# Patient Record
Sex: MALE | Race: WHITE | NOT HISPANIC OR LATINO | Employment: FULL TIME | URBAN - METROPOLITAN AREA
[De-identification: names, ages, dates, MRNs, and addresses within clinical notes are randomized per-mention and may not be internally consistent; named-entity substitution may affect disease eponyms.]

---

## 2016-10-14 LAB — PHQ9 SCORE: 10

## 2017-03-13 LAB
CHOLEST SERPL-MCNC: 171 MG/DL (ref 100–199)
CREAT SERPL-MCNC: 0.71 MG/DL (ref 0.72–1.25)
GFR SERPL CREATININE-BSD FRML MDRD: >60 ML/MIN/1.73M2
GLUCOSE SERPL-MCNC: 176 MG/DL (ref 65–100)
HBA1C MFR BLD: 9.4 % (ref 0–5.7)
HDLC SERPL-MCNC: 37 MG/DL
LDLC SERPL CALC-MCNC: 97 MG/DL
NONHDLC SERPL-MCNC: 134 MG/DL
POTASSIUM SERPL-SCNC: 4.2 MMOL/L (ref 3.5–5)
TRIGL SERPL-MCNC: 183 MG/DL

## 2017-05-15 ENCOUNTER — TRANSFERRED RECORDS (OUTPATIENT)
Dept: HEALTH INFORMATION MANAGEMENT | Facility: CLINIC | Age: 32
End: 2017-05-15

## 2017-05-15 LAB — HBA1C MFR BLD: 10.4 % (ref 0–5.7)

## 2017-06-13 ENCOUNTER — OFFICE VISIT (OUTPATIENT)
Dept: SURGERY | Facility: CLINIC | Age: 32
End: 2017-06-13
Payer: COMMERCIAL

## 2017-06-13 VITALS
SYSTOLIC BLOOD PRESSURE: 144 MMHG | HEIGHT: 73 IN | HEART RATE: 103 BPM | OXYGEN SATURATION: 98 % | TEMPERATURE: 99 F | DIASTOLIC BLOOD PRESSURE: 88 MMHG | RESPIRATION RATE: 22 BRPM | BODY MASS INDEX: 41.75 KG/M2 | WEIGHT: 315 LBS

## 2017-06-13 DIAGNOSIS — F41.1 GAD (GENERALIZED ANXIETY DISORDER): ICD-10-CM

## 2017-06-13 DIAGNOSIS — I10 ESSENTIAL HYPERTENSION: ICD-10-CM

## 2017-06-13 DIAGNOSIS — Z13.21 SCREENING FOR ENDOCRINE, NUTRITIONAL, METABOLIC AND IMMUNITY DISORDER: ICD-10-CM

## 2017-06-13 DIAGNOSIS — E66.01 MORBID OBESITY WITH BMI OF 45.0-49.9, ADULT (H): Primary | ICD-10-CM

## 2017-06-13 DIAGNOSIS — Z79.4 TYPE 2 DIABETES MELLITUS WITH HYPERGLYCEMIA, WITH LONG-TERM CURRENT USE OF INSULIN (H): ICD-10-CM

## 2017-06-13 DIAGNOSIS — Z13.0 SCREENING FOR ENDOCRINE, NUTRITIONAL, METABOLIC AND IMMUNITY DISORDER: ICD-10-CM

## 2017-06-13 DIAGNOSIS — M72.2 PLANTAR FASCIITIS: ICD-10-CM

## 2017-06-13 DIAGNOSIS — Z13.0 SCREENING FOR IRON DEFICIENCY ANEMIA: ICD-10-CM

## 2017-06-13 DIAGNOSIS — E66.01 MORBID OBESITY WITH BMI OF 45.0-49.9, ADULT (H): ICD-10-CM

## 2017-06-13 DIAGNOSIS — G89.29 CHRONIC RIGHT-SIDED LOW BACK PAIN WITH RIGHT-SIDED SCIATICA: ICD-10-CM

## 2017-06-13 DIAGNOSIS — E78.2 MIXED HYPERLIPIDEMIA: ICD-10-CM

## 2017-06-13 DIAGNOSIS — M54.41 CHRONIC RIGHT-SIDED LOW BACK PAIN WITH RIGHT-SIDED SCIATICA: ICD-10-CM

## 2017-06-13 DIAGNOSIS — E66.9 DIABETES MELLITUS TYPE 2 IN OBESE: ICD-10-CM

## 2017-06-13 DIAGNOSIS — Z13.29 SCREENING FOR ENDOCRINE, NUTRITIONAL, METABOLIC AND IMMUNITY DISORDER: ICD-10-CM

## 2017-06-13 DIAGNOSIS — E11.69 DIABETES MELLITUS TYPE 2 IN OBESE: ICD-10-CM

## 2017-06-13 DIAGNOSIS — Z13.228 SCREENING FOR ENDOCRINE, NUTRITIONAL, METABOLIC AND IMMUNITY DISORDER: ICD-10-CM

## 2017-06-13 DIAGNOSIS — G89.29 CHRONIC PAIN OF RIGHT KNEE: ICD-10-CM

## 2017-06-13 DIAGNOSIS — E11.65 TYPE 2 DIABETES MELLITUS WITH HYPERGLYCEMIA, WITH LONG-TERM CURRENT USE OF INSULIN (H): ICD-10-CM

## 2017-06-13 DIAGNOSIS — M25.561 CHRONIC PAIN OF RIGHT KNEE: ICD-10-CM

## 2017-06-13 PROCEDURE — 97802 MEDICAL NUTRITION INDIV IN: CPT | Performed by: DIETITIAN, REGISTERED

## 2017-06-13 PROCEDURE — 99205 OFFICE O/P NEW HI 60 MIN: CPT | Performed by: PHYSICIAN ASSISTANT

## 2017-06-13 RX ORDER — LOSARTAN POTASSIUM AND HYDROCHLOROTHIAZIDE 12.5; 1 MG/1; MG/1
1 TABLET ORAL DAILY
COMMUNITY
End: 2017-07-10

## 2017-06-13 RX ORDER — AMLODIPINE BESYLATE AND ATORVASTATIN CALCIUM 10; 40 MG/1; MG/1
1 TABLET, FILM COATED ORAL DAILY
COMMUNITY
End: 2017-07-10

## 2017-06-13 RX ORDER — TRIAMTERENE AND HYDROCHLOROTHIAZIDE 37.5; 25 MG/1; MG/1
1 CAPSULE ORAL EVERY MORNING
COMMUNITY
End: 2017-07-10

## 2017-06-13 RX ORDER — BISMUTH SUBSALICYLATE 262MG/15ML
1 SUSPENSION, ORAL (FINAL DOSE FORM) ORAL DAILY
COMMUNITY
End: 2018-05-04 | Stop reason: ALTCHOICE

## 2017-06-13 RX ORDER — TRAZODONE HYDROCHLORIDE 100 MG/1
100 TABLET ORAL AT BEDTIME
COMMUNITY
End: 2018-04-13

## 2017-06-13 RX ORDER — GLYBURIDE-METFORMIN HYDROCHLORIDE 5; 500 MG/1; MG/1
1 TABLET ORAL 2 TIMES DAILY WITH MEALS
COMMUNITY
End: 2017-07-10

## 2017-06-13 NOTE — MR AVS SNAPSHOT
MRN:[id number]                      After Visit Summary   6/13/2017    Naman Jones    MRN: [id number]           Visit Information        Provider Department      6/13/2017 10:30 AM 1, Adele Landa RD [facility]      Your next 10 appointments already scheduled     Jul 10, 2017  1:00 PM CDT   New Visit with Leilani Tavera MD   [facility]    [address]  [address]  [facility] [address] [phone]            Jul 14, 2017  8:30 AM CDT   Weight Loss Visit with Adele Landa 2, RD   [facility])    [address] [place] [address] [address]  [facility] [address] [phone]              MyChart Information     [employer] gives you secure access to your electronic health record. If you see a primary care provider, you can also send messages to your care team and make appointments. If you have questions, please call your primary care clinic.  If you do not have a primary care provider, please call [phone] and they will assist you.        Care EveryWhere ID     This is your Care EveryWhere ID. This could be used by other organizations to access your [facility] medical records  [id number]

## 2017-06-13 NOTE — MR AVS SNAPSHOT
After Visit Summary   6/13/2017    Naman Jones    MRN: [id number]           Patient Information     Date Of Birth          1985        Visit Information        Provider Department      6/13/2017 9:30 AM Lilibeth Ying PA-C [facility]      Today's Diagnoses     Morbid obesity with BMI of 45.0-49.9, adult (H)    -  1    Essential hypertension        Mixed hyperlipidemia        Diabetes mellitus type 2 in obese (H)        Plantar fasciitis        Chronic right-sided low back pain with right-sided sciatica        Chronic pain of right knee        Type 2 diabetes mellitus with hyperglycemia, with long-term current use of insulin (H)        JOEL (generalized anxiety disorder)        Screening for iron deficiency anemia        Screening for endocrine, nutritional, metabolic and immunity disorder          Care Instructions    Please refer to your task list created today at your appointment.  Make appointment to return to clinic in 1 month to see the dietician.  Have initial labs drawn.     [facility] if you would like to have your blood tests drawn today or call [phone] to make an appointment to have them drawn on another day.  You may also get your blood drawn at your [facility] clinic.            Follow-ups after your visit        Additional Services     ENDOCRINOLOGY ADULT REFERRAL       Your provider has referred you to: [facility]: [facility] [phone]   [email or web address]    Reason Uncontrolled Type II diabetes.  Preparing for baraitric surgery.  HgA1C must be below 9 prior to surgery.       Please be aware that coverage of these services is subject to the terms and limitations of your health insurance plan.  Call member services at your health plan with any benefit or coverage questions.      Please bring the following to your  "appointment:    >>   Any x-rays, CTs or MRIs which have been performed.  Contact the facility where they were done to arrange for  prior to your scheduled appointment.    >>   List of current medications   >>   This referral request   >>   Any documents/labs given to you for this referral            NUTRITION REFERRAL       Type of nutrition instruction needed: Weight Loss  Your provider has referred you to:     [facility]            PSYCHOLOGY REFERRAL       You will need to undergo Bariatric Psychological Assessment.  Below is a list of preferred bariatric psychologists.    When you call the psychologist's office, please specify you need a \"screening psychological assessment for bariatric surgery.\" This includes a bariatric surgery evaluation and MMPI.  The report should be mailed /faxed to our office at [phone]. Please see psychologist list given to you in your initial packet.                    Follow-up notes from your care team     Return in 1 month (on 7/13/2017).      Future tests that were ordered for you today     Open Future Orders        Priority Expected Expires Ordered    CBC with platelets Routine 6/13/2017 12/10/2017 6/13/2017    Comprehensive metabolic panel Routine 6/13/2017 12/10/2017 6/13/2017    Hemoglobin A1c Routine 6/13/2017 12/10/2017 6/13/2017    Vitamin D Deficiency Routine 6/13/2017 12/10/2017 6/13/2017            Who to contact     If you have questions or need follow up information about today's clinic visit or your schedule please contact [facility] directly at [phone].  Normal or non-critical lab and imaging results will be communicated to you by MyChart, letter or phone within 4 business days after the clinic has received the results. If you do not hear from us within 7 days, please contact the clinic through MyChart or phone. If you have a critical or abnormal lab result, we will notify you by phone as " "soon as possible.  Submit refill requests through [employer] or call your pharmacy and they will forward the refill request to us. Please allow 3 business days for your refill to be completed.          Additional Information About Your Visit        Mobile [facility] Information     [employer] gives you secure access to your electronic health record. If you see a primary care provider, you can also send messages to your care team and make appointments. If you have questions, please call your primary care clinic.  If you do not have a primary care provider, please call [phone] and they will assist you.        Care EveryWhere ID     This is your Care EveryWhere ID. This could be used by other organizations to access your [facility] medical records  [id number]        Your Vitals Were     Pulse Temperature Respirations Height Pulse Oximetry BMI (Body Mass Index)    103 99  F (37.2  C) 22 6' 1\" (1.854 m) 98% 46.1 kg/m2       Blood Pressure from Last 3 Encounters:   06/13/17 144/88    Weight from Last 3 Encounters:   06/13/17 (!) 349 lb 6.4 oz (158.5 kg)              We Performed the Following     ENDOCRINOLOGY ADULT REFERRAL     NUTRITION REFERRAL     OP ROOMING NOTE TO [facility]     PSYCHOLOGY REFERRAL        Primary Care Provider Office Phone # Fax #    Gilda Hernadez [phone]       [facility] [address] [place] [address]        Thank you!     Thank you for choosing [facility]  for your care. Our goal is always to provide you with excellent care. Hearing back from our patients is one way we can continue to improve our services. Please take a few minutes to complete the written survey that you may receive in the mail after your visit with us. Thank you!             Your Updated Medication List - Protect others around you: Learn how to safely use, store and throw away your medicines at www.disposemymeds.org.          This list is accurate as of: 6/13/17 10:45 " AM.  Always use your most recent med list.                   Brand Name Dispense Instructions for use    amLODIPine-atorvastatin 10-40 MG per tablet    CADUET     Take 1 tablet by mouth daily       blood glucose monitoring lancets      1 each by In Vitro route daily As directed.1 box. For testing blood sugar at home       blood glucose monitoring test strip    no brand specified     1 strip by In Vitro route 2 times daily Test twice daily       dulaglutide 1.5 MG/0.5ML pen    TRULICITY     Inject 1.5 mg Subcutaneous every 7 days       glyBURIDE-metFORMIN 5-500 MG per tablet    GLUCOVANCE     Take 1 tablet by mouth 2 times daily (with meals)       HumaLOG MIX 50/50 KWIKPEN 100 UNITS/ML susp   Generic drug:  insulin lispro prot & lispro      Inject 10 Units Subcutaneous 3 times daily (before meals)       LORAZEPAM PO      Take 1 mg by mouth 2 times daily       losartan-hydrochlorothiazide 100-12.5 MG per tablet    HYZAAR     Take 1 tablet by mouth daily       SERTRALINE HCL PO      Take 100 mg by mouth daily       traZODone 100 MG tablet    DESYREL     Take 100 mg by mouth At Bedtime       TRESIBA FLEXTOUCH 200 UNIT/ML pen   Generic drug:  insulin degludec      Inject 116 Units Subcutaneous At Bedtime       triamterene-hydrochlorothiazide 37.5-25 MG per capsule    DYAZIDE     Take 1 capsule by mouth every morning

## 2017-06-13 NOTE — PATIENT INSTRUCTIONS
Please refer to your task list created today at your appointment.  Make appointment to return to clinic in 1 month to see the dietician.  Have initial labs drawn.     [facility] if you would like to have your blood tests drawn today or call [phone] to make an appointment to have them drawn on another day.  You may also get your blood drawn at your [facility] clinic.

## 2017-06-13 NOTE — PROGRESS NOTES
[facility]  [address]  [place] [address]  Phone [phone] Fax [phone]    Date: 2017    RE:   MICK CHOWDHURY  MR#: [id number]  : 1985    Dear Emili Luna    I had the pleasure of seeing your patient, MICK CHOWDHURY, to evaluate his obesity and consider him for possible weight loss surgery.  As you know, MICK is 32 years old. He has been overweight since early childhood. He has been morbidly obese for the last 10 years and has been unable to achieve long-term success with weight loss attempts, such as: [place]ia, [place], Calorie Counting.    Comorbidities of obesity from his past medical history include: High blood pressure, High cholesterol, Diabetes type II, Plantar fasciitis, Sciatica, Low back pain, Intertrigo.     The symptoms related to his obesity include elevated blood sugars, Hip pain, Knee pain, Back pain, intertrigo. The following ADLs are hindered due to his weight: Transfer on/off toilet, Transfer in/out of shower/tub, Not enough energy to complete routine daily tasks.    Pts wife,Brit, had gastric bypass surgery about 1.5 years ago.  Pt has been working with the [facility] for a year and a half.  Has been having trouble getting he Hemoglobin A1C under 7.4 which is a requirement for him to have surgery there.  Pt is frustrated with the lack of care coordination and is hoping to transfer care to our program.       Non-Obesity Related Past Medical History:  Anxiety  Depression  Binge Eating Disorder diagnosed 2016-Did 6 months of treatment and now working with Hannah Antonio PsyD at [facility].  Acne  Subluxing patella    Past Surgical History:No previous bleeding, anesthesia, or nausea concerns with surgery.  [place] teeth   Knee Arthroscopy    Family History:  Father- Diabetes;High cholesterol  Mother- Obese  PGF- Prostate Cancer  PGM- Diabetes  MGM- Breast Cancer  Sibling 1- gall bladder problems;High  "cholesterol;Obese    Social History:  He is  to Brit.   Brody is going to be 3 in September.  He is in .   ETOH: Weekly, 4 drinks at a time.  Illicit Drug Use: None  Tobacco Use: No  Support system: spouse, mom, sister will be available to help the patient in the early post op period. spouse, mom, sister, raine, grace, bertram, han is who the patient can count on for support throughout his weight loss journey.  Can you afford three meals per day? Yes  Can you afford the $50-60 per month for vitamins? Yes    A 14 point review of system shows:  Gastrointestinal: Elevated blood sugars, Diarrhea- secondary to metformin  HEENT: Headaches ,  Musculoskeletal:  Hip pain, Knee pain, Back pain,  Psychological: Anxiety, Depression,  Skin: intertrigo  Endocrinology: Diabetes Type II diagnosed last year with a HgA1C at 11.8.  Medication management with PCP. Sees Enedelia Candelaria RD for diabetic diet management with Emili Vazquez - Most recent HgA1C 10.4.      /88  Pulse 103  Temp 99  F (37.2  C)  Resp 22  Ht 6' 1\" (1.854 m)  Wt (!) 349 lb 6.4 oz (158.5 kg)  SpO2 98%  BMI 46.1 kg/m2    PHYSICAL EXAMINATION:    GENERAL: obese, good health, good development, and normal affect., Alert and oriented x3. NAD  HEENT: Trachea midline, Neck supple without lymphadenopathy, thyroidmegaly, or mass., Oral dentition adequate for chewing  CARDIOVASCULAR: Regular rate and rhythm, No murmurs, rubs or gallops  RESPIRATORY: Good breath sounds, Lung sounds clear to auscultation bilaterally  GASTROINTESTINAL: Obese, Soft, Nontender, Non-distended, No organomeagly or masses  LOWER EXTREMITIES: No LE edema. No cyanosis, ulceration, or chronic venous stasis  MUSCULOSKELETAL: Normal gait, Moves all 4 extremities equal and strong  NEUROLOGIC: cranial nerves II-XII grossly intact  SKIN: No intertriginous irritation or rash    In summary, MICK is morbidly obese with a body mass index of 46.1 kg/m2 and the " comorbidities stated above. He attended an informational seminar and is a candidate for Gastric Bypass (Snehal-en-Y). He will have to complete the following pre-requisites:    Received weight loss goal of 5 lb prior to surgery.  Hemoglobin A1C under 9 before surgery  Records from [facility] regarding Binge Eating Disorder  Achieve clearance from dietitian to see surgeon.  Have preoperative laboratory tests drawn.  Letter of support from current therapist.  Psychological Evaluation with MMPI and clearance for weight loss surgery.    Today in the office we discussed gastric bypass surgery. Preoperative, perioperative, and postoperative processes, management, and follow up were addressed. Risks and benefits were outlined including the risk of death, PE, DVT, ulcer, N/V, stricture, hernia, wound infection, and vitamin deficiencies. All questions were answered. A goal sheet and support group handout were given to the patient.    Once the patient has completed the requirements set forth in paragraph one, and there are no further recommendations, he will be allowed to see the surgeon of his choice for consultation on the Gastric Bypass (Snehal-en-Y) surgery. Patient verbalizes understanding of the process to surgery and expectations for the postoperative period including the need for lifelong lifestyle changes, vitamin supplementation, and laboratory monitoring.    Thank you for allowing us to participate in his care.    Sincerely,        Lilibeth Ying PA-C      At [facility] we are committed to providing you exceptional care. Our surgeons specialize in performing the following minimally invasive weight-loss surgeries:    Snehal-en-Y gastric bypass  Laparoscopic adjustable gastric band  Sleeve gastrectomy    If you would like to review aspects of our weight loss surgery program, please visit our website at [email or web address]. If you have any questions, please do not hesitate to contact us at [phone].

## 2017-06-14 NOTE — PROGRESS NOTES
"late entry (patient seen on 17)  Name: MICK CHOWDHURY  : 1985  Gender: Male  MRN: [id number]  Age: 32  INITIAL BARIATRIC NUTRITION ASSESSMENT  REASON FOR VISIT:  MICK CHOWDHURY is a 32 year old Male presents today for initial nutrition visit for bariatric surgery. He was accompanied by his spouse, Brit.  DIAGNOSIS:  Morbid Obesity.  ANTHROPOMETRICS:  Height: 73 inches  Weight: 349.4 lbs  BMI: 46.1 kg/m2  CURRENT SUPPLEMENTS:  none  WEIGHT LOSS ATTEMPTS:  Atkins, [place], Calorie Counting  Prescription diet medications:  None  NUTRITION HISTORY:  Meals per day: 3  Snacking: No  Breakfast: Protein Shake ( Whey) and Yogurt-within 1 hour of waking  Lunch: [place] or soup, fruit or yogurt or cheese  Supper: Chicken Stir oquendo, side or ham/cheese/ green peppers omelet  Snacks: Bedtime some cereal with 2% milk or 1 cup shelled peanuts  Drinks: 3  Emotional eating: No  Binge eating: No-see comments  Night eating: No  Can you afford three meals per day? Yes  Can you afford the $50-60 per month for vitamins? Yes  Comments: patient goes by \"Chirag;\"pt worked with [facility] for 6 months on binge Eating Disorder and has been discharged from that program per feed back from Lilibeth Ying PA-C (she has requested records form that program); wife had weight loss surgery in ; sister had sleeve gastrostomy; pt and his wife share the cooking; pt was in the surgical weight loss program at [facility] for ~ 1 year (was having trouble meeting HGBA1C requirements and was not happy with the program in general) ; has on 4 year old son; pt works in the [employer] industry  DINING OUT HISTORY:  Frequency per week: Around once a week  Location: fast-casual  Type of food: hamburger, diet soda  PHYSICAL ACTIVITY:  None  NUTRITION DIAGNOSIS:  Patient with excessive oral food/beverage intake related to intake of calorically dense food/beverages at meals and/or snacks as evidenced by BMI of " 46.1  INTERVENTION:  Nutrition Prescription: Recommend nutrient/ energy modification   Goals:  Start: 1 multivitamin mineral, 2000 International Units Vitamin D, 1200 mg calcium with vitamin D (2-3 separate doses)  Avoid all alcohol (pt chose this goal) Limit diet pop to 12 oz. per day  Implementation:  Provided written guidelines for Gastric Bypass (Snehal-en-Y) surgery.  Provided weight loss suggestions.  Explained diet changes that would occur after surgery.  Emphasized importance of diet and lifestyle modifications.  Discussed necessity for chewable multivitamin/ mineral supplements, calcium with vitamin D, vitamin B-12, vitamin D supplements.  NUTRITION MONITORING AND EVALUATION:  Verbalizes understanding of surgery diet guidelines.  Anticipated compliance: fair-good    FOLLOW UP:  Recommend 2 to 3 follow up visits to assist with lifestyle changes or per insurance requirements.  RD name and number provided.  TIME SPENT WITH PATIENT: 50 minutes  Darius Montenegro RD, LD  [facility]  [phone]

## 2017-06-15 ENCOUNTER — TRANSFERRED RECORDS (OUTPATIENT)
Dept: HEALTH INFORMATION MANAGEMENT | Facility: CLINIC | Age: 32
End: 2017-06-15

## 2017-06-27 ENCOUNTER — TELEPHONE (OUTPATIENT)
Dept: SURGERY | Facility: CLINIC | Age: 32
End: 2017-06-27

## 2017-06-27 NOTE — TELEPHONE ENCOUNTER
Left message for therapist at [facility].  I reviewed records from [facility].  Pt has binge eating disorder and is looking to having bariatric surgery.  Was binging 2x weekly most recently Dec 2016.  Asked her to return my call so we could discuss his case.  (Pts with active eating disorder much complete treatment before continuing in bariatric program.)  Will await her follow up call.

## 2017-07-10 ENCOUNTER — OFFICE VISIT (OUTPATIENT)
Dept: ENDOCRINOLOGY | Facility: CLINIC | Age: 32
End: 2017-07-10
Payer: COMMERCIAL

## 2017-07-10 VITALS
BODY MASS INDEX: 40.43 KG/M2 | DIASTOLIC BLOOD PRESSURE: 84 MMHG | WEIGHT: 315 LBS | OXYGEN SATURATION: 98 % | TEMPERATURE: 98.4 F | HEART RATE: 108 BPM | SYSTOLIC BLOOD PRESSURE: 118 MMHG | HEIGHT: 74 IN

## 2017-07-10 DIAGNOSIS — Z79.4 TYPE 2 DIABETES MELLITUS WITH HYPERGLYCEMIA, WITH LONG-TERM CURRENT USE OF INSULIN (H): Primary | ICD-10-CM

## 2017-07-10 DIAGNOSIS — I10 ESSENTIAL HYPERTENSION: ICD-10-CM

## 2017-07-10 DIAGNOSIS — E66.01 MORBID OBESITY WITH BMI OF 45.0-49.9, ADULT (H): ICD-10-CM

## 2017-07-10 DIAGNOSIS — E11.65 TYPE 2 DIABETES MELLITUS WITH HYPERGLYCEMIA, WITH LONG-TERM CURRENT USE OF INSULIN (H): Primary | ICD-10-CM

## 2017-07-10 DIAGNOSIS — E78.2 MIXED HYPERLIPIDEMIA: ICD-10-CM

## 2017-07-10 LAB — HBA1C MFR BLD: 8.4 % (ref 4.3–6)

## 2017-07-10 PROCEDURE — 82043 UR ALBUMIN QUANTITATIVE: CPT | Performed by: INTERNAL MEDICINE

## 2017-07-10 PROCEDURE — 83516 IMMUNOASSAY NONANTIBODY: CPT | Mod: 90 | Performed by: INTERNAL MEDICINE

## 2017-07-10 PROCEDURE — 99000 SPECIMEN HANDLING OFFICE-LAB: CPT | Performed by: INTERNAL MEDICINE

## 2017-07-10 PROCEDURE — 82565 ASSAY OF CREATININE: CPT | Performed by: INTERNAL MEDICINE

## 2017-07-10 PROCEDURE — 84681 ASSAY OF C-PEPTIDE: CPT | Performed by: INTERNAL MEDICINE

## 2017-07-10 PROCEDURE — 83721 ASSAY OF BLOOD LIPOPROTEIN: CPT | Performed by: INTERNAL MEDICINE

## 2017-07-10 PROCEDURE — 99204 OFFICE O/P NEW MOD 45 MIN: CPT | Performed by: INTERNAL MEDICINE

## 2017-07-10 PROCEDURE — 36415 COLL VENOUS BLD VENIPUNCTURE: CPT | Performed by: INTERNAL MEDICINE

## 2017-07-10 PROCEDURE — 83036 HEMOGLOBIN GLYCOSYLATED A1C: CPT | Performed by: INTERNAL MEDICINE

## 2017-07-10 PROCEDURE — 84443 ASSAY THYROID STIM HORMONE: CPT | Performed by: INTERNAL MEDICINE

## 2017-07-10 RX ORDER — ATORVASTATIN CALCIUM 40 MG/1
40 TABLET, FILM COATED ORAL DAILY
COMMUNITY
End: 2018-04-18

## 2017-07-10 RX ORDER — LOSARTAN POTASSIUM 100 MG/1
100 TABLET ORAL DAILY
COMMUNITY
End: 2018-02-07

## 2017-07-10 RX ORDER — TRIAMTERENE AND HYDROCHLOROTHIAZIDE 75; 50 MG/1; MG/1
1 TABLET ORAL DAILY
COMMUNITY
End: 2018-04-18

## 2017-07-10 NOTE — PROGRESS NOTES
ENDOCRINOLOGY CLINIC NOTE:  Name: Naman Jones  Seen at the request of Gilda Hernadez for Diabetes. Is here with wife.  HPI:  Naman Jones is a 32 year old male who presents for the evaluation/management of Diabetes.  Was seen at bariatric surgery clinic for consideration of gastric bypass surgery and was noted to have high A1c.  He was referred to endocrinology for management of type 2 diabetes.  He was getting care at [facility] before.  Limited records available and were reviewed.    1. Type 2 DM:  Orginally diagnosed at the age of: 30. On routine physical exam. Was loosing wt at that time.  On insulin X 1 year (tresiba). Humalog X 3 months  Current Regimen: Tresiba 116 Units hs, Humalog 11 Units TID, Trulicity 1.5 mg once a week andMetformin 1000 mg BID.  BS checks: 1-2 times a day  Average Meter Download: Patient did not bring glucometer. Without Blood sugar data it is difficult to make adjustment to medical regimen.   FBGs 150-160.  Exercise: not much  Last A1c: 10.4%  Symptoms of hypoglycemia (low blood sugar):  Gets symptoms of hypoglycemia.  Episodes of hypoglycemia: no  Fixed meal pattern: yes  Patient counting carbs: no    DM Complications:   Nephropathy: no  Retinopathy: no  Neuropathy: + numbness in toes  Microalbuminuria: no  No results found for: MICROL  No results found for: MICROALBUMIN    CAD/PAD: no  Gastroparesis: no  Hypoglycemia unawareness: no    2. Hypertension:    On medications  3. Hyperlipidemia: On medications       PMH/PSH:  DM  HTN  Dyslipidemia    Family Hx:  Diabetes: Father and mgm    Social Hx:  Social History     Social History     Marital status:      Spouse name: N/A     Number of children: N/A     Years of education: N/A     Occupational History     Not on file.     Social History Main Topics     Smoking status: Never Smoker     Smokeless tobacco: Never Used     Alcohol use Not on file     Drug use: Not on file     Sexual activity: Not on file     Other Topics Concern  "    Not on file     Social History Narrative          MEDICATIONS:  has a current medication list which includes the following prescription(s): triamterene-hydrochlorothiazide, insulin degludec, insulin lispro prot & lispro, blood glucose monitoring, dulaglutide, trazodone, glyburide-metformin, lorazepam, amlodipine-atorvastatin, losartan-hydrochlorothiazide, blood glucose monitoring, and sertraline hcl.    ROS     ROS: 10 point ROS neg other than the symptoms noted above in the HPI.    Physical Exam   VS: /84 (Cuff Size: Adult Large)  Pulse 108  Temp 98.4  F (36.9  C) (Oral)  Ht 1.867 m (6' 1.5\")  Wt (!) 155.1 kg (342 lb)  SpO2 98%  BMI 44.51 kg/m2  GENERAL: AXOX3, NAD, well dressed, answering questions appropriately, appears stated age.  HEENT: No exopthalmous, no proptosis, EOMI, no lig lag, no retraction  NECK: Thyroid normal in size, nontender. No nodules were palpated.  CV: RRR, no rubs, gallops, no murmurs  LUNGS: CTAB, no wheezes, rales, or ronchi  ABDOMEN: +BS  EXTREMITIES: no edema, +pulses, no rashes, no lesions  NEUROLOGY: CN grossly intact, + DTR upper and lower extremity, no tremors  MSK: grossly intact  SKIN: no rashes, no lesions  PSYCH: normal affect and mood      LABS:  A1c:  5/2017 :  A1c 10.4%     Records from outside clinic reviewed.    All pertinent notes, labs, and images personally reviewed by me.     A/P  Mr.Anthony Jones is a 32 year old here for the evaluation/management of diabetes:    1. DM2 - Under Poor control.  A1c 10.4%.  Diabetes complicated by neuropathy.  Body mass index is 44.51 kg/(m^2).  Consideration for gastric bypass.  Needs strict blood sugar control before that.  Patient did not bring glucometer. Without Blood sugar data it is difficult to make adjustment to medical regimen.   He is on basal > bolus insulin  Sometimes having trouble with metformin.  Sometimes has diarrhea and nausea.  No major episodes of hypoglycemia noted  I also discussed importance of " strict blood sugar control to prevent complications associated with uncontrolled diabetes.  I discussed importance of carbohydrate counting and taking insulin according to carbohydrate with meals  Referred to diabetic educator for education about carbohydrate counting  Need more data- will benefit from continuous glucose monitor.  Referral made to diabetic educator.    -- Continue metformin 1000 mg twice a day.  He is not able to tolerate it can decrease the dose by 500 mg per day    -- Continue Tresiba 116 units per day and Humalog 11 units with each meal.    -- Start Invokana 100 mg/day    -- He is allergic to sulfa medications so can not be on sulfonylureas    -- Also discussed insulin pump briefly.    -- repeat labs      Recommend checking blood sugars before meals and at bedtime.    If Blood glucose are low more often-> 2-3 times/week- give us a call.  The patient is advised to Make better food choices: reduce carbs, Reduce portion size, weight loss and exercise 3-4 times a week.  Discussed hypoglycemia signs and symptoms as well as management in detail.    There is some variability among people, most will usually develop symptoms suggestive of hypoglycemia when blood glucose levels are lowered to the mid 60's. The first set of symptoms are called adrenergic. Patients may experience any of the following nervousness, sweating, intense hunger, trembling, weakness, palpitations, and difficulty speaking.   The acute management of hypoglycemia involves the rapid delivery of a source of easily absorbed sugar. Regular soda, juice, lifesavers, table sugar, are good options. 15 grams of glucose is the dose that is given, followed by an assessment of symptoms and a blood glucose check if possible. If after 10 minutes there is no improvement, another 10-15 grams should be given. This can be repeated up to three times. The equivalency of 10-15 grams of glucose (approximate servings) are: Four lifesavers, 4 teaspoons of  sugar, or 1/2 can of regular soda or juice.      2. Hypertension - Under Good control.  Continue Maxide, losartan 100 mg      3. Hyperlipidemia -no recent labs available.  Continue atorvastatin 40 mg  -- Repeat lipid panel once blood sugar under better control    4. Prevention  Flu Shot- NA  Pneumovax- NA  Opthalmology- due  ASA- NA  Smoking- no    All questions were answered.  The patient indicates understanding of the above issues and agrees with the plan set forth.     More than 50% of face to face time spent with Mr. Jones on counseling / coordinating his care.  Total appointment times was 45 minutes.      Follow-up:  4-6 weeks    Leilani Tavera M.D  Endocrinology  [facility]  CC: Gilda Hernadez    Disclaimer: This note consists of symbols derived from keyboarding, dictation and/or voice recognition software. As a result, there may be errors in the script that have gone undetected. Please consider this when interpreting information found in this chart.    Addendum to above note and clinic visit:    Labs reviewed.    See result note/telephone encounter.

## 2017-07-10 NOTE — PATIENT INSTRUCTIONS
[facility]   Dr Tavera, Endocrinology Department      [facility]   [address] [address]  Appointment [phone]  Fax: [phone]   Monday and Tuesday         [facility]   [address] [place] [address]  Appointment [phone]  Fax: [phone]  Wednesday and Thursday           Labs today  Your provider has referred you to Diabetes Education: For all [facility]:  Phone [phone]; Fax [phone]  Please call and make the appointment.--> Learn about carb counting and continous glucose monitor    Continue same amount of insulin and metformin  Start Invokana 100 mg/day    Follow  Up 4-6 weeks    Recommend checking blood sugars before meals and at bedtime.    If Blood glucose are low more often-> 2-3 times/week- give us a call.  The patient is advised to Make better food choices: reduce carbs, Reduce portion size, weight loss and exercise 3-4 times a week.  Discussed hypoglycemia signs and symptoms as well as management in detail.

## 2017-07-10 NOTE — MR AVS SNAPSHOT
After Visit Summary   7/10/2017    Naman Jones    MRN: [id number]           Patient Information     Date Of Birth          1985        Visit Information        Provider Department      7/10/2017 1:00 PM Leilani Tavera MD [facility]        Today's Diagnoses     Type 2 diabetes mellitus with hyperglycemia, with long-term current use of insulin (H)    -  1    Morbid obesity with BMI of 45.0-49.9, adult (H)        Essential hypertension        Mixed hyperlipidemia          Care Instructions    [facility]   Dr Tavera, Endocrinology Department      [facility]   [address] [address]  Appointment [phone]  Fax: [phone]   Monday and Tuesday         [address] [place] [place] [address]  Appointment [phone]  Fax: [phone]  Wednesday and Thursday           Labs today  Your provider has referred you to Diabetes Education: For all [facility]:  Phone [phone]; Fax [phone]  Please call and make the appointment.--> Learn about carb counting and continous glucose monitor    Continue same amount of insulin and metformin  Start Invokana 100 mg/day    Follow  Up 4-6 weeks    Recommend checking blood sugars before meals and at bedtime.    If Blood glucose are low more often-> 2-3 times/week- give us a call.  The patient is advised to Make better food choices: reduce carbs, Reduce portion size, weight loss and exercise 3-4 times a week.  Discussed hypoglycemia signs and symptoms as well as management in detail.                Follow-ups after your visit        Additional Services     DIABETES EDUCATOR REFERRAL       Your provider has referred you to Diabetes Education: For all [facility]:  Phone [phone]; Fax [phone]    This is a Previous Diagnosis     Type of diabetes is Type 2 - On Insulin                                                           A1C is: No results found for: A1C  If an urgent visit is needed or A1C is above 12, Care Team to call the diabetes education team at [phone] or send a message to the diabetes education pool (P DIAB ED-PATIENT CARE).    Diabetes education focus: Knowledge Comprehensive knowledge assessment , Healthy Eating, Being Active, Monitoring blood sugar, Taking Medication, Problem Solving (hypoglycemia), Reducing Risks (preventing diabetes complications) and Healthy Coping and Medical Nutrition Therapy Previous diagnosis: Annual follow-up MNT - 2 hours      Education needs: None                                                                                                                                                      Please be aware that coverage of these services is subject to the terms and limitations of your health insurance plan.  Call member services at your health plan to determine Diabetes Self-Management Training benefits and ask which blood glucose monitor brands are covered by your plan.      Please bring the following to your appointment:    -   List of current medications   -   List of blood glucose monitor brands that are covered by your insurance plan  -   Blood glucose monitor and log book  -   Food records for the 3 days prior to your visit      The Certified Diabetes Educator may make diabetes medication adjustments per  the CDE Protocol and Collaborative Practice Agreement.                  Your next 10 appointments already scheduled     Jul 28, 2017  9:00 AM CDT   Weight Loss Visit with Adele Landa 2, RD   [facility]    [address] [place] [id number] [phone]              Who to contact     If you have questions or need follow up information about today's clinic visit or your schedule please contact [facility] [place] directly at  "[phone].  Normal or non-critical lab and imaging results will be communicated to you by [employer], letter or phone within 4 business days after the clinic has received the results. If you do not hear from us within 7 days, please contact the clinic through [employer] or phone. If you have a critical or abnormal lab result, we will notify you by phone as soon as possible.  Submit refill requests through [employer] or call your pharmacy and they will forward the refill request to us. Please allow 3 business days for your refill to be completed.          Additional Information About Your Visit        [employer] Information     [employer] gives you secure access to your electronic health record. If you see a primary care provider, you can also send messages to your care team and make appointments. If you have questions, please call your primary care clinic.  If you do not have a primary care provider, please call [phone] and they will assist you.        Care EveryWhere ID     This is your Care EveryWhere ID. This could be used by other organizations to access your [place] medical records  [id number]        Your Vitals Were     Pulse Temperature Height Pulse Oximetry BMI (Body Mass Index)       108 98.4  F (36.9  C) (Oral) 1.867 m (6' 1.5\") 98% 44.51 kg/m2        Blood Pressure from Last 3 Encounters:   07/10/17 118/84   06/13/17 144/88    Weight from Last 3 Encounters:   07/10/17 (!) 155.1 kg (342 lb)   06/13/17 (!) 158.5 kg (349 lb 6.4 oz)              We Performed the Following     Albumin Random Urine Quantitative     C-peptide     Creatinine     DIABETES EDUCATOR REFERRAL     Glutamic acid decarboxylase antibody     Hemoglobin A1c     LDL cholesterol direct          Today's Medication Changes          These changes are accurate as of: 7/10/17  1:47 PM.  If you have any questions, ask your nurse or doctor.               Start taking these medicines.        Dose/Directions    canagliflozin 100 MG tablet   Commonly " known as:  INVOKANA   Used for:  Type 2 diabetes mellitus with hyperglycemia, with long-term current use of insulin (H)   Started by:  Leilani Tavera MD        Dose:  100 mg   Take 1 tablet (100 mg) by mouth every morning (before breakfast)   Quantity:  30 tablet   Refills:  1       insulin lispro 100 UNIT/ML injection   Commonly known as:  HumaLOG KWIKpen   Used for:  Type 2 diabetes mellitus with hyperglycemia, with long-term current use of insulin (H)   Started by:  Leilani Tavera MD        11 units with each meal   Quantity:  15 mL   Refills:  1       metFORMIN 500 MG tablet   Commonly known as:  GLUCOPHAGE   Used for:  Type 2 diabetes mellitus with hyperglycemia, with long-term current use of insulin (H)   Started by:  Leilani Tavera MD        Dose:  1000 mg   Take 2 tablets (1,000 mg) by mouth 2 times daily (with meals)   Quantity:  90 tablet   Refills:  3         Stop taking these medicines if you haven't already. Please contact your care team if you have questions.     glyBURIDE-metFORMIN 5-500 MG per tablet   Commonly known as:  GLUCOVANCE   Stopped by:  Leilani Tavera MD           HumaLOG MIX 50/50 KWIKPEN 100 UNITS/ML susp   Generic drug:  insulin lispro prot & lispro   Stopped by:  Leilani Tavera MD                Where to get your medicines      These medications were sent to [facility] - [place] - [address] AT [facility]  [address], [place] [address]     Phone:  [phone]     canagliflozin 100 MG tablet         Some of these will need a paper prescription and others can be bought over the counter.  Ask your nurse if you have questions.     You don't need a prescription for these medications     insulin lispro 100 UNIT/ML injection    metFORMIN 500 MG tablet                Primary Care Provider Office Phone # Fax #    Gilda Hernadez [phone]       [facility] [address]  [address]        Equal Access to Services     [facility]: Hadii lopez juarez miguel angelbonny Julia, waaxda luqadaha, qaybta kaalmada liz, naman rodgers. So [facility] [phone].    ATENCIÓN: Si habla español, tiene a dumas disposición servicios gratuitos de asistencia lingüística. Romanaame al [phone].    We comply with applicable federal civil rights laws and [place] laws. We do not discriminate on the basis of race, color, national origin, age, disability sex, sexual orientation or gender identity.            Thank you!     Thank you for choosing [facility] [place]  for your care. Our goal is always to provide you with excellent care. Hearing back from our patients is one way we can continue to improve our services. Please take a few minutes to complete the written survey that you may receive in the mail after your visit with us. Thank you!             Your Updated Medication List - Protect others around you: Learn how to safely use, store and throw away your medicines at www.disposemymeds.org.          This list is accurate as of: 7/10/17  1:47 PM.  Always use your most recent med list.                   Brand Name Dispense Instructions for use Diagnosis    amLODIPine-atorvastatin 10-40 MG per tablet    CADUET     Take 1 tablet by mouth daily        blood glucose monitoring lancets      1 each by In Vitro route daily As directed.1 box. For testing blood sugar at home        blood glucose monitoring test strip    no brand specified     1 strip by In Vitro route 2 times daily Test twice daily        canagliflozin 100 MG tablet    INVOKANA    30 tablet    Take 1 tablet (100 mg) by mouth every morning (before breakfast)    Type 2 diabetes mellitus with hyperglycemia, with long-term current use of insulin (H)       dulaglutide 1.5 MG/0.5ML pen    TRULICITY     Inject 1.5 mg Subcutaneous every 7 days        insulin lispro 100 UNIT/ML injection    HumaLOG KWIKpen    15 mL    11 units with each  meal    Type 2 diabetes mellitus with hyperglycemia, with long-term current use of insulin (H)       LORAZEPAM PO      Take 1 mg by mouth 2 times daily        losartan-hydrochlorothiazide 100-12.5 MG per tablet    HYZAAR     Take 1 tablet by mouth daily        metFORMIN 500 MG tablet    GLUCOPHAGE    90 tablet    Take 2 tablets (1,000 mg) by mouth 2 times daily (with meals)    Type 2 diabetes mellitus with hyperglycemia, with long-term current use of insulin (H)       SERTRALINE HCL PO      Take 100 mg by mouth daily        traZODone 100 MG tablet    DESYREL     Take 100 mg by mouth At Bedtime        TRESIBA FLEXTOUCH 200 UNIT/ML pen   Generic drug:  insulin degludec      Inject 116 Units Subcutaneous At Bedtime        triamterene-hydrochlorothiazide 37.5-25 MG per capsule    DYAZIDE     Take 1 capsule by mouth every morning

## 2017-07-11 LAB
C PEPTIDE SERPL-MCNC: 4.6 NG/ML (ref 0.9–6.9)
CREAT SERPL-MCNC: 0.48 MG/DL (ref 0.66–1.25)
CREAT UR-MCNC: 56 MG/DL
GFR SERPL CREATININE-BSD FRML MDRD: ABNORMAL ML/MIN/1.7M2
LDLC SERPL DIRECT ASSAY-MCNC: 127 MG/DL
MICROALBUMIN UR-MCNC: 18 MG/L
MICROALBUMIN/CREAT UR: 32.08 MG/G CR (ref 0–17)
TSH SERPL DL<=0.005 MIU/L-ACNC: 0.9 MU/L (ref 0.4–4)

## 2017-07-13 LAB — GAD65 AB SER IA-ACNC: NORMAL

## 2017-07-13 NOTE — TELEPHONE ENCOUNTER
Dayday Vega returned my call.  She has been seeing Naman for over 9 months and has been very impressed with his progress.  He has not binge ate > 2x a week since December. She is now classifying him as in remission.  She will continue seeeing him through the baritraic processes to surgery as well as in the post operative period to help ensure patient continues to stay in remission.   She will send a letter of support/clearance stating this.

## 2017-07-18 ENCOUNTER — ALLIED HEALTH/NURSE VISIT (OUTPATIENT)
Dept: EDUCATION SERVICES | Facility: CLINIC | Age: 32
End: 2017-07-18
Payer: COMMERCIAL

## 2017-07-18 DIAGNOSIS — Z79.4 TYPE 2 DIABETES MELLITUS WITH HYPERGLYCEMIA, WITH LONG-TERM CURRENT USE OF INSULIN (H): Primary | ICD-10-CM

## 2017-07-18 DIAGNOSIS — E66.01 MORBID OBESITY WITH BMI OF 45.0-49.9, ADULT (H): ICD-10-CM

## 2017-07-18 DIAGNOSIS — E11.65 TYPE 2 DIABETES MELLITUS WITH HYPERGLYCEMIA, WITH LONG-TERM CURRENT USE OF INSULIN (H): Primary | ICD-10-CM

## 2017-07-18 PROCEDURE — G0108 DIAB MANAGE TRN  PER INDIV: HCPCS

## 2017-07-18 PROCEDURE — 95250 CONT GLUC MNTR PHYS/QHP EQP: CPT

## 2017-07-18 NOTE — MR AVS SNAPSHOT
After Visit Summary   7/18/2017    Naman Jones    MRN: [id number]           Patient Information     Date Of Birth          1985        Visit Information        Provider Department      7/18/2017 8:00 AM CR DIABETIC ED RESOURCE [facility]        Care Instructions    Continue taking your Tresiba at bedtime.  Take your 11 units of Humalog before each meal (within 5 minutes of eating). If you skip a meal you will not take your Humalog as it can promote low blood sugars.   Blood glucose targets:  Fasting or before meals:  mg/dL and 2 hours after a meal: 180 mg/dL or less   Continue to aim for 2500 calories/day for weight loss.   Aim for around 60-75 grams of carbohydrate at your meals. This allows room for 15-30 grams at 1-2 snacks per day.     Signs/symptoms of low blood sugar include (but are not limited to): sweating, shaking, feeling dizzy or weak, extreme hunger, headache, feeling crabby or confused, numbness or tingling of mouth/lips.  If you have these symptoms check your blood sugar if you can and then consume carbohydrate (sugar)  This is a helpful reminder on how to treat a blood sugar if you are low:  If your blood sugar is < 70 mg/dL, consume 15 grams of fast-acting carbohydrate (4 glucose tabs OR 4 oz juice or non-diet pop OR 2 Tbsp dried fruit OR 5-7 lifesavers OR 1 Tbsp sugar) and wait 15 minutes. Check blood sugar again and if not rising, repeat.  If your blood sugar is < 50 mg/dL, consume 30 grams of fast-acting carbohydrate (8 glucose tabs OR 8 oz juice or non-diet pop OR 4 Tbsp dried fruit OR 10-14 lifesavers OR 2 Tbsp sugar) and wait 15 minutes. Check blood sugar again and if not rising, repeat.    Follow-up: Argelia Jose and Enedelia (work in [place])  [phone] Diabetes Education Line   [email or web address]           Follow-ups after your visit        Your next 10 appointments already scheduled     Jul 26, 2017  2:30 PM CDT   Diabetic  Education with Enedelia Pena   [facility]    [address]  [facility] [address] [phone]            Jul 28, 2017  9:00 AM CDT   Weight Loss Visit with Adele Wl Diet 2, RD   [facility] ([facility])    [address] [place] [address]  [place] [id number] [phone]            Aug 01, 2017 10:00 AM CDT   Diabetic Education with CR DIABETIC ED RESOURCE   [facility]    [address]  [facility] [address] [phone]              Who to contact     If you have questions or need follow up information about today's clinic visit or your schedule please contact [facility] directly at [phone].  Normal or non-critical lab and imaging results will be communicated to you by MyChart, letter or phone within 4 business days after the clinic has received the results. If you do not hear from us within 7 days, please contact the clinic through [employer] or phone. If you have a critical or abnormal lab result, we will notify you by phone as soon as possible.  Submit refill requests through [employer] or call your pharmacy and they will forward the refill request to us. Please allow 3 business days for your refill to be completed.          Additional Information About Your Visit        [employer] Information     [employer] gives you secure access to your electronic health record. If you see a primary care provider, you can also send messages to your care team and make appointments. If you have questions, please call your primary care clinic.  If you do not have a primary care provider, please call [phone] and they will assist you.        Care EveryWhere ID     This is your Care EveryWhere ID. This could be used by other organizations to access your [place] medical records  [id number]         Blood Pressure from Last 3  Encounters:   07/10/17 118/84   06/13/17 144/88    Weight from Last 3 Encounters:   07/10/17 (!) 155.1 kg (342 lb)   06/13/17 (!) 158.5 kg (349 lb 6.4 oz)              Today, you had the following     No orders found for display       Primary Care Provider Office Phone # Fax #    Gilda Hernadez [phone]       [facility] [address] [place] [address]        Equal Access to Services     [facility] HospitalJOSE L : Hadii aad ku hadasho Soomaali, waaxda luqadaha, qaybta kaalmada adeegyada, waxay idiin hayaan adeeg kharajordon laglenys . So [facility] [phone].    ATENCIÓN: Si habla español, tiene a dumas disposición servicios gratuitos de asistencia lingüística. Ll[facility] [phone].    We comply with applicable federal civil rights laws and [place] laws. We do not discriminate on the basis of race, color, national origin, age, disability sex, sexual orientation or gender identity.            Thank you!     Thank you for choosing [facility]  for your care. Our goal is always to provide you with excellent care. Hearing back from our patients is one way we can continue to improve our services. Please take a few minutes to complete the written survey that you may receive in the mail after your visit with us. Thank you!             Your Updated Medication List - Protect others around you: Learn how to safely use, store and throw away your medicines at www.disposemymeds.org.          This list is accurate as of: 7/18/17  9:03 AM.  Always use your most recent med list.                   Brand Name Dispense Instructions for use Diagnosis    blood glucose monitoring lancets      1 each by In Vitro route daily As directed.1 box. For testing blood sugar at home        blood glucose monitoring test strip    no brand specified     1 strip by In Vitro route 2 times daily Test twice daily        canagliflozin 100 MG tablet    INVOKANA    30 tablet    Take 1 tablet (100 mg) by mouth every  morning (before breakfast)    Type 2 diabetes mellitus with hyperglycemia, with long-term current use of insulin (H)       dulaglutide 1.5 MG/0.5ML pen    TRULICITY     Inject 1.5 mg Subcutaneous every 7 days        insulin lispro 100 UNIT/ML injection    HumaLOG KWIKpen    15 mL    11 units with each meal    Type 2 diabetes mellitus with hyperglycemia, with long-term current use of insulin (H)       LIPITOR 40 MG tablet   Generic drug:  atorvastatin      Take 40 mg by mouth daily        LORAZEPAM PO      Take 1 mg by mouth 2 times daily        losartan 100 MG tablet    COZAAR     Take 100 mg by mouth daily        metFORMIN 500 MG tablet    GLUCOPHAGE    90 tablet    Take 2 tablets (1,000 mg) by mouth 2 times daily (with meals)    Type 2 diabetes mellitus with hyperglycemia, with long-term current use of insulin (H)       SERTRALINE HCL PO      Take 100 mg by mouth daily        traZODone 100 MG tablet    DESYREL     Take 100 mg by mouth At Bedtime        TRESIBA FLEXTOUCH 200 UNIT/ML pen   Generic drug:  insulin degludec      Inject 116 Units Subcutaneous At Bedtime        triamterene-hydrochlorothiazide 75-50 MG per tablet    MAXZIDE     Take 1 tablet by mouth daily

## 2017-07-18 NOTE — Clinical Note
Hi JARRED Rene only- inserted sensor today and provided diabetes ed. Pt will see Enedelia Pena next week for CGM download (1 week) and will see me in 2 weeks for full download. Focused on carbohydrate counting recommendations for diet intervention. No med changes made today  Thank you! Aby Beverly RD, LD, CDE

## 2017-07-18 NOTE — PROGRESS NOTES
Diabetes Self Management Training: Individual Review Visit    Naman Jones presents today for education, evaluation of glucose control and initiation of continuous glucose monitoring related to Type 2 diabetes.    He is accompanied by spouse- Brit    Patient's diabetes management related comments/concerns: was seeing diabetes educator at [facility], doesn't feel the education was enough. Reports diet education was confusing as the focus was more on weight loss. Has questions about how to take his Humalog correctly as isn't currently taking with his lunch at work but rather taking it when he gets home.     Patient's emotional response to diabetes: expresses readiness to learn    Patient would like this visit to be focused around the following diabetes-related behaviors and goals: Diabetes pathophysiology, Assistance with making lifestyle changes, Self-care behavioral goal setting, Healthy Eating, Being Active, Monitoring, Taking Medication and Problem Solving    ASSESSMENT:  Patient Problem List and Family Medical History reviewed for relevant medical history, current medical status, and diabetes risk factors.    Was diagnosed with diabetes about 1.5 years ago. Is currently preparing for bariatric surgery. Is working with a dietitian to lose weight for bariatric surgery at [facility]. Is currently working on changing eating habits and using my fitness pal though is uncertain how many carbohydrates he should have at his meals.     No low blood sugars reports. Has been on Humalog for 1-2 months, started by Emili. Has been on Tresiba for about 1 year. Was recently prescribed Invokana but has not yet started taking as is concerned about risk of yeast infections.     Current Diabetes Management per Patient:  Taking diabetes medications?   yes:     Diabetes Medication(s)     Biguanides Sig    metFORMIN (GLUCOPHAGE) 500 MG tablet Take 2 tablets (1,000 mg) by mouth 2 times daily (with meals)    Insulin Sig    insulin  lispro (HUMALOG KWIKPEN) 100 UNIT/ML injection 11 units with each meal      insulin degludec (TRESIBA FLEXTOUCH) 200 UNIT/ML pen Inject 116 Units Subcutaneous At Bedtime     Sodium-Glucose Co-Transporter 2 (SGLT2) Inhibitors Sig    canagliflozin (INVOKANA) 100 MG tablet Take 1 tablet (100 mg) by mouth every morning (before breakfast)    Incretin Mimetic Agents (GLP-1 Receptor Agonists) Sig    dulaglutide (TRULICITY) 1.5 MG/0.5ML pen Inject 1.5 mg Subcutaneous every 7 days          *Abbreviated insulin dose documentation key: Insulin Trade Name (saismusmm-ekpyc-bezidz-bedtime) - i.e. Humalog 5-5-5-0 (Humalog 5 units at breakfast, 5 units at lunch, and 5 units at dinner).    Past Diabetes Education: Yes at [facility] for 6-7 months.     Patient glucose self monitoring as follows: three times daily. Has been checking fasting blood sugar, when home from work (before Humalog) and before dinner.   He reports a high fasting blood sugar. Notices it goes down after meals.  BG meter: Pt did not bring meter today.  BG results: fasting glucose- 210, weekends before lunch- 150, home from work (no Humalog at lunch)- 190-200, before dinner- 130 to 150 mg/dL      BG values are: unable to assess- appear to be above target but trending more toward target   Patient's most recent   Lab Results   Component Value Date    A1C 8.4 07/10/2017   A1c has decreased from previous, was a little over 10% at [facility] per patient report.     Nutrition:  Patient wakes up around 7 am. He is tracking his intake on my fitness pal and aiming for around 2500 calories/day. Reports feels satisfied at this amount and has been losing weight.     Breakfast 730am- in the car on the way to work- protein shake or protein bar (low carb, atkins or pure protein or  whey) + yogurt (Dannon light and fit) OR fruit (cherries) + coffee (black) takes 11 units of Humalog   Lunch - 11:30-12 pm-  (reports this may be his problem, can vary and may not be the best choices):  "may go out to eat for a burger + fries OR bring lunch (ham and cheese and deviled eggs) + diet soda or water Isn't currently taking Humalog with this meal   Takes 11 units of Humalog at 5 pm (no food)- doesn't normally snack during the day. On weekends takes Humalog with lunch.   Dinner - 7-8 pm- soup (green chicken maldonado) + chips a little later + water Takes 11 units of Humalog  Snacks - peanuts, varies depending on good of bad day. Reports will have chips if \"bad\". May have soup     Beverages: coffee-black, diet soda, water.  Will drink alcohol 4-5 drinks after dinner. Will have beer or whisky diet.     Biggest Challenge to Healthy Eating: knowing what to eat    Physical Activity:    Has had a lot of knee/leg problems which is a barrier to activity   Low activity over the past year  Has a sit down job at work. Tries to take the stairs.  On weekends is more active, walking or playing.     Diabetes Complications:  Acute Complications: At risk for hypoglycemia? yes  Symptoms of low blood sugar? none  Frequency of hypoglycemia: None  Patient carries a carbohydrate source with them regularly: No- discussed importance today     Vitals:  There were no vitals taken for this visit.  Estimated body mass index is 44.51 kg/(m^2) as calculated from the following:    Height as of 7/10/17: 1.867 m (6' 1.5\").    Weight as of 7/10/17: 155.1 kg (342 lb).   Last 3 BP:   BP Readings from Last 3 Encounters:   07/10/17 118/84   06/13/17 144/88       History   Smoking Status     Never Smoker   Smokeless Tobacco     Never Used       Labs:  Lab Results   Component Value Date    A1C 8.4 07/10/2017     No results found for: GLC  Lab Results   Component Value Date     07/10/2017     No results found for: HDL]  GFR Estimate   Date Value Ref Range Status   07/10/2017 >90  Non  GFR Calc   >60 mL/min/1.7m2 Final     GFR Estimate If Black   Date Value Ref Range Status   07/10/2017 >90   GFR Calc   >60 " mL/min/1.7m2 Final     Lab Results   Component Value Date    CR 0.48 07/10/2017     No results found for: MICROALBUMIN    Socio/Economic Considerations:    Support system: spouse/significant other    Health Beliefs and Attitudes:   Patient Activation Measure Survey Score:  No flowsheet data found.    Stage of Change: ACTION (Actively working towards change)    INTERVENTION:   Reviewed mechanism of action of medications patient is on, mainly focusing on long acting and rapid acting insulin. Did not make changes to any medication doses as not a lot of data available though patient isn't currently taking his Humalog before lunch at work, but rather when he gets home from work, without food. He is taking Humalog with breakfast and dinner daily, and with lunch on the weekends. Discussed risk of taking rapid acting insulin without food and advised patient to take Humalog directly before each meal (skipping Humalog if meal is skipped.) Pt plans to start doing this. He would benefit from working toward an insulin/carbohydrate ratio in the future, though will require more education. Data from the CGM will be helpful regarding fine tuning insulin doses.    Per MSJ equation patient likely burns around 3000 calories/day. Recommended he aim for 2500 calories/day to promote weight loss, continuing to track on My Fitness Pal. He would benefit from maintaining a consistent carbohydrate intake of around 60-75 grams of carbohydrates per meal and 15-30 grams of carbohydrates at snacks. This is roughly 45% of daily calorie intake from carbohydrate. Shared these needs can be adjusted/reassessed pending CGM results and weight loss. Recommended he follow my plate planner for portion control.     ASSESSMENT:  LibrePro sensor started today. (Lot # [id number], Serial # [id number], Expiration date 10/31/2017) Sensor was inserted with no resistance or bleeding at insertion site.    Pt will plan to wear the sensor through August 8.    WRITTEN AND  VERBAL INFORMATION GIVEN TO SUPPORT UNDERSTANDING OF:  LibrePro CGM: Sensor insertion, intention of monitoring for 14 days. Keep records of BG, food intake, exercise, and medication dosing during wear.       Patient verbalizes understanding of how to remove sensor and all instructions provided.     Education provided today on:  AADE Self-Care Behaviors:  Healthy Eating: carbohydrate counting, consistency in amount, composition, and timing of food intake, weight reduction, portion control, plate planning method and label reading. Discussed focus will be different with diabetes educator and bariatric RD and that his calorie needs will change as he loses weight and after bariatric surgery. Stressed importance of continued follow-up to reassess nutrition needs.   Being Active: relationship to blood glucose, describe appropriate activity program and precautions to take  Monitoring: log and interpret results, individual blood glucose targets and frequency of monitoring  Taking Medication: action of prescribed medication, side effects of prescribed medications and when to take medications- see above   Problem Solving: high blood glucose - causes, signs/symptoms, treatment and prevention, low blood glucose - causes, signs/symptoms, treatment and prevention, carrying a carbohydrate source at all times and when to call health care provider    Opportunities for ongoing education and support in diabetes-self management were discussed.    Pt verbalized understanding of concepts discussed and recommendations provided today.       Education Materials Provided:  Carbohydrate Counting, Hypoglycemia Signs and Symptoms, My Plate Planner and How to Read a Nutrition Label  Librepro Food and Blood Glucose Log Sheet    PLAN:  See Patient Instructions for co-developed, patient-stated behavior change goals.  AVS printed and provided to patient today.    FOLLOW-UP:  Patient to return all items associated with professional Continuous Glucose  Monitor System to the [facility] by August 8.   He will come in for 1 week download for insulin changes next Wed 7/26    Aby Beverly RD, JORGE, CDE  Time Spent: 60 minutes  Encounter Type: Individual    Any diabetes medication dose changes were made via the CDE Protocol and Collaborative Practice Agreement with the patient's referring provider. A copy of this encounter was shared with the provider.

## 2017-07-18 NOTE — PATIENT INSTRUCTIONS
Continue taking your Tresiba at bedtime.  Take your 11 units of Humalog before each meal (within 5 minutes of eating). If you skip a meal you will not take your Humalog as it can promote low blood sugars.   Blood glucose targets:  Fasting or before meals:  mg/dL and 2 hours after a meal: 180 mg/dL or less   Continue to aim for 2500 calories/day for weight loss.   Aim for around 60-75 grams of carbohydrate at your meals. This allows room for 15-30 grams at 1-2 snacks per day.     Signs/symptoms of low blood sugar include (but are not limited to): sweating, shaking, feeling dizzy or weak, extreme hunger, headache, feeling crabby or confused, numbness or tingling of mouth/lips.  If you have these symptoms check your blood sugar if you can and then consume carbohydrate (sugar)  This is a helpful reminder on how to treat a blood sugar if you are low:  If your blood sugar is < 70 mg/dL, consume 15 grams of fast-acting carbohydrate (4 glucose tabs OR 4 oz juice or non-diet pop OR 2 Tbsp dried fruit OR 5-7 lifesavers OR 1 Tbsp sugar) and wait 15 minutes. Check blood sugar again and if not rising, repeat.  If your blood sugar is < 50 mg/dL, consume 30 grams of fast-acting carbohydrate (8 glucose tabs OR 8 oz juice or non-diet pop OR 4 Tbsp dried fruit OR 10-14 lifesavers OR 2 Tbsp sugar) and wait 15 minutes. Check blood sugar again and if not rising, repeat.    Follow-up: Argelia Jose and Enedelia (work in [place])  [phone] Diabetes Education Line   [email or web address]

## 2017-07-24 NOTE — PROGRESS NOTES
Naman    Recently done endocrinology labs shows labs consistent with diagnosis of type 2 diabetes  Thyroid function test in normal range.  Kidney function is slightly low.  LDL- bad cholesterol is high.  With history of diabetes goal is below 100.  Lab Results       Component                Value               Date                       A1C                      8.4                 07/10/2017            Your microalbumen is elevated. This means you have some protein in the urine. It indicates that your kidneys are being affected by diabetes. Keeping blood pressure and blood sugar normal is the best treatment in order to keep this  stable. People with microalbumen should avoid anti-inflamatory agents such as motrin, alleve and ibuprofen as much as possible. Anybody that has this should be on lisinopril/losartan-as you already are-since this is protective for the kidney's    Please call endocrinology clinic (nurse line: [phone]) if questions.    Leilani Tavera MD  Endocrinology   [facility]  July 24, 2017

## 2017-07-26 ENCOUNTER — ALLIED HEALTH/NURSE VISIT (OUTPATIENT)
Dept: EDUCATION SERVICES | Facility: CLINIC | Age: 32
End: 2017-07-26
Payer: COMMERCIAL

## 2017-07-26 DIAGNOSIS — E11.69 DIABETES MELLITUS TYPE 2 IN OBESE: ICD-10-CM

## 2017-07-26 DIAGNOSIS — Z13.228 SCREENING FOR ENDOCRINE, NUTRITIONAL, METABOLIC AND IMMUNITY DISORDER: ICD-10-CM

## 2017-07-26 DIAGNOSIS — Z79.4 TYPE 2 DIABETES MELLITUS WITH HYPERGLYCEMIA, WITH LONG-TERM CURRENT USE OF INSULIN (H): ICD-10-CM

## 2017-07-26 DIAGNOSIS — E11.65 TYPE 2 DIABETES MELLITUS WITH HYPERGLYCEMIA, WITH LONG-TERM CURRENT USE OF INSULIN (H): ICD-10-CM

## 2017-07-26 DIAGNOSIS — E66.9 DIABETES MELLITUS TYPE 2 IN OBESE: ICD-10-CM

## 2017-07-26 DIAGNOSIS — Z13.21 SCREENING FOR ENDOCRINE, NUTRITIONAL, METABOLIC AND IMMUNITY DISORDER: ICD-10-CM

## 2017-07-26 DIAGNOSIS — E66.01 MORBID OBESITY WITH BMI OF 45.0-49.9, ADULT (H): ICD-10-CM

## 2017-07-26 DIAGNOSIS — Z13.0 SCREENING FOR IRON DEFICIENCY ANEMIA: ICD-10-CM

## 2017-07-26 DIAGNOSIS — Z13.29 SCREENING FOR ENDOCRINE, NUTRITIONAL, METABOLIC AND IMMUNITY DISORDER: ICD-10-CM

## 2017-07-26 DIAGNOSIS — Z13.0 SCREENING FOR ENDOCRINE, NUTRITIONAL, METABOLIC AND IMMUNITY DISORDER: ICD-10-CM

## 2017-07-26 DIAGNOSIS — E78.2 MIXED HYPERLIPIDEMIA: ICD-10-CM

## 2017-07-26 LAB
ERYTHROCYTE [DISTWIDTH] IN BLOOD BY AUTOMATED COUNT: 14.4 % (ref 10–15)
HBA1C MFR BLD: 8.5 % (ref 4.3–6)
HCT VFR BLD AUTO: 45.6 % (ref 40–53)
HGB BLD-MCNC: 15.6 G/DL (ref 13.3–17.7)
MCH RBC QN AUTO: 29.8 PG (ref 26.5–33)
MCHC RBC AUTO-ENTMCNC: 34.2 G/DL (ref 31.5–36.5)
MCV RBC AUTO: 87 FL (ref 78–100)
PLATELET # BLD AUTO: 252 10E9/L (ref 150–450)
RBC # BLD AUTO: 5.24 10E12/L (ref 4.4–5.9)
WBC # BLD AUTO: 9.1 10E9/L (ref 4–11)

## 2017-07-26 PROCEDURE — 82306 VITAMIN D 25 HYDROXY: CPT | Performed by: PHYSICIAN ASSISTANT

## 2017-07-26 PROCEDURE — G0108 DIAB MANAGE TRN  PER INDIV: HCPCS | Performed by: DIETITIAN, REGISTERED

## 2017-07-26 PROCEDURE — 80053 COMPREHEN METABOLIC PANEL: CPT | Performed by: PHYSICIAN ASSISTANT

## 2017-07-26 PROCEDURE — 83036 HEMOGLOBIN GLYCOSYLATED A1C: CPT | Performed by: PHYSICIAN ASSISTANT

## 2017-07-26 PROCEDURE — 36415 COLL VENOUS BLD VENIPUNCTURE: CPT | Performed by: PHYSICIAN ASSISTANT

## 2017-07-26 PROCEDURE — 85027 COMPLETE CBC AUTOMATED: CPT | Performed by: PHYSICIAN ASSISTANT

## 2017-07-26 NOTE — MR AVS SNAPSHOT
After Visit Summary   7/26/2017    Naman Jones    MRN: [id number]           Patient Information     Date Of Birth          1985        Visit Information        Provider Department      7/26/2017 2:30 PM Enedelia Pena [facility]        Today's Diagnoses     Type 2 diabetes mellitus with hyperglycemia, with long-term current use of insulin (H)          Care Instructions    1.           Follow-ups after your visit        Your next 10 appointments already scheduled     Jul 28, 2017  9:00 AM CDT   Weight Loss Visit with  Arturo Diet 2, RD   [facility] ([facility])    [facility] [facility]  [facility] [address]   [phone]            Aug 01, 2017 10:00 AM CDT   Diabetic Education with CR DIABETIC ED RESOURCE   [facility] ([facility])    [address]  [facility] [address] [phone]              Who to contact     If you have questions or need follow up information about today's clinic visit or your schedule please contact [facility] directly at [phone].  Normal or non-critical lab and imaging results will be communicated to you by Cellabushart, letter or phone within 4 business days after the clinic has received the results. If you do not hear from us within 7 days, please contact the clinic through In[employer] or phone. If you have a critical or abnormal lab result, we will notify you by phone as soon as possible.  Submit refill requests through [employer] or call your pharmacy and they will forward the refill request to us. Please allow 3 business days for your refill to be completed.          Additional Information About Your Visit        MyChart Information     [employer] gives you secure access to your electronic health record. If you see a primary care provider, you can also send messages to your care team and make appointments. If you  have questions, please call your primary care clinic.  If you do not have a primary care provider, please call [phone] and they will assist you.        Care EveryWhere ID     This is your Care EveryWhere ID. This could be used by other organizations to access your [place] medical records  [id number]         Blood Pressure from Last 3 Encounters:   07/10/17 118/84   06/13/17 144/88    Weight from Last 3 Encounters:   07/10/17 (!) 155.1 kg (342 lb)   06/13/17 (!) 158.5 kg (349 lb 6.4 oz)              We Performed the Following     DIABETES EDUCATION - Individual  []          Today's Medication Changes          These changes are accurate as of: 7/26/17 11:59 PM.  If you have any questions, ask your nurse or doctor.               These medicines have changed or have updated prescriptions.        Dose/Directions    insulin lispro 100 UNIT/ML injection   Commonly known as:  HumaLOG KWIKpen   This may have changed:  additional instructions   Used for:  Type 2 diabetes mellitus with hyperglycemia, with long-term current use of insulin (H)   Changed by:  Enedelia Pena        16 units with each meal   Quantity:  15 mL   Refills:  1            Where to get your medicines      Some of these will need a paper prescription and others can be bought over the counter.  Ask your nurse if you have questions.     You don't need a prescription for these medications     insulin lispro 100 UNIT/ML injection                Primary Care Provider Office Phone # Fax #    Gilda Hernadez [phone]       [facility] [address]  [place] [address]        Equal Access to Services     [facility] AH: Hadii aad ku hadasho Soomaali, waaxda luqadaha, qaybta kaalmada adeegyada, naman rodgers. So [facility] [phone].    ATENCIÓN: Si habla español, tiene a dumas disposición servicios gratuitos de asistencia lingüística. Llame al [phone].    We comply with applicable federal  civil rights laws and [place] laws. We do not discriminate on the basis of race, color, national origin, age, disability sex, sexual orientation or gender identity.            Thank you!     Thank you for choosing [facility]  for your care. Our goal is always to provide you with excellent care. Hearing back from our patients is one way we can continue to improve our services. Please take a few minutes to complete the written survey that you may receive in the mail after your visit with us. Thank you!             Your Updated Medication List - Protect others around you: Learn how to safely use, store and throw away your medicines at www.disposemymeds.org.          This list is accurate as of: 7/26/17 11:59 PM.  Always use your most recent med list.                   Brand Name Dispense Instructions for use Diagnosis    blood glucose monitoring lancets      1 each by In Vitro route daily As directed.1 box. For testing blood sugar at home        blood glucose monitoring test strip    no brand specified     1 strip by In Vitro route 2 times daily Test twice daily        canagliflozin 100 MG tablet    INVOKANA    30 tablet    Take 1 tablet (100 mg) by mouth every morning (before breakfast)    Type 2 diabetes mellitus with hyperglycemia, with long-term current use of insulin (H)       dulaglutide 1.5 MG/0.5ML pen    TRULICITY     Inject 1.5 mg Subcutaneous every 7 days        insulin lispro 100 UNIT/ML injection    HumaLOG KWIKpen    15 mL    16 units with each meal    Type 2 diabetes mellitus with hyperglycemia, with long-term current use of insulin (H)       LIPITOR 40 MG tablet   Generic drug:  atorvastatin      Take 40 mg by mouth daily        LORAZEPAM PO      Take 1 mg by mouth 2 times daily        losartan 100 MG tablet    COZAAR     Take 100 mg by mouth daily        metFORMIN 500 MG tablet    GLUCOPHAGE    90 tablet    Take 2 tablets (1,000 mg) by mouth 2 times daily (with meals)    Type 2  diabetes mellitus with hyperglycemia, with long-term current use of insulin (H)       SERTRALINE HCL PO      Take 100 mg by mouth daily        traZODone 100 MG tablet    DESYREL     Take 100 mg by mouth At Bedtime        TRESIBA FLEXTOUCH 200 UNIT/ML pen   Generic drug:  insulin degludec      Inject 116 Units Subcutaneous At Bedtime        triamterene-hydrochlorothiazide 75-50 MG per tablet    MAXZIDE     Take 1 tablet by mouth daily

## 2017-07-26 NOTE — PROGRESS NOTES
LibrePro Continuous Glucose Monitor Interpretation     Patient History:   1. Type of Diabetes: Type 2 diabetes  2. Duration of diabetes or year of diagnosis: age 30  3. Current treatment regimen (include all diabetes medications, dose & dosing frequency/timing):     Diabetes Medication(s)     Biguanides Sig    metFORMIN (GLUCOPHAGE) 500 MG tablet Take 2 tablets (1,000 mg) by mouth 2 times daily (with meals)    Insulin Sig    insulin lispro (HUMALOG KWIKPEN) 100 UNIT/ML injection 11 units with each meal    insulin degludec (TRESIBA FLEXTOUCH) 200 UNIT/ML pen Inject 116 Units Subcutaneous At Bedtime     Sodium-Glucose Co-Transporter 2 (SGLT2) Inhibitors Sig    canagliflozin (INVOKANA) 100 MG tablet Take 1 tablet (100 mg) by mouth every morning (before breakfast)    Incretin Mimetic Agents (GLP-1 Receptor Agonists) Sig    dulaglutide (TRULICITY) 1.5 MG/0.5ML pen Inject 1.5 mg Subcutaneous every 7 days        4. Most Recent A1c Result:    Lab Results   Component Value Date    A1C 8.4 07/10/2017     5. Indication/s for LibrePro study: Difficult to manage hypoglycemia and/or hyperglycemia, despite multiple adjustments in self-monitoring of blood glucose and diabetes medication administration and Unexplained fluctuations in glucose values.    Statistics:   1. Sensor worn for 9 days.  2. Glucose excursions:   Percent in target is: 26%  Percent above target is: 74%  Percent below target is: 0%  3. Estimated A1c: 9.0%                        Data evaluation:   1. Sensor modal day evaluation shows the followin. Consistent day-to-day patterns noted: pattern of daytime hyperglycemia, pattern of nocturnal hyperglycemia.  3. Average glucose: 211 mg/dL.  4. Low glucose events: 0  5. The overnight ranges from 150 to >350 mg/dL.   6. The premeal is usually above the target range.   7. The postmeal is above the target range.        Patient's Logbook shows the following:   Carbohydrate counting is: accurate, he is trying to  keep his carb intake consistent (4-5 carb choices per meal). Patient did not bring his written record, but was able to report food choices  from his phone lupe- which helped him correlate BG spikes to different foods (eg on Tues evening- had a large blueberry muffin, etc.)  He is aware that he has been snacking too much in the evening, and after seeing CGM report- wants to work on changing this behavior.   Medication and/or insulin dosing is: accurate-  is taking 11 units of Humalog with each meal (rather than at random times of day). Appears to need an increase in meal time insulin. He is currently at 78% basal and 22% bolus.    Education provided today:  AADE Self-Care Behaviors  Healthy Eating: reinforced carbohydrate counting and portion control  Taking Medication: action of prescribed medication and how to maximize the effectiveness of current insulin regimen. Reviewed basal vs. Bolus, concept of I:C and correction dosing  Problem Solving: high blood glucose - causes, signs/symptoms, treatment and prevention, low blood glucose - causes, signs/symptoms, treatment and prevention and carrying a carbohydrate source at all times     Assessment and Plan:  1. Meal Plan Recommendation: avoid high sugar/high carb snacks in the evening. Continue 4-5 carb choices per meal.   2. Recommend increase Humalog insulin from 11 units to 16 units each meal. Recommend move toward I:C dosing- he would like to defer more discussion on this to the next visit.   3. Patient will return for full CGM download in 1 week.     Enedelia Pena RD, CDE  Diabetes     Time Spent: 30 minutes  Any diabetes medication dose changes were made via the CDE Protocol and Collaborative Practice Agreement with the patient's endocrinology provider. A copy of this encounter was shared with the provider.

## 2017-07-27 LAB
ALBUMIN SERPL-MCNC: 4.1 G/DL (ref 3.4–5)
ALP SERPL-CCNC: 104 U/L (ref 40–150)
ALT SERPL W P-5'-P-CCNC: 52 U/L (ref 0–70)
ANION GAP SERPL CALCULATED.3IONS-SCNC: 9 MMOL/L (ref 3–14)
AST SERPL W P-5'-P-CCNC: 25 U/L (ref 0–45)
BILIRUB SERPL-MCNC: 0.5 MG/DL (ref 0.2–1.3)
BUN SERPL-MCNC: 12 MG/DL (ref 7–30)
CALCIUM SERPL-MCNC: 9.5 MG/DL (ref 8.5–10.1)
CHLORIDE SERPL-SCNC: 99 MMOL/L (ref 94–109)
CO2 SERPL-SCNC: 29 MMOL/L (ref 20–32)
CREAT SERPL-MCNC: 0.55 MG/DL (ref 0.66–1.25)
DEPRECATED CALCIDIOL+CALCIFEROL SERPL-MC: 24 UG/L (ref 20–75)
GFR SERPL CREATININE-BSD FRML MDRD: ABNORMAL ML/MIN/1.7M2
GLUCOSE SERPL-MCNC: 190 MG/DL (ref 70–99)
POTASSIUM SERPL-SCNC: 3.9 MMOL/L (ref 3.4–5.3)
PROT SERPL-MCNC: 7.9 G/DL (ref 6.8–8.8)
SODIUM SERPL-SCNC: 137 MMOL/L (ref 133–144)

## 2017-07-28 ENCOUNTER — OFFICE VISIT (OUTPATIENT)
Dept: SURGERY | Facility: CLINIC | Age: 32
End: 2017-07-28
Payer: COMMERCIAL

## 2017-07-28 DIAGNOSIS — E66.01 MORBID OBESITY WITH BMI OF 45.0-49.9, ADULT (H): ICD-10-CM

## 2017-07-28 PROCEDURE — 97803 MED NUTRITION INDIV SUBSEQ: CPT | Performed by: DIETITIAN, REGISTERED

## 2017-07-28 NOTE — MR AVS SNAPSHOT
MRN:[id number]                      After Visit Summary   7/28/2017    Naman Jones    MRN: [id number]           Visit Information        Provider Department      7/28/2017 9:00 AM 2, Sh Wl Diet, RD [facility] Weight Loss      Your next 10 appointments already scheduled     Aug 01, 2017 10:00 AM CDT   Diabetic Education with CR DIABETIC ED RESOURCE   [facility] ([facility])    [address]  [facility] [address] [phone]            Aug 25, 2017  9:00 AM CDT   Weight Loss Visit with Adele Wl Diet 1, RD   [facility] ([facility])    [address]  [facility] [address] [phone]              MyChart Information     [employer] gives you secure access to your electronic health record. If you see a primary care provider, you can also send messages to your care team and make appointments. If you have questions, please call your primary care clinic.  If you do not have a primary care provider, please call [phone] and they will assist you.        Care EveryWhere ID     This is your Care EveryWhere ID. This could be used by other organizations to access your [place] medical records  [id number]        Equal Access to Services     RANULFO REINOSO : Lucas michelleo Sozacariasali, waaxda luqadaha, qaybta kaalmada adeegyada, naman rodgers. So [facility] [phone].    ATENCIÓN: Si habla español, tiene a dumas disposición servicios gratuitos de asistencia lingüística. Llame al [phone].    We comply with applicable federal civil rights laws and [place] laws. We do not discriminate on the basis of race, color, national origin, age, disability sex, sexual orientation or gender identity.

## 2017-07-28 NOTE — PROGRESS NOTES
"PRE-SURGICAL WEIGHT LOSS NUTRITION APPOINTMENT  DATE OF VISIT: 2017  Name: MICK CHOWDHURY  : 1985  Gender: Male  MRN: [id number]  Age: 32  ASSESSMENT  REASON FOR VISIT:  MICK CHOWDHURY is a 32 year old Male presents today for a pre-surgical weight loss follow-up appointment.  DIAGNOSIS:  Class III  Morbid Obesity.    ANTHROPOMETRICS:  Height: 73 inches  Initial Weight: 349.4 lbs  Weight last visit: 349.4 lbs  Current Weight: 351.6 lbs  BMI: 46.4 kg/m2    NUTRITION HISTORY:  Breakfast: Protein Shake ( Whey) + banana/peach -within 1 hour of waking  Lunch: bringing lunch- chili bake; homemade soup; homemade sandwich (whole wheat bread) likes crock pot cooking (12:00)  Supper: Chicken Stir oquendo, side or ham/cheese/ green peppers omelet (7:00-8:00)  Snacks: Bedtime some cereal with 2% milk or 1 cup shelled peanuts  Drinks: 3  Consuming liquid calories: Milk  Eating 3 meals per day: Yes  Eating slower: Yes  Chewing foods thoroughly: Yes  Take 30 minutes to consume each meal: Yes  Fluids and meals separate by at least 30 minutes: Sometimes  Comments: patient goes by \"Chirag;\"pt worked with [facility] for 6 months on binge Eating Disorder and has been dischrged from that program per feed back from Lilibeth Ying PA-C (she has requested records form that program); wife had weight loss surgery in ; sister had sleeve gastrostomy; pt and his wife share the cooking; pt was in the surgical weight loss program at [facility] for ~ 1 year (was having trouble meeting HGBA1C requirements and was not happy with the program in general) ; has on 4 year old son; pt works in the [employer] industry  17 Patient has been working with endocrinologist and diabetes educator in the past month. Having him increase carbohydrates at meals and eliminating snacks. Patient states hgbA1C has decreased in past month from 10 to 9. Patient making positive changes towards surgery.  Desired Surgical Procedure: gastric " bypass   PHYSICAL ACTIVITY:  None  DIAGNOSIS:  Previous Nutrition Diagnosis: Obesity related to excess energy intake as evidenced by BMI of 46.1 kg/m2-no change  Previous goals:  Start: 1 multivitamin mineral, 2000 IU Vitamin D, 1200 mg calcium with vitamin D (2-3 separate doses)-improving (started MVI)   Avoid all alcohol (pt chose this goal)-met   Limit diet pop to 12 oz. per day-improving  Current Nutrition Diagnosis: Obesity related to excess energy intake as evidenced by BMI of 46.4 kg/m2  IMPLEMENTATION:  Nutrition Prescription: Recommended energy/nutrient modification  Goals:  Start: 2000 IU Vitamin D, 1200 mg calcium with vitamin D (2-3 separate doses)  Eliminate carbonation by 50%  Focus on evening meal  Implementation:  - Discussed progress towards previous goals  - Reinforced importance of making behavior changes in preparation for bariatric surgery.  NUTRITION MONITORING AND EVALUATION:  Anticipated compliance: Fair to good  Patient verbalized good understanding of bariatric diet guidelines.  Follow up: 1 month  # of visits needed: 2  Cleared by RD:No  TIME SPENT WITH PATIENT: 30 minutes  Timothy Raymond, RD, LD  [facility] Outpatient Dietitian  [phone] (office phone)

## 2017-08-01 ENCOUNTER — ALLIED HEALTH/NURSE VISIT (OUTPATIENT)
Dept: EDUCATION SERVICES | Facility: CLINIC | Age: 32
End: 2017-08-01
Payer: COMMERCIAL

## 2017-08-01 DIAGNOSIS — Z79.4 TYPE 2 DIABETES MELLITUS WITH HYPERGLYCEMIA, WITH LONG-TERM CURRENT USE OF INSULIN (H): Primary | ICD-10-CM

## 2017-08-01 DIAGNOSIS — E11.65 TYPE 2 DIABETES MELLITUS WITH HYPERGLYCEMIA, WITH LONG-TERM CURRENT USE OF INSULIN (H): Primary | ICD-10-CM

## 2017-08-01 DIAGNOSIS — E11.69 DIABETES MELLITUS TYPE 2 IN OBESE: ICD-10-CM

## 2017-08-01 DIAGNOSIS — E66.9 DIABETES MELLITUS TYPE 2 IN OBESE: ICD-10-CM

## 2017-08-01 PROCEDURE — G0108 DIAB MANAGE TRN  PER INDIV: HCPCS

## 2017-08-01 NOTE — Clinical Note
Hi. Dr. Tavera,  Please see documentation for BG values. Improved since increasing Humalog. I think he may benefit from dosing Humalog on insulin/carbohydrate ratio of 1 unit/3 grams at B/L/D and bedtime snack. Also starting correction (pre meal or solo) or 1 unit/15 grams > 150. Please document approval or indicate alt plan. I will notify pt once plan in place. No other changes made to diabetes meds.   Thanks! Aby Beverly RD, LD, CDE

## 2017-08-01 NOTE — PATIENT INSTRUCTIONS
Continue on your current insulin and diabetes medication regimen.    I will talk to Dr. Tavera about starting an insulin to carbohydrate ratio. Considering 1 unit for every 3 grams of carbohydrate (or 5 units for every 15 grams of carbohydrate.) I will ask Dr. Tavera if she would like you to do this for just meals or for snacks as well. Recommend using this insulin/carbohydrate ratio for breakfast, lunch, dinner and bedtime snack.    I am also going to recommend a correction scale if your blood sugar is over 150 mg/dL. I will mychart this to you. This can be used in addition to meals if your blood sugar is > 150 mg/dL and you are going to eat, or on it's own if your blood sugar is >150 mg/dL.     I will reach out to you regarding Dr. Tavera's approval. Please send me your numbers within a week.

## 2017-08-01 NOTE — PROGRESS NOTES
See [employer] message sent to patient regarding MD approval of plan for ICR and correction  Aby Beverly RD, LD, CDE

## 2017-08-01 NOTE — MR AVS SNAPSHOT
After Visit Summary   8/1/2017    Naman Jones    MRN: [id number]           Patient Information     Date Of Birth          1985        Visit Information        Provider Department      8/1/2017 10:00 AM CR DIABETIC ED RESOURCE [facility]        Care Instructions    Continue on your current insulin and diabetes medication regimen.    I will talk to Dr. Tavera about starting an insulin to carbohydrate ratio. Considering 1 unit for every 3 grams of carbohydrate (or 5 units for every 15 grams of carbohydrate.) I will ask Dr. Tavera if she would like you to do this for just meals or for snacks as well. Recommend using this insulin/carbohydrate ratio for breakfast, lunch, dinner and bedtime snack.    I am also going to recommend a correction scale if your blood sugar is over 150 mg/dL. I will mychart this to you. This can be used in addition to meals if your blood sugar is > 150 mg/dL and you are going to eat, or on it's own if your blood sugar is >150 mg/dL.     I will reach out to you regarding Dr. Tavera's approval. Please send me your numbers within a week.           Follow-ups after your visit        Your next 10 appointments already scheduled     Aug 09, 2017 11:15 AM CDT   Telephone Visit with  NUTRITION RESOURCE   [facility] ([facility])    [address] [id number] [phone]           Note: this is not an onsite visit; there is no need to come to the facility.            Aug 18, 2017  8:30 AM CDT   Diabetic Education with Enedelia Pena   [facility]    [address]  [facility] [address] [phone]            Aug 25, 2017  9:00 AM CDT   Weight Loss Visit with  Arturo Diet 1, RD   [facility] ([facility])    [facility]  [facility] [address] [phone]               Who to contact     If you have questions or need follow up information about today's clinic visit or your schedule please contact [facility] directly at [phone].  Normal or non-critical lab and imaging results will be communicated to you by MyChart, letter or phone within 4 business days after the clinic has received the results. If you do not hear from us within 7 days, please contact the clinic through MyChart or phone. If you have a critical or abnormal lab result, we will notify you by phone as soon as possible.  Submit refill requests through [employer] or call your pharmacy and they will forward the refill request to us. Please allow 3 business days for your refill to be completed.          Additional Information About Your Visit        [employer] Information     [employer] gives you secure access to your electronic health record. If you see a primary care provider, you can also send messages to your care team and make appointments. If you have questions, please call your primary care clinic.  If you do not have a primary care provider, please call [phone] and they will assist you.        Care EveryWhere ID     This is your Care EveryWhere ID. This could be used by other organizations to access your [place] medical records  [id number]         Blood Pressure from Last 3 Encounters:   07/10/17 118/84   06/13/17 144/88    Weight from Last 3 Encounters:   07/10/17 (!) 155.1 kg (342 lb)   06/13/17 (!) 158.5 kg (349 lb 6.4 oz)              Today, you had the following     No orders found for display       Primary Care Provider Office Phone # Fax #    Gilda Hernadez [phone]       [facility] [address] [address]        Equal Access to Services     [facility]: Hadii lopez Dumont, waaxda luqgreer, qaybta naman rosen . So [facility] [phone].    ATENCIÓN: Si jose guadalupe [place]  disposición servicios gratuitos de asistencia lingüística. Abraham capm [phone].    We comply with applicable federal civil rights laws and [place] laws. We do not discriminate on the basis of race, color, national origin, age, disability sex, sexual orientation or gender identity.            Thank you!     Thank you for choosing [facility]  for your care. Our goal is always to provide you with excellent care. Hearing back from our patients is one way we can continue to improve our services. Please take a few minutes to complete the written survey that you may receive in the mail after your visit with us. Thank you!             Your Updated Medication List - Protect others around you: Learn how to safely use, store and throw away your medicines at www.disposemymeds.org.          This list is accurate as of: 8/1/17 10:52 AM.  Always use your most recent med list.                   Brand Name Dispense Instructions for use Diagnosis    blood glucose monitoring lancets      1 each by In Vitro route daily As directed.1 box. For testing blood sugar at home        blood glucose monitoring test strip    no brand specified     1 strip by In Vitro route 2 times daily Test twice daily        canagliflozin 100 MG tablet    INVOKANA    30 tablet    Take 1 tablet (100 mg) by mouth every morning (before breakfast)    Type 2 diabetes mellitus with hyperglycemia, with long-term current use of insulin (H)       dulaglutide 1.5 MG/0.5ML pen    TRULICITY     Inject 1.5 mg Subcutaneous every 7 days        insulin lispro 100 UNIT/ML injection    HumaLOG KWIKpen    15 mL    16 units with each meal    Type 2 diabetes mellitus with hyperglycemia, with long-term current use of insulin (H)       LIPITOR 40 MG tablet   Generic drug:  atorvastatin      Take 40 mg by mouth daily        LORAZEPAM PO      Take 1 mg by mouth 2 times daily        losartan 100 MG tablet    COZAAR     Take 100 mg by mouth daily        metFORMIN  500 MG tablet    GLUCOPHAGE    90 tablet    Take 2 tablets (1,000 mg) by mouth 2 times daily (with meals)    Type 2 diabetes mellitus with hyperglycemia, with long-term current use of insulin (H)       SERTRALINE HCL PO      Take 100 mg by mouth daily        traZODone 100 MG tablet    DESYREL     Take 100 mg by mouth At Bedtime        TRESIBA FLEXTOUCH 200 UNIT/ML pen   Generic drug:  insulin degludec      Inject 116 Units Subcutaneous At Bedtime        triamterene-hydrochlorothiazide 75-50 MG per tablet    MAXZIDE     Take 1 tablet by mouth daily

## 2017-08-01 NOTE — PROGRESS NOTES
Diabetes Self Management Training: Follow-up Visit and Continuous Glucose Monitor Download    Naman Jones presents today for education and evaluation of glucose control related to Type 2 diabetes.    He is accompanied by self    Patient's diabetes management related comments/concerns: is feeling better since insulin changes were made. Seeing more blood sugars in the 150 range which is an improvement. Has tried to stop snacking and feels has been successful with this. Desires to work toward and insulin/carbohydrate ratio to fine tune blood glucose values. Did not bring BG meter with him today.     Current Diabetes Management per Patient:  Taking diabetes medications?   yes:     Diabetes Medication(s)     Biguanides Sig    metFORMIN (GLUCOPHAGE) 500 MG tablet Take 2 tablets (1,000 mg) by mouth 2 times daily (with meals)    Insulin Sig    insulin lispro (HUMALOG KWIKPEN) 100 UNIT/ML injection 16 units with each meal    insulin degludec (TRESIBA FLEXTOUCH) 200 UNIT/ML pen Inject 116 Units Subcutaneous At Bedtime     Sodium-Glucose Co-Transporter 2 (SGLT2) Inhibitors Sig    canagliflozin (INVOKANA) 100 MG tablet Take 1 tablet (100 mg) by mouth every morning (before breakfast)    Incretin Mimetic Agents (GLP-1 Receptor Agonists) Sig    dulaglutide (TRULICITY) 1.5 MG/0.5ML pen Inject 1.5 mg Subcutaneous every 7 days        Never started the Invokana. Scared of the yeast infections.   LibrePro Continuous Glucose Monitor Interpretation           Most Recent A1c Result:    Lab Results   Component Value Date    A1C 8.5 07/26/2017     Indication/s for LibrePro study: Difficult to manage hypoglycemia and/or hyperglycemia, despite multiple adjustments in self-monitoring of blood glucose and diabetes medication administration and Unexplained fluctuations in glucose values.    Statistics:   1. Sensor worn for 10 days.  2. Glucose excursions:   Percent in target is: 29%  Percent above target is: 71%  Percent below target is:  0%  3. Estimated A1c: 8.9%                        Data evaluation:   1. Sensor modal day evaluation shows the followin. Consistent day-to-day patterns noted: pattern of daytime hyperglycemia but this appears to have improved since insulin changes were made to increase Humalog last week. Pt only had one day of data after these changes were made to unable to fully assess the impact of these changes.   2. Average glucose: 208 mg/dL.  3. Low glucose events: none     Patient's Logbook shows the following:   Carbohydrate counting is: accurate  Medication and/or insulin dosing is: accurate    Assessment:  Using My Fitness Pal for Carb Counting. Is counting carbohydrates in grams and is pretty confident with carbohydrate counting.   Is trying to cut nighttime snacking way down- has been successful with this.   Reports being a little sluggish the first few days after insulin changes last week.   Food records for  (Wednesday)   Lunch: 11:30am- [employer] Roast Beef [place] + diet coke (16 units of Humalog)  Questions if he drank something with sugar.   Dinner: 7 pm- beef stew (around 20 oz) + non caloric beverage        Breakfast around 730-8 am: (in the car on the way to work)- coffee cold with 1% milk (8oz) + Atkins Bar (17 grams)   Lunch around 12:30 pm: burrialex bowl from restaurant (less rice than [employer], beans, meat, cheese, veggies)- estimates 47 grams of carbohydrates   T-ball around 5:15 pm- active  Dinner around 8:30pm- beef barley soup (41 g carb) + peanuts (16 g carb)   Has been trying to cut back on snacks after seeing sensor values.  Bedtime snack (small dinner)- 200 calorie soup OR peanuts OR 2 servings of chips + avocado OR protein bar     Generally only drinks diet soda. May have had vitamin water. Doesn't drink a lot of alcohol.     Activity: feeling better and doing more walking (outside and in mall). Playing T-ball.     INTERVENTION:  Pt would benefit from dosing his insulin  using an insulin to carbohydrate ratio. Per rule of 450, it's estimated that 1 unit of insulin will cover 3 grams of carbohydrate. Reviewed amount of carbohydrates consumed on 16 units of Humalog and this appears to be appropriate. Recommend that patient consume 1 unit of insulin for every 3 grams of carbohydrate consumed for breakfast, lunch, dinner and bedtime snack.  Pt would also benefit from using a correction scale. Per rule of 1800, 1 unit of insulin drops patients blood sugar 11 points. Recommend patient begin following scale pre-meal or for correction of 1/15/150 *1 unit drops blood sugar 15 points over 150. Would not recommend patient use this scale at bedtime at this time.  Pt feels comfortable following the above recommendations and will await endo approval. Will route to endo and notify patient upon approval.     Education provided today on:  AADE Self-Care Behaviors:  Healthy Eating: carbohydrate counting  Problem Solving: high blood glucose - causes, signs/symptoms, treatment and prevention and when to call health care provider    Opportunities for ongoing education and support in diabetes-self management were discussed.    Pt verbalized understanding of concepts discussed and recommendations provided today.       Education Materials Provided:  No new materials provided today    PLAN:  See Patient Instructions for co-developed, patient-stated behavior change goals.  AVS printed and provided to patient today.    FOLLOW-UP:  Follow-up [facility] Wed [email or web address] and in clinic on 8/18  Chart routed to referring provider.    Aby Beverly RD, LD, CDE  Time Spent: 45 minutes  Encounter Type: Individual    Any diabetes medication dose changes were made via the CDE Protocol and Collaborative Practice Agreement with the patient's referring provider and endocrinology provider. A copy of this encounter was shared with the provider.

## 2017-08-01 NOTE — Clinical Note
Harris Mcdaniels,  Just an FYI as patient will follow-up with you! He will begin on ICR of 1 unit/3 grams of carbohydrate and correction of 1 unit/15 grams over 150 (not at bedtime)  Thanks! Aby

## 2017-08-01 NOTE — Clinical Note
Please sign pended rx of humalog as updated to 30 mL/month with current Humalog use. Also, pt is not taking Invokana as is afraid of UTIs.  Thank you, Aby Beverly RD, LD, CDE

## 2017-08-09 ENCOUNTER — MYC MEDICAL ADVICE (OUTPATIENT)
Dept: EDUCATION SERVICES | Facility: CLINIC | Age: 32
End: 2017-08-09

## 2017-08-09 DIAGNOSIS — Z79.4 TYPE 2 DIABETES MELLITUS WITH HYPERGLYCEMIA, WITH LONG-TERM CURRENT USE OF INSULIN (H): ICD-10-CM

## 2017-08-09 DIAGNOSIS — E11.65 TYPE 2 DIABETES MELLITUS WITH HYPERGLYCEMIA, WITH LONG-TERM CURRENT USE OF INSULIN (H): ICD-10-CM

## 2017-08-09 NOTE — TELEPHONE ENCOUNTER
Pt needs refill on Humalog to reflect updated doses as he is now taking 1 unit per 3 grams of carbohydrate. He uses approximately 80 units/day. Please update Rx to reflect and send to [facility]. Thank you!!  Aby Beverly RD, LD, CDE

## 2017-08-09 NOTE — TELEPHONE ENCOUNTER
Diabetes Follow-up    Subjective/Objective:    Naman Jones sent in blood glucose log for review. Last date of communication was: 8/01/17. (started insulin/carbohydrate ratio at this time)    Taking diabetes medications?   yes:     Diabetes Medication(s)     Biguanides Sig    metFORMIN (GLUCOPHAGE) 500 MG tablet Take 2 tablets (1,000 mg) by mouth 2 times daily (with meals)    Insulin Sig    insulin lispro (HUMALOG KWIKPEN) 100 UNIT/ML injection 1 unit for every 3 grams of carb plus correction of 1 unit for every 15 points above 150 (approx 60 units/day)    insulin degludec (TRESIBA FLEXTOUCH) 200 UNIT/ML pen Inject 116 Units Subcutaneous At Bedtime     Sodium-Glucose Co-Transporter 2 (SGLT2) Inhibitors Sig    canagliflozin (INVOKANA) 100 MG tablet Take 1 tablet (100 mg) by mouth every morning (before breakfast)     Patient not taking:  Reported on 8/1/2017    Incretin Mimetic Agents (GLP-1 Receptor Agonists) Sig    dulaglutide (TRULICITY) 1.5 MG/0.5ML pen Inject 1.5 mg Subcutaneous every 7 days          BG/Food Log:   See below    Date Breakfast  Lunch  Dinner  Bedtime    Before After Before After Before After    8/3     162     8/4     230 172    8/5      229 149   8/6   192  160     8/7      204    8/8 230    142     8/9 180  170         Assessment:  Fasting blood glucose: 0% in target.  Before lunch glucose: 0% in target.  Before dinner glucose: 0% in target.  After dinner glucose: 33% in target.  Bedtime glucose: 100% in target.  Pt would benefit from checking blood sugars more frequently.   Will assess how well he feels he is counting carbohydrates.  Could consider correction at bedtime?  Will route to endo for refill request (also routed to [facility] refill team) and for plan     Plan/Response:  Will await endo reply    Aby Beverly RD, LD, CDE    Any diabetes medication dose changes were made via the CDE Protocol and Collaborative Practice Agreement with the patient's referring provider. A copy of this  encounter was shared with the provider.

## 2017-08-10 DIAGNOSIS — Z79.4 TYPE 2 DIABETES MELLITUS WITH HYPERGLYCEMIA, WITH LONG-TERM CURRENT USE OF INSULIN (H): ICD-10-CM

## 2017-08-10 DIAGNOSIS — E11.65 TYPE 2 DIABETES MELLITUS WITH HYPERGLYCEMIA, WITH LONG-TERM CURRENT USE OF INSULIN (H): ICD-10-CM

## 2017-08-10 NOTE — TELEPHONE ENCOUNTER
Pt does not have enough Humalog to get through 1 month. He is using approximately 80 units/day.  Updated rx dose to reflect the amount he is using.   Will route to RN as RD cannot refill Humalog    Aby Beverly RD, LD, CDE

## 2017-08-11 NOTE — TELEPHONE ENCOUNTER
Does this patient need anything further?  Looks like the Humalog and test strip Rx were renewed.  Let me know if there's something more he needs.  Annette Hobson NP  Endocrinology

## 2017-08-14 NOTE — TELEPHONE ENCOUNTER
Per initial message-I will route this to Dr. Tavera and her team as it looks like they amount in the prescription is not enough to last you one month-this was addressed by Annette Hobson.  RAJAN Medina RN

## 2017-08-18 ENCOUNTER — ALLIED HEALTH/NURSE VISIT (OUTPATIENT)
Dept: EDUCATION SERVICES | Facility: CLINIC | Age: 32
End: 2017-08-18
Payer: COMMERCIAL

## 2017-08-18 DIAGNOSIS — Z79.4 TYPE 2 DIABETES MELLITUS WITH HYPERGLYCEMIA, WITH LONG-TERM CURRENT USE OF INSULIN (H): Primary | ICD-10-CM

## 2017-08-18 DIAGNOSIS — E11.65 TYPE 2 DIABETES MELLITUS WITH HYPERGLYCEMIA, WITH LONG-TERM CURRENT USE OF INSULIN (H): Primary | ICD-10-CM

## 2017-08-18 PROCEDURE — G0108 DIAB MANAGE TRN  PER INDIV: HCPCS | Performed by: DIETITIAN, REGISTERED

## 2017-08-18 NOTE — PROGRESS NOTES
"Diabetes Self Management Training: Follow-up Visit    Naman Jones presents today for education related to Type 2 diabetes.    He is accompanied by self    Patient's diabetes management related comments/concerns: doing well, using I:C and correction dosing, \"BG rarely over 200 mg/dl\", not getting the big post meal spikes. Has lost 5#. Is going preparing for bariatric surgery. Pt very pleased with his progress and appreciates the support he has received.    Patient would like this visit to be focused around the following diabetes-related behaviors and goals: Assistance with making lifestyle changes    ASSESSMENT:  Patient Problem List reviewed for relevant medical history and current medical status.    Current Diabetes Management per Patient:     Diabetes Medication(s)     Biguanides Sig    metFORMIN (GLUCOPHAGE) 500 MG tablet Take 2 tablets (1,000 mg) by mouth 2 times daily (with meals)    Insulin Sig    insulin lispro (HUMALOG KWIKPEN) 100 UNIT/ML injection 1 unit for every 3 grams of carb plus correction of 1 unit for every 15 points above 150 (approx 100 units/day)    insulin degludec (TRESIBA FLEXTOUCH) 200 UNIT/ML pen Inject 116 Units Subcutaneous At Bedtime     Sodium-Glucose Co-Transporter 2 (SGLT2) Inhibitors Sig    canagliflozin (INVOKANA) 100 MG tablet Take 1 tablet (100 mg) by mouth every morning (before breakfast)     Patient not taking:  Reported on 8/1/2017    Incretin Mimetic Agents (GLP-1 Receptor Agonists) Sig    dulaglutide (TRULICITY) 1.5 MG/0.5ML pen Inject 1.5 mg Subcutaneous every 7 days        Not taking Invokana, never started due to fear of side effects.    Problems taking diabetes medications regularly? No   Side effects? No   Humalog TDD 70-80 units/day,  takes 4-5 injections daily (3 meals + 1-2 snacks)    Patient glucose self monitoring as follows: 3-4 times daily.   Patient forgot to bring his meter to appt.  BG results: 126- 190 (rarely > 200 mg/dl)     BG values are: significantly " "improved per patient comments  Patient's most recent   Lab Results   Component Value Date    A1C 8.5 07/26/2017    is not meeting goal of <7.0    Nutrition:  Patient eats 3 meals per day and counts carbohydrates in grams. Is eating out once per week rather than almost daily. Now enjoys cooking and planning meals.      Breakfast - banana or yogurt or protein bar or shake  Lunch - left over home made chili or split pea soup or grilled meat and potato   Dinner - tacos or fajitas or stir oquendo with rice or vegetable omelet and fruit  Snacks - protein bar, has been limiting dessert- eating more yogurt    Beverages: coffee, some milk with meals, flavored water, is weaning off carbonated beverages (diet)    Cultural/Anglican diet restrictions: No     Biggest Challenge to Healthy Eating: social events and evening snacking- has previously struggled with binge eating    Physical Activity:    Limitations: knee/leg problems  Is walking more    Diabetes Complications:  Not discussed today.    Vitals:  Estimated body mass index is 44.51 kg/(m^2) as calculated from the following:    Height as of 7/10/17: 1.867 m (6' 1.5\").    Weight as of 7/10/17: 155.1 kg (342 lb).   Last 3 BP:   BP Readings from Last 3 Encounters:   07/10/17 118/84   06/13/17 144/88       History   Smoking Status     Never Smoker   Smokeless Tobacco     Never Used       Labs:  Lab Results   Component Value Date    A1C 8.5 07/26/2017     Lab Results   Component Value Date     07/26/2017     Lab Results   Component Value Date     07/10/2017    LDL 97 03/13/2017     HDL Cholesterol   Date Value Ref Range Status   03/13/2017 37 (L) >40 mg/dL Final   ]  GFR Estimate   Date Value Ref Range Status   07/26/2017 >90  Non  GFR Calc   >60 mL/min/1.7m2 Final     GFR Estimate If Black   Date Value Ref Range Status   07/26/2017 >90   GFR Calc   >60 mL/min/1.7m2 Final     Lab Results   Component Value Date    CR 0.55 07/26/2017 "     No results found for: MICROALBUMIN    Health Beliefs and Attitudes:   Patient Activation Measure Survey Score:  No flowsheet data found.    Stage of Change: ACTION (Actively working towards change)    Diabetes knowledge and skills assessment:     Patient is knowledgeable in diabetes management concepts related to: Healthy Eating, Being Active, Monitoring, Taking Medication, Problem Solving, Reducing Risks and Healthy Coping    Patient needs further education on the following diabetes management concepts: Healthy Coping and continued reinforcement of principles of DM self mgmt    Barriers to Learning Assessment: No Barriers identified    Based on learning assessment above, most appropriate setting for further diabetes education would be: Individual setting.    INTERVENTION:    Education provided today on:  AADE Self-Care Behaviors:  Healthy Coping: benefits of making appropriate lifestyle changes, utilize support systems and benefits of professional counseling  Most of visit spent discussing current progress, challenges, behavior change process, and preparation for gastric bypass    Opportunities for ongoing education and support in diabetes-self management were discussed.    Pt verbalized understanding of concepts discussed and recommendations provided today.       Education Materials Provided:  No new materials provided today    PLAN:  Round up Humalog correction doses pre-meal as well as I:C to help slightly reduce post meal blood sugars (difficult to assess need for further changes without BG data).    Continue lifestyle efforts and current treatment regimen.     FOLLOW-UP:  Follow-up appointment scheduled 1 month.  Chart routed to referring provider.    Ongoing plan for education and support: Bariatric surgery program, Follow-up visit with diabetes educator  and Follow-up with primary care provider    Enedelia Pena RD, ADITIE  Diabetes     Time Spent: 40 minutes  Encounter Type:  Individual    Any diabetes medication dose changes were made via the CDE Protocol and Collaborative Practice Agreement with the patient's primary care provider. A copy of this encounter was shared with the provider.

## 2017-08-21 NOTE — TELEPHONE ENCOUNTER
I am going to close this encounter.  The last prescription sent was for Humalog pens, up to 100 units/day - 30 ml which is 6 pens.  There are no refills as patient is due to follow up with Dr. Tavera I believe.  Annette Hobson NP  Endocrinology

## 2017-08-21 NOTE — TELEPHONE ENCOUNTER
Is there anything father to do? It looks like the rx was sent for 30 ml (0 refills) this should last him at least a month if he is taking 100 units (or under a day) please advise

## 2017-08-25 ENCOUNTER — OFFICE VISIT (OUTPATIENT)
Dept: SURGERY | Facility: CLINIC | Age: 32
End: 2017-08-25
Payer: COMMERCIAL

## 2017-08-25 PROCEDURE — 97803 MED NUTRITION INDIV SUBSEQ: CPT | Performed by: DIETITIAN, REGISTERED

## 2017-08-25 NOTE — PROGRESS NOTES
PRE-SURGICAL WEIGHT LOSS NUTRITION APPOINTMENT  DATE OF VISIT: 2017  Name: MICK CHOWDHURY  : 1985  Gender: Male  MRN: [id number]  Age: 32  ASSESSMENT  REASON FOR VISIT:  MICK CHOWDHURY is a 32 year old Male presents today for a pre-surgical weight loss follow-up appointment.  DIAGNOSIS:  Class III,Morbid Obesity.    ANTHROPOMETRICS:  Height: 73 inches  Initial Weight: 349.4 lbs  Weight last visit: 351.6 lbs  Current Weight: 359.0 lbs  BMI: 47.4 kg/m2    NUTRITION HISTORY:  Breakfast: Protein Shake ( Whey) + banana or peach -within 1 hour of waking  Lunch: bringing lunch- chili bake or homemade soup or leftover stir oquendo with 1/2 cup rice (12:00)  Supper: Chicken Stir oquendo, side or ham/cheese/ green peppers omelet (7:00-8:00)  Snacks: Bedtime-lunch meat sandwich (30 g CHO + protein per diabetes education)  Drinks: 3  Consuming liquid calories: Milk  Eating 3 meals per day: Yes  Eating slower: Yes  Chewing foods thoroughly: Yes  Take 30 minutes to consume each meal: Yes  Fluids and meals separate by at least 30 minutes: Sometimes  Comments: Patient is frustrated with several changes in his insulin (long and short acting); has been advised to increase his carbohydrate intake by the diabetes educators to help manage his diabetes; patient said he did not know he needed a bariatric psychological evaluation, but will work on getting those appointments scheduled  Desired Surgical Procedure: gastric bypass  PHYSICAL ACTIVITY:  None  DIAGNOSIS:  Previous Nutrition Diagnosis: Obesity related to excess energy intake as evidenced by BMI of 46.4  no change, modified below  Previous goals:  Start: 1 multivitamin mineral, 2000 International Units Vitamin D, 1200 mg calcium with vitamin D (2-3 separate doses)-met  Eliminate carbonation by 50%-met  Focus on evening meal-met (eating earlier)  Current Nutrition Diagnosis: Obesity related to excess energy intake as evidenced by BMI of 47.4  IMPLEMENTATION:  Nutrition  Prescription: Recommended energy/nutrient modification  Goals:  Eliminate all carbonation  Reduce coffee intake by 50%  Exercise 3 X per week starting at 20 minutes, working up to 30 minutes as able (15 minutes 2 X per day if needed)  Implementation:  - Discussed progress towards previous goals  - Reinforced importance of making behavior changes in preparation for bariatric surgery.  NUTRITION MONITORING AND EVALUATION:  Anticipated compliance: fair-good  Patient verbalized good understanding of bariatric diet guidelines.    Follow up: 1 month  # of visits needed: 2  Cleared by RD: No  TIME SPENT WITH PATIENT: 25 minutes  Darius Montenegro RD, LD  [facility]  [phone]

## 2017-08-25 NOTE — MR AVS SNAPSHOT
MRN:[id number]                      After Visit Summary   8/25/2017    Naman Jones    MRN: [id number]           Visit Information        Provider Department      8/25/2017 9:00 AM 1, Adele Morris Diet, RD [facility]      Your next 10 appointments already scheduled     Sep 28, 2017  9:00 AM CDT   Weight Loss Visit with Adele Morris Diet 3, RD   [facility]  [place] ([facility])    [address]  [facility] [id number] [phone]            Sep 29, 2017  8:30 AM CDT   Diabetic Education with Enedelia Pena   [facility] ([facility])    [address]  [place] [address] [phone]              MyChart Information     [employer] gives you secure access to your electronic health record. If you see a primary care provider, you can also send messages to your care team and make appointments. If you have questions, please call your primary care clinic.  If you do not have a primary care provider, please call [phone] and they will assist you.        Care EveryWhere ID     This is your Care EveryWhere ID. This could be used by other organizations to access your [facility] medical records  [id number]        Equal Access to Services     RANULFO REINOSO : Hadii lopez michelleo Sozacariasali, waaxda luqadaha, qaybta kaalmada adeegyada, naman rodgers. So [facility] [phone].    ATENCIÓN: Si habla español, tiene a dumas disposición servicios gratuitos de asistencia lingüística. Llame al [phone].    We comply with applicable federal civil rights laws and [place] laws. We do not discriminate on the basis of race, color, national origin, age, disability sex, sexual orientation or gender identity.

## 2017-09-09 DIAGNOSIS — E11.65 TYPE 2 DIABETES MELLITUS WITH HYPERGLYCEMIA, WITH LONG-TERM CURRENT USE OF INSULIN (H): ICD-10-CM

## 2017-09-09 DIAGNOSIS — Z79.4 TYPE 2 DIABETES MELLITUS WITH HYPERGLYCEMIA, WITH LONG-TERM CURRENT USE OF INSULIN (H): ICD-10-CM

## 2017-09-11 RX ORDER — INSULIN LISPRO 100 [IU]/ML
INJECTION, SOLUTION INTRAVENOUS; SUBCUTANEOUS
Qty: 30 ML | Refills: 0 | Status: SHIPPED | OUTPATIENT
Start: 2017-09-11 | End: 2018-02-26

## 2017-10-04 ENCOUNTER — OFFICE VISIT (OUTPATIENT)
Dept: SURGERY | Facility: CLINIC | Age: 32
End: 2017-10-04
Payer: COMMERCIAL

## 2017-10-04 DIAGNOSIS — E66.01 MORBID OBESITY WITH BMI OF 45.0-49.9, ADULT (H): ICD-10-CM

## 2017-10-04 PROCEDURE — 97803 MED NUTRITION INDIV SUBSEQ: CPT | Performed by: DIETITIAN, REGISTERED

## 2017-10-04 NOTE — MR AVS SNAPSHOT
MRN:[id number]                      After Visit Summary   10/4/2017    Naman Jones    MRN: [id number]           Visit Information        Provider Department      10/4/2017 9:00 AM 3, Adele Landa, RD [facility]      Your next 10 appointments already scheduled     Oct 25, 2017  9:00 AM CDT   Weight Loss Visit with Sh Wl Diet 1, RD   [facility] ([facility])    [address] [place] [address] [phone]              MyChart Information     [employer] gives you secure access to your electronic health record. If you see a primary care provider, you can also send messages to your care team and make appointments. If you have questions, please call your primary care clinic.  If you do not have a primary care provider, please call [phone] and they will assist you.        Care EveryWhere ID     This is your Care EveryWhere ID. This could be used by other organizations to access your [facility] medical records  [id number]        Equal Access to Services     RANULFO REINOSO : Hadii lopez graf Sostacie, waaxda luqadaha, qaybta kaalmaanatoliy hill, naman rodgers. So [facility] [phone].    ATENCIÓN: Si habla español, tiene a dumas disposición servicios gratfrankos de asistencia lingüística. Llame al [phone].    We comply with applicable federal civil rights laws and [place] laws. We do not discriminate on the basis of race, color, national origin, age, disability, sex, sexual orientation, or gender identity.

## 2017-10-04 NOTE — PROGRESS NOTES
"PRE-SURGICAL WEIGHT LOSS NUTRITION APPOINTMENT  DATE OF VISIT: 10/4/2017  Name: MICK CHOWDHURY  : 1985  Gender: Male  MRN: [id number]  Age: 32  ASSESSMENT  REASON FOR VISIT:  MICK CHOWDHURY is a 32 year old Male presents today for a pre-surgical weight loss follow-up appointment.  DIAGNOSIS:  Class III obesity    ANTHROPOMETRICS:  Height: 73 inches  Initial Weight: 349.4 lbs  Weight last visit: 359.0 lbs  Current Weight: 351.7 lbs  BMI: 46.4 kg/m2    NUTRITION HISTORY:  Breakfast: (within 1 hour of waking, while driving to work) Protein Bar/Shake  Lunch: salad, soup or sandwich (trying to do more salads with light protein)   Supper: stir oquendo, fajitas (tries to stay low CHO)   Snacks: HS - protein drink   Drinks: powerade zero, vitamin water zero, 16oz milk, diet juice (grape, cranberry)   Consuming liquid calories: Milk  Eating 3 meals per day: Yes  Eating slower: Yes  Chewing foods thoroughly: Yes  Take 30 minutes to consume each meal: Yes  Fluids and meals separate by at least 30 minutes: Yes  Comments: patient goes by \"Noam;\"pt worked with [facility] for 6 months on binge Eating Disorder and has been discharged from that program per feed back from Lilibeth Ying PA-C (she has requested records form that program); wife had weight loss surgery in ; sister had sleeve gastrostomy; pt and his wife share the cooking; pt was in the surgical weight loss program at [place] for ~ 1 year (was having trouble meeting HGBA1C requirements and was not happy with the program in general) ; has on 4 year old son; pt works in the [employer] industry 17-Patient is frustrated with several changes in his insulin (long and short acting); has been advised to increase his carbohydrate intake by the diabetes educators to help manage his diabetes; patient said he did not know he needed a bariatric psychological evaluation, but will work on getting those appointments scheduled     10/4: Pt and wife very pleased " with weight loss and having found a good insulin/medication regimen. Has found information sessions with RD CDE very helpful. It is obvious they have a strong understanding of macronutrient needs. Has psych follow up appointment tomorrow. Pt opts to return in 3 weeks (vs 4) for next appointment; this is acceptable given confidence in all bariatric habits.   Desired Surgical Procedure: Laparoscopic Gastric Bypass  PHYSICAL ACTIVITY:  Type: Walking  Frequency: Daily  Duration (min): 15  DIAGNOSIS:  Previous Nutrition Diagnosis: Obesity related to excess energy intake as evidenced by BMI of 47.4kg/m2.   Unchanged, modified below.   Previous goals:  Start vitamin D and calcium per guidelines - improving  Reduce carbonation by 50% - met  Focus on evening meal - met  Current Nutrition Diagnosis: Obesity related to excess energy intake as evidenced by BMI of 46.4kg/m2.   IMPLEMENTATION:  Nutrition Prescription: Recommended energy/nutrient modification  Goals:  Continue all pre-op bariatric guidelines   Begin taking Vitamin D supplement - 2000 international units  Continue to follow low/moderate CHO diet as appropriate  Implementation:  - Discussed progress towards previous goals  - Reinforced importance of making behavior changes in preparation for bariatric surgery.  NUTRITION MONITORING AND EVALUATION:  Anticipated compliance: Good  Patient verbalized good understanding of bariatric diet guidelines.  Follow up: 1 month  # of visits needed: 1  Cleared by RD: No  TIME SPENT WITH PATIENT: 20 minutes  Radha Hood RD, LD  Clinical Dietitian

## 2017-10-09 ENCOUNTER — TRANSFERRED RECORDS (OUTPATIENT)
Dept: HEALTH INFORMATION MANAGEMENT | Facility: CLINIC | Age: 32
End: 2017-10-09

## 2017-10-25 ENCOUNTER — OFFICE VISIT (OUTPATIENT)
Dept: SURGERY | Facility: CLINIC | Age: 32
End: 2017-10-25
Payer: COMMERCIAL

## 2017-10-25 DIAGNOSIS — E66.01 MORBID OBESITY WITH BMI OF 45.0-49.9, ADULT (H): ICD-10-CM

## 2017-10-25 PROCEDURE — 97803 MED NUTRITION INDIV SUBSEQ: CPT | Performed by: DIETITIAN, REGISTERED

## 2017-10-25 NOTE — PROGRESS NOTES
"PRE-SURGICAL WEIGHT LOSS NUTRITION APPOINTMENT  DATE OF VISIT: 10/25/2017  Name: MICK CHOWDHURY  : 1985  Gender: Male  MRN: [id number]  Age: 32  ASSESSMENT  REASON FOR VISIT:  MICK CHOWDHURY is a 32 year old Male presents today for a pre-surgical weight loss follow-up appointment. Patient accompanied by his wife, Brit.  DIAGNOSIS:  Class III  Morbid Obesity.    ANTHROPOMETRICS:  Height: 73 inches  Initial Weight: 349.4 lbs  Weight last visit: 351.7 lbs  Current Weight: 352.2 lbs  BMI: 46.5 kg/m2    NUTRITION HISTORY:  Breakfast: Protein Bar OR Protein drink (30 g protein)  Lunch: Protein Bar (15 g pro/ 170 kcal/ 17 g CHO) OR Protein drink (30 g protein) OR deli ham/ cheese or home made chili  Supper: chili or entree salad with chicken  Snacks: 1 glass of milk + handful peanuts or beef jerky  Drinks: Light Vitamin Water, 4 cups Diet fruit juice (15 kcal/ cup), Milk, herbal tea  Consuming liquid calories: Milk  Eating 3 meals per day: Yes  Eating slower: Yes  Chewing foods thoroughly: Yes  Take 30 minutes to consume each meal: Yes  Fluids and meals separate by at least 30 minutes: Yes  Comments: patient goes by \"Chirag;\"pt worked with [facility] for 6 months on binge Eating Disorder and has been discharged from that program per feed back from Lilibeth Ying PA-C (she has requested records form that program); wife had weight loss surgery in ; sister had sleeve gastrostomy; pt and his wife share the cooking; pt was in the surgical weight loss program at [place] for ~ 1 year (was having trouble meeting HGBA1C requirements and was not happy with the program in general) ; has on 4 year old son; pt works in the [employer] industry  17-Patient is frustrated with several changes in his insulin (long and short acting); has been advised to increase his carbohydrate intake by the diabetes educators to help manage his diabetes; patient said he did not know he needed a bariatric psychological " evaluation, but will work on getting those appointments scheduled 10/4: Pt and wife very pleased with weight loss and having found a good insulin/medication regimen. Has found information sessions with RD CDE very helpful. It is obvious they have a strong understanding of macronutrient needs. Has psych follow up appointment tomorrow. Pt opts to return in 3 weeks (vs 4) for next appointment; this is acceptable given confidence in all bariatric habits.  10/25/17- pt is frustrated weight is up slightly; less intentional physical activity; pt feels like biggest struggle weight gain is due to increase in insulin; pt feels like if he has a shorter time frame (2 weeks) he will get more focused on weight loss; feel pt need to eat more to help promote weight loss  Desired Surgical Procedure: gastric bypass  PHYSICAL ACTIVITY:  Type: Walking  Frequency: 3-4x/week  Duration (min): 15  DIAGNOSIS:  Previous Nutrition Diagnosis: Obesity related to excess energy intake as evidenced by BMI of 46.4  no change, modified below  Previous goals:  Continue all pre-op bariatric guidelines-met   Begin taking Vitamin D supplement - 2000 international units-met   Continue to follow low/moderate CHO diet as appropriate-met  Current Nutrition Diagnosis: Obesity related to excess energy intake as evidenced by BMI of 46.5  IMPLEMENTATION:  Nutrition Prescription: Recommended energy/nutrient modification  Goals:  Continue to practice all prebariatric surgery diet guidelines  Increase walking to 5-6 X per week for 15 minutes  Track intake and activity; increasing calories to 2978-2937 kcal/ day (11-14 kcal/kg)   Implementation:  - Discussed progress towards previous goals  - Reinforced importance of making behavior changes in preparation for bariatric surgery.  NUTRITION MONITORING AND EVALUATION:  Anticipated compliance: good  Patient verbalized good understanding of bariatric diet guidelines.    Follow up: 2 weeks  # of visits needed: 1  Cleared  by RD: No  TIME SPENT WITH PATIENT: 20 minutes  Darius Montenegro RD, LD  [facility]  [phone]

## 2017-12-22 ENCOUNTER — OFFICE VISIT (OUTPATIENT)
Dept: SURGERY | Facility: CLINIC | Age: 32
End: 2017-12-22
Payer: COMMERCIAL

## 2017-12-22 VITALS — BODY MASS INDEX: 41.75 KG/M2 | HEIGHT: 73 IN | WEIGHT: 315 LBS

## 2017-12-22 DIAGNOSIS — E66.01 MORBID OBESITY WITH BMI OF 45.0-49.9, ADULT (H): ICD-10-CM

## 2017-12-22 PROCEDURE — 97803 MED NUTRITION INDIV SUBSEQ: CPT | Performed by: DIETITIAN, REGISTERED

## 2017-12-22 NOTE — PROGRESS NOTES
"PRE SURGICAL WEIGHT LOSS NUTRITION APPOINTMENT    Naman Jones  1985  male  [id number]  32 year old    ASSESSMENT    Desired Surgical Procedure: gastric bypass    REASON FOR VISIT:  Naman Jones is a 32 year old year old male presents today for a pre-surgical weight loss follow-up appointment. Patient accompanied by self.    DIAGNOSIS:  Weight Status Obesity Grade III BMI >40    ANTHROPOMETRICS:  Height: 73\"   Initial Weight:  349.4 lb  Weight last visit:  352.2 lb   Current weight:  352.2 lb  BMI: 46.55  kg/(m^2).      NUTRITION HISTORY:  Breakfast: protein drink, fresh fruit  Lunch: chili or chicken stew  Supper: earlier in month more take out due to moving/ omelette with peppers, cheese, ham and mushrooms   Snacks: rare   Fluids Consumed: Water, Protein Drink and skim or 1% milk, diet fruit juice, enhanced water  Eating slower: Yes  Chewing foods thoroughly: Yes  Take 20-30 minutes to consume each meal: Yes  Fluids and meals separate by at least 30 minutes: No  Carbonation: none  Caffeine: occasional, but none this week  Additional Information: patient reports this past month has been stressful due to selling his house and moving to a temporary living situation until his house is done being built; pt feels like January will be a good month for him to get focus on weight loss; pt is not concerned about holiday eating issues as his family is very supportive and will be offering healthy food choices    PHYSICAL ACTIVITY:  Type: gym at work  Frequency: 1 (days per week)  Duration: 30(minutes)      DIAGNOSIS:  Previous Nutrition Diagnosis: Obesity related to long history of self- monitoring deficit and excessive energy intake evidenced by BMI of 45.5 kg/m2  No change, modified below    Previous goals:   Continue to practice all prebariatric surgery diet guidelines-not met  Increase walking to 5-6 X per week for 15 minutes-not met  Track intake and activity; increasing calories to 2305-3480 kcal/ day " (11-14 kcal/kg) -not met    Current Nutrition Diagnosis: Obesity related to long history of self-monitoring deficit and excessive energy intake as evidenced by BMI of 46.55 kg/m2.    INTERVENTION:  Nutrition Prescription: Recommended energy/nutrient modification.    GOALS:  Relating To Eating:  Track intake and activity on application or paper (keeping calories at 7166-6757 kcal/day)    Relating to beverages:  Separate fluids from meals by 30 minutes before, during, and after eating      Relating to activity:  Increase activity level.  Exercise 3 days/week for 30 minutes      Follow-Up:   Recommend 1-2 follow up visits to assist with lifestyle changes or per insurance.    Intervention:  - Discussed progress towards previous goals.  - Reinforced importance of making behavior changes in preparation for bariatric surgery.   -Assessed learning needs and learning preferences       NUTRITION MONITORING AND EVALUATION:  Anticipated compliance: fair  Patient demonstrated fair-good understanding.   *Patient has met pre bariatric surgery diet requirements    Follow up: Continue to monitor patient closely regarding weight loss and diet.  # of visits needed: 1-2  Cleared by RD: No     TIME SPENT WITH PATIENT: 20 minutes  Darius Montenegro RD, LD  [facility]  [phone]

## 2017-12-22 NOTE — MR AVS SNAPSHOT
MRN:[id number]                      After Visit Summary   12/22/2017    Naman Jones    MRN: [id number]           Visit Information        Provider Department      12/22/2017 3:00 PM 1, Adele Landa, DANIELLE [facility] Weight Loss      Your next 10 appointments already scheduled     Jan 19, 2018  9:00 AM CST   Weight Loss Visit with Sh Wl Diet 1, RD   [facility] ([facility])    [address] [place] [id number] [phone]              MyChart Information     [employer] gives you secure access to your electronic health record. If you see a primary care provider, you can also send messages to your care team and make appointments. If you have questions, please call your primary care clinic.  If you do not have a primary care provider, please call [phone] and they will assist you.        Care EveryWhere ID     This is your Care EveryWhere ID. This could be used by other organizations to access your [facility] medical records  [id number]        Equal Access to Services     XI [facility] HospitalJOSE L : Hadii lopez graf Sostacie, waaxda luqadaha, qaybta kaalmaanatoliy hill, naman rodgers. So [facility] [phone].    ATENCIÓN: Si habla español, tiene a dumas disposición servicios gratfrankos de asistencia lingüística. Llame al [phone].    We comply with applicable federal civil rights laws and [place] laws. We do not discriminate on the basis of race, color, national origin, age, disability, sex, sexual orientation, or gender identity.

## 2018-02-02 ENCOUNTER — OFFICE VISIT (OUTPATIENT)
Dept: FAMILY MEDICINE | Facility: CLINIC | Age: 33
End: 2018-02-02
Payer: COMMERCIAL

## 2018-02-02 VITALS
RESPIRATION RATE: 18 BRPM | SYSTOLIC BLOOD PRESSURE: 130 MMHG | TEMPERATURE: 98.3 F | WEIGHT: 315 LBS | BODY MASS INDEX: 41.75 KG/M2 | HEIGHT: 73 IN | DIASTOLIC BLOOD PRESSURE: 86 MMHG | OXYGEN SATURATION: 98 % | HEART RATE: 102 BPM

## 2018-02-02 DIAGNOSIS — I10 ESSENTIAL HYPERTENSION: ICD-10-CM

## 2018-02-02 DIAGNOSIS — E11.65 TYPE 2 DIABETES MELLITUS WITH HYPERGLYCEMIA, WITH LONG-TERM CURRENT USE OF INSULIN (H): ICD-10-CM

## 2018-02-02 DIAGNOSIS — E66.01 MORBID OBESITY WITH BMI OF 45.0-49.9, ADULT (H): Primary | ICD-10-CM

## 2018-02-02 DIAGNOSIS — F41.1 GAD (GENERALIZED ANXIETY DISORDER): ICD-10-CM

## 2018-02-02 DIAGNOSIS — Z79.4 TYPE 2 DIABETES MELLITUS WITH HYPERGLYCEMIA, WITH LONG-TERM CURRENT USE OF INSULIN (H): ICD-10-CM

## 2018-02-02 PROCEDURE — 99214 OFFICE O/P EST MOD 30 MIN: CPT | Performed by: NURSE PRACTITIONER

## 2018-02-02 ASSESSMENT — ANXIETY QUESTIONNAIRES
6. BECOMING EASILY ANNOYED OR IRRITABLE: SEVERAL DAYS
2. NOT BEING ABLE TO STOP OR CONTROL WORRYING: NOT AT ALL
5. BEING SO RESTLESS THAT IT IS HARD TO SIT STILL: NOT AT ALL
GAD7 TOTAL SCORE: 4
IF YOU CHECKED OFF ANY PROBLEMS ON THIS QUESTIONNAIRE, HOW DIFFICULT HAVE THESE PROBLEMS MADE IT FOR YOU TO DO YOUR WORK, TAKE CARE OF THINGS AT HOME, OR GET ALONG WITH OTHER PEOPLE: SOMEWHAT DIFFICULT
3. WORRYING TOO MUCH ABOUT DIFFERENT THINGS: SEVERAL DAYS
1. FEELING NERVOUS, ANXIOUS, OR ON EDGE: SEVERAL DAYS
7. FEELING AFRAID AS IF SOMETHING AWFUL MIGHT HAPPEN: NOT AT ALL

## 2018-02-02 ASSESSMENT — PATIENT HEALTH QUESTIONNAIRE - PHQ9: 5. POOR APPETITE OR OVEREATING: SEVERAL DAYS

## 2018-02-02 NOTE — PROGRESS NOTES
SUBJECTIVE:   Naman Jones is a 32 year old male who presents to clinic today for the following health issues:    New Patient/Transfer of Care    Naman is hoping to transfer his primary care here to [place]. Previously his PCP was at [facility].     Morbid obesity: Naman's reports that his last step before he gets a gastric bypass is to loose 7 more pounds. He gained some weight after he got his A1C lowered, and he is now working to loose that weight. Is seeing weight loss center at .  Currently, Naman's only exercise is over his lunch break. He does not have a gym membership but he is hoping to get one this spring.    DM2: Naman spoke to a endocrinologist to get his A1C better managed. He has some mild peripheral neuropathy. Last A1C was done in 12/2017 and was 8.7%.      HTN: Well controlled with losartan (100 mg) and Maxzide (75-50 mg).    Depression/Anxiety:   PHQ9 score: 2 - denies thoughts of harming self or others  GAD7 score: 4    Work/Social: Naman works in . He has a wife and 5 year old child.    Vision: Naman's last vision check was in 6/2017.      Problem list and histories reviewed & adjusted, as indicated.  Additional history: as documented    Reviewed and updated as needed this visit by clinical staff  Tobacco  Allergies  Meds  Med Hx  Surg Hx  Fam Hx  Soc Hx      Reviewed and updated as needed this visit by Provider       ROS:  Constitutional, HEENT, cardiovascular, pulmonary, GI, , musculoskeletal, neuro, skin, endocrine and psych systems are negative, except as otherwise noted.    The information in this document, created by the medical scribe for me, accurately reflects the services I personally performed and the decisions made by me. I have reviewed and approved this document for accuracy.   Susan Haase, PRABHAKAR     OBJECTIVE:     /86 (BP Location: Left arm, Patient Position: Chair, Cuff Size: Adult Large)  Pulse 102  Temp 98.3  F (36.8  C)  "(Oral)  Resp 18  Ht 1.854 m (6' 1\")  Wt (!) 159.7 kg (352 lb)  SpO2 98%  BMI 46.44 kg/m2  Body mass index is 46.44 kg/(m^2).  GENERAL: healthy, alert and no distress  HENT: ear canals and TM's normal, nose and mouth without ulcers or lesions  NECK: no adenopathy, no asymmetry, masses, or scars and thyroid normal to palpation  RESP: lungs clear to auscultation - no rales, rhonchi or wheezes  CV: regular rate and rhythm, normal S1 S2, no S3 or S4, no murmur, click or rub, no peripheral edema and peripheral pulses strong  ABDOMEN: soft, nontender, no hepatosplenomegaly, no masses and bowel sounds normal  MS: no gross musculoskeletal defects noted, no edema  SKIN: no suspicious lesions or rashes  NEURO: Normal strength and tone, mentation intact and speech normal  PSYCH: mentation appears normal, affect normal/bright    Diabetic Foot Screen:  Any complaints of increased pain or numbness ? Numbness of great toe   Is there a foot ulcer now or a history of foot ulcer? No   Does the foot have an abnormal shape? No   Are the nails thick, too long or ingrown? No   Are there any redness or open areas? No            Sensation Testing done at all points on the diagram with monofilament     Right Foot: Sensation 1 and second toe  Left Foot: Sensation 1     Risk Category: 1- Loss of protective sensation with normal appearing foot (no weakness, deformity, callous, pre-ulcer or h/o ulceration)  Performed by Susan Haase      ASSESSMENT/PLAN:   Naman was seen today for establish care.    Diagnoses and all orders for this visit:    Morbid obesity with BMI of 45.0-49.9, adult (H):  Encouraged continued weight loss with dietary changes and exercise.      Type 2 diabetes mellitus with hyperglycemia, with long-term current use of insulin (H): Last A1C in 12/2017 elevated at 8.7%.     Essential hypertension:  Well controlled, /86    JOEL (generalized anxiety disorder): well controlled.    Follow up in 3/2018 for physical exam, " arrive fasting.   The information in this document, created by the medical scribe for me, accurately reflects the services I personally performed and the decisions made by me. I have reviewed and approved this document for accuracy.   Susan Haase, CNP Susan Haase, APRN CNP  [facility]

## 2018-02-02 NOTE — MR AVS SNAPSHOT
After Visit Summary   2/2/2018    Naman Jones    MRN: [id number]           Patient Information     Date Of Birth          1985        Visit Information        Provider Department      2/2/2018 1:00 PM Haase, Susan Rachele, APRN CNP [facility]        Today's Diagnoses     Type 2 diabetes mellitus with hyperglycemia, with long-term current use of insulin (H)    -  1    Essential hypertension           Follow-ups after your visit        Your next 10 appointments already scheduled     Mar 14, 2018  8:00 AM CDT   PHYSICAL with Susan Rachele Haase, APRN CNP   [facility]    [address] [facility] [address] [phone]              Who to contact     If you have questions or need follow up information about today's clinic visit or your schedule please contact [facility] directly at [phone].  Normal or non-critical lab and imaging results will be communicated to you by MyChart, letter or phone within 4 business days after the clinic has received the results. If you do not hear from us within 7 days, please contact the clinic through [employer] or phone. If you have a critical or abnormal lab result, we will notify you by phone as soon as possible.  Submit refill requests through [employer] or call your pharmacy and they will forward the refill request to us. Please allow 3 business days for your refill to be completed.          Additional Information About Your Visit        MyChart Information     [employer] gives you secure access to your electronic health record. If you see a primary care provider, you can also send messages to your care team and make appointments. If you have questions, please call your primary care clinic.  If you do not have a primary care provider, please call [phone] and they will assist you.        Care EveryWhere ID     This is your Care EveryWhere ID. This could  "be used by other organizations to access your [facility] medical records  [id number]        Your Vitals Were     Pulse Temperature Respirations Height Pulse Oximetry BMI (Body Mass Index)    102 98.3  F (36.8  C) (Oral) 18 6' 1\" (1.854 m) 98% 46.44 kg/m2       Blood Pressure from Last 3 Encounters:   02/02/18 130/86   07/10/17 118/84   06/13/17 144/88    Weight from Last 3 Encounters:   02/02/18 (!) 352 lb (159.7 kg)   12/22/17 (!) 352 lb 1.6 oz (159.7 kg)   07/10/17 (!) 342 lb (155.1 kg)              Today, you had the following     No orders found for display       Primary Care Provider Fax #    Physician No Ref-Primary [phone]       No address on file        Equal Access to Services     XI REINOSO : Lucas Dumont, julio c tay, natalia hill, naman conner . So [facility] [phone].    ATENCIÓN: Si habla español, tiene a dumas disposición servicios gratuitos de asistencia lingüística. Llame al [phone].    We comply with applicable federal civil rights laws and [place] laws. We do not discriminate on the basis of race, color, national origin, age, disability, sex, sexual orientation, or gender identity.            Thank you!     Thank you for choosing [facility]  for your care. Our goal is always to provide you with excellent care. Hearing back from our patients is one way we can continue to improve our services. Please take a few minutes to complete the written survey that you may receive in the mail after your visit with us. Thank you!             Your Updated Medication List - Protect others around you: Learn how to safely use, store and throw away your medicines at www.disposemymeds.org.          This list is accurate as of 2/2/18  1:24 PM.  Always use your most recent med list.                   Brand Name Dispense Instructions for use Diagnosis    blood glucose monitoring lancets      1 each by In Vitro route daily As " directed.1 box. For testing blood sugar at home        blood glucose monitoring test strip    no brand specified    150 each    5 strips by In Vitro route daily Test twice daily    Type 2 diabetes mellitus with hyperglycemia, with long-term current use of insulin (H)       dulaglutide 1.5 MG/0.5ML pen    TRULICITY     Inject 1.5 mg Subcutaneous every 7 days        HumaLOG KWIKpen 100 UNIT/ML injection   Generic drug:  insulin lispro     30 mL    INJECT 1 UNIT FOR EVERY 3 GRAMS OF CARBS PLUS CORRECTION OF 1 UNIT FOR EVERY 15 POINTS ABOVE 150. APPROXIMATELY 100 UNITS PER DAY.    Type 2 diabetes mellitus with hyperglycemia, with long-term current use of insulin (H)       LIPITOR 40 MG tablet   Generic drug:  atorvastatin      Take 40 mg by mouth daily        LORAZEPAM PO      Take 1 mg by mouth 2 times daily        losartan 100 MG tablet    COZAAR     Take 100 mg by mouth daily        metFORMIN 500 MG tablet    GLUCOPHAGE    90 tablet    Take 2 tablets (1,000 mg) by mouth 2 times daily (with meals)    Type 2 diabetes mellitus with hyperglycemia, with long-term current use of insulin (H)       SERTRALINE HCL PO      Take 100 mg by mouth daily        traZODone 100 MG tablet    DESYREL     Take 100 mg by mouth At Bedtime        TRESIBA FLEXTOUCH 200 UNIT/ML pen   Generic drug:  insulin degludec      Inject 116 Units Subcutaneous At Bedtime        triamterene-hydrochlorothiazide 75-50 MG per tablet    MAXZIDE     Take 1 tablet by mouth daily

## 2018-02-03 ASSESSMENT — PATIENT HEALTH QUESTIONNAIRE - PHQ9: SUM OF ALL RESPONSES TO PHQ QUESTIONS 1-9: 2

## 2018-02-03 ASSESSMENT — ANXIETY QUESTIONNAIRES: GAD7 TOTAL SCORE: 4

## 2018-02-07 DIAGNOSIS — I10 ESSENTIAL HYPERTENSION: Primary | ICD-10-CM

## 2018-02-07 RX ORDER — LOSARTAN POTASSIUM 100 MG/1
TABLET ORAL
Qty: 30 TABLET | Refills: 1 | Status: SHIPPED | OUTPATIENT
Start: 2018-02-07 | End: 2018-04-18

## 2018-02-07 NOTE — TELEPHONE ENCOUNTER
Routing refill request to provider for review/approval because:  Labs not current:  Dino 7/2017  Medication is reported/historical      Lauren KEYS RN, BSN, PHN  [facility]

## 2018-02-07 NOTE — TELEPHONE ENCOUNTER
"Last Written Prescription Date:  ?  Last Fill Quantity: ?,  # refills: ?   [facility] Office Visit with [facility] [place]:  2/2/2018   Future Office Visit:    Next 5 appointments (look out 90 days)     Mar 14, 2018  8:00 AM CDT   PHYSICAL with Susan Rachele Haase, APRN CNP   [facility]    [address] [place] [address] [phone]                 Requested Prescriptions   Pending Prescriptions Disp Refills     losartan (COZAAR) 100 MG tablet [Pharmacy Med Name: LOSARTAN 100MG TABLETS] 30 tablet 0     Sig: TAKE 1 TABLET BY MOUTH EVERY DAY    Angiotensin-II Receptors Failed    2/7/2018 12:53 PM       Failed - Normal serum creatinine on file in past 12 months    Recent Labs   Lab Test  07/26/17   1525   CR  0.55*            Passed - Blood pressure under 140/90 in past 12 months.    BP Readings from Last 3 Encounters:   02/02/18 130/86   07/10/17 118/84   06/13/17 144/88                Passed - Recent or future visit with authorizing provider's specialty    Patient had office visit in the last year or has a visit in the next 30 days with authorizing provider.  See \"Patient Info\" tab in inbasket, or \"Choose Columns\" in Meds & Orders section of the refill encounter.            Passed - Patient is age 18 or older       Passed - Normal serum potassium on file in past 12 months    Recent Labs   Lab Test  07/26/17   1525   POTASSIUM  3.9                    "

## 2018-03-27 DIAGNOSIS — E11.65 TYPE 2 DIABETES MELLITUS WITH HYPERGLYCEMIA, WITH LONG-TERM CURRENT USE OF INSULIN (H): ICD-10-CM

## 2018-03-27 DIAGNOSIS — Z79.4 TYPE 2 DIABETES MELLITUS WITH HYPERGLYCEMIA, WITH LONG-TERM CURRENT USE OF INSULIN (H): ICD-10-CM

## 2018-03-27 NOTE — TELEPHONE ENCOUNTER
"Requested Prescriptions   Pending Prescriptions Disp Refills     HUMALOG KWIKPEN 100 UNIT/ML soln [Pharmacy Med Name: HUMALOG 100 U/ML KWIK PEN INJ 3ML] 30 mL 0    Last Written Prescription Date:  02/26/2018  Last Fill Quantity: 30 milliliter,  # refills: 0   Last office visit: 2/2/2018 with prescribing provider:  02/02/2018   Future Office Visit:     Sig: INJECT 1 UNIT FOR EVERY 3 GRAMS OF CARBS PLUS CORRECTION OF 1 UNIT FOR EVERY 15 POINTS ABOVE 150.(APPROXIMATELY 100 UNITS PER DAY)    Short Acting Insulin Protocol Failed    3/27/2018 11:01 AM       Failed - HgbA1C in past 3 or 6 months    Recent Labs   Lab Test  07/26/17   1525   A1C  8.5*            Passed - Blood pressure less than 140/90 in past 6 months    BP Readings from Last 3 Encounters:   02/02/18 130/86   07/10/17 118/84   06/13/17 144/88                Passed - LDL on file in past 12 months    Recent Labs   Lab Test  07/10/17   1400   LDL  127*            Passed - Microalbumin on file in past 12 months    Recent Labs   Lab Test  07/10/17   1400   MICROL  18   UMALCR  32.08*            Passed - Serum creatinine on file in past 12 months    Recent Labs   Lab Test  07/26/17   1525   CR  0.55*            Passed - Patient is age 18 or older       Passed - Recent (6 mo) or future (30 days) visit within the authorizing provider's specialty    Patient had office visit in the last 6 months or has a visit in the next 30 days with authorizing provider or within the authorizing provider's specialty.  See \"Patient Info\" tab in inbasket, or \"Choose Columns\" in Meds & Orders section of the refill encounter.              Ralph Cramer XRT  "

## 2018-03-28 NOTE — TELEPHONE ENCOUNTER
Routing refill request to provider to review approval because:  Had pcp visit Feb 2018, ? Endo managing, overdue for A1C, ? Recent bypass    SH ok for lab only A1C?, route to inform pt if lab needed now    Enedelia Goetz RN, BSN  Message handled by Nurse Triage.

## 2018-03-28 NOTE — TELEPHONE ENCOUNTER
"Patient Contact    Attempt # 1    Was call answered?  No.  Unable to leave message.\"mailbox full\"    LMOVM informing [facility] to have pt contact us for clinic message, will consider filling after talking to pt    Enedelia Goetz RN, BSN  Message handled by Nurse Triage.      "

## 2018-03-28 NOTE — TELEPHONE ENCOUNTER
He needs to see endocrinology, not sure if this is outside of our clinic or sees Annette Hobson.  Anyway important to establish care with endo prior to his surgery since will need adjusting after. If he has established with outside endo clinic will need to get refill from them, if sets up with Annette I will refill for 1 month.   Thanks,  Susan Haase, CNP

## 2018-03-29 ENCOUNTER — MYC MEDICAL ADVICE (OUTPATIENT)
Dept: ENDOCRINOLOGY | Facility: CLINIC | Age: 33
End: 2018-03-29

## 2018-03-29 ENCOUNTER — TELEPHONE (OUTPATIENT)
Dept: ENDOCRINOLOGY | Facility: CLINIC | Age: 33
End: 2018-03-29

## 2018-03-29 ENCOUNTER — MYC REFILL (OUTPATIENT)
Dept: FAMILY MEDICINE | Facility: CLINIC | Age: 33
End: 2018-03-29

## 2018-03-29 DIAGNOSIS — E66.9 DIABETES MELLITUS TYPE 2 IN OBESE: Primary | ICD-10-CM

## 2018-03-29 DIAGNOSIS — E11.69 DIABETES MELLITUS TYPE 2 IN OBESE: Primary | ICD-10-CM

## 2018-03-29 DIAGNOSIS — Z79.4 TYPE 2 DIABETES MELLITUS WITH HYPERGLYCEMIA, WITH LONG-TERM CURRENT USE OF INSULIN (H): ICD-10-CM

## 2018-03-29 DIAGNOSIS — E11.65 TYPE 2 DIABETES MELLITUS WITH HYPERGLYCEMIA, WITH LONG-TERM CURRENT USE OF INSULIN (H): ICD-10-CM

## 2018-03-29 NOTE — TELEPHONE ENCOUNTER
Rx sent.    Please check with patient/pharmacy if he is in need of medication.  If he has then please pend orders.  Please pend the orders with correct quantity, select pharmacy and then send for signature. Also associate with correct diagnosis.    Thank you.    Leilani Tavera

## 2018-03-29 NOTE — TELEPHONE ENCOUNTER
Panel Management Review      Patient has the following on his problem list:     Diabetes    ASA: Not Required     Last A1C  Lab Results   Component Value Date    A1C 8.5 07/26/2017    A1C 8.4 07/10/2017    A1C 10.4 05/15/2017    A1C 9.4 03/13/2017     A1C tested: FAILED    Last LDL:    Lab Results   Component Value Date    CHOL 171 03/13/2017     Lab Results   Component Value Date    HDL 37 03/13/2017     Lab Results   Component Value Date     07/10/2017    LDL 97 03/13/2017     Lab Results   Component Value Date    TRIG 183 03/13/2017     No results found for: CHOLHDLRATIO  Lab Results   Component Value Date    NHDL 134 03/13/2017       Is the patient on a Statin? YES             Is the patient on Aspirin? NO    Medications     HMG CoA Reductase Inhibitors    atorvastatin (LIPITOR) 40 MG tablet          Last three blood pressure readings:  BP Readings from Last 3 Encounters:   02/02/18 130/86   07/10/17 118/84   06/13/17 144/88       Date of last diabetes office visit: 7/10/17     Tobacco History:     History   Smoking Status     Never Smoker   Smokeless Tobacco     Never Used           Composite cancer screening  Chart review shows that this patient is due/due soon for the following None  Summary:    Patient is due/failing the following:   FOLLOW UP    Action needed:   Patient needs office visit for dm.    Type of outreach:    Sent [employer] message.    Questions for provider review:    None                                                                                                                                    Bonnie Shukla CMA ([facility])       Chart routed to closed .

## 2018-03-30 ENCOUNTER — MYC REFILL (OUTPATIENT)
Dept: FAMILY MEDICINE | Facility: CLINIC | Age: 33
End: 2018-03-30

## 2018-03-30 DIAGNOSIS — Z79.4 TYPE 2 DIABETES MELLITUS WITH HYPERGLYCEMIA, WITH LONG-TERM CURRENT USE OF INSULIN (H): ICD-10-CM

## 2018-03-30 DIAGNOSIS — E11.65 TYPE 2 DIABETES MELLITUS WITH HYPERGLYCEMIA, WITH LONG-TERM CURRENT USE OF INSULIN (H): ICD-10-CM

## 2018-03-30 RX ORDER — INSULIN LISPRO 100 [IU]/ML
INJECTION, SOLUTION INTRAVENOUS; SUBCUTANEOUS
Qty: 30 ML | Refills: 0 | Status: SHIPPED | OUTPATIENT
Start: 2018-03-30 | End: 2018-03-30

## 2018-03-30 NOTE — TELEPHONE ENCOUNTER
Message from [employer]:  Original authorizing provider: Susan Haase, APRN CNP [facility] would like a refill of the following medications:  HUMALOG KWIKPEN 100 UNIT/ML abena [Susan Haase, APRN CNP]    Preferred pharmacy: [facility] [address] [place] [address] AT [facility] [address]    Comment:  Hey Aurelia only got the order for the trulicity but not the humalog. Again, I have labs and appt with endocrinologist before I'll need another refill

## 2018-03-30 NOTE — TELEPHONE ENCOUNTER
rx sent, sent message, refill was sent, will monitor reply  Enedelia Goetz, RN, BSN  Message handled by Nurse Triage.

## 2018-03-30 NOTE — TELEPHONE ENCOUNTER
Message from [employer]:  Original authorizing provider: Susan Haase, APRN CNP [facility] would like a refill of the following medications:  dulaglutide (TRULICITY) 1.5 MG/0.5ML pen [Susan Haase, APRN CNP]  insulin lispro (HUMALOG KWIKPEN) 100 UNIT/ML injection [Susan Haase, APRN CNP]    Preferred pharmacy: [facility] [address]    Comment:  I have scheduled appointments with both Dr Tavera and Susan Haase but need the meds to get me through to these appointments.

## 2018-03-30 NOTE — TELEPHONE ENCOUNTER
See other mychart message, pt made endo appointment, needs refill extension  Prescription approved per [facility].  Enedelia Goetz, RN, BSN

## 2018-04-12 DIAGNOSIS — E11.69 DIABETES MELLITUS TYPE 2 IN OBESE: ICD-10-CM

## 2018-04-12 DIAGNOSIS — E66.9 DIABETES MELLITUS TYPE 2 IN OBESE: ICD-10-CM

## 2018-04-12 LAB — HBA1C MFR BLD: 9 % (ref 0–6.4)

## 2018-04-12 PROCEDURE — 83036 HEMOGLOBIN GLYCOSYLATED A1C: CPT | Performed by: INTERNAL MEDICINE

## 2018-04-12 PROCEDURE — 36415 COLL VENOUS BLD VENIPUNCTURE: CPT | Performed by: INTERNAL MEDICINE

## 2018-04-13 ENCOUNTER — MYC MEDICAL ADVICE (OUTPATIENT)
Dept: FAMILY MEDICINE | Facility: CLINIC | Age: 33
End: 2018-04-13

## 2018-04-13 ENCOUNTER — TELEPHONE (OUTPATIENT)
Dept: FAMILY MEDICINE | Facility: CLINIC | Age: 33
End: 2018-04-13

## 2018-04-13 DIAGNOSIS — E66.01 MORBID OBESITY WITH BMI OF 45.0-49.9, ADULT (H): Primary | ICD-10-CM

## 2018-04-13 DIAGNOSIS — E11.65 TYPE 2 DIABETES MELLITUS WITH HYPERGLYCEMIA, WITH LONG-TERM CURRENT USE OF INSULIN (H): ICD-10-CM

## 2018-04-13 DIAGNOSIS — Z79.4 TYPE 2 DIABETES MELLITUS WITH HYPERGLYCEMIA, WITH LONG-TERM CURRENT USE OF INSULIN (H): ICD-10-CM

## 2018-04-13 DIAGNOSIS — F41.1 GAD (GENERALIZED ANXIETY DISORDER): Primary | ICD-10-CM

## 2018-04-13 RX ORDER — TRAZODONE HYDROCHLORIDE 100 MG/1
100 TABLET ORAL AT BEDTIME
Qty: 30 TABLET | Refills: 0 | Status: SHIPPED | OUTPATIENT
Start: 2018-04-13 | End: 2018-04-18

## 2018-04-13 NOTE — TELEPHONE ENCOUNTER
A referral is recommended for health  in regards to diabetes and obesity.  Referral written and sent to health  to contact patient.   SHANNAN Mulligan

## 2018-04-13 NOTE — TELEPHONE ENCOUNTER
SH, see Mychart message and advise, pt wants short term trazodone rx, has f/u appointment next week, new for you?    Last OV: 2/2/18  Reason for visit: morbid obesity, DM ,JOEL      Enedelia Goetz, RN, BSN  Message handled by Nurse Triage.

## 2018-04-17 ENCOUNTER — ALLIED HEALTH/NURSE VISIT (OUTPATIENT)
Dept: EDUCATION SERVICES | Facility: CLINIC | Age: 33
End: 2018-04-17
Payer: COMMERCIAL

## 2018-04-17 ENCOUNTER — OFFICE VISIT (OUTPATIENT)
Dept: ENDOCRINOLOGY | Facility: CLINIC | Age: 33
End: 2018-04-17
Payer: COMMERCIAL

## 2018-04-17 VITALS
HEIGHT: 74 IN | OXYGEN SATURATION: 98 % | DIASTOLIC BLOOD PRESSURE: 88 MMHG | HEART RATE: 107 BPM | BODY MASS INDEX: 40.43 KG/M2 | WEIGHT: 315 LBS | RESPIRATION RATE: 19 BRPM | SYSTOLIC BLOOD PRESSURE: 138 MMHG

## 2018-04-17 DIAGNOSIS — Z79.4 TYPE 2 DIABETES MELLITUS WITH HYPERGLYCEMIA, WITH LONG-TERM CURRENT USE OF INSULIN (H): Primary | ICD-10-CM

## 2018-04-17 DIAGNOSIS — I10 ESSENTIAL HYPERTENSION: ICD-10-CM

## 2018-04-17 DIAGNOSIS — E11.65 TYPE 2 DIABETES MELLITUS WITH HYPERGLYCEMIA, WITH LONG-TERM CURRENT USE OF INSULIN (H): Primary | ICD-10-CM

## 2018-04-17 PROCEDURE — 99214 OFFICE O/P EST MOD 30 MIN: CPT | Performed by: INTERNAL MEDICINE

## 2018-04-17 PROCEDURE — 95250 CONT GLUC MNTR PHYS/QHP EQP: CPT

## 2018-04-17 NOTE — PROGRESS NOTES
ENDOCRINOLOGY CLINIC NOTE:  Name: Naman Jones  Seen for f/u of Diabetes.  HPI:  Naman Jones is a 32 year old male who presents for the evaluation/management of Diabetes.  Was seen at bariatric surgery clinic for consideration of gastric bypass surgery and was noted to have high A1c.  He was referred to endocrinology for management of type 2 diabetes.  He was getting care at [facility] before.  Limited records available and were reviewed.    1. Type 2 DM:  Orginally diagnosed at the age of: 30. On routine physical exam. Was loosing wt at that time.  On insulin X 2016  Current Regimen: Tresiba 116 Units hs, Humalog 1 unit/3 gm of CHO + SSI 1-15 >150 (though not checking BG), Trulicity 1.5 mg once a week andMetformin 1000 mg BID.  BS checks: 1-2 times a day  Average Meter Download: Patient did not bring glucometer. Without Blood sugar data it is difficult to make adjustment to medical regimen. Reports that BG are in 180-190.  No major episodes of hypoglycemia noted/reported.   Exercise: not much  Last A1c: 9.0%  Symptoms of hypoglycemia (low blood sugar):  Gets symptoms of hypoglycemia.  Episodes of hypoglycemia: no  Fixed meal pattern: yes  Patient counting carbs: no    DM Complications:   Nephropathy: no  Retinopathy: no  Neuropathy: + numbness in toes  Microalbuminuria: present- on losartan  CAD/PAD: no  Gastroparesis: no  Hypoglycemia unawareness: no    2. Hypertension:    On medications  3. Hyperlipidemia: On medications       PMH/PSH:  DM  HTN  Dyslipidemia    Family Hx:  Diabetes: Father and mgm    Social Hx:  Social History     Social History     Marital status:      Spouse name: N/A     Number of children: N/A     Years of education: N/A     Occupational History     Not on file.     Social History Main Topics     Smoking status: Never Smoker     Smokeless tobacco: Never Used     Alcohol use Yes     Drug use: No     Sexual activity: Yes     Other Topics Concern     Not on file     Social History  "Narrative          MEDICATIONS:  has a current medication list which includes the following prescription(s): insulin lispro, insulin degludec, trazodone, dulaglutide, losartan, blood glucose monitoring, metformin, triamterene-hydrochlorothiazide, atorvastatin, lorazepam, blood glucose monitoring, and sertraline hcl.    ROS     ROS: 10 point ROS neg other than the symptoms noted above in the HPI.    Physical Exam   VS: /88 (BP Location: Right arm, Patient Position: Chair, Cuff Size: Adult Large)  Pulse 107  Resp 19  Ht 1.88 m (6' 2\")  Wt (!) 161 kg (355 lb)  SpO2 98%  BMI 45.58 kg/m2  GENERAL: AXOX3, NAD, well dressed, answering questions appropriately, appears stated age.  HEENT: No exopthalmous, no proptosis, EOMI, no lig lag, no retraction  NECK: Thyroid normal in size, non tender, no nodules were palpated.  CV: RRR  LUNGS: CTAB  ABDOMEN: +BS  NEUROLOGY: CN grossly intact, no tremors  PSYCH: normal affect and mood      LABS:  A1c:  Lab Results   Component Value Date    A1C 9.0 04/12/2018    A1C 8.5 07/26/2017    A1C 8.4 07/10/2017    A1C 10.4 05/15/2017    A1C 9.4 03/13/2017     Lab Results   Component Value Date    UMALCR 32.08 07/10/2017        Records from outside clinic reviewed.    All pertinent notes, labs, and images personally reviewed by me.     A/P  Mr.Anthony Jones is a 32 year old here for the evaluation/management of diabetes:    1. DM2 - Under Poor control.  A1c 9.0%.  Diabetes complicated by neuropathy and microalbuminuria.  Body mass index is 45.58 kg/(m^2).  Consideration for gastric bypass.  Needs strict blood sugar control before that.  Has seen bariatric surgery center [facility] and plans follow up with them.  Patient did not bring glucometer. Without Blood sugar data it is difficult to make adjustment to medical regimen.   Sometimes having trouble with metformin.  Sometimes has diarrhea and nausea.  No major episodes of hypoglycemia noted  I also discussed importance of strict " blood sugar control to prevent complications associated with uncontrolled diabetes.  I discussed importance of carbohydrate counting and taking insulin according to carbohydrate with meals  Referred to diabetic educator for education about carbohydrate counting  Need more data- will benefit from continuous glucose monitor.  Referral made to diabetic educator.  Using > 220 Units/day- recommend transition to U-500. CDE referral in place.    -- Continue metformin 1000 mg twice a day.  He is not able to tolerate it can decrease the dose by 500 mg per day    -- increase Tresiba 130 units per day   -- increase Humalog to 1.5 Units/3 gm of CHO + SSI 1-15> 150 (though he is not checking BG and not using that)   -- continue Trulicity 1.5 mg once a week    -- He is allergic to sulfa medications so can not be on sulfonylureas    -- Also discussed insulin pump briefly but will hold off at this time as insulin requirements will change after bariatric surgery.    -- repeat labs in 3 months      Recommend checking blood sugars before meals and at bedtime.    If Blood glucose are low more often-> 2-3 times/week- give us a call.  The patient is advised to Make better food choices: reduce carbs, Reduce portion size, weight loss and exercise 3-4 times a week.  Discussed hypoglycemia signs and symptoms as well as management in detail.    There is some variability among people, most will usually develop symptoms suggestive of hypoglycemia when blood glucose levels are lowered to the mid 60's. The first set of symptoms are called adrenergic. Patients may experience any of the following nervousness, sweating, intense hunger, trembling, weakness, palpitations, and difficulty speaking.   The acute management of hypoglycemia involves the rapid delivery of a source of easily absorbed sugar. Regular soda, juice, lifesavers, table sugar, are good options. 15 grams of glucose is the dose that is given, followed by an assessment of symptoms and a  blood glucose check if possible. If after 10 minutes there is no improvement, another 10-15 grams should be given. This can be repeated up to three times. The equivalency of 10-15 grams of glucose (approximate servings) are: Four lifesavers, 4 teaspoons of sugar, or 1/2 can of regular soda or juice.      2. Hypertension - Under Good control.  Continue Maxide, losartan 100 mg      3. Hyperlipidemia -poor control. Continue atorvastatin 40 mg  -- Repeat lipid panel once blood sugar under better control    4. Prevention  Opthalmology- June 2017  ASA- NA  Smoking- no    Most Recent Immunizations   Administered Date(s) Administered     HepB 05/15/2017     Influenza (intradermal) 09/20/2016     TDAP Vaccine (Adacel) 10/04/2012         All questions were answered.  The patient indicates understanding of the above issues and agrees with the plan set forth.     More than 50% of face to face time spent with Mr. Jones on counseling / coordinating his care.  Total appointment times was 45 minutes.      Follow-up:  3 months    Leilani Tavera M.D  Endocrinology  [facility]  CC: Haase, Susan Rachele      Disclaimer: This note consists of symbols derived from keyboarding, dictation and/or voice recognition software. As a result, there may be errors in the script that have gone undetected. Please consider this when interpreting information found in this chart.    Addendum to above note and clinic visit:    Labs reviewed.    See result note/telephone encounter.

## 2018-04-17 NOTE — MR AVS SNAPSHOT
After Visit Summary   4/17/2018    Naman Jones    MRN: [id number]           Patient Information     Date Of Birth          1985        Visit Information        Provider Department      4/17/2018 11:30 AM  DIABETIC ED RESOURCE [facility] [place]        Care Instructions    1. Plan to wear the LibrePro sensor for 14 days. It is okay to shower, bathe, and swim (up to 3 feet deep for 30 minutes)    2. Continue with your usual diabetes care plan - check blood sugars and take medicines, as prescribed.    3. Keep a log of what you eat and drink, when you take your medications and how much you take, and exercise you do while you are wearing the sensor.    3. Do not cover the sensor with extra adhesive (the small hole in the center of the sensor must remain uncovered)    4. Use a little extra care, especially when getting dressed or exercising, to avoid accidentally loosening or removing the sensor.     5. Remove the sensor if you need to have an MRI or CT scan.     Enedelia Pena RD, LD, CDE  Diabetes     [facility] for the [facility] Area:  For Your Diabetes Education and Nutrition Appointments Call:  [phone]   For Diabetes Education or Nutrition Related Questions:   Phone: [phone]  E-mail: [email or web address]  Fax: [phone]   If you need a medication refill please contact your pharmacy. Please allow 3 business days for your refills to be completed.    Instructions for emailing the Diabetes Educators    If you need to communicate a non-urgent message to a Diabetes Educator via email, please send to [email or web address].    Please follow the following email guidelines:    Subject line: Secure: your clinic name (example: Secure: [facility])  In the email please include: First name, middle initial, last name and date of birth.    We will be in touch with you within one (1) business day.               Follow-ups after your visit        Your next 10 appointments already scheduled     Apr 18, 2018  8:00 AM CDT   MyChart Physical Adult with Susan Rachele Haase, APRN CNP   [facility]    [address] [place] [address] [phone]            May 04, 2018  9:30 AM CDT   Diabetic Education with Enedelia Pena   [facility] ([facility])    [id number] [place] [address] [phone]              Future tests that were ordered for you today     Open Future Orders        Priority Expected Expires Ordered    Lipid Profile Routine  2/11/2019 4/17/2018    Hemoglobin A1c ASAP 6/16/2018 2/11/2019 4/17/2018            Who to contact     If you have questions or need follow up information about today's clinic visit or your schedule please contact [facility] directly at [phone].  Normal or non-critical lab and imaging results will be communicated to you by zSouphart, letter or phone within 4 business days after the clinic has received the results. If you do not hear from us within 7 days, please contact the clinic through [employer] or phone. If you have a critical or abnormal lab result, we will notify you by phone as soon as possible.  Submit refill requests through [employer] or call your pharmacy and they will forward the refill request to us. Please allow 3 business days for your refill to be completed.          Additional Information About Your Visit        [employer] Information     [employer] gives you secure access to your electronic health record. If you see a primary care provider, you can also send messages to your care team and make appointments. If you have questions, please call your primary care clinic.  If you do not have a primary care provider, please call [phone] and they will assist you.        Care EveryWhere ID     This is your Care EveryWhere ID. This  could be used by other organizations to access your [facility] medical records  [id number]         Blood Pressure from Last 3 Encounters:   04/17/18 138/88   02/02/18 130/86   07/10/17 118/84    Weight from Last 3 Encounters:   04/17/18 (!) 161 kg (355 lb)   02/02/18 (!) 159.7 kg (352 lb)   12/22/17 (!) 159.7 kg (352 lb 1.6 oz)              Today, you had the following     No orders found for display         Today's Medication Changes          These changes are accurate as of 4/17/18 12:03 PM.  If you have any questions, ask your nurse or doctor.               Start taking these medicines.        Dose/Directions    insulin degludec 200 UNIT/ML pen   Commonly known as:  TRESIBA   Used for:  Type 2 diabetes mellitus with hyperglycemia, with long-term current use of insulin (H)   Started by:  Leilani Tavera MD        Dose:  120 Units   Inject 120 Units Subcutaneous daily   Quantity:  30 mL   Refills:  3         These medicines have changed or have updated prescriptions.        Dose/Directions    insulin lispro 100 UNIT/ML injection   Commonly known as:  HumaLOG KWIKpen   This may have changed:  additional instructions   Used for:  Type 2 diabetes mellitus with hyperglycemia, with long-term current use of insulin (H)   Changed by:  Leilani Tavera MD        INJECT 1.5 UNIT FOR EVERY 3 GRAMS OF CARBS PLUS CORRECTION OF 1 UNIT FOR EVERY 15 POINTS ABOVE 150.(APPROXIMATELY 100 UNITS PER DAY)   Quantity:  30 mL   Refills:  3            Where to get your medicines      [facility] [address] [place] [address] AT [facility] [address] [address]     Phone:  [phone]     insulin degludec 200 UNIT/ML pen    insulin lispro 100 UNIT/ML injection                Primary Care Provider Office Phone # Fax #    Susan Rachele Haase, APRN [phone] [address] [facility] [address]        Equal Access to Services      RANULFO REINOSO : Hadii aad ku lesia Dumont, waaxda luqadaha, qaybta kaalmada adeainsley, naman lon thomilton marreroteodorojordon conner . So [facility] [phone].    ATENCIÓN: Si habla español, tiene a dumas disposición servicios gratuitos de asistencia lingüística. Llame al [phone].    We comply with applicable federal civil rights laws and [place] laws. We do not discriminate on the basis of race, color, national origin, age, disability, sex, sexual orientation, or gender identity.            Thank you!     Thank you for choosing [facility]  for your care. Our goal is always to provide you with excellent care. Hearing back from our patients is one way we can continue to improve our services. Please take a few minutes to complete the written survey that you may receive in the mail after your visit with us. Thank you!             Your Updated Medication List - Protect others around you: Learn how to safely use, store and throw away your medicines at www.disposemymeds.org.          This list is accurate as of 4/17/18 12:03 PM.  Always use your most recent med list.                   Brand Name Dispense Instructions for use Diagnosis    blood glucose monitoring lancets      1 each by In Vitro route daily As directed.1 box. For testing blood sugar at home        blood glucose monitoring test strip    no brand specified    150 each    5 strips by In Vitro route daily Test twice daily    Type 2 diabetes mellitus with hyperglycemia, with long-term current use of insulin (H)       dulaglutide 1.5 MG/0.5ML pen    TRULICITY    2 mL    Inject 1.5 mg Subcutaneous every 7 days    Type 2 diabetes mellitus with hyperglycemia, with long-term current use of insulin (H)       insulin degludec 200 UNIT/ML pen    TRESIBA    30 mL    Inject 120 Units Subcutaneous daily    Type 2 diabetes mellitus with hyperglycemia, with long-term current use of insulin (H)       insulin lispro 100 UNIT/ML injection    HumaLOG  KWIKpen    30 mL    INJECT 1.5 UNIT FOR EVERY 3 GRAMS OF CARBS PLUS CORRECTION OF 1 UNIT FOR EVERY 15 POINTS ABOVE 150.(APPROXIMATELY 100 UNITS PER DAY)    Type 2 diabetes mellitus with hyperglycemia, with long-term current use of insulin (H)       LIPITOR 40 MG tablet   Generic drug:  atorvastatin      Take 40 mg by mouth daily        LORAZEPAM PO      Take 1 mg by mouth 2 times daily        losartan 100 MG tablet    COZAAR    30 tablet    TAKE 1 TABLET BY MOUTH EVERY DAY    Essential hypertension       metFORMIN 500 MG tablet    GLUCOPHAGE    90 tablet    Take 2 tablets (1,000 mg) by mouth 2 times daily (with meals)    Type 2 diabetes mellitus with hyperglycemia, with long-term current use of insulin (H)       SERTRALINE HCL PO      Take 100 mg by mouth daily        traZODone 100 MG tablet    DESYREL    30 tablet    Take 1 tablet (100 mg) by mouth At Bedtime    JOEL (generalized anxiety disorder)       triamterene-hydrochlorothiazide 75-50 MG per tablet    MAXZIDE     Take 1 tablet by mouth daily

## 2018-04-17 NOTE — PATIENT INSTRUCTIONS
1. Plan to wear the LibrePro sensor for 14 days. It is okay to shower, bathe, and swim (up to 3 feet deep for 30 minutes)    2. Continue with your usual diabetes care plan - check blood sugars and take medicines, as prescribed.    3. Keep a log of what you eat and drink, when you take your medications and how much you take, and exercise you do while you are wearing the sensor.    3. Do not cover the sensor with extra adhesive (the small hole in the center of the sensor must remain uncovered)    4. Use a little extra care, especially when getting dressed or exercising, to avoid accidentally loosening or removing the sensor.     5. Remove the sensor if you need to have an MRI or CT scan.     Enedelia Pena RD, LD, CDE  Diabetes     [place] [facility] for the [facility] Area:  For Your Diabetes Education and Nutrition Appointments Call:  [phone]   For Diabetes Education or Nutrition Related Questions:   Phone: [phone]  E-mail: [email or web address]  Fax: [phone]   If you need a medication refill please contact your pharmacy. Please allow 3 business days for your refills to be completed.    Instructions for emailing the Diabetes Educators    If you need to communicate a non-urgent message to a Diabetes Educator via email, please send to [email or web address].    Please follow the following email guidelines:    Subject line: Secure: your clinic name (example: Secure: [facility])  In the email please include: First name, middle initial, last name and date of birth.    We will be in touch with you within one (1) business day.

## 2018-04-17 NOTE — PATIENT INSTRUCTIONS
[facility]   Dr Tavera, Endocrinology Department      [facility]   [address] [address]  Appointment [phone]  Fax: [phone]   Monday and Tuesday         [facility]   [address] [place] [address]  Appointment [phone]  Fax: [phone]  Wednesday and Thursday           To provide the best diabetic care, please bring your blood glucose meter to each and every visit with your Endocrinologist.  Your blood glucose meter/insulin pump will be downloaded at every appointment.      Please arrive 15 minutes before your scheduled appointment.  This will allow for your blood glucose meter/insulin pump to be downloaded and glycosylated hemoglobin (A1c) to be obtained prior to your appointment.    Increase Tresiba to 130 Units/day  Increase Humalog to 1.5 Units/ 3 gm of Carb + SSI 1-15> 150  Follow up with diabetic educator for U-500 transition  Your provider has referred you to Diabetes Education: For all [facility]:  Phone [phone]; Fax [phone]  Please call and make the appointment.-->continous glucose monitor (dexom, ipro) (diagnostic)    Recommend checking blood sugars before meals and at bedtime.    If Blood glucose are low more often-> 2-3 times/week- give us a call.  The patient is advised to Make better food choices: reduce carbs, Reduce portion size, weight loss and exercise 3-4 times a week.  Discussed hypoglycemia signs and symptoms as well as management in detail.

## 2018-04-17 NOTE — LETTER
4/17/2018         RE: Naman Jones  [address]  [facility] [address]        Dear Colleague,    Thank you for referring your patient, Naman Jones, to the [facility]. Please see a copy of my visit note below.    ENDOCRINOLOGY CLINIC NOTE:  Name: Naman Jones  Seen for f/u of Diabetes.  HPI:  Naman Jones is a 32 year old male who presents for the evaluation/management of Diabetes.  Was seen at bariatric surgery clinic for consideration of gastric bypass surgery and was noted to have high A1c.  He was referred to endocrinology for management of type 2 diabetes.  He was getting care at [facility].  Limited records available and were reviewed.    1. Type 2 DM:  Orginally diagnosed at the age of: 30. On routine physical exam. Was loosing wt at that time.  On insulin X 2016  Current Regimen: Tresiba 116 Units hs, Humalog 1 unit/3 gm of CHO + SSI 1-15 >150 (though not checking BG), Trulicity 1.5 mg once a week andMetformin 1000 mg BID.  BS checks: 1-2 times a day  Average Meter Download: Patient did not bring glucometer. Without Blood sugar data it is difficult to make adjustment to medical regimen. Reports that BG are in 180-190.  No major episodes of hypoglycemia noted/reported.   Exercise: not much  Last A1c: 9.0%  Symptoms of hypoglycemia (low blood sugar):  Gets symptoms of hypoglycemia.  Episodes of hypoglycemia: no  Fixed meal pattern: yes  Patient counting carbs: no    DM Complications:   Nephropathy: no  Retinopathy: no  Neuropathy: + numbness in toes  Microalbuminuria: present- on losartan  CAD/PAD: no  Gastroparesis: no  Hypoglycemia unawareness: no    2. Hypertension:    On medications  3. Hyperlipidemia: On medications       PMH/PSH:  DM  HTN  Dyslipidemia    Family Hx:  Diabetes: Father and mgm    Social Hx:  Social History     Social History     Marital status:      Spouse name: N/A     Number of children: N/A     Years of education: N/A     Occupational History     Not on  "file.     Social History Main Topics     Smoking status: Never Smoker     Smokeless tobacco: Never Used     Alcohol use Yes     Drug use: No     Sexual activity: Yes     Other Topics Concern     Not on file     Social History Narrative          MEDICATIONS:  has a current medication list which includes the following prescription(s): insulin lispro, insulin degludec, trazodone, dulaglutide, losartan, blood glucose monitoring, metformin, triamterene-hydrochlorothiazide, atorvastatin, lorazepam, blood glucose monitoring, and sertraline hcl.    ROS     ROS: 10 point ROS neg other than the symptoms noted above in the HPI.    Physical Exam   VS: /88 (BP Location: Right arm, Patient Position: Chair, Cuff Size: Adult Large)  Pulse 107  Resp 19  Ht 1.88 m (6' 2\")  Wt (!) 161 kg (355 lb)  SpO2 98%  BMI 45.58 kg/m2  GENERAL: AXOX3, NAD, well dressed, answering questions appropriately, appears stated age.  HEENT: No exopthalmous, no proptosis, EOMI, no lig lag, no retraction  NECK: Thyroid normal in size, non tender, no nodules were palpated.  CV: RRR  LUNGS: CTAB  ABDOMEN: +BS  NEUROLOGY: CN grossly intact, no tremors  PSYCH: normal affect and mood      LABS:  A1c:  Lab Results   Component Value Date    A1C 9.0 04/12/2018    A1C 8.5 07/26/2017    A1C 8.4 07/10/2017    A1C 10.4 05/15/2017    A1C 9.4 03/13/2017     Lab Results   Component Value Date    UMALCR 32.08 07/10/2017        Records from outside clinic reviewed.    All pertinent notes, labs, and images personally reviewed by me.     A/P  Mr.Anthony Jones is a 32 year old here for the evaluation/management of diabetes:    1. DM2 - Under Poor control.  A1c 9.0%.  Diabetes complicated by neuropathy and microalbuminuria.  Body mass index is 45.58 kg/(m^2).  Consideration for gastric bypass.  Needs strict blood sugar control before that.  Has seen bariatric surgery center [facility] and plans follow up with them.  Patient did not bring glucometer. Without Blood " sugar data it is difficult to make adjustment to medical regimen.   Sometimes having trouble with metformin.  Sometimes has diarrhea and nausea.  No major episodes of hypoglycemia noted  I also discussed importance of strict blood sugar control to prevent complications associated with uncontrolled diabetes.  I discussed importance of carbohydrate counting and taking insulin according to carbohydrate with meals  Referred to diabetic educator for education about carbohydrate counting  Need more data- will benefit from continuous glucose monitor.  Referral made to diabetic educator.  Using > 220 Units/day- recommend transition to U-500. CDE referral in place.    -- Continue metformin 1000 mg twice a day.  He is not able to tolerate it can decrease the dose by 500 mg per day    -- increase Tresiba 130 units per day   -- increase Humalog to 1.5 Units/3 gm of CHO + SSI 1-15> 150 (though he is not checking BG and not using that)   -- continue Trulicity 1.5 mg once a week    -- He is allergic to sulfa medications so can not be on sulfonylureas    -- Also discussed insulin pump briefly but will hold off at this time as insulin requirements will change after bariatric surgery.    -- repeat labs in 3 months      Recommend checking blood sugars before meals and at bedtime.    If Blood glucose are low more often-> 2-3 times/week- give us a call.  The patient is advised to Make better food choices: reduce carbs, Reduce portion size, weight loss and exercise 3-4 times a week.  Discussed hypoglycemia signs and symptoms as well as management in detail.    There is some variability among people, most will usually develop symptoms suggestive of hypoglycemia when blood glucose levels are lowered to the mid 60's. The first set of symptoms are called adrenergic. Patients may experience any of the following nervousness, sweating, intense hunger, trembling, weakness, palpitations, and difficulty speaking.   The acute management of  hypoglycemia involves the rapid delivery of a source of easily absorbed sugar. Regular soda, juice, lifesavers, table sugar, are good options. 15 grams of glucose is the dose that is given, followed by an assessment of symptoms and a blood glucose check if possible. If after 10 minutes there is no improvement, another 10-15 grams should be given. This can be repeated up to three times. The equivalency of 10-15 grams of glucose (approximate servings) are: Four lifesavers, 4 teaspoons of sugar, or 1/2 can of regular soda or juice.      2. Hypertension - Under Good control.  Continue Maxide, losartan 100 mg      3. Hyperlipidemia -poor control. Continue atorvastatin 40 mg  -- Repeat lipid panel once blood sugar under better control    4. Prevention  Opthalmology- June 2017  ASA- NA  Smoking- no    Most Recent Immunizations   Administered Date(s) Administered     HepB 05/15/2017     Influenza (intradermal) 09/20/2016     TDAP Vaccine (Adacel) 10/04/2012         All questions were answered.  The patient indicates understanding of the above issues and agrees with the plan set forth.     More than 50% of face to face time spent with Mr. Jones on counseling / coordinating his care.  Total appointment times was 45 minutes.      Follow-up:  3 months    Leilani Tavera M.D  Endocrinology  [facility]  CC: Haase, Susan Rachele      Disclaimer: This note consists of symbols derived from keyboarding, dictation and/or voice recognition software. As a result, there may be errors in the script that have gone undetected. Please consider this when interpreting information found in this chart.    Addendum to above note and clinic visit:    Labs reviewed.    See result note/telephone encounter.            Again, thank you for allowing me to participate in the care of your patient.        Sincerely,        Leilani Tavera MD

## 2018-04-17 NOTE — MR AVS SNAPSHOT
After Visit Summary   2018    Naman Jones    MRN: [id number]           Patient Information     Date Of Birth          1985        Visit Information        Provider Department      2018 11:30 AM Leilani Tavera MD [facility]        Today's Diagnoses     Type 2 diabetes mellitus with hyperglycemia, with long-term current use of insulin (H)    -  1    Essential hypertension          Care Instructions    [facility]   Dr Tavera, Endocrinology Department      [facility]   [address] [address]  Appointment [phone]  Fax: [phone]   Monday and Tuesday         [place] [address] [place] [address]  Appointment [phone]  Fax: [phone]  Wednesday and Thursday           To provide the best diabetic care, please bring your blood glucose meter to each and every visit with your Endocrinologist.  Your blood glucose meter/insulin pump will be downloaded at every appointment.      Please arrive 15 minutes before your scheduled appointment.  This will allow for your blood glucose meter/insulin pump to be downloaded and glycosylated hemoglobin (A1c) to be obtained prior to your appointment.    Increase Tresiba to 120 Units/day  Increase Humalog to 1.5 Units/ 3 gm of Carb + SSI 1-15> 150  Follow up with diabetic educator for U-500 transition  Your provider has referred you to Diabetes Education: For all [facility]:  Phone [phone]; Fax [phone]  Please call and make the appointment.-->continous glucose monitor (dexom, ipro) (diagnostic)    Recommend checking blood sugars before meals and at bedtime.    If Blood glucose are low more often-> 2-3 times/week- give us a call.  The patient is advised to Make better food choices: reduce carbs, Reduce portion size, weight loss and exercise 3-4 times a  week.  Discussed hypoglycemia signs and symptoms as well as management in detail.                Follow-ups after your visit        Additional Services     DIABETES EDUCATOR REFERRAL       Your provider has referred you to Diabetes Education: For all [place] [facility]:  Phone [phone]; Fax [phone]    This is a Previous Diagnosis     Type of diabetes is Type 2 - On Insulin   Medicare covers: 10 hours of initial DSMT in 12 month period from the time of first visit, plus 2 hours of follow-up DSMT annually, and additional hours as requested for insulin training.         Diabetes Co-Morbidities: hypertension               A1C Goal:  <7.0       A1C is: Lab Results       Component                Value               Date                       A1C                      9.0                 04/12/2018              MEDICAL NUTRITION THERAPY (MNT) for Diabetes    Medical Nutrition Therapy with a Registered Dietitian can be provided in coordination with Diabetes Self-Management Training to assist in achieving optimal diabetes management.    MNT Type and Hours: Previous diagnosis: Annual follow-up MNT - 2 hours                       Medicare will cover: 3 hours initial MNT in 12 month period after first visit, plus 2 hours of follow-up MNT annually                                                          A1C is: Lab Results       Component                Value               Date                       A1C                      9.0                 04/12/2018            If an urgent visit is needed or A1C is above 12, Care Team to call the diabetes education team at [phone] or send a message to the diabetes education pool (P DIAB ED-PATIENT CARE).    Diabetes education focus: Continuous glucose monitor Diagnostic CGM (minimum of 72-hours)      Education needs: None                                                                                                                                                      Please be  aware that coverage of these services is subject to the terms and limitations of your health insurance plan.  Call member services at your health plan to determine Diabetes Self-Management Training benefits and ask which blood glucose monitor brands are covered by your plan.      Please bring the following to your appointment:    -   List of current medications   -   List of blood glucose monitor brands that are covered by your insurance plan  -   Blood glucose monitor and log book  -   Food records for the 3 days prior to your visit      The Certified Diabetes Educator may make diabetes medication adjustments per  the CDE Protocol and Collaborative Practice Agreement.                  Your next 10 appointments already scheduled     Apr 18, 2018  8:00 AM ADITIT   [facility] Physical Adult with Susan Rachele Haase, APRN CNP   [facility]    [address] [place] [id number] [phone]              Future tests that were ordered for you today     Open Future Orders        Priority Expected Expires Ordered    Hemoglobin A1c ASAP 6/16/2018 2/11/2019 4/17/2018            Who to contact     If you have questions or need follow up information about today's clinic visit or your schedule please contact [facility] directly at [phone].  Normal or non-critical lab and imaging results will be communicated to you by MyChart, letter or phone within 4 business days after the clinic has received the results. If you do not hear from us within 7 days, please contact the clinic through [employer] or phone. If you have a critical or abnormal lab result, we will notify you by phone as soon as possible.  Submit refill requests through [employer] or call your pharmacy and they will forward the refill request to us. Please allow 3 business days for your refill to be completed.          Additional Information About Your Visit        MyChart Information     [employer]  "gives you secure access to your electronic health record. If you see a primary care provider, you can also send messages to your care team and make appointments. If you have questions, please call your primary care clinic.  If you do not have a primary care provider, please call [phone] and they will assist you.        Care EveryWhere ID     This is your Care EveryWhere ID. This could be used by other organizations to access your [facility] medical records  [id number]        Your Vitals Were     Pulse Respirations Height Pulse Oximetry BMI (Body Mass Index)       107 19 1.88 m (6' 2\") 98% 45.58 kg/m2        Blood Pressure from Last 3 Encounters:   04/17/18 138/88   02/02/18 130/86   07/10/17 118/84    Weight from Last 3 Encounters:   04/17/18 (!) 161 kg (355 lb)   02/02/18 (!) 159.7 kg (352 lb)   12/22/17 (!) 159.7 kg (352 lb 1.6 oz)              We Performed the Following     DIABETES EDUCATOR REFERRAL          Today's Medication Changes          These changes are accurate as of 4/17/18 11:48 AM.  If you have any questions, ask your nurse or doctor.               Start taking these medicines.        Dose/Directions    insulin degludec 200 UNIT/ML pen   Commonly known as:  TRESIBA   Used for:  Type 2 diabetes mellitus with hyperglycemia, with long-term current use of insulin (H)   Started by:  Leilani Tavera MD        Dose:  120 Units   Inject 120 Units Subcutaneous daily   Quantity:  30 mL   Refills:  3         These medicines have changed or have updated prescriptions.        Dose/Directions    insulin lispro 100 UNIT/ML injection   Commonly known as:  HumaLOG KWIKpen   This may have changed:  additional instructions   Used for:  Type 2 diabetes mellitus with hyperglycemia, with long-term current use of insulin (H)   Changed by:  Leilani Tavera MD        INJECT 1.5 UNIT FOR EVERY 3 GRAMS OF CARBS PLUS CORRECTION OF 1 UNIT FOR EVERY 15 POINTS ABOVE 150.(APPROXIMATELY 100 UNITS PER DAY) "   Quantity:  30 mL   Refills:  3            Where to get your medicines      These medications were sent to [facility] [id number] [address] AT [facility]  [address] [address]     Phone:  [phone]     insulin degludec 200 UNIT/ML pen    insulin lispro 100 UNIT/ML injection                Primary Care Provider Office Phone # Fax #    Susan Rachele Haase, APRN [phone] [address]  [facility] [address]        Equal Access to Services     [facility]: Hadii aad ku hadasho Soomaali, waaxda luqadaha, qaybta kaalmada adeegyada, waxay idiin hayaan adeeg khkristine conner . So [facility] [phone].    ATENCIÓN: Si habla español, tiene a dumas disposición servicios gratuitos de asistencia lingüística. [facility] [phone].    We comply with applicable federal civil rights laws and [place] laws. We do not discriminate on the basis of race, color, national origin, age, disability, sex, sexual orientation, or gender identity.            Thank you!     Thank you for choosing [facility]  for your care. Our goal is always to provide you with excellent care. Hearing back from our patients is one way we can continue to improve our services. Please take a few minutes to complete the written survey that you may receive in the mail after your visit with us. Thank you!             Your Updated Medication List - Protect others around you: Learn how to safely use, store and throw away your medicines at www.disposemymeds.org.          This list is accurate as of 4/17/18 11:48 AM.  Always use your most recent med list.                   Brand Name Dispense Instructions for use Diagnosis    blood glucose monitoring lancets      1 each by In Vitro route daily As directed.1 box. For testing blood sugar at home        blood glucose monitoring test strip    no brand specified    150 each    5 strips by In Vitro route daily Test twice daily    Type 2  diabetes mellitus with hyperglycemia, with long-term current use of insulin (H)       dulaglutide 1.5 MG/0.5ML pen    TRULICITY    2 mL    Inject 1.5 mg Subcutaneous every 7 days    Type 2 diabetes mellitus with hyperglycemia, with long-term current use of insulin (H)       insulin degludec 200 UNIT/ML pen    TRESIBA    30 mL    Inject 120 Units Subcutaneous daily    Type 2 diabetes mellitus with hyperglycemia, with long-term current use of insulin (H)       insulin lispro 100 UNIT/ML injection    HumaLOG KWIKpen    30 mL    INJECT 1.5 UNIT FOR EVERY 3 GRAMS OF CARBS PLUS CORRECTION OF 1 UNIT FOR EVERY 15 POINTS ABOVE 150.(APPROXIMATELY 100 UNITS PER DAY)    Type 2 diabetes mellitus with hyperglycemia, with long-term current use of insulin (H)       LIPITOR 40 MG tablet   Generic drug:  atorvastatin      Take 40 mg by mouth daily        LORAZEPAM PO      Take 1 mg by mouth 2 times daily        losartan 100 MG tablet    COZAAR    30 tablet    TAKE 1 TABLET BY MOUTH EVERY DAY    Essential hypertension       metFORMIN 500 MG tablet    GLUCOPHAGE    90 tablet    Take 2 tablets (1,000 mg) by mouth 2 times daily (with meals)    Type 2 diabetes mellitus with hyperglycemia, with long-term current use of insulin (H)       SERTRALINE HCL PO      Take 100 mg by mouth daily        traZODone 100 MG tablet    DESYREL    30 tablet    Take 1 tablet (100 mg) by mouth At Bedtime    JOEL (generalized anxiety disorder)       triamterene-hydrochlorothiazide 75-50 MG per tablet    MAXZIDE     Take 1 tablet by mouth daily

## 2018-04-18 ENCOUNTER — OFFICE VISIT (OUTPATIENT)
Dept: FAMILY MEDICINE | Facility: CLINIC | Age: 33
End: 2018-04-18
Payer: COMMERCIAL

## 2018-04-18 VITALS
BODY MASS INDEX: 46.09 KG/M2 | DIASTOLIC BLOOD PRESSURE: 94 MMHG | WEIGHT: 315 LBS | OXYGEN SATURATION: 97 % | TEMPERATURE: 99.3 F | HEART RATE: 116 BPM | RESPIRATION RATE: 16 BRPM | SYSTOLIC BLOOD PRESSURE: 152 MMHG

## 2018-04-18 DIAGNOSIS — Z00.00 ROUTINE GENERAL MEDICAL EXAMINATION AT A HEALTH CARE FACILITY: Primary | ICD-10-CM

## 2018-04-18 DIAGNOSIS — E11.65 TYPE 2 DIABETES MELLITUS WITH HYPERGLYCEMIA, WITH LONG-TERM CURRENT USE OF INSULIN (H): ICD-10-CM

## 2018-04-18 DIAGNOSIS — F41.1 GAD (GENERALIZED ANXIETY DISORDER): ICD-10-CM

## 2018-04-18 DIAGNOSIS — I10 ESSENTIAL HYPERTENSION: ICD-10-CM

## 2018-04-18 DIAGNOSIS — Z79.4 TYPE 2 DIABETES MELLITUS WITH HYPERGLYCEMIA, WITH LONG-TERM CURRENT USE OF INSULIN (H): ICD-10-CM

## 2018-04-18 DIAGNOSIS — E66.01 MORBID OBESITY WITH BMI OF 45.0-49.9, ADULT (H): ICD-10-CM

## 2018-04-18 DIAGNOSIS — E78.2 MIXED HYPERLIPIDEMIA: ICD-10-CM

## 2018-04-18 DIAGNOSIS — J01.01 ACUTE RECURRENT MAXILLARY SINUSITIS: ICD-10-CM

## 2018-04-18 PROCEDURE — 99395 PREV VISIT EST AGE 18-39: CPT | Performed by: NURSE PRACTITIONER

## 2018-04-18 RX ORDER — TRIAMTERENE AND HYDROCHLOROTHIAZIDE 75; 50 MG/1; MG/1
1 TABLET ORAL DAILY
Qty: 90 TABLET | Refills: 1 | Status: SHIPPED | OUTPATIENT
Start: 2018-04-18 | End: 2018-09-25

## 2018-04-18 RX ORDER — LOSARTAN POTASSIUM 100 MG/1
100 TABLET ORAL DAILY
Qty: 90 TABLET | Refills: 1 | Status: SHIPPED | OUTPATIENT
Start: 2018-04-18 | End: 2018-08-19

## 2018-04-18 RX ORDER — LORAZEPAM 1 MG/1
1 TABLET ORAL DAILY PRN
Qty: 30 TABLET | Refills: 2 | Status: SHIPPED | OUTPATIENT
Start: 2018-04-18 | End: 2020-02-14

## 2018-04-18 RX ORDER — ATORVASTATIN CALCIUM 40 MG/1
40 TABLET, FILM COATED ORAL DAILY
Qty: 90 TABLET | Refills: 3 | Status: SHIPPED | OUTPATIENT
Start: 2018-04-18 | End: 2019-02-11

## 2018-04-18 RX ORDER — TRAZODONE HYDROCHLORIDE 100 MG/1
100 TABLET ORAL AT BEDTIME
Qty: 90 TABLET | Refills: 1 | Status: SHIPPED | OUTPATIENT
Start: 2018-04-18 | End: 2018-06-29 | Stop reason: ALTCHOICE

## 2018-04-18 RX ORDER — AMOXICILLIN 875 MG
875 TABLET ORAL 2 TIMES DAILY
Qty: 20 TABLET | Refills: 0 | Status: SHIPPED | OUTPATIENT
Start: 2018-04-18 | End: 2018-05-18

## 2018-04-18 NOTE — MR AVS SNAPSHOT
After Visit Summary   4/18/2018    Naman Jones    MRN: [id number]           Patient Information     Date Of Birth          1985        Visit Information        Provider Department      4/18/2018 8:00 AM Haase, Susan Rachele, APRN [facility]        Today's Diagnoses     Routine general medical examination at a health care facility    -  1    Type 2 diabetes mellitus with hyperglycemia, with long-term current use of insulin (H)        Essential hypertension        Mixed hyperlipidemia        JOEL (generalized anxiety disorder)        Acute recurrent maxillary sinusitis          Care Instructions      Preventive Health Recommendations  Male Ages 26 - 39    Yearly exam:             See your health care provider every year in order to  o   Review health changes.   o   Discuss preventive care.    o   Review your medicines if your doctor has prescribed any.    You should be tested each year for STDs (sexually transmitted diseases), if you re at risk.     After age 35, talk to your provider about cholesterol testing. If you are at risk for heart disease, have your cholesterol tested at least every 5 years.     If you are at risk for diabetes, you should have a diabetes test (fasting glucose).  Shots: Get a flu shot each year. Get a tetanus shot every 10 years.     Nutrition:    Eat at least 5 servings of fruits and vegetables daily.     Eat whole-grain bread, whole-wheat pasta and brown rice instead of white grains and rice.     Talk to your provider about Calcium and Vitamin D.     Lifestyle    Exercise for at least 150 minutes a week (30 minutes a day, 5 days a week). This will help you control your weight and prevent disease.     Limit alcohol to one drink per day.     No smoking.     Wear sunscreen to prevent skin cancer.     See your dentist every six months for an exam and cleaning.             Follow-ups after your visit        Follow-up notes from your care team      Return in about 3 months (around 7/18/2018) for Routine Visit.      Your next 10 appointments already scheduled     May 04, 2018  9:30 AM CDT   Diabetic Education with Enedelia Pena   [facility] ([facility])    [id number] [place] [id number] [phone]            Jul 20, 2018  8:45 AM CDT   SHORT with Susan Rachele Haase, APRN CNP   [facility] ([facility])    [address] [facility] [address] [phone]              Future tests that were ordered for you today     Open Future Orders        Priority Expected Expires Ordered    Lipid Profile Routine  2/11/2019 4/17/2018    Hemoglobin A1c ASAP 6/16/2018 2/11/2019 4/17/2018            Who to contact     If you have questions or need follow up information about today's clinic visit or your schedule please contact [facility] directly at [phone].  Normal or non-critical lab and imaging results will be communicated to you by [employer], letter or phone within 4 business days after the clinic has received the results. If you do not hear from us within 7 days, please contact the clinic through [employer] or phone. If you have a critical or abnormal lab result, we will notify you by phone as soon as possible.  Submit refill requests through [employer] or call your pharmacy and they will forward the refill request to us. Please allow 3 business days for your refill to be completed.          Additional Information About Your Visit        Realie Information     Realie gives you secure access to your electronic health record. If you see a primary care provider, you can also send messages to your care team and make appointments. If you have questions, please call your primary care clinic.  If you do not have a primary care provider, please call [phone] and they will assist you.        Care EveryWhere ID     This is your Care  EveryWhere ID. This could be used by other organizations to access your [facility] medical records  [id number]        Your Vitals Were     Pulse Temperature Respirations Pulse Oximetry BMI (Body Mass Index)       116 99.3  F (37.4  C) (Oral) 16 97% 46.09 kg/m2        Blood Pressure from Last 3 Encounters:   04/18/18 (!) 152/94   04/17/18 138/88   02/02/18 130/86    Weight from Last 3 Encounters:   04/18/18 (!) 359 lb (162.8 kg)   04/17/18 (!) 355 lb (161 kg)   02/02/18 (!) 352 lb (159.7 kg)              Today, you had the following     No orders found for display         Today's Medication Changes          These changes are accurate as of 4/18/18  8:30 AM.  If you have any questions, ask your nurse or doctor.               Start taking these medicines.        Dose/Directions    amoxicillin 875 MG tablet   Commonly known as:  AMOXIL   Used for:  Acute recurrent maxillary sinusitis   Started by:  Haase, Susan Rachele, APRN CNP        Dose:  875 mg   Take 1 tablet (875 mg) by mouth 2 times daily   Quantity:  20 tablet   Refills:  0         These medicines have changed or have updated prescriptions.        Dose/Directions    * LORAZEPAM PO   This may have changed:  Another medication with the same name was added. Make sure you understand how and when to take each.   Changed by:  Haase, Susan Rachele, APRN CNP        Dose:  1 mg   Take 1 mg by mouth 2 times daily   Refills:  0       * LORazepam 1 MG tablet   Commonly known as:  ATIVAN   This may have changed:  You were already taking a medication with the same name, and this prescription was added. Make sure you understand how and when to take each.   Used for:  JOEL (generalized anxiety disorder)   Changed by:  Haase, Susan Rachele, APRN CNP        Dose:  1 mg   Take 1 tablet (1 mg) by mouth daily as needed for anxiety   Quantity:  30 tablet   Refills:  2       losartan 100 MG tablet   Commonly known as:  COZAAR   This may have changed:  See the new instructions.   Used  for:  Essential hypertension   Changed by:  Haase, Susan Rachele, APRN CNP        Dose:  100 mg   Take 1 tablet (100 mg) by mouth daily   Quantity:  90 tablet   Refills:  1       * Notice:  This list has 2 medication(s) that are the same as other medications prescribed for you. Read the directions carefully, and ask your doctor or other care provider to review them with you.         Where to get your medicines      These medications were sent to [facility] [address] [place] [address]  [id number] [place] [address]     Phone:  [phone]     amoxicillin 875 MG tablet         These medications were sent to [facility] [address] [place] [address] [facility]  [address] [place] [address]     Phone:  [phone]     atorvastatin 40 MG tablet    losartan 100 MG tablet    traZODone 100 MG tablet    triamterene-hydrochlorothiazide 75-50 MG per tablet         Some of these will need a paper prescription and others can be bought over the counter.  Ask your nurse if you have questions.     Bring a paper prescription for each of these medications     LORazepam 1 MG tablet                Primary Care Provider Office Phone # Fax #    Susan Rachele Haase, APRN [phone] [address] [place] [address]        Equal Access to Services     RANULFO REINOSO AH: Hadii lopez ku hadasho Soomaali, waaxda luqadaha, qaybta kaalmada adeegyada, waxay lon haydarío conner . So [facility] [phone].    ATENCIÓN: Si habla español, tiene a dumas disposición servicios gratuitos de asistencia lingüística. Llame al [phone].    We comply with applicable federal civil rights laws and [place] laws. We do not discriminate on the basis of race, color, national origin, age, disability, sex, sexual orientation, or gender identity.            Thank you!     Thank you for choosing [facility]  for your care. Our goal is  always to provide you with excellent care. Hearing back from our patients is one way we can continue to improve our services. Please take a few minutes to complete the written survey that you may receive in the mail after your visit with us. Thank you!             Your Updated Medication List - Protect others around you: Learn how to safely use, store and throw away your medicines at www.disposemymeds.org.          This list is accurate as of 4/18/18  8:30 AM.  Always use your most recent med list.                   Brand Name Dispense Instructions for use Diagnosis    amoxicillin 875 MG tablet    AMOXIL    20 tablet    Take 1 tablet (875 mg) by mouth 2 times daily    Acute recurrent maxillary sinusitis       atorvastatin 40 MG tablet    LIPITOR    90 tablet    Take 1 tablet (40 mg) by mouth daily    Mixed hyperlipidemia       blood glucose monitoring lancets      1 each by In Vitro route daily As directed.1 box. For testing blood sugar at home        blood glucose monitoring test strip    no brand specified    150 each    5 strips by In Vitro route daily Test twice daily    Type 2 diabetes mellitus with hyperglycemia, with long-term current use of insulin (H)       dulaglutide 1.5 MG/0.5ML pen    TRULICITY    2 mL    Inject 1.5 mg Subcutaneous every 7 days    Type 2 diabetes mellitus with hyperglycemia, with long-term current use of insulin (H)       insulin degludec 200 UNIT/ML pen    TRESIBA    30 mL    Inject 130 Units Subcutaneous daily    Type 2 diabetes mellitus with hyperglycemia, with long-term current use of insulin (H)       insulin lispro 100 UNIT/ML injection    HumaLOG KWIKpen    30 mL    INJECT 1.5 UNIT FOR EVERY 3 GRAMS OF CARBS PLUS CORRECTION OF 1 UNIT FOR EVERY 15 POINTS ABOVE 150.(APPROXIMATELY 100 UNITS PER DAY)    Type 2 diabetes mellitus with hyperglycemia, with long-term current use of insulin (H)       * LORAZEPAM PO      Take 1 mg by mouth 2 times daily        * LORazepam 1 MG tablet     ATIVAN    30 tablet    Take 1 tablet (1 mg) by mouth daily as needed for anxiety    JOEL (generalized anxiety disorder)       losartan 100 MG tablet    COZAAR    90 tablet    Take 1 tablet (100 mg) by mouth daily    Essential hypertension       metFORMIN 500 MG tablet    GLUCOPHAGE    120 tablet    Take 2 tablets (1,000 mg) by mouth 2 times daily (with meals)    Type 2 diabetes mellitus with hyperglycemia, with long-term current use of insulin (H)       SERTRALINE HCL PO      Take 100 mg by mouth daily        traZODone 100 MG tablet    DESYREL    90 tablet    Take 1 tablet (100 mg) by mouth At Bedtime    JOEL (generalized anxiety disorder)       triamterene-hydrochlorothiazide 75-50 MG per tablet    MAXZIDE    90 tablet    Take 1 tablet by mouth daily    Essential hypertension       * Notice:  This list has 2 medication(s) that are the same as other medications prescribed for you. Read the directions carefully, and ask your doctor or other care provider to review them with you.

## 2018-04-18 NOTE — PROGRESS NOTES
SUBJECTIVE:   CC: Naman Jones is an 32 year old male who presents for preventative health visit.     Physical   Annual:     Getting at least 3 servings of Calcium per day::  Yes    Bi-annual eye exam::  Yes    Dental care twice a year::  NO    Sleep apnea or symptoms of sleep apnea::  Daytime drowsiness    Diet::  Diabetic    Frequency of exercise::  1 day/week    Duration of exercise::  15-30 minutes    Taking medications regularly::  Yes    Medication side effects::  Other    Additional concerns today::  No          Answers for HPI/ROS submitted by the patient on 4/18/2018   PHQ-2 Score: 0    Obesity: Recently seen at Bariatric surgery clinic for consideration of gastric bypass, needs to lose 10 lbs and lower A1C before surgery. Current living situation is stressful along with poor eating habits.     Type 2 DM: Continues on insulin and metformin 1,000 mg BID. Complains of diarrhea from medication Last A1C 9.0. Seeing Dr. Tavera with endocrinology, currently on CMG. Follow up with Enedelia Pena.    Hypertension: Continues on losartan and Maxzide. BP is 152/94     Hyperlipidemia: Takes Lipitor 40 mg daily.     Anxiety: Lorazepam 1 mg nightly. Trouble falling asleep and staying asleep due to racing thoughts.     URI symptoms: Symptoms began Saturday, consist of nasal congestion, mild sore throat, sinus pressure and fever. Has taken Mucinex-D, Delsum, ibuprofen for symptoms. Personal history of sinus infections. Denies cough.     Today's PHQ-2 Score:   PHQ-2 ( 1999 Pfizer) 4/18/2018   Q1: Little interest or pleasure in doing things 0   Q2: Feeling down, depressed or hopeless 0   PHQ-2 Score 0   Q1: Little interest or pleasure in doing things Not at all   Q2: Feeling down, depressed or hopeless Not at all   PHQ-2 Score 0       Abuse: Current or Past(Physical, Sexual or Emotional)- No  Do you feel safe in your environment - Yes    Social History   Substance Use Topics     Smoking status: Never Smoker      Smokeless tobacco: Never Used     Alcohol use Yes     Alcohol Use 4/18/2018   If you drink alcohol do you typically have greater than 3 drinks per day OR greater than 7 drinks per week? No   No flowsheet data found.    Last PSA: No results found for: PSA    Reviewed orders with patient. Reviewed health maintenance and updated orders accordingly - Yes  Patient Active Problem List   Diagnosis     Morbid obesity with BMI of 45.0-49.9, adult (H)     Essential hypertension     Mixed hyperlipidemia     Diabetes mellitus type 2 in obese (H)     Plantar fasciitis     Chronic pain of right knee     JOEL (generalized anxiety disorder)     Type 2 diabetes mellitus with hyperglycemia, with long-term current use of insulin (H)     History reviewed. No pertinent surgical history.    Social History   Substance Use Topics     Smoking status: Never Smoker     Smokeless tobacco: Never Used     Alcohol use Yes     History reviewed. No pertinent family history.      Current Outpatient Prescriptions   Medication Sig Dispense Refill     atorvastatin (LIPITOR) 40 MG tablet Take 40 mg by mouth daily       blood glucose monitoring (NO BRAND SPECIFIED) test strip 5 strips by In Vitro route daily Test twice daily 150 each 3     blood glucose monitoring (ULTRA THIN 30G) lancets 1 each by In Vitro route daily As directed.1 box. For testing blood sugar at home       dulaglutide (TRULICITY) 1.5 MG/0.5ML pen Inject 1.5 mg Subcutaneous every 7 days 2 mL 3     insulin degludec (TRESIBA) 200 UNIT/ML pen Inject 130 Units Subcutaneous daily 30 mL 3     insulin lispro (HUMALOG KWIKPEN) 100 UNIT/ML injection INJECT 1.5 UNIT FOR EVERY 3 GRAMS OF CARBS PLUS CORRECTION OF 1 UNIT FOR EVERY 15 POINTS ABOVE 150.(APPROXIMATELY 100 UNITS PER DAY) 30 mL 3     LORAZEPAM PO Take 1 mg by mouth 2 times daily       losartan (COZAAR) 100 MG tablet TAKE 1 TABLET BY MOUTH EVERY DAY 30 tablet 1     metFORMIN (GLUCOPHAGE) 500 MG tablet Take 2 tablets (1,000 mg) by mouth 2  times daily (with meals) 120 tablet 3     SERTRALINE HCL PO Take 100 mg by mouth daily       traZODone (DESYREL) 100 MG tablet Take 1 tablet (100 mg) by mouth At Bedtime 30 tablet 0     triamterene-hydrochlorothiazide (MAXZIDE) 75-50 MG per tablet Take 1 tablet by mouth daily       Allergies   Allergen Reactions     Sulfa Drugs Anaphylaxis     Lisinopril      Other reaction(s): Impotence     Onion      Other reaction(s): Intolerance-Can't Take       Reviewed and updated as needed this visit by clinical staff  Tobacco  Allergies  Meds  Med Hx  Surg Hx  Fam Hx  Soc Hx        Reviewed and updated as needed this visit by Provider        History reviewed. No pertinent past medical history.   History reviewed. No pertinent surgical history.    Review of Systems  C: NEGATIVE for fever, chills, change in weight  I: NEGATIVE for worrisome rashes, moles or lesions  E: NEGATIVE for vision changes or irritation  ENT: NEGATIVE for ear, mouth and throat problems  R: NEGATIVE for significant cough or SOB  CV: NEGATIVE for chest pain, palpitations or peripheral edema  GI: NEGATIVE for nausea, abdominal pain, heartburn, or change in bowel habits   male: negative for dysuria, hematuria, decreased urinary stream, erectile dysfunction, urethral discharge  M: NEGATIVE for significant arthralgias or myalgia  N: NEGATIVE for weakness, dizziness or paresthesias  P: NEGATIVE for changes in mood or affect    This document serves as a record of the services and decisions personally performed and made by Susan Haase, CNP. It was created on her behalf by Heather Winter, a trained medical scribe. The creation of this document is based on the provider's statements to the medical scribe.  Heather Winter 8:14 AM April 18, 2018  OBJECTIVE:   BP (!) 152/94 (BP Location: Left arm, Patient Position: Chair, Cuff Size: Adult Large)  Pulse 116  Temp 99.3  F (37.4  C) (Oral)  Resp 16  Wt (!) 359 lb (162.8 kg)  SpO2 97%  BMI 46.09 kg/m2    Physical  Exam  GENERAL: healthy, alert and no distress  HENT: ear canals normal, TM with fullness, posterior pharynx with erythema, maxillary sinus tender to palpation. nose congested and mouth without ulcers or lesions  NECK: no adenopathy, no asymmetry, masses, or scars and thyroid normal to palpation  RESP: lungs clear to auscultation - no rales, rhonchi or wheezes  CV: regular rate and rhythm, normal S1 S2, no S3 or S4, no murmur, click or rub, no peripheral edema  ABDOMEN: soft, nontender, no hepatosplenomegaly, no masses and bowel sounds normal  MS: no gross musculoskeletal defects noted, no edema  SKIN: no suspicious lesions or rashes  NEURO: Normal strength and tone, mentation intact and speech normal  PSYCH: mentation appears normal, affect normal/bright    ASSESSMENT/PLAN:   Naman was seen today for physical.    Diagnoses and all orders for this visit:    Routine general medical examination at a health care facility    Morbid obesity with BMI of 45.0-49.9, adult (H): is aware of proper eating and techniques for weight loss, trying to cut out fast food    Type 2 diabetes mellitus with hyperglycemia, with long-term current use of insulin (H): followed by endocrinology   Essential hypertension:elevated at 152/94, follow up in 1 week for recheck.  -     losartan (COZAAR) 100 MG tablet; Take 1 tablet (100 mg) by mouth daily  -     triamterene-hydrochlorothiazide (MAXZIDE) 75-50 MG per tablet; Take 1 tablet by mouth daily    Mixed hyperlipidemia:  Last . Due for recheck in 7/2018  -     atorvastatin (LIPITOR) 40 MG tablet; Take 1 tablet (40 mg) by mouth daily    JOEL (generalized anxiety disorder): at next appointment will discuss options for sleep other than lorazepam  -     traZODone (DESYREL) 100 MG tablet; Take 1 tablet (100 mg) by mouth At Bedtime  -     LORazepam (ATIVAN) 1 MG tablet; Take 1 tablet (1 mg) by mouth daily as needed for anxiety    Acute recurrent maxillary sinusitis: suggested saline nasal  "spray  -     amoxicillin (AMOXIL) 875 MG tablet; Take 1 tablet (875 mg) by mouth 2 times daily      COUNSELING:   Reviewed preventive health counseling, as reflected in patient instructions       Regular exercise       Healthy diet/nutrition     reports that he has never smoked. He has never used smokeless tobacco.    Estimated body mass index is 46.09 kg/(m^2) as calculated from the following:    Height as of 4/17/18: 1.88 m (6' 2\").    Weight as of this encounter: 162.8 kg (359 lb).   Weight management plan: Patient referred to endocrine and/or weight management specialty, is having bariatric surgery, sees endocrinology    Counseling Resources:  ATP IV Guidelines  Pooled Cohorts Equation Calculator  FRAX Risk Assessment  ICSI Preventive Guidelines  Dietary Guidelines for Americans, 2010  USDA's MyPlate  ASA Prophylaxis  Lung CA Screening  Follow up in 1 week for BP check, in 3 months for routine visit.   The information in this document, created by the medical scribe for me, accurately reflects the services I personally performed and the decisions made by me. I have reviewed and approved this document for accuracy prior to leaving the patient care area.  April 18, 2018 8:22 AM  Susan Haase, APRN [facility]      "

## 2018-04-18 NOTE — PROGRESS NOTES
SUBJECTIVE:   CC: Naman Jones is an 32 year old woman who presents for preventive health visit.     Physical   Annual:     Getting at least 3 servings of Calcium per day::  Yes    Bi-annual eye exam::  Yes    Dental care twice a year::  NO    Sleep apnea or symptoms of sleep apnea::  Daytime drowsiness    Diet::  Diabetic    Frequency of exercise::  1 day/week    Duration of exercise::  15-30 minutes    Taking medications regularly::  Yes    Medication side effects::  Other    Additional concerns today::  No          Answers for HPI/ROS submitted by the patient on 4/18/2018   PHQ-2 Score: 0    Type 2 DM: Last A1C 9.0    Hypertension: Takes triamterene-HCTZ daily. BP is 152/94    Today's PHQ-2 Score:   PHQ-2 ( 1999 Pfizer) 4/18/2018   Q1: Little interest or pleasure in doing things 0   Q2: Feeling down, depressed or hopeless 0   PHQ-2 Score 0   Q1: Little interest or pleasure in doing things Not at all   Q2: Feeling down, depressed or hopeless Not at all   PHQ-2 Score 0       Abuse: Current or Past(Physical, Sexual or Emotional)- No  Do you feel safe in your environment - Yes    Social History   Substance Use Topics     Smoking status: Never Smoker     Smokeless tobacco: Never Used     Alcohol use Yes     Alcohol Use 4/18/2018   If you drink alcohol do you typically have greater than 3 drinks per day OR greater than 7 drinks per week? No   No flowsheet data found.    Reviewed orders with patient.  Reviewed health maintenance and updated orders accordingly - Yes  Patient Active Problem List   Diagnosis     Morbid obesity with BMI of 45.0-49.9, adult (H)     Essential hypertension     Mixed hyperlipidemia     Diabetes mellitus type 2 in obese (H)     Plantar fasciitis     Chronic pain of right knee     JOEL (generalized anxiety disorder)     Type 2 diabetes mellitus with hyperglycemia, with long-term current use of insulin (H)     History reviewed. No pertinent surgical history.    Social History   Substance Use  Topics     Smoking status: Never Smoker     Smokeless tobacco: Never Used     Alcohol use Yes     History reviewed. No pertinent family history.      Current Outpatient Prescriptions   Medication Sig Dispense Refill     atorvastatin (LIPITOR) 40 MG tablet Take 40 mg by mouth daily       blood glucose monitoring (NO BRAND SPECIFIED) test strip 5 strips by In Vitro route daily Test twice daily 150 each 3     blood glucose monitoring (ULTRA THIN 30G) lancets 1 each by In Vitro route daily As directed.1 box. For testing blood sugar at home       dulaglutide (TRULICITY) 1.5 MG/0.5ML pen Inject 1.5 mg Subcutaneous every 7 days 2 mL 3     insulin degludec (TRESIBA) 200 UNIT/ML pen Inject 130 Units Subcutaneous daily 30 mL 3     insulin lispro (HUMALOG KWIKPEN) 100 UNIT/ML injection INJECT 1.5 UNIT FOR EVERY 3 GRAMS OF CARBS PLUS CORRECTION OF 1 UNIT FOR EVERY 15 POINTS ABOVE 150.(APPROXIMATELY 100 UNITS PER DAY) 30 mL 3     LORAZEPAM PO Take 1 mg by mouth 2 times daily       losartan (COZAAR) 100 MG tablet TAKE 1 TABLET BY MOUTH EVERY DAY 30 tablet 1     metFORMIN (GLUCOPHAGE) 500 MG tablet Take 2 tablets (1,000 mg) by mouth 2 times daily (with meals) 120 tablet 3     SERTRALINE HCL PO Take 100 mg by mouth daily       traZODone (DESYREL) 100 MG tablet Take 1 tablet (100 mg) by mouth At Bedtime 30 tablet 0     triamterene-hydrochlorothiazide (MAXZIDE) 75-50 MG per tablet Take 1 tablet by mouth daily       Allergies   Allergen Reactions     Sulfa Drugs Anaphylaxis     Lisinopril      Other reaction(s): Impotence     Onion      Other reaction(s): Intolerance-Can't Take       Pertinent mammograms are reviewed under the imaging tab.  History of abnormal Pap smear: {PAP HX:166388}    Reviewed and updated as needed this visit by clinical staff         Reviewed and updated as needed this visit by Provider        {HISTORY OPTIONS (Optional):753389}    Review of Systems  {FEMALE PREVENTATIVE ROS:524645}     OBJECTIVE:   There were  "no vitals taken for this visit.  Physical Exam  {Exam Choices:349109}    ASSESSMENT/PLAN:   {Diag Picklist:033481}    COUNSELING:  {FEMALE COUNSELING MESSAGES:842648::\"Reviewed preventive health counseling, as reflected in patient instructions\"}    {BP Counseling- Complete if BP >= 120/80  (Optional):490854}     reports that he has never smoked. He has never used smokeless tobacco.  {Tobacco Cessation -- Complete if patient is a smoker (Optional):459176}  Estimated body mass index is 45.58 kg/(m^2) as calculated from the following:    Height as of 4/17/18: 6' 2\" (1.88 m).    Weight as of 4/17/18: 355 lb (161 kg).   {Weight Management Plan (ACO) Complete if BMI is abnormal-  Ages 18-64  BMI >24.9.  Age 65+ with BMI <23 or >30 (Optional):889373}    Counseling Resources:  ATP IV Guidelines  Pooled Cohorts Equation Calculator  Breast Cancer Risk Calculator  FRAX Risk Assessment  ICSI Preventive Guidelines  Dietary Guidelines for Americans, 2010  USDA's MyPlate  ASA Prophylaxis  Lung CA Screening    Susan Haase, APRN CNP  [facility]"

## 2018-04-30 ENCOUNTER — TELEPHONE (OUTPATIENT)
Dept: NURSING | Facility: CLINIC | Age: 33
End: 2018-04-30

## 2018-04-30 NOTE — TELEPHONE ENCOUNTER
Received Health Coaching Referral-Outreached patient for the first time, but wasn't able to connect. Left message for call back.    Samuel Rossi, Health    4/30/2018    4:01 PM

## 2018-05-04 ENCOUNTER — TELEPHONE (OUTPATIENT)
Dept: EDUCATION SERVICES | Facility: CLINIC | Age: 33
End: 2018-05-04

## 2018-05-04 ENCOUNTER — ALLIED HEALTH/NURSE VISIT (OUTPATIENT)
Dept: NURSING | Facility: CLINIC | Age: 33
End: 2018-05-04
Payer: COMMERCIAL

## 2018-05-04 ENCOUNTER — ALLIED HEALTH/NURSE VISIT (OUTPATIENT)
Dept: EDUCATION SERVICES | Facility: CLINIC | Age: 33
End: 2018-05-04
Payer: COMMERCIAL

## 2018-05-04 VITALS — SYSTOLIC BLOOD PRESSURE: 132 MMHG | DIASTOLIC BLOOD PRESSURE: 80 MMHG

## 2018-05-04 DIAGNOSIS — E11.65 TYPE 2 DIABETES MELLITUS WITH HYPERGLYCEMIA, WITH LONG-TERM CURRENT USE OF INSULIN (H): Primary | ICD-10-CM

## 2018-05-04 DIAGNOSIS — Z79.4 TYPE 2 DIABETES MELLITUS WITH HYPERGLYCEMIA, WITH LONG-TERM CURRENT USE OF INSULIN (H): Primary | ICD-10-CM

## 2018-05-04 DIAGNOSIS — Z01.30 BLOOD PRESSURE CHECK: Primary | ICD-10-CM

## 2018-05-04 PROCEDURE — G0108 DIAB MANAGE TRN  PER INDIV: HCPCS | Performed by: DIETITIAN, REGISTERED

## 2018-05-04 PROCEDURE — 99207 ZZC NO CHARGE NURSE ONLY: CPT

## 2018-05-04 RX ORDER — LANCETS 33 GAUGE
1 EACH MISCELLANEOUS 4 TIMES DAILY
Qty: 100 EACH | Refills: 6 | Status: SHIPPED | OUTPATIENT
Start: 2018-05-04 | End: 2020-09-23

## 2018-05-04 NOTE — TELEPHONE ENCOUNTER
See DM encounter from today. Recommend personal CGM.     Rx for Janie reader and sensors pended for Dr. Tavera signature if agreeable with plan.    Enedelia Pena RD, CDE  Diabetes

## 2018-05-04 NOTE — PATIENT INSTRUCTIONS
My Diabetes Care Goals:    1. Continue the Humalog 1 per 2 grams carbs + correction 1 per 15 above 150 mg/dl. Continue Tresiba 130 units/ day.    2. Start using OneTouch Reveal lupe. New meter provided today. Also recommend Janie personal CGM- will route Rx to Dr. Tavera for signature.    3. Continue working on healthy eating. Aim for no more than 75 gms of carbs per meal.     Enedelia Pena RD, LD, CDE  Diabetes      Bring blood glucose meter and logbook with you to all doctor and follow-up appointments.     [facility]:  For Your Diabetes Education and Nutrition Appointments Call:  [phone]   For Diabetes Education or Nutrition Related Questions:   Phone: [phone]  E-mail: [email or web address]  Fax: [phone]   If you need a medication refill please contact your pharmacy. Please allow 3 business days for your refills to be completed.    Instructions for emailing the Diabetes Educators    If you need to communicate a non-urgent message to a Diabetes Educator via email, please send to [email or web address].    Please follow the following email guidelines:    Subject line: Secure: your clinic name (example: Secure: [facility])  In the email please include: First name, middle initial, last name and date of birth.    We will be in touch with you within one (1) business day.

## 2018-05-04 NOTE — MR AVS SNAPSHOT
After Visit Summary   5/4/2018    Naman Jones    MRN: [id number]           Patient Information     Date Of Birth          1985        Visit Information        Provider Department      5/4/2018 9:30 AM Enedelia Pena [facility] [place]        Today's Diagnoses     Type 2 diabetes mellitus with hyperglycemia, with long-term current use of insulin (H)    -  1      Care Instructions    My Diabetes Care Goals:    1. Continue the Humalog 1 per 2 grams carbs + correction 1 per 15 above 150 mg/dl. Continue Tresiba 130 units/ day.    2. Start using OneTouch Reveal lupe. New meter provided today. Also recommend Jnaie personal CGM- will route Rx to Dr. Tavera for signature.    3. Continue working on healthy eating. Aim for no more than 75 gms of carbs per meal.     Enedelia Pena RD, LD, CDE  Diabetes      Bring blood glucose meter and logbook with you to all doctor and follow-up appointments.     [facility]:  For Your Diabetes Education and Nutrition Appointments Call:  [phone]   For Diabetes Education or Nutrition Related Questions:   Phone: [phone]  E-mail: [email or web address]  Fax: [phone]   If you need a medication refill please contact your pharmacy. Please allow 3 business days for your refills to be completed.    Instructions for emailing the Diabetes Educators    If you need to communicate a non-urgent message to a Diabetes Educator via email, please send to [email or web address].    Please follow the following email guidelines:    Subject line: Secure: your clinic name (example: Secure: [facility])  In the email please include: First name, middle initial, last name and date of birth.    We will be in touch with you within one (1) business day.             Follow-ups after your visit        Your next 10 appointments already scheduled     May 18, 2018  9:00 AM ADITIT    Weight Loss Visit with Lilibeth Ying PA-C   [facility] [place] ([facility])    [address]  [facility] [address] [phone]            May 18, 2018  9:30 AM CDT   Weight Loss Visit with Adele Wl Diet 3, RD   [facility] [place] ([facility])    [address] [address] [phone]            May 25, 2018 12:30 PM CDT   Diabetic Education with Enedelia Pena   [facility] ([facility])    [address] [place] [address] [phone]            Jul 20, 2018  8:45 AM CDT   SHORT with Susan Rachele Haase, APRN CNP   [facility] ([facility])    [address] [place] [address] [phone]              Who to contact     If you have questions or need follow up information about today's clinic visit or your schedule please contact [facility] [place] directly at [phone].  Normal or non-critical lab and imaging results will be communicated to you by [employer], letter or phone within 4 business days after the clinic has received the results. If you do not hear from us within 7 days, please contact the clinic through [employer] or phone. If you have a critical or abnormal lab result, we will notify you by phone as soon as possible.  Submit refill requests through Spokeable or call your pharmacy and they will forward the refill request to us. Please allow 3 business days for your refill to be completed.          Additional Information About Your Visit        Spokeable Information     Spokeable gives you secure access to your electronic health record. If you see a primary care provider, you can also send messages to your care team and make appointments. If you have questions, please call your primary care clinic.  If  you do not have a primary care provider, please call [phone] and they will assist you.        Care EveryWhere ID     This is your Care EveryWhere ID. This could be used by other organizations to access your [facility] medical records  [id number]         Blood Pressure from Last 3 Encounters:   05/04/18 132/80   04/18/18 (!) 152/94   04/17/18 138/88    Weight from Last 3 Encounters:   04/18/18 (!) 162.8 kg (359 lb)   04/17/18 (!) 161 kg (355 lb)   02/02/18 (!) 159.7 kg (352 lb)              Today, you had the following     No orders found for display         Today's Medication Changes          These changes are accurate as of 5/4/18 10:18 AM.  If you have any questions, ask your nurse or doctor.               Start taking these medicines.        Dose/Directions    insulin pen needle 31G X 5 MM   Commonly known as:  B-D U/F   Used for:  Type 2 diabetes mellitus with hyperglycemia, with long-term current use of insulin (H)   Started by:  Enedelia Pena        Use 3 daily as directed. May substitute with another brand if insurance does not cover.   Quantity:  100 each   Refills:  6         These medicines have changed or have updated prescriptions.        Dose/Directions    blood glucose monitoring test strip   Commonly known as:  ONETOUCH VERIO IQ   This may have changed:    - how much to take  - how to take this  - when to take this  - additional instructions   Used for:  Type 2 diabetes mellitus with hyperglycemia, with long-term current use of insulin (H)   Changed by:  Enedelia Pena        Use to test blood sugars 4 times daily or as directed.   Quantity:  150 each   Refills:  6       ONETOUCH DELICA LANCETS 33G Misc   This may have changed:    - how much to take  - how to take this  - when to take this  - additional instructions   Used for:  Type 2 diabetes mellitus with hyperglycemia, with long-term current use of insulin (H)   Changed by:  Enedelia Pena        Dose:  1 lancet   1 lancet 4 times daily    Quantity:  100 each   Refills:  6            Where to get your medicines      These medications were sent to [facility] - [facility] [address] AT [place] [address]  [address] [facility] [address]    Hours:  24-hours Phone:  [phone]     blood glucose monitoring test strip    insulin pen needle 31G X 5 MM    ONETOUCH DELICA LANCETS 33G [facility]                Primary Care Provider Office Phone # Fax #    Susan Rachele Haase, APRN [phone] [address] [facility] [address]        Equal Access to Services     : Hadii aad ku hadasho Soomaali, waaxda luqadaha, qaybta kaalmada adeegyada, waxay idiin hayaan adeeg fab rodgers. So [facility] [phone].    ATENCIÓN: Si habla español, tiene a dumas disposición servicios gratuitos de asistencia lingüística. [facility] [phone].    We comply with applicable federal civil rights laws and [place] laws. We do not discriminate on the basis of race, color, national origin, age, disability, sex, sexual orientation, or gender identity.            Thank you!     Thank you for choosing [facility]  for your care. Our goal is always to provide you with excellent care. Hearing back from our patients is one way we can continue to improve our services. Please take a few minutes to complete the written survey that you may receive in the mail after your visit with us. Thank you!             Your Updated Medication List - Protect others around you: Learn how to safely use, store and throw away your medicines at www.disposemymeds.org.          This list is accurate as of 5/4/18 10:18 AM.  Always use your most recent med list.                   Brand Name Dispense Instructions for use Diagnosis    amoxicillin 875 MG tablet    AMOXIL    20 tablet    Take 1 tablet (875 mg) by mouth 2 times daily    Acute recurrent maxillary sinusitis       atorvastatin 40 MG tablet     LIPITOR    90 tablet    Take 1 tablet (40 mg) by mouth daily    Mixed hyperlipidemia       blood glucose monitoring test strip    ONETOUCH VERIO IQ    150 each    Use to test blood sugars 4 times daily or as directed.    Type 2 diabetes mellitus with hyperglycemia, with long-term current use of insulin (H)       dulaglutide 1.5 MG/0.5ML pen    TRULICITY    2 mL    Inject 1.5 mg Subcutaneous every 7 days    Type 2 diabetes mellitus with hyperglycemia, with long-term current use of insulin (H)       insulin degludec 200 UNIT/ML pen    TRESIBA    30 mL    Inject 130 Units Subcutaneous daily    Type 2 diabetes mellitus with hyperglycemia, with long-term current use of insulin (H)       insulin lispro 100 UNIT/ML injection    HumaLOG KWIKpen    30 mL    INJECT 1.5 UNIT FOR EVERY 3 GRAMS OF CARBS PLUS CORRECTION OF 1 UNIT FOR EVERY 15 POINTS ABOVE 150.(APPROXIMATELY 100 UNITS PER DAY)    Type 2 diabetes mellitus with hyperglycemia, with long-term current use of insulin (H)       insulin pen needle 31G X 5 MM    B-D U/F    100 each    Use 3 daily as directed. May substitute with another brand if insurance does not cover.    Type 2 diabetes mellitus with hyperglycemia, with long-term current use of insulin (H)       * LORAZEPAM PO      Take 1 mg by mouth 2 times daily        * LORazepam 1 MG tablet    ATIVAN    30 tablet    Take 1 tablet (1 mg) by mouth daily as needed for anxiety    JOEL (generalized anxiety disorder)       losartan 100 MG tablet    COZAAR    90 tablet    Take 1 tablet (100 mg) by mouth daily    Essential hypertension       metFORMIN 500 MG tablet    GLUCOPHAGE    120 tablet    Take 2 tablets (1,000 mg) by mouth 2 times daily (with meals)    Type 2 diabetes mellitus with hyperglycemia, with long-term current use of insulin (H)       ONETOUCH DELICA LANCETS 33G Misc     100 each    1 lancet 4 times daily    Type 2 diabetes mellitus with hyperglycemia, with long-term current use of insulin (H)        SERTRALINE HCL PO      Take 100 mg by mouth daily        traZODone 100 MG tablet    DESYREL    90 tablet    Take 1 tablet (100 mg) by mouth At Bedtime    JOEL (generalized anxiety disorder)       triamterene-hydrochlorothiazide 75-50 MG per tablet    MAXZIDE    90 tablet    Take 1 tablet by mouth daily    Essential hypertension       * Notice:  This list has 2 medication(s) that are the same as other medications prescribed for you. Read the directions carefully, and ask your doctor or other care provider to review them with you.

## 2018-05-04 NOTE — Clinical Note
Harirs Tavera-  Here is pt's CGM report. Sensor came off after 5 days- and reports that he was very ill while wearing it, thus data may not be very useful. Rather that repeating the test, recommend  personal Janie.   Will send separate medication request for signature.   Please bill for CGM as usual if you feel it is appropriate.  Thanks! Enedelia Pena RD, CDE Diabetes

## 2018-05-04 NOTE — PROGRESS NOTES
"LibrePro Continuous Glucose Monitor Interpretation   CGM Download:  Janie came off after 5 days    Patient comments: reports was \"really sick while wearing the Janie\" - not sure it will be good data, states blood sugars getting better last few days.     Patient History:   1. Type of Diabetes: Type 2 diabetes  2. Duration of diabetes or year of diagnosis: age 30  3. Current treatment regimen:    Diabetes Medication(s)     Biguanides Sig    metFORMIN (GLUCOPHAGE) 500 MG tablet Take 2 tablets (1,000 mg) by mouth 2 times daily (with meals)    Insulin Sig    insulin degludec (TRESIBA) 200 UNIT/ML pen Inject 130 Units Subcutaneous daily    insulin lispro (HUMALOG KWIKPEN) 100 UNIT/ML injection INJECT 1.5 UNIT FOR EVERY 3 GRAMS OF CARBS PLUS CORRECTION OF 1 UNIT FOR EVERY 15 POINTS ABOVE 150.(APPROXIMATELY 100 UNITS PER DAY)    Incretin Mimetic Agents (GLP-1 Receptor Agonists) Sig    dulaglutide (TRULICITY) 1.5 MG/0.5ML pen Inject 1.5 mg Subcutaneous every 7 days        Current Humalog use is 1 unit per 2 gms of carbs (30-40 units per meal)    4. Most recent A1c values:  Lab Results   Component Value Date    A1C 9.0 2018    A1C 8.5 2017    A1C 8.4 07/10/2017     5. Indication/s for LibrePro study: Difficult to manage hypoglycemia and/or hyperglycemia, despite multiple adjustments in self-monitoring of blood glucose and diabetes medication administration and Unexplained fluctuations in glucose values.        Statistics:                         Data evaluation:   Ambulatory Glucose Profile (AGP) shows the followin. Consistent day-to-day patterns noted: pattern of daytime hyperglycemia  2. pattern of nocturnal hyperglycemia.  3. Hypoglycemia: none    Patient's Logbook shows the following:    Carbohydrate counting is: accurate   Medication and/or insulin dosing is: accurate          Education provided today:  AADE Self-Care Behaviors:  Monitoring: testing frequency, benefits of personal CGM, provided " "overview of Janie    Opportunities for ongoing education and support in diabetes-self management were discussed.    Pt verbalized understanding of concepts discussed and recommendations provided today.       Educational and other materials:  Personal Janie patient brochure  Copy of CGM report    Assessment:  pt is now making a better effort to control carb intake, portions, etc. Is now moved into their new home which has made healthy eating easier. While living with sister- there was a lot of junk food in the house which was difficult for him to avoid.   pt has self adjusted Humalog I:C from 1.5 per 3 gms to --> 1 per 2 gms.     BG meter download:      BG's are improving, pt interested in testing more frequently. New OneTouch Verio Flex meter provided at today's visit with rx for testing supplies. Discussed use of OneTouch Reveal [place] to help track BG's, carbs, insulin, etc.   Also recommend personal Janie CGM- which could also be used for tracking food, insulin, etc. Pt is very interested in learning more about his BG trends/ patterns and agrees that CGM would be very helpful to him.   Pt requesting to \"hold off on U-500 insulin\", as he feels that he can continue to work with his current treatment plan to improve glycemic control. He is receptive to more frequent follow up.     Plan:  1. Continue the Humalog 1 per 2 grams carbs + correction 1 per 15 above 150 mg/dl. Continue Tresiba 130 units/ day.    2. Start using OneTouch Reveal tyrone. New meter provided today. Also recommend Janie personal CGM- will route Rx to Dr. Tavera for signature.    3. Continue working on healthy eating. Aim for no more than 75 gms of carbs per meal.      AVS provided to patient     Enedelia Pena RD, CDE  Diabetes     Time Spent: 60 minutes  Any diabetes medication dose changes were made via the CDE Protocol and Collaborative Practice Agreement with the patient's endocrinology provider. A copy of this encounter was " shared with the provider.

## 2018-05-04 NOTE — LETTER
"    2018         RE: Naman Jones  [address] [facility] [place] [address]        Dear Colleague,    Thank you for referring your patient, Naman Jones, to the [facility]. Please see a copy of my visit note below.    LibrePro Continuous Glucose Monitor Interpretation   CGM Download:  Janie came off after 5 days    Patient comments: reports was \"really sick while wearing the Janie\" - not sure it will be good data, states blood sugars getting better last few days.     Patient History:   1. Type of Diabetes: Type 2 diabetes  2. Duration of diabetes or year of diagnosis: age 30  3. Current treatment regimen:    Diabetes Medication(s)     Biguanides Sig    metFORMIN (GLUCOPHAGE) 500 MG tablet Take 2 tablets (1,000 mg) by mouth 2 times daily (with meals)    Insulin Sig    insulin degludec (TRESIBA) 200 UNIT/ML pen Inject 130 Units Subcutaneous daily    insulin lispro (HUMALOG KWIKPEN) 100 UNIT/ML injection INJECT 1.5 UNIT FOR EVERY 3 GRAMS OF CARBS PLUS CORRECTION OF 1 UNIT FOR EVERY 15 POINTS ABOVE 150.(APPROXIMATELY 100 UNITS PER DAY)    Incretin Mimetic Agents (GLP-1 Receptor Agonists) Sig    dulaglutide (TRULICITY) 1.5 MG/0.5ML pen Inject 1.5 mg Subcutaneous every 7 days        Current Humalog use is 1 unit per 2 gms of carbs (30-40 units per meal)    4. Most recent A1c values:  Lab Results   Component Value Date    A1C 9.0 2018    A1C 8.5 2017    A1C 8.4 07/10/2017     5. Indication/s for LibrePro study: Difficult to manage hypoglycemia and/or hyperglycemia, despite multiple adjustments in self-monitoring of blood glucose and diabetes medication administration and Unexplained fluctuations in glucose values.        Statistics:                         Data evaluation:   Ambulatory Glucose Profile (AGP) shows the followin. Consistent day-to-day patterns noted: pattern of daytime hyperglycemia  2. pattern of nocturnal hyperglycemia.  3. Hypoglycemia: " "none    Patient's Logbook shows the following:    Carbohydrate counting is: accurate   Medication and/or insulin dosing is: accurate          Education provided today:  [place] Self-Care Behaviors:  Monitoring: testing frequency, benefits of personal CGM, provided overview of Janie    Opportunities for ongoing education and support in diabetes-self management were discussed.    Pt verbalized understanding of concepts discussed and recommendations provided today.       Educational and other materials:  Personal Janie patient brochure  Copy of CGM report    Assessment:  pt is now making a better effort to control carb intake, portions, etc. Is now moved into their new home which has made healthy eating easier. While living with sister- there was a lot of junk food in the house which was difficult for him to avoid.   pt has self adjusted Humalog I:C from 1.5 per 3 gms to --> 1 per 2 gms.     BG meter download:      BG's are improving, pt interested in testing more frequently. New OneTouch Verio Flex meter provided at today's visit with rx for testing supplies. Discussed use of OneTouch Reveal Tyrone to help track BG's, carbs, insulin, etc.   Also recommend personal Janie CGM- which could also be used for tracking food, insulin, etc. Pt is very interested in learning more about his BG trends/ patterns and agrees that CGM would be very helpful to him.   Pt requesting to \"hold off on U-500 insulin\", as he feels that he can continue to work with his current treatment plan to improve glycemic control. He is receptive to more frequent follow up.     Plan:  1. Continue the Humalog 1 per 2 grams carbs + correction 1 per 15 above 150 mg/dl. Continue Tresiba 130 units/ day.    2. Start using OneTouch Reveal tyrone. New meter provided today. Also recommend Janie personal CGM- will route Rx to Dr. Tavera for signature.    3. Continue working on healthy eating. Aim for no more than 75 gms of carbs per meal.      AVS provided to patient "     Enedelia Pena RD, CDE  Diabetes     Time Spent: 60 minutes  Any diabetes medication dose changes were made via the CDE Protocol and Collaborative Practice Agreement with the patient's endocrinology provider. A copy of this encounter was shared with the provider.

## 2018-05-08 RX ORDER — FLASH GLUCOSE SENSOR
KIT MISCELLANEOUS
Qty: 3 EACH | Refills: 6 | Status: SHIPPED | OUTPATIENT
Start: 2018-05-08 | End: 2018-07-26

## 2018-05-08 RX ORDER — FLASH GLUCOSE SENSOR
1 KIT MISCELLANEOUS PRN
Qty: 1 DEVICE | Refills: 0 | Status: SHIPPED | OUTPATIENT
Start: 2018-05-08 | End: 2019-02-01

## 2018-05-08 NOTE — TELEPHONE ENCOUNTER
Thanks so much Annette-  Dr. Tavera will appreciate the help I'm sure!    Enedelia Pena RD, CDE  Diabetes

## 2018-05-08 NOTE — TELEPHONE ENCOUNTER
Enedelia,   Dr. Tavera is out for the week.  I signed the order for the Janie, I don't think Dr. Tavera will mind.  Annette Hobson NP  Endocrinology

## 2018-05-11 ENCOUNTER — TELEPHONE (OUTPATIENT)
Dept: NURSING | Facility: CLINIC | Age: 33
End: 2018-05-11

## 2018-05-11 NOTE — TELEPHONE ENCOUNTER
Outreached patient for second time, but wasn't able to connect. Left message for call back.    Samuel Rossi, Health    5/11/2018    1:27 PM

## 2018-05-18 ENCOUNTER — OFFICE VISIT (OUTPATIENT)
Dept: SURGERY | Facility: CLINIC | Age: 33
End: 2018-05-18
Payer: COMMERCIAL

## 2018-05-18 VITALS
TEMPERATURE: 98 F | HEART RATE: 90 BPM | OXYGEN SATURATION: 96 % | HEIGHT: 73 IN | WEIGHT: 315 LBS | BODY MASS INDEX: 41.75 KG/M2 | DIASTOLIC BLOOD PRESSURE: 83 MMHG | SYSTOLIC BLOOD PRESSURE: 129 MMHG | RESPIRATION RATE: 18 BRPM

## 2018-05-18 VITALS — BODY MASS INDEX: 41.75 KG/M2 | WEIGHT: 315 LBS | HEIGHT: 73 IN

## 2018-05-18 DIAGNOSIS — E66.01 MORBID OBESITY WITH BMI OF 45.0-49.9, ADULT (H): ICD-10-CM

## 2018-05-18 DIAGNOSIS — E78.2 MIXED HYPERLIPIDEMIA: ICD-10-CM

## 2018-05-18 DIAGNOSIS — F41.1 GAD (GENERALIZED ANXIETY DISORDER): ICD-10-CM

## 2018-05-18 DIAGNOSIS — E66.01 MORBID OBESITY WITH BMI OF 40.0-44.9, ADULT (H): ICD-10-CM

## 2018-05-18 DIAGNOSIS — E11.65 TYPE 2 DIABETES MELLITUS WITH HYPERGLYCEMIA, WITH LONG-TERM CURRENT USE OF INSULIN (H): ICD-10-CM

## 2018-05-18 DIAGNOSIS — I10 ESSENTIAL HYPERTENSION: ICD-10-CM

## 2018-05-18 DIAGNOSIS — M72.2 PLANTAR FASCIITIS: ICD-10-CM

## 2018-05-18 DIAGNOSIS — Z79.4 TYPE 2 DIABETES MELLITUS WITH HYPERGLYCEMIA, WITH LONG-TERM CURRENT USE OF INSULIN (H): ICD-10-CM

## 2018-05-18 DIAGNOSIS — E55.9 VITAMIN D INSUFFICIENCY: Primary | ICD-10-CM

## 2018-05-18 PROCEDURE — 97803 MED NUTRITION INDIV SUBSEQ: CPT | Performed by: DIETITIAN, REGISTERED

## 2018-05-18 PROCEDURE — 99215 OFFICE O/P EST HI 40 MIN: CPT | Performed by: PHYSICIAN ASSISTANT

## 2018-05-18 NOTE — LETTER
[facility]          [address]  [address]  [place] [address]                                         Tel:  [phone]  Fax: [phone]    2018    Naman Jones  [address] [address] [address]    : 1985      Dear Naman;     We have identified that you have outstanding lab tests that have . If you would like to have the  labs completed, please contact our clinic at [phone] to have them re-ordered. If you have any questions, please contact our office.  Sincerely,        Moni Aragon CMA

## 2018-05-18 NOTE — LETTER
Naman Jones  May 18, 2018        Bariatric Task List      Required Weight loss:     Lose 5 lbs prior to surgery.  Goal Weight: 364 lbs  Tasks:   Have preoperative laboratory tests drawn.    Psychological Evaluation Update    Hemoglobin A1C under 8 before surgery   Achieve clearance from dietitian to see surgeon.

## 2018-05-18 NOTE — PROGRESS NOTES
"PRE SURGICAL WEIGHT LOSS NUTRITION APPOINTMENT    Naman Jones  1985  male  [id number]  33 year old    ASSESSMENT    Desired Surgical Procedure: gastric bypass    REASON FOR VISIT:  Naman Jones is a 33 year old year old male presents today for a pre-surgical weight loss follow-up appointment. Patient accompanied by wife.    DIAGNOSIS:  Weight Status Obesity Grade III BMI >40    ANTHROPOMETRICS:  Height: 185.4 cm (6' 1\")  Initial Weight: 349.4 lbs    Weight last visit: 352.2 lbs   Current weight: (!) 167.4 kg (369 lb) RESTART WEIGHT  BMI: 48.79 kg/(m^2).    MULTIVITAMINS/MINERALS  Multivitamin - daily     NUTRITION HISTORY:  Breakfast: (within 1 hour of waking) Protein bar   Lunch: leftovers OR restaurant food (chicken sandwich, stir oquendo, chinese food) OR trail mix   Supper: meat + vegetable + sometimes a starch (low carb pasta)   Snacks: HS snack w/insulin - usually cereal OR leftovers  Fluids Consumed: Water (1 liter), coffee (8oz), diet soda (40oz), herbal tea (8oz), EtOH (~4 drinks on weekends)  Eating slower: No  Chewing foods thoroughly: No  Take 20-30 minutes to consume each meal: No  Fluids and meals separate by at least 30 minutes: No  Carbonation: yes  Caffeine: yes  Additional Information: Pt restarting program today. Goal weight is 364 lbs. Discussed getting back to following all pre-op habits. Pt has been working hard at getting DM under better control.     PHYSICAL ACTIVITY:  Minimal    DIAGNOSIS:  Previous Nutrition Diagnosis:   (remote/restarting)    Current Nutrition Diagnosis: Obesity related to long history of self-monitoring deficit and excessive energy intake as evidenced by BMI of 48.68 kg/m2.    INTERVENTION:  Nutrition Prescription: Recommended energy/nutrient modification.    GOALS:  Begin taking calcium and vitamin D per guidelines  Eliminate carbonated and alcoholic beverages  Reduce caffeine/coffee by 50 %    Follow-Up:   Recommend monthly follow up visits to assist with " lifestyle changes or per insurance.    Intervention:  - Discussed progress towards previous goals.  - Reinforced importance of making behavior changes in preparation for bariatric surgery.   - Assessed learning needs and learning preferences       NUTRITION MONITORING AND EVALUATION:  Anticipated compliance: good  Patient demonstrated good understanding.     Follow up: Continue to monitor patient closely regarding weight loss and diet.  # of visits needed: 2-3  Cleared by RD: No     TIME SPENT WITH PATIENT: 20 minutes    Radha Hood RD, LD  Clinical Dietitian

## 2018-05-18 NOTE — MR AVS SNAPSHOT
MRN:[id number]                      After Visit Summary   5/18/2018    Naman Jones    MRN: [id number]           Visit Information        Provider Department      5/18/2018 9:30 AM 3, Sh Arturo Landa RD [facility]      Your next 10 appointments already scheduled     May 25, 2018 12:30 PM CDT   Diabetic Education with Enedelia Mongeer   [facility] [address] [address] [phone]            Jul 20, 2018  8:45 AM CDT   SHORT with Susan Rachele Haase, APRN CNP   [facility]    [address] [place] [address] [phone]              MyChart Information     Imsyshart gives you secure access to your electronic health record. If you see a primary care provider, you can also send messages to your care team and make appointments. If you have questions, please call your primary care clinic.  If you do not have a primary care provider, please call [phone] and they will assist you.        Care EveryWhere ID     This is your Care EveryWhere ID. This could be used by other organizations to access your [facility] medical records  [id number]        Equal Access to Services     RANULFO RODGERS: Hadii lopez michelleo Sozacariasali, waaxda luqadaha, qaybta kaalmada adeegyada, naman rodgers. So [facility] [phone].    ATENCIÓN: Si habla español, tiene a dumas disposición servicios gratuitos de asistencia lingüística. Llame al [phone].    We comply with applicable federal civil rights laws and [place] laws. We do not discriminate on the basis of race, color, national origin, age, disability, sex, sexual orientation, or gender identity.

## 2018-05-18 NOTE — Clinical Note
I had the pleasure of seeing your patient in our bariatric clinic to evaluate his obesity. He is considering baraitric surgery and had an initial evaluation today. I will keep you abreast of his progress.  Sincerely,   Lilibeth Ying PA-C

## 2018-05-18 NOTE — MR AVS SNAPSHOT
After Visit Summary   5/18/2018    Naman Jones    MRN: [id number]           Patient Information     Date Of Birth          1985        Visit Information        Provider Department      5/18/2018 9:00 AM Lilibeth Ying PA-C [facility]      Today's Diagnoses     Vitamin D insufficiency    -  1    Morbid obesity with BMI of 40.0-44.9, adult (H)        Mixed hyperlipidemia        Essential hypertension        Plantar fasciitis        JOEL (generalized anxiety disorder)        Type 2 diabetes mellitus with hyperglycemia, with long-term current use of insulin (H)          Care Instructions    Please refer to your task list created today at your appointment.  Make appointment to return to clinic in 1 month to see the dietician.    Bariatric Postoperative Vitamins     2 Complete multivitamins with minerals* (at different times than calcium)   2 tablets at bedtime   Vitamin D   2000 Int. Units Daily     Calcium+D  0550-8588 mg in divided doses (Do not take at same time as multivitamin or iron)  500 mg three times daily or 600 mg twice daily.    Vitamin B12   1000 mcg SL Tablet daily or 1000 mcg SQ injection monthly    For menstruating women:  Iron supplement with vitamin C   1 at bedtime     *Multivitamin should contain at least     18 mg of Iron, 400 mg of Folic Acid,  2 mg of Copper,  1.5 mg of Thiamine.                              Follow-ups after your visit        Your next 10 appointments already scheduled     May 25, 2018 12:30 PM CDT   Diabetic Education with Enedelia Pena   [facility] ([facility])    [address] [place] [address]   [phone]            Jul 20, 2018  8:45 AM CDT   SHORT with Susan Rachele Haase, APRN [facility] ([facility])    [address] [place] [address]  "  [phone]              Future tests that were ordered for you today     Open Future Orders        Priority Expected Expires Ordered    Vitamin D Screen Routine 7/18/2018 11/14/2018 5/18/2018            Who to contact     If you have questions or need follow up information about today's clinic visit or your schedule please contact [facility] directly at [phone].  Normal or non-critical lab and imaging results will be communicated to you by Fanearhart, letter or phone within 4 business days after the clinic has received the results. If you do not hear from us within 7 days, please contact the clinic through JavaJobst or phone. If you have a critical or abnormal lab result, we will notify you by phone as soon as possible.  Submit refill requests through Ticket Cake or call your pharmacy and they will forward the refill request to us. Please allow 3 business days for your refill to be completed.          Additional Information About Your Visit        [employer] Information     Ticket Cake gives you secure access to your electronic health record. If you see a primary care provider, you can also send messages to your care team and make appointments. If you have questions, please call your primary care clinic.  If you do not have a primary care provider, please call [phone] and they will assist you.        Care EveryWhere ID     This is your Care EveryWhere ID. This could be used by other organizations to access your [facility] medical records  [id number]        Your Vitals Were     Pulse Temperature Respirations Height Pulse Oximetry BMI (Body Mass Index)    90 98  F (36.7  C) (Oral) 18 6' 1\" (1.854 m) 96% 48.68 kg/m2       Blood Pressure from Last 3 Encounters:   05/18/18 129/83   05/04/18 132/80   04/18/18 (!) 152/94    Weight from Last 3 Encounters:   05/18/18 (!) 369 lb (167.4 kg)   05/18/18 (!) 369 lb (167.4 kg)   04/18/18 (!) 359 lb (162.8 kg)               Primary Care Provider " Office Phone # Fax #    Susan Rachele Haase, APRN [phone] [address] [facility] [address]        Equal Access to Services     RANULFO REINOSO : Hadii lopez juarez miguel angelo Sozacariasali, waaxda luqadaha, qaybta kaalmada adeegyada, naman sanon laArnolddarío rodgers. So [facility] [phone].    ATENCIÓN: Si habla español, tiene a dumas disposición servicios gratuitos de asistencia lingüística. Llame al [phone].    We comply with applicable federal civil rights laws and [place] laws. We do not discriminate on the basis of race, color, national origin, age, disability, sex, sexual orientation, or gender identity.            Thank you!     Thank you for choosing [facility]  for your care. Our goal is always to provide you with excellent care. Hearing back from our patients is one way we can continue to improve our services. Please take a few minutes to complete the written survey that you may receive in the mail after your visit with us. Thank you!             Your Updated Medication List - Protect others around you: Learn how to safely use, store and throw away your medicines at www.disposemymeds.org.          This list is accurate as of 5/18/18 12:21 PM.  Always use your most recent med list.                   Brand Name Dispense Instructions for use Diagnosis    atorvastatin 40 MG tablet    LIPITOR    90 tablet    Take 1 tablet (40 mg) by mouth daily    Mixed hyperlipidemia       blood glucose monitoring test strip    ONETOUCH VERIO IQ    150 each    Use to test blood sugars 4 times daily or as directed.    Type 2 diabetes mellitus with hyperglycemia, with long-term current use of insulin (H)       continuous blood glucose monitoring sensor     3 each    For use with Freestyle Janie Flash  for continuous monitioring of blood glucose levels. Replace sensor every 10 days.    Type 2 diabetes mellitus with hyperglycemia, with long-term current use of  insulin (H)       dulaglutide 1.5 MG/0.5ML pen    TRULICITY    2 mL    Inject 1.5 mg Subcutaneous every 7 days    Type 2 diabetes mellitus with hyperglycemia, with long-term current use of insulin (H)       FREESTYLE KEILA READER Noemy     1 Device    1 Device as needed    Type 2 diabetes mellitus with hyperglycemia, with long-term current use of insulin (H)       insulin degludec 200 UNIT/ML pen    TRESIBA    30 mL    Inject 130 Units Subcutaneous daily    Type 2 diabetes mellitus with hyperglycemia, with long-term current use of insulin (H)       insulin lispro 100 UNIT/ML injection    HumaLOG KWIKpen    30 mL    INJECT 1 UNIT FOR EVERY 2 GRAMS OF CARBS PLUS CORRECTION OF 1 UNIT FOR EVERY 15 POINTS ABOVE 150.(APPROXIMATELY 100 UNITS PER DAY)    Type 2 diabetes mellitus with hyperglycemia, with long-term current use of insulin (H)       insulin pen needle 31G X 5 MM    B-D U/F    100 each    Use 3 daily as directed. May substitute with another brand if insurance does not cover.    Type 2 diabetes mellitus with hyperglycemia, with long-term current use of insulin (H)       * LORAZEPAM PO      Take 1 mg by mouth 2 times daily        * LORazepam 1 MG tablet    ATIVAN    30 tablet    Take 1 tablet (1 mg) by mouth daily as needed for anxiety    JOEL (generalized anxiety disorder)       losartan 100 MG tablet    COZAAR    90 tablet    Take 1 tablet (100 mg) by mouth daily    Essential hypertension       metFORMIN 500 MG tablet    GLUCOPHAGE    120 tablet    Take 2 tablets (1,000 mg) by mouth 2 times daily (with meals)    Type 2 diabetes mellitus with hyperglycemia, with long-term current use of insulin (H)       ONETOUCH DELICA LANCETS 33G Misc     100 each    1 lancet 4 times daily    Type 2 diabetes mellitus with hyperglycemia, with long-term current use of insulin (H)       SERTRALINE HCL PO      Take 100 mg by mouth daily        traZODone 100 MG tablet    DESYREL    90 tablet    Take 1 tablet (100 mg) by mouth At  Bedtime    JOEL (generalized anxiety disorder)       triamterene-hydrochlorothiazide 75-50 MG per tablet    MAXZIDE    90 tablet    Take 1 tablet by mouth daily    Essential hypertension       * Notice:  This list has 2 medication(s) that are the same as other medications prescribed for you. Read the directions carefully, and ask your doctor or other care provider to review them with you.

## 2018-05-18 NOTE — PATIENT INSTRUCTIONS
Please refer to your task list created today at your appointment.  Make appointment to return to clinic in 1 month to see the dietician.    Bariatric Postoperative Vitamins     2 Complete multivitamins with minerals* (at different times than calcium)   2 tablets at bedtime   Vitamin D   2000 Int. Units Daily     Calcium+D  1963-4980 mg in divided doses (Do not take at same time as multivitamin or iron)  500 mg three times daily or 600 mg twice daily.    Vitamin B12   1000 mcg SL Tablet daily or 1000 mcg SQ injection monthly    For menstruating women:  Iron supplement with vitamin C   1 at bedtime     *Multivitamin should contain at least     18 mg of Iron, 400 mg of Folic Acid,  2 mg of Copper,  1.5 mg of Thiamine.

## 2018-05-22 ENCOUNTER — DOCUMENTATION ONLY (OUTPATIENT)
Dept: ENDOCRINOLOGY | Facility: CLINIC | Age: 33
End: 2018-05-22
Payer: COMMERCIAL

## 2018-05-22 DIAGNOSIS — Z79.4 TYPE 2 DIABETES MELLITUS WITH HYPERGLYCEMIA, WITH LONG-TERM CURRENT USE OF INSULIN (H): Primary | ICD-10-CM

## 2018-05-22 DIAGNOSIS — E11.65 TYPE 2 DIABETES MELLITUS WITH HYPERGLYCEMIA, WITH LONG-TERM CURRENT USE OF INSULIN (H): Primary | ICD-10-CM

## 2018-05-22 PROCEDURE — 95251 CONT GLUC MNTR ANALYSIS I&R: CPT | Performed by: INTERNAL MEDICINE

## 2018-05-22 NOTE — PROGRESS NOTES
"LibrePro Continuous Glucose Monitor Interpretation   CGM Download:  Janie came off after 5 days     Patient comments: reports was \"really sick while wearing the Janie\" - not sure it will be good data, states blood sugars getting better last few days.      Patient History:   1. Type of Diabetes: Type 2 diabetes  2. Duration of diabetes or year of diagnosis: age 30  3. Current treatment regimen:     Diabetes Medication(s)     Biguanides Sig     metFORMIN (GLUCOPHAGE) 500 MG tablet Take 2 tablets (1,000 mg) by mouth 2 times daily (with meals)    Insulin Sig     insulin degludec (TRESIBA) 200 UNIT/ML pen Inject 130 Units Subcutaneous daily     insulin lispro (HUMALOG KWIKPEN) 100 UNIT/ML injection INJECT 1.5 UNIT FOR EVERY 3 GRAMS OF CARBS PLUS CORRECTION OF 1 UNIT FOR EVERY 15 POINTS ABOVE 150.(APPROXIMATELY 100 UNITS PER DAY)    Incretin Mimetic Agents (GLP-1 Receptor Agonists) Sig     dulaglutide (TRULICITY) 1.5 MG/0.5ML pen Inject 1.5 mg Subcutaneous every 7 days         Current Humalog use is 1 unit per 2 gms of carbs (30-40 units per meal)     4. Most recent A1c values:        Lab Results   Component Value Date     A1C 9.0 2018     A1C 8.5 2017     A1C 8.4 07/10/2017      5. Indication/s for LibrePro study: Difficult to manage hypoglycemia and/or hyperglycemia, despite multiple adjustments in self-monitoring of blood glucose and diabetes medication administration and Unexplained fluctuations in glucose values.          Statistics:                         Data evaluation:   Ambulatory Glucose Profile (AGP) shows the followin. Consistent day-to-day patterns noted: pattern of daytime hyperglycemia  2. pattern of nocturnal hyperglycemia.  3. Hypoglycemia: none     Patient's Logbook shows the following:    Carbohydrate counting is: accurate   Medication and/or insulin dosing is: accurate           Education provided today:  AADE Self-Care Behaviors:  Monitoring: testing frequency, benefits of " "personal CGM, provided overview of Janie     Opportunities for ongoing education and support in diabetes-self management were discussed.     Pt verbalized understanding of concepts discussed and recommendations provided today.        Educational and other materials:  Personal Janie patient brochure  Copy of CGM report     Assessment:  pt is now making a better effort to control carb intake, portions, etc. Is now moved into their new home which has made healthy eating easier. While living with sister- there was a lot of junk food in the house which was difficult for him to avoid.   pt has self adjusted Humalog I:C from 1.5 per 3 gms to --> 1 per 2 gms.      BG meter download:       BG's are improving, pt interested in testing more frequently. New OneTouch Verio Flex meter provided at today's visit with rx for testing supplies. Discussed use of OneTouch Reveal Tyrone to help track BG's, carbs, insulin, etc.   Also recommend personal Janie CGM- which could also be used for tracking food, insulin, etc. Pt is very interested in learning more about his BG trends/ patterns and agrees that CGM would be very helpful to him.   Pt requesting to \"hold off on U-500 insulin\", as he feels that he can continue to work with his current treatment plan to improve glycemic control. He is receptive to more frequent follow up.      Plan:  1. Continue the Humalog 1 per 2 grams carbs + correction 1 per 15 above 150 mg/dl. Continue Tresiba 130 units/ day.     2. Start using OneTouch Reveal tyrone. New meter provided today. Also recommend Janie personal CGM- Agree.     3. Continue working on healthy eating. Aim for no more than 75 gms of carbs per meal.       CGM data reviewed.  Diabetic Educator Assessment and plan reviewed.    I agree with CGM data assessment and plan.    Leilani Tavera      "

## 2018-05-24 ENCOUNTER — TELEPHONE (OUTPATIENT)
Dept: NURSING | Facility: CLINIC | Age: 33
End: 2018-05-24

## 2018-05-24 NOTE — TELEPHONE ENCOUNTER
Outreached patient for third and final time and was able to connect.  Unfortunately caught him at a bad time-asked I call next Thursday at 4pm.  Will outreach again then.    Samuel Rossi, Health    5/24/2018    3:45 PM

## 2018-05-25 ENCOUNTER — ALLIED HEALTH/NURSE VISIT (OUTPATIENT)
Dept: EDUCATION SERVICES | Facility: CLINIC | Age: 33
End: 2018-05-25
Payer: COMMERCIAL

## 2018-05-25 DIAGNOSIS — E66.9 DIABETES MELLITUS TYPE 2 IN OBESE: Primary | ICD-10-CM

## 2018-05-25 DIAGNOSIS — E11.69 DIABETES MELLITUS TYPE 2 IN OBESE: Primary | ICD-10-CM

## 2018-05-25 PROCEDURE — G0108 DIAB MANAGE TRN  PER INDIV: HCPCS | Performed by: DIETITIAN, REGISTERED

## 2018-05-25 NOTE — PROGRESS NOTES
Diabetes Self Management Training: Follow-up and Personal Continuous Glucose Monitor Download    Naman Jones presents today for download and report review of patient-owned continuous glucose monitor device related to Type 2 diabetes.    He is accompanied by self    Patient's diabetes management related comments/concerns: has started using personal Janie, which he reports has been very helpful in assessing his BG trends, it has also motivated him to make better food choices as he has noticed the difference in how feels when blood sugars are in target range, has also lost 8#,  is still planning to get a gastric sleeve.    Patient would like this visit to be focused around the following diabetes-related behaviors and goals: Problem Solving    ASSESSMENT:  Patient Problem List reviewed for relevant medical history and current medical status.    Current Diabetes Management per Patient:     Diabetes Medication(s)     Biguanides Sig    metFORMIN (GLUCOPHAGE) 500 MG tablet Take 2 tablets (1,000 mg) by mouth 2 times daily (with meals)    Insulin Sig    insulin degludec (TRESIBA) 200 UNIT/ML pen Inject 130 Units Subcutaneous daily    insulin lispro (HUMALOG KWIKPEN) 100 UNIT/ML injection INJECT 1 UNIT FOR EVERY 2 GRAMS OF CARBS PLUS CORRECTION OF 1 UNIT FOR EVERY 15 POINTS ABOVE 150.(APPROXIMATELY 100 UNITS PER DAY)    Incretin Mimetic Agents (GLP-1 Receptor Agonists) Sig    dulaglutide (TRULICITY) 1.5 MG/0.5ML pen Inject 1.5 mg Subcutaneous every 7 days          Most Recent A1c Result:    Lab Results   Component Value Date    A1C 9.0 2018       Personal Continuous Glucose Monitor Interpretation           Statistics:   1. Sensor data covers 10 days.  2. Average scans and/or glucose check/calibrations per day: 8  3. Summary:                             Data evaluation:    Ambulatory Glucose Profile shows the followin. Consistent day-to-day patterns noted: variability during the night due depending on snacks  in the evening, pt is very aware that he needs to reduce/avoid carbs before going to bed. Discussed replacing current snack of cereal with alternative options unless taking Novolog to cover carbs.   2. Low glucose events:  None     Assessment:  Glycemic control significantly improved since use of personal CGM. Current sensor avg of 182 mg/dl is reduced from previous diagnosticCGM avg of 292 mg/dl (was 8% in target --> now 58% in target).   Was previously 92% above target with 77% above 250 mg/dl, and now 42% above target with 15% above 250 mg/dl.   Medication and/or insulin dosing is: accurate - is using correction dosing as needed, no change in I:C ratio or basal insulin dose.    INTERVENTION:  Commended pt on progress- discussed lifestyle behavior change process at length.      Education provided today on:  AADE Self-Care Behaviors:  Problem Solving: high blood glucose - causes, signs/symptoms, treatment and prevention    CGM-specific education:  [employer] System: reviewing reports, downloading to [employer], assessing trends/ patterns and trouble shooting    Pt verbalized understanding of concepts discussed and recommendations provided today.       Education Materials Provided:  Copy of CGM report    PLAN:  Reduce/avoid carbs at bedtime.  Continue to work on healthier food choices, portion control, and more physical activity.   Continue current treatment plan.    FOLLOW-UP:  Follow-up appointment scheduled 4 weeks.  Chart routed to referring provider.    Enedelia Pena RD, CDE  Diabetes     Time Spent: 60 minutes  Encounter Type: Individual    Any diabetes medication dose changes were made via the CDE Protocol and Collaborative Practice Agreement with the patient's endocrinology provider. A copy of this encounter was shared with the provider.

## 2018-05-25 NOTE — LETTER
5/25/2018         RE: Naman Jones  [address] [facility] [address]        Dear Colleague,    Thank you for referring your patient, Naman Jones, to the [facility] [facility]. Please see a copy of my visit note below.    Diabetes Self Management Training: Follow-up and Personal Continuous Glucose Monitor Download    Naman Jones presents today for download and report review of patient-owned continuous glucose monitor device related to Type 2 diabetes.    He is accompanied by self    Patient's diabetes management related comments/concerns: has started using personal Janie, which he reports has been very helpful in assessing his BG trends, it has also motivated him to make better food choices as he has noticed the difference in how feels when blood sugars are in target range, has also lost 8#,  is still planning to get a gastric sleeve.    Patient would like this visit to be focused around the following diabetes-related behaviors and goals: Problem Solving    ASSESSMENT:  Patient Problem List reviewed for relevant medical history and current medical status.    Current Diabetes Management per Patient:     Diabetes Medication(s)     Biguanides Sig    metFORMIN (GLUCOPHAGE) 500 MG tablet Take 2 tablets (1,000 mg) by mouth 2 times daily (with meals)    Insulin Sig    insulin degludec (TRESIBA) 200 UNIT/ML pen Inject 130 Units Subcutaneous daily    insulin lispro (HUMALOG KWIKPEN) 100 UNIT/ML injection INJECT 1 UNIT FOR EVERY 2 GRAMS OF CARBS PLUS CORRECTION OF 1 UNIT FOR EVERY 15 POINTS ABOVE 150.(APPROXIMATELY 100 UNITS PER DAY)    Incretin Mimetic Agents (GLP-1 Receptor Agonists) Sig    dulaglutide (TRULICITY) 1.5 MG/0.5ML pen Inject 1.5 mg Subcutaneous every 7 days          Most Recent A1c Result:    Lab Results   Component Value Date    A1C 9.0 04/12/2018       Personal Continuous Glucose Monitor Interpretation           Statistics:   1. Sensor data covers 10  days.  2. Average scans and/or glucose check/calibrations per day: 8  3. Summary:                             Data evaluation:    Ambulatory Glucose Profile shows the followin. Consistent day-to-day patterns noted: variability during the night due depending on snacks in the evening, pt is very aware that he needs to reduce/avoid carbs before going to bed. Discussed replacing current snack of cereal with alternative options unless taking Novolog to cover carbs.   2. Low glucose events:  None     Assessment:  Glycemic control significantly improved since use of personal CGM. Current sensor avg of 182 mg/dl is reduced from previous diagnosticCGM avg of 292 mg/dl (was 8% in target --> now 58% in target).   Was previously 92% above target with 77% above 250 mg/dl, and now 42% above target with 15% above 250 mg/dl.   Medication and/or insulin dosing is: accurate - is using correction dosing as needed, no change in I:C ratio or basal insulin dose.    INTERVENTION:  Commended pt on progress- discussed lifestyle behavior change process at length.      Education provided today on:  AADE Self-Care Behaviors:  Problem Solving: high blood glucose - causes, signs/symptoms, treatment and prevention    CGM-specific education:  [employer] System: reviewing reports, downloading to [employer], assessing trends/ patterns and trouble shooting    Pt verbalized understanding of concepts discussed and recommendations provided today.       Education Materials Provided:  Copy of CGM report    PLAN:  Reduce/avoid carbs at bedtime.  Continue to work on healthier food choices, portion control, and more physical activity.   Continue current treatment plan.    FOLLOW-UP:  Follow-up appointment scheduled 4 weeks.  Chart routed to referring provider.    Enedelia Pena RD, CDE  Diabetes     Time Spent: 60 minutes  Encounter Type: Individual    Any diabetes medication dose changes were made via the CDE Protocol and  Collaborative Practice Agreement with the patient's endocrinology provider. A copy of this encounter was shared with the provider.

## 2018-05-25 NOTE — Clinical Note
Hi Dr. Tavera, Just fyi- pt is doing much better with his BG control since using personal Janie. Has also started losing weight. Follow up is planned for 1 month.  Thanks! Enedelia Pena RD, CDE Diabetes

## 2018-05-25 NOTE — MR AVS SNAPSHOT
After Visit Summary   5/25/2018    Naman Jones    MRN: [id number]           Patient Information     Date Of Birth          1985        Visit Information        Provider Department      5/25/2018 12:30 PM Enedelia Pena [facility] [place]        Today's Diagnoses     Diabetes mellitus type 2 in obese (H)    -  1       Follow-ups after your visit        Your next 10 appointments already scheduled     Feliz 15, 2018  9:30 AM CDT   Weight Loss Visit with  Wl Diet 2, RD   [facility] ([facility])    [address]  [facility] [address]   [phone]            Jun 22, 2018 12:30 PM CDT   Diabetic Education with Enedelia Pena   [facility] ([facility])    [address] [place] [address] [phone]            Jul 20, 2018  8:45 AM CDT   SHORT with Susan Rachele Haase, APRN CNP   [facility] ([facility])    [address] [place] [address] [phone]              Who to contact     If you have questions or need follow up information about today's clinic visit or your schedule please contact [facility] directly at [phone].  Normal or non-critical lab and imaging results will be communicated to you by MyChart, letter or phone within 4 business days after the clinic has received the results. If you do not hear from us within 7 days, please contact the clinic through MyChart or phone. If you have a critical or abnormal lab result, we will notify you by phone as soon as possible.  Submit refill requests through [employer] or call your pharmacy and they will forward the refill request to us. Please allow 3 business days for your refill to be completed.          Additional Information About Your Visit        MyChart Information     [employer] gives  you secure access to your electronic health record. If you see a primary care provider, you can also send messages to your care team and make appointments. If you have questions, please call your primary care clinic.  If you do not have a primary care provider, please call [phone] and they will assist you.        Care EveryWhere ID     This is your Care EveryWhere ID. This could be used by other organizations to access your [facility] [place] medical records  [id number]         Blood Pressure from Last 3 Encounters:   05/18/18 129/83   05/04/18 132/80   04/18/18 (!) 152/94    Weight from Last 3 Encounters:   05/18/18 (!) 167.4 kg (369 lb)   05/18/18 (!) 167.4 kg (369 lb)   04/18/18 (!) 162.8 kg (359 lb)              We Performed the Following     DIABETES EDUCATION - Individual  []          Today's Medication Changes          These changes are accurate as of 5/25/18  2:10 PM.  If you have any questions, ask your nurse or doctor.               These medicines have changed or have updated prescriptions.        Dose/Directions    insulin pen needle 32G X 4 MM   This may have changed:    - medication strength  - additional instructions   Used for:  Diabetes mellitus type 2 in obese (H)        Use 3 pen needles daily or as directed.   Quantity:  100 each   Refills:  6            Where to get your medicines      These medications were sent to [facility] [address] [place] [address] [id number] [place] [address] AT [address] [address]     Phone:  [phone]     insulin pen needle 32G X 4 MM                Primary Care Provider Office Phone # Fax #    Susan Rachele Haase, APRN [phone] [address] [facility] [address]        Equal Access to Services     RANULFO REINOSO AH: Lucas Dumont, julio c tay, qausha kaalexandra hill, naman rodgers. So [facility] [phone].    ATENCIÓN: Harinder guzman,  tiene a dumas disposición servicios gratuitos de asistencia lingüística. Abraham camp [phone].    We comply with applicable federal civil rights laws and [place] laws. We do not discriminate on the basis of race, color, national origin, age, disability, sex, sexual orientation, or gender identity.            Thank you!     Thank you for choosing [facility]  for your care. Our goal is always to provide you with excellent care. Hearing back from our patients is one way we can continue to improve our services. Please take a few minutes to complete the written survey that you may receive in the mail after your visit with us. Thank you!             Your Updated Medication List - Protect others around you: Learn how to safely use, store and throw away your medicines at www.disposemymeds.org.          This list is accurate as of 5/25/18  2:10 PM.  Always use your most recent med list.                   Brand Name Dispense Instructions for use Diagnosis    atorvastatin 40 MG tablet    LIPITOR    90 tablet    Take 1 tablet (40 mg) by mouth daily    Mixed hyperlipidemia       blood glucose monitoring test strip    ONETOUCH VERIO IQ    150 each    Use to test blood sugars 4 times daily or as directed.    Type 2 diabetes mellitus with hyperglycemia, with long-term current use of insulin (H)       continuous blood glucose monitoring sensor     3 each    For use with Freestyle Janie Flash  for continuous monitioring of blood glucose levels. Replace sensor every 10 days.    Type 2 diabetes mellitus with hyperglycemia, with long-term current use of insulin (H)       dulaglutide 1.5 MG/0.5ML pen    TRULICITY    2 mL    Inject 1.5 mg Subcutaneous every 7 days    Type 2 diabetes mellitus with hyperglycemia, with long-term current use of insulin (H)       FREESTYLE JANIE READER Noemy     1 Device    1 Device as needed    Type 2 diabetes mellitus with hyperglycemia, with long-term current use of  insulin (H)       insulin degludec 200 UNIT/ML pen    TRESIBA    30 mL    Inject 130 Units Subcutaneous daily    Type 2 diabetes mellitus with hyperglycemia, with long-term current use of insulin (H)       insulin lispro 100 UNIT/ML injection    HumaLOG KWIKpen    30 mL    INJECT 1 UNIT FOR EVERY 2 GRAMS OF CARBS PLUS CORRECTION OF 1 UNIT FOR EVERY 15 POINTS ABOVE 150.(APPROXIMATELY 100 UNITS PER DAY)    Type 2 diabetes mellitus with hyperglycemia, with long-term current use of insulin (H)       insulin pen needle 32G X 4 MM     100 each    Use 3 pen needles daily or as directed.    Diabetes mellitus type 2 in obese (H)       * LORAZEPAM PO      Take 1 mg by mouth 2 times daily        * LORazepam 1 MG tablet    ATIVAN    30 tablet    Take 1 tablet (1 mg) by mouth daily as needed for anxiety    JOEL (generalized anxiety disorder)       losartan 100 MG tablet    COZAAR    90 tablet    Take 1 tablet (100 mg) by mouth daily    Essential hypertension       metFORMIN 500 MG tablet    GLUCOPHAGE    120 tablet    Take 2 tablets (1,000 mg) by mouth 2 times daily (with meals)    Type 2 diabetes mellitus with hyperglycemia, with long-term current use of insulin (H)       ONETOUCH DELICA LANCETS 33G Misc     100 each    1 lancet 4 times daily    Type 2 diabetes mellitus with hyperglycemia, with long-term current use of insulin (H)       SERTRALINE HCL PO      Take 100 mg by mouth daily        traZODone 100 MG tablet    DESYREL    90 tablet    Take 1 tablet (100 mg) by mouth At Bedtime    JOEL (generalized anxiety disorder)       triamterene-hydrochlorothiazide 75-50 MG per tablet    MAXZIDE    90 tablet    Take 1 tablet by mouth daily    Essential hypertension       * Notice:  This list has 2 medication(s) that are the same as other medications prescribed for you. Read the directions carefully, and ask your doctor or other care provider to review them with you.

## 2018-05-31 ENCOUNTER — TELEPHONE (OUTPATIENT)
Dept: NURSING | Facility: CLINIC | Age: 33
End: 2018-05-31

## 2018-05-31 NOTE — TELEPHONE ENCOUNTER
Outreached patient for third and final time today as requested by patient, but wasn't able to connect. Left message for call back.      Samuel Rossi, Health    5/31/2018    4:14 PM

## 2018-06-15 ENCOUNTER — OFFICE VISIT (OUTPATIENT)
Dept: SURGERY | Facility: CLINIC | Age: 33
End: 2018-06-15
Payer: COMMERCIAL

## 2018-06-15 VITALS — BODY MASS INDEX: 48.25 KG/M2 | WEIGHT: 315 LBS

## 2018-06-15 DIAGNOSIS — E66.01 MORBID OBESITY WITH BMI OF 45.0-49.9, ADULT (H): ICD-10-CM

## 2018-06-15 PROCEDURE — 97803 MED NUTRITION INDIV SUBSEQ: CPT | Performed by: DIETITIAN, REGISTERED

## 2018-06-15 NOTE — PROGRESS NOTES
"PRE SURGICAL WEIGHT LOSS NUTRITION APPOINTMENT    Naman Jones  1985  male  [id number]  33 year old    ASSESSMENT    Desired Surgical Procedure: gastric bypass    REASON FOR VISIT:  Naman Jones is a 33 year old year old male presents today for a pre-surgical weight loss follow-up appointment. Patient accompanied by self.    DIAGNOSIS:  Weight Status Obesity Grade III BMI >40    ANTHROPOMETRICS:  Height: 6'1\"  Initial Weight: 369 (restart weight)     Weight last visit: 369 lbs    Current weight: (!) 165.9 kg (365 lb 11.2 oz)  BMI: 48.2  kg/(m^2).    VITAMINS AND MINERALS:  Multivitamin with Minerals  calcium with Vitamin D (not sure of dose) 3 times per day  Vitamin D (unsure of dose)    NUTRITION HISTORY:  Breakfast: protein shake   Lunch: homemade meal-chicken noodle soup; casserole  Supper: grilled protein and vegetables  Snacks: limiting   Fluids Consumed: Water, Crystal Light and Protein Drink  Eating slower: Yes  Chewing foods thoroughly: Yes  Take 20-30 minutes to consume each meal: Yes  Fluids and meals separate by at least 30 minutes: Yes  Carbonation: none for past 2 days  Caffeine: reduced by 50%  Additional Information: Patient states he is very engaged in the process and has helped him lose weight in the past month.  Patient putting fork or spoon down after each bite.  Patient states he is no longer shoveling food.  Patient moved into new house and no longer has \"moving\" as exercise.  Patient bought exercise bike but has not been able put it together yet.    PHYSICAL ACTIVITY:  Type: none -Patient willing to start walking     DIAGNOSIS:  Previous Nutrition Diagnosis: Obesity related to long history of self- monitoring deficit and excessive energy intake evidenced by BMI of 48.8 kg/m2  No change, modified below    Previous goals:   Start calcium and vitamin D-met  Eliminate alcohol and carbonation-met  Reduce caffeine by 50%-met    Current Nutrition Diagnosis: Obesity related to long history " of self-monitoring deficit and excessive energy intake as evidenced by BMI of 48.2 kg/m2.    INTERVENTION:  Nutrition Prescription: Recommended energy/nutrient modification.    GOALS:  Exercise 3 times per week for 20 minutes  Reduce portion sizes at meals  Reduce caffeine by additional 50%     Intervention:  - Discussed progress towards previous goals.  - Reinforced importance of making behavior changes in preparation for bariatric surgery.   - Assessed learning needs and learning preferences     NUTRITION MONITORING AND EVALUATION:  Anticipated compliance: good  Patient demonstrated good understanding.     Follow up: Continue to monitor patient closely regarding weight loss and diet.  # of visits needed: 1-2  Cleared by RD: No     TIME SPENT WITH PATIENT: 25 minutes    Timothy Raymond, RD, LD  [facility] Outpatient Dietitian  [phone] (office phone)

## 2018-06-15 NOTE — MR AVS SNAPSHOT
MRN:[id number]                      After Visit Summary   6/15/2018    Naman Jones    MRN: [id number]           Visit Information        Provider Department      6/15/2018 9:30 AM 2, Sh Arturo Diet, RD [facility]      Your next 10 appointments already scheduled     Jun 22, 2018 12:30 PM CDT   Diabetic Education with Enedelia BAHENA Jean   [facility] [place] ([facility])    [address] [facility] [address] [phone]            Jul 20, 2018  8:45 AM CDT   SHORT with Susan Rachele Haase, APRN [facility] ([facility])    [address] [facility] [address] [phone]            Jul 24, 2018  8:30 AM CDT   Weight Loss Visit with Sh Wl Diet 1, RD   [facility] ([facility])    [address]  [place] [address] [phone]              MyChart Information     [employer] gives you secure access to your electronic health record. If you see a primary care provider, you can also send messages to your care team and make appointments. If you have questions, please call your primary care clinic.  If you do not have a primary care provider, please call [phone] and they will assist you.        Care EveryWhere ID     This is your Care EveryWhere ID. This could be used by other organizations to access your [place] medical records  [id number]        Equal Access to Services     RANULFO REINOSO : Hadii aad ku hadasho Soomaali, waaxda luqadaha, qaybta kaalmada adeegyada, waxtanner forrest haydarío rodgers. So [facility] [phone].    ATENCIÓN: Si habla español, tiene a dumas disposición servicios gratuitos de asistencia lingüística. Llame al [phone].    We comply with applicable federal civil rights laws and [place] laws. We do not  discriminate on the basis of race, color, national origin, age, disability, sex, sexual orientation, or gender identity.

## 2018-06-27 ENCOUNTER — MYC REFILL (OUTPATIENT)
Dept: FAMILY MEDICINE | Facility: CLINIC | Age: 33
End: 2018-06-27

## 2018-06-27 ENCOUNTER — MYC MEDICAL ADVICE (OUTPATIENT)
Dept: FAMILY MEDICINE | Facility: CLINIC | Age: 33
End: 2018-06-27

## 2018-06-27 DIAGNOSIS — F41.1 GAD (GENERALIZED ANXIETY DISORDER): ICD-10-CM

## 2018-06-27 RX ORDER — LORAZEPAM 1 MG/1
1 TABLET ORAL DAILY PRN
Qty: 30 TABLET | Refills: 0 | Status: CANCELLED | OUTPATIENT
Start: 2018-06-27

## 2018-06-27 NOTE — TELEPHONE ENCOUNTER
Message from Rebel Monkeyt:  Original authorizing provider: Susan Haase, APRN CNP [facility] would like a refill of the following medications:  LORazepam (ATIVAN) 1 MG tablet [Susan Haase, APRN CNP]    Preferred pharmacy: [facility] [address] [place] [address] AT [facility] [address]    Comment:  Need refill almost out. Used for anxiety at night and acute anxiety episodes.

## 2018-06-27 NOTE — TELEPHONE ENCOUNTER
Sent MiCursadahart response,  only shows SH and 4/18/18 listed  Enedelia Goetz RN, BSN  Message handled by Nurse Triage.

## 2018-06-27 NOTE — TELEPHONE ENCOUNTER
Routing refill request to provider to review approval because:  Drug not on the [facility], [facility] or [facility] refill protocol or controlled substance    See Mychart request, pt using q, inform pt when final               Requested Prescriptions   Pending Prescriptions Disp Refills     LORazepam (ATIVAN) 1 MG tablet 30 tablet 2     Sig: Take 1 tablet (1 mg) by mouth daily as needed for anxiety    There is no refill protocol information for this order        Controlled Substance Refill Request for Lorazepam  Problem List Complete:  No     PROVIDER TO CONSIDER COMPLETION OF PROBLEM LIST AND OVERVIEW/CONTROLLED SUBSTANCE AGREEMENT    Last Written Prescription Date:  4/18/18  Last Fill Quantity: 30,   # refills: 2    Last Office Visit with [facility] primary care provider: 4/18/18    Future Office visit:   Next 5 appointments (look out 90 days)     Jul 20, 2018  8:45 AM CDT   SHORT with Susan Rachele Haase, APRN [facility] ([facility])    [address] [place] [address] [phone]                  Controlled substance agreement on file: No.     Processing:  Fax Rx to [facility]'s pharmacy   checked in past 3 months?  Yes 6/27/18     Enedelia Goetz, RN, BSN  Message handled by Nurse Triage.'

## 2018-06-27 NOTE — TELEPHONE ENCOUNTER
I refilled that for him when he established care, I would like him to set up an appt to discuss other sleep options,  at his last appt in 4/2018 I wanted him to follow up and also asked him to try trazodone. So no refills, will need an appt to discuss other options.  Thanks,  Susan Haase, CNP

## 2018-06-28 ENCOUNTER — TRANSFERRED RECORDS (OUTPATIENT)
Dept: HEALTH INFORMATION MANAGEMENT | Facility: CLINIC | Age: 33
End: 2018-06-28

## 2018-06-29 ENCOUNTER — OFFICE VISIT (OUTPATIENT)
Dept: FAMILY MEDICINE | Facility: CLINIC | Age: 33
End: 2018-06-29
Payer: COMMERCIAL

## 2018-06-29 VITALS
HEART RATE: 114 BPM | RESPIRATION RATE: 16 BRPM | DIASTOLIC BLOOD PRESSURE: 86 MMHG | SYSTOLIC BLOOD PRESSURE: 138 MMHG | BODY MASS INDEX: 47.1 KG/M2 | TEMPERATURE: 98.5 F | OXYGEN SATURATION: 98 % | WEIGHT: 315 LBS

## 2018-06-29 DIAGNOSIS — G47.00 INSOMNIA, UNSPECIFIED TYPE: Primary | ICD-10-CM

## 2018-06-29 PROCEDURE — 99213 OFFICE O/P EST LOW 20 MIN: CPT | Performed by: NURSE PRACTITIONER

## 2018-06-29 NOTE — PROGRESS NOTES
SUBJECTIVE:   Naman Jones is a 33 year old male who presents to clinic today for the following health issues:    Insomnia      Duration: 15 years    Description  Frequency of insomnia:  nightly  Time to fall asleep: 1.5 hours  Middle of night awakening:  several times a week  Early morning awakening:  several times a week    Accompanying signs and symptoms:  none    History  Similar episodes in past:  YES  Previous evaluation/sleep study:  YES    Precipitating or alleviating factors:  New stressful situation: no   Caffeine intake after lunchtime: no   OTC decongestants: no   Any new medications: no     Therapies tried and outcome: Benadryl  and lorazepam -     Insomnia: Naman reports that he has been struggling falling asleep, which he thinks this is mostly due to his anxiety. He reports that once he falls asleep he stays asleep, and he had a sleep study done two years ago which was normal. He has tried several different things to help him sleep including exercise, melatonin, benadryl, trazodone and lorazepam. He had some success with benadryl and trazodone, but didn't like the side effects,felt drowsy in the morning after taking trazodone.      Problem list and histories reviewed & adjusted, as indicated.  Additional history: as documented    Reviewed and updated as needed this visit by clinical staff  Tobacco  Med Hx  Surg Hx  Fam Hx  Soc Hx      Reviewed and updated as needed this visit by Provider       ROS:  Constitutional and psych systems are negative, except as otherwise noted.    This document serves as a record of the services and decisions personally performed and made by Susan Haase, CNP. It was created on her behalf by Mario Godwin, a trained medical scribe. The creation of this document is based the provider's statements to the medical scribe.  Mario Godwin June 29, 2018 2:59 PM      OBJECTIVE:   /86 (BP Location: Right arm, Patient Position: Chair, Cuff Size: Adult Large)  Pulse 114   Temp 98.5  F (36.9  C) (Oral)  Resp 16  Wt (!) 161.9 kg (357 lb)  SpO2 98%  BMI 47.1 kg/m2  Body mass index is 47.1 kg/(m^2).  GENERAL: healthy, alert and no distress  PSYCH: mentation appears normal, affect normal/bright    ASSESSMENT/PLAN:     Naman was seen today for insomnia.    Diagnoses and all orders for this visit:    Insomnia, unspecified type: sleep hygiene techniques discussed,will try amitriptyline 25 mg at bedtime, may increase to 50 mg if needed.   -     amitriptyline (ELAVIL) 25 MG tablet; Take 25-50 mg prior to bed.  Follow up in 1 month with endocrinology, in 6 months with PCP, sooner as needed.   The information in this document, created by the medical scribe for me, accurately reflects the services I personally performed and the decisions made by me. I have reviewed and approved this document for accuracy.   Susan Haase, CNP Susan Haase, APRN CNP  [facility]

## 2018-06-29 NOTE — MR AVS SNAPSHOT
After Visit Summary   6/29/2018    Naman Jones    MRN: [id number]           Patient Information     Date Of Birth          1985        Visit Information        Provider Department      6/29/2018 3:00 PM Haase, Susan Rachele, APRN CNP [facility]        Today's Diagnoses     Insomnia, unspecified type    -  1       Follow-ups after your visit        Follow-up notes from your care team     Return in about 3 months (around 9/29/2018).      Your next 10 appointments already scheduled     Jul 20, 2018  8:45 AM CDT   SHORT with Susan Rachele Haase, APRN CNP   [facility] ([facility])    [address] [facility] [address] [phone]            Jul 24, 2018  8:30 AM CDT   Weight Loss Visit with Sh Wl Diet 1, RD   [facility] ([facility])    [address] [place] [address] [phone]              Who to contact     If you have questions or need follow up information about today's clinic visit or your schedule please contact [facility] directly at [phone].  Normal or non-critical lab and imaging results will be communicated to you by MyChart, letter or phone within 4 business days after the clinic has received the results. If you do not hear from us within 7 days, please contact the clinic through MyChart or phone. If you have a critical or abnormal lab result, we will notify you by phone as soon as possible.  Submit refill requests through [employer] or call your pharmacy and they will forward the refill request to us. Please allow 3 business days for your refill to be completed.          Additional Information About Your Visit        [employer] Information     [employer] gives you secure access to your electronic health record. If you see a primary care provider, you can also send messages to your care team and make  appointments. If you have questions, please call your primary care clinic.  If you do not have a primary care provider, please call [phone] and they will assist you.        Care EveryWhere ID     This is your Care EveryWhere ID. This could be used by other organizations to access your [place] medical records  [id number]        Your Vitals Were     Pulse Temperature Respirations Pulse Oximetry BMI (Body Mass Index)       114 98.5  F (36.9  C) (Oral) 16 98% 47.1 kg/m2        Blood Pressure from Last 3 Encounters:   06/29/18 138/86   05/18/18 129/83   05/04/18 132/80    Weight from Last 3 Encounters:   06/29/18 (!) 357 lb (161.9 kg)   06/15/18 (!) 365 lb 11.2 oz (165.9 kg)   05/18/18 (!) 369 lb (167.4 kg)              Today, you had the following     No orders found for display         Today's Medication Changes          These changes are accurate as of 6/29/18  3:12 PM.  If you have any questions, ask your nurse or doctor.               Start taking these medicines.        Dose/Directions    amitriptyline 25 MG tablet   Commonly known as:  ELAVIL   Used for:  Insomnia, unspecified type   Started by:  Haase, Susan Rachele, APRN CNP        Take 25-50 mg prior to bed.   Quantity:  60 tablet   Refills:  1         These medicines have changed or have updated prescriptions.        Dose/Directions    LORazepam 1 MG tablet   Commonly known as:  ATIVAN   This may have changed:  Another medication with the same name was removed. Continue taking this medication, and follow the directions you see here.   Used for:  JOEL (generalized anxiety disorder)   Changed by:  Haase, Susan Rachele, APRN CNP        Dose:  1 mg   Take 1 tablet (1 mg) by mouth daily as needed for anxiety   Quantity:  30 tablet   Refills:  2         Stop taking these medicines if you haven't already. Please contact your care team if you have questions.     traZODone 100 MG tablet   Commonly known as:  DESYREL   Stopped by:  Haase, Susan Rachele, APRN CNP                 Where to get your medicines      These medications were sent to [id number] - [place], [place] - [address]  [address], [facility] [address]     Phone:  [phone]     amitriptyline 25 MG tablet                Primary Care Provider Office Phone # Fax #    Susan Rachele Haase, APRN -[phone]       [address]  [facility] [address]        Equal Access to Services     RANULFO REINOSO : Hadii aad ku hadasho Soomaali, waaxda luqadaha, qaybta kaalmada adeegyada, waxay idiin hayaan adeeg kharash laglenys . So [facility] [phone].    ATENCIÓN: Si habla español, tiene a dumas disposición servicios gratuitos de asistencia lingüística. Llame al [phone].    We comply with applicable federal civil rights laws and [place] laws. We do not discriminate on the basis of race, color, national origin, age, disability, sex, sexual orientation, or gender identity.            Thank you!     Thank you for choosing [facility]  for your care. Our goal is always to provide you with excellent care. Hearing back from our patients is one way we can continue to improve our services. Please take a few minutes to complete the written survey that you may receive in the mail after your visit with us. Thank you!             Your Updated Medication List - Protect others around you: Learn how to safely use, store and throw away your medicines at www.disposemymeds.org.          This list is accurate as of 6/29/18  3:12 PM.  Always use your most recent med list.                   Brand Name Dispense Instructions for use Diagnosis    amitriptyline 25 MG tablet    ELAVIL    60 tablet    Take 25-50 mg prior to bed.    Insomnia, unspecified type       atorvastatin 40 MG tablet    LIPITOR    90 tablet    Take 1 tablet (40 mg) by mouth daily    Mixed hyperlipidemia       blood glucose monitoring test strip    ONETOUCH VERIO IQ    150  each    Use to test blood sugars 4 times daily or as directed.    Type 2 diabetes mellitus with hyperglycemia, with long-term current use of insulin (H)       continuous blood glucose monitoring sensor     3 each    For use with Freestyle Janie Flash  for continuous monitioring of blood glucose levels. Replace sensor every 10 days.    Type 2 diabetes mellitus with hyperglycemia, with long-term current use of insulin (H)       dulaglutide 1.5 MG/0.5ML pen    TRULICITY    2 mL    Inject 1.5 mg Subcutaneous every 7 days    Type 2 diabetes mellitus with hyperglycemia, with long-term current use of insulin (H)       FREESTYLE JANIE READER Noemy     1 Device    1 Device as needed    Type 2 diabetes mellitus with hyperglycemia, with long-term current use of insulin (H)       insulin degludec 200 UNIT/ML pen    TRESIBA    30 mL    Inject 130 Units Subcutaneous daily    Type 2 diabetes mellitus with hyperglycemia, with long-term current use of insulin (H)       insulin lispro 100 UNIT/ML injection    HumaLOG KWIKpen    30 mL    INJECT 1 UNIT FOR EVERY 2 GRAMS OF CARBS PLUS CORRECTION OF 1 UNIT FOR EVERY 15 POINTS ABOVE 150.(APPROXIMATELY 100 UNITS PER DAY)    Type 2 diabetes mellitus with hyperglycemia, with long-term current use of insulin (H)       insulin pen needle 32G X 4 MM     100 each    Use 3 pen needles daily or as directed.    Diabetes mellitus type 2 in obese (H)       LORazepam 1 MG tablet    ATIVAN    30 tablet    Take 1 tablet (1 mg) by mouth daily as needed for anxiety    JOEL (generalized anxiety disorder)       losartan 100 MG tablet    COZAAR    90 tablet    Take 1 tablet (100 mg) by mouth daily    Essential hypertension       metFORMIN 500 MG tablet    GLUCOPHAGE    120 tablet    Take 2 tablets (1,000 mg) by mouth 2 times daily (with meals)    Type 2 diabetes mellitus with hyperglycemia, with long-term current use of insulin (H)       ONETOUCH DELICA LANCETS 33G Misc     100 each    1 lancet 4 times  daily    Type 2 diabetes mellitus with hyperglycemia, with long-term current use of insulin (H)       SERTRALINE HCL PO      Take 100 mg by mouth daily        triamterene-hydrochlorothiazide 75-50 MG per tablet    MAXZIDE    90 tablet    Take 1 tablet by mouth daily    Essential hypertension

## 2018-07-05 ENCOUNTER — TELEPHONE (OUTPATIENT)
Dept: ENDOCRINOLOGY | Facility: CLINIC | Age: 33
End: 2018-07-05

## 2018-07-05 NOTE — TELEPHONE ENCOUNTER
Panel Management Review      Patient has the following on his problem list:     Diabetes    ASA: Not Required     Last A1C  Lab Results   Component Value Date    A1C 9.0 04/12/2018    A1C 8.5 07/26/2017    A1C 8.4 07/10/2017    A1C 10.4 05/15/2017    A1C 9.4 03/13/2017     A1C tested: FAILED    Last LDL:    Lab Results   Component Value Date    CHOL 171 03/13/2017     Lab Results   Component Value Date    HDL 37 03/13/2017     Lab Results   Component Value Date     07/10/2017    LDL 97 03/13/2017     Lab Results   Component Value Date    TRIG 183 03/13/2017     No results found for: CHOLHDLRATIO  Lab Results   Component Value Date    NHDL 134 03/13/2017       Is the patient on a Statin? YES             Is the patient on Aspirin? NO    Medications     HMG CoA Reductase Inhibitors    atorvastatin (LIPITOR) 40 MG tablet          Last three blood pressure readings:  BP Readings from Last 3 Encounters:   06/29/18 138/86   05/18/18 129/83   05/04/18 132/80       Date of last diabetes office visit: 4/17/18     Tobacco History:     History   Smoking Status     Never Smoker   Smokeless Tobacco     Never Used         Hypertension   Last three blood pressure readings:  BP Readings from Last 3 Encounters:   06/29/18 138/86   05/18/18 129/83   05/04/18 132/80     Blood pressure: Passed    HTN Guidelines:  Age 18-59 BP range:  Less than 140/90  Age 60-85 with Diabetes:  Less than 140/90  Age 60-85 without Diabetes:  less than 150/90      Composite cancer screening  Chart review shows that this patient is due/due soon for the following None  Summary:    Patient is due/failing the following:   FOLLOW UP    Action needed:   Patient needs office visit for endo.    Type of outreach:    Sent letter.    Questions for provider review:    None                                                                                                                                    oBnnie Shukla CMA ([facility])       Chart routed to closed  .

## 2018-07-05 NOTE — LETTER
July 5, 2018    Naman Jones  [address] [address] [facility] [address]    Dear Naman,  This letter is being sent on behalf of Dr. Tavera. It is to remind you that your provider expects you to return for a follow up clinic visit. Please make a lab only appointment, and then follow up with a clinic visit one week afterwards to review those results. I     You may call our office at [phone] ([place]) or [phone] ([place]) to schedule an appointment.     If you have already scheduled these appointments, please disregard this notice.      Sincerely,    [facility],  Dr. Leilani Tavera

## 2018-07-20 ENCOUNTER — OFFICE VISIT (OUTPATIENT)
Dept: FAMILY MEDICINE | Facility: CLINIC | Age: 33
End: 2018-07-20
Payer: COMMERCIAL

## 2018-07-20 ENCOUNTER — MYC REFILL (OUTPATIENT)
Dept: ENDOCRINOLOGY | Facility: CLINIC | Age: 33
End: 2018-07-20

## 2018-07-20 VITALS
TEMPERATURE: 98.4 F | OXYGEN SATURATION: 100 % | DIASTOLIC BLOOD PRESSURE: 80 MMHG | BODY MASS INDEX: 47.89 KG/M2 | HEART RATE: 98 BPM | RESPIRATION RATE: 14 BRPM | WEIGHT: 315 LBS | SYSTOLIC BLOOD PRESSURE: 130 MMHG

## 2018-07-20 DIAGNOSIS — E11.65 TYPE 2 DIABETES MELLITUS WITH HYPERGLYCEMIA, WITH LONG-TERM CURRENT USE OF INSULIN (H): ICD-10-CM

## 2018-07-20 DIAGNOSIS — G47.00 INSOMNIA, UNSPECIFIED TYPE: ICD-10-CM

## 2018-07-20 DIAGNOSIS — Z79.4 TYPE 2 DIABETES MELLITUS WITH HYPERGLYCEMIA, WITH LONG-TERM CURRENT USE OF INSULIN (H): ICD-10-CM

## 2018-07-20 DIAGNOSIS — F41.1 GAD (GENERALIZED ANXIETY DISORDER): Primary | ICD-10-CM

## 2018-07-20 PROCEDURE — 99213 OFFICE O/P EST LOW 20 MIN: CPT | Performed by: NURSE PRACTITIONER

## 2018-07-20 RX ORDER — SERTRALINE HYDROCHLORIDE 100 MG/1
100 TABLET, FILM COATED ORAL DAILY
Qty: 90 TABLET | Refills: 1 | Status: SHIPPED | OUTPATIENT
Start: 2018-07-20 | End: 2018-12-25

## 2018-07-20 ASSESSMENT — ANXIETY QUESTIONNAIRES
7. FEELING AFRAID AS IF SOMETHING AWFUL MIGHT HAPPEN: NOT AT ALL
GAD7 TOTAL SCORE: 4
3. WORRYING TOO MUCH ABOUT DIFFERENT THINGS: NOT AT ALL
5. BEING SO RESTLESS THAT IT IS HARD TO SIT STILL: SEVERAL DAYS
6. BECOMING EASILY ANNOYED OR IRRITABLE: SEVERAL DAYS
IF YOU CHECKED OFF ANY PROBLEMS ON THIS QUESTIONNAIRE, HOW DIFFICULT HAVE THESE PROBLEMS MADE IT FOR YOU TO DO YOUR WORK, TAKE CARE OF THINGS AT HOME, OR GET ALONG WITH OTHER PEOPLE: SOMEWHAT DIFFICULT
2. NOT BEING ABLE TO STOP OR CONTROL WORRYING: NOT AT ALL
1. FEELING NERVOUS, ANXIOUS, OR ON EDGE: SEVERAL DAYS

## 2018-07-20 ASSESSMENT — PATIENT HEALTH QUESTIONNAIRE - PHQ9: 5. POOR APPETITE OR OVEREATING: SEVERAL DAYS

## 2018-07-20 NOTE — MR AVS SNAPSHOT
After Visit Summary   7/20/2018    Naman Jones    MRN: [id number]           Patient Information     Date Of Birth          1985        Visit Information        Provider Department      7/20/2018 8:45 AM Haase, Susan Rachele, APRN CNP [facility]        Today's Diagnoses     JOEL (generalized anxiety disorder)    -  1       Follow-ups after your visit        Follow-up notes from your care team     Return in about 6 months (around 1/20/2019) for Routine Visit.      Your next 10 appointments already scheduled     Jul 24, 2018  8:30 AM CDT   Weight Loss Visit with Adele Morris Diet 1, RD   [facility] [place] ([facility])    [address] [place] [id number] [phone]            Jul 26, 2018  7:00 AM CDT   Return Visit with ARDEN Henderson CNP   [facility] ([facility])    [address]  [facility] [address] [phone]              Who to contact     If you have questions or need follow up information about today's clinic visit or your schedule please contact [facility] directly at [phone].  Normal or non-critical lab and imaging results will be communicated to you by MyChart, letter or phone within 4 business days after the clinic has received the results. If you do not hear from us within 7 days, please contact the clinic through MyChart or phone. If you have a critical or abnormal lab result, we will notify you by phone as soon as possible.  Submit refill requests through [employer] or call your pharmacy and they will forward the refill request to us. Please allow 3 business days for your refill to be completed.          Additional Information About Your Visit        [employer] Information     [employer] gives you secure access to your electronic health record. If you see a primary care provider, you can also send messages to your  care team and make appointments. If you have questions, please call your primary care clinic.  If you do not have a primary care provider, please call [phone] and they will assist you.        Care EveryWhere ID     This is your Care EveryWhere ID. This could be used by other organizations to access your [place] medical records  [id number]        Your Vitals Were     Pulse Temperature Respirations Pulse Oximetry BMI (Body Mass Index)       98 98.4  F (36.9  C) (Oral) 14 100% 47.89 kg/m2        Blood Pressure from Last 3 Encounters:   07/20/18 130/80   06/29/18 138/86   05/18/18 129/83    Weight from Last 3 Encounters:   07/20/18 (!) 363 lb (164.7 kg)   06/29/18 (!) 357 lb (161.9 kg)   06/15/18 (!) 365 lb 11.2 oz (165.9 kg)              Today, you had the following     No orders found for display         Today's Medication Changes          These changes are accurate as of 7/20/18  9:00 AM.  If you have any questions, ask your nurse or doctor.               Start taking these medicines.        Dose/Directions    sertraline 100 MG tablet   Commonly known as:  ZOLOFT   Used for:  JOEL (generalized anxiety disorder)   Started by:  Haase, Susan Rachele, APRN CNP        Dose:  100 mg   Take 1 tablet (100 mg) by mouth daily   Quantity:  90 tablet   Refills:  1            Where to get your medicines      These medications were sent to [id number] - [facility] [address] [place], [place] [address]  [address], [facility] [address]     Phone:  [phone]     sertraline 100 MG tablet                Primary Care Provider Office Phone # Fax #    Susan Rachele Haase, APRN -[phone] [address]  [facility] [address]        Equal Access to Services     RANULFO REINOSO AH: Lucas Dumont, wamunira luqadaha, qaybta kaalmanaman portillo. [facility] [phone].    ATENCIÓN: Si jose guadalupe [place]  disposición servicios gratuitos de asistencia lingüística. [place] [phone].    We comply with applicable federal civil rights laws and [place] laws. We do not discriminate on the basis of race, color, national origin, age, disability, sex, sexual orientation, or gender identity.            Thank you!     Thank you for choosing [facility]  for your care. Our goal is always to provide you with excellent care. Hearing back from our patients is one way we can continue to improve our services. Please take a few minutes to complete the written survey that you may receive in the mail after your visit with us. Thank you!             Your Updated Medication List - Protect others around you: Learn how to safely use, store and throw away your medicines at www.disposemymeds.org.          This list is accurate as of 7/20/18  9:00 AM.  Always use your most recent med list.                   Brand Name Dispense Instructions for use Diagnosis    amitriptyline 25 MG tablet    ELAVIL    60 tablet    Take 25-50 mg prior to bed.    Insomnia, unspecified type       atorvastatin 40 MG tablet    LIPITOR    90 tablet    Take 1 tablet (40 mg) by mouth daily    Mixed hyperlipidemia       blood glucose monitoring test strip    ONETOUCH VERIO IQ    150 each    Use to test blood sugars 4 times daily or as directed.    Type 2 diabetes mellitus with hyperglycemia, with long-term current use of insulin (H)       continuous blood glucose monitoring sensor     3 each    For use with Freestyle Janie Flash  for continuous monitioring of blood glucose levels. Replace sensor every 10 days.    Type 2 diabetes mellitus with hyperglycemia, with long-term current use of insulin (H)       dulaglutide 1.5 MG/0.5ML pen    TRULICITY    2 mL    Inject 1.5 mg Subcutaneous every 7 days    Type 2 diabetes mellitus with hyperglycemia, with long-term current use of insulin (H)       FREESTYLE JANIE READER Noemy     1 Device    1  Device as needed    Type 2 diabetes mellitus with hyperglycemia, with long-term current use of insulin (H)       insulin degludec 200 UNIT/ML pen    TRESIBA    30 mL    Inject 130 Units Subcutaneous daily    Type 2 diabetes mellitus with hyperglycemia, with long-term current use of insulin (H)       insulin lispro 100 UNIT/ML injection    HumaLOG KWIKpen    30 mL    INJECT 1 UNIT FOR EVERY 2 GRAMS OF CARBS PLUS CORRECTION OF 1 UNIT FOR EVERY 15 POINTS ABOVE 150.(APPROXIMATELY 100 UNITS PER DAY)    Type 2 diabetes mellitus with hyperglycemia, with long-term current use of insulin (H)       insulin pen needle 32G X 4 MM     100 each    Use 3 pen needles daily or as directed.    Diabetes mellitus type 2 in obese (H)       LORazepam 1 MG tablet    ATIVAN    30 tablet    Take 1 tablet (1 mg) by mouth daily as needed for anxiety    JOEL (generalized anxiety disorder)       losartan 100 MG tablet    COZAAR    90 tablet    Take 1 tablet (100 mg) by mouth daily    Essential hypertension       metFORMIN 500 MG tablet    GLUCOPHAGE    120 tablet    Take 2 tablets (1,000 mg) by mouth 2 times daily (with meals)    Type 2 diabetes mellitus with hyperglycemia, with long-term current use of insulin (H)       ONETOUCH DELICA LANCETS 33G Misc     100 each    1 lancet 4 times daily    Type 2 diabetes mellitus with hyperglycemia, with long-term current use of insulin (H)       sertraline 100 MG tablet    ZOLOFT    90 tablet    Take 1 tablet (100 mg) by mouth daily    JOEL (generalized anxiety disorder)       triamterene-hydrochlorothiazide 75-50 MG per tablet    MAXZIDE    90 tablet    Take 1 tablet by mouth daily    Essential hypertension

## 2018-07-20 NOTE — PROGRESS NOTES
SUBJECTIVE:   Naman Jones is a 33 year old male who presents to clinic today for the following health issues:    Anxiety Follow-Up    Status since last visit: No change    Other associated symptoms:None    Complicating factors:   Significant life event: No   Current substance abuse: None  Depression symptoms: Yes  JOEL-7 SCORE 2/2/2018   Total Score 4   JOEL-7    Amount of exercise or physical activity: 2-3 days/week for an average of 15-30 minutes    Problems taking medications regularly: No    Medication side effects: none    Diet: diabetic and carbohydrate counting  Getting 6-7 hours of sleep per night, takes about 1 hour to fall asleep.  Waking up once per night.  Taking amitriptyline 25 mg every night, does not feel groggy in the morning.    Also taking sertraline 100 mg every day, anxiety under good control.      Problem list and histories reviewed & adjusted, as indicated.  Additional history: as documented      Reviewed and updated as needed this visit by clinical staff       Reviewed and updated as needed this visit by Provider         ROS:  CONSTITUTIONAL: NEGATIVE for fever, chills, change in weight  RESP: NEGATIVE for significant cough or SOB  CV: NEGATIVE for chest pain, palpitations or peripheral edema  PSYCHIATRIC: NEGATIVE for changes in mood or affect    OBJECTIVE:     /80 (BP Location: Right arm, Patient Position: Chair, Cuff Size: Adult Large)  Pulse 98  Temp 98.4  F (36.9  C) (Oral)  Resp 14  Wt (!) 363 lb (164.7 kg)  SpO2 100%  BMI 47.89 kg/m2  Body mass index is 47.89 kg/(m^2).   GENERAL: healthy, alert and no distress  RESP: lungs clear to auscultation - no rales, rhonchi or wheezes  CV: regular rate and rhythm, normal S1 S2, no S3 or S4, no murmur, click or rub, no peripheral edema  PSYCH: mentation appears normal, affect normal/bright    ASSESSMENT:   See below    PLAN:   Naman was seen today for recheck medication.    Diagnoses and all orders for this visit:    JOEL  (generalized anxiety disorder)  -     sertraline (ZOLOFT) 100 MG tablet; Take 1 tablet (100 mg) by mouth daily    Insomnia:  Taking amitriptyline 25 mg at bedtime with adequate control.     FUTURE APPOINTMENTS:       - Follow-up visit in 6 months, sooner as needed.     Susan Haase, APRN [facility]

## 2018-07-21 ASSESSMENT — ANXIETY QUESTIONNAIRES: GAD7 TOTAL SCORE: 4

## 2018-07-21 ASSESSMENT — PATIENT HEALTH QUESTIONNAIRE - PHQ9: SUM OF ALL RESPONSES TO PHQ QUESTIONS 1-9: 3

## 2018-07-26 ENCOUNTER — OFFICE VISIT (OUTPATIENT)
Dept: ENDOCRINOLOGY | Facility: CLINIC | Age: 33
End: 2018-07-26
Payer: COMMERCIAL

## 2018-07-26 VITALS
TEMPERATURE: 98.5 F | WEIGHT: 315 LBS | BODY MASS INDEX: 48.1 KG/M2 | DIASTOLIC BLOOD PRESSURE: 85 MMHG | SYSTOLIC BLOOD PRESSURE: 126 MMHG | HEART RATE: 112 BPM

## 2018-07-26 DIAGNOSIS — Z79.4 TYPE 2 DIABETES MELLITUS WITH HYPERGLYCEMIA, WITH LONG-TERM CURRENT USE OF INSULIN (H): Primary | ICD-10-CM

## 2018-07-26 DIAGNOSIS — E11.65 TYPE 2 DIABETES MELLITUS WITH HYPERGLYCEMIA, WITH LONG-TERM CURRENT USE OF INSULIN (H): Primary | ICD-10-CM

## 2018-07-26 DIAGNOSIS — Z23 NEED FOR VACCINATION: ICD-10-CM

## 2018-07-26 DIAGNOSIS — I10 ESSENTIAL HYPERTENSION: ICD-10-CM

## 2018-07-26 DIAGNOSIS — E78.2 MIXED HYPERLIPIDEMIA: ICD-10-CM

## 2018-07-26 LAB
ALBUMIN SERPL-MCNC: 3.8 G/DL (ref 3.4–5)
ALP SERPL-CCNC: 102 U/L (ref 40–150)
ALT SERPL W P-5'-P-CCNC: 58 U/L (ref 0–70)
ANION GAP SERPL CALCULATED.3IONS-SCNC: 12 MMOL/L (ref 3–14)
AST SERPL W P-5'-P-CCNC: 37 U/L (ref 0–45)
BILIRUB SERPL-MCNC: 0.5 MG/DL (ref 0.2–1.3)
BUN SERPL-MCNC: 13 MG/DL (ref 7–30)
CALCIUM SERPL-MCNC: 9 MG/DL (ref 8.5–10.1)
CHLORIDE SERPL-SCNC: 102 MMOL/L (ref 94–109)
CHOLEST SERPL-MCNC: 175 MG/DL
CO2 SERPL-SCNC: 22 MMOL/L (ref 20–32)
CREAT SERPL-MCNC: 0.5 MG/DL (ref 0.66–1.25)
CREAT UR-MCNC: 63 MG/DL
GFR SERPL CREATININE-BSD FRML MDRD: >90 ML/MIN/1.7M2
GLUCOSE SERPL-MCNC: 265 MG/DL (ref 70–99)
HBA1C MFR BLD: 7.7 % (ref 0–5.6)
HDLC SERPL-MCNC: 34 MG/DL
LDLC SERPL CALC-MCNC: ABNORMAL MG/DL
LDLC SERPL DIRECT ASSAY-MCNC: 108 MG/DL
MICROALBUMIN UR-MCNC: 7 MG/L
MICROALBUMIN/CREAT UR: 10.85 MG/G CR (ref 0–17)
NONHDLC SERPL-MCNC: 141 MG/DL
POTASSIUM SERPL-SCNC: 3.4 MMOL/L (ref 3.4–5.3)
PROT SERPL-MCNC: 7.5 G/DL (ref 6.8–8.8)
SODIUM SERPL-SCNC: 136 MMOL/L (ref 133–144)
TRIGL SERPL-MCNC: 488 MG/DL
TSH SERPL DL<=0.005 MIU/L-ACNC: 1.5 MU/L (ref 0.4–4)

## 2018-07-26 PROCEDURE — 84443 ASSAY THYROID STIM HORMONE: CPT | Performed by: CLINICAL NURSE SPECIALIST

## 2018-07-26 PROCEDURE — 99214 OFFICE O/P EST MOD 30 MIN: CPT | Mod: 25 | Performed by: CLINICAL NURSE SPECIALIST

## 2018-07-26 PROCEDURE — 36415 COLL VENOUS BLD VENIPUNCTURE: CPT | Performed by: CLINICAL NURSE SPECIALIST

## 2018-07-26 PROCEDURE — 90471 IMMUNIZATION ADMIN: CPT | Performed by: CLINICAL NURSE SPECIALIST

## 2018-07-26 PROCEDURE — 90670 PCV13 VACCINE IM: CPT | Performed by: CLINICAL NURSE SPECIALIST

## 2018-07-26 PROCEDURE — 80061 LIPID PANEL: CPT | Performed by: CLINICAL NURSE SPECIALIST

## 2018-07-26 PROCEDURE — 83721 ASSAY OF BLOOD LIPOPROTEIN: CPT | Mod: 59 | Performed by: CLINICAL NURSE SPECIALIST

## 2018-07-26 PROCEDURE — 82043 UR ALBUMIN QUANTITATIVE: CPT | Performed by: CLINICAL NURSE SPECIALIST

## 2018-07-26 PROCEDURE — 80053 COMPREHEN METABOLIC PANEL: CPT | Performed by: CLINICAL NURSE SPECIALIST

## 2018-07-26 PROCEDURE — 82306 VITAMIN D 25 HYDROXY: CPT | Performed by: CLINICAL NURSE SPECIALIST

## 2018-07-26 PROCEDURE — 83036 HEMOGLOBIN GLYCOSYLATED A1C: CPT | Performed by: CLINICAL NURSE SPECIALIST

## 2018-07-26 RX ORDER — FLASH GLUCOSE SENSOR
KIT MISCELLANEOUS
Qty: 3 EACH | Refills: 11 | Status: SHIPPED | OUTPATIENT
Start: 2018-07-26 | End: 2019-02-01

## 2018-07-26 NOTE — TELEPHONE ENCOUNTER
Message from [employer]:  Original authorizing provider: MD Naman Rodriguez would like a refill of the following medications:  insulin degludec (TRESIBA) 200 UNIT/ML pen [Leilani Tavera MD]    Preferred pharmacy: [facility] [id number] [place] [address] [id number]    Comment:  Hey, I tried refilling this but the insurance is coming back as not ready to fill yet. I think they must have an old dosage or something. Can you please send a new script with the 130 dose? They said that might work. Thanks, Naman

## 2018-07-26 NOTE — LETTER
7/26/2018         RE: Naman Jones  [address] [facility] [address]        Dear Colleague,    Thank you for referring your patient, Naman Jones, to the [facility]. Please see a copy of my visit note below.    ENDOCRINOLOGY CLINIC NOTE:  Name: Naman Jones  Seen for f/u of Diabetes (Last seen 4/17/2018).  HPI:  Naman Jones is a 33 year old male who presents for the evaluation/management of Diabetes.  Was seen at bariatric surgery clinic for consideration of gastric bypass surgery and was noted to have high A1c.  He was referred to endocrinology for management of type 2 diabetes.  He was getting care at [facility] before.   Appointment in 2 weeks - plans upcoming gastric bypass.    1. Type 2 DM:  Orginally diagnosed at the age of: 30. On routine physical exam. Was losing wt at that time.  On insulin X 2016    Current Regimen: Tresiba 130 Units hs, Humalog 1.5-2 unit/3 gm of CHO + SSI 1-15 >150, Trulicity 1.5 mg once a week andMetformin 1000 mg BID.    Insurance would not fill Tresiba and has been out x 1 week - glucose higher off Tresiba.    BS checks: 1-2 times a day  Meter Download:   Janie: average glucose 216.  BG range:  73% above 180.  27% in target range.  0% below 70.    Exercise: not much  Symptoms of hypoglycemia (low blood sugar):  Gets symptoms of hypoglycemia.  Episodes of hypoglycemia: no  Fixed meal pattern: yes  Patient counting carbs: yes    DM Complications:   Nephropathy: no  Retinopathy: no, last exam 7/2017  Neuropathy: + numbness in toes  Microalbuminuria: present- on losartan  CAD/PAD: no  Gastroparesis: no  Hypoglycemia unawareness: no  2. Hypertension:    On medications  3. Hyperlipidemia: On medications     PMH/PSH:  DM  HTN  Dyslipidemia    Family Hx:  Diabetes: Father and mgm    Social Hx:  Social History     Social History     Marital status:      Spouse name: N/A     Number of children: N/A     Years of education: N/A     Occupational  History     Not on file.     Social History Main Topics     Smoking status: Never Smoker     Smokeless tobacco: Never Used     Alcohol use Yes     Drug use: No     Sexual activity: Yes     Other Topics Concern     Not on file     Social History Narrative          MEDICATIONS:  has a current medication list which includes the following prescription(s): amitriptyline, atorvastatin, blood glucose monitoring, freestyle césar reader, continuous blood glucose monitoring, dulaglutide, insulin degludec, insulin lispro, insulin pen needle, lorazepam, losartan, metformin, onetouch delica lancets 33g, sertraline, and triamterene-hydrochlorothiazide.    ROS     ROS: 10 point ROS neg other than the symptoms noted above in the HPI.    Physical Exam   VS: /85 (BP Location: Left arm, Patient Position: Chair, Cuff Size: Adult Large)  Pulse 112  Temp 98.5  F (36.9  C) (Oral)  Wt (!) 165.4 kg (364 lb 9.6 oz)  BMI 48.1 kg/m2  GENERAL: AXOX3, NAD, well dressed, answering questions appropriately, appears stated age.  HEENT: No exopthalmous, no proptosis, no lig lag, no retraction  NECK: Thyroid normal in size, non tender, no nodules were palpated.  CV: RRR  LUNGS: CTAB  NEUROLOGY: CN grossly intact, no tremors  PSYCH: normal affect and mood    LABS:  A1c:  Component      Latest Ref Rng & Units 7/26/2017 4/12/2018 7/26/2018   Hemoglobin A1C      0 - 5.6 % 8.5 (H) 9.0 (H) 7.7 (H)     !COMPREHENSIVE Latest Ref Rng & Units 7/26/2017   SODIUM 133 - 144 mmol/L 137   POTASSIUM 3.4 - 5.3 mmol/L 3.9   CHLORIDE 94 - 109 mmol/L 99   BUN 7 - 30 mg/dL 12   Creatinine 0.66 - 1.25 mg/dL 0.55 (L)   Glucose 70 - 99 mg/dL 190 (H)   ANION GAP 3 - 14 mmol/L 9   CALCIUM 8.5 - 10.1 mg/dL 9.5   ALBUMIN 3.4 - 5.0 g/dL 4.1     !LIPID/HEPATIC Latest Ref Rng & Units 7/10/2017   LDL CHOLESTEROL DIRECT <100 mg/dL 127 (H)     !LIPID/HEPATIC Latest Ref Rng & Units 7/26/2017   AST 0 - 45 U/L 25   ALT 0 - 70 U/L 52     !THYROID Latest Ref Rng & Units 7/10/2017    TSH 0.40 - 4.00 mU/L 0.90       Records from outside clinic previously reviewed.    All pertinent notes, labs, and images personally reviewed by me.     A/P  Mr.Anthony Jones is a 33 year old here for the evaluation/management of diabetes:    1. DM2 - Uncontrolled but significantly improved since starting using Janie.  A1c 7.7%.  Diabetes complicated by neuropathy and microalbuminuria.  Body mass index is 48.1 kg/(m^2).  A1c/blood sugars adequately controlled for gastric bypass.    Has seen bariatric surgery center [facility] and plans follow up with them.  No major episodes of hypoglycemia  Sees Enedelia Pena - diabetes ed.    -- Continue metformin 1000 mg twice a day.  He is not able to tolerate it can decrease the dose by 500 mg per day    -- Continue Tresiba 130 units per day   -- Continue current dose of Humalog     -- continue Trulicity 1.5 mg once a week    -- He is allergic to sulfa medications so can not be on sulfonylureas    -- previous eye exam at [facility], wishing to change to [facility].  Ophthalmology referral provided.     2. Hypertension - Under Good control.  Continue Maxide, losartan 100 mg      3. Hyperlipidemia -Currently treated with atorvastatin 40 mg  -- Repeat lipid panel today.    Most Recent Immunizations   Administered Date(s) Administered     HepB 05/15/2017     Influenza (intradermal) 09/20/2016     TDAP Vaccine (Adacel) 10/04/2012     All questions were answered.  The patient indicates understanding of the above issues and agrees with the plan set forth.     More than 50% of face to face time spent with Mr. Jones on counseling / coordinating his care.  Total face to face time was 25 minutes.      Follow-up:  3 months    Annette Hobson NP  Endocrinology  [facility]  CC: Haase, Susan Rachele            Again, thank you for allowing me to participate in the care of your patient.        Sincerely,        ARDEN Henderson CNP

## 2018-07-26 NOTE — MR AVS SNAPSHOT
After Visit Summary   7/26/2018    Naman Jones    MRN: [id number]           Patient Information     Date Of Birth          1985        Visit Information        Provider Department      7/26/2018 7:00 AM Annette Hobson APRN [facility]        Today's Diagnoses     Type 2 diabetes mellitus with hyperglycemia, with long-term current use of insulin (H)    -  1    Mixed hyperlipidemia        Essential hypertension        Need for vaccination          Care Instructions    Component      Latest Ref Rng & Units 7/26/2017 4/12/2018 7/26/2018   Hemoglobin A1C      0 - 5.6 % 8.5 (H) 9.0 (H) 7.7 (H)             Follow-ups after your visit        Additional Services     OPHTHALMOLOGY ADULT REFERRAL       Your provider has referred you to:  N: [facility]  [place]  [phone]  [email or web address] OR [facility] Optometry with Goldie Georges at ph# [phone].      Please be aware that coverage of these services is subject to the terms and limitations of your health insurance plan.  Call member services at your health plan with any benefit or coverage questions.      Please bring the following to your appointment:  >>   Any x-rays, CTs or MRIs which have been performed.  Contact the facility where they were done to arrange for  prior to your scheduled appointment.  Any new CT, MRI or other procedures ordered by your specialist must be performed at a [facility] facility or coordinated by your clinic's referral office.    >>   List of current medications   >>   This referral request   >>   Any documents/labs given to you for this referral                  Your next 10 appointments already scheduled     Aug 08, 2018  8:30 AM CDT   Weight Loss Visit with Adele Morris Diet 1, RD   [facility] ([facility])    [address] [place] [address] [phone]             Oct 26, 2018  2:30 PM CDT   Return Visit with ARDEN Henderson CNP   [facility] ([facility])    [address]  [facility] [address] [phone]              Who to contact     If you have questions or need follow up information about today's clinic visit or your schedule please contact [facility] directly at [phone].  Normal or non-critical lab and imaging results will be communicated to you by [employer], letter or phone within 4 business days after the clinic has received the results. If you do not hear from us within 7 days, please contact the clinic through [employer] or phone. If you have a critical or abnormal lab result, we will notify you by phone as soon as possible.  Submit refill requests through [employer] or call your pharmacy and they will forward the refill request to us. Please allow 3 business days for your refill to be completed.          Additional Information About Your Visit        [employer] Information     [employer] gives you secure access to your electronic health record. If you see a primary care provider, you can also send messages to your care team and make appointments. If you have questions, please call your primary care clinic.  If you do not have a primary care provider, please call [phone] and they will assist you.        Care EveryWhere ID     This is your Care EveryWhere ID. This could be used by other organizations to access your [facility] medical records  [id number]        Your Vitals Were     Pulse Temperature BMI (Body Mass Index)             112 98.5  F (36.9  C) (Oral) 48.1 kg/m2          Blood Pressure from Last 3 Encounters:   07/26/18 126/85   07/20/18 130/80   06/29/18 138/86    Weight from Last 3 Encounters:   07/26/18 (!) 165.4 kg (364 lb 9.6 oz)   07/20/18 (!) 164.7 kg (363 lb)   06/29/18 (!) 161.9 kg (357 lb)              We Performed the Following     Albumin Random Urine  Quantitative with Creat Ratio     Comprehensive metabolic panel     Hemoglobin A1c     Lipid panel reflex to direct LDL Fasting     OPHTHALMOLOGY ADULT REFERRAL     PNEUMOCOCCAL CONJ VACCINE 13 VALENT IM     TSH with free T4 reflex     Vitamin D Deficiency          Today's Medication Changes          These changes are accurate as of 7/26/18  7:40 AM.  If you have any questions, ask your nurse or doctor.               These medicines have changed or have updated prescriptions.        Dose/Directions    insulin lispro 100 UNIT/ML injection   Commonly known as:  HumaLOG KWIKpen   This may have changed:  additional instructions   Used for:  Type 2 diabetes mellitus with hyperglycemia, with long-term current use of insulin (H)   Changed by:  Annette Hobson, ARDEN CNP        INJECT 1 UNIT FOR EVERY 2 GRAMS OF CARBS PLUS CORRECTION OF 1 UNIT FOR EVERY 15 POINTS ABOVE 150.(APPROXIMATELY 130 UNITS PER DAY)   Quantity:  45 mL   Refills:  5            Where to get your medicines      These medications were sent to [facility] - [facility] [address]  [address], [facility] [address]     Phone:  [phone]     continuous blood glucose monitoring sensor    dulaglutide 1.5 MG/0.5ML pen    insulin degludec 200 UNIT/ML pen    insulin lispro 100 UNIT/ML injection    metFORMIN 500 MG tablet                Primary Care Provider Office Phone # Fax #    Ilana Rachele Haase, APRN [phone] [address]  [facility] [address]        Equal Access to Services     RANULFO REINOSO AH: Hadii lopez michelleo Sostacie, waaxda luqadaha, qaybta kaalmada adeegyada, naman conner . So [facility] [phone].    ATENCIÓN: Si habla español, tiene a dumas disposición servicios gratuitos de asistencia lingüística. Abraham al [phone].    We comply with applicable federal civil rights laws and [place] laws. We do not discriminate  on the basis of race, color, national origin, age, disability, sex, sexual orientation, or gender identity.            Thank you!     Thank you for choosing [facility]  for your care. Our goal is always to provide you with excellent care. Hearing back from our patients is one way we can continue to improve our services. Please take a few minutes to complete the written survey that you may receive in the mail after your visit with us. Thank you!             Your Updated Medication List - Protect others around you: Learn how to safely use, store and throw away your medicines at www.disposemymeds.org.          This list is accurate as of 7/26/18  7:40 AM.  Always use your most recent med list.                   Brand Name Dispense Instructions for use Diagnosis    amitriptyline 25 MG tablet    ELAVIL    60 tablet    Take 25-50 mg prior to bed.    Insomnia, unspecified type       atorvastatin 40 MG tablet    LIPITOR    90 tablet    Take 1 tablet (40 mg) by mouth daily    Mixed hyperlipidemia       blood glucose monitoring test strip    ONETOUCH VERIO IQ    150 each    Use to test blood sugars 4 times daily or as directed.    Type 2 diabetes mellitus with hyperglycemia, with long-term current use of insulin (H)       continuous blood glucose monitoring sensor     3 each    For use with Freestyle Janie Flash  for continuous monitioring of blood glucose levels. Replace sensor every 10 days.    Type 2 diabetes mellitus with hyperglycemia, with long-term current use of insulin (H)       dulaglutide 1.5 MG/0.5ML pen    TRULICITY    2 mL    Inject 1.5 mg Subcutaneous every 7 days    Type 2 diabetes mellitus with hyperglycemia, with long-term current use of insulin (H)       FREESTYLE JANIE READER Noemy     1 Device    1 Device as needed    Type 2 diabetes mellitus with hyperglycemia, with long-term current use of insulin (H)       insulin degludec 200 UNIT/ML pen    TRESIBA    27 mL    Inject 130  Units Subcutaneous daily    Type 2 diabetes mellitus with hyperglycemia, with long-term current use of insulin (H)       insulin lispro 100 UNIT/ML injection    HumaLOG KWIKpen    45 mL    INJECT 1 UNIT FOR EVERY 2 GRAMS OF CARBS PLUS CORRECTION OF 1 UNIT FOR EVERY 15 POINTS ABOVE 150.(APPROXIMATELY 130 UNITS PER DAY)    Type 2 diabetes mellitus with hyperglycemia, with long-term current use of insulin (H)       insulin pen needle 32G X 4 MM     100 each    Use 3 pen needles daily or as directed.    Diabetes mellitus type 2 in obese (H)       LORazepam 1 MG tablet    ATIVAN    30 tablet    Take 1 tablet (1 mg) by mouth daily as needed for anxiety    JOEL (generalized anxiety disorder)       losartan 100 MG tablet    COZAAR    90 tablet    Take 1 tablet (100 mg) by mouth daily    Essential hypertension       metFORMIN 500 MG tablet    GLUCOPHAGE    360 tablet    Take 2 tablets (1,000 mg) by mouth 2 times daily (with meals)    Type 2 diabetes mellitus with hyperglycemia, with long-term current use of insulin (H)       ONETOUCH DELICA LANCETS 33G Misc     100 each    1 lancet 4 times daily    Type 2 diabetes mellitus with hyperglycemia, with long-term current use of insulin (H)       sertraline 100 MG tablet    ZOLOFT    90 tablet    Take 1 tablet (100 mg) by mouth daily    JOEL (generalized anxiety disorder)       triamterene-hydrochlorothiazide 75-50 MG per tablet    MAXZIDE    90 tablet    Take 1 tablet by mouth daily    Essential hypertension

## 2018-07-26 NOTE — PROGRESS NOTES
ENDOCRINOLOGY CLINIC NOTE:  Name: Naman Jones  Seen for f/u of Diabetes (Last seen 4/17/2018).  HPI:  Naman Jones is a 33 year old male who presents for the evaluation/management of Diabetes.  Was seen at bariatric surgery clinic for consideration of gastric bypass surgery and was noted to have high A1c.  He was referred to endocrinology for management of type 2 diabetes.  He was getting care at [facility] before.   Appointment in 2 weeks - plans upcoming gastric bypass.    1. Type 2 DM:  Orginally diagnosed at the age of: 30. On routine physical exam. Was losing wt at that time.  On insulin X 2016    Current Regimen: Tresiba 130 Units hs, Humalog 1.5-2 unit/3 gm of CHO + SSI 1-15 >150, Trulicity 1.5 mg once a week andMetformin 1000 mg BID.    Insurance would not fill Tresiba and has been out x 1 week - glucose higher off Tresiba.    BS checks: 1-2 times a day  Meter Download:   Janie: average glucose 216.  BG range:  73% above 180.  27% in target range.  0% below 70.    Exercise: not much  Symptoms of hypoglycemia (low blood sugar):  Gets symptoms of hypoglycemia.  Episodes of hypoglycemia: no  Fixed meal pattern: yes  Patient counting carbs: yes    DM Complications:   Nephropathy: no  Retinopathy: no, last exam 7/2017  Neuropathy: + numbness in toes  Microalbuminuria: present- on losartan  CAD/PAD: no  Gastroparesis: no  Hypoglycemia unawareness: no  2. Hypertension:    On medications  3. Hyperlipidemia: On medications     PMH/PSH:  DM  HTN  Dyslipidemia    Family Hx:  Diabetes: Father and mgm    Social Hx:  Social History     Social History     Marital status:      Spouse name: N/A     Number of children: N/A     Years of education: N/A     Occupational History     Not on file.     Social History Main Topics     Smoking status: Never Smoker     Smokeless tobacco: Never Used     Alcohol use Yes     Drug use: No     Sexual activity: Yes     Other Topics Concern     Not on file     Social History Narrative           MEDICATIONS:  has a current medication list which includes the following prescription(s): amitriptyline, atorvastatin, blood glucose monitoring, freestyle césar reader, continuous blood glucose monitoring, dulaglutide, insulin degludec, insulin lispro, insulin pen needle, lorazepam, losartan, metformin, onetouch delica lancets 33g, sertraline, and triamterene-hydrochlorothiazide.    ROS     ROS: 10 point ROS neg other than the symptoms noted above in the HPI.    Physical Exam   VS: /85 (BP Location: Left arm, Patient Position: Chair, Cuff Size: Adult Large)  Pulse 112  Temp 98.5  F (36.9  C) (Oral)  Wt (!) 165.4 kg (364 lb 9.6 oz)  BMI 48.1 kg/m2  GENERAL: AXOX3, NAD, well dressed, answering questions appropriately, appears stated age.  HEENT: No exopthalmous, no proptosis, no lig lag, no retraction  NECK: Thyroid normal in size, non tender, no nodules were palpated.  CV: RRR  LUNGS: CTAB  NEUROLOGY: CN grossly intact, no tremors  PSYCH: normal affect and mood    LABS:  A1c:  Component      Latest Ref Rng & Units 7/26/2017 4/12/2018 7/26/2018   Hemoglobin A1C      0 - 5.6 % 8.5 (H) 9.0 (H) 7.7 (H)     !COMPREHENSIVE Latest Ref Rng & Units 7/26/2017   SODIUM 133 - 144 mmol/L 137   POTASSIUM 3.4 - 5.3 mmol/L 3.9   CHLORIDE 94 - 109 mmol/L 99   BUN 7 - 30 mg/dL 12   Creatinine 0.66 - 1.25 mg/dL 0.55 (L)   Glucose 70 - 99 mg/dL 190 (H)   ANION GAP 3 - 14 mmol/L 9   CALCIUM 8.5 - 10.1 mg/dL 9.5   ALBUMIN 3.4 - 5.0 g/dL 4.1     !LIPID/HEPATIC Latest Ref Rng & Units 7/10/2017   LDL CHOLESTEROL DIRECT <100 mg/dL 127 (H)     !LIPID/HEPATIC Latest Ref Rng & Units 7/26/2017   AST 0 - 45 U/L 25   ALT 0 - 70 U/L 52     !THYROID Latest Ref Rng & Units 7/10/2017   TSH 0.40 - 4.00 mU/L 0.90       Records from outside clinic previously reviewed.    All pertinent notes, labs, and images personally reviewed by me.     A/P  Mr.Anthony Jones is a 33 year old here for the evaluation/management of diabetes:    1. DM2 -  Uncontrolled but significantly improved since starting using Janie.  A1c 7.7%.  Diabetes complicated by neuropathy and microalbuminuria.  Body mass index is 48.1 kg/(m^2).  A1c/blood sugars adequately controlled for gastric bypass.    Has seen bariatric surgery center [facility] and plans follow up with them.  No major episodes of hypoglycemia  Sees Enedelia Pena - diabetes ed.    -- Continue metformin 1000 mg twice a day.  He is not able to tolerate it can decrease the dose by 500 mg per day    -- Continue Tresiba 130 units per day   -- Continue current dose of Humalog     -- continue Trulicity 1.5 mg once a week    -- He is allergic to sulfa medications so can not be on sulfonylureas    -- previous eye exam at [facility], wishing to change to [place].  Ophthalmology referral provided.     2. Hypertension - Under Good control.  Continue Maxide, losartan 100 mg      3. Hyperlipidemia -Currently treated with atorvastatin 40 mg  -- Repeat lipid panel today.    Most Recent Immunizations   Administered Date(s) Administered     HepB 05/15/2017     Influenza (intradermal) 09/20/2016     TDAP Vaccine (Adacel) 10/04/2012     All questions were answered.  The patient indicates understanding of the above issues and agrees with the plan set forth.     More than 50% of face to face time spent with Mr. Jones on counseling / coordinating his care.  Total face to face time was 25 minutes.      Follow-up:  3 months    Annette Hobson NP  Endocrinology  [facility]  CC: Haase, Susan Rachele

## 2018-07-27 LAB — DEPRECATED CALCIDIOL+CALCIFEROL SERPL-MC: 18 UG/L (ref 20–75)

## 2018-07-28 NOTE — PROGRESS NOTES
Naman,  There was no abnormal protein in your urine.  Your vitamin D level is low.  Please start over the counter vitamin D, 4000 units per day.  Your LDL cholesterol was slightly above desirable. Your triglycerides were elevated but I do not think you were fasting for 10-12 hours before this test so it is probably due to being nonfasting.  Your thyroid test was normal.  Here's a copy of the results for our records.  Please let me know if you have any questions.  Annette Hobson NP  Endocrinology

## 2018-08-08 ENCOUNTER — OFFICE VISIT (OUTPATIENT)
Dept: SURGERY | Facility: CLINIC | Age: 33
End: 2018-08-08
Payer: COMMERCIAL

## 2018-08-08 VITALS — HEIGHT: 73 IN | WEIGHT: 315 LBS | BODY MASS INDEX: 41.75 KG/M2

## 2018-08-08 DIAGNOSIS — E66.01 MORBID OBESITY WITH BMI OF 45.0-49.9, ADULT (H): ICD-10-CM

## 2018-08-08 PROCEDURE — 97803 MED NUTRITION INDIV SUBSEQ: CPT | Performed by: DIETITIAN, REGISTERED

## 2018-08-08 NOTE — MR AVS SNAPSHOT
MRN:[id number]                      After Visit Summary   8/8/2018    Naman Jones    MRN: [id number]           Visit Information        Provider Department      8/8/2018 8:30 AM 1, Adele Landa RD [facility] - [facility]      Your next 10 appointments already scheduled     Oct 26, 2018  2:30 PM CDT   [facility]DEN [facility]    [address]  [facility] [id number] [phone]              MyChart Information     Your.MDhart gives you secure access to your electronic health record. If you see a primary care provider, you can also send messages to your care team and make appointments. If you have questions, please call your primary care clinic.  If you do not have a primary care provider, please call [phone] and they will assist you.        Care EveryWhere ID     This is your Care EveryWhere ID. This could be used by other organizations to access your [facility] medical records  [id number]        Equal Access to Services     RANULFO REINOSO : Hadii lopez juarez hadasho Sostacie, waaxda luqadaha, qaybta kaalmada adeegrenetta, naman conner . So [facility] [phone].    ATENCIÓN: Si habla español, tiene a dumas disposición servicios gratuitos de asistencia lingüística. Llame al [phone].    We comply with applicable federal civil rights laws and [place] laws. We do not discriminate on the basis of race, color, national origin, age, disability, sex, sexual orientation, or gender identity.

## 2018-08-08 NOTE — PROGRESS NOTES
"PRE SURGICAL WEIGHT LOSS NUTRITION APPOINTMENT    Naman Jones  1985  male  [id number]  33 year old    ASSESSMENT    Desired Surgical Procedure: gastric bypass    REASON FOR VISIT:  Naman Jones is a 33 year old year old male presents today for a pre-surgical weight loss follow-up appointment. Patient accompanied by self.    DIAGNOSIS:  Weight Status Obesity Grade III BMI >40    ANTHROPOMETRICS:  Height: 185.4 cm (6' 1\")  Initial Weight:  167.4 kg (369 lb)   Weight last visit:  165.9 kg (365 lb 11.2 oz)  Current weight: (!) 163.7 kg (361 lb)  BMI: 47.73 kg/(m^2).    VITAMINS AND MINERALS:  1 Multivitamin with Minerals (Childrens chewable complete)  500 mg Calcium with Vitamin D TID  2000 International units Vitamin D      NUTRITION HISTORY:  Breakfast: protein drink (30 g protein)  Lunch: home made chicken soup (small amount of grain) or chili  Supper: chicken fajita with or without a tortilla or chicken stir oquendo  Snacks: sugar free frozen fudge bar   Fluids Consumed: Water and Kafix (chicory root, beets)  Eating slower: Yes  Chewing foods thoroughly: Yes  Take 20-30 minutes to consume each meal: Yes  Fluids and meals separate by at least 30 minutes: Yes  Carbonation: none  Caffeine: none  Additional Information: Patient feels like working with a new endocrine MD has helped the most with weight loss. He is wearing a continuous glucose monitor and keeping his CHO intake at < 50 g per meal. Patient is feeling confident with moving forward with weight loss surgery.    PHYSICAL ACTIVITY:  Type: walking  Frequency: 3-4 (days per week)  Duration: 30 (minutes)     DIAGNOSIS:  Previous Nutrition Diagnosis: Obesity related to long history of self- monitoring deficit and excessive energy intake evidenced by BMI of 48.2 kg/m2  No change, modified below    Previous goals:   Exercise 3 times per week for 20 minutes-met  Reduce portion sizes at meals-met  Reduce caffeine by additional 50% -met       Current Nutrition " Diagnosis: Obesity related to long history of self-monitoring deficit and excessive energy intake as evidenced by BMI of 47.73 kg/m2.    INTERVENTION:  Nutrition Prescription: Recommended energy/nutrient modification.    GOALS:  Relating To Eating:  Continue to practice all pre-bariatric diet guidelines  Relating to dietary supplements:  Take multivitmins/ mineral at bed time to free up the rest of the day to take calcium      Intervention:  - Discussed progress towards previous goals.  - Reinforced importance of making behavior changes in preparation for bariatric surgery.   - Assessed learning needs and learning preferences       NUTRITION MONITORING AND EVALUATION:  Anticipated compliance: good  Patient demonstrated good understanding.   Patient has met pre bariatric surgery diet requirements    Follow up: Continue to monitor patient closely regarding weight loss and diet.  # of visits needed: 0  Cleared by RD: Yes     TIME SPENT WITH PATIENT: 20 minutes    Darius Montenegro RD, LD  [facility]  [phone]

## 2018-08-16 ENCOUNTER — OFFICE VISIT (OUTPATIENT)
Dept: SURGERY | Facility: CLINIC | Age: 33
End: 2018-08-16
Payer: COMMERCIAL

## 2018-08-16 VITALS
OXYGEN SATURATION: 99 % | DIASTOLIC BLOOD PRESSURE: 100 MMHG | BODY MASS INDEX: 41.75 KG/M2 | HEIGHT: 73 IN | SYSTOLIC BLOOD PRESSURE: 140 MMHG | RESPIRATION RATE: 12 BRPM | HEART RATE: 117 BPM | WEIGHT: 315 LBS

## 2018-08-16 DIAGNOSIS — E66.01 MORBID OBESITY WITH BMI OF 45.0-49.9, ADULT (H): Primary | ICD-10-CM

## 2018-08-16 PROCEDURE — 99215 OFFICE O/P EST HI 40 MIN: CPT | Performed by: SURGERY

## 2018-08-16 NOTE — MR AVS SNAPSHOT
After Visit Summary   8/16/2018    Naman Jones    MRN: [id number]           Patient Information     Date Of Birth          1985        Visit Information        Provider Department      8/16/2018 8:30 AM Alden Mcwilliams MD [facility]      Today's Diagnoses     Morbid obesity with BMI of 45.0-49.9, adult (H)    -  1      Care Instructions    Patient to follow up with Primary Care provider regarding elevated blood pressure.  Moni Aragon CMA on 8/16/2018 at 9:26 AM            Follow-ups after your visit        Your next 10 appointments already scheduled     Oct 26, 2018  2:30 PM CDT   [facility]    [address] [facility] [address] [phone]              Who to contact     If you have questions or need follow up information about today's clinic visit or your schedule please contact [place] [facility] directly at [phone].  Normal or non-critical lab and imaging results will be communicated to you by [employer], letter or phone within 4 business days after the clinic has received the results. If you do not hear from us within 7 days, please contact the clinic through Tribzit or phone. If you have a critical or abnormal lab result, we will notify you by phone as soon as possible.  Submit refill requests through [employer] or call your pharmacy and they will forward the refill request to us. Please allow 3 business days for your refill to be completed.          Additional Information About Your Visit        [employer] Information     [employer] gives you secure access to your electronic health record. If you see a primary care provider, you can also send messages to your care team and make appointments. If you have questions, please call your primary care clinic.  If you do not have a  "primary care provider, please call [phone] and they will assist you.        Care EveryWhere ID     This is your Care EveryWhere ID. This could be used by other organizations to access your [facility] medical records  [id number]        Your Vitals Were     Pulse Respirations Height Pulse Oximetry BMI (Body Mass Index)       117 12 6' 1\" (1.854 m) 99% 47.69 kg/m2        Blood Pressure from Last 3 Encounters:   08/16/18 (!) 140/100   07/26/18 126/85   07/20/18 130/80    Weight from Last 3 Encounters:   08/16/18 (!) 361 lb 8 oz (164 kg)   08/08/18 (!) 361 lb (163.7 kg)   07/26/18 (!) 364 lb 9.6 oz (165.4 kg)              Today, you had the following     No orders found for display       Primary Care Provider Office Phone # Fax #    Ilana Yoo Haase, APRN [phone]       [address] [facility] [address]        Equal Access to Services     [facility]: Hadii aad ku hadasho Sostacie, waaxda luqadaha, qaybta kaalmada adeegyaanatoliy, naman conner . So [facility] [phone].    ATENCIÓN: Si habla español, tiene a dumas disposición servicios gratuitos de asistencia lingüística. Llame al [phone].    We comply with applicable federal civil rights laws and [place] laws. We do not discriminate on the basis of race, color, national origin, age, disability, sex, sexual orientation, or gender identity.            Thank you!     Thank you for choosing [place] [facility]  for your care. Our goal is always to provide you with excellent care. Hearing back from our patients is one way we can continue to improve our services. Please take a few minutes to complete the written survey that you may receive in the mail after your visit with us. Thank you!             Your Updated Medication List - Protect others around you: Learn how to safely use, store and throw away your medicines at www.disposemymeds.org.          This list is accurate as of 8/16/18 11:59 " PM.  Always use your most recent med list.                   Brand Name Dispense Instructions for use Diagnosis    amitriptyline 25 MG tablet    ELAVIL    60 tablet    Take 25-50 mg prior to bed.    Insomnia, unspecified type       atorvastatin 40 MG tablet    LIPITOR    90 tablet    Take 1 tablet (40 mg) by mouth daily    Mixed hyperlipidemia       blood glucose monitoring test strip    ONETOUCH VERIO IQ    150 each    Use to test blood sugars 4 times daily or as directed.    Type 2 diabetes mellitus with hyperglycemia, with long-term current use of insulin (H)       continuous blood glucose monitoring sensor     3 each    For use with Freestyle Janie Flash  for continuous monitioring of blood glucose levels. Replace sensor every 10 days.    Type 2 diabetes mellitus with hyperglycemia, with long-term current use of insulin (H)       dulaglutide 1.5 MG/0.5ML pen    TRULICITY    2 mL    Inject 1.5 mg Subcutaneous every 7 days    Type 2 diabetes mellitus with hyperglycemia, with long-term current use of insulin (H)       FREESTYLE JANIE READER Noemy     1 Device    1 Device as needed    Type 2 diabetes mellitus with hyperglycemia, with long-term current use of insulin (H)       insulin degludec 200 UNIT/ML pen    TRESIBA    27 mL    Inject 130 Units Subcutaneous daily    Type 2 diabetes mellitus with hyperglycemia, with long-term current use of insulin (H)       insulin lispro 100 UNIT/ML injection    HumaLOG KWIKpen    45 mL    INJECT 1 UNIT FOR EVERY 2 GRAMS OF CARBS PLUS CORRECTION OF 1 UNIT FOR EVERY 15 POINTS ABOVE 150.(APPROXIMATELY 130 UNITS PER DAY)    Type 2 diabetes mellitus with hyperglycemia, with long-term current use of insulin (H)       insulin pen needle 32G X 4 MM     100 each    Use 3 pen needles daily or as directed.    Diabetes mellitus type 2 in obese (H)       LORazepam 1 MG tablet    ATIVAN    30 tablet    Take 1 tablet (1 mg) by mouth daily as needed for anxiety    JOEL (generalized  anxiety disorder)       losartan 100 MG tablet    COZAAR    90 tablet    Take 1 tablet (100 mg) by mouth daily    Essential hypertension       metFORMIN 500 MG tablet    GLUCOPHAGE    360 tablet    Take 2 tablets (1,000 mg) by mouth 2 times daily (with meals)    Type 2 diabetes mellitus with hyperglycemia, with long-term current use of insulin (H)       ONETOUCH DELICA LANCETS 33G Misc     100 each    1 lancet 4 times daily    Type 2 diabetes mellitus with hyperglycemia, with long-term current use of insulin (H)       sertraline 100 MG tablet    ZOLOFT    90 tablet    Take 1 tablet (100 mg) by mouth daily    JOEL (generalized anxiety disorder)       triamterene-hydrochlorothiazide 75-50 MG per tablet    MAXZIDE    90 tablet    Take 1 tablet by mouth daily    Essential hypertension

## 2018-08-16 NOTE — PATIENT INSTRUCTIONS
Patient to follow up with Primary Care provider regarding elevated blood pressure.  Moni Aragon CMA on 8/16/2018 at 9:26 AM

## 2018-08-19 DIAGNOSIS — I10 ESSENTIAL HYPERTENSION: ICD-10-CM

## 2018-08-20 NOTE — TELEPHONE ENCOUNTER
Routing refill request to provider for review/approval because:  Labs out of range:  BP  Carlos Albright RN, BSN

## 2018-08-20 NOTE — TELEPHONE ENCOUNTER
"Requested Prescriptions   Pending Prescriptions Disp Refills     losartan (COZAAR) 100 MG tablet [Pharmacy Med Name: LOSARTAN 100MG TABLETS]  Last Written Prescription Date:  4/18/2018  Last Fill Quantity: 90 tablet,  # refills: 1   Last office visit: 7/20/2018 with prescribing provider:  Haase     Future Office Visit:   Next 5 appointments (look out 90 days)     Oct 26, 2018  2:30 PM CDT   [facility]    [address] [facility] [id number] [phone]                  90 tablet 0     Sig: TAKE 1 TABLET(100 MG) BY MOUTH DAILY    Angiotensin-II Receptors Failed    8/19/2018  2:38 PM       Failed - Blood pressure under 140/90 in past 12 months    BP Readings from Last 3 Encounters:   08/16/18 (!) 140/100   07/26/18 126/85   07/20/18 130/80                Failed - Normal serum creatinine on file in past 12 months    Recent Labs   Lab Test  07/26/18   0703   CR  0.50*            Passed - Recent (12 mo) or future (30 days) visit within the authorizing provider's specialty    Patient had office visit in the last 12 months or has a visit in the next 30 days with authorizing provider or within the authorizing provider's specialty.  See \"Patient Info\" tab in inbasket, or \"Choose Columns\" in Meds & Orders section of the refill encounter.           Passed - Patient is age 18 or older       Passed - Normal serum potassium on file in past 12 months    Recent Labs   Lab Test  07/26/18   0703   POTASSIUM  3.4                      "

## 2018-08-21 NOTE — TELEPHONE ENCOUNTER
Naman needs to see me regarding his BP, was quite elevated when seen by surgery on 8/16.  I will likely change his dose when I see him.  Thanks,  Susan Haase, CNP

## 2018-08-25 NOTE — PROGRESS NOTES
"Dear Dr. Haase, Ilana Yoo,      I was asked to see the patient regarding obesity by the referring provider above.    I had the pleasure of meeting with your patient Naman Jones in our weight loss surgery office.  This patient is a 33 year old male who has been undergoing our thorough preoperative screening process in anticipation of potential bariatric surgery.    At initial evaluation we recorded Naman Jones's Initial BMI: 46.1 and Initial Weight: 349 lb (158.3 kg).  The patient has been unsuccessful with other methods of permanent weight loss and suffers from multiple weight related medical conditions.  Due to lack of success in achieving weight loss through other methods, he is interested in undergoing bariatric surgery.    PREVIOUS WEIGHT LOSS ATTEMPTS:  Calorie count, atkins, [place].    CO-MORBIDITIES OF OBESITY INCLUDE:  Diabetes, hypertension, high cholesterol, sciatica.    VITALS:  BP (!) 140/100 (BP Location: Right arm, Patient Position: Sitting, Cuff Size: Adult Large)  Pulse 117  Resp 12  Ht 6' 1\" (1.854 m)  Wt (!) 361 lb 8 oz (164 kg)  SpO2 99%  BMI 47.69 kg/m2    PE:  GENERAL: Alert and oriented x3. NAD  HEENT exam: Sclerae not icteric. Hearing good. Head normocephalic and atraumatic.   CARDIOVASCULAR: No JVD  RESPIRATORY: Breathing unlabored  GASTROINTESTINAL: Obese  LOWER EXTREMITIES: no deformities  MUSCULOSKELETAL: Normal gait, Moves all 4 extremities equal and strong  NEUROLOGIC: no gross defect  SKIN: warm and dry to touch     In summary, he has undergone an appropriate medical evaluation, dietitian evaluation, as well as psychologic screening. The patient appears to be an appropriate candidate for bariatric surgery.    In the office today, I discussed the laparoscopic gastric bypass surgery.  Risks, benefits and anticipated outcomes were outlined including the risk of death, staple line leak (1-2%), PE, DVT, ulcer, worsening GERD, N/V, stricture, hernia, wound infection, " weight regain, and vitamin deficiencies. This patient has a good chance of sustaining permanent weight loss due to this procedure.  This should also allow improvement if not resolution of his/her weight related medical conditions.    At present we are going to present your patient's file for prior authorization to insurance.  Pending prior authorization, I anticipate a surgical date in the near future.  We will keep you updated on any progress.  If you have questions regarding care please feel free to contact me.  Total time spent in the clinic was 60 minutes with greater than 50% in face-to-face consultation.        Sincerely,    Alden Mcwilliams    Please route or send letter to:  Primary Care Provider (PCP) and Referring Provider

## 2018-08-27 RX ORDER — LOSARTAN POTASSIUM 100 MG/1
TABLET ORAL
Status: SHIPPED
Start: 2018-08-27 | End: 2019-02-01

## 2018-08-27 NOTE — TELEPHONE ENCOUNTER
losartan (COZAAR) 100 MG tablet 90 tablet 1 4/18/2018  No   Sig - Route: Take 1 tablet (100 mg) by mouth daily - Oral     Has existing refill, sent denial to pharmacy as not needed, also inform pt to make f/u apt  Enedelia Goetz RN, BSN  Message handled by Nurse Triage.

## 2018-09-25 ENCOUNTER — MYC MEDICAL ADVICE (OUTPATIENT)
Dept: SURGERY | Facility: CLINIC | Age: 33
End: 2018-09-25

## 2018-09-25 ENCOUNTER — OFFICE VISIT (OUTPATIENT)
Dept: FAMILY MEDICINE | Facility: CLINIC | Age: 33
End: 2018-09-25
Payer: COMMERCIAL

## 2018-09-25 VITALS
DIASTOLIC BLOOD PRESSURE: 86 MMHG | HEART RATE: 114 BPM | TEMPERATURE: 98.7 F | BODY MASS INDEX: 41.75 KG/M2 | WEIGHT: 315 LBS | OXYGEN SATURATION: 97 % | RESPIRATION RATE: 18 BRPM | SYSTOLIC BLOOD PRESSURE: 132 MMHG | HEIGHT: 73 IN

## 2018-09-25 DIAGNOSIS — E11.65 TYPE 2 DIABETES MELLITUS WITH HYPERGLYCEMIA, WITH LONG-TERM CURRENT USE OF INSULIN (H): ICD-10-CM

## 2018-09-25 DIAGNOSIS — Z79.4 TYPE 2 DIABETES MELLITUS WITH HYPERGLYCEMIA, WITH LONG-TERM CURRENT USE OF INSULIN (H): ICD-10-CM

## 2018-09-25 DIAGNOSIS — E66.01 MORBID OBESITY WITH BMI OF 45.0-49.9, ADULT (H): ICD-10-CM

## 2018-09-25 DIAGNOSIS — Z01.818 PREOP GENERAL PHYSICAL EXAM: Primary | ICD-10-CM

## 2018-09-25 DIAGNOSIS — Z23 NEED FOR PROPHYLACTIC VACCINATION AND INOCULATION AGAINST INFLUENZA: ICD-10-CM

## 2018-09-25 DIAGNOSIS — I10 ESSENTIAL HYPERTENSION: ICD-10-CM

## 2018-09-25 LAB
BASOPHILS # BLD AUTO: 0.1 10E9/L (ref 0–0.2)
BASOPHILS NFR BLD AUTO: 0.6 %
DIFFERENTIAL METHOD BLD: NORMAL
EOSINOPHIL # BLD AUTO: 0.2 10E9/L (ref 0–0.7)
EOSINOPHIL NFR BLD AUTO: 1.7 %
ERYTHROCYTE [DISTWIDTH] IN BLOOD BY AUTOMATED COUNT: 14.7 % (ref 10–15)
HBA1C MFR BLD: 8.9 % (ref 0–5.6)
HCT VFR BLD AUTO: 47.2 % (ref 40–53)
HGB BLD-MCNC: 16 G/DL (ref 13.3–17.7)
LYMPHOCYTES # BLD AUTO: 2.3 10E9/L (ref 0.8–5.3)
LYMPHOCYTES NFR BLD AUTO: 25.6 %
MCH RBC QN AUTO: 29.9 PG (ref 26.5–33)
MCHC RBC AUTO-ENTMCNC: 33.9 G/DL (ref 31.5–36.5)
MCV RBC AUTO: 88 FL (ref 78–100)
MONOCYTES # BLD AUTO: 0.7 10E9/L (ref 0–1.3)
MONOCYTES NFR BLD AUTO: 7.4 %
NEUTROPHILS # BLD AUTO: 5.9 10E9/L (ref 1.6–8.3)
NEUTROPHILS NFR BLD AUTO: 64.7 %
PLATELET # BLD AUTO: 267 10E9/L (ref 150–450)
RBC # BLD AUTO: 5.36 10E12/L (ref 4.4–5.9)
WBC # BLD AUTO: 9.1 10E9/L (ref 4–11)

## 2018-09-25 PROCEDURE — 90686 IIV4 VACC NO PRSV 0.5 ML IM: CPT | Performed by: NURSE PRACTITIONER

## 2018-09-25 PROCEDURE — 99214 OFFICE O/P EST MOD 30 MIN: CPT | Mod: 25 | Performed by: NURSE PRACTITIONER

## 2018-09-25 PROCEDURE — 85025 COMPLETE CBC W/AUTO DIFF WBC: CPT | Performed by: NURSE PRACTITIONER

## 2018-09-25 PROCEDURE — 93000 ELECTROCARDIOGRAM COMPLETE: CPT | Performed by: NURSE PRACTITIONER

## 2018-09-25 PROCEDURE — 83036 HEMOGLOBIN GLYCOSYLATED A1C: CPT | Performed by: NURSE PRACTITIONER

## 2018-09-25 PROCEDURE — 90471 IMMUNIZATION ADMIN: CPT | Performed by: NURSE PRACTITIONER

## 2018-09-25 PROCEDURE — 36415 COLL VENOUS BLD VENIPUNCTURE: CPT | Performed by: NURSE PRACTITIONER

## 2018-09-25 PROCEDURE — 80053 COMPREHEN METABOLIC PANEL: CPT | Performed by: NURSE PRACTITIONER

## 2018-09-25 RX ORDER — TRIAMTERENE AND HYDROCHLOROTHIAZIDE 75; 50 MG/1; MG/1
1 TABLET ORAL DAILY
Qty: 90 TABLET | Refills: 1 | Status: SHIPPED | OUTPATIENT
Start: 2018-09-25 | End: 2019-02-11

## 2018-09-25 NOTE — PROGRESS NOTES
[facility]  [address] [address]  [phone]  Dept: [phone]    PRE-OP EVALUATION:  Today's date: 2018    Naman Jones (: 1985) presents for pre-operative evaluation assessment as requested by Dr. Alden chew.  He requires evaluation and anesthesia risk assessment prior to undergoing surgery/procedure for treatment of obesity .    Fax number for surgical facility: [facility]  Primary Physician: Haase, Susan Rachele  Type of Anesthesia Anticipated: General    Patient has a Health Care Directive or Living Will:  NO    Preop Questions 2018   Who is doing your surgery? alden chew   What are you having done? jackie en y gastric bypass   Date of Surgery/Procedure: oct 9   [facility]   1.  Do you have a history of Heart attack, stroke, stent, coronary bypass surgery, or other heart surgery? No   2.  Do you ever have any pain or discomfort in your chest? No   3.  Do you have a history of  Heart Failure? No   4.   Are you troubled by shortness of breath when:  walking on a level surface, or up a slight hill, or at night? No   5.  Do you currently have a cold, bronchitis or other respiratory infection? No   6.  Do you have a cough, shortness of breath, or wheezing? No   7.  Do you sometimes get pains in the calves of your legs when you walk? No   8. Do you or anyone in your family have previous history of blood clots? No   9.  Do you or does anyone in your family have a serious bleeding problem such as prolonged bleeding following surgeries or cuts? No   10. Have you ever had problems with anemia or been told to take iron pills? No   11. Have you had any abnormal blood loss such as black, tarry or bloody stools? No   12. Have you ever had a blood transfusion? No   13. Have you or any of your relatives ever had problems with anesthesia? No   14. Do you have sleep apnea,  excessive snoring or daytime drowsiness? No   15. Do you have any prosthetic heart valves? No   16. Do you have prosthetic joints? No     HPI:     HPI related to upcoming procedure:morbid obesity, has tried calorie counting, atkins and [place] diet without significant weigh loss.  BMI of 46.1.    Type 2 DM: well controlled, last A1C 7.7%. On  tresiba 130 units daily and lispro sliding scale.   Hypertension:  BP today 132/86, taking lisinopril, trimaterene and hydrochlorothiazide.     MEDICAL HISTORY:     Patient Active Problem List    Diagnosis Date Noted     Insomnia, unspecified type 07/20/2018     Priority: Medium     Morbid obesity with BMI of 45.0-49.9, adult (H) 06/13/2017     Priority: Medium     Essential hypertension 06/13/2017     Priority: Medium     Mixed hyperlipidemia 06/13/2017     Priority: Medium     Diabetes mellitus type 2 in obese (H) 06/13/2017     Priority: Medium     Plantar fasciitis 06/13/2017     Priority: Medium     Chronic pain of right knee 06/13/2017     Priority: Medium     JOEL (generalized anxiety disorder) 06/13/2017     Priority: Medium     Type 2 diabetes mellitus with hyperglycemia, with long-term current use of insulin (H) 06/13/2017     Priority: Medium      No past medical history on file.  Past Surgical History:   Procedure Laterality Date     ORTHOPEDIC SURGERY Right 02/2016     Current Outpatient Prescriptions   Medication Sig Dispense Refill     amitriptyline (ELAVIL) 25 MG tablet Take 25-50 mg prior to bed. 60 tablet 1     atorvastatin (LIPITOR) 40 MG tablet Take 1 tablet (40 mg) by mouth daily 90 tablet 3     Continuous Blood Gluc  (FREESTYLE KEILA READER) SUGAR 1 Device as needed 1 Device 0     continuous blood glucose monitoring (FREESTYLE KEILA) sensor For use with Freestyle Keila Flash  for continuous monitioring of blood glucose levels. Replace sensor every 10 days. 3 each 11     dulaglutide (TRULICITY) 1.5 MG/0.5ML pen Inject 1.5 mg Subcutaneous  every 7 days 2 mL 5     insulin degludec (TRESIBA) 200 UNIT/ML pen Inject 130 Units Subcutaneous daily 27 mL 3     insulin lispro (HUMALOG KWIKPEN) 100 UNIT/ML injection INJECT 1 UNIT FOR EVERY 2 GRAMS OF CARBS PLUS CORRECTION OF 1 UNIT FOR EVERY 15 POINTS ABOVE 150.(APPROXIMATELY 130 UNITS PER DAY) 45 mL 5     insulin pen needle 32G X 4 MM Use 3 pen needles daily or as directed. 100 each 6     LORazepam (ATIVAN) 1 MG tablet Take 1 tablet (1 mg) by mouth daily as needed for anxiety 30 tablet 2     losartan (COZAAR) 100 MG tablet TAKE 1 TABLET(100 MG) BY MOUTH DAILY       metFORMIN (GLUCOPHAGE) 500 MG tablet Take 2 tablets (1,000 mg) by mouth 2 times daily (with meals) 360 tablet 3     ONETOUCH DELICA LANCETS 33G MISC 1 lancet 4 times daily 100 each 6     sertraline (ZOLOFT) 100 MG tablet Take 1 tablet (100 mg) by mouth daily 90 tablet 1     triamterene-hydrochlorothiazide (MAXZIDE) 75-50 MG per tablet Take 1 tablet by mouth daily 90 tablet 1     OTC products: no recent use of OTC ASA, NSAIDS or Steroids    Allergies   Allergen Reactions     Sulfa Drugs Anaphylaxis     Lisinopril      Other reaction(s): Impotence     Onion      Other reaction(s): Intolerance-Can't Take      Latex Allergy: NO    Social History   Substance Use Topics     Smoking status: Never Smoker     Smokeless tobacco: Never Used     Alcohol use Yes     History   Drug Use No       REVIEW OF SYSTEMS:   CONSTITUTIONAL: NEGATIVE for fever, chills, change in weight  INTEGUMENTARY/SKIN: NEGATIVE for worrisome rashes, moles or lesions  ENT/MOUTH: NEGATIVE for ear, mouth and throat problems  RESP: NEGATIVE for significant cough or SOB  CV: NEGATIVE for chest pain, palpitations or peripheral edema  GI: NEGATIVE for nausea, abdominal pain, heartburn, or change in bowel habits  : NEGATIVE for frequency, dysuria, or hematuria  MUSCULOSKELETAL: NEGATIVE for significant arthralgias or myalgia  NEURO: NEGATIVE for weakness, dizziness or paresthesias  ENDOCRINE:  "NEGATIVE for temperature intolerance, skin/hair changes  HEME: NEGATIVE for bleeding problems  PSYCHIATRIC: NEGATIVE for changes in mood or affect    EXAM:   /86 (BP Location: Left arm, Patient Position: Chair, Cuff Size: Adult Large)  Pulse 114  Temp 98.7  F (37.1  C) (Oral)  Resp 18  Ht 6' 1\" (1.854 m)  Wt (!) 360 lb (163.3 kg)  SpO2 97%  BMI 47.5 kg/m2    GENERAL APPEARANCE: healthy, alert and no distress     HENT: ear canals and TM's normal and nose and mouth without ulcers or lesions     NECK: no adenopathy, no asymmetry, masses, or scars and thyroid normal to palpation     RESP: lungs clear to auscultation - no rales, rhonchi or wheezes     CV: regular rates and rhythm, normal S1 S2, no S3 or S4 and no murmur, click or rub     ABDOMEN:  soft, nontender, no HSM or masses and bowel sounds normal     MS: extremities normal- no gross deformities noted, no evidence of inflammation in joints, FROM in all extremities.     SKIN: no suspicious lesions or rashes     NEURO: Normal strength and tone, sensory exam grossly normal, mentation intact and speech normal     PSYCH: mentation appears normal. and affect normal/bright     LYMPHATICS: No cervical adenopathy    DIAGNOSTICS:   EKG: sinus tachycardia, normal axis, normal intervals, no acute ST/T changes c/w ischemia, no LVH by voltage criteria, unchanged from previous tracings  HGB: 16  Glucose:  Pending.  A1C: 8.9%    IMPRESSION:   Reason for surgery/procedure: morbid obesity  Diagnosis/reason for consult: evaluation and anesthesia risk assessment prior to undergoing surgery    The proposed surgical procedure is considered INTERMEDIATE risk.    REVISED CARDIAC RISK INDEX  The patient has the following serious cardiovascular risks for perioperative complications such as (MI, PE, VFib and 3  AV Block):  Diabetes Mellitus (on Insulin)  INTERPRETATION: 1 risks: Class II (low risk - 0.9% complication rate)    The patient has the following additional risks for " perioperative complications:  Morbid obesity    RECOMMENDATIONS:   Naman was seen today for pre-op exam and flu shot.    Diagnoses and all orders for this visit:    Preop general physical exam  -     EKG 12-lead complete w/read - Clinics  -     CBC with platelets differential  -     Comprehensive metabolic panel  -     Hemoglobin A1c    Morbid obesity with BMI of 45.0-49.9, adult (H)    Type 2 diabetes mellitus with hyperglycemia, with long-term current use of insulin (H): A1C of 8.9%. Instructed to hold metformin and lispro the morning of surgery, decrease tresiba to 70 units the night prior to surgery.      Essential hypertension:  BP today well controlled 132/86.    ACE Inhibitor or Angiotensin Receptor Blocker (ARB) Use:   Ace inhibitor or Angiotensin Receptor Blocker (ARB) and should HOLD this medication for the 24 hours prior to surgery.    --Consult hospital rounder / IM to assist post-op medical management      APPROVAL GIVEN to proceed with proposed procedure, without further diagnostic evaluation       Signed Electronically by: Susan Haase, APRN CNP    Copy of this evaluation report is provided to requesting physician.    [facility] Preop Guidelines    Revised Cardiac Risk Index

## 2018-09-25 NOTE — PROGRESS NOTES
Harris Florence,  Your CBC (checks for anemia and infection) is normal.  Sincerely,  Susan Haase, CNP

## 2018-09-25 NOTE — MR AVS SNAPSHOT
After Visit Summary   9/25/2018    Naman Jones    MRN: [id number]           Patient Information     Date Of Birth          1985        Visit Information        Provider Department      9/25/2018 9:00 AM Haase, Susan Rachele, APRN [facility]        Today's Diagnoses     Preop general physical exam    -  1    Morbid obesity with BMI of 45.0-49.9, adult (H)        Type 2 diabetes mellitus with hyperglycemia, with long-term current use of insulin (H)        Essential hypertension          Care Instructions      Before Your Surgery      Call your surgeon if there is any change in your health. This includes signs of a cold or flu (such as a sore throat, runny nose, cough, rash or fever).    Do not smoke, drink alcohol or take over the counter medicine (unless your surgeon or primary care doctor tells you to) for the 24 hours before and after surgery.    If you take prescribed drugs: Follow your doctor s orders about which medicines to take and which to stop until after surgery.    Eating and drinking prior to surgery: follow the instructions from your surgeon    Take a shower or bath the night before surgery. Use the soap your surgeon gave you to gently clean your skin. If you do not have soap from your surgeon, use your regular soap. Do not shave or scrub the surgery site.  Wear clean pajamas and have clean sheets on your bed.           Follow-ups after your visit        Your next 10 appointments already scheduled     Oct 09, 2018  8:00 AM CDT   [facility] OR with Alden Mcwilliams MD   [facility] ([facility])    [facility] ([facility])   [phone]            Oct 09, 2018   Procedure with Alden Mcwilliams MD   [facility] PeriOP Services (--)    [address]  [facility] [address]   [phone]            Oct 16, 2018  1:00 PM CDT   Bariatric Post Op Global  Visit with Adele Morris Rn, RN   [facility] [place] ([facility])    [address]  [address]  [place] [address]   [phone]            Oct 16, 2018  1:20 PM CDT   Bariatric Post Op Global Visit with Oj Luong PA-C   [facility] [place] ([facility])    [address]  [address]  [place] [address] [phone]            Oct 23, 2018 10:30 AM CDT   Bariatric Post Op Global Visit with Adele Morris Rn, RN   [facility] [place] ([facility])    [address]  [address]  [place] [address] [phone]            Oct 23, 2018 11:00 AM CDT   Return Bariatric Nutrition Visit with Adele Morris Diet 1, RD   [facility] [place] ([facility])    [address]  [address]  [place] [address] [phone]            Oct 26, 2018  2:30 PM CDT   Return Visit with ARDEN Henderson CNP   [facility] ([facility]    [address]  [facility] [id number] [phone]            Nov 12, 2018  9:00 AM CST   Return Bariatric Nutrition Visit with Adele Morris Diet 1, RD   [facility] [place] ([facility])    [address]  [place] [address] [phone]              Who to contact     If you have questions or need follow up information about today's clinic visit or your schedule please contact [facility] directly at [phone].  Normal or non-critical lab and imaging results will be communicated to you by MyChart, letter or phone within 4 business days after the clinic has received the results. If you do not hear from us within 7 days, please contact the clinic through MyChart or phone. If you have a critical or abnormal lab result, we will notify  "you by phone as soon as possible.  Submit refill requests through Look[employer] (powering InternMatch) or call your pharmacy and they will forward the refill request to us. Please allow 3 business days for your refill to be completed.          Additional Information About Your Visit        CitySlickerhart Information     [employer] (powering [employer]) gives you secure access to your electronic health record. If you see a primary care provider, you can also send messages to your care team and make appointments. If you have questions, please call your primary care clinic.  If you do not have a primary care provider, please call [phone] and they will assist you.        Care EveryWhere ID     This is your Care EveryWhere ID. This could be used by other organizations to access your [place] medical records  [id number]        Your Vitals Were     Pulse Temperature Respirations Height Pulse Oximetry BMI (Body Mass Index)    114 98.7  F (37.1  C) (Oral) 18 6' 1\" (1.854 m) 97% 47.5 kg/m2       Blood Pressure from Last 3 Encounters:   09/25/18 132/86   08/16/18 (!) 140/100   07/26/18 126/85    Weight from Last 3 Encounters:   09/25/18 (!) 360 lb (163.3 kg)   08/16/18 (!) 361 lb 8 oz (164 kg)   08/08/18 (!) 361 lb (163.7 kg)              We Performed the Following     CBC with platelets differential     Comprehensive metabolic panel     EKG 12-lead complete w/read - Clinics     Hemoglobin A1c        Primary Care Provider Office Phone # Fax #    Ilana Rachele Haase, APRN [phone] [address] [facility] [address]        Equal Access to Services     [facility]: Hadii aad ku hadasho Soomaali, waaxda luqadaha, qaybta kaalmada adeegyaanatoliy, naman conner . So [facility] [phone].    ATENCIÓN: Si habla español, tiene a dumas disposición servicios gratuitos de asistencia lingüística. Llame al [phone].    We comply with applicable federal civil rights laws and [place] laws. We do not discriminate on the basis of race, color, " national origin, age, disability, sex, sexual orientation, or gender identity.            Thank you!     Thank you for choosing [facility]  for your care. Our goal is always to provide you with excellent care. Hearing back from our patients is one way we can continue to improve our services. Please take a few minutes to complete the written survey that you may receive in the mail after your visit with us. Thank you!             Your Updated Medication List - Protect others around you: Learn how to safely use, store and throw away your medicines at www.disposemymeds.org.          This list is accurate as of 9/25/18  9:23 AM.  Always use your most recent med list.                   Brand Name Dispense Instructions for use Diagnosis    amitriptyline 25 MG tablet    ELAVIL    60 tablet    Take 25-50 mg prior to bed.    Insomnia, unspecified type       atorvastatin 40 MG tablet    LIPITOR    90 tablet    Take 1 tablet (40 mg) by mouth daily    Mixed hyperlipidemia       continuous blood glucose monitoring sensor     3 each    For use with Freestyle Janie Flash  for continuous monitioring of blood glucose levels. Replace sensor every 10 days.    Type 2 diabetes mellitus with hyperglycemia, with long-term current use of insulin (H)       dulaglutide 1.5 MG/0.5ML pen    TRULICITY    2 mL    Inject 1.5 mg Subcutaneous every 7 days    Type 2 diabetes mellitus with hyperglycemia, with long-term current use of insulin (H)       FREESTYLE JANIE READER Noemy     1 Device    1 Device as needed    Type 2 diabetes mellitus with hyperglycemia, with long-term current use of insulin (H)       insulin degludec 200 UNIT/ML pen    TRESIBA    27 mL    Inject 130 Units Subcutaneous daily    Type 2 diabetes mellitus with hyperglycemia, with long-term current use of insulin (H)       insulin lispro 100 UNIT/ML injection    HumaLOG KWIKpen    45 mL    INJECT 1 UNIT FOR EVERY 2 GRAMS OF CARBS PLUS CORRECTION OF 1 UNIT  FOR EVERY 15 POINTS ABOVE 150.(APPROXIMATELY 130 UNITS PER DAY)    Type 2 diabetes mellitus with hyperglycemia, with long-term current use of insulin (H)       insulin pen needle 32G X 4 MM     100 each    Use 3 pen needles daily or as directed.    Diabetes mellitus type 2 in obese (H)       LORazepam 1 MG tablet    ATIVAN    30 tablet    Take 1 tablet (1 mg) by mouth daily as needed for anxiety    JOEL (generalized anxiety disorder)       losartan 100 MG tablet    COZAAR     TAKE 1 TABLET(100 MG) BY MOUTH DAILY    Essential hypertension       metFORMIN 500 MG tablet    GLUCOPHAGE    360 tablet    Take 2 tablets (1,000 mg) by mouth 2 times daily (with meals)    Type 2 diabetes mellitus with hyperglycemia, with long-term current use of insulin (H)       ONETOUCH DELICA LANCETS 33G Misc     100 each    1 lancet 4 times daily    Type 2 diabetes mellitus with hyperglycemia, with long-term current use of insulin (H)       sertraline 100 MG tablet    ZOLOFT    90 tablet    Take 1 tablet (100 mg) by mouth daily    JOEL (generalized anxiety disorder)       triamterene-hydrochlorothiazide 75-50 MG per tablet    MAXZIDE    90 tablet    Take 1 tablet by mouth daily    Essential hypertension

## 2018-09-25 NOTE — PROGRESS NOTES

## 2018-09-25 NOTE — PROGRESS NOTES
Harris Florence,  Your A1C (test for diabetes) has increased to 8.9%. Please continue to monitor carbohydrate intake.  Sincerely,     Susan Haase, CNP

## 2018-09-26 LAB
ALBUMIN SERPL-MCNC: 4.1 G/DL (ref 3.4–5)
ALP SERPL-CCNC: 112 U/L (ref 40–150)
ALT SERPL W P-5'-P-CCNC: 77 U/L (ref 0–70)
ANION GAP SERPL CALCULATED.3IONS-SCNC: 12 MMOL/L (ref 3–14)
AST SERPL W P-5'-P-CCNC: 77 U/L (ref 0–45)
BILIRUB SERPL-MCNC: 0.6 MG/DL (ref 0.2–1.3)
BUN SERPL-MCNC: 11 MG/DL (ref 7–30)
CALCIUM SERPL-MCNC: 9.6 MG/DL (ref 8.5–10.1)
CHLORIDE SERPL-SCNC: 97 MMOL/L (ref 94–109)
CO2 SERPL-SCNC: 24 MMOL/L (ref 20–32)
CREAT SERPL-MCNC: 0.51 MG/DL (ref 0.66–1.25)
GFR SERPL CREATININE-BSD FRML MDRD: >90 ML/MIN/1.7M2
GLUCOSE SERPL-MCNC: 249 MG/DL (ref 70–99)
POTASSIUM SERPL-SCNC: 4 MMOL/L (ref 3.4–5.3)
PROT SERPL-MCNC: 8.1 G/DL (ref 6.8–8.8)
SODIUM SERPL-SCNC: 133 MMOL/L (ref 133–144)

## 2018-09-26 NOTE — PROGRESS NOTES
Harris Florence  Your glucose is elevated at 249 and your liver enzyme levels (AST/ALT) are slightly elevated, I would like you to have these checked again in the next 3 months.  Sincerely,    Susan Haase, CNP

## 2018-09-27 ENCOUNTER — ALLIED HEALTH/NURSE VISIT (OUTPATIENT)
Dept: SURGERY | Facility: CLINIC | Age: 33
End: 2018-09-27
Payer: COMMERCIAL

## 2018-09-27 DIAGNOSIS — E66.01 MORBID OBESITY WITH BMI OF 45.0-49.9, ADULT (H): Primary | ICD-10-CM

## 2018-09-27 PROCEDURE — 99207 ZZC NO CHARGE NURSE ONLY: CPT

## 2018-09-27 NOTE — TELEPHONE ENCOUNTER
Please see message below.  Recommend to f/u in CDE for titration in next few days or with me if we have opening.    Lab Results   Component Value Date    A1C 8.9 09/25/2018    A1C 7.7 07/26/2018    A1C 9.0 04/12/2018    A1C 8.5 07/26/2017    A1C 8.4 07/10/2017     yes:     Diabetes Medication(s)     Biguanides Sig    metFORMIN (GLUCOPHAGE) 500 MG tablet Take 2 tablets (1,000 mg) by mouth 2 times daily (with meals)    Insulin Sig    insulin degludec (TRESIBA) 200 UNIT/ML pen Inject 130 Units Subcutaneous daily    insulin lispro (HUMALOG KWIKPEN) 100 UNIT/ML injection INJECT 1 UNIT FOR EVERY 2 GRAMS OF CARBS PLUS CORRECTION OF 1 UNIT FOR EVERY 15 POINTS ABOVE 150.(APPROXIMATELY 130 UNITS PER DAY)    Incretin Mimetic Agents (GLP-1 Receptor Agonists) Sig    dulaglutide (TRULICITY) 1.5 MG/0.5ML pen Inject 1.5 mg Subcutaneous every 7 days

## 2018-09-27 NOTE — TELEPHONE ENCOUNTER
Naman Madrid is scheduled for RYGBS on 10/9/18.  His most recent HgA1C on 9/25/18 was 8.9 up from 7.7 on 7/26/18.  Could you, Naman, and your team take a look to see if there is any way we could tighten up his control before surgery?    Thanks.    Lilibeth Ying PA-C

## 2018-09-27 NOTE — TELEPHONE ENCOUNTER
Will message surgeon who has RYGBS scheduled for 10/9/18 to see if he would like to postpone surgery to try to tighten his control?  I can talk to Dr. Turpin if needed.  I don't think he is ever going to get that great of control.      Naman Jones did a repeat HgA1C since he was so close to 8.   HgA1C 9/25/18  8.9   HgA1C 7/26/18. 7.7   RYGBS scheduled for 10/9/18     Sees endocrinologist: Dr. Turpin.     On [employer] continuous blood glucose monitoring system.   Meds:   Humalog- short acting insulin   Trisiba- long acting insulin   Metformin   Trulicity     6098-7236 HGA1C ranges   high 10.4   low 7.7   Avg 8.9       Component      Latest Ref Rng & Units 3/13/2017 5/15/2017 7/10/2017 7/26/2017   Hemoglobin A1C      0 - 5.6 % 9.4 (H) 10.4 (H) 8.4 (H) 8.5 (H)     Component      Latest Ref Rng & Units 4/12/2018 7/26/2018 9/25/2018   Hemoglobin A1C      0 - 5.6 % 9.0 (H) 7.7 (H) 8.9 (H)

## 2018-09-27 NOTE — MR AVS SNAPSHOT
After Visit Summary   9/27/2018    Naman Jones    MRN: [id number]           Patient Information     Date Of Birth          1985        Visit Information        Provider Department      9/27/2018 9:00 AM Rn, Adele Morris RN [facility]      Today's Diagnoses     Morbid obesity with BMI of 45.0-49.9, adult (H)    -  1       Follow-ups after your visit        Your next 10 appointments already scheduled     Oct 09, 2018  8:00 AM CDT   [facility] OR with Alden Mcwilliams MD   [facility] ([facility])    [facility] ([facility])   [phone]            Oct 09, 2018   Procedure with Alden Mcwilliams MD   [facility] PeriOP Services (--)    [address] [place], [place] [address], [address]  [facility] [address]   [phone]            Oct 16, 2018  1:00 PM CDT   Bariatric Post Op Global Visit with Adele Morris Rn RN   [facility])    [address] [place] [address]  [address] [place] [address]   [phone]            Oct 16, 2018  1:20 PM CDT   Bariatric Post Op Global Visit with Oj Luong PA-C   [facility] ([facility])    [address] [place] [address]  [address]  [facility] [address]   [phone]            Oct 23, 2018 10:30 AM CDT   Bariatric Post Op Global Visit with Adele Morris Rn, RN   [facility] ([facility])    [address] [place] [address]  [address]  [place] [address]   [phone]            Oct 23, 2018 11:00 AM CDT   Return Bariatric Nutrition Visit with Adele Landa 1, RD   [facility] ([facility])    [address] [place] [address]  [address]  [place] [address]   [phone]            Oct 26, 2018   2:30 PM CDT   Return Visit with ARDEN Henderson CNP   [facility] ([facility])    [address]  [facility] [address] [phone]            Nov 12, 2018  9:00 AM CST   Return Bariatric Nutrition Visit with Adele Morris Diet 1, RD   [facility] ([facility])    [address] [place], [place] [address]  [facility] [address] [phone]              Who to contact     If you have questions or need follow up information about today's clinic visit or your schedule please contact [facility] [place] [facility] directly at [phone].  Normal or non-critical lab and imaging results will be communicated to you by Rollbarhart, letter or phone within 4 business days after the clinic has received the results. If you do not hear from us within 7 days, please contact the clinic through Rollbarhart or phone. If you have a critical or abnormal lab result, we will notify you by phone as soon as possible.  Submit refill requests through New.net or call your pharmacy and they will forward the refill request to us. Please allow 3 business days for your refill to be completed.          Additional Information About Your Visit        New.net Information     New.net gives you secure access to your electronic health record. If you see a primary care provider, you can also send messages to your care team and make appointments. If you have questions, please call your primary care clinic.  If you do not have a primary care provider, please call [phone] and they will assist you.        Care EveryWhere ID     This is your Care EveryWhere ID. This could be used by other organizations to access your [place] medical records  [id number]         Blood Pressure from Last 3 Encounters:   09/25/18 132/86   08/16/18 (!) 140/100   07/26/18 126/85    Weight from Last 3 Encounters:   09/25/18 (!) 360 lb (163.3 kg)   08/16/18 (!) 361  lb 8 oz (164 kg)   08/08/18 (!) 361 lb (163.7 kg)              Today, you had the following     No orders found for display       Primary Care Provider Office Phone # Fax #    Susan Rachele Haase, APRN [phone] [address] [facility] [address]        Equal Access to Services     [facility]: Hadii aad ku hadasho Soomaali, waaxda luqadaha, qaybta kaalmada adeegyada, waxay idiin hayaan adeeg kharash la'aamilton . So [facility] [phone].    ATENCIÓN: Si habla español, tiene a dumas disposición servicios gratuitos de asistencia lingüística. Romanaame al [phone].    We comply with applicable federal civil rights laws and [place] laws. We do not discriminate on the basis of race, color, national origin, age, disability, sex, sexual orientation, or gender identity.            Thank you!     Thank you for choosing [facility]  for your care. Our goal is always to provide you with excellent care. Hearing back from our patients is one way we can continue to improve our services. Please take a few minutes to complete the written survey that you may receive in the mail after your visit with us. Thank you!             Your Updated Medication List - Protect others around you: Learn how to safely use, store and throw away your medicines at www.disposemymeds.org.          This list is accurate as of 9/27/18 11:52 AM.  Always use your most recent med list.                   Brand Name Dispense Instructions for use Diagnosis    amitriptyline 25 MG tablet    ELAVIL    60 tablet    Take 25-50 mg prior to bed.    Insomnia, unspecified type       atorvastatin 40 MG tablet    LIPITOR    90 tablet    Take 1 tablet (40 mg) by mouth daily    Mixed hyperlipidemia       continuous blood glucose monitoring sensor     3 each    For use with Freestyle Janie Flash  for continuous monitioring of blood glucose levels. Replace sensor every 10 days.    Type 2 diabetes mellitus with  hyperglycemia, with long-term current use of insulin (H)       dulaglutide 1.5 MG/0.5ML pen    TRULICITY    2 mL    Inject 1.5 mg Subcutaneous every 7 days    Type 2 diabetes mellitus with hyperglycemia, with long-term current use of insulin (H)       FREESTYLE KEILA READER Noemy     1 Device    1 Device as needed    Type 2 diabetes mellitus with hyperglycemia, with long-term current use of insulin (H)       insulin degludec 200 UNIT/ML pen    TRESIBA    27 mL    Inject 130 Units Subcutaneous daily    Type 2 diabetes mellitus with hyperglycemia, with long-term current use of insulin (H)       insulin lispro 100 UNIT/ML injection    HumaLOG KWIKpen    45 mL    INJECT 1 UNIT FOR EVERY 2 GRAMS OF CARBS PLUS CORRECTION OF 1 UNIT FOR EVERY 15 POINTS ABOVE 150.(APPROXIMATELY 130 UNITS PER DAY)    Type 2 diabetes mellitus with hyperglycemia, with long-term current use of insulin (H)       insulin pen needle 32G X 4 MM     100 each    Use 3 pen needles daily or as directed.    Diabetes mellitus type 2 in obese (H)       LORazepam 1 MG tablet    ATIVAN    30 tablet    Take 1 tablet (1 mg) by mouth daily as needed for anxiety    JOEL (generalized anxiety disorder)       losartan 100 MG tablet    COZAAR     TAKE 1 TABLET(100 MG) BY MOUTH DAILY    Essential hypertension       metFORMIN 500 MG tablet    GLUCOPHAGE    360 tablet    Take 2 tablets (1,000 mg) by mouth 2 times daily (with meals)    Type 2 diabetes mellitus with hyperglycemia, with long-term current use of insulin (H)       ONETOUCH DELICA LANCETS 33G Misc     100 each    1 lancet 4 times daily    Type 2 diabetes mellitus with hyperglycemia, with long-term current use of insulin (H)       sertraline 100 MG tablet    ZOLOFT    90 tablet    Take 1 tablet (100 mg) by mouth daily    JOEL (generalized anxiety disorder)       triamterene-hydrochlorothiazide 75-50 MG per tablet    MAXZIDE    90 tablet    Take 1 tablet by mouth daily    Essential hypertension

## 2018-09-27 NOTE — PROGRESS NOTES
Bariatric pre op class completed.  Class power point and patient checklist information gone over with patient.  Patient verbalized understanding of tasks needed to complete before surgery.  Patient also verbalized understanding of the power point and patient checklist information.  All questions were answered.  Quiz was completed.  Patient was advised to call if further questions.  Sena Barrios, MS, RD, RN

## 2018-09-27 NOTE — TELEPHONE ENCOUNTER
Will do.  I'll let Jennifer and Sena know to keep him on schedule and contact Dr. Turpin and pt in mean time.    MKD.  ===View-only below this line===    ----- Message -----     From: Alden Mcwilliams MD     Sent: 9/27/2018  12:19 PM       To: EFRAÍN RodriguezC  Subject: RE:                                              I agree, he will probably never get it under that tight control until he loses the weight, and because of that diabete is the reason he s doing this the most. If you can touch base with [facility] to see if they can do something different, that would be great but otherwise we can still proceed. Thank you

## 2018-10-08 NOTE — H&P (VIEW-ONLY)
[facility]  [address] [facility] [address]  [phone]  Dept: [phone]    PRE-OP EVALUATION:  Today's date: 2018    Naman Jones (: 1985) presents for pre-operative evaluation assessment as requested by Dr. Alden chew.  He requires evaluation and anesthesia risk assessment prior to undergoing surgery/procedure for treatment of obesity .    Fax number for surgical facility: [facility]  Primary Physician: Haase, Susan Rachele  Type of Anesthesia Anticipated: General    Patient has a Health Care Directive or Living Will:  NO    Preop Questions 2018   Who is doing your surgery? alden chew   What are you having done? jackie en y gastric bypass   Date of Surgery/Procedure: oct 9   [facility]   1.  Do you have a history of Heart attack, stroke, stent, coronary bypass surgery, or other heart surgery? No   2.  Do you ever have any pain or discomfort in your chest? No   3.  Do you have a history of  Heart Failure? No   4.   Are you troubled by shortness of breath when:  walking on a level surface, or up a slight hill, or at night? No   5.  Do you currently have a cold, bronchitis or other respiratory infection? No   6.  Do you have a cough, shortness of breath, or wheezing? No   7.  Do you sometimes get pains in the calves of your legs when you walk? No   8. Do you or anyone in your family have previous history of blood clots? No   9.  Do you or does anyone in your family have a serious bleeding problem such as prolonged bleeding following surgeries or cuts? No   10. Have you ever had problems with anemia or been told to take iron pills? No   11. Have you had any abnormal blood loss such as black, tarry or bloody stools? No   12. Have you ever had a blood transfusion? No   13. Have you or any of your relatives ever had problems with anesthesia? No   14. Do you have sleep apnea,  excessive snoring or daytime drowsiness? No   15. Do you have any prosthetic heart valves? No   16. Do you have prosthetic joints? No     HPI:     HPI related to upcoming procedure:morbid obesity, has tried calorie counting, atkins and [place] diet without significant weigh loss.  BMI of 46.1.    Type 2 DM: well controlled, last A1C 7.7%. On  tresiba 130 units daily and lispro sliding scale.   Hypertension:  BP today 132/86, taking lisinopril, trimaterene and hydrochlorothiazide.     MEDICAL HISTORY:     Patient Active Problem List    Diagnosis Date Noted     Insomnia, unspecified type 07/20/2018     Priority: Medium     Morbid obesity with BMI of 45.0-49.9, adult (H) 06/13/2017     Priority: Medium     Essential hypertension 06/13/2017     Priority: Medium     Mixed hyperlipidemia 06/13/2017     Priority: Medium     Diabetes mellitus type 2 in obese (H) 06/13/2017     Priority: Medium     Plantar fasciitis 06/13/2017     Priority: Medium     Chronic pain of right knee 06/13/2017     Priority: Medium     JOEL (generalized anxiety disorder) 06/13/2017     Priority: Medium     Type 2 diabetes mellitus with hyperglycemia, with long-term current use of insulin (H) 06/13/2017     Priority: Medium      No past medical history on file.  Past Surgical History:   Procedure Laterality Date     ORTHOPEDIC SURGERY Right 02/2016     Current Outpatient Prescriptions   Medication Sig Dispense Refill     amitriptyline (ELAVIL) 25 MG tablet Take 25-50 mg prior to bed. 60 tablet 1     atorvastatin (LIPITOR) 40 MG tablet Take 1 tablet (40 mg) by mouth daily 90 tablet 3     Continuous Blood Gluc  (FREESTYLE KEILA READER) SUGAR 1 Device as needed 1 Device 0     continuous blood glucose monitoring (FREESTYLE KEILA) sensor For use with Freestyle Keila Flash  for continuous monitioring of blood glucose levels. Replace sensor every 10 days. 3 each 11     dulaglutide (TRULICITY) 1.5 MG/0.5ML pen Inject 1.5 mg Subcutaneous  every 7 days 2 mL 5     insulin degludec (TRESIBA) 200 UNIT/ML pen Inject 130 Units Subcutaneous daily 27 mL 3     insulin lispro (HUMALOG KWIKPEN) 100 UNIT/ML injection INJECT 1 UNIT FOR EVERY 2 GRAMS OF CARBS PLUS CORRECTION OF 1 UNIT FOR EVERY 15 POINTS ABOVE 150.(APPROXIMATELY 130 UNITS PER DAY) 45 mL 5     insulin pen needle 32G X 4 MM Use 3 pen needles daily or as directed. 100 each 6     LORazepam (ATIVAN) 1 MG tablet Take 1 tablet (1 mg) by mouth daily as needed for anxiety 30 tablet 2     losartan (COZAAR) 100 MG tablet TAKE 1 TABLET(100 MG) BY MOUTH DAILY       metFORMIN (GLUCOPHAGE) 500 MG tablet Take 2 tablets (1,000 mg) by mouth 2 times daily (with meals) 360 tablet 3     ONETOUCH DELICA LANCETS 33G MISC 1 lancet 4 times daily 100 each 6     sertraline (ZOLOFT) 100 MG tablet Take 1 tablet (100 mg) by mouth daily 90 tablet 1     triamterene-hydrochlorothiazide (MAXZIDE) 75-50 MG per tablet Take 1 tablet by mouth daily 90 tablet 1     OTC products: no recent use of OTC ASA, NSAIDS or Steroids    Allergies   Allergen Reactions     Sulfa Drugs Anaphylaxis     Lisinopril      Other reaction(s): Impotence     Onion      Other reaction(s): Intolerance-Can't Take      Latex Allergy: NO    Social History   Substance Use Topics     Smoking status: Never Smoker     Smokeless tobacco: Never Used     Alcohol use Yes     History   Drug Use No       REVIEW OF SYSTEMS:   CONSTITUTIONAL: NEGATIVE for fever, chills, change in weight  INTEGUMENTARY/SKIN: NEGATIVE for worrisome rashes, moles or lesions  ENT/MOUTH: NEGATIVE for ear, mouth and throat problems  RESP: NEGATIVE for significant cough or SOB  CV: NEGATIVE for chest pain, palpitations or peripheral edema  GI: NEGATIVE for nausea, abdominal pain, heartburn, or change in bowel habits  : NEGATIVE for frequency, dysuria, or hematuria  MUSCULOSKELETAL: NEGATIVE for significant arthralgias or myalgia  NEURO: NEGATIVE for weakness, dizziness or paresthesias  ENDOCRINE:  "NEGATIVE for temperature intolerance, skin/hair changes  HEME: NEGATIVE for bleeding problems  PSYCHIATRIC: NEGATIVE for changes in mood or affect    EXAM:   /86 (BP Location: Left arm, Patient Position: Chair, Cuff Size: Adult Large)  Pulse 114  Temp 98.7  F (37.1  C) (Oral)  Resp 18  Ht 6' 1\" (1.854 m)  Wt (!) 360 lb (163.3 kg)  SpO2 97%  BMI 47.5 kg/m2    GENERAL APPEARANCE: healthy, alert and no distress     HENT: ear canals and TM's normal and nose and mouth without ulcers or lesions     NECK: no adenopathy, no asymmetry, masses, or scars and thyroid normal to palpation     RESP: lungs clear to auscultation - no rales, rhonchi or wheezes     CV: regular rates and rhythm, normal S1 S2, no S3 or S4 and no murmur, click or rub     ABDOMEN:  soft, nontender, no HSM or masses and bowel sounds normal     MS: extremities normal- no gross deformities noted, no evidence of inflammation in joints, FROM in all extremities.     SKIN: no suspicious lesions or rashes     NEURO: Normal strength and tone, sensory exam grossly normal, mentation intact and speech normal     PSYCH: mentation appears normal. and affect normal/bright     LYMPHATICS: No cervical adenopathy    DIAGNOSTICS:   EKG: sinus tachycardia, normal axis, normal intervals, no acute ST/T changes c/w ischemia, no LVH by voltage criteria, unchanged from previous tracings  HGB: 16  Glucose:  Pending.  A1C: 8.9%    IMPRESSION:   Reason for surgery/procedure: morbid obesity  Diagnosis/reason for consult: evaluation and anesthesia risk assessment prior to undergoing surgery    The proposed surgical procedure is considered INTERMEDIATE risk.    REVISED CARDIAC RISK INDEX  The patient has the following serious cardiovascular risks for perioperative complications such as (MI, PE, VFib and 3  AV Block):  Diabetes Mellitus (on Insulin)  INTERPRETATION: 1 risks: Class II (low risk - 0.9% complication rate)    The patient has the following additional risks for " perioperative complications:  Morbid obesity    RECOMMENDATIONS:   Naman was seen today for pre-op exam and flu shot.    Diagnoses and all orders for this visit:    Preop general physical exam  -     EKG 12-lead complete w/read - Clinics  -     CBC with platelets differential  -     Comprehensive metabolic panel  -     Hemoglobin A1c    Morbid obesity with BMI of 45.0-49.9, adult (H)    Type 2 diabetes mellitus with hyperglycemia, with long-term current use of insulin (H): A1C of 8.9%. Instructed to hold metformin and lispro the morning of surgery, decrease tresiba to 70 units the night prior to surgery.      Essential hypertension:  BP today well controlled 132/86.    ACE Inhibitor or Angiotensin Receptor Blocker (ARB) Use:   Ace inhibitor or Angiotensin Receptor Blocker (ARB) and should HOLD this medication for the 24 hours prior to surgery.    --Consult hospital rounder / IM to assist post-op medical management      APPROVAL GIVEN to proceed with proposed procedure, without further diagnostic evaluation       Signed Electronically by: Susan Haase, APRN CNP    Copy of this evaluation report is provided to requesting physician.    [facility] Preop Guidelines    Revised Cardiac Risk Index

## 2018-10-09 ENCOUNTER — SURGERY (OUTPATIENT)
Age: 33
End: 2018-10-09

## 2018-10-09 ENCOUNTER — ANESTHESIA (OUTPATIENT)
Dept: SURGERY | Facility: CLINIC | Age: 33
DRG: 621 | End: 2018-10-09
Payer: COMMERCIAL

## 2018-10-09 ENCOUNTER — APPOINTMENT (OUTPATIENT)
Dept: SURGERY | Facility: PHYSICIAN GROUP | Age: 33
End: 2018-10-09
Payer: COMMERCIAL

## 2018-10-09 ENCOUNTER — ANESTHESIA EVENT (OUTPATIENT)
Dept: SURGERY | Facility: CLINIC | Age: 33
DRG: 621 | End: 2018-10-09
Payer: COMMERCIAL

## 2018-10-09 ENCOUNTER — HOSPITAL ENCOUNTER (INPATIENT)
Facility: CLINIC | Age: 33
LOS: 2 days | Discharge: HOME | DRG: 621 | End: 2018-10-11
Attending: SURGERY | Admitting: SURGERY
Payer: COMMERCIAL

## 2018-10-09 DIAGNOSIS — Z98.84 STATUS POST BARIATRIC SURGERY: ICD-10-CM

## 2018-10-09 DIAGNOSIS — Z98.890 PONV (POSTOPERATIVE NAUSEA AND VOMITING): Primary | ICD-10-CM

## 2018-10-09 DIAGNOSIS — R11.2 PONV (POSTOPERATIVE NAUSEA AND VOMITING): Primary | ICD-10-CM

## 2018-10-09 DIAGNOSIS — G89.18 ACUTE POST-OPERATIVE PAIN: ICD-10-CM

## 2018-10-09 LAB
CREAT SERPL-MCNC: 0.6 MG/DL (ref 0.66–1.25)
GFR SERPL CREATININE-BSD FRML MDRD: >90 ML/MIN/1.7M2
GLUCOSE BLDC GLUCOMTR-MCNC: 156 MG/DL (ref 70–99)
GLUCOSE BLDC GLUCOMTR-MCNC: 240 MG/DL (ref 70–99)
GLUCOSE BLDC GLUCOMTR-MCNC: 259 MG/DL (ref 70–99)
GLUCOSE BLDC GLUCOMTR-MCNC: 261 MG/DL (ref 70–99)
GLUCOSE SERPL-MCNC: 160 MG/DL (ref 70–99)
PLATELET # BLD AUTO: 215 10E9/L (ref 150–450)
POTASSIUM SERPL-SCNC: 3.6 MMOL/L (ref 3.4–5.3)

## 2018-10-09 PROCEDURE — 25000125 ZZHC RX 250: Performed by: PHYSICIAN ASSISTANT

## 2018-10-09 PROCEDURE — 43644 LAP GASTRIC BYPASS/ROUX-EN-Y: CPT | Performed by: SURGERY

## 2018-10-09 PROCEDURE — 36415 COLL VENOUS BLD VENIPUNCTURE: CPT | Performed by: PHYSICIAN ASSISTANT

## 2018-10-09 PROCEDURE — 25800025 ZZH RX 258: Performed by: SURGERY

## 2018-10-09 PROCEDURE — 25000125 ZZHC RX 250: Performed by: SURGERY

## 2018-10-09 PROCEDURE — 36415 COLL VENOUS BLD VENIPUNCTURE: CPT | Performed by: ANESTHESIOLOGY

## 2018-10-09 PROCEDURE — 25000128 H RX IP 250 OP 636: Performed by: NURSE ANESTHETIST, CERTIFIED REGISTERED

## 2018-10-09 PROCEDURE — 37000009 ZZH ANESTHESIA TECHNICAL FEE, EACH ADDTL 15 MIN: Performed by: SURGERY

## 2018-10-09 PROCEDURE — 43644 LAP GASTRIC BYPASS/ROUX-EN-Y: CPT | Mod: AS | Performed by: PHYSICIAN ASSISTANT

## 2018-10-09 PROCEDURE — 25000125 ZZHC RX 250: Performed by: NURSE ANESTHETIST, CERTIFIED REGISTERED

## 2018-10-09 PROCEDURE — 00000146 ZZHCL STATISTIC GLUCOSE BY METER IP

## 2018-10-09 PROCEDURE — 36000063 ZZH SURGERY LEVEL 4 EA 15 ADDTL MIN: Performed by: SURGERY

## 2018-10-09 PROCEDURE — 82947 ASSAY GLUCOSE BLOOD QUANT: CPT | Performed by: ANESTHESIOLOGY

## 2018-10-09 PROCEDURE — 25000128 H RX IP 250 OP 636: Performed by: ANESTHESIOLOGY

## 2018-10-09 PROCEDURE — 27210794 ZZH OR GENERAL SUPPLY STERILE: Performed by: SURGERY

## 2018-10-09 PROCEDURE — 12000007 ZZH R&B INTERMEDIATE

## 2018-10-09 PROCEDURE — 25000566 ZZH SEVOFLURANE, EA 15 MIN: Performed by: SURGERY

## 2018-10-09 PROCEDURE — 71000013 ZZH RECOVERY PHASE 1 LEVEL 1 EA ADDTL HR: Performed by: SURGERY

## 2018-10-09 PROCEDURE — 84132 ASSAY OF SERUM POTASSIUM: CPT | Performed by: ANESTHESIOLOGY

## 2018-10-09 PROCEDURE — 71000012 ZZH RECOVERY PHASE 1 LEVEL 1 FIRST HR: Performed by: SURGERY

## 2018-10-09 PROCEDURE — 25000131 ZZH RX MED GY IP 250 OP 636 PS 637: Performed by: PHYSICIAN ASSISTANT

## 2018-10-09 PROCEDURE — 85049 AUTOMATED PLATELET COUNT: CPT | Performed by: PHYSICIAN ASSISTANT

## 2018-10-09 PROCEDURE — 25000128 H RX IP 250 OP 636: Performed by: PHYSICIAN ASSISTANT

## 2018-10-09 PROCEDURE — 36000093 ZZH SURGERY LEVEL 4 1ST 30 MIN: Performed by: SURGERY

## 2018-10-09 PROCEDURE — 25000128 H RX IP 250 OP 636: Performed by: SURGERY

## 2018-10-09 PROCEDURE — 82565 ASSAY OF CREATININE: CPT | Performed by: PHYSICIAN ASSISTANT

## 2018-10-09 PROCEDURE — 40000170 ZZH STATISTIC PRE-PROCEDURE ASSESSMENT II: Performed by: SURGERY

## 2018-10-09 PROCEDURE — 0D164ZA BYPASS STOMACH TO JEJUNUM, PERCUTANEOUS ENDOSCOPIC APPROACH: ICD-10-PCS | Performed by: SURGERY

## 2018-10-09 PROCEDURE — 37000008 ZZH ANESTHESIA TECHNICAL FEE, 1ST 30 MIN: Performed by: SURGERY

## 2018-10-09 RX ORDER — DIPHENHYDRAMINE HYDROCHLORIDE 50 MG/ML
25 INJECTION INTRAMUSCULAR; INTRAVENOUS EVERY 6 HOURS PRN
Status: DISCONTINUED | OUTPATIENT
Start: 2018-10-09 | End: 2018-10-11 | Stop reason: HOSPADM

## 2018-10-09 RX ORDER — DEXTROSE MONOHYDRATE 25 G/50ML
25-50 INJECTION, SOLUTION INTRAVENOUS
Status: DISCONTINUED | OUTPATIENT
Start: 2018-10-09 | End: 2018-10-11 | Stop reason: HOSPADM

## 2018-10-09 RX ORDER — MAGNESIUM HYDROXIDE 1200 MG/15ML
LIQUID ORAL PRN
Status: DISCONTINUED | OUTPATIENT
Start: 2018-10-09 | End: 2018-10-09 | Stop reason: HOSPADM

## 2018-10-09 RX ORDER — DIPHENHYDRAMINE HCL 25 MG
25 CAPSULE ORAL EVERY 6 HOURS PRN
Status: DISCONTINUED | OUTPATIENT
Start: 2018-10-09 | End: 2018-10-11 | Stop reason: HOSPADM

## 2018-10-09 RX ORDER — CEFAZOLIN SODIUM IN 0.9 % NACL 3 G/100 ML
3 INTRAVENOUS SOLUTION, PIGGYBACK (ML) INTRAVENOUS
Status: COMPLETED | OUTPATIENT
Start: 2018-10-09 | End: 2018-10-09

## 2018-10-09 RX ORDER — KETOROLAC TROMETHAMINE 15 MG/ML
15 INJECTION, SOLUTION INTRAMUSCULAR; INTRAVENOUS
Status: DISCONTINUED | OUTPATIENT
Start: 2018-10-09 | End: 2018-10-09 | Stop reason: HOSPADM

## 2018-10-09 RX ORDER — LIDOCAINE HYDROCHLORIDE 20 MG/ML
INJECTION, SOLUTION INFILTRATION; PERINEURAL PRN
Status: DISCONTINUED | OUTPATIENT
Start: 2018-10-09 | End: 2018-10-09

## 2018-10-09 RX ORDER — SERTRALINE HYDROCHLORIDE 100 MG/1
100 TABLET, FILM COATED ORAL DAILY
Status: DISCONTINUED | OUTPATIENT
Start: 2018-10-10 | End: 2018-10-11 | Stop reason: HOSPADM

## 2018-10-09 RX ORDER — LOSARTAN POTASSIUM 100 MG/1
100 TABLET ORAL DAILY
Status: DISCONTINUED | OUTPATIENT
Start: 2018-10-10 | End: 2018-10-11 | Stop reason: HOSPADM

## 2018-10-09 RX ORDER — PROPOFOL 10 MG/ML
INJECTION, EMULSION INTRAVENOUS PRN
Status: DISCONTINUED | OUTPATIENT
Start: 2018-10-09 | End: 2018-10-09

## 2018-10-09 RX ORDER — SODIUM CHLORIDE, SODIUM LACTATE, POTASSIUM CHLORIDE, CALCIUM CHLORIDE 600; 310; 30; 20 MG/100ML; MG/100ML; MG/100ML; MG/100ML
INJECTION, SOLUTION INTRAVENOUS CONTINUOUS
Status: DISCONTINUED | OUTPATIENT
Start: 2018-10-09 | End: 2018-10-09 | Stop reason: HOSPADM

## 2018-10-09 RX ORDER — ALBUTEROL SULFATE 0.83 MG/ML
2.5 SOLUTION RESPIRATORY (INHALATION) EVERY 4 HOURS PRN
Status: DISCONTINUED | OUTPATIENT
Start: 2018-10-09 | End: 2018-10-09 | Stop reason: HOSPADM

## 2018-10-09 RX ORDER — MEPERIDINE HYDROCHLORIDE 25 MG/ML
12.5 INJECTION INTRAMUSCULAR; INTRAVENOUS; SUBCUTANEOUS EVERY 5 MIN PRN
Status: DISCONTINUED | OUTPATIENT
Start: 2018-10-09 | End: 2018-10-09 | Stop reason: HOSPADM

## 2018-10-09 RX ORDER — ALBUTEROL SULFATE 0.83 MG/ML
2.5 SOLUTION RESPIRATORY (INHALATION)
Status: DISCONTINUED | OUTPATIENT
Start: 2018-10-09 | End: 2018-10-09 | Stop reason: HOSPADM

## 2018-10-09 RX ORDER — KETOROLAC TROMETHAMINE 30 MG/ML
INJECTION, SOLUTION INTRAMUSCULAR; INTRAVENOUS PRN
Status: DISCONTINUED | OUTPATIENT
Start: 2018-10-09 | End: 2018-10-09

## 2018-10-09 RX ORDER — FENTANYL CITRATE 50 UG/ML
25-50 INJECTION, SOLUTION INTRAMUSCULAR; INTRAVENOUS
Status: DISCONTINUED | OUTPATIENT
Start: 2018-10-09 | End: 2018-10-09 | Stop reason: HOSPADM

## 2018-10-09 RX ORDER — HEPARIN SODIUM 5000 [USP'U]/.5ML
5000 INJECTION, SOLUTION INTRAVENOUS; SUBCUTANEOUS
Status: COMPLETED | OUTPATIENT
Start: 2018-10-09 | End: 2018-10-09

## 2018-10-09 RX ORDER — LORAZEPAM 2 MG/ML
.5-1 INJECTION INTRAMUSCULAR
Status: DISCONTINUED | OUTPATIENT
Start: 2018-10-09 | End: 2018-10-09 | Stop reason: HOSPADM

## 2018-10-09 RX ORDER — NALOXONE HYDROCHLORIDE 0.4 MG/ML
.1-.4 INJECTION, SOLUTION INTRAMUSCULAR; INTRAVENOUS; SUBCUTANEOUS
Status: DISCONTINUED | OUTPATIENT
Start: 2018-10-09 | End: 2018-10-09

## 2018-10-09 RX ORDER — OXYCODONE HYDROCHLORIDE 5 MG/1
5-10 TABLET ORAL
Status: DISCONTINUED | OUTPATIENT
Start: 2018-10-09 | End: 2018-10-11 | Stop reason: HOSPADM

## 2018-10-09 RX ORDER — DEXAMETHASONE SODIUM PHOSPHATE 4 MG/ML
INJECTION, SOLUTION INTRA-ARTICULAR; INTRALESIONAL; INTRAMUSCULAR; INTRAVENOUS; SOFT TISSUE PRN
Status: DISCONTINUED | OUTPATIENT
Start: 2018-10-09 | End: 2018-10-09

## 2018-10-09 RX ORDER — ONDANSETRON 4 MG/1
4 TABLET, ORALLY DISINTEGRATING ORAL EVERY 30 MIN PRN
Status: DISCONTINUED | OUTPATIENT
Start: 2018-10-09 | End: 2018-10-09 | Stop reason: HOSPADM

## 2018-10-09 RX ORDER — FENTANYL CITRATE 50 UG/ML
INJECTION, SOLUTION INTRAMUSCULAR; INTRAVENOUS PRN
Status: DISCONTINUED | OUTPATIENT
Start: 2018-10-09 | End: 2018-10-09

## 2018-10-09 RX ORDER — ONDANSETRON 2 MG/ML
4 INJECTION INTRAMUSCULAR; INTRAVENOUS EVERY 30 MIN PRN
Status: DISCONTINUED | OUTPATIENT
Start: 2018-10-09 | End: 2018-10-09 | Stop reason: HOSPADM

## 2018-10-09 RX ORDER — LIDOCAINE 40 MG/G
CREAM TOPICAL
Status: DISCONTINUED | OUTPATIENT
Start: 2018-10-09 | End: 2018-10-09 | Stop reason: HOSPADM

## 2018-10-09 RX ORDER — ACETAMINOPHEN 325 MG/1
650 TABLET ORAL EVERY 4 HOURS PRN
Status: DISCONTINUED | OUTPATIENT
Start: 2018-10-12 | End: 2018-10-11 | Stop reason: HOSPADM

## 2018-10-09 RX ORDER — CEFAZOLIN SODIUM 2 G/100ML
2 INJECTION, SOLUTION INTRAVENOUS EVERY 8 HOURS
Status: COMPLETED | OUTPATIENT
Start: 2018-10-09 | End: 2018-10-10

## 2018-10-09 RX ORDER — ATORVASTATIN CALCIUM 40 MG/1
40 TABLET, FILM COATED ORAL DAILY
Status: DISCONTINUED | OUTPATIENT
Start: 2018-10-10 | End: 2018-10-11 | Stop reason: HOSPADM

## 2018-10-09 RX ORDER — NICOTINE POLACRILEX 4 MG
15-30 LOZENGE BUCCAL
Status: DISCONTINUED | OUTPATIENT
Start: 2018-10-09 | End: 2018-10-11 | Stop reason: HOSPADM

## 2018-10-09 RX ORDER — SODIUM CHLORIDE, SODIUM LACTATE, POTASSIUM CHLORIDE, CALCIUM CHLORIDE 600; 310; 30; 20 MG/100ML; MG/100ML; MG/100ML; MG/100ML
INJECTION, SOLUTION INTRAVENOUS CONTINUOUS
Status: DISCONTINUED | OUTPATIENT
Start: 2018-10-09 | End: 2018-10-11 | Stop reason: HOSPADM

## 2018-10-09 RX ORDER — SIMETHICONE 20 MG/.3ML
100 EMULSION ORAL 4 TIMES DAILY
Status: DISCONTINUED | OUTPATIENT
Start: 2018-10-09 | End: 2018-10-11 | Stop reason: HOSPADM

## 2018-10-09 RX ORDER — ONDANSETRON 2 MG/ML
INJECTION INTRAMUSCULAR; INTRAVENOUS PRN
Status: DISCONTINUED | OUTPATIENT
Start: 2018-10-09 | End: 2018-10-09

## 2018-10-09 RX ORDER — PROPOFOL 10 MG/ML
INJECTION, EMULSION INTRAVENOUS CONTINUOUS PRN
Status: DISCONTINUED | OUTPATIENT
Start: 2018-10-09 | End: 2018-10-09

## 2018-10-09 RX ORDER — ONDANSETRON 2 MG/ML
4 INJECTION INTRAMUSCULAR; INTRAVENOUS EVERY 6 HOURS PRN
Status: DISCONTINUED | OUTPATIENT
Start: 2018-10-09 | End: 2018-10-11 | Stop reason: HOSPADM

## 2018-10-09 RX ORDER — ONDANSETRON 4 MG/1
4 TABLET, ORALLY DISINTEGRATING ORAL EVERY 6 HOURS PRN
Status: DISCONTINUED | OUTPATIENT
Start: 2018-10-09 | End: 2018-10-11 | Stop reason: HOSPADM

## 2018-10-09 RX ORDER — ACETAMINOPHEN 325 MG/1
975 TABLET ORAL EVERY 8 HOURS
Status: DISCONTINUED | OUTPATIENT
Start: 2018-10-09 | End: 2018-10-11 | Stop reason: HOSPADM

## 2018-10-09 RX ORDER — LIDOCAINE 40 MG/G
CREAM TOPICAL
Status: DISCONTINUED | OUTPATIENT
Start: 2018-10-09 | End: 2018-10-11 | Stop reason: HOSPADM

## 2018-10-09 RX ORDER — NALOXONE HYDROCHLORIDE 0.4 MG/ML
.1-.4 INJECTION, SOLUTION INTRAMUSCULAR; INTRAVENOUS; SUBCUTANEOUS
Status: DISCONTINUED | OUTPATIENT
Start: 2018-10-09 | End: 2018-10-11 | Stop reason: HOSPADM

## 2018-10-09 RX ORDER — PROCHLORPERAZINE MALEATE 10 MG
10 TABLET ORAL EVERY 6 HOURS PRN
Status: DISCONTINUED | OUTPATIENT
Start: 2018-10-09 | End: 2018-10-11 | Stop reason: HOSPADM

## 2018-10-09 RX ORDER — LORAZEPAM 0.5 MG/1
1 TABLET ORAL 3 TIMES DAILY PRN
Status: DISCONTINUED | OUTPATIENT
Start: 2018-10-10 | End: 2018-10-11 | Stop reason: HOSPADM

## 2018-10-09 RX ORDER — HYDROMORPHONE HYDROCHLORIDE 1 MG/ML
.3-.5 INJECTION, SOLUTION INTRAMUSCULAR; INTRAVENOUS; SUBCUTANEOUS EVERY 5 MIN PRN
Status: DISCONTINUED | OUTPATIENT
Start: 2018-10-09 | End: 2018-10-09 | Stop reason: HOSPADM

## 2018-10-09 RX ADMIN — SODIUM CHLORIDE 1000 ML: 900 IRRIGANT IRRIGATION at 08:40

## 2018-10-09 RX ADMIN — INSULIN ASPART 3 UNITS: 100 INJECTION, SOLUTION INTRAVENOUS; SUBCUTANEOUS at 21:17

## 2018-10-09 RX ADMIN — DEXAMETHASONE SODIUM PHOSPHATE 4 MG: 4 INJECTION, SOLUTION INTRA-ARTICULAR; INTRALESIONAL; INTRAMUSCULAR; INTRAVENOUS; SOFT TISSUE at 08:08

## 2018-10-09 RX ADMIN — DEXMEDETOMIDINE HYDROCHLORIDE 4 MCG: 100 INJECTION, SOLUTION INTRAVENOUS at 09:10

## 2018-10-09 RX ADMIN — HYDROMORPHONE HYDROCHLORIDE 0.25 MG: 1 INJECTION, SOLUTION INTRAMUSCULAR; INTRAVENOUS; SUBCUTANEOUS at 10:33

## 2018-10-09 RX ADMIN — LIDOCAINE HYDROCHLORIDE 50 ML: 10 INJECTION, SOLUTION INFILTRATION; PERINEURAL at 10:41

## 2018-10-09 RX ADMIN — HYDROMORPHONE HYDROCHLORIDE 0.25 MG: 1 INJECTION, SOLUTION INTRAMUSCULAR; INTRAVENOUS; SUBCUTANEOUS at 10:19

## 2018-10-09 RX ADMIN — DEXMEDETOMIDINE HYDROCHLORIDE 8 MCG: 100 INJECTION, SOLUTION INTRAVENOUS at 08:40

## 2018-10-09 RX ADMIN — SUGAMMADEX 200 MG: 100 INJECTION, SOLUTION INTRAVENOUS at 11:00

## 2018-10-09 RX ADMIN — ROCURONIUM BROMIDE 20 MG: 10 INJECTION INTRAVENOUS at 10:00

## 2018-10-09 RX ADMIN — HYDROMORPHONE HYDROCHLORIDE 0.25 MG: 1 INJECTION, SOLUTION INTRAMUSCULAR; INTRAVENOUS; SUBCUTANEOUS at 10:52

## 2018-10-09 RX ADMIN — PHENYLEPHRINE HYDROCHLORIDE 100 MCG: 10 INJECTION, SOLUTION INTRAMUSCULAR; INTRAVENOUS; SUBCUTANEOUS at 10:42

## 2018-10-09 RX ADMIN — HEPARIN SODIUM 5000 UNITS: 10000 INJECTION, SOLUTION INTRAVENOUS; SUBCUTANEOUS at 08:11

## 2018-10-09 RX ADMIN — PROPOFOL 200 MG: 10 INJECTION, EMULSION INTRAVENOUS at 08:01

## 2018-10-09 RX ADMIN — LIDOCAINE HYDROCHLORIDE 100 MG: 20 INJECTION, SOLUTION INFILTRATION; PERINEURAL at 08:01

## 2018-10-09 RX ADMIN — KETOROLAC TROMETHAMINE 30 MG: 30 INJECTION, SOLUTION INTRAMUSCULAR at 10:33

## 2018-10-09 RX ADMIN — MIDAZOLAM HYDROCHLORIDE 2 MG: 1 INJECTION, SOLUTION INTRAMUSCULAR; INTRAVENOUS at 07:58

## 2018-10-09 RX ADMIN — SODIUM CHLORIDE, POTASSIUM CHLORIDE, SODIUM LACTATE AND CALCIUM CHLORIDE: 600; 310; 30; 20 INJECTION, SOLUTION INTRAVENOUS at 08:46

## 2018-10-09 RX ADMIN — ROCURONIUM BROMIDE 20 MG: 10 INJECTION INTRAVENOUS at 10:22

## 2018-10-09 RX ADMIN — SODIUM CHLORIDE, POTASSIUM CHLORIDE, SODIUM LACTATE AND CALCIUM CHLORIDE: 600; 310; 30; 20 INJECTION, SOLUTION INTRAVENOUS at 17:41

## 2018-10-09 RX ADMIN — SODIUM CHLORIDE, POTASSIUM CHLORIDE, SODIUM LACTATE AND CALCIUM CHLORIDE: 600; 310; 30; 20 INJECTION, SOLUTION INTRAVENOUS at 10:14

## 2018-10-09 RX ADMIN — PROPOFOL 25 MCG/KG/MIN: 10 INJECTION, EMULSION INTRAVENOUS at 08:33

## 2018-10-09 RX ADMIN — SODIUM CHLORIDE, POTASSIUM CHLORIDE, SODIUM LACTATE AND CALCIUM CHLORIDE: 600; 310; 30; 20 INJECTION, SOLUTION INTRAVENOUS at 22:47

## 2018-10-09 RX ADMIN — HYDROMORPHONE HYDROCHLORIDE 0.25 MG: 1 INJECTION, SOLUTION INTRAMUSCULAR; INTRAVENOUS; SUBCUTANEOUS at 10:02

## 2018-10-09 RX ADMIN — FENTANYL CITRATE 100 MCG: 50 INJECTION, SOLUTION INTRAMUSCULAR; INTRAVENOUS at 08:01

## 2018-10-09 RX ADMIN — Medication: at 12:08

## 2018-10-09 RX ADMIN — PHENYLEPHRINE HYDROCHLORIDE 100 MCG: 10 INJECTION, SOLUTION INTRAMUSCULAR; INTRAVENOUS; SUBCUTANEOUS at 10:45

## 2018-10-09 RX ADMIN — CEFAZOLIN SODIUM 2 G: 2 INJECTION, SOLUTION INTRAVENOUS at 17:41

## 2018-10-09 RX ADMIN — DEXMEDETOMIDINE HYDROCHLORIDE 8 MCG: 100 INJECTION, SOLUTION INTRAVENOUS at 08:57

## 2018-10-09 RX ADMIN — ROCURONIUM BROMIDE 50 MG: 10 INJECTION INTRAVENOUS at 08:01

## 2018-10-09 RX ADMIN — ONDANSETRON 4 MG: 2 INJECTION INTRAMUSCULAR; INTRAVENOUS at 10:33

## 2018-10-09 RX ADMIN — PHENYLEPHRINE HYDROCHLORIDE 100 MCG: 10 INJECTION, SOLUTION INTRAMUSCULAR; INTRAVENOUS; SUBCUTANEOUS at 10:24

## 2018-10-09 RX ADMIN — FENTANYL CITRATE 100 MCG: 50 INJECTION, SOLUTION INTRAMUSCULAR; INTRAVENOUS at 08:37

## 2018-10-09 RX ADMIN — Medication 3 G: at 08:15

## 2018-10-09 RX ADMIN — PHENYLEPHRINE HYDROCHLORIDE 100 MCG: 10 INJECTION, SOLUTION INTRAMUSCULAR; INTRAVENOUS; SUBCUTANEOUS at 10:14

## 2018-10-09 RX ADMIN — SUGAMMADEX 400 MG: 100 INJECTION, SOLUTION INTRAVENOUS at 10:49

## 2018-10-09 RX ADMIN — SODIUM CHLORIDE 3000 ML: 900 IRRIGANT IRRIGATION at 08:40

## 2018-10-09 RX ADMIN — ROCURONIUM BROMIDE 20 MG: 10 INJECTION INTRAVENOUS at 08:57

## 2018-10-09 RX ADMIN — FAMOTIDINE 20 MG: 10 INJECTION, SOLUTION INTRAVENOUS at 17:41

## 2018-10-09 RX ADMIN — SODIUM CHLORIDE, POTASSIUM CHLORIDE, SODIUM LACTATE AND CALCIUM CHLORIDE: 600; 310; 30; 20 INJECTION, SOLUTION INTRAVENOUS at 07:55

## 2018-10-09 RX ADMIN — INSULIN ASPART 3 UNITS: 100 INJECTION, SOLUTION INTRAVENOUS; SUBCUTANEOUS at 17:40

## 2018-10-09 RX ADMIN — MIDAZOLAM HYDROCHLORIDE 2 MG: 1 INJECTION, SOLUTION INTRAMUSCULAR; INTRAVENOUS at 07:55

## 2018-10-09 RX ADMIN — ROCURONIUM BROMIDE 30 MG: 10 INJECTION INTRAVENOUS at 08:30

## 2018-10-09 RX ADMIN — FENTANYL CITRATE 50 MCG: 50 INJECTION, SOLUTION INTRAMUSCULAR; INTRAVENOUS at 08:57

## 2018-10-09 RX ADMIN — FENTANYL CITRATE 50 MCG: 50 INJECTION, SOLUTION INTRAMUSCULAR; INTRAVENOUS at 11:25

## 2018-10-09 ASSESSMENT — COPD QUESTIONNAIRES: COPD: 0

## 2018-10-09 ASSESSMENT — ENCOUNTER SYMPTOMS
SEIZURES: 0
DYSRHYTHMIAS: 0

## 2018-10-09 ASSESSMENT — LIFESTYLE VARIABLES: TOBACCO_USE: 0

## 2018-10-09 ASSESSMENT — ACTIVITIES OF DAILY LIVING (ADL)
ADLS_ACUITY_SCORE: 9
ADLS_ACUITY_SCORE: 9

## 2018-10-09 NOTE — PROGRESS NOTES
Admission medication history interview status for the 10/9/2018  admission is complete. See EPIC admission navigator for prior to admission medications     Medication history source reliability:Good    Medication history interview source(s):Patient    Medication history resources (including written lists, pill bottles, clinic record):None    Primary pharmacy.[facility]    Additional medication history information not noted on PTA med list :None    Time spent in this activity: 45 minutes    Prior to Admission medications    Medication Sig Last Dose Taking? Auth Provider   atorvastatin (LIPITOR) 40 MG tablet Take 1 tablet (40 mg) by mouth daily 10/8/2018 at am Yes Haase, Susan Rachele, APRN CNP   DiphenhydrAMINE HCl (BENADRYL PO) Take 25 mg by mouth every evening 10/8/2018 at 2000 Yes Reported, Patient   dulaglutide (TRULICITY) 1.5 MG/0.5ML pen Inject 1.5 mg Subcutaneous every 7 days  Patient taking differently: Inject 1.5 mg Subcutaneous every 7 days On Sundays 10/7/2018 Yes Annette Hobson APRN CNP   insulin degludec (TRESIBA) 200 UNIT/ML pen Inject 130 Units Subcutaneous daily 80 units on 0/8/2018 at 2200 Yes Annette Hobson APRN CNP   insulin lispro (HUMALOG KWIKPEN) 100 UNIT/ML injection INJECT 1 UNIT FOR EVERY 2 GRAMS OF CARBS PLUS CORRECTION OF 1 UNIT FOR EVERY 15 POINTS ABOVE 150.(APPROXIMATELY 130 UNITS PER DAY)  Patient taking differently: Inject Subcutaneous 4 times daily INJECT 1 UNIT FOR EVERY 2 GRAMS OF CARBS PLUS CORRECTION OF 1 UNIT FOR EVERY 15 POINTS ABOVE 150.(APPROXIMATELY 130 UNITS PER DAY) 10/8/2018 at am Yes Annette Hobson APRN CNP   LORazepam (ATIVAN) 1 MG tablet Take 1 tablet (1 mg) by mouth daily as needed for anxiety  Patient taking differently: Take 1 mg by mouth 3 times daily as needed for anxiety  10/8/2018 at pm Yes Haase, Susan Rachele, APRN CNP   losartan (COZAAR) 100 MG tablet TAKE 1 TABLET(100 MG) BY MOUTH DAILY 10/9/2018 at 0500 Yes Haase, Susan Rachele, APRN CNP    metFORMIN (GLUCOPHAGE) 500 MG tablet Take 2 tablets (1,000 mg) by mouth 2 times daily (with meals) 10/8/2018 at am Yes Annette Hobson APRN CNP   multivitamin  peds with iron (FLINTSTONES COMPLETE) 60 MG chewable tablet Take 1 chew tab by mouth daily 10/8/2018 at am Yes Reported, Patient   sertraline (ZOLOFT) 100 MG tablet Take 1 tablet (100 mg) by mouth daily 10/8/2018 at am Yes Haase, Susan Rachele, APRN CNP   triamterene-hydrochlorothiazide (MAXZIDE) 75-50 MG per tablet Take 1 tablet by mouth daily 10/9/2018 at 0500 Yes Haase, Susan Rachele, APRN CNP   Continuous Blood Gluc  (FREESTYLE JANIE READER) SUGAR 1 Device as needed   Annette Hobson APRN CNP   continuous blood glucose monitoring (FREESTYLE JANIE) sensor For use with Freestyle Janie Flash  for continuous monitioring of blood glucose levels. Replace sensor every 10 days.   Annette Hobson APRN CNP   insulin pen needle 32G X 4 MM Use 3 pen needles daily or as directed.   Leilani Tavera MD   ONETOUCH DELICA LANCETS 33G MISC 1 lancet 4 times daily   Leilani Tavera MD

## 2018-10-09 NOTE — PLAN OF CARE
"Problem: Patient Care Overview  Goal: Plan of Care/Patient Progress Review  Outcome: Improving  Arrived from PACU Ox4, states \"very tired\". AVSS. 6 laps with ice and binder in place, CDI. Dilaudid PCA @ 0.2, rates pain 2-3/10, states well controlled. BS hypo, denies nausea or flatus, tolerating ice chips. LS diminished. Family at bedside, very supportive.       "

## 2018-10-09 NOTE — IP AVS SNAPSHOT
MRN:[id number]                      After Visit Summary   10/9/2018    Naman Jones    MRN: [id number]           Thank you!     Thank you for choosing [facility] for your care. Our goal is always to provide you with excellent care. Hearing back from our patients is one way we can continue to improve our services. Please take a few minutes to complete the written survey that you may receive in the mail after you visit with us. Thank you!        Patient Information     Date Of Birth          1985        Designated Caregiver       Most Recent Value    Caregiver    Will someone help with your care after discharge? yes    Name of designated caregiver Siri    Phone number of caregiver [phone]    Caregiver address [id number]       About your hospital stay     You were admitted on:  October 9, 2018 You last received care in the:  [place] [address] [facility]    You were discharged on:  October 11, 2018        Reason for your hospital stay       Bariatric operation                  Who to Call     For medical emergencies, please call 911.  For non-urgent questions about your medical care, please call your primary care provider or clinic, [phone]          Attending Provider     Provider Specialty    Alden Mcwilliams MD Surgery       Primary Care Provider Office Phone # Fax #    Susan Rachele Haase, APRN [phone]      After Care Instructions     Activity       Your activity upon discharge: activity as tolerated and no heavy lifting for 4 weeks            Diet       Follow this diet upon discharge: Orders Placed This Encounter      Room Service      Bariatric Diet Full Liquids                  Follow-up Appointments     Follow-up and recommended labs and tests        Follow up with bariatric clinic next week                  Your next 10 appointments already scheduled     Oct 16, 2018  1:00 PM CDT   Bariatric Post Op Global Visit with Adele Morris Rn, RN   [facility] ([facility])    [address]  [address]  [place] [address]   [phone]            Oct 16, 2018  1:20 PM CDT   Bariatric Post Op Global Visit with Oj Luong PA-C   [facility] ([facility])    [address]  [address]  [place] [address]   [phone]            Oct 23, 2018 10:30 AM CDT   Bariatric Post Op Global Visit with Adele Morris Rn, RN   [facility] ([facility])    [address]  [address]  [place] [address]   [phone]            Oct 23, 2018 11:00 AM CDT   Return Bariatric Nutrition Visit with Adele Morris Diet 1, RD   [facility] ([facility])    [address]  [place] [address]   [phone]            Oct 26, 2018  2:30 PM CDT   Return Visit with ARDEN Henderson CNP   [facility] ([facility]    [address]  [facility] [address] [phone]            Nov 12, 2018  9:00 AM CST   Return Bariatric Nutrition Visit with Adele Landa 1, RD   [facility] ([facility])    [address]  [place] [address]   [phone]              Further instructions from your care team            For informational purposes only. Not to replace the advice of your health care provider. Copyright   2006 [facility]. All rights reserved. [employer] [id number] - REV 09/14  After Bariatric Surgery  [facility]  Note: Before you go home, ask your nurse to order your pain medicine from the pharmacy. Be sure you have your medicine with you when you leave.  How much fluid should I drink?    Drink at least 1 ounce of fluid every 15 minutes (1/2 cup per hour) during  the day.     Carry a water bottle with you. Drink from it often.    Keep track of how much fluid you drink in a day.    Adjustable stomach band: Do not overeat or drink too much. This can cause vomiting (throwing up), stretch your stomach, or make your stomach slip up over your band.    Remember:  - Do not use straws, chew gum or suck on hard candies. They may cause painful gas.   - No cold drinks.  - No coffee, soda pop or drinks with caffeine. These may cause stomach pain.  - No alcohol. It is bad for your liver and will cause stomach pain. It also adds a lot of calories.  What can I do for pain control?      You had major abdominal surgery that involves all layers of your abdominal muscles.   Pain is expected, even as far out as 6-8 weeks postop.  Moving, sneezing, coughing, and  breathing will cause pain because these activities use your abdominal muscles.      You can take the liquid pain medicine as prescribed. You can also try to wean off from it  as soon as you feel comfortable.  Do not drive while you are taking pain medicine.   This is dangerous.     You may take liquid Tylenol (acetaminophen) for pain in place of the prescribed pain  medicine. Do not take more than 3000 mg in a 24 hour period.     You may also apply ice or heat to the affected area(s).  Just remember to wrap the ice  in something and limit icing sessions to 20 minutes. Excessive icing can irritate the skin  or cause tissue damage.   You can apply heat with a hot, wet towel or heating pad. Just like cold therapy, limit  heat application to 20 minutes. Never sleep with a heating pad on. It could cause severe  burns to your skin.    Do not take NSAID s (ibuprofen, Motrin, Advil, Aleve, Naproxen). They will increase you risk for bleeding or getting an ulcer.    If you have any of the following pain issues, we would like to talk to you:  - pain that does not improve with rest  - pain that gets worse and worse  - pain that is not controlled by  your pain medicine  - a sudden severe increase in pain  -   If your doctor prescribes Zantac, take it as ordered. Take it for 3 months to prevent       Ulcers.  -   If you took an antacid before you had surgery, keep taking it for 3 months after           surgery. This will help prevent ulcers.   - Take any other medicines that your doctor tells you to take.   - Wear your binder to support your belly muscles.  Please call the clinic at [phone] for any concerns      How much rest do I need?  Get plenty of rest the first few days after surgery. Balance rest and activity.  Do not nap more than one hour during the day. Set a timer to wake yourself up, if needed. Too much sleep will keep you from drinking enough fluid during the day.  What should I know about my incisions (cuts)?  - Change your bandages as needed or if they get wet.   -Call your doctor if you have any of these signs of infection:   - Redness around the site.   - Drainage that smells bad.   - Fever of 101  F (38.3  C) or higher when taken under the tongue.- Chills.  If you     have a drain:  - Do not pull on the drain. Do not pull the drain out. We will take out your drain at your first clinic visit.  - The color and amount of fluid varies. Right after surgery the fluid is bright red. Over time, it changes to light pink and may become clear or the color of straw.   Will my urine or bowel movements change?   You might not have a bowel movement for several days after surgery. Your first bowel movements will likely be liquid.   Gastric bypass or sleeve gastrectomy: You may also notice old blood or a darker color in your stools (bowel movements).   Your urine should be clear to light yellow. This shows that you are drinking enough fluid. You should urinate (pass water) at least 2 to 3 times during the day. If not, call us.  What kind of activity is safe?  For 4 weeks after surgery:     Walk for a short time every day.    Do not jog or run.    No weight  lifting or belly exercises.  No swimming, baths or hot tubs until your cuts are healed (scabs are gone). You may shower.  No outside activity in hot, humid weather until you can drink 48 to 64 ounces of fluid in 24 hours. If you sweat a lot, your body may lose too much water.   The first month after surgery, do not take any trips where you must sit for a long time. You could get a blood clot in your legs.  Call the clinic at [phone] if:    Your pain medicine is not working.    You do not urinate (pass water) 2 to 3 times per day.    You have any signs of infection.    You have belly pain that gets worse and worse.    You have swollen legs with pain behind the knee or calf.    You have chest pain or feel very short of breath.    You have any questions or concerns.    You have a sudden severe increase in heart rate.    You have vomiting that gets worse and worse.  When should I go back to the clinic?  Time Gastric bypass or sleeve gastrectomy Adjustable gastric band   Week 1 See the physician or physician assistant (PA). See the nurse and physical therapist.   Week 2 See the nurse and dietitian. See the nurse and dietitian.   Week 4 Have a nutrition consult with the dietitian. See the PA and dietitian.   Week 6  Go for the first adjustment (fill) of your stomach band.   For the first year (or until your BMI is less than 30) Go to the clinic each month to see if you need a band adjustment (fill).         Pending Results     No orders found from 10/7/2018 to 10/10/2018.            Statement of Approval     Ordered          10/11/18 1025  I have reviewed and agree with all the recommendations and orders detailed in this document.  EFFECTIVE NOW     Approved and electronically signed by:  Alden Mcwilliams MD             Admission Information     Date & Time Provider Department Dept. Phone    10/9/2018 Alden Mcwilliams MD [place] [address] [facility] [phone]      Your Vitals Were     Blood Pressure  "Pulse Temperature Respirations Height Weight    152/94 (BP Location: Left arm) 80 98.6  F (37  C) (Oral) 16 1.854 m (6' 1\") 162.8 kg (359 lb)    Pulse Oximetry BMI (Body Mass Index)                95% 47.36 kg/m2          MyChart Information     [employer] gives you secure access to your electronic health record. If you see a primary care provider, you can also send messages to your care team and make appointments. If you have questions, please call your primary care clinic.  If you do not have a primary care provider, please call [phone] and they will assist you.        Care EveryWhere ID     This is your Care EveryWhere ID. This could be used by other organizations to access your [facility] medical records  [id number]        Equal Access to Services     RANULFO REINOSO : Lucas Dumont, julio c tay, natalia hill, naman rodgers. So [facility] [phone].    ATENCIÓN: Si habla español, tiene a dumas disposición servicios gratuitos de asistencia lingüística. Llame al [phone].    We comply with applicable federal civil rights laws and [place] laws. We do not discriminate on the basis of race, color, national origin, age, disability, sex, sexual orientation, or gender identity.               Review of your medicines      START taking        Dose / Directions    HYDROmorphone 2 MG tablet   Commonly known as:  DILAUDID   Used for:  Acute post-operative pain, Status post bariatric surgery        Dose:  2-4 mg   Take 1-2 tablets (2-4 mg) by mouth every 3 hours as needed for moderate to severe pain   Quantity:  40 tablet   Refills:  0       ondansetron 4 MG ODT tab   Commonly known as:  ZOFRAN-ODT   Used for:  PONV (postoperative nausea and vomiting)        Dose:  4 mg   Take 1 tablet (4 mg) by mouth every 6 hours as needed for nausea or vomiting   Quantity:  30 tablet   Refills:  0       ranitidine 75 MG tablet   Commonly known as:  ZANTAC   Used for:  Status post " bariatric surgery        Dose:  75 mg   Take 1 tablet (75 mg) by mouth 2 times daily   Quantity:  120 tablet   Refills:  1         CONTINUE these medicines which may have CHANGED, or have new prescriptions. If we are uncertain of the size of tablets/capsules you have at home, strength may be listed as something that might have changed.        Dose / Directions    dulaglutide 1.5 MG/0.5ML pen   Commonly known as:  TRULICITY   This may have changed:  additional instructions   Used for:  Type 2 diabetes mellitus with hyperglycemia, with long-term current use of insulin (H)        Dose:  1.5 mg   Inject 1.5 mg Subcutaneous every 7 days   Quantity:  2 mL   Refills:  5       insulin lispro 100 UNIT/ML injection   Commonly known as:  HumaLOG KWIKpen   This may have changed:    - how to take this  - when to take this  - additional instructions   Used for:  Type 2 diabetes mellitus with hyperglycemia, with long-term current use of insulin (H)        INJECT 1 UNIT FOR EVERY 2 GRAMS OF CARBS PLUS CORRECTION OF 1 UNIT FOR EVERY 15 POINTS ABOVE 150.(APPROXIMATELY 130 UNITS PER DAY)   Quantity:  45 mL   Refills:  5       LORazepam 1 MG tablet   Commonly known as:  ATIVAN   This may have changed:  when to take this   Used for:  JOEL (generalized anxiety disorder)        Dose:  1 mg   Take 1 tablet (1 mg) by mouth daily as needed for anxiety   Quantity:  30 tablet   Refills:  2         CONTINUE these medicines which have NOT CHANGED        Dose / Directions    atorvastatin 40 MG tablet   Commonly known as:  LIPITOR   Used for:  Mixed hyperlipidemia        Dose:  40 mg   Take 1 tablet (40 mg) by mouth daily   Quantity:  90 tablet   Refills:  3       BENADRYL PO        Dose:  25 mg   Take 25 mg by mouth every evening   Refills:  0       continuous blood glucose monitoring sensor   Used for:  Type 2 diabetes mellitus with hyperglycemia, with long-term current use of insulin (H)        For use with Freestyle Janie Flash  for  continuous monitioring of blood glucose levels. Replace sensor every 10 days.   Quantity:  3 each   Refills:  11       FREESTYLE KEILA READER Noemy   Used for:  Type 2 diabetes mellitus with hyperglycemia, with long-term current use of insulin (H)        Dose:  1 Device   1 Device as needed   Quantity:  1 Device   Refills:  0       insulin degludec 200 UNIT/ML pen   Commonly known as:  TRESIBA   Used for:  Type 2 diabetes mellitus with hyperglycemia, with long-term current use of insulin (H)        Dose:  130 Units   Inject 130 Units Subcutaneous daily   Quantity:  27 mL   Refills:  3       insulin pen needle 32G X 4 MM   Used for:  Diabetes mellitus type 2 in obese (H)        Use 3 pen needles daily or as directed.   Quantity:  100 each   Refills:  6       losartan 100 MG tablet   Commonly known as:  COZAAR   Used for:  Essential hypertension        TAKE 1 TABLET(100 MG) BY MOUTH DAILY   Refills:  0       metFORMIN 500 MG tablet   Commonly known as:  GLUCOPHAGE   Used for:  Type 2 diabetes mellitus with hyperglycemia, with long-term current use of insulin (H)        Dose:  1000 mg   Take 2 tablets (1,000 mg) by mouth 2 times daily (with meals)   Quantity:  360 tablet   Refills:  3       multivitamin  peds with iron 60 MG chewable tablet        Dose:  1 chew tab   Take 1 chew tab by mouth daily   Refills:  0       ONETOUCH DELICA LANCETS 33G Misc   Used for:  Type 2 diabetes mellitus with hyperglycemia, with long-term current use of insulin (H)        Dose:  1 lancet   1 lancet 4 times daily   Quantity:  100 each   Refills:  6       sertraline 100 MG tablet   Commonly known as:  ZOLOFT   Used for:  JOEL (generalized anxiety disorder)        Dose:  100 mg   Take 1 tablet (100 mg) by mouth daily   Quantity:  90 tablet   Refills:  1       triamterene-hydrochlorothiazide 75-50 MG per tablet   Commonly known as:  MAXZIDE   Used for:  Essential hypertension        Dose:  1 tablet   Take 1 tablet by mouth daily   Quantity:   90 tablet   Refills:  1            Where to get your medicines      These medications were sent to [facility] [place], [place] - [address]  [address] [place] [address]     Phone:  [phone]     ondansetron 4 MG ODT tab    ranitidine 75 MG tablet         Some of these will need a paper prescription and others can be bought over the counter. Ask your nurse if you have questions.     Bring a paper prescription for each of these medications     HYDROmorphone 2 MG tablet                Protect others around you: Learn how to safely use, store and throw away your medicines at www.disposemymeds.org.        Information about OPIOIDS     PRESCRIPTION OPIOIDS: WHAT YOU NEED TO KNOW   We gave you an opioid (narcotic) pain medicine. It is important to manage your pain, but opioids are not always the best choice. You should first try all the other options your care team gave you. Take this medicine for as short a time (and as few doses) as possible.    Some activities can increase your pain, such as bandage changes or therapy sessions. It may help to take your pain medicine 30 to 60 minutes before these activities. Reduce your stress by getting enough sleep, working on hobbies you enjoy and practicing relaxation or meditation. Talk to your care team about ways to manage your pain beyond prescription opioids.    These medicines have risks:    DO NOT drive when on new or higher doses of pain medicine. These medicines can affect your alertness and reaction times, and you could be arrested for driving under the influence (DUI). If you need to use opioids long-term, talk to your care team about driving.    DO NOT operate heavy machinery    DO NOT do any other dangerous activities while taking these medicines.    DO NOT drink any alcohol while taking these medicines.     If the opioid prescribed includes acetaminophen, DO NOT take with any other medicines that contain acetaminophen. Read all  labels carefully. Look for the word  acetaminophen  or  Tylenol.  Ask your pharmacist if you have questions or are unsure.    You can get addicted to pain medicines, especially if you have a history of addiction (chemical, alcohol or substance dependence). Talk to your care team about ways to reduce this risk.    All opioids tend to cause constipation. Drink plenty of water and eat foods that have a lot of fiber, such as fruits, vegetables, prune juice, apple juice and high-fiber cereal. Take a laxative (Miralax, milk of magnesia, Colace, Senna) if you don t move your bowels at least every other day. Other side effects include upset stomach, sleepiness, dizziness, throwing up, tolerance (needing more of the medicine to have the same effect), physical dependence and slowed breathing.    Store your pills in a secure place, locked if possible. We will not replace any lost or stolen medicine. If you don t finish your medicine, please throw away (dispose) as directed by your pharmacist. The [place] [employer]ution [employer] has more information about safe disposal: https://www.pca.[facility].[place].us/living-green/managing-unwanted-medications             Medication List: This is a list of all your medications and when to take them. Check marks below indicate your daily home schedule. Keep this list as a reference.      Medications           Morning Afternoon Evening Bedtime As Needed    atorvastatin 40 MG tablet   Commonly known as:  LIPITOR   Take 1 tablet (40 mg) by mouth daily   Last time this was given:  40 mg on 10/11/2018  9:07 AM   Next Dose Due:  Take tomorrow,10/12                                   BENADRYL PO   Take 25 mg by mouth every evening   Last time this was given:  25 mg on 10/10/2018  8:10 PM   Next Dose Due:  Take this evening, 10/11                                   continuous blood glucose monitoring sensor   For use with Freestyle Janie Flash  for continuous monitioring of blood glucose levels.  Replace sensor every 10 days.   Next Dose Due:  As at home                                dulaglutide 1.5 MG/0.5ML pen   Commonly known as:  TRULICITY   Inject 1.5 mg Subcutaneous every 7 days   Next Dose Due:  As at home                                FREESTYLE KEILA READER Noemy   1 Device as needed                                HYDROmorphone 2 MG tablet   Commonly known as:  DILAUDID   Take 1-2 tablets (2-4 mg) by mouth every 3 hours as needed for moderate to severe pain   Last time this was given:  2 mg on 10/11/2018  9:03 AM   Next Dose Due:  May take anytime as needed for pain.                                   insulin degludec 200 UNIT/ML pen   Commonly known as:  TRESIBA   Inject 130 Units Subcutaneous daily   Next Dose Due:  Resume as directed by your doctor                                insulin lispro 100 UNIT/ML injection   Commonly known as:  HumaLOG KWIKpen   INJECT 1 UNIT FOR EVERY 2 GRAMS OF CARBS PLUS CORRECTION OF 1 UNIT FOR EVERY 15 POINTS ABOVE 150.(APPROXIMATELY 130 UNITS PER DAY)                                insulin pen needle 32G X 4 MM   Use 3 pen needles daily or as directed.                                LORazepam 1 MG tablet   Commonly known as:  ATIVAN   Take 1 tablet (1 mg) by mouth daily as needed for anxiety   Last time this was given:  1 mg on 10/10/2018  7:58 PM                                losartan 100 MG tablet   Commonly known as:  COZAAR   TAKE 1 TABLET(100 MG) BY MOUTH DAILY   Last time this was given:  100 mg on 10/11/2018  9:07 AM   Next Dose Due:  Take tomorrow, 10/12                                metFORMIN 500 MG tablet   Commonly known as:  GLUCOPHAGE   Take 2 tablets (1,000 mg) by mouth 2 times daily (with meals)   Next Dose Due:  Resume per instructed by doctor                                      multivitamin  peds with iron 60 MG chewable tablet   Take 1 chew tab by mouth daily                                   ondansetron 4 MG ODT tab   Commonly known as:   ZOFRAN-ODT   Take 1 tablet (4 mg) by mouth every 6 hours as needed for nausea or vomiting   Next Dose Due:  Anytime as needed for nausea                                ONETOUCH DELICA LANCETS 33G Misc   1 lancet 4 times daily                                ranitidine 75 MG tablet   Commonly known as:  ZANTAC   Take 1 tablet (75 mg) by mouth 2 times daily   Last time this was given:  75 mg on 10/11/2018  9:07 AM   Next Dose Due:  Take this evening                                      sertraline 100 MG tablet   Commonly known as:  ZOLOFT   Take 1 tablet (100 mg) by mouth daily   Last time this was given:  100 mg on 10/11/2018  9:07 AM   Next Dose Due:  Take tomorrow                                   triamterene-hydrochlorothiazide 75-50 MG per tablet   Commonly known as:  MAXZIDE   Take 1 tablet by mouth daily   Next Dose Due:  Take tomorrow

## 2018-10-09 NOTE — BRIEF OP NOTE
[facility] Brief Operative Note    Pre-operative diagnosis: MORBID OBESITY   Post-operative diagnosis Morbid Obesity    Procedure: Procedure(s):  LAPAROSCOPIC WELLINGTON-EN Y GASTRIC BYPASS - Wound Class: I-Clean   Surgeon(s): Surgeon(s) and Role:     * Alden Mcwilliams MD - Primary     * Sunday Zhu PA-C - Assisting   Estimated blood loss: 15 mL    Specimens: * No specimens in log *   Findings: Gallbladder wnl  Large, fatty liver  25 EEA used.  See Operative Report for full details.  No complications noted.

## 2018-10-09 NOTE — ANESTHESIA PREPROCEDURE EVALUATION
Anesthesia Evaluation     .             ROS/MED HX    ENT/Pulmonary:      (-) tobacco use, asthma, COPD and sleep apnea   Neurologic:      (-) seizures, CVA and TIA   Cardiovascular:     (+) Dyslipidemia, hypertension----. : . . . :. . Previous cardiac testing date:results:date: results:ECG reviewed date:9/2018 results:ST at 105 bpm date: results:         (-) CAD, CHF and arrhythmias   METS/Exercise Tolerance:     Hematologic:         Musculoskeletal:         GI/Hepatic:        (-) GERD and liver disease   Renal/Genitourinary:      (-) renal disease   Endo: Comment: BMI 48    (+) type II DM Using insulin Obesity, .   (-) Type I DM   Psychiatric:         Infectious Disease:         Malignancy:         Other:                     Physical Exam  Normal systems: dental    Airway   Mallampati: III  TM distance: >3 FB  Neck ROM: full    Dental     Cardiovascular   Rhythm and rate: regular      Pulmonary    breath sounds clear to auscultation                    Anesthesia Plan      History & Physical Review  History and physical reviewed and following examination; no interval change.    ASA Status:  3 .    NPO Status:  > 8 hours    Plan for General and ETT with Intravenous and Propofol induction. Maintenance will be Balanced.    PONV prophylaxis:  Ondansetron (or other 5HT-3)  Additional equipment: Videolaryngoscope Intraoperative glucose monitoring - patient took 1/2 dose of Tresiba last evening      Postoperative Care      Consents  Anesthetic plan, risks, benefits and alternatives discussed with:  Patient..                          .

## 2018-10-09 NOTE — ANESTHESIA POSTPROCEDURE EVALUATION
Patient: Naman Jones    Procedure(s):  LAPAROSCOPIC WELLINGTON-EN Y GASTRIC BYPASS - Wound Class: I-Clean    Diagnosis:MORBID OBESITY  Diagnosis Additional Information: No value filed.    Anesthesia Type:  General, ETT    Note:  Anesthesia Post Evaluation    Patient location during evaluation: PACU  Patient participation: Able to fully participate in evaluation  Level of consciousness: awake and alert  Pain management: adequate  Airway patency: patent  Cardiovascular status: acceptable  Respiratory status: acceptable and unassisted  Hydration status: acceptable  PONV: none             Last vitals:  Vitals:    10/09/18 1140 10/09/18 1150 10/09/18 1200   BP: 109/63 119/66 121/71   Pulse:      Resp: 15 15 14   Temp:      SpO2: 96% 97% 94%         Electronically Signed By: Linnea Boyd MD  October 9, 2018  12:34 PM

## 2018-10-09 NOTE — OP NOTE
Surgeon: Alden Mcwilliams MD   1 st Assistant: Sunday Zhu PA-C, The physicians assistant was medically necessary for their expertise in camera management, suctioning, suturing, and retraction.    PREOPERATIVE DIAGNOSIS:   Morbid Obesity  Indication for operation:  Body mass index is 47.36 kg/(m^2).  Naman Jones has been previously unsuccessful with medical treatment for obesity  History reviewed. No pertinent past medical history.    POSTOPERATIVE DIAGNOSIS:   Same    PROCEDURE:   Laparoscopic Gastric Bypass with Jackie-en-Y Gastrojejunostomy ( 125 cm jackie limb)  ANESTHESIA:   General Endotracheal Anesthesia   COMPLICATIONS:   none   EBL:   15 ml   CONSENT:   Naman Jones was seen and evaluated in the office setting where the procedure was explained to the patient and all questions were answered. An informed consent discussion was held with the patient. Viable alternatives to the proposed procedure, including but not limited to, medical observation under the care of a physician, exercise programs and other weight reductive operative procedures were explained to the patient. Risks to the proposed procedure, including but not limited to bleeding, infection, conversion to open, anastomotic disruption, bile leak, injury to bile ducts, pneumonia, pulmonary embolus, airway complications and death were explained to the patient. The patient understands the above alternatives and risks and has chosen laparoscopic gastric bypass with Jackie-en-Y reconstruction and wishes to proceed.  Events:   The patient was taken to the operating room and placed in the supine position. After successful induction of general endotracheal anesthesia, a ayala catheter and orogastric tube was placed to decompress the bladder and the stomach, respectively. The patient was placed in the Lithotomy position, supine with legs abducted to 30 degrees. Care was taken to pad the exposed extremities. The patient's abdomen was prepped and draped  in the normal sterile fashion. A direct visualization trocar was placed in the left subcostal position in the midclavicular line and access to abdominal cavity was obtained under visualization. Pneumoperitoneum was then established without complications. After evaluation of the abdominal contents from this trocar position, four other 12-mm ports were placed under direct visualization. A solution of local anesthetic was used to infiltrate the subcutaneous tissues prior to trocar placement.   We turned our attention to dividing the greater omentum. This was accomplished with the harmonic scalpel. After this, we identified the ligament of Treitz and went 45 to 50-cm distal to that. A 3-0 suture was then place to viviana the proximal bowel and then we divided the bowel using a 45-mm MEHRDAD stapler. We then measured 125-cm distal on the bowel and oriented our bowel to create the jejunojejunostomy. An enterotomy was created in each limb with the harmonic scalpel and using the triple-staple technique created a side-to-side jejunojejunostomy. The opening was evaluated for hemostasis and care was taken to make sure that the posterior wall of the anastomosis was free. The remaining defect was closed with one more firing of the MEHRDAD stapler.  Mesenteric defect closed with silk suture.  Left lobe of the liver was retracted with a Shikha device on the sub-xyphoid location. After decompression of the stomach with the orogastric tube, the orogastric tube and any esophageal probes were removed from the patient. The stomach was grasped using forceps and retracted caudally to expose the phrenoesophageal ligament. This was taken down with the hook electrocautery. A blunt palpation probe was used to expose the esophagus and the left bundle of the esophageal milton. At this point we released our retraction on the stomach and measured seven centimeters from the Angle of His along the lesser curvature. The Nerve of Latarjet was identified and  preserved. We made a window along the lesser curvature with the Harmonic Scalpel and entered the lesser sac. Once the lesser sac was entered, a MEHRDAD staple loads were used to progress cephalad toward the Angle of His. A small gastric pouch was created, approximately 25-30 ml in size. After this was done, the anvil for the 25 mm EEA stapler was placed transorally. The orogastric tube with the anvil was seen in the gastric pouch. The harmonic scalpel was used to make a gastrotomy on the pouch thru the staple line, and the orogastric tube was pulled out of the gastric pouch. The orogastric tube was detached from the anvil and removed leaving the anvil in the gastric pouch.   After this was done we brought the efferent limb all the way up to the gastric pouch, in between our previously created defect in the greater omentum. The staple line on the jejunum was opened using the harmonic scalpel and after enlarging the lateral trocar site, the 25-mm EEA stapler was introduced into the abdomen and into the open jejunum. Using the EEA stapler, we performed our end to side gastrojejunostomy. The stapler was removed and two healthy donuts of tissue were identified. The open-ended jejunum was closed using a MEHRDAD stapler. This amputated piece of jejunum was placed into an endopouch and removed from the abdomen. The gallbladder was inspected and found to be normal.  Having completed our gastrojejunostomy, an orogastric tube was placed by anesthesia and the integrity of the anastomosis was checked. Saline was used to irrigate the pouch to evaluate for hemostasis, and after that, the pouch was insufflated with air and methylene blue. There was no evidence of bleeding, air leak nor liquid leak.   The abdomen was then copiously irrigated and checked for hemostasis. The effluent was evacuated from the abdomen and then we turned our attention to closure of the trocar sites.   At this point, prior to evacuation of the pneumoperitoneum, we  closed all our ports with the laparoscopic suture fascial closure device using 0-Vicryl. The pneumoperitoneum was then evacuated. The wounds were irrigated and found to be hemostatic. The skin was closed using 4-0 Monocryl. Steri strips were applied.  Counts reported as correct.  No immediate complications.

## 2018-10-09 NOTE — IP AVS SNAPSHOT
[facility]    [address] [place] [address]    Phone:  [phone]                                       After Visit Summary   10/9/2018    Naman Jones    MRN: [id number]           After Visit Summary Signature Page     I have received my discharge instructions, and my questions have been answered. I have discussed any challenges I see with this plan with the nurse or doctor.    ..........................................................................................................................................  Patient/Patient Representative Signature      ..........................................................................................................................................  Patient Representative Print Name and Relationship to Patient    ..................................................               ................................................  Date                                   Time    ..........................................................................................................................................  Reviewed by Signature/Title    ...................................................              ..............................................  Date                                               Time          22EPIC Rev 08/18

## 2018-10-09 NOTE — ANESTHESIA CARE TRANSFER NOTE
Patient: Naman Jones    Procedure(s):  LAPAROSCOPIC WELLINGTON-EN Y GASTRIC BYPASS - Wound Class: I-Clean    Diagnosis: MORBID OBESITY  Diagnosis Additional Information: No value filed.    Anesthesia Type:   General, ETT     Note:  Airway :Face Mask  Patient transferred to:PACU  Comments: Pt extubated awake and strong. Talking in PACU. Denies pain      Vitals: (Last set prior to Anesthesia Care Transfer)    CRNA VITALS  10/9/2018 1041 - 10/9/2018 1123      10/9/2018             Pulse: 96    SpO2: 95 %                Electronically Signed By: ARDEN Yi CRNA  October 9, 2018  11:23 AM

## 2018-10-10 ENCOUNTER — ANESTHESIA EVENT (OUTPATIENT)
Dept: MEDSURG UNIT | Facility: CLINIC | Age: 33
DRG: 621 | End: 2018-10-10
Payer: COMMERCIAL

## 2018-10-10 ENCOUNTER — ANESTHESIA (OUTPATIENT)
Dept: MEDSURG UNIT | Facility: CLINIC | Age: 33
DRG: 621 | End: 2018-10-10
Payer: COMMERCIAL

## 2018-10-10 ENCOUNTER — APPOINTMENT (OUTPATIENT)
Dept: GENERAL RADIOLOGY | Facility: CLINIC | Age: 33
DRG: 621 | End: 2018-10-10
Attending: PHYSICIAN ASSISTANT
Payer: COMMERCIAL

## 2018-10-10 LAB
ANION GAP SERPL CALCULATED.3IONS-SCNC: 4 MMOL/L (ref 3–14)
CHLORIDE SERPL-SCNC: 101 MMOL/L (ref 94–109)
CO2 SERPL-SCNC: 33 MMOL/L (ref 20–32)
GLUCOSE BLDC GLUCOMTR-MCNC: 260 MG/DL (ref 70–99)
HGB BLD-MCNC: 13.6 G/DL (ref 13.3–17.7)
POTASSIUM SERPL-SCNC: 3.7 MMOL/L (ref 3.4–5.3)
SODIUM SERPL-SCNC: 138 MMOL/L (ref 133–144)

## 2018-10-10 PROCEDURE — 36415 COLL VENOUS BLD VENIPUNCTURE: CPT | Performed by: PHYSICIAN ASSISTANT

## 2018-10-10 PROCEDURE — 25000128 H RX IP 250 OP 636: Performed by: PHYSICIAN ASSISTANT

## 2018-10-10 PROCEDURE — 25000132 ZZH RX MED GY IP 250 OP 250 PS 637: Performed by: INTERNAL MEDICINE

## 2018-10-10 PROCEDURE — 00000146 ZZHCL STATISTIC GLUCOSE BY METER IP

## 2018-10-10 PROCEDURE — 37000011 ZZH ANESTHESIA WARD SERVICE: Performed by: NURSE ANESTHETIST, CERTIFIED REGISTERED

## 2018-10-10 PROCEDURE — 25000132 ZZH RX MED GY IP 250 OP 250 PS 637: Performed by: PHYSICIAN ASSISTANT

## 2018-10-10 PROCEDURE — 85018 HEMOGLOBIN: CPT | Performed by: PHYSICIAN ASSISTANT

## 2018-10-10 PROCEDURE — 25000125 ZZHC RX 250: Performed by: PHYSICIAN ASSISTANT

## 2018-10-10 PROCEDURE — 12000007 ZZH R&B INTERMEDIATE

## 2018-10-10 PROCEDURE — 40000986 XR UPPER GI WATER SOLUBLE

## 2018-10-10 PROCEDURE — 40000671 ZZH STATISTIC ANESTHESIA CASE

## 2018-10-10 PROCEDURE — 80051 ELECTROLYTE PANEL: CPT | Performed by: PHYSICIAN ASSISTANT

## 2018-10-10 RX ORDER — HYDROMORPHONE HYDROCHLORIDE 2 MG/1
2 TABLET ORAL
Status: DISCONTINUED | OUTPATIENT
Start: 2018-10-10 | End: 2018-10-11 | Stop reason: HOSPADM

## 2018-10-10 RX ADMIN — ACETAMINOPHEN 975 MG: 325 TABLET, FILM COATED ORAL at 22:38

## 2018-10-10 RX ADMIN — Medication 100 MG: at 14:00

## 2018-10-10 RX ADMIN — INSULIN ASPART 2 UNITS: 100 INJECTION, SOLUTION INTRAVENOUS; SUBCUTANEOUS at 07:56

## 2018-10-10 RX ADMIN — ENOXAPARIN SODIUM 40 MG: 40 INJECTION SUBCUTANEOUS at 10:45

## 2018-10-10 RX ADMIN — FAMOTIDINE 20 MG: 10 INJECTION, SOLUTION INTRAVENOUS at 18:39

## 2018-10-10 RX ADMIN — HYDROMORPHONE HYDROCHLORIDE 2 MG: 2 TABLET ORAL at 21:42

## 2018-10-10 RX ADMIN — DIPHENHYDRAMINE HYDROCHLORIDE 25 MG: 25 CAPSULE ORAL at 20:10

## 2018-10-10 RX ADMIN — ACETAMINOPHEN 975 MG: 325 TABLET, FILM COATED ORAL at 14:00

## 2018-10-10 RX ADMIN — LOSARTAN POTASSIUM 100 MG: 100 TABLET, FILM COATED ORAL at 14:00

## 2018-10-10 RX ADMIN — OXYCODONE HYDROCHLORIDE 10 MG: 5 TABLET ORAL at 17:03

## 2018-10-10 RX ADMIN — DIATRIZOATE MEGLUMINE AND DIATRIZOATE SODIUM 30 ML: 660; 100 SOLUTION ORAL; RECTAL at 08:40

## 2018-10-10 RX ADMIN — Medication 100 MG: at 18:38

## 2018-10-10 RX ADMIN — INSULIN ASPART 1 UNITS: 100 INJECTION, SOLUTION INTRAVENOUS; SUBCUTANEOUS at 01:37

## 2018-10-10 RX ADMIN — SERTRALINE HYDROCHLORIDE 100 MG: 100 TABLET ORAL at 14:01

## 2018-10-10 RX ADMIN — INSULIN ASPART 3 UNITS: 100 INJECTION, SOLUTION INTRAVENOUS; SUBCUTANEOUS at 17:03

## 2018-10-10 RX ADMIN — CEFAZOLIN SODIUM 2 G: 2 INJECTION, SOLUTION INTRAVENOUS at 01:36

## 2018-10-10 RX ADMIN — LORAZEPAM 1 MG: 0.5 TABLET ORAL at 19:58

## 2018-10-10 RX ADMIN — INSULIN ASPART 3 UNITS: 100 INJECTION, SOLUTION INTRAVENOUS; SUBCUTANEOUS at 13:55

## 2018-10-10 RX ADMIN — ATORVASTATIN CALCIUM 40 MG: 40 TABLET, FILM COATED ORAL at 14:00

## 2018-10-10 RX ADMIN — SODIUM CHLORIDE, POTASSIUM CHLORIDE, SODIUM LACTATE AND CALCIUM CHLORIDE: 600; 310; 30; 20 INJECTION, SOLUTION INTRAVENOUS at 19:47

## 2018-10-10 RX ADMIN — INSULIN ASPART 2 UNITS: 100 INJECTION, SOLUTION INTRAVENOUS; SUBCUTANEOUS at 21:15

## 2018-10-10 RX ADMIN — SODIUM CHLORIDE, POTASSIUM CHLORIDE, SODIUM LACTATE AND CALCIUM CHLORIDE: 600; 310; 30; 20 INJECTION, SOLUTION INTRAVENOUS at 12:45

## 2018-10-10 RX ADMIN — ENOXAPARIN SODIUM 40 MG: 40 INJECTION SUBCUTANEOUS at 20:10

## 2018-10-10 RX ADMIN — FAMOTIDINE 20 MG: 10 INJECTION, SOLUTION INTRAVENOUS at 06:22

## 2018-10-10 RX ADMIN — SODIUM CHLORIDE, POTASSIUM CHLORIDE, SODIUM LACTATE AND CALCIUM CHLORIDE: 600; 310; 30; 20 INJECTION, SOLUTION INTRAVENOUS at 06:27

## 2018-10-10 RX ADMIN — INSULIN ASPART 2 UNITS: 100 INJECTION, SOLUTION INTRAVENOUS; SUBCUTANEOUS at 04:15

## 2018-10-10 RX ADMIN — Medication 100 MG: at 21:41

## 2018-10-10 ASSESSMENT — ACTIVITIES OF DAILY LIVING (ADL)
ADLS_ACUITY_SCORE: 9

## 2018-10-10 NOTE — PROGRESS NOTES
"Bariatric Surgery Progress Note         Assessment:      Naman Jones is a 33 year old male S/P Lap Gastric Bypass, POD #1   Morbid Obesity, BMI >40     Diabetes Mellitus Type 2         Plan:   UGI this am was WNL  Start protocol:    D/C lucy this am.  Start water now.  Clears at lunch.  Fulls tomorrow am.  D/C PCA when tolerating PO and start pain medication.  Antiemetics PRN N/V.  Lovenox and PCDs.  Ambulate at least QID  Discussed OR findings    Dispo:Likely home tomorrow.        Interval History:   Fractured sleep last night.  Pain is about what he expected.  Worse on the Left side.  Denies N/V.           Physical Exam:   /88 (BP Location: Left arm)  Pulse 87  Temp 97.6  F (36.4  C) (Oral)  Resp 18  Ht 1.854 m (6' 1\")  Wt (!) 162.8 kg (359 lb)  SpO2 97%  BMI 47.36 kg/m2  I/O last 3 completed shifts:  In: 4140 [P.O.:60; I.V.:4080]  Out: 2220 [Urine:2205; Blood:15]  General: NAD, pleasant, alert and oriented x3  Abdomen: soft, with binder on  Incision: no complaints   UGI: WNL    Labs (most recent at bottom):     Results for orders placed or performed during the hospital encounter of 10/09/18 (from the past 24 hour(s))   Glucose by meter   Result Value Ref Range    Glucose 261 (H) 70 - 99 mg/dL   Platelet count   Result Value Ref Range    Platelet Count 215 150 - 450 10e9/L   Creatinine   Result Value Ref Range    Creatinine 0.60 (L) 0.66 - 1.25 mg/dL    GFR Estimate >90 >60 mL/min/1.7m2    GFR Estimate If Black >90 >60 mL/min/1.7m2   Glucose by meter   Result Value Ref Range    Glucose 259 (H) 70 - 99 mg/dL   Glucose by meter   Result Value Ref Range    Glucose 240 (H) 70 - 99 mg/dL   Hemoglobin   Result Value Ref Range    Hemoglobin 13.6 13.3 - 17.7 g/dL   Electrolyte panel   Result Value Ref Range    Sodium 138 133 - 144 mmol/L    Potassium 3.7 3.4 - 5.3 mmol/L    Chloride 101 94 - 109 mmol/L    Carbon Dioxide 33 (H) 20 - 32 mmol/L    Anion Gap 4 3 - 14 mmol/L   XR Upper GI Water Soluble: " Snehal-en-Y    Narrative    UPPER GI WATER SOLUBLE 10/10/2018 8:41 AM     COMPARISON: None.    HISTORY: Snehal-en-Y gastric bypass postoperative day 1.    FLUOROSCOPY TIME: 0.4 minutes.    SPOT FILMS: 2    FINDINGS: A water soluble upper GI showed no leak. The  gastrojejunostomy is patent and intact. No dilated loops of bowel are  noted.      Impression    IMPRESSION: Intact gastrojejunostomy.    MD Sunday GOTTLIEB  Pager: [phone]  [facility]: [phone]

## 2018-10-10 NOTE — CONSULTS
"NUTRITION EDUCATION    REASON FOR ASSESSMENT:  Bariatric Surgery Consult    CURRENT DIET:  Bariatric Clear Liquids    NUTRITION HISTORY:  Patient worked with clinical dietitian in [facility] prior to surgery to assist with lifestyle modification.    ANTHROPOMETRICS:   Ht: 6' 1\"  Wt: 359 lbs 0 oz (162.8 kg)  BMI: Body mass index is 47.36 kg/(m^2).  IBW: 83.6 kg  %IBW: 195%    ASSESSED NUTRITION NEEDS:  Energy Needs: 7824-7043 kcals (11 - 14 kcal/kg ABW)                      Protein Needs:  g (1 - 1.5 gm/kg IBW)  Fluid Needs: 8494-1357 mL (1mL/kcal/ABW)    Know patient will not meet assessed needs due to the restrictive nature of surgery.    NUTRITION DIAGNOSIS:   Food- and nutrition related knowledge deficit related to bariatric surgery as evidence by laparoscopic gastric bypass surgery on 10/9/18.    INTERVENTIONS:    Nutrition Prescription:    Recommended patient follow bariatric diet advancement for laparoscopic gastric bypass    Implementation:    Nutrition Education (Content):  a) Reviewed laparoscopic gastric bypass diet guidelines  b) Provided handouts:  Nutrition After laparoscopic gastric bypass and Vitamin and Mineral Supplements After Weight Loss Surgery    Nutrition Education (Application):  c) Discussed current eating habits and recommended alternative food choices  d) Patient verbalizes understanding of diet by listing appropriate foods for home    Anticipate good compliance    Diet Education - refer to Education Flowsheet    Goals:    Patient will follow bariatric diet advancement schedule    Patient will follow post-operative vitamin mineral schedule      Follow Up:    Patient to follow up in 2 weeks with RD in [facility]    Provided RD contact information for future questions      Radha Hood RD, LD  Clinical Dietitian       "

## 2018-10-10 NOTE — PLAN OF CARE
Problem: Patient Care Overview  Goal: Plan of Care/Patient Progress Review  Outcome: No Change  A/Ox 4, vss,a-febrile, c/o pain 5-6/10 pca helping, tolerating clear liquids,up with one assist and gait belt, patient has six lap sites, CDI, no drainage, using IS to 2250, ambulated in the hallway with spouse, tolerated well, patient had small bowel movement.

## 2018-10-10 NOTE — PLAN OF CARE
Problem: Patient Care Overview  Goal: Plan of Care/Patient Progress Review  Outcome: Improving  VSS, alert/orientated x4, lung sounds diminished throughout, NPO/Ice chips, bowel sounds hypoactive, no flatus, ayala in place and to be removed POD 1. Pain controlled with PCA pump 0.2 Dilauded, successful.

## 2018-10-11 VITALS
RESPIRATION RATE: 16 BRPM | SYSTOLIC BLOOD PRESSURE: 152 MMHG | BODY MASS INDEX: 41.75 KG/M2 | HEART RATE: 80 BPM | DIASTOLIC BLOOD PRESSURE: 94 MMHG | HEIGHT: 73 IN | OXYGEN SATURATION: 95 % | TEMPERATURE: 98.6 F | WEIGHT: 315 LBS

## 2018-10-11 LAB
GLUCOSE BLDC GLUCOMTR-MCNC: 178 MG/DL (ref 70–99)
GLUCOSE BLDC GLUCOMTR-MCNC: 179 MG/DL (ref 70–99)
GLUCOSE BLDC GLUCOMTR-MCNC: 189 MG/DL (ref 70–99)
GLUCOSE BLDC GLUCOMTR-MCNC: 199 MG/DL (ref 70–99)
GLUCOSE BLDC GLUCOMTR-MCNC: 206 MG/DL (ref 70–99)
GLUCOSE BLDC GLUCOMTR-MCNC: 207 MG/DL (ref 70–99)
GLUCOSE BLDC GLUCOMTR-MCNC: 212 MG/DL (ref 70–99)
GLUCOSE BLDC GLUCOMTR-MCNC: 243 MG/DL (ref 70–99)

## 2018-10-11 PROCEDURE — 00000146 ZZHCL STATISTIC GLUCOSE BY METER IP

## 2018-10-11 PROCEDURE — 25000132 ZZH RX MED GY IP 250 OP 250 PS 637: Performed by: PHYSICIAN ASSISTANT

## 2018-10-11 PROCEDURE — 25000132 ZZH RX MED GY IP 250 OP 250 PS 637: Performed by: INTERNAL MEDICINE

## 2018-10-11 PROCEDURE — 25000125 ZZHC RX 250: Performed by: PHYSICIAN ASSISTANT

## 2018-10-11 PROCEDURE — 25000128 H RX IP 250 OP 636: Performed by: PHYSICIAN ASSISTANT

## 2018-10-11 RX ORDER — HYDROMORPHONE HYDROCHLORIDE 2 MG/1
2-4 TABLET ORAL
Qty: 40 TABLET | Refills: 0 | Status: SHIPPED | OUTPATIENT
Start: 2018-10-11 | End: 2018-10-16

## 2018-10-11 RX ORDER — OXYCODONE HYDROCHLORIDE 5 MG/1
5-10 TABLET ORAL
Qty: 40 TABLET | Refills: 0 | Status: SHIPPED | OUTPATIENT
Start: 2018-10-11 | End: 2018-10-11

## 2018-10-11 RX ORDER — ONDANSETRON 4 MG/1
4 TABLET, ORALLY DISINTEGRATING ORAL EVERY 6 HOURS PRN
Qty: 30 TABLET | Refills: 0 | Status: SHIPPED | OUTPATIENT
Start: 2018-10-11 | End: 2018-10-23

## 2018-10-11 RX ADMIN — ACETAMINOPHEN 975 MG: 325 TABLET, FILM COATED ORAL at 05:59

## 2018-10-11 RX ADMIN — RANITIDINE 75 MG: 75 TABLET, FILM COATED ORAL at 09:07

## 2018-10-11 RX ADMIN — LOSARTAN POTASSIUM 100 MG: 100 TABLET, FILM COATED ORAL at 09:07

## 2018-10-11 RX ADMIN — INSULIN ASPART 3 UNITS: 100 INJECTION, SOLUTION INTRAVENOUS; SUBCUTANEOUS at 01:39

## 2018-10-11 RX ADMIN — INSULIN ASPART 2 UNITS: 100 INJECTION, SOLUTION INTRAVENOUS; SUBCUTANEOUS at 09:09

## 2018-10-11 RX ADMIN — INSULIN ASPART 1 UNITS: 100 INJECTION, SOLUTION INTRAVENOUS; SUBCUTANEOUS at 13:28

## 2018-10-11 RX ADMIN — SODIUM CHLORIDE, POTASSIUM CHLORIDE, SODIUM LACTATE AND CALCIUM CHLORIDE: 600; 310; 30; 20 INJECTION, SOLUTION INTRAVENOUS at 05:59

## 2018-10-11 RX ADMIN — Medication 100 MG: at 09:08

## 2018-10-11 RX ADMIN — INSULIN ASPART 1 UNITS: 100 INJECTION, SOLUTION INTRAVENOUS; SUBCUTANEOUS at 04:47

## 2018-10-11 RX ADMIN — SERTRALINE HYDROCHLORIDE 100 MG: 100 TABLET ORAL at 09:07

## 2018-10-11 RX ADMIN — HYDROMORPHONE HYDROCHLORIDE 2 MG: 2 TABLET ORAL at 09:03

## 2018-10-11 RX ADMIN — HYDROMORPHONE HYDROCHLORIDE 2 MG: 2 TABLET ORAL at 13:27

## 2018-10-11 RX ADMIN — ENOXAPARIN SODIUM 40 MG: 40 INJECTION SUBCUTANEOUS at 09:08

## 2018-10-11 RX ADMIN — FAMOTIDINE 20 MG: 10 INJECTION, SOLUTION INTRAVENOUS at 05:59

## 2018-10-11 RX ADMIN — ATORVASTATIN CALCIUM 40 MG: 40 TABLET, FILM COATED ORAL at 09:07

## 2018-10-11 ASSESSMENT — ACTIVITIES OF DAILY LIVING (ADL)
ADLS_ACUITY_SCORE: 9

## 2018-10-11 NOTE — DISCHARGE INSTRUCTIONS
Check with endocrinologist in regards to insulin/oral agent adjustments.     For informational purposes only. Not to replace the advice of your health care provider. Copyright   2006 [place] [employer] â€“ [employer] [facility]. All rights reserved. [employer] 141604 - REV 09/14  After Bariatric Surgery  [facility]  Note: Before you go home, ask your nurse to order your pain medicine from the pharmacy. Be sure you have your medicine with you when you leave.  How much fluid should I drink?    Drink at least 1 ounce of fluid every 15 minutes (1/2 cup per hour) during the day.     Carry a water bottle with you. Drink from it often.    Keep track of how much fluid you drink in a day.    Adjustable stomach band: Do not overeat or drink too much. This can cause vomiting (throwing up), stretch your stomach, or make your stomach slip up over your band.    Remember:  - Do not use straws, chew gum or suck on hard candies. They may cause painful gas.   - No cold drinks.  - No coffee, soda pop or drinks with caffeine. These may cause stomach pain.  - No alcohol. It is bad for your liver and will cause stomach pain. It also adds a lot of calories.  What can I do for pain control?      You had major abdominal surgery that involves all layers of your abdominal muscles.   Pain is expected, even as far out as 6-8 weeks postop.  Moving, sneezing, coughing, and  breathing will cause pain because these activities use your abdominal muscles.      You can take the liquid pain medicine as prescribed. You can also try to wean off from it  as soon as you feel comfortable.  Do not drive while you are taking pain medicine.   This is dangerous.     You may take liquid Tylenol (acetaminophen) for pain in place of the prescribed pain  medicine. Do not take more than 3000 mg in a 24 hour period.     You may also apply ice or heat to the affected area(s).  Just remember to wrap the ice  in something and limit icing sessions to 20 minutes.  Excessive icing can irritate the skin  or cause tissue damage.   You can apply heat with a hot, wet towel or heating pad. Just like cold therapy, limit  heat application to 20 minutes. Never sleep with a heating pad on. It could cause severe  burns to your skin.    Do not take NSAID s (ibuprofen, Motrin, Advil, Aleve, Naproxen). They will increase you risk for bleeding or getting an ulcer.    If you have any of the following pain issues, we would like to talk to you:  - pain that does not improve with rest  - pain that gets worse and worse  - pain that is not controlled by your pain medicine  - a sudden severe increase in pain  -   If your doctor prescribes Zantac, take it as ordered. Take it for 3 months to prevent       Ulcers.  -   If you took an antacid before you had surgery, keep taking it for 3 months after           surgery. This will help prevent ulcers.   - Take any other medicines that your doctor tells you to take.   - Wear your binder to support your belly muscles.  Please call the clinic at [phone] for any concerns      How much rest do I need?  Get plenty of rest the first few days after surgery. Balance rest and activity.  Do not nap more than one hour during the day. Set a timer to wake yourself up, if needed. Too much sleep will keep you from drinking enough fluid during the day.  What should I know about my incisions (cuts)?  - Change your bandages as needed or if they get wet.   -Call your doctor if you have any of these signs of infection:   - Redness around the site.   - Drainage that smells bad.   - Fever of 101  F (38.3  C) or higher when taken under the tongue.- Chills.  If you     have a drain:  - Do not pull on the drain. Do not pull the drain out. We will take out your drain at your first clinic visit.  - The color and amount of fluid varies. Right after surgery the fluid is bright red. Over time, it changes to light pink and may become clear or the color of straw.   Will my urine or  bowel movements change?   You might not have a bowel movement for several days after surgery. Your first bowel movements will likely be liquid.   Gastric bypass or sleeve gastrectomy: You may also notice old blood or a darker color in your stools (bowel movements).   Your urine should be clear to light yellow. This shows that you are drinking enough fluid. You should urinate (pass water) at least 2 to 3 times during the day. If not, call us.  What kind of activity is safe?  For 4 weeks after surgery:     Walk for a short time every day.    Do not jog or run.    No weight lifting or belly exercises.  No swimming, baths or hot tubs until your cuts are healed (scabs are gone). You may shower.  No outside activity in hot, humid weather until you can drink 48 to 64 ounces of fluid in 24 hours. If you sweat a lot, your body may lose too much water.   The first month after surgery, do not take any trips where you must sit for a long time. You could get a blood clot in your legs.  Call the clinic at [phone] if:    Your pain medicine is not working.    You do not urinate (pass water) 2 to 3 times per day.    You have any signs of infection.    You have belly pain that gets worse and worse.    You have swollen legs with pain behind the knee or calf.    You have chest pain or feel very short of breath.    You have any questions or concerns.    You have a sudden severe increase in heart rate.    You have vomiting that gets worse and worse.  When should I go back to the clinic?  Time Gastric bypass or sleeve gastrectomy Adjustable gastric band   Week 1 See the physician or physician assistant (PA). See the nurse and physical therapist.   Week 2 See the nurse and dietitian. See the nurse and dietitian.   Week 4 Have a nutrition consult with the dietitian. See the PA and dietitian.   Week 6  Go for the first adjustment (fill) of your stomach band.   For the first year (or until your BMI is less than 30) Go to the clinic each  month to see if you need a band adjustment (fill).

## 2018-10-11 NOTE — PLAN OF CARE
Problem: Patient Care Overview  Goal: Plan of Care/Patient Progress Review  Outcome: No Change  A&Ox4. Pain controlled with dilaudid. Patient ambulates and voids independently. Incision CDI with steri strips. Abdominal binder on. Discharge instructions reviewed and well received by patient. Pt's feels comfortable with diabetic management and states he will contact endocrinologist immediately upon discharge.

## 2018-10-11 NOTE — PROVIDER NOTIFICATION
MD Notification    Notified Person: MD Delcid    Notified Person Name: on call answering service     Notification Date/Time: 10/10/18 2030    Notification Interaction:     Purpose of Notification: Pt reaction to oxycodone of facial flushing, increased temp, itching. Denies SOB     Orders Received: Awaiting call back    Comments:

## 2018-10-11 NOTE — ANESTHESIA CARE TRANSFER NOTE
Patient: Naman Jones    * No procedures listed *    Diagnosis: * No pre-op diagnosis entered *  Diagnosis Additional Information: No value filed.    Anesthesia Type:   No value filed.     Note:  Airway :Room Air  Destination: did not transfer.  Comments: Report given to RN. Patient alert and talking.      Vitals: (Last set prior to Anesthesia Care Transfer)              Electronically Signed By: ARDEN Bauer CRNA  October 10, 2018  11:10 PM

## 2018-10-11 NOTE — PROGRESS NOTES
Slight increased in temp overnight - improved quickly with IS  Pain well controlled  abd soft and benign   Tolerated full liquid breakfast  Home today

## 2018-10-11 NOTE — PLAN OF CARE
Problem: Patient Care Overview  Goal: Plan of Care/Patient Progress Review  A&Ox4. CMS intact. Bowel sounds audible, +flatus. Tolerating clears. VSS. Dressing CDI-of the 6 dressings, 2 dressings on LUQ have marked dried drainage. Up with SBA. C/o 6/10 abdominal pain, decreased with PO dilaudid. PO oxycodone was given when PCA was discontinued-pt reacted with flushing of face, increased temp, upper body itchy with white raised bumps-administered benadryl with relief of symptoms & notified provider. New order of PO dilaudid. Plan pending.

## 2018-10-11 NOTE — PLAN OF CARE
Problem: Patient Care Overview  Goal: Plan of Care/Patient Progress Review  Outcome: Improving  VSS, alert/orientated x4, lung sounds diminished throughout, clear liquid diet. Change of shift reported patient had reaction to oxycodone PO, patients temperature elevated at 100.3, provided education regarding IS and the impact it can have on relieving elevated temp, patients 0400 temp at 98.5, successful. Patient now on PO Dilaudid but refused pain meds for this shift, verbalized tolerating current pain level.

## 2018-10-11 NOTE — DISCHARGE SUMMARY
[facility] Discharge Summary    Naman Jones MRN# [id number]   Age: 33 year old YOB: 1985     Date of Admission:  10/9/2018  Date of Discharge:  10/11/2018  Admitting Provider:  Alden Mcwilliams MD  Discharge Provider:  Sunday Zhu PA-C with Dr. Mcwilliams  Discharging Service: Bariatric Surgery     Primary Provider: Haase, Susan Rachele  Primary Care Physician Phone Number: [phone]          Admission Diagnoses:   Principle Diagnosis: MORBID OBESITY  Morbid obesity with BMI of 45.0-49.9, adult (H)    Secondary Diagnoses:    Diabetes Mellitus Type 2, Hypertension, and Hyperlipidemia ALL associated with obesity           Discharge Diagnosis:   Morbid obesity with BMI of 45.0-49.9          Procedures:   Procedure(s): Laparoscopic Gastric Bypass            Discharge Medications:     Current Discharge Medication List      START taking these medications    Details   HYDROmorphone (DILAUDID) 2 MG tablet Take 1-2 tablets (2-4 mg) by mouth every 3 hours as needed for moderate to severe pain  Qty: 40 tablet, Refills: 0    Associated Diagnoses: Acute post-operative pain; Status post bariatric surgery      ondansetron (ZOFRAN-ODT) 4 MG ODT tab Take 1 tablet (4 mg) by mouth every 6 hours as needed for nausea or vomiting  Qty: 30 tablet, Refills: 0    Associated Diagnoses: PONV (postoperative nausea and vomiting)      ranitidine (ZANTAC) 75 MG tablet Take 1 tablet (75 mg) by mouth 2 times daily  Qty: 120 tablet, Refills: 1    Associated Diagnoses: Status post bariatric surgery         CONTINUE these medications which have NOT CHANGED    Details   atorvastatin (LIPITOR) 40 MG tablet Take 1 tablet (40 mg) by mouth daily  Qty: 90 tablet, Refills: 3    Associated Diagnoses: Mixed hyperlipidemia      DiphenhydrAMINE HCl (BENADRYL PO) Take 25 mg by mouth every evening      dulaglutide (TRULICITY) 1.5 MG/0.5ML pen Inject 1.5 mg Subcutaneous every 7 days  Qty: 2 mL, Refills: 5    Associated Diagnoses: Type 2  diabetes mellitus with hyperglycemia, with long-term current use of insulin (H)      insulin degludec (TRESIBA) 200 UNIT/ML pen Inject 130 Units Subcutaneous daily  Qty: 27 mL, Refills: 3    Associated Diagnoses: Type 2 diabetes mellitus with hyperglycemia, with long-term current use of insulin (H)      insulin lispro (HUMALOG KWIKPEN) 100 UNIT/ML injection INJECT 1 UNIT FOR EVERY 2 GRAMS OF CARBS PLUS CORRECTION OF 1 UNIT FOR EVERY 15 POINTS ABOVE 150.(APPROXIMATELY 130 UNITS PER DAY)  Qty: 45 mL, Refills: 5    Associated Diagnoses: Type 2 diabetes mellitus with hyperglycemia, with long-term current use of insulin (H)      LORazepam (ATIVAN) 1 MG tablet Take 1 tablet (1 mg) by mouth daily as needed for anxiety  Qty: 30 tablet, Refills: 2    Associated Diagnoses: JOEL (generalized anxiety disorder)      losartan (COZAAR) 100 MG tablet TAKE 1 TABLET(100 MG) BY MOUTH DAILY    Comments: Has refills until October, inform pt needs f/u appointment  Associated Diagnoses: Essential hypertension      metFORMIN (GLUCOPHAGE) 500 MG tablet Take 2 tablets (1,000 mg) by mouth 2 times daily (with meals)  Qty: 360 tablet, Refills: 3    Associated Diagnoses: Type 2 diabetes mellitus with hyperglycemia, with long-term current use of insulin (H)      multivitamin  peds with iron (FLINTSTONES COMPLETE) 60 MG chewable tablet Take 1 chew tab by mouth daily      sertraline (ZOLOFT) 100 MG tablet Take 1 tablet (100 mg) by mouth daily  Qty: 90 tablet, Refills: 1    Associated Diagnoses: JOEL (generalized anxiety disorder)      triamterene-hydrochlorothiazide (MAXZIDE) 75-50 MG per tablet Take 1 tablet by mouth daily  Qty: 90 tablet, Refills: 1    Associated Diagnoses: Essential hypertension      Continuous Blood Gluc  (FREESTYLE KEILA READER) SUGAR 1 Device as needed  Qty: 1 Device, Refills: 0    Associated Diagnoses: Type 2 diabetes mellitus with hyperglycemia, with long-term current use of insulin (H)      continuous blood glucose  monitoring (FREESTYLE KEILA) sensor For use with Freestyle Keila Flash  for continuous monitioring of blood glucose levels. Replace sensor every 10 days.  Qty: 3 each, Refills: 11    Associated Diagnoses: Type 2 diabetes mellitus with hyperglycemia, with long-term current use of insulin (H)      insulin pen needle 32G X 4 MM Use 3 pen needles daily or as directed.  Qty: 100 each, Refills: 6    Associated Diagnoses: Diabetes mellitus type 2 in obese (H)      ONETOUCH DELICA LANCETS 33G MISC 1 lancet 4 times daily  Qty: 100 each, Refills: 6    Associated Diagnoses: Type 2 diabetes mellitus with hyperglycemia, with long-term current use of insulin (H)                 Allergies:         Allergies   Allergen Reactions     Sulfa Drugs Anaphylaxis     Oxycodone Other (See Comments) and Itching     Flushed     Lisinopril      Other reaction(s): Impotence     Onion      Other reaction(s): Intolerance-Can't Take             Brief History of Illness:   Naman Jones is a 33 year old-year-old male who underwent preoperative screening in anticipation for potential bariatric surgery. He was deemed an appropriate candidate for bariatric surgery by the consensus of our [facility] team. In the office, surgical options were thoroughly discussed. He was furnished with a copy of our specialized consent form for bariatric surgery. The potential risks as outlined in that document were all thoroughly reviewed. All questions were answered and he wished to proceed.    On the day of surgery, after reviewing the risks, benefits, and possible complications, informed consent was obtained and the patient underwent the above procedure.  There were no complications.  Please see the Operative Report for full details.           Hospital Course:   Naman Jones's hospital course was unremarkable.  He recovered as anticipated and experienced no post-operative complications.  He had a reaction to oxycodone that was treated with  "benadryl.  Changed PO narcotic to dilaudid and tolerated this better.    On the date of discharge, the patient was discharged to home in stable condition and afebrile.  He verbalized understanding of all discharge instructions and felt comfortable with the discharge plan.  He was asked to call with any further questions or concerns.         Image Results From This Hospital Stay:        Results for orders placed or performed during the hospital encounter of 10/09/18   XR Upper GI Water Soluble: Snehal-en-Y    Narrative    UPPER GI WATER SOLUBLE 10/10/2018 8:41 AM     COMPARISON: None.    HISTORY: Snehal-en-Y gastric bypass postoperative day 1.    FLUOROSCOPY TIME: 0.4 minutes.    SPOT FILMS: 2    FINDINGS: A water soluble upper GI showed no leak. The  gastrojejunostomy is patent and intact. No dilated loops of bowel are  noted.      Impression    IMPRESSION: Intact gastrojejunostomy.    BALTAZAR PRO MD            Condition on Discharge:      Discharge condition: Stable   Discharge vitals: Blood pressure (!) 152/94, pulse 80, temperature 98.6  F (37  C), temperature source Oral, resp. rate 16, height 1.854 m (6' 1\"), weight (!) 162.8 kg (359 lb), SpO2 95 %.           Discharge Disposition:   Discharged to home          Discharge Instructions and Follow-Up:      Naman RUDY Robert was asked to follow up with the bariatric surgical team within 1 week.  He will see our clinic PA-C at that visit, with plans to reconvene with a Registered Dietician at the 2nd week office visit.    Sunday Zhu PA-C  dictating on behalf of Dr. Mcwilliams  [phone]     "

## 2018-10-12 ENCOUNTER — TELEPHONE (OUTPATIENT)
Dept: FAMILY MEDICINE | Facility: CLINIC | Age: 33
End: 2018-10-12

## 2018-10-12 DIAGNOSIS — E11.65 TYPE 2 DIABETES MELLITUS WITH HYPERGLYCEMIA, WITH LONG-TERM CURRENT USE OF INSULIN (H): Primary | ICD-10-CM

## 2018-10-12 DIAGNOSIS — Z79.4 TYPE 2 DIABETES MELLITUS WITH HYPERGLYCEMIA, WITH LONG-TERM CURRENT USE OF INSULIN (H): Primary | ICD-10-CM

## 2018-10-12 NOTE — TELEPHONE ENCOUNTER
Blood sugars have been 190 highest and 110 lowest since being home.  Has been taking Novolog with meals 15-20 carbs about 10 units.  No Metformin yet.  Trulicity is on Sundays so has not done that.  Tresiba has not taken.  Hold Novolog and others over the week-end.  Can take Trulicty Sunday if tolerates OK.  Mychart readings Monday.  Advised patient of Annette's recommendations.  Advised to mychart us Monday with his readings from over the week-end and also to include in message if he took Trulicity.  Patient agrees with plan.  Nazanin Rahman RN

## 2018-10-12 NOTE — TELEPHONE ENCOUNTER
"Annette- had jackie-en-y surgery 10/9/18.  Wondering about diabetic medications.  Should he back off on Tresiba.  Should he adjust Humalog sliding scale.  Does have appt 10/26/18.  Also willing to come talk to Enedelia if you would also.  Has lost 5-6# so far but feels still full of IV fluids.  Please advise.  Call him back at [phone].  Nazanin Rahman RN           Discharge Instructions and Follow-Up:      Naman Jones was asked to follow up with the bariatric surgical team within 1 week.  He will see our clinic PAJennaC at that visit, with plans to reconvene with a Registered Dietician at the 2nd week office visit.     Sunday Zhu PA-C  dictating on behalf of Dr. Mcwilliams  [phone]      Hospital/TCU/ED for chronic condition Discharge Protocol    \"Hi, my name is Nazanin Rahman, a registered nurse, and I am calling from [facility].  I am calling to follow up and see how things are going for you after your recent emergency visit/hospital/TCU stay.\"    Tell me how you are doing now that you are home?\" so far so good      Discharge Instructions    \"Let's review your discharge instructions.  What is/are the follow-up recommendations?  Pt. Response: see above    \"Has an appointment with your primary care provider been scheduled?\"   seeing surgeon    \"When you see the provider, I would recommend that you bring your medications with you.\"    Medications    \"Tell me what changed about your medicines when you discharged?\"    Changes to chronic meds?    0-1    \"What questions do you have about your medications?\"    see above - Questions cannot be easily answered - Epic MTM referral needed     New diagnoses of heart failure, COPD, diabetes, or MI?    No     On insulin: \"Did you start on insulin in the hospital or did you have your insulin dose changed?\"  No         Medication reconciliation completed? Yes  Was MTM referral placed (*Make sure to put transitions as reason for referral)?   No    Call Summary    \"What " "questions or concerns do you have about your recent visit and your follow-up care?\"     none    \"If you have questions or things don't continue to improve, we encourage you contact us through the main clinic number (give number).  Even if the clinic is not open, triage nurses are available 24/7 to help you.     We would like you to know that our clinic has extended hours (provide information).  We also have urgent care (provide details on closest location and hours/contact info)\"      \"Thank you for your time and take care!\"    Nazanin Rahman RN             "

## 2018-10-12 NOTE — TELEPHONE ENCOUNTER
Please call and see what his recent blood sugar readings are and message me back.    Annette Hobson NP  Endocrinology

## 2018-10-12 NOTE — TELEPHONE ENCOUNTER
Inpatient discharge from [facility] on 10/11/2018 Morbid Obesity, Morbid Obesity With Bmi Of 45.0-49.9, Adult (H)    Astrid Franklin/

## 2018-10-12 NOTE — TELEPHONE ENCOUNTER
ED / Discharge Outreach Protocol    Patient Contact    Attempt # 1    Was call answered?  No.  Left message on voicemail with information to call me back.  Enedelia Goetz RN, BSN  Message handled by Nurse Triage.

## 2018-10-14 ENCOUNTER — TELEPHONE (OUTPATIENT)
Dept: SURGERY | Facility: CLINIC | Age: 33
End: 2018-10-14

## 2018-10-15 NOTE — TELEPHONE ENCOUNTER
[facility]      I spoke with Naman Jones on the phone this evening.  He underwent Snehal-en-Y laparoscopic gastric bypass with no complications earlier this week.  He did have a slight fever during his hospitalization to improve with his incentive spirometer.    He calls tonight because he has had a slight cough and a temperature to 100.6 degrees.  No chills associated with this.  No abdominal pain or excessive pain at his surgical sites.  Is been tolerating his bariatric diet with no difficulty.    He just started using the incentive spirometer again this evening but called due to the fever.    Speaking with him he is not short of breath and is quite coherent and intelligent.  Very likely has atelectasis associated with the surgery.  Very unlikely this is related to the abdominal procedure itself with no abdominal pain and tolerating his diet.    You aggressively work on his incentive spirometer using this 3-4 times every 15 minutes.  We discussed the importance of holding the spirometer level up to help aerate the lower lobes of the lung.  He is very comfortable with this.  He will call us in the morning if his fever and cough does not resolve.    Kolton Renteria MD   10/14/2018 at 2000 hrs.

## 2018-10-15 NOTE — TELEPHONE ENCOUNTER
Has not sent [employer] message update yet.  Will check tomorrow and if no message will contact him for update.  Nazanin Rahman RN

## 2018-10-16 ENCOUNTER — OFFICE VISIT (OUTPATIENT)
Dept: SURGERY | Facility: CLINIC | Age: 33
End: 2018-10-16
Payer: COMMERCIAL

## 2018-10-16 VITALS
DIASTOLIC BLOOD PRESSURE: 90 MMHG | SYSTOLIC BLOOD PRESSURE: 134 MMHG | WEIGHT: 315 LBS | BODY MASS INDEX: 46.18 KG/M2 | RESPIRATION RATE: 16 BRPM | TEMPERATURE: 98.2 F | OXYGEN SATURATION: 96 % | HEART RATE: 89 BPM

## 2018-10-16 DIAGNOSIS — G89.18 ACUTE POST-OPERATIVE PAIN: ICD-10-CM

## 2018-10-16 DIAGNOSIS — E66.01 MORBID OBESITY WITH BMI OF 45.0-49.9, ADULT (H): Primary | ICD-10-CM

## 2018-10-16 DIAGNOSIS — E66.9 DIABETES MELLITUS TYPE 2 IN OBESE: ICD-10-CM

## 2018-10-16 DIAGNOSIS — E11.69 DIABETES MELLITUS TYPE 2 IN OBESE: ICD-10-CM

## 2018-10-16 DIAGNOSIS — Z98.84 BARIATRIC SURGERY STATUS: ICD-10-CM

## 2018-10-16 DIAGNOSIS — Z98.84 STATUS POST BARIATRIC SURGERY: ICD-10-CM

## 2018-10-16 DIAGNOSIS — I10 ESSENTIAL HYPERTENSION: ICD-10-CM

## 2018-10-16 DIAGNOSIS — E66.01 MORBID OBESITY (H): Primary | ICD-10-CM

## 2018-10-16 PROCEDURE — 99024 POSTOP FOLLOW-UP VISIT: CPT | Performed by: PHYSICIAN ASSISTANT

## 2018-10-16 PROCEDURE — 99207 ZZC NO CHARGE NURSE ONLY: CPT

## 2018-10-16 RX ORDER — HYDROMORPHONE HYDROCHLORIDE 2 MG/1
2 TABLET ORAL 2 TIMES DAILY PRN
Qty: 10 TABLET | Refills: 0 | Status: SHIPPED | OUTPATIENT
Start: 2018-10-16 | End: 2018-10-26

## 2018-10-16 RX ORDER — ACETAMINOPHEN 325 MG/1
325-650 TABLET ORAL EVERY 8 HOURS PRN
COMMUNITY
End: 2018-10-23

## 2018-10-16 NOTE — PATIENT INSTRUCTIONS
Post op pain.  Discussed increasing tylenol to maximum of 3000 mg daily.  Start ice/heat.  Small refill given of dilaudid.   Continue with recommended antacid medication for the next 3 months.   Strive to drink 64 oz of fluid daily.  Strive to move hourly while awake to decrease risk of developing a blood clot.  Continue to do deep breathing exercises twice daily or use your spirometer.- Advised he continue with 3-4 times per hour and take his temp before taking tylenol.  If he continues to get over 100 should contact this clinic asap.  Or if has worsening cough or concerns  Follow up with your primary care provider to discuss obesity related conditions by one month post op.   Has appointment on Oct 26 th with endocrinology  Start recommended postoperative vitamins within in the first 6 weeks.  Advance diet per Dietitian at your 2 week appointment.   Make follow up appointment to meet with psychologist at 1 and 3 months post operatively. - Continues to meet with Dr Hannah Blue every 2 weeks  Wear binder to support abdominal muscles and gradually wean off over the next few weeks.   Return to clinic for 2 wk post op appointment and bring post op vitamins along.  Call with questions or concerns at any time.

## 2018-10-16 NOTE — TELEPHONE ENCOUNTER
Called patient.  Mailbox full.  Unable to L/M.  Sent Here On [employer] message.  Nazanin Rahman RN

## 2018-10-16 NOTE — MR AVS SNAPSHOT
After Visit Summary   10/16/2018    Naman Jones    MRN: [id number]           Patient Information     Date Of Birth          1985        Visit Information        Provider Department      10/16/2018 1:00 PM Rn, Adele Morris RN [facility]      Today's Diagnoses     Morbid obesity (H)    -  1    Bariatric surgery status           Follow-ups after your visit        Your next 10 appointments already scheduled     Oct 23, 2018 10:30 AM CDT   Bariatric Post Op Global Visit with Adele Morris Rn, RN   [facility] ([facility])    [address] [place] [address]   [phone]            Oct 23, 2018 11:00 AM CDT   Return Bariatric Nutrition Visit with Adele Landa 1, RD   [facility] ([facility])    [address] [place] [id number] [phone]            Oct 26, 2018  2:30 PM CDT   [facility] ARDEN Henderson C[facility]   [facility]    [address]  [facility] [address]   [phone]            Nov 12, 2018  9:00 AM CST   Return Bariatric Nutrition Visit with Adele Landa 1, RD   [facility] ([facility])    [address] [place] [address] [phone]              Who to contact     If you have questions or need follow up information about today's clinic visit or your schedule please contact [place] [facility] directly at [phone].  Normal or non-critical lab and imaging results will be communicated to you by MyChart, letter or phone within 4 business days after the clinic has received the results. If you do not hear from us within 7 days, please contact the clinic through MyChart or phone.  If you have a critical or abnormal lab result, we will notify you by phone as soon as possible.  Submit refill requests through [employer] or call your pharmacy and they will forward the refill request to us. Please allow 3 business days for your refill to be completed.          Additional Information About Your Visit        [employer] Information     [employer] gives you secure access to your electronic health record. If you see a primary care provider, you can also send messages to your care team and make appointments. If you have questions, please call your primary care clinic.  If you do not have a primary care provider, please call [phone] and they will assist you.        Care EveryWhere ID     This is your Care EveryWhere ID. This could be used by other organizations to access your [place] medical records  [id number]         Blood Pressure from Last 3 Encounters:   10/16/18 134/90   10/11/18 (!) 152/94   09/25/18 132/86    Weight from Last 3 Encounters:   10/16/18 (!) 350 lb (158.8 kg)   10/09/18 (!) 359 lb (162.8 kg)   09/25/18 (!) 360 lb (163.3 kg)              Today, you had the following     No orders found for display         Today's Medication Changes          These changes are accurate as of 10/16/18  2:31 PM.  If you have any questions, ask your nurse or doctor.               These medicines have changed or have updated prescriptions.        Dose/Directions    dulaglutide 1.5 MG/0.5ML pen   Commonly known as:  TRULICITY   This may have changed:  additional instructions   Used for:  Type 2 diabetes mellitus with hyperglycemia, with long-term current use of insulin (H)        Dose:  1.5 mg   Inject 1.5 mg Subcutaneous every 7 days   Quantity:  2 mL   Refills:  5       HYDROmorphone 2 MG tablet   Commonly known as:  DILAUDID   This may have changed:    - how much to take  - when to take this   Used for:  Acute post-operative pain, Status post bariatric surgery   Changed by:  Oj Luong PA-C         Dose:  2 mg   Take 1 tablet (2 mg) by mouth 2 times daily as needed for moderate to severe pain   Quantity:  10 tablet   Refills:  0       insulin lispro 100 UNIT/ML injection   Commonly known as:  HumaLOG KWIKpen   This may have changed:    - how to take this  - when to take this  - additional instructions   Used for:  Type 2 diabetes mellitus with hyperglycemia, with long-term current use of insulin (H)        INJECT 1 UNIT FOR EVERY 2 GRAMS OF CARBS PLUS CORRECTION OF 1 UNIT FOR EVERY 15 POINTS ABOVE 150.(APPROXIMATELY 130 UNITS PER DAY)   Quantity:  45 mL   Refills:  5       LORazepam 1 MG tablet   Commonly known as:  ATIVAN   This may have changed:    - when to take this  - additional instructions   Used for:  JOEL (generalized anxiety disorder)        Dose:  1 mg   Take 1 tablet (1 mg) by mouth daily as needed for anxiety   Quantity:  30 tablet   Refills:  2            Where to get your medicines      Some of these will need a paper prescription and others can be bought over the counter.  Ask your nurse if you have questions.     Bring a paper prescription for each of these medications     HYDROmorphone 2 MG tablet                Primary Care Provider Office Phone # Fax #    Ilana Rachele Haase, APRN -[phone] [address] [id number]        Equal Access to Services     RANULFO REINOSO : Lucas graf Sostacie, waaxda maggi, qaybta kaalmada adejonathanyada, naman rodgers. So [facility] [phone].    ATENCIÓN: Si habla español, tiene a dumas disposición servicios gratuitos de asistencia lingüística. Abraham al [phone].    We comply with applicable federal civil rights laws and [place] laws. We do not discriminate on the basis of race, color, national origin, age, disability, sex, sexual orientation, or gender identity.            Thank you!     Thank you for choosing [facility]  for your care. Our goal is  always to provide you with excellent care. Hearing back from our patients is one way we can continue to improve our services. Please take a few minutes to complete the written survey that you may receive in the mail after your visit with us. Thank you!             Your Updated Medication List - Protect others around you: Learn how to safely use, store and throw away your medicines at www.disposemymeds.org.          This list is accurate as of 10/16/18  2:31 PM.  Always use your most recent med list.                   Brand Name Dispense Instructions for use Diagnosis    atorvastatin 40 MG tablet    LIPITOR    90 tablet    Take 1 tablet (40 mg) by mouth daily    Mixed hyperlipidemia       BENADRYL PO      Take 25 mg by mouth every evening        continuous blood glucose monitoring sensor     3 each    For use with Freestyle Janie Flash  for continuous monitioring of blood glucose levels. Replace sensor every 10 days.    Type 2 diabetes mellitus with hyperglycemia, with long-term current use of insulin (H)       dulaglutide 1.5 MG/0.5ML pen    TRULICITY    2 mL    Inject 1.5 mg Subcutaneous every 7 days    Type 2 diabetes mellitus with hyperglycemia, with long-term current use of insulin (H)       FREESTYLE JANIE READER Noemy     1 Device    1 Device as needed    Type 2 diabetes mellitus with hyperglycemia, with long-term current use of insulin (H)       HYDROmorphone 2 MG tablet    DILAUDID    10 tablet    Take 1 tablet (2 mg) by mouth 2 times daily as needed for moderate to severe pain    Acute post-operative pain, Status post bariatric surgery       insulin degludec 200 UNIT/ML pen    TRESIBA    27 mL    Inject 130 Units Subcutaneous daily    Type 2 diabetes mellitus with hyperglycemia, with long-term current use of insulin (H)       insulin lispro 100 UNIT/ML injection    HumaLOG KWIKpen    45 mL    INJECT 1 UNIT FOR EVERY 2 GRAMS OF CARBS PLUS CORRECTION OF 1 UNIT FOR EVERY 15 POINTS ABOVE  150.(APPROXIMATELY 130 UNITS PER DAY)    Type 2 diabetes mellitus with hyperglycemia, with long-term current use of insulin (H)       insulin pen needle 32G X 4 MM     100 each    Use 3 pen needles daily or as directed.    Diabetes mellitus type 2 in obese (H)       LORazepam 1 MG tablet    ATIVAN    30 tablet    Take 1 tablet (1 mg) by mouth daily as needed for anxiety    JOEL (generalized anxiety disorder)       losartan 100 MG tablet    COZAAR     TAKE 1 TABLET(100 MG) BY MOUTH DAILY    Essential hypertension       metFORMIN 500 MG tablet    GLUCOPHAGE    360 tablet    Take 2 tablets (1,000 mg) by mouth 2 times daily (with meals)    Type 2 diabetes mellitus with hyperglycemia, with long-term current use of insulin (H)       multivitamin  peds with iron 60 MG chewable tablet      Take 1 chew tab by mouth daily        ondansetron 4 MG ODT tab    ZOFRAN-ODT    30 tablet    Take 1 tablet (4 mg) by mouth every 6 hours as needed for nausea or vomiting    PONV (postoperative nausea and vomiting)       ONETOUCH DELICA LANCETS 33G Misc     100 each    1 lancet 4 times daily    Type 2 diabetes mellitus with hyperglycemia, with long-term current use of insulin (H)       ranitidine 75 MG tablet    ZANTAC    120 tablet    Take 1 tablet (75 mg) by mouth 2 times daily    Status post bariatric surgery       sertraline 100 MG tablet    ZOLOFT    90 tablet    Take 1 tablet (100 mg) by mouth daily    JOEL (generalized anxiety disorder)       triamterene-hydrochlorothiazide 75-50 MG per tablet    MAXZIDE    90 tablet    Take 1 tablet by mouth daily    Essential hypertension       TYLENOL 325 MG tablet   Generic drug:  acetaminophen      Take 325-650 mg by mouth every 8 hours as needed for mild pain

## 2018-10-16 NOTE — MR AVS SNAPSHOT
After Visit Summary   10/16/2018    Naman Jones    MRN: [id number]           Patient Information     Date Of Birth          1985        Visit Information        Provider Department      10/16/2018 1:20 PM Oj Luong PA-C [facility]      Today's Diagnoses     Morbid obesity with BMI of 45.0-49.9, adult (H)    -  1    Acute post-operative pain        Status post bariatric surgery        Essential hypertension        Diabetes mellitus type 2 in obese (H)          Care Instructions    Post op pain.  Discussed increasing tylenol to maximum of 3000 mg daily.  Start ice/heat.  Small refill given of dilaudid.   Continue with recommended antacid medication for the next 3 months.   Strive to drink 64 oz of fluid daily.  Strive to move hourly while awake to decrease risk of developing a blood clot.  Continue to do deep breathing exercises twice daily or use your spirometer.- Advised he continue with 3-4 times per hour and take his temp before taking tylenol.  If he continues to get over 100 should contact this clinic asap.  Or if has worsening cough or concerns  Follow up with your primary care provider to discuss obesity related conditions by one month post op.   Has appointment on Oct 26 th with endocrinology  Start recommended postoperative vitamins within in the first 6 weeks.  Advance diet per Dietitian at your 2 week appointment.   Make follow up appointment to meet with psychologist at 1 and 3 months post operatively. - Continues to meet with Dr Hannah Blue every 2 weeks  Wear binder to support abdominal muscles and gradually wean off over the next few weeks.   Return to clinic for 2 wk post op appointment and bring post op vitamins along.  Call with questions or concerns at any time.            Follow-ups after your visit        Your next 10 appointments already scheduled     Oct 23, 2018 10:30 AM CDT    Bariatric Post Op Global Visit with Adele Morris Rn, RN   [facility] ([facility])    [address]  [address]  [place] [address] [phone]            Oct 23, 2018 11:00 AM CDT   Return Bariatric Nutrition Visit with Adele Morris Diet 1, RD   [facility] ([facility])    [address]  [address]  [place] [address] [phone]            Oct 26, 2018  2:30 PM CDT   [facility] with ARDEN Henderson CNP   [facility]    [address]  [facility] [id number] [phone]            Nov 12, 2018  9:00 AM CST   Return Bariatric Nutrition Visit with Adele Morris Diet 1, RD   [facility] ([facility])    [address]  [place] [address] [phone]              Who to contact     If you have questions or need follow up information about today's clinic visit or your schedule please contact [place] [facility] directly at [phone].  Normal or non-critical lab and imaging results will be communicated to you by [employer], letter or phone within 4 business days after the clinic has received the results. If you do not hear from us within 7 days, please contact the clinic through [employer] or phone. If you have a critical or abnormal lab result, we will notify you by phone as soon as possible.  Submit refill requests through [employer] or call your pharmacy and they will forward the refill request to us. Please allow 3 business days for your refill to be completed.          Additional Information About Your Visit        [employer] Information     [employer] gives you secure access to your electronic health record. If you see a primary care provider, you can also send messages to your care team and make appointments. If you have  questions, please call your primary care clinic.  If you do not have a primary care provider, please call [phone] and they will assist you.        Care EveryWhere ID     This is your Care EveryWhere ID. This could be used by other organizations to access your [place] medical records  [id number]        Your Vitals Were     Pulse Temperature Respirations Pulse Oximetry BMI (Body Mass Index)       89 98.2  F (36.8  C) 16 96% 46.18 kg/m2        Blood Pressure from Last 3 Encounters:   10/16/18 134/90   10/11/18 (!) 152/94   09/25/18 132/86    Weight from Last 3 Encounters:   10/16/18 (!) 350 lb (158.8 kg)   10/09/18 (!) 359 lb (162.8 kg)   09/25/18 (!) 360 lb (163.3 kg)              Today, you had the following     No orders found for display         Today's Medication Changes          These changes are accurate as of 10/16/18  2:08 PM.  If you have any questions, ask your nurse or doctor.               These medicines have changed or have updated prescriptions.        Dose/Directions    dulaglutide 1.5 MG/0.5ML pen   Commonly known as:  TRULICITY   This may have changed:  additional instructions   Used for:  Type 2 diabetes mellitus with hyperglycemia, with long-term current use of insulin (H)        Dose:  1.5 mg   Inject 1.5 mg Subcutaneous every 7 days   Quantity:  2 mL   Refills:  5       HYDROmorphone 2 MG tablet   Commonly known as:  DILAUDID   This may have changed:    - how much to take  - when to take this   Used for:  Acute post-operative pain, Status post bariatric surgery   Changed by:  Oj Luong PA-C        Dose:  2 mg   Take 1 tablet (2 mg) by mouth 2 times daily as needed for moderate to severe pain   Quantity:  10 tablet   Refills:  0       insulin lispro 100 UNIT/ML injection   Commonly known as:  HumaLOG KWIKpen   This may have changed:    - how to take this  - when to take this  - additional instructions   Used for:  Type 2 diabetes mellitus with hyperglycemia, with long-term  current use of insulin (H)        INJECT 1 UNIT FOR EVERY 2 GRAMS OF CARBS PLUS CORRECTION OF 1 UNIT FOR EVERY 15 POINTS ABOVE 150.(APPROXIMATELY 130 UNITS PER DAY)   Quantity:  45 mL   Refills:  5       LORazepam 1 MG tablet   Commonly known as:  ATIVAN   This may have changed:    - when to take this  - additional instructions   Used for:  JOEL (generalized anxiety disorder)        Dose:  1 mg   Take 1 tablet (1 mg) by mouth daily as needed for anxiety   Quantity:  30 tablet   Refills:  2            Where to get your medicines      Some of these will need a paper prescription and others can be bought over the counter.  Ask your nurse if you have questions.     Bring a paper prescription for each of these medications     HYDROmorphone 2 MG tablet               Information about OPIOIDS     PRESCRIPTION OPIOIDS: WHAT YOU NEED TO KNOW   We gave you an opioid (narcotic) pain medicine. It is important to manage your pain, but opioids are not always the best choice. You should first try all the other options your care team gave you. Take this medicine for as short a time (and as few doses) as possible.    Some activities can increase your pain, such as bandage changes or therapy sessions. It may help to take your pain medicine 30 to 60 minutes before these activities. Reduce your stress by getting enough sleep, working on hobbies you enjoy and practicing relaxation or meditation. Talk to your care team about ways to manage your pain beyond prescription opioids.    These medicines have risks:    DO NOT drive when on new or higher doses of pain medicine. These medicines can affect your alertness and reaction times, and you could be arrested for driving under the influence (DUI). If you need to use opioids long-term, talk to your care team about driving.    DO NOT operate heavy machinery    DO NOT do any other dangerous activities while taking these medicines.    DO NOT drink any alcohol while taking these medicines.     If  the opioid prescribed includes acetaminophen, DO NOT take with any other medicines that contain acetaminophen. Read all labels carefully. Look for the word  acetaminophen  or  Tylenol.  Ask your pharmacist if you have questions or are unsure.    You can get addicted to pain medicines, especially if you have a history of addiction (chemical, alcohol or substance dependence). Talk to your care team about ways to reduce this risk.    All opioids tend to cause constipation. Drink plenty of water and eat foods that have a lot of fiber, such as fruits, vegetables, prune juice, apple juice and high-fiber cereal. Take a laxative (Miralax, milk of magnesia, Colace, Senna) if you don t move your bowels at least every other day. Other side effects include upset stomach, sleepiness, dizziness, throwing up, tolerance (needing more of the medicine to have the same effect), physical dependence and slowed breathing.    Store your pills in a secure place, locked if possible. We will not replace any lost or stolen medicine. If you don t finish your medicine, please throw away (dispose) as directed by your pharmacist. The [place] Pollution Control Agency has more information about safe disposal: [email or web address]://[email or web address].[email or web address]/living-[email or web address]/[email or web address]-unwanted-medications         Primary Care Provider Office Phone # Fax #    Susan Rachele Haase, APRN -[phone] [address] [facility] [address]        Equal Access to Services     RANULFO REINOSO : Hadibeth graf Sostacie, waaxda luqadaha, qaybta kaalmada naman hill. So [facility] [phone].    ATENCIÓN: Si habla español, tiene a dumas disposición servicios gratuitos de asistencia lingüística. Abraham camp [phone].    We comply with applicable federal civil rights laws and [place] laws. We do not discriminate on the basis of race, color, national origin, age, disability, sex, sexual orientation, or gender  identity.            Thank you!     Thank you for choosing [place] [facility]  for your care. Our goal is always to provide you with excellent care. Hearing back from our patients is one way we can continue to improve our services. Please take a few minutes to complete the written survey that you may receive in the mail after your visit with us. Thank you!             Your Updated Medication List - Protect others around you: Learn how to safely use, store and throw away your medicines at www.disposemymeds.org.          This list is accurate as of 10/16/18  2:08 PM.  Always use your most recent med list.                   Brand Name Dispense Instructions for use Diagnosis    atorvastatin 40 MG tablet    LIPITOR    90 tablet    Take 1 tablet (40 mg) by mouth daily    Mixed hyperlipidemia       BENADRYL PO      Take 25 mg by mouth every evening        continuous blood glucose monitoring sensor     3 each    For use with Freestyle Janie Flash  for continuous monitioring of blood glucose levels. Replace sensor every 10 days.    Type 2 diabetes mellitus with hyperglycemia, with long-term current use of insulin (H)       dulaglutide 1.5 MG/0.5ML pen    TRULICITY    2 mL    Inject 1.5 mg Subcutaneous every 7 days    Type 2 diabetes mellitus with hyperglycemia, with long-term current use of insulin (H)       FREESTYLE JANIE READER Noemy     1 Device    1 Device as needed    Type 2 diabetes mellitus with hyperglycemia, with long-term current use of insulin (H)       HYDROmorphone 2 MG tablet    DILAUDID    10 tablet    Take 1 tablet (2 mg) by mouth 2 times daily as needed for moderate to severe pain    Acute post-operative pain, Status post bariatric surgery       insulin degludec 200 UNIT/ML pen    TRESIBA    27 mL    Inject 130 Units Subcutaneous daily    Type 2 diabetes mellitus with hyperglycemia, with long-term current use of insulin (H)       insulin lispro 100 UNIT/ML injection     HumaLOG KWIKpen    45 mL    INJECT 1 UNIT FOR EVERY 2 GRAMS OF CARBS PLUS CORRECTION OF 1 UNIT FOR EVERY 15 POINTS ABOVE 150.(APPROXIMATELY 130 UNITS PER DAY)    Type 2 diabetes mellitus with hyperglycemia, with long-term current use of insulin (H)       insulin pen needle 32G X 4 MM     100 each    Use 3 pen needles daily or as directed.    Diabetes mellitus type 2 in obese (H)       LORazepam 1 MG tablet    ATIVAN    30 tablet    Take 1 tablet (1 mg) by mouth daily as needed for anxiety    JOEL (generalized anxiety disorder)       losartan 100 MG tablet    COZAAR     TAKE 1 TABLET(100 MG) BY MOUTH DAILY    Essential hypertension       metFORMIN 500 MG tablet    GLUCOPHAGE    360 tablet    Take 2 tablets (1,000 mg) by mouth 2 times daily (with meals)    Type 2 diabetes mellitus with hyperglycemia, with long-term current use of insulin (H)       multivitamin  peds with iron 60 MG chewable tablet      Take 1 chew tab by mouth daily        ondansetron 4 MG ODT tab    ZOFRAN-ODT    30 tablet    Take 1 tablet (4 mg) by mouth every 6 hours as needed for nausea or vomiting    PONV (postoperative nausea and vomiting)       ONETOUCH DELICA LANCETS 33G Misc     100 each    1 lancet 4 times daily    Type 2 diabetes mellitus with hyperglycemia, with long-term current use of insulin (H)       ranitidine 75 MG tablet    ZANTAC    120 tablet    Take 1 tablet (75 mg) by mouth 2 times daily    Status post bariatric surgery       sertraline 100 MG tablet    ZOLOFT    90 tablet    Take 1 tablet (100 mg) by mouth daily    JOEL (generalized anxiety disorder)       triamterene-hydrochlorothiazide 75-50 MG per tablet    MAXZIDE    90 tablet    Take 1 tablet by mouth daily    Essential hypertension       TYLENOL 325 MG tablet   Generic drug:  acetaminophen      Take 325-650 mg by mouth every 8 hours as needed for mild pain

## 2018-10-16 NOTE — PROGRESS NOTES
"[facility]   1 Week Surgical Follow-Up     PCP:  Haase, Susan Rachele    DOS: 10/09/18  Surgeon: BOBBYL  Surgery Type: Bypass  HISTORY OF PRESENT ILLNESS:  Naman Jones returns today for her follow-up appointment status post bariatric surgery. Here with mom Maricel  He is currently using dilaudid for pain medication.  Down to 4 per day over the last few days and also taking tylenol 325 mg tid.  Refill Needed: Yes.  Patient's Pain Scale: 3. The pain is located left side of abdomen.  Sitting in clinic pain is 3/10.  Last dilaudid is 5 hours ago.  With movement can go up to 5-6/10.  Patient's current daily fluid intake in oz is: 60-64 oz. Powerade Zero and water - starting protein drink today.  Patient has started postoperative Vitamins: No.   Checking BS 6 times per day.  They were running 250's up until yesterday.  Discussed with endo and today sugars are 150's.  Patient was discharged with insulin but patient did not actually start until yesterday  Talked to MD on call regarding a fever 2 days ago. Was 100.6.  Has increased spirometry and fever was down to 99.9 yesterday.  He only 2 elevated readings.  Patient is coughing.  Worse with lying down.  Using spirometer once per hour for the past couple of days.  Using ing for 3-4 times.  Today the cough seems better.  No phlegmy cough. No chest pain.  Activity:   Activity: walking outside several blocks about 15\" 2 times daily  REVIEW OF SYSTEMS:  GI:    Nausea: No  Vomiting: No  Diarrhea: No  Constipation: No  Last BM: Sunday semi solid and darkish brown  Dysphagia: No  GERD: No - zantac bid    CV/Pulmonary:   Chest Pain: No  Dizzy: No  Light Headed: No  SOB: No  Endo:  Diabetes: Yes  :    Contraception: male  Dysuria: No  Vascular:    LE Edema: No  PHYSICAL EXAMINATION:    /90 (BP Location: Right arm, Patient Position: Sitting, Cuff Size: Adult Large)  Pulse 89  Temp 98.2  F (36.8  C)  Resp 16  Wt (!) 350 lb (158.8 kg)  SpO2 96%  BMI 46.18 " kg/m2     GENERAL: No acute distress. Alert and oriented time 3.  HEART: Regular rate and rhythm.  LUNGS:  Clear to auscultation bilaterally. - No wheezes or abnormal lung sounds  ABDOMEN: Soft, incisions clean,dry, and intact. Tenderness WNL for post op.  EXTREMITIES: No lower extremity edema bilaterally. No calf swelling or tenderness. Negative anna's  SKIN: No rashes.  PSYCHOLOGICAL: Stable. Pleasant      ASSESSMENT:    1. S/P bariatric surgery.  2. Post surgical malabsorption  3. DM  4. Hypertension    PLAN:   Post op pain.  Discussed increasing tylenol to maximum of 3000 mg daily.  Start ice/heat.  Small refill given of dilaudid.   Continue with recommended antacid medication for the next 3 months.   Strive to drink 64 oz of fluid daily.  Strive to move hourly while awake to decrease risk of developing a blood clot.  Continue to do deep breathing exercises twice daily or use your spirometer.- Advised he continue with 3-4 times per hour and take his temp before taking tylenol.  If he continues to get over 100 should contact this clinic asap.  Or if has worsening cough or concerns  Follow up with your primary care provider to discuss obesity related conditions by one month post op.   Has appointment on Oct 26 th with endocrinology  Start recommended postoperative vitamins within in the first 6 weeks.  Advance diet per Dietitian at your 2 week appointment.   Make follow up appointment to meet with psychologist at 1 and 3 months post operatively. - Continues to meet with Dr Hannah Blue every 2 weeks  Wear binder to support abdominal muscles and gradually wean off over the next few weeks.   Return to clinic for 2 wk post op appointment and bring post op vitamins along.  Call with questions or concerns at any time.

## 2018-10-17 NOTE — TELEPHONE ENCOUNTER
Has not read [employer] message.  Called patient again.  No answer.  Mailbox still full.  Unable to L/M.  Nazanin Rahman RN

## 2018-10-22 NOTE — TELEPHONE ENCOUNTER
Requested Prescriptions   Pending Prescriptions Disp Refills     LORazepam (ATIVAN) 2 MG tablet [Pharmacy Med Name: LORAZEPAM 2MG TABLETS] 15 tablet 0     Sig: TAKE ONE-HALF TABLET BY MOUTH DAILY AS NEEDED FOR ANXIETY    There is no refill protocol information for this order        Last Written Prescription Date:  4/18/18  Last Fill Quantity: 30,  # refills: 2   Last Office Visit: 9/25/2018 Haase  Future Office Visit:    Next 5 appointments (look out 90 days)     Oct 26, 2018  2:30 PM CDT   Return Visit with ARDEN Henderson CNP   [facility]    [address]  [facility] [id number] [phone]                   Routing refill request to provider for review/approval because:  Drug not on the [facility], [facility] or [facility] refill protocol or controlled substance

## 2018-10-23 ENCOUNTER — OFFICE VISIT (OUTPATIENT)
Dept: SURGERY | Facility: CLINIC | Age: 33
End: 2018-10-23
Payer: COMMERCIAL

## 2018-10-23 VITALS
OXYGEN SATURATION: 96 % | DIASTOLIC BLOOD PRESSURE: 79 MMHG | TEMPERATURE: 97.4 F | SYSTOLIC BLOOD PRESSURE: 144 MMHG | BODY MASS INDEX: 44.65 KG/M2 | RESPIRATION RATE: 16 BRPM | WEIGHT: 315 LBS | HEART RATE: 96 BPM

## 2018-10-23 VITALS — BODY MASS INDEX: 41.75 KG/M2 | HEIGHT: 73 IN | WEIGHT: 315 LBS

## 2018-10-23 DIAGNOSIS — E66.01 MORBID OBESITY (H): Primary | ICD-10-CM

## 2018-10-23 DIAGNOSIS — Z98.84 BARIATRIC SURGERY STATUS: ICD-10-CM

## 2018-10-23 DIAGNOSIS — E66.01 MORBID OBESITY WITH BMI OF 45.0-49.9, ADULT (H): ICD-10-CM

## 2018-10-23 PROCEDURE — 97803 MED NUTRITION INDIV SUBSEQ: CPT | Performed by: DIETITIAN, REGISTERED

## 2018-10-23 PROCEDURE — 99207 ZZC NO CHARGE NURSE ONLY: CPT

## 2018-10-23 RX ORDER — GREEN TEA/HOODIA GORDONII 315-12.5MG
1 CAPSULE ORAL DAILY
COMMUNITY

## 2018-10-23 RX ORDER — LORAZEPAM 2 MG/1
TABLET ORAL
Qty: 15 TABLET | Refills: 0 | COMMUNITY
Start: 2018-10-23 | End: 2018-10-23

## 2018-10-23 NOTE — PROGRESS NOTES
"BARIATRIC PROGRESS NOTE - 2 Week Post Op  DATE OF VISIT: October 23, 2018    Naman Jones  1985  male  [id number]  33 year old    ASSESSMENT:    REASON FOR VISIT:  Naman Jones is a 33 year old year old male presents today for a 2 Week Post Op nutrition follow-up appointment. Patient is accompanied by self      DIAGNOSIS:  Status: post gastric bypass surgery.   Obesity Grade III BMI >40    ANTHROPOMETRICS:  Height: 185.4 cm (6' 1\")  Initial weight:  158.5 kg (349 lb 6.4 oz)  Current Weight: (!) 153.5 kg (338 lb 6.5 oz)  BMI: 44.74 kg/(m^2).    VITAMINS AND MINERALS:   2 Multivitamin with Minerals-   600 mg Calcium carbonate With Vitamin D TID- with meals  3-1000 International units Vitamin D  500 mcg Vitamin B-12 sublingual      NUTRITION HISTORY:  Tolerating diet: Bariatric full liquid diet  Fluids/water intake: 80 ounces/day (water, protein drink, enhanced water, Fairlife)  Small bites: Yes  Portion size: 1/2 cup or less  20-30 minute meals: Yes  Breakfast: 1/2 cup Greek yogurt  Lunch: 1/2 cup tomato soup  Dinner: 1/2 cup maldonado soup  Snacks: 1-2 protein drinks per day  Nausea: none  Vomiting: none  Constipation: 1 stool every 3 days- has bowel regimen   Additional Information: Patient shared that his blood sugars running in the 150's and pt states he is using less insulin. Tried some online recipes for bariatric full liquids.      PHYSICAL ACTIVITY:  Type: walking  Frequency: 7 days a week.  Duration: 35-40 minutes.    DIAGNOSIS:     Current Nutrition Diagnosis: Altered gastrointestinal function related to alternation in gastrointestinal structure as evidenced by history of gastric bypass.     INTERVENTION  Nutrition Prescription: Recommended bariatric puree diet.    Goals:  Start puree diet at day 14 post op.  Continue MVI, calcium with vitamin D, vitamin B12, vitamin D  Aim for 60 to 90 grams protein.  Continue 48 to 80 oz of fluid per day.    Implementation:  Discussed transition to puree " diet.  Emphasized importance of adequate protein.  Reviewed required vitamins and mineral supplements.  Verbalizes good understanding of surgery diet guidelines.  Assessed learning needs and learning preferences.      NUTRITION MONITORING AND EVALUATION:   Monitor diet tolerance and weight loss.      Anticipated Compliance: good  Follow Up: Continue to monitor patient closely regarding weight loss and diet.  At 4 weeks Post Op    TIME SPENT WITH PATIENT: 20 minutes.    Darius Montenegro RD, LD  [facility]  [phone]

## 2018-10-23 NOTE — TELEPHONE ENCOUNTER
Pt has appointment this week, will close and wait for pt to call back if needed, see multiple attempts  Next 5 appointments (look out 90 days)     Oct 26, 2018  2:30 PM CDT   Return Visit with ARDEN Henderson [facility] ([facility]    [address] [facility] [address]   [phone]                Enedelia Goetz RN, BSN  Message handled by Nurse Triage.

## 2018-10-23 NOTE — MR AVS SNAPSHOT
MRN:[id number]                      After Visit Summary   10/23/2018    Naman Jones    MRN: [id number]           Visit Information        Provider Department      10/23/2018 11:00 AM 1, Adele Landa, RD [facility] [place]      Your next 10 appointments already scheduled     Oct 26, 2018  2:30 PM CDT   [facility] ARDEN Henderson CNP   [facility]    [address]  [facility] [id number] [phone]            Nov 12, 2018  9:00 AM CST   Return Bariatric Nutrition Visit with Adele Morris Diet 1, RD   [facility] ([facility])    [facility] [address]  [facility] [address] [phone]              MyChart Information     [employer] gives you secure access to your electronic health record. If you see a primary care provider, you can also send messages to your care team and make appointments. If you have questions, please call your primary care clinic.  If you do not have a primary care provider, please call [phone] and they will assist you.        Care EveryWhere ID     This is your Care EveryWhere ID. This could be used by other organizations to access your [place] medical records  [id number]        Equal Access to Services     RANULFO REINOSO : Hadii lopez michelleo Sozacariasali, waaxda luqadaha, qaybta kaalmada adeegyada, naman rodgers. So [facility] [phone].    ATENCIÓN: Si habla español, tiene a dumas disposición servicios gratuitos de asistencia lingüística. Llame al [phone].    We comply with applicable federal civil rights laws and [place] laws. We do not discriminate on the basis of race, color, national origin, age, disability, sex, sexual orientation, or gender identity.

## 2018-10-23 NOTE — PROGRESS NOTES
"[facility]  2 Week Surgical Follow-Up     Current PCP:  Haase, Susan Rachele  Surgeon: ANJALI  HISTORY OF PRESENT ILLNESS:  Naman Jones returns today for his follow-up appointment status post Surgery Type: Bypass DOS: 10/09/18.  He is accompanied by Self.   His daily fluid intake in oz is: 80 oz Powerade Zero, Skim, flavored H2O, and Atkins 1-2 daily between meals.   Feels well emotionally Yes. Discussed with patient that if mood not well-controlled with Zoloft to contact provider. Patient verbalized understanding and is agreeable to plan.  Patient reports using: Alcohol - No     Cigarettes - No  Pain:    He is currently using Pain Meds: No Dilaudid in 2 days - using 500 mg 3 times daily for pain relief. He rates his pain at a Pain Scale: 0 - 3 on the pain scale. The pain is located right side of abdomen and is mostly felt with twisting motion.  Refill Needed: No  Activity:  The patient is considered a fall risk: No. What are you doing for physical activity? Activity: walking 20-25\" minutes daily  Patient plans to walk outside or in mall for now but when gets back to work will use treadmill there or when restrictions lifted use recumbent bike at home.    Review of Systems:   GI:  Nausea: No   Vomiting: No   GERD: No  Diarrhea: No          Constipation: No Patient's Last BM: Yesterday - very hard BM and dark brown      Neuro/CV/Pulmonary:   Dizzy: No    Light Headed: No   SOB: No   Cough reported last week is much better - remains non-productive  Chest Pain: No   Vascular:    LE Edema: No  Maryann's Negative  :  Form of birth control is Contraception: male  Symptoms of UTI:  Dysuria: No  Endo: Diabetes: Yes  BS check (freq): at least 5 times                   Avg. BS Level: 150's avg  PHYSICAL EXAMINATION:    VITALS:  /79 (BP Location: Left arm, Patient Position: Sitting, Cuff Size: Adult Large)  Pulse 96  Temp 97.4  F (36.3  C)  Resp 16  Wt (!) 338 lb 6.4 oz (153.5 kg)  SpO2 96%  BMI " 44.65 kg/m2   Repeat BP done manually, left arm with adult large cuff: 116/76. Taking Maxzide and Cozaar.                   LUNGS:  clear to auscultation  ABDOMEN: Abdomen soft, non-tender. BS normal. No masses, organomegaly  INCISION: dry and intact    MEDICATIONS/VITAMINS/ALLERGIES REVIEWED: Yes - taking Trulicity weekly + 30 units Humalog daily + 100 units Tresiba daily - no Metformin  ASA Hx: No    ASSESSMENT:    1.  DOS: 10/09/18 status post gastric bypass surgery.    Steri strip removed?  Yes       PLAN:    Make follow up appointment to meet with psychologist and 1 month post operatively. Sees Dr. Blue every 2 weeks.    Follow up with your primary care provider to obesity related conditions by one month post op. Seeing endocrine 10/26/18.    Advance diet per Dietitian today.     Call with questions or concerns at any time.    Return to clinic in 4 weeks for nutrition visit.    Return to clinic postop month 3.    RTW extended to 10/29/18 from 10/24/18. [facility] paperwork was updated and faxed. A letter was written and emailed to patient's home email. Patient verbalized that he realized the security of this email and was okay with document being emailed.     Guidelines provided re: how to increase activity/exercise?  Yes

## 2018-10-23 NOTE — MR AVS SNAPSHOT
After Visit Summary   10/23/2018    Naman Jones    MRN: [id number]           Patient Information     Date Of Birth          1985        Visit Information        Provider Department      10/23/2018 10:30 AM Rn, Adele Morris, RN [facility]      Today's Diagnoses     Morbid obesity (H)    -  1    Bariatric surgery status           Follow-ups after your visit        Your next 10 appointments already scheduled     Oct 26, 2018  2:30 PM CDT   Return Visit with ARDEN Henderson CNP   [facility]    [address]  [facility] [address]   [phone]            Nov 12, 2018  9:00 AM CST   Return Bariatric Nutrition Visit with Adele Morris Diet 1, RD   [facility] ([facility])    [address] [place] [id number] [phone]              Who to contact     If you have questions or need follow up information about today's clinic visit or your schedule please contact [place] [facility] directly at [phone].  Normal or non-critical lab and imaging results will be communicated to you by flexReceiptshart, letter or phone within 4 business days after the clinic has received the results. If you do not hear from us within 7 days, please contact the clinic through flexReceiptshart or phone. If you have a critical or abnormal lab result, we will notify you by phone as soon as possible.  Submit refill requests through [employer] or call your pharmacy and they will forward the refill request to us. Please allow 3 business days for your refill to be completed.          Additional Information About Your Visit        MyChart Information     [employer] gives you secure access to your electronic health record. If you see a primary care provider, you can also send messages to your care team and make  appointments. If you have questions, please call your primary care clinic.  If you do not have a primary care provider, please call [phone] and they will assist you.        Care EveryWhere ID     This is your Care EveryWhere ID. This could be used by other organizations to access your [place] medical records  [id number]        Your Vitals Were     Pulse Temperature Respirations Pulse Oximetry BMI (Body Mass Index)       96 97.4  F (36.3  C) 16 96% 44.65 kg/m2        Blood Pressure from Last 3 Encounters:   10/23/18 144/79   10/16/18 134/90   10/11/18 (!) 152/94    Weight from Last 3 Encounters:   10/23/18 (!) 338 lb 6.5 oz (153.5 kg)   10/23/18 (!) 338 lb 6.4 oz (153.5 kg)   10/16/18 (!) 350 lb (158.8 kg)              Today, you had the following     No orders found for display         Today's Medication Changes          These changes are accurate as of 10/23/18  3:10 PM.  If you have any questions, ask your nurse or doctor.               These medicines have changed or have updated prescriptions.        Dose/Directions    dulaglutide 1.5 MG/0.5ML pen   Commonly known as:  TRULICITY   This may have changed:  additional instructions   Used for:  Type 2 diabetes mellitus with hyperglycemia, with long-term current use of insulin (H)        Dose:  1.5 mg   Inject 1.5 mg Subcutaneous every 7 days   Quantity:  2 mL   Refills:  5       insulin degludec 200 UNIT/ML pen   Commonly known as:  TRESIBA   This may have changed:  how much to take   Used for:  Type 2 diabetes mellitus with hyperglycemia, with long-term current use of insulin (H)        Dose:  130 Units   Inject 130 Units Subcutaneous daily   Quantity:  27 mL   Refills:  3       insulin lispro 100 UNIT/ML injection   Commonly known as:  HumaLOG KWIKpen   This may have changed:    - how to take this  - when to take this  - additional instructions   Used for:  Type 2 diabetes mellitus with hyperglycemia, with long-term current use of insulin (H)        INJECT  1 UNIT FOR EVERY 2 GRAMS OF CARBS PLUS CORRECTION OF 1 UNIT FOR EVERY 15 POINTS ABOVE 150.(APPROXIMATELY 130 UNITS PER DAY)   Quantity:  45 mL   Refills:  5       LORazepam 1 MG tablet   Commonly known as:  ATIVAN   This may have changed:    - when to take this  - additional instructions   Used for:  JOEL (generalized anxiety disorder)        Dose:  1 mg   Take 1 tablet (1 mg) by mouth daily as needed for anxiety   Quantity:  30 tablet   Refills:  2                Primary Care Provider Office Phone # Fax #    Susan Rachele Haase, APRN [phone] [address] [facility] [address]        Equal Access to Services     XI REINOSO : Hadii lopez graf Sostacie, waaxda lujunoadaha, qaybta kaalmada liz, naamn conner . So [facility] [phone].    ATENCIÓN: Si habla español, tiene a dumas disposición servicios gratuitos de asistencia lingüística. [facility] [phone].    We comply with applicable federal civil rights laws and [place] laws. We do not discriminate on the basis of race, color, national origin, age, disability, sex, sexual orientation, or gender identity.            Thank you!     Thank you for choosing [facility] [facility]  for your care. Our goal is always to provide you with excellent care. Hearing back from our patients is one way we can continue to improve our services. Please take a few minutes to complete the written survey that you may receive in the mail after your visit with us. Thank you!             Your Updated Medication List - Protect others around you: Learn how to safely use, store and throw away your medicines at www.disposemymeds.org.          This list is accurate as of 10/23/18  3:10 PM.  Always use your most recent med list.                   Brand Name Dispense Instructions for use Diagnosis    ACETAMINOPHEN ER PO      Take 500 mg by mouth 3 times daily        atorvastatin 40 MG tablet    LIPITOR    90  tablet    Take 1 tablet (40 mg) by mouth daily    Mixed hyperlipidemia       B-12 500 MCG Subl      Place 1 tablet under the tongue daily        BENADRYL PO      Take 25 mg by mouth every evening        CALCIUM 600 + D PO      Take 1 tablet by mouth 3 times daily        continuous blood glucose monitoring sensor     3 each    For use with Freestyle Janie Flash  for continuous monitioring of blood glucose levels. Replace sensor every 10 days.    Type 2 diabetes mellitus with hyperglycemia, with long-term current use of insulin (H)       dulaglutide 1.5 MG/0.5ML pen    TRULICITY    2 mL    Inject 1.5 mg Subcutaneous every 7 days    Type 2 diabetes mellitus with hyperglycemia, with long-term current use of insulin (H)       FREESTYLE JANIE READER Noemy     1 Device    1 Device as needed    Type 2 diabetes mellitus with hyperglycemia, with long-term current use of insulin (H)       HYDROmorphone 2 MG tablet    DILAUDID    10 tablet    Take 1 tablet (2 mg) by mouth 2 times daily as needed for moderate to severe pain    Acute post-operative pain, Status post bariatric surgery       insulin degludec 200 UNIT/ML pen    TRESIBA    27 mL    Inject 130 Units Subcutaneous daily    Type 2 diabetes mellitus with hyperglycemia, with long-term current use of insulin (H)       insulin lispro 100 UNIT/ML injection    HumaLOG KWIKpen    45 mL    INJECT 1 UNIT FOR EVERY 2 GRAMS OF CARBS PLUS CORRECTION OF 1 UNIT FOR EVERY 15 POINTS ABOVE 150.(APPROXIMATELY 130 UNITS PER DAY)    Type 2 diabetes mellitus with hyperglycemia, with long-term current use of insulin (H)       insulin pen needle 32G X 4 MM     100 each    Use 3 pen needles daily or as directed.    Diabetes mellitus type 2 in obese (H)       LORazepam 1 MG tablet    ATIVAN    30 tablet    Take 1 tablet (1 mg) by mouth daily as needed for anxiety    JOEL (generalized anxiety disorder)       losartan 100 MG tablet    COZAAR     TAKE 1 TABLET(100 MG) BY MOUTH DAILY     Essential hypertension       metFORMIN 500 MG tablet    GLUCOPHAGE    360 tablet    Take 2 tablets (1,000 mg) by mouth 2 times daily (with meals)    Type 2 diabetes mellitus with hyperglycemia, with long-term current use of insulin (H)       multivitamin  peds with iron 60 MG chewable tablet      Take 2 chew tab by mouth every morning        ONETOUCH DELICA LANCETS 33G Misc     100 each    1 lancet 4 times daily    Type 2 diabetes mellitus with hyperglycemia, with long-term current use of insulin (H)       ranitidine 75 MG tablet    ZANTAC    120 tablet    Take 1 tablet (75 mg) by mouth 2 times daily    Status post bariatric surgery       sertraline 100 MG tablet    ZOLOFT    90 tablet    Take 1 tablet (100 mg) by mouth daily    JOEL (generalized anxiety disorder)       triamterene-hydrochlorothiazide 75-50 MG per tablet    MAXZIDE    90 tablet    Take 1 tablet by mouth daily    Essential hypertension       vitamin D3 1000 units Caps      Take 3 capsules by mouth daily

## 2018-10-23 NOTE — TELEPHONE ENCOUNTER
Routing refill request to provider to review approval because:  Drug not on the [facility] or [facility] refill protocol or controlled substance     I refilled that for him when he established care, I would like him to set up an appt to discuss other sleep options,  at his last appt in 4/2018 I wanted him to follow up and also asked him to try trazodone. So no refills, will need an appt to discuss other options.  Thanks,  Susan Haase, CNP    OK TO DENY REFILL FROM PHARMACY REQUEST? ROUTE TO INFORM THEM  Enedelia Goetz, RN, BSN  Message handled by Nurse Triage.

## 2018-10-23 NOTE — TELEPHONE ENCOUNTER
"Patient Contact    Attempt # 1    Was call answered?  No.  Unable to leave message.\"mail box full    Called pharmacy, LMOVM informing  message, have pt call us if needed, ? Auto refill   Will close and wait for pt or pharmacy to call us back  Enedelia Goetz RN, BSN  Message handled by Nurse Triage.      "

## 2018-10-23 NOTE — LETTER
[facility]          [address]  [place] [address]                                         Tel:  [phone]  Fax: [phone]    October 23, 2018    Regarding:  Name: Naman Jones  Address: [address] [address] [facility] [address]  YOB: 1985      To Whom it may concern:    Type of Request:  Medical Necessity - employee s own  Serious Health Condition  requiring hospitalization and recovery from a procedure that prohibited patient from performing essential function(s) of his job in a safe manner. The procedure took place on October 9, 2018. The patient originally felt that his return to work day would be 10/24/18; however during his recovery period decided that he would benefit from extending his return date to 10/29/18. It is appropriate for this patient s leave to be extended to October 29, 2018.  .    Procedure took place on at: October 9, 2018  Surgeon: Dr. Alden Mcwilliams MD  [facility]                                                         [address], [place] [address]    Sincerely,       MARLEN Baldwin/kendrick

## 2018-10-23 NOTE — TELEPHONE ENCOUNTER
Can you let radha know that I would rather not have him on lorazepam for sleep. He has been on amitriptyline for sleep, does he need a refill of this?    Thanks,  Susan Haase, CNP

## 2018-10-26 ENCOUNTER — OFFICE VISIT (OUTPATIENT)
Dept: ENDOCRINOLOGY | Facility: CLINIC | Age: 33
End: 2018-10-26
Payer: COMMERCIAL

## 2018-10-26 VITALS
WEIGHT: 315 LBS | TEMPERATURE: 98.5 F | SYSTOLIC BLOOD PRESSURE: 119 MMHG | BODY MASS INDEX: 44.49 KG/M2 | HEART RATE: 88 BPM | DIASTOLIC BLOOD PRESSURE: 88 MMHG

## 2018-10-26 DIAGNOSIS — E66.01 MORBID OBESITY WITH BMI OF 45.0-49.9, ADULT (H): ICD-10-CM

## 2018-10-26 DIAGNOSIS — E78.2 MIXED HYPERLIPIDEMIA: ICD-10-CM

## 2018-10-26 DIAGNOSIS — I10 ESSENTIAL HYPERTENSION: ICD-10-CM

## 2018-10-26 DIAGNOSIS — E66.9 DIABETES MELLITUS TYPE 2 IN OBESE: ICD-10-CM

## 2018-10-26 DIAGNOSIS — E11.69 DIABETES MELLITUS TYPE 2 IN OBESE: ICD-10-CM

## 2018-10-26 DIAGNOSIS — E11.65 TYPE 2 DIABETES MELLITUS WITH HYPERGLYCEMIA, WITH LONG-TERM CURRENT USE OF INSULIN (H): Primary | ICD-10-CM

## 2018-10-26 DIAGNOSIS — Z79.4 TYPE 2 DIABETES MELLITUS WITH HYPERGLYCEMIA, WITH LONG-TERM CURRENT USE OF INSULIN (H): Primary | ICD-10-CM

## 2018-10-26 PROCEDURE — 99214 OFFICE O/P EST MOD 30 MIN: CPT | Mod: 25 | Performed by: CLINICAL NURSE SPECIALIST

## 2018-10-26 PROCEDURE — 95251 CONT GLUC MNTR ANALYSIS I&R: CPT | Performed by: CLINICAL NURSE SPECIALIST

## 2018-10-26 RX ORDER — FLASH GLUCOSE SENSOR
1 KIT MISCELLANEOUS
Qty: 2 EACH | Refills: 11 | Status: SHIPPED | OUTPATIENT
Start: 2018-10-26 | End: 2019-10-18

## 2018-10-26 RX ORDER — FLASH GLUCOSE SENSOR
1 KIT MISCELLANEOUS CONTINUOUS
Qty: 1 DEVICE | Refills: 0 | Status: SHIPPED | OUTPATIENT
Start: 2018-10-26 | End: 2022-08-03

## 2018-10-26 NOTE — LETTER
10/26/2018         RE: Naman Jones  [address] [address] [address]        Dear Colleague,    Thank you for referring your patient, Naman Jones, to the [facility]. Please see a copy of my visit note below.    ENDOCRINOLOGY CLINIC NOTE:  Name: Naman Jones  Seen for f/u of Diabetes (Last seen 4/17/2018).  HPI:  Naman Jones is a 33 year old male who presents for the evaluation/management of Diabetes.  10/9/2018: gastric bypass, [facility] loss [facility]  Has lost 359-->337 lbs today.  On bariatric full liquid diet:  Fluids/water intake: 80 ounces/day (water, protein drink, enhanced water, [employer]life)  Small bites: Yes  Portion size: 1/2 cup or less  20-30 minute meals: Yes  Breakfast: 1/2 cup Greek yogurt  Lunch: 1/2 cup tomato soup  Dinner: 1/2 cup maldonado soup  Snacks: 1-2 protein drinks per day    VITAMINS AND MINERALS:   2 Multivitamin with Minerals-   600 mg Calcium carbonate With Vitamin D TID- with meals  3-1000 International units Vitamin D  500 mcg Vitamin B-12 sublingual  He was getting care at [facility] before.       1. Type 2 DM:  Orginally diagnosed at the age of: 30. On routine physical exam. Was losing wt at that time.  On insulin X 2016    Current Regimen: Tresiba 100 Units hs, Humalog 1 unit per 2 gm of CHO + SSI 1:15 >150, Trulicity 1.5 mg once a week.  Has not resumed Metformin yet.    Insulin use decreased since gastric bypass, Tresiba 150 unit(s) --> 100 units/day.    Still taking Humalog before each meal using pre-gastric bypass I:C ratio and correction factor.  No low blood sugars.  Resumed Trulicity on his own - tolerating well, says he feels better since restarting it.    BS checks: Janie personal CGM  Janie: average glucose 182.    50% above 180.  50% in target range.  0% below 70.    Exercise: not much  Symptoms of hypoglycemia (low blood sugar):  Gets symptoms of hypoglycemia.  Episodes of hypoglycemia: no  Fixed meal pattern: yes  Patient  counting carbs: yes    DM Complications:   Nephropathy: no  Retinopathy: no retinopathy  Neuropathy: + numbness in toes  Microalbuminuria: Yes- on losartan  CAD/PAD: no  Gastroparesis: no  Hypoglycemia unawareness: no  2. Hypertension:    On Losartan 100 mg and Maxide 75-50 mg daily.  3. Hyperlipidemia: +Atorvastatin 40 mg qd    PMH/PSH:  DM  HTN  Dyslipidemia    Family Hx:  Diabetes: Father and mgm    Social Hx:  Social History     Social History     Marital status:      Spouse name: N/A     Number of children: N/A     Years of education: N/A     Occupational History     Not on file.     Social History Main Topics     Smoking status: Never Smoker     Smokeless tobacco: Never Used     Alcohol use Yes     Drug use: No     Sexual activity: Yes     Other Topics Concern     Not on file     Social History Narrative          MEDICATIONS:  has a current medication list which includes the following prescription(s): atorvastatin, calcium carb-cholecalciferol, vitamin d3, freestyle césar reader, freestyle césar reader, freestyle césar 14 day sensor, continuous blood glucose monitoring, b-12, diphenhydramine hcl, dulaglutide, insulin degludec, insulin lispro, insulin pen needle, lorazepam, losartan, metformin, multivitamin  peds with iron, onetouch delica lancets 33g, ranitidine, sertraline, and triamterene-hydrochlorothiazide.    ROS     ROS: 10 point ROS neg other than the symptoms noted above in the HPI.    Physical Exam   VS: /88 (BP Location: Right arm, Patient Position: Chair, Cuff Size: Adult Large)  Pulse 88  Temp 98.5  F (36.9  C) (Oral)  Wt (!) 153 kg (337 lb 3.2 oz)  BMI 44.49 kg/m2  GENERAL: AXOX3, NAD, well dressed, answering questions appropriately, appears stated age.  HEENT: No exopthalmous, no proptosis, no lig lag, no retraction  CV: RRR  LUNGS: CTAB  NEUROLOGY: CN grossly intact, no tremors  PSYCH: normal affect and mood    LABS:  A1c:  Component      Latest Ref Rng & Units 7/26/2018  9/25/2018   Hemoglobin A1C      0 - 5.6 % 7.7 (H) 8.9 (H)     !COMPREHENSIVE Latest Ref Rng & Units 10/9/2018 10/10/2018   SODIUM 133 - 144 mmol/L  138   POTASSIUM 3.4 - 5.3 mmol/L 3.6 3.7   CHLORIDE 94 - 109 mmol/L  101   BUN 7 - 30 mg/dL     Creatinine 0.66 - 1.25 mg/dL 0.60 (L)    Glucose 70 - 99 mg/dL 160 (H)    ANION GAP 3 - 14 mmol/L  4     !LIPID/HEPATIC Latest Ref Rng & Units 7/26/2018   CHOLESTEROL <200 mg/dL 175   TRIGLYCERIDES <150 mg/dL 488 (H)   HDL CHOLESTEROL >39 mg/dL 34 (L)   LDL CHOLESTEROL DIRECT <100 mg/dL 108 (H)   LDL CHOLESTEROL, CALCULATED <100 mg/dL Cannot estimate LDL when triglyceride exceeds 400 mg/dL   NON HDL CHOLESTEROL <130 mg/dL 141 (H)   AST 0 - 45 U/L 37   ALT 0 - 70 U/L 58     !THYROID Latest Ref Rng & Units 7/26/2018   TSH 0.40 - 4.00 mU/L 1.50       Records from outside clinic previously reviewed.    All pertinent notes, labs, and images personally reviewed by me.     A/P  Mr.Anthony Jones is a 33 year old here for the evaluation/management of diabetes:    1. DM2 - Uncontrolled but significantly improved post gastric bypass.  A1c 8.9%.  Diabetes complicated by neuropathy and microalbuminuria.  Body mass index is 44.49 kg/(m^2).    No major episodes of hypoglycemia  Sees Enedelia Pena - diabetes ed    -- Appear to still need insulin at this time as 50% of his readings are > 180.  He's not having any low blood sugars.  -- Continue Tresiba 100 units per day   -- Continue current dose of Humalog   -- decrease insulin as needed to keep glucose levels controlled without low blood sugars  -- continue Trulicity 1.5 mg once a week  -- upgrade Janie to 14-day sensor    --resume metformin regular release 500 mg every day - crush tabs and mix with applesause for enhanced absorption.    -- He is allergic to sulfa medications so can not be on sulfonylureas    -- previous eye exam at [facility], wishing to change to [place].  Ophthalmology referral provided.     2. Hypertension - Under Good  control.  Continue Maxide, losartan 100 mg      3. Hyperlipidemia -Currently treated with atorvastatin 40 mg - conitnue.      Most Recent Immunizations   Administered Date(s) Administered     HepB 05/15/2017     Influenza (intradermal) 09/20/2016     Influenza Vaccine IM 3yrs+ 4 Valent IIV4 09/25/2018     Pneumo Conj 13-V (2010&after) 07/26/2018     TDAP Vaccine (Adacel) 10/04/2012     All questions were answered.  The patient indicates understanding of the above issues and agrees with the plan set forth.     More than 50% of face to face time spent with Mr. Jones on counseling / coordinating his care.  Total face to face time was 25 minutes.      Follow-up:  4 weeks with Enedelia Pena, diabetes ed   3 months with me    Annette Hobson NP  Endocrinology  [facility]  CC: Haase, Susan Rachele            Again, thank you for allowing me to participate in the care of your patient.        Sincerely,        ARDEN Henderson CNP

## 2018-10-26 NOTE — PROGRESS NOTES
ENDOCRINOLOGY CLINIC NOTE:  Name: Naman Jones  Seen for f/u of Diabetes (Last seen 4/17/2018).  HPI:  Naman Jones is a 33 year old male who presents for the evaluation/management of Diabetes.  10/9/2018: gastric bypass, [facility]  Has lost 359-->337 lbs today.  On bariatric full liquid diet:  Fluids/water intake: 80 ounces/day (water, protein drink, enhanced water, [employer]life)  Small bites: Yes  Portion size: 1/2 cup or less  20-30 minute meals: Yes  Breakfast: 1/2 cup Greek yogurt  Lunch: 1/2 cup tomato soup  Dinner: 1/2 cup maldonado soup  Snacks: 1-2 protein drinks per day    VITAMINS AND MINERALS:   2 Multivitamin with Minerals-   600 mg Calcium carbonate With Vitamin D TID- with meals  3-1000 International units Vitamin D  500 mcg Vitamin B-12 sublingual  He was getting care at [facility] before.       1. Type 2 DM:  Orginally diagnosed at the age of: 30. On routine physical exam. Was losing wt at that time.  On insulin X 2016    Current Regimen: Tresiba 100 Units hs, Humalog 1 unit per 2 gm of CHO + SSI 1:15 >150, Trulicity 1.5 mg once a week.  Has not resumed Metformin yet.    Insulin use decreased since gastric bypass, Tresiba 150 unit(s) --> 100 units/day.    Still taking Humalog before each meal using pre-gastric bypass I:C ratio and correction factor.  No low blood sugars.  Resumed Trulicity on his own - tolerating well, says he feels better since restarting it.    BS checks: Janie personal CGM  Janie: average glucose 182.    50% above 180.  50% in target range.  0% below 70.    Exercise: not much  Symptoms of hypoglycemia (low blood sugar):  Gets symptoms of hypoglycemia.  Episodes of hypoglycemia: no  Fixed meal pattern: yes  Patient counting carbs: yes    DM Complications:   Nephropathy: no  Retinopathy: no retinopathy  Neuropathy: + numbness in toes  Microalbuminuria: Yes- on losartan  CAD/PAD: no  Gastroparesis: no  Hypoglycemia unawareness: no  2. Hypertension:    On Losartan  100 mg and Maxide 75-50 mg daily.  3. Hyperlipidemia: +Atorvastatin 40 mg qd    PMH/PSH:  DM  HTN  Dyslipidemia    Family Hx:  Diabetes: Father and mgm    Social Hx:  Social History     Social History     Marital status:      Spouse name: N/A     Number of children: N/A     Years of education: N/A     Occupational History     Not on file.     Social History Main Topics     Smoking status: Never Smoker     Smokeless tobacco: Never Used     Alcohol use Yes     Drug use: No     Sexual activity: Yes     Other Topics Concern     Not on file     Social History Narrative          MEDICATIONS:  has a current medication list which includes the following prescription(s): atorvastatin, calcium carb-cholecalciferol, vitamin d3, freestyle césar reader, freestyle césar reader, freestyle césar 14 day sensor, continuous blood glucose monitoring, b-12, diphenhydramine hcl, dulaglutide, insulin degludec, insulin lispro, insulin pen needle, lorazepam, losartan, metformin, multivitamin  peds with iron, onetouch delica lancets 33g, ranitidine, sertraline, and triamterene-hydrochlorothiazide.    ROS     ROS: 10 point ROS neg other than the symptoms noted above in the HPI.    Physical Exam   VS: /88 (BP Location: Right arm, Patient Position: Chair, Cuff Size: Adult Large)  Pulse 88  Temp 98.5  F (36.9  C) (Oral)  Wt (!) 153 kg (337 lb 3.2 oz)  BMI 44.49 kg/m2  GENERAL: AXOX3, NAD, well dressed, answering questions appropriately, appears stated age.  HEENT: No exopthalmous, no proptosis, no lig lag, no retraction  CV: RRR  LUNGS: CTAB  NEUROLOGY: CN grossly intact, no tremors  PSYCH: normal affect and mood    LABS:  A1c:  Component      Latest Ref Rng & Units 7/26/2018 9/25/2018   Hemoglobin A1C      0 - 5.6 % 7.7 (H) 8.9 (H)     !COMPREHENSIVE Latest Ref Rng & Units 10/9/2018 10/10/2018   SODIUM 133 - 144 mmol/L  138   POTASSIUM 3.4 - 5.3 mmol/L 3.6 3.7   CHLORIDE 94 - 109 mmol/L  101   BUN 7 - 30 mg/dL     Creatinine  0.66 - 1.25 mg/dL 0.60 (L)    Glucose 70 - 99 mg/dL 160 (H)    ANION GAP 3 - 14 mmol/L  4     !LIPID/HEPATIC Latest Ref Rng & Units 7/26/2018   CHOLESTEROL <200 mg/dL 175   TRIGLYCERIDES <150 mg/dL 488 (H)   HDL CHOLESTEROL >39 mg/dL 34 (L)   LDL CHOLESTEROL DIRECT <100 mg/dL 108 (H)   LDL CHOLESTEROL, CALCULATED <100 mg/dL Cannot estimate LDL when triglyceride exceeds 400 mg/dL   NON HDL CHOLESTEROL <130 mg/dL 141 (H)   AST 0 - 45 U/L 37   ALT 0 - 70 U/L 58     !THYROID Latest Ref Rng & Units 7/26/2018   TSH 0.40 - 4.00 mU/L 1.50       Records from outside clinic previously reviewed.    All pertinent notes, labs, and images personally reviewed by me.     A/P  Mr.Anthony Jones is a 33 year old here for the evaluation/management of diabetes:    1. DM2 - Uncontrolled but significantly improved post gastric bypass.  A1c 8.9%.  Diabetes complicated by neuropathy and microalbuminuria.  Body mass index is 44.49 kg/(m^2).    No major episodes of hypoglycemia  Sees Enedelia Pena - diabetes ed    -- Appear to still need insulin at this time as 50% of his readings are > 180.  He's not having any low blood sugars.  -- Continue Tresiba 100 units per day   -- Continue current dose of Humalog   -- decrease insulin as needed to keep glucose levels controlled without low blood sugars  -- continue Trulicity 1.5 mg once a week  -- upgrade Janie to 14-day sensor    --resume metformin regular release 500 mg every day - crush tabs and mix with applesause for enhanced absorption.    -- He is allergic to sulfa medications so can not be on sulfonylureas    -- previous eye exam at [facility], wishing to change to [place].  Ophthalmology referral provided.     2. Hypertension - Under Good control.  Continue Maxide, losartan 100 mg      3. Hyperlipidemia -Currently treated with atorvastatin 40 mg - conitnue.      Most Recent Immunizations   Administered Date(s) Administered     HepB 05/15/2017     Influenza (intradermal) 09/20/2016     Influenza  Vaccine IM 3yrs+ 4 Valent IIV4 09/25/2018     Pneumo Conj 13-V (2010&after) 07/26/2018     TDAP Vaccine (Adacel) 10/04/2012     All questions were answered.  The patient indicates understanding of the above issues and agrees with the plan set forth.     More than 50% of face to face time spent with Mr. Jones on counseling / coordinating his care.  Total face to face time was 25 minutes.      Follow-up:  4 weeks with Enedelia Pena, diabetes ed   3 months with forrest Hobson NP  Endocrinology  [facility]  CC: Haase, Susan Rachele

## 2018-10-26 NOTE — MR AVS SNAPSHOT
After Visit Summary   10/26/2018    Naman Jones    MRN: [id number]           Patient Information     Date Of Birth          1985        Visit Information        Provider Department      10/26/2018 2:30 PM Annette Hobson APRN [facility]        Today's Diagnoses     Type 2 diabetes mellitus with hyperglycemia, with long-term current use of insulin (H)    -  1       Follow-ups after your visit        Additional Services     OPHTHALMOLOGY ADULT REFERRAL       Your provider has referred you to:  [facility]  [phone]  [email or web address] [facility] with Goldie José Manuel at # [phone].      Please be aware that coverage of these services is subject to the terms and limitations of your health insurance plan.  Call member services at your health plan with any benefit or coverage questions.      Please bring the following to your appointment:  >>   Any x-rays, CTs or MRIs which have been performed.  Contact the facility where they were done to arrange for  prior to your scheduled appointment.  Any new CT, MRI or other procedures ordered by your specialist must be performed at a [place] facility or coordinated by your clinic's referral office.    >>   List of current medications   >>   This referral request   >>   Any documents/labs given to you for this referral                  Your next 10 appointments already scheduled     Nov 12, 2018  9:00 AM CST   Return Bariatric Nutrition Visit with Adele Landa 1, RD   [facility] ([facility])    [address] [place] [address] [phone]            Nov 16, 2018  8:30 AM CST   Diabetes Education with Enedelia Pena RD   [facility] [place] ([facility])    [address]  [facility] [address] [phone]             Feb 01, 2019  8:30 AM CST   Return Visit with ARDEN Henderson CNP   [facility] ([facility])    [address]  [facility] [address] [phone]              Who to contact     If you have questions or need follow up information about today's clinic visit or your schedule please contact [facility] directly at [phone].  Normal or non-critical lab and imaging results will be communicated to you by IdealSeathart, letter or phone within 4 business days after the clinic has received the results. If you do not hear from us within 7 days, please contact the clinic through Inaayat or phone. If you have a critical or abnormal lab result, we will notify you by phone as soon as possible.  Submit refill requests through FlightOffice or call your pharmacy and they will forward the refill request to us. Please allow 3 business days for your refill to be completed.          Additional Information About Your Visit        IdealSeatharPendo Systems Information     FlightOffice gives you secure access to your electronic health record. If you see a primary care provider, you can also send messages to your care team and make appointments. If you have questions, please call your primary care clinic.  If you do not have a primary care provider, please call [phone] and they will assist you.        Care EveryWhere ID     This is your Care EveryWhere ID. This could be used by other organizations to access your [place] medical records  [id number]        Your Vitals Were     Pulse Temperature BMI (Body Mass Index)             88 98.5  F (36.9  C) (Oral) 44.49 kg/m2          Blood Pressure from Last 3 Encounters:   10/26/18 119/88   10/23/18 144/79   10/16/18 134/90    Weight from Last 3 Encounters:   10/26/18 (!) 153 kg (337 lb 3.2 oz)   10/23/18 (!) 153.5 kg (338 lb 6.5 oz)   10/23/18 (!) 153.5 kg (338 lb 6.4 oz)              We Performed the Following     OPHTHALMOLOGY ADULT  REFERRAL          Today's Medication Changes          These changes are accurate as of 10/26/18  3:37 PM.  If you have any questions, ask your nurse or doctor.               These medicines have changed or have updated prescriptions.        Dose/Directions    * continuous blood glucose monitoring sensor   This may have changed:  Another medication with the same name was added. Make sure you understand how and when to take each.   Used for:  Type 2 diabetes mellitus with hyperglycemia, with long-term current use of insulin (H)   Changed by:  Annette Hobson APRN CNP        For use with Freestyle Janie Flash  for continuous monitioring of blood glucose levels. Replace sensor every 10 days.   Quantity:  3 each   Refills:  11       * FREESTYLE JANIE 14 DAY SENSOR Misc   This may have changed:  You were already taking a medication with the same name, and this prescription was added. Make sure you understand how and when to take each.   Used for:  Type 2 diabetes mellitus with hyperglycemia, with long-term current use of insulin (H)   Changed by:  Annette Hobson APRN CNP        Dose:  1 each   1 each every 14 days   Quantity:  2 each   Refills:  11       dulaglutide 1.5 MG/0.5ML pen   Commonly known as:  TRULICITY   This may have changed:  additional instructions   Used for:  Type 2 diabetes mellitus with hyperglycemia, with long-term current use of insulin (H)        Dose:  1.5 mg   Inject 1.5 mg Subcutaneous every 7 days   Quantity:  2 mL   Refills:  5       * FREESTYLE JANIE READER Noemy   This may have changed:  Another medication with the same name was added. Make sure you understand how and when to take each.   Used for:  Type 2 diabetes mellitus with hyperglycemia, with long-term current use of insulin (H)   Changed by:  Annette Hobson APRN CNP        Dose:  1 Device   1 Device as needed   Quantity:  1 Device   Refills:  0       * FREESTYLE JANIE READER Noemy   This may have changed:  You were  already taking a medication with the same name, and this prescription was added. Make sure you understand how and when to take each.   Used for:  Type 2 diabetes mellitus with hyperglycemia, with long-term current use of insulin (H)   Changed by:  Annette Hobson APRN CNP        Dose:  1 each   1 each continuous   Quantity:  1 Device   Refills:  0       insulin degludec 200 UNIT/ML pen   Commonly known as:  TRESIBA   This may have changed:  how much to take   Used for:  Type 2 diabetes mellitus with hyperglycemia, with long-term current use of insulin (H)        Dose:  130 Units   Inject 130 Units Subcutaneous daily   Quantity:  27 mL   Refills:  3       insulin lispro 100 UNIT/ML injection   Commonly known as:  HumaLOG KWIKpen   This may have changed:    - how to take this  - when to take this  - additional instructions   Used for:  Type 2 diabetes mellitus with hyperglycemia, with long-term current use of insulin (H)        INJECT 1 UNIT FOR EVERY 2 GRAMS OF CARBS PLUS CORRECTION OF 1 UNIT FOR EVERY 15 POINTS ABOVE 150.(APPROXIMATELY 130 UNITS PER DAY)   Quantity:  45 mL   Refills:  5       LORazepam 1 MG tablet   Commonly known as:  ATIVAN   This may have changed:    - when to take this  - additional instructions   Used for:  JOEL (generalized anxiety disorder)        Dose:  1 mg   Take 1 tablet (1 mg) by mouth daily as needed for anxiety   Quantity:  30 tablet   Refills:  2       * Notice:  This list has 4 medication(s) that are the same as other medications prescribed for you. Read the directions carefully, and ask your doctor or other care provider to review them with you.         Where to get your medicines      These medications were sent to [facility] [address] [facility] [address], [facility] [address]     Phone:  [phone]     FREESTYLE KEILA 14 DAY SENSOR [facility]    FREESTYLE KEILA READER Noemy                Primary Care  Provider Office Phone # Fax #    Susan Rachele Haase, ARDEN [phone] [address]  [facility] [address]        Equal Access to Services     RANULFO REINOSO : Hadii lopez ku miguel angelo Soomaali, waaxda luqadaha, qaybta kaalmada adeegyada, naman nettlesn jackelin mercerdarío rodgers. So [facility] [phone].    ATENCIÓN: Si habla español, tiene a dumas disposición servicios gratuitos de asistencia lingüística. Llame al [phone].    We comply with applicable federal civil rights laws and [place] laws. We do not discriminate on the basis of race, color, national origin, age, disability, sex, sexual orientation, or gender identity.            Thank you!     Thank you for choosing [facility]  for your care. Our goal is always to provide you with excellent care. Hearing back from our patients is one way we can continue to improve our services. Please take a few minutes to complete the written survey that you may receive in the mail after your visit with us. Thank you!             Your Updated Medication List - Protect others around you: Learn how to safely use, store and throw away your medicines at www.disposemymeds.org.          This list is accurate as of 10/26/18  3:37 PM.  Always use your most recent med list.                   Brand Name Dispense Instructions for use Diagnosis    atorvastatin 40 MG tablet    LIPITOR    90 tablet    Take 1 tablet (40 mg) by mouth daily    Mixed hyperlipidemia       B-12 500 MCG Subl      Place 1 tablet under the tongue daily        BENADRYL PO      Take 25 mg by mouth every evening        CALCIUM 600 + D PO      Take 1 tablet by mouth 3 times daily        * continuous blood glucose monitoring sensor     3 each    For use with Freestyle Janie Flash  for continuous monitioring of blood glucose levels. Replace sensor every 10 days.    Type 2 diabetes mellitus with hyperglycemia, with long-term current use of insulin (H)       * FREESTYLE  KEILA 14 DAY SENSOR Misc     2 each    1 each every 14 days    Type 2 diabetes mellitus with hyperglycemia, with long-term current use of insulin (H)       dulaglutide 1.5 MG/0.5ML pen    TRULICITY    2 mL    Inject 1.5 mg Subcutaneous every 7 days    Type 2 diabetes mellitus with hyperglycemia, with long-term current use of insulin (H)       * FREESTYLE KEILA READER Noemy     1 Device    1 Device as needed    Type 2 diabetes mellitus with hyperglycemia, with long-term current use of insulin (H)       * FREESTYLE KEILA READER Noemy     1 Device    1 each continuous    Type 2 diabetes mellitus with hyperglycemia, with long-term current use of insulin (H)       insulin degludec 200 UNIT/ML pen    TRESIBA    27 mL    Inject 130 Units Subcutaneous daily    Type 2 diabetes mellitus with hyperglycemia, with long-term current use of insulin (H)       insulin lispro 100 UNIT/ML injection    HumaLOG KWIKpen    45 mL    INJECT 1 UNIT FOR EVERY 2 GRAMS OF CARBS PLUS CORRECTION OF 1 UNIT FOR EVERY 15 POINTS ABOVE 150.(APPROXIMATELY 130 UNITS PER DAY)    Type 2 diabetes mellitus with hyperglycemia, with long-term current use of insulin (H)       insulin pen needle 32G X 4 MM     100 each    Use 3 pen needles daily or as directed.    Diabetes mellitus type 2 in obese (H)       LORazepam 1 MG tablet    ATIVAN    30 tablet    Take 1 tablet (1 mg) by mouth daily as needed for anxiety    JOEL (generalized anxiety disorder)       losartan 100 MG tablet    COZAAR     TAKE 1 TABLET(100 MG) BY MOUTH DAILY    Essential hypertension       metFORMIN 500 MG tablet    GLUCOPHAGE    360 tablet    Take 2 tablets (1,000 mg) by mouth 2 times daily (with meals)    Type 2 diabetes mellitus with hyperglycemia, with long-term current use of insulin (H)       multivitamin  peds with iron 60 MG chewable tablet      Take 2 chew tab by mouth every morning        ONETOUCH DELICA LANCETS 33G Misc     100 each    1 lancet 4 times daily    Type 2 diabetes  mellitus with hyperglycemia, with long-term current use of insulin (H)       ranitidine 75 MG tablet    ZANTAC    120 tablet    Take 1 tablet (75 mg) by mouth 2 times daily    Status post bariatric surgery       sertraline 100 MG tablet    ZOLOFT    90 tablet    Take 1 tablet (100 mg) by mouth daily    JOEL (generalized anxiety disorder)       triamterene-hydrochlorothiazide 75-50 MG per tablet    MAXZIDE    90 tablet    Take 1 tablet by mouth daily    Essential hypertension       vitamin D3 1000 units Caps      Take 3 capsules by mouth daily        * Notice:  This list has 4 medication(s) that are the same as other medications prescribed for you. Read the directions carefully, and ask your doctor or other care provider to review them with you.

## 2018-11-12 ENCOUNTER — OFFICE VISIT (OUTPATIENT)
Dept: SURGERY | Facility: CLINIC | Age: 33
End: 2018-11-12
Payer: COMMERCIAL

## 2018-11-12 VITALS — HEIGHT: 73 IN | BODY MASS INDEX: 41.75 KG/M2 | WEIGHT: 315 LBS

## 2018-11-12 DIAGNOSIS — E66.01 MORBID OBESITY WITH BMI OF 45.0-49.9, ADULT (H): ICD-10-CM

## 2018-11-12 PROCEDURE — 97803 MED NUTRITION INDIV SUBSEQ: CPT | Performed by: DIETITIAN, REGISTERED

## 2018-11-12 NOTE — MR AVS SNAPSHOT
MRN:[id number]                      After Visit Summary   11/12/2018    Naman Jones    MRN: [id number]           Visit Information        Provider Department      11/12/2018 9:00 AM 1, Adele Landa, DANIELLE [facility]      Your next 10 appointments already scheduled     Nov 16, 2018  8:30 AM CST   Diabetes Education with Enedelia Pena RD   [facility] [place] ([facility])    [address]  [facility] [id number] [phone]            Jan 11, 2019  8:30 AM CST   (Arrive by 8:15 AM)   Return Bariatric Visit with Lilibeth Ying PA-C   [facility] ([facility])    [address] [place] [address]   [phone]            Jan 11, 2019  9:00 AM CST   (Arrive by 8:45 AM)   Return Bariatric Nutrition Visit with Adele Landa 2, DANIELLE   [facility] ([facility])    [address] [place] [address]   [phone]            Feb 01, 2019  8:30 AM CST   Return Visit with ARDEN Henderson [facility] ([facility])    [address] [place] [address]. [facility] [address]   [phone]              MyChart Information     Medifocus gives you secure access to your electronic health record. If you see a primary care provider, you can also send messages to your care team and make appointments. If you have questions, please call your primary care clinic.  If you do not have a primary care provider, please call [phone] and they will assist you.        Care EveryWhere ID     This is your Care EveryWhere ID. This could be used by other organizations to access your [place] medical records  [id number]        Equal Access to Services     RANULFO REINOSO AH: Lucas graf  Julia, julio c tay, natalia kaalmaanatoliy hill, naman rodgers. So [facility] [phone].    ATENCIÓN: Si habla español, tiene a dumas disposición servicios gratuitos de asistencia lingüística. Llame al [phone].    We comply with applicable federal civil rights laws and [place] laws. We do not discriminate on the basis of race, color, national origin, age, disability, sex, sexual orientation, or gender identity.

## 2018-11-12 NOTE — PROGRESS NOTES
"BARIATRIC PROGRESS NOTE - 4 Week Post Op  DATE OF VISIT: 2018    Naman Jones  1985  male  [id number]  33 year old    ASSESSMENT:    REASON FOR VISIT:  Naman Jones is a 33 year old year old male presents today for a 4 Week Post Op nutrition follow-up appointment. Patient is accompanied by self      DIAGNOSIS:  Status: post gastric bypass surgery.   Obesity Grade III BMI >40    ANTHROPOMETRICS:  Height: 185.4 cm (6' 1\")  Initial weight:  158.5 kg (349 lb 6.4 oz)  Current Weight: (!) 150.5 kg (331 lb 11.2 oz)  BMI: 43.85 kg/(m^2).    VITAMINS AND MINERALS:   2 Multivitamin with Minerals  600 mg Calcium With Vitamin D BID  3-1000  International units Vitamin D  500 mcg Vitamin B-12 sublingual    NUTRITION HISTORY:  Tolerating diet: Bariatric pureed diet  Fluids/water intake: 60 ounces/day  Small bites: Yes (using baby spoon)  Portion size: 1/2-1 cup  20-30 minute meals: Yes  Fluids and meals  by 30 minutes: Yes  Chew foods thoroughly: Yes  Breakfast: ~ 3 oz Greek yogurt, recently 1 egg  Lunch: soup with diced ham or chicken (not drinking the liquid)  Dinner: 1/3 cup taco meat or ~ 2 oz baked salmon   Snacks: protein drink (mid morning)  Nausea: rare (not needing Zofran)  VomitinX  Constipation: yes, 1 stool every 3 days  Additional Information: patient started soft foods at 4 weeks post-op.; has vomited @X (dry oatmeal and over cooked fish); blood sugars are averaging 160 per patient      PHYSICAL ACTIVITY:  Type: walking  Frequency: 6-7 days a week.  Duration: 20 minutes.    DIAGNOSIS:   *Previous Nutrition Diagnosis: Altered gastrointestinal function related to alternation in gastrointestinal structure as evidenced by history of gastric bypass.   No change    Previous Goals:  Start puree diet at day 14 post op.-met  Continue MVI, calcium with vitamin D, vitamin B12, vitamin D-met  Aim for 60 to 90 grams protein.-met  Continue 48 to 80 oz of fluid per day.-not met    Current " "Nutrition Diagnosis: Altered gastrointestinal function related to alternation in gastrointestinal structure as evidenced by history of gastric bypass.     INTERVENTION  Nutrition Prescription: Recommended bariatric soft diet.    Goals:  Start soft diet at day 28 post op.  Continue MVI, calcium with vitamin D, vitamin B12, vitamin D  Aim for 60 to 90 grams protein.  Start 64 to 80 oz of fluid per day.    Implementation:  Congratulated patient on weight loss  Reviewed \"Bowel regimen\" information  Discussed transition to soft diet.  Emphasized importance of adequate protein.  Reviewed required vitamins and mineral supplements.  Verbalizes good understanding of surgery diet guidelines.  Assessed learning needs and learning preferences.      NUTRITION MONITORING AND EVALUATION:   Monitor diet tolerance and weight loss.      Anticipated Compliance: good  Follow Up: Continue to monitor patient closely regarding weight loss and diet.  At 3 months Post Op    TIME SPENT WITH PATIENT: 25 minutes.    Darius Montenegro RD, LD  [facility]  [phone]  "

## 2018-11-27 ENCOUNTER — MYC REFILL (OUTPATIENT)
Dept: ENDOCRINOLOGY | Facility: CLINIC | Age: 33
End: 2018-11-27

## 2018-11-27 DIAGNOSIS — E11.65 TYPE 2 DIABETES MELLITUS WITH HYPERGLYCEMIA, WITH LONG-TERM CURRENT USE OF INSULIN (H): ICD-10-CM

## 2018-11-27 DIAGNOSIS — Z79.4 TYPE 2 DIABETES MELLITUS WITH HYPERGLYCEMIA, WITH LONG-TERM CURRENT USE OF INSULIN (H): ICD-10-CM

## 2018-11-27 RX ORDER — FLASH GLUCOSE SENSOR
1 KIT MISCELLANEOUS CONTINUOUS
Qty: 1 DEVICE | Refills: 0 | Status: CANCELLED | OUTPATIENT
Start: 2018-11-27

## 2018-11-28 NOTE — TELEPHONE ENCOUNTER
Message from [employer]:  Original authorizing provider: ARDEN Henderson CNP Jones would like a refill of the following medications:  Continuous Blood Gluc  (FREESTYLE KEILA READER) SUGAR [ARDEN Henderson CNP]    Preferred pharmacy: [facility] [id number] [place] [address] [id number]    Comment:  Hey when I originally tried to fill this RX they tried to give me a 10 day sensor which I didn't need so I declined. Can you resend it? I am out of 10 day sensors now and would like to switch to 14 day at this point.

## 2018-12-07 ENCOUNTER — TELEPHONE (OUTPATIENT)
Dept: SURGERY | Facility: CLINIC | Age: 33
End: 2018-12-07

## 2018-12-07 NOTE — TELEPHONE ENCOUNTER
Patient 2 months S/P gastric bypass called in and left message stating that everything had been going well but this am he woke up and is nauseated and vomiting. Unable to keep anything down.      Called patient back.  At about 6 am today woke up with a feeling of overeating/burning in chest going from stomach to back of mouth. DENIES ANY PAIN. Almost like GERD.  Threw up about 8 am.  It was a little.  Since then has thrown up several times.  No abdominal pain.  Has not been able to drink anything as he vomits it back up.  No diarrhea.  No SOB.  Last time threw up about 1 hour ago.  Vomit looks brown in color.  No red blood.  A lot of mucous.      No one else at house feels sick that he knows of.    Denies any heart racing. Tried to have patient check his pulse over the phone but he was unable to do so but denies that it is fast Taking zantac bid.  Sometimes misses second dose but denies any heartburn symptoms since surgery.     While on the phone had patient take a few sips of water.  He did so but in about 30 seconds he vomited up.      Feels slightly better now than 3 hour ago.      A/P:  Discussed that this seems like a GI bug.  Advised he try to nurse his vitamin water he has there.  Can have some ice chips or popcles.  Asked that he call this clinic back in about 3 hours with update.  Informed if he develops acute abdominal pain, tachycardia, or worsening symptoms to go to ED.  He was calm on phone and verbalized understanding.       Oj Luong MS, PA-C          Called patient back.  He states he is doing better.  States it was scary.  But is able to keep down vitamin zero over the past 2 hours.  Time of call 4:45 pm.  Nausea has subsided.  Not sure if had fever or chills earlier but now feels good.  Does think it was a GI bug. Will continue to slowly push fluids.      Oj Luong MS PAJennaC

## 2018-12-26 ENCOUNTER — ALLIED HEALTH/NURSE VISIT (OUTPATIENT)
Dept: EDUCATION SERVICES | Facility: CLINIC | Age: 33
End: 2018-12-26
Payer: COMMERCIAL

## 2018-12-26 DIAGNOSIS — Z79.4 TYPE 2 DIABETES MELLITUS WITH HYPERGLYCEMIA, WITH LONG-TERM CURRENT USE OF INSULIN (H): Primary | ICD-10-CM

## 2018-12-26 DIAGNOSIS — E11.65 TYPE 2 DIABETES MELLITUS WITH HYPERGLYCEMIA, WITH LONG-TERM CURRENT USE OF INSULIN (H): Primary | ICD-10-CM

## 2018-12-26 PROCEDURE — G0108 DIAB MANAGE TRN  PER INDIV: HCPCS | Performed by: DIETITIAN, REGISTERED

## 2018-12-26 NOTE — LETTER
2018         RE: Naman Jones  [address] [facility] [place] [address]        Dear Colleague,    Thank you for referring your patient, Naman Jones, to the [facility]. Please see a copy of my visit note below.    Diabetes Self-Management Education & Support      Diabetes Education Self-Management & Training: Follow-up and Continuous Glucose Monitor Download    Naman Jones presents today for download and report review of Personal continuous glucose monitor device      Current Diabetes Management per Patient:  Diabetes Medication(s)     Biguanides Sig    metFORMIN (GLUCOPHAGE) 500 MG tablet Take 2 tablets (1,000 mg) by mouth 2 times daily (with meals)    Insulin Sig    insulin degludec (TRESIBA) 200 UNIT/ML pen Inject 100 Units Subcutaneous daily    insulin lispro (HUMALOG KWIKPEN) 100 UNIT/ML injection Inject 1 unit(s): 2 gms CHO + 1:15>150 tid.  TDD 80 units per day    Incretin Mimetic Agents (GLP-1 Receptor Agonists) Sig    dulaglutide (TRULICITY) 1.5 MG/0.5ML pen Inject 1.5 mg Subcutaneous every 7 days        Current Treatments: Diet, Insulin Injections, Non-insulin Injectables    Most Recent A1c Result:    Lab Results   Component Value Date    A1C 8.9 2018       Continuous Glucose Monitor Interpretation             Statistics:   1. Sensor data covers 14 days.  2. Average scans and/or glucose check/calibrations per day: 3  (61% of data captured)  3.   Summary:      Data evaluation:    Ambulatory Glucose Profile (AGP) shows the followin. Consistent day-to-day patterns noted: high BG variability during the night due to inconsistencies with diet and meal time insulin- sometimes late evening meals or snacks, over-all BG avg is above target.          2.   Hypoglycemia:               Healthy Eating  Meal planning: Avoiding sweets, Calorie counting, Carbohydrate counting, Smaller portions  Meals include: Breakfast, Lunch, Dinner  Beverages: Tea, Coffee,  Milk, Juice  Has patient met with a dietitian in the past?: Yes    Being Active  How intense was your typical exercise? : Moderate (like brisk walking)  Barrier to exercise: Time, Access    Monitoring  Blood Glucose Meter: FreeStyle  Home Glucose (Sugar) Monitoring: 3-4 times per day  Blood glucose trend: Decreasing steadily  Low Glucose Range (mg/dL): 70-90  High Glucose Range (mg/dL): 180-200  Overall Range (mg/dL): 140-180    Taking Medications  Diabetes Medication(s)     Biguanides Sig    metFORMIN (GLUCOPHAGE) 500 MG tablet Take 2 tablets (1,000 mg) by mouth 2 times daily (with meals)    Insulin Sig    insulin degludec (TRESIBA) 200 UNIT/ML pen Inject 100 Units Subcutaneous daily    insulin lispro (HUMALOG KWIKPEN) 100 UNIT/ML injection Inject 1 unit(s): 2 gms CHO + 1:15>150 tid.  TDD 80 units per day    Incretin Mimetic Agents (GLP-1 Receptor Agonists) Sig    dulaglutide (TRULICITY) 1.5 MG/0.5ML pen Inject 1.5 mg Subcutaneous every 7 days          Current Treatments: Diet, Insulin Injections, Non-insulin Injectables    Problem Solving  Hypoglycemia Frequency: Never  Patient carries a carbohydrate source: No  Medical alert: No  Severe weather/disaster plan for diabetes management?: No  DKA prevention plan?: No  Sick day plan for diabetes management?: (P) No    Reducing Risks  CAD Risks: Diabetes Mellitus, Family history, Obesity, Stress  Has dilated eye exam at least once a year?: No  Sees dentist every 6 months?: Yes  Sees podiatrist (foot doctor)?: No    Healthy Coping  Informal Support system:: Children, Family, Friends, Parent, Spouse  Difficulty affording diabetes management supplies?: Yes  Patient Activation Measure Survey Score:  No flowsheet data found.      ASSESSMENT:  Medication and/or insulin dosing is: needs to work on consistency with taking insulin doses, pt agreeable to more tracking along with follow up visit to re-assess basal vs bolus insulin dosing.     Goals        General    Taking  Medication     Notes - Note created  12/26/2018  3:18 PM by Enedelia Pena RD    My Goal: I will start paying closer attention to my BG trends and insulin use    What I need to meet my goal: use Janie reader to start tracking insulin doses and carb intake, avoid going > 8 hrs between BG scans    I plan to meet my goal by this date: next CDE appt- 1 month              INTERVENTION:   Diabetes knowledge and skills assessment:     Patient is knowledgeable in diabetes management concepts related to: Healthy Eating, Being Active, Monitoring, Taking Medication, Problem Solving, Reducing Risks and Healthy Coping    Patient needs further education on the following diabetes management concepts: Taking Medication and Problem Solving    Based on learning assessment above, most appropriate setting for further diabetes education would be: Individual setting.    Education provided today on:  AADE Self-Care Behaviors:  Taking Medication: action of prescribed medication, when to take medications and importance, avoid missed injections  Problem Solving: high blood glucose - causes, signs/symptoms, treatment and prevention and low blood glucose - causes, signs/symptoms, treatment and prevention    CGM-specific education:   Janie [place]: frequency of scanning sensor, length of time data is visible, use of built in glucose meter, Precision X-tra test strips and Using Janie reader to track carbs and insulin doses    Pt verbalized understanding of concepts discussed and recommendations provided today.       Education Materials Provided:  No new materials provided today    PLAN:  No changes made today.   Increase BG testing, track carb and insulin doses for further review at next visit.   AVS printed and provided to patient today. See Follow-Up section for recommended follow-up.    Enedelia Pena RD, CDE  Diabetes     Time Spent: 30 minutes  Encounter Type: Individual    Any diabetes medication dose changes were made  via the CDE Protocol and Collaborative Practice Agreement with the patient's endocrinology provider. A copy of this encounter was shared with the provider.

## 2018-12-26 NOTE — PROGRESS NOTES
Diabetes Self-Management Education & Support      Diabetes Education Self-Management & Training: Follow-up and Continuous Glucose Monitor Download    Naman RUDY Jones presents today for download and report review of Personal continuous glucose monitor device      Current Diabetes Management per Patient:  Diabetes Medication(s)     Biguanides Sig    metFORMIN (GLUCOPHAGE) 500 MG tablet Take 2 tablets (1,000 mg) by mouth 2 times daily (with meals)    Insulin Sig    insulin degludec (TRESIBA) 200 UNIT/ML pen Inject 100 Units Subcutaneous daily    insulin lispro (HUMALOG KWIKPEN) 100 UNIT/ML injection Inject 1 unit(s): 2 gms CHO + 1:15>150 tid.  TDD 80 units per day    Incretin Mimetic Agents (GLP-1 Receptor Agonists) Sig    dulaglutide (TRULICITY) 1.5 MG/0.5ML pen Inject 1.5 mg Subcutaneous every 7 days        Current Treatments: Diet, Insulin Injections, Non-insulin Injectables    Most Recent A1c Result:    Lab Results   Component Value Date    A1C 8.9 2018       Continuous Glucose Monitor Interpretation             Statistics:   1. Sensor data covers 14 days.  2. Average scans and/or glucose check/calibrations per day: 3  (61% of data captured)  3.   Summary:      Data evaluation:    Ambulatory Glucose Profile (AGP) shows the followin. Consistent day-to-day patterns noted: high BG variability during the night due to inconsistencies with diet and meal time insulin- sometimes late evening meals or snacks, over-all BG avg is above target.          2.   Hypoglycemia:               Healthy Eating  Meal planning: Avoiding sweets, Calorie counting, Carbohydrate counting, Smaller portions  Meals include: Breakfast, Lunch, Dinner  Beverages: Tea, Coffee, Milk, Juice  Has patient met with a dietitian in the past?: Yes    Being Active  How intense was your typical exercise? : Moderate (like brisk walking)  Barrier to exercise: Time, Access    Monitoring  Blood Glucose Meter: FreeStyle  Home Glucose (Sugar)  Monitoring: 3-4 times per day  Blood glucose trend: Decreasing steadily  Low Glucose Range (mg/dL): 70-90  High Glucose Range (mg/dL): 180-200  Overall Range (mg/dL): 140-180    Taking Medications  Diabetes Medication(s)     Biguanides Sig    metFORMIN (GLUCOPHAGE) 500 MG tablet Take 2 tablets (1,000 mg) by mouth 2 times daily (with meals)    Insulin Sig    insulin degludec (TRESIBA) 200 UNIT/ML pen Inject 100 Units Subcutaneous daily    insulin lispro (HUMALOG KWIKPEN) 100 UNIT/ML injection Inject 1 unit(s): 2 gms CHO + 1:15>150 tid.  TDD 80 units per day    Incretin Mimetic Agents (GLP-1 Receptor Agonists) Sig    dulaglutide (TRULICITY) 1.5 MG/0.5ML pen Inject 1.5 mg Subcutaneous every 7 days          Current Treatments: Diet, Insulin Injections, Non-insulin Injectables    Problem Solving  Hypoglycemia Frequency: Never  Patient carries a carbohydrate source: No  Medical alert: No  Severe weather/disaster plan for diabetes management?: No  DKA prevention plan?: No  Sick day plan for diabetes management?: (P) No    Reducing Risks  CAD Risks: Diabetes Mellitus, Family history, Obesity, Stress  Has dilated eye exam at least once a year?: No  Sees dentist every 6 months?: Yes  Sees podiatrist (foot doctor)?: No    Healthy Coping  Informal Support system:: Children, Family, Friends, Parent, Spouse  Difficulty affording diabetes management supplies?: Yes  Patient Activation Measure Survey Score:  No flowsheet data found.      ASSESSMENT:  Medication and/or insulin dosing is: needs to work on consistency with taking insulin doses, pt agreeable to more tracking along with follow up visit to re-assess basal vs bolus insulin dosing.     Goals        General    Taking Medication     Notes - Note created  12/26/2018  3:18 PM by Enedelia Pena RD    My Goal: I will start paying closer attention to my BG trends and insulin use    What I need to meet my goal: use [employer] reader to start tracking insulin doses and carb intake,  avoid going > 8 hrs between BG scans    I plan to meet my goal by this date: next CDE appt- 1 month              INTERVENTION:   Diabetes knowledge and skills assessment:     Patient is knowledgeable in diabetes management concepts related to: Healthy Eating, Being Active, Monitoring, Taking Medication, Problem Solving, Reducing Risks and Healthy Coping    Patient needs further education on the following diabetes management concepts: Taking Medication and Problem Solving    Based on learning assessment above, most appropriate setting for further diabetes education would be: Individual setting.    Education provided today on:  AADE Self-Care Behaviors:  Taking Medication: action of prescribed medication, when to take medications and importance, avoid missed injections  Problem Solving: high blood glucose - causes, signs/symptoms, treatment and prevention and low blood glucose - causes, signs/symptoms, treatment and prevention    CGM-specific education:   Janie [place]: frequency of scanning sensor, length of time data is visible, use of built in glucose meter, Precision X-tra test strips and Using Janie reader to track carbs and insulin doses    Pt verbalized understanding of concepts discussed and recommendations provided today.       Education Materials Provided:  No new materials provided today    PLAN:  No changes made today.   Increase BG testing, track carb and insulin doses for further review at next visit.   AVS printed and provided to patient today. See Follow-Up section for recommended follow-up.    Enedelia Pena RD, CDE  Diabetes     Time Spent: 30 minutes  Encounter Type: Individual    Any diabetes medication dose changes were made via the CDE Protocol and Collaborative Practice Agreement with the patient's endocrinology provider. A copy of this encounter was shared with the provider.

## 2019-01-18 ENCOUNTER — ALLIED HEALTH/NURSE VISIT (OUTPATIENT)
Dept: EDUCATION SERVICES | Facility: CLINIC | Age: 34
End: 2019-01-18
Payer: COMMERCIAL

## 2019-01-18 DIAGNOSIS — Z79.4 TYPE 2 DIABETES MELLITUS WITH HYPERGLYCEMIA, WITH LONG-TERM CURRENT USE OF INSULIN (H): Primary | ICD-10-CM

## 2019-01-18 DIAGNOSIS — E11.65 TYPE 2 DIABETES MELLITUS WITH HYPERGLYCEMIA, WITH LONG-TERM CURRENT USE OF INSULIN (H): Primary | ICD-10-CM

## 2019-01-18 PROCEDURE — G0108 DIAB MANAGE TRN  PER INDIV: HCPCS | Performed by: DIETITIAN, REGISTERED

## 2019-01-18 NOTE — PROGRESS NOTES
Diabetes Self-Management Education & Support      Naman RUDY Dickinsonon presents today for download and report review of Personal continuous glucose monitor device    Most Recent A1c Result:    Lab Results   Component Value Date    A1C 8.9 2018       Continuous Glucose Monitor Download and Interpretation: Personal          Statistics:   1. Data includes 14 days. Avg # scans per day: 2 (49% of data captured)  2. Summary:                           Data evaluation:   Ambulatory Glucose Profile (AGP) shows the followin. Consistent day-to-day patterns noted: pattern of post evening meal hyperglycemia.  2. Hypoglycemia:  none    Healthy Eating  Healthy Eating Assessed Today: Yes  Meal planning: Avoiding sweets, Calorie counting, Carbohydrate counting, Smaller portions  Meals include: Breakfast, Lunch, Dinner  Has patient met with a dietitian in the past?: Yes    Being Active  Being Active Assessed Today: Yes  Exercise:: Yes  Days per week of moderate to strenuous exercise (like a brisk walk): 5  On average, minutes per day of exercise at this level: 30  Exercise Minutes per Week: 150  Barrier to exercise: Time, Access    Monitoring  Monitoring Assessed Today: Yes  Did patient bring glucose meter to appointment? : Yes  Blood Glucose Meter: CGM  Home Glucose (Sugar) Monitoring: 3-4 times per day  Blood glucose trend: Decreasing steadily  Low Glucose Range (mg/dL): 70-90  High Glucose Range (mg/dL): 180-200  Overall Range (mg/dL): 140-180    Taking Medications  Diabetes Medication(s)     Biguanides Sig    metFORMIN (GLUCOPHAGE) 500 MG tablet Take 2 tablets (1,000 mg) by mouth 2 times daily (with meals)- currently taking 1 tab bid    Insulin Sig    insulin degludec (TRESIBA) 200 UNIT/ML pen Inject 100 Units Subcutaneous daily    insulin lispro (HUMALOG KWIKPEN) 100 UNIT/ML injection Inject 1 unit(s): 2 gms CHO + 1:15>150 tid.  TDD 80 units per day - currently using ~50 units/day    Incretin Mimetic Agents (GLP-1  "Receptor Agonists) Sig    dulaglutide (TRULICITY) 1.5 MG/0.5ML pen Inject 1.5 mg Subcutaneous every 7 days          Current Treatments: Diet, Insulin Injections, Non-insulin Injectables, Oral Agent (monotherapy)    Problem Solving  Problem Solving Assessed Today: Yes  Hypoglycemia Frequency: Never  Patient carries a carbohydrate source: No  Medical alert: No  Severe weather/disaster plan for diabetes management?: No  DKA prevention plan?: No    Reducing Risks  Reducing Risks Assessed Today: No  CAD Risks: Diabetes Mellitus, Family history, Obesity, Stress  Has dilated eye exam at least once a year?: No  Sees dentist every 6 months?: Yes  Sees podiatrist (foot doctor)?: No    Healthy Coping  Healthy Coping Assessed Today: Yes  Emotional response to diabetes: Confidence diabetes can be controlled  Informal Support system:: Children, Family, Friends, Parent, Spouse  Stage of change: ACTION (Actively working towards change)  Difficulty affording diabetes management supplies?: Yes  Patient Activation Measure Survey Score:  No flowsheet data found.      Assessment:  Pt has lost ~70# since bariatric surgery. Reports \"feeling better, more energy, and blood sugars improved\".   Current time in BG target is 67% - which has improved from 55% at last visit on 12/26. Noted that BG data is somewhat incomplete.   Pt would benefit from closer attention to I:C dosing at evening meal, since blood sugars are highest post dinner which continue overnight. Pt agrees.     Goals        General    Taking Medication     Notes - Note created  12/26/2018  3:18 PM by Enedelia Pena RD    My Goal: I will start paying closer attention to my BG trends and insulin use    What I need to meet my goal: use [employer] reader to start tracking insulin doses and carb intake, avoid going > 8 hrs between BG scans    I plan to meet my goal by this date: next CDE appt- 1 month              INTERVENTION:   Diabetes knowledge and skills assessment:     Patient is " knowledgeable in diabetes management concepts related to: Healthy Eating, Being Active, Reducing Risks and Healthy Coping    Patient needs further education on the following diabetes management concepts: Healthy Eating, Monitoring, Taking Medication and Problem Solving    Based on learning assessment above, most appropriate setting for further diabetes education would be: Individual setting.    Education provided today on:  AADE Self-Care Behaviors:  Healthy Eating: carb counting accuracy  Monitoring: frequency of monitoring  Taking Medication: importance of balancing insulin with carbs and BG  Problem Solving: high blood glucose - causes, signs/symptoms, treatment and prevention and when to call health care provider    CGM-specific education:   Janie [place]: frequency of scanning sensor, length of time data is visible, use of built in glucose meter, Precision X-tra test strips, Use of trends and graphs for pattern management and problem solving and Dosing insulin based on sensor glucose results    Pt verbalized understanding of concepts discussed and recommendations provided today.       Education Materials Provided:  Copy of CGM report    PLAN:  See Patient Instructions for co-developed, patient-stated behavior change goals.  Pay closer attention to I:C dosing at evening meal, work on carb counting accuracy. Check blood sugar two hrs after meal and/or bedtime.  Endo follow up as planned. Recommend CDE appt 3 months, or as needed.    Enedelia Pena RD, CDE  Diabetes     Time Spent: 30 minutes  Encounter Type: Individual    Any diabetes medication dose changes were made via the CDE Protocol and Collaborative Practice Agreement with the patient's endocrinology provider. A copy of this encounter was shared with the provider.

## 2019-01-18 NOTE — LETTER
2019         RE: Naman Jones  [address] [facility] [address]        Dear Colleague,    Thank you for referring your patient, Naman Jones, to the [facility]. Please see a copy of my visit note below.    Diabetes Self-Management Education & Support      Naman Jones presents today for download and report review of Personal continuous glucose monitor device    Most Recent A1c Result:    Lab Results   Component Value Date    A1C 8.9 2018       Continuous Glucose Monitor Download and Interpretation: Personal          Statistics:   1. Data includes 14 days. Avg # scans per day: 2 (49% of data captured)  2. Summary:                           Data evaluation:   Ambulatory Glucose Profile (AGP) shows the followin. Consistent day-to-day patterns noted: pattern of post evening meal hyperglycemia.  2. Hypoglycemia:  none    Healthy Eating  Healthy Eating Assessed Today: Yes  Meal planning: Avoiding sweets, Calorie counting, Carbohydrate counting, Smaller portions  Meals include: Breakfast, Lunch, Dinner  Has patient met with a dietitian in the past?: Yes    Being Active  Being Active Assessed Today: Yes  Exercise:: Yes  Days per week of moderate to strenuous exercise (like a brisk walk): 5  On average, minutes per day of exercise at this level: 30  Exercise Minutes per Week: 150  Barrier to exercise: Time, Access    Monitoring  Monitoring Assessed Today: Yes  Did patient bring glucose meter to appointment? : Yes  Blood Glucose Meter: CGM  Home Glucose (Sugar) Monitoring: 3-4 times per day  Blood glucose trend: Decreasing steadily  Low Glucose Range (mg/dL): 70-90  High Glucose Range (mg/dL): 180-200  Overall Range (mg/dL): 140-180    Taking Medications  Diabetes Medication(s)     Biguanides Sig    metFORMIN (GLUCOPHAGE) 500 MG tablet Take 2 tablets (1,000 mg) by mouth 2 times daily (with meals)- currently taking 1 tab bid    Insulin Sig    insulin  "degludec (TRESIBA) 200 UNIT/ML pen Inject 100 Units Subcutaneous daily    insulin lispro (HUMALOG KWIKPEN) 100 UNIT/ML injection Inject 1 unit(s): 2 gms CHO + 1:15>150 tid.  TDD 80 units per day - currently using ~50 units/day    Incretin Mimetic Agents (GLP-1 Receptor Agonists) Sig    dulaglutide (TRULICITY) 1.5 MG/0.5ML pen Inject 1.5 mg Subcutaneous every 7 days          Current Treatments: Diet, Insulin Injections, Non-insulin Injectables, Oral Agent (monotherapy)    Problem Solving  Problem Solving Assessed Today: Yes  Hypoglycemia Frequency: Never  Patient carries a carbohydrate source: No  Medical alert: No  Severe weather/disaster plan for diabetes management?: No  DKA prevention plan?: No    Reducing Risks  Reducing Risks Assessed Today: No  CAD Risks: Diabetes Mellitus, Family history, Obesity, Stress  Has dilated eye exam at least once a year?: No  Sees dentist every 6 months?: Yes  Sees podiatrist (foot doctor)?: No    Healthy Coping  Healthy Coping Assessed Today: Yes  Emotional response to diabetes: Confidence diabetes can be controlled  Informal Support system:: Children, Family, Friends, Parent, Spouse  Stage of change: ACTION (Actively working towards change)  Difficulty affording diabetes management supplies?: Yes  Patient Activation Measure Survey Score:  No flowsheet data found.      Assessment:  Pt has lost ~70# since bariatric surgery. Reports \"feeling better, more energy, and blood sugars improved\".   Current time in BG target is 67% - which has improved from 55% at last visit on 12/26. Noted that BG data is somewhat incomplete.   Pt would benefit from closer attention to I:C dosing at evening meal, since blood sugars are highest post dinner which continue overnight. Pt agrees.     Goals        General    Taking Medication     Notes - Note created  12/26/2018  3:18 PM by Enedelia Pena RD    My Goal: I will start paying closer attention to my BG trends and insulin use    What I need to meet " my goal: use Janie reader to start tracking insulin doses and carb intake, avoid going > 8 hrs between BG scans    I plan to meet my goal by this date: next CDE appt- 1 month              INTERVENTION:   Diabetes knowledge and skills assessment:     Patient is knowledgeable in diabetes management concepts related to: Healthy Eating, Being Active, Reducing Risks and Healthy Coping    Patient needs further education on the following diabetes management concepts: Healthy Eating, Monitoring, Taking Medication and Problem Solving    Based on learning assessment above, most appropriate setting for further diabetes education would be: Individual setting.    Education provided today on:  AADE Self-Care Behaviors:  Healthy Eating: carb counting accuracy  Monitoring: frequency of monitoring  Taking Medication: importance of balancing insulin with carbs and BG  Problem Solving: high blood glucose - causes, signs/symptoms, treatment and prevention and when to call health care provider    CGM-specific education:   Janie [place]: frequency of scanning sensor, length of time data is visible, use of built in glucose meter, Precision X-tra test strips, Use of trends and graphs for pattern management and problem solving and Dosing insulin based on sensor glucose results    Pt verbalized understanding of concepts discussed and recommendations provided today.       Education Materials Provided:  Copy of CGM report    PLAN:  See Patient Instructions for co-developed, patient-stated behavior change goals.  Pay closer attention to I:C dosing at evening meal, work on carb counting accuracy. Check blood sugar two hrs after meal and/or bedtime.  Endo follow up as planned. Recommend CDE appt 3 months, or as needed.    Enedelia Pena RD, CDE  Diabetes     Time Spent: 30 minutes  Encounter Type: Individual    Any diabetes medication dose changes were made via the CDE Protocol and Collaborative Practice Agreement with the  patient's endocrinology provider. A copy of this encounter was shared with the provider.

## 2019-02-01 ENCOUNTER — OFFICE VISIT (OUTPATIENT)
Dept: ENDOCRINOLOGY | Facility: CLINIC | Age: 34
End: 2019-02-01
Payer: COMMERCIAL

## 2019-02-01 VITALS
HEART RATE: 106 BPM | TEMPERATURE: 98.1 F | BODY MASS INDEX: 39.07 KG/M2 | DIASTOLIC BLOOD PRESSURE: 86 MMHG | SYSTOLIC BLOOD PRESSURE: 126 MMHG | WEIGHT: 296.1 LBS

## 2019-02-01 DIAGNOSIS — E11.65 TYPE 2 DIABETES MELLITUS WITH HYPERGLYCEMIA, WITH LONG-TERM CURRENT USE OF INSULIN (H): Primary | ICD-10-CM

## 2019-02-01 DIAGNOSIS — E66.9 DIABETES MELLITUS TYPE 2 IN OBESE: ICD-10-CM

## 2019-02-01 DIAGNOSIS — E11.69 DIABETES MELLITUS TYPE 2 IN OBESE: ICD-10-CM

## 2019-02-01 DIAGNOSIS — Z79.4 TYPE 2 DIABETES MELLITUS WITH HYPERGLYCEMIA, WITH LONG-TERM CURRENT USE OF INSULIN (H): Primary | ICD-10-CM

## 2019-02-01 LAB — HBA1C MFR BLD: 6.4 % (ref 0–5.6)

## 2019-02-01 PROCEDURE — 36415 COLL VENOUS BLD VENIPUNCTURE: CPT | Performed by: CLINICAL NURSE SPECIALIST

## 2019-02-01 PROCEDURE — 83036 HEMOGLOBIN GLYCOSYLATED A1C: CPT | Performed by: CLINICAL NURSE SPECIALIST

## 2019-02-01 PROCEDURE — 99213 OFFICE O/P EST LOW 20 MIN: CPT | Performed by: CLINICAL NURSE SPECIALIST

## 2019-02-01 NOTE — PATIENT INSTRUCTIONS
In an effort to stay on schedule, please remember to check in for your next clinic appointment 15-20 minutes early.  This is necessary so your insulin pump/blood glucose meter or CGM can be uploaded and any labs can be done if necessary.  We appreciate your understanding.      Component      Latest Ref Rng & Units 7/26/2018 9/25/2018 2/1/2019   Hemoglobin A1C      0 - 5.6 % 7.7 (H) 8.9 (H) 6.4 (H)       Follow up with me in 3 months.    Annette Hobson NP  Endocrinology

## 2019-02-01 NOTE — PROGRESS NOTES
ENDOCRINOLOGY CLINIC NOTE:  Name: Naman Jones  Seen for f/u of Diabetes (Last seen 10/26/2018).  HPI:  Naman Jones is a 33 year old male who presents for the evaluation/management of Diabetes.  10/9/2018: gastric bypass, [facility] weight loss center  Has lost 369-->296 lbs today.  Currently eating about 70% normal diet  Following up every 3 months with [facility] weight loss center  Snacks: 1 protein drinks per day    VITAMINS AND MINERALS:   2 Multivitamin with Minerals-   600 mg Calcium carbonate With Vitamin D TID- with meals  3-1000 International units Vitamin D  500 mcg Vitamin B-12 sublingual      1. Type 2 DM:  Orginally diagnosed at the age of: 30. On routine physical exam. Was losing wt at that time.  On insulin X 2016    Current Regimen: Tresiba 100 Units hs, Humalog 1 unit per 2 gm of CHO + SSI 1:15 >150 - average daily dose about 50 units, Trulicity 1.5 mg once a week.  Metformin 1000 mg daily.    Insulin use decreased since gastric bypass, Tresiba 150 unit(s) --> 100 units/day.    Still taking Humalog before each meal using pre-gastric bypass I:C ratio and correction factor.  No low blood sugars.  Resumed Trulicity on his own - tolerating well, says he feels better since restarting it.  Slowly increasing Metformin, initially caused some nausea    BS checks: Janie personal CGM  Janie: average glucose forgot Janie  at work.     Exercise: not much  Symptoms of hypoglycemia (low blood sugar):  Gets symptoms of hypoglycemia.  Episodes of hypoglycemia: no  Fixed meal pattern: yes  Patient counting carbs: yes    DM Complications:   Nephropathy: no  Retinopathy: no retinopathy - due for eye exam, referral provided.  Neuropathy: + numbness in toes  Microalbuminuria: Yes- on losartan  CAD/PAD: no  Gastroparesis: no  Hypoglycemia unawareness: no  2. Hypertension:    On Losartan 100 mg and Maxide 75-50 mg daily.  3. Hyperlipidemia: +Atorvastatin 40 mg qd     PMH/PSH:  DM  HTN  Dyslipidemia    Family Hx:  Diabetes: Father and mgm    Social Hx:  Social History     Socioeconomic History     Marital status:      Spouse name: Not on file     Number of children: Not on file     Years of education: Not on file     Highest education level: Not on file   Social Needs     Financial resource strain: Not on file     Food insecurity - worry: Not on file     Food insecurity - inability: Not on file     Transportation needs - medical: Not on file     Transportation needs - non-medical: Not on file   Occupational History     Not on file   Tobacco Use     Smoking status: Never Smoker     Smokeless tobacco: Never Used   Substance and Sexual Activity     Alcohol use: Yes     Drug use: No     Sexual activity: Yes   Other Topics Concern     Parent/sibling w/ CABG, MI or angioplasty before 65F 55M? Not Asked   Social History Narrative     Not on file          MEDICATIONS:  has a current medication list which includes the following prescription(s): atorvastatin, calcium carb-cholecalciferol, vitamin d3, freestyle césar reader, freestyle césar reader, freestyle césar 14 day sensor, continuous blood glucose monitoring, b-12, diphenhydramine hcl, dulaglutide, insulin degludec, insulin lispro, insulin pen needle, lorazepam, losartan, losartan, metformin, childrens multivitamin w/iron, onetouch delica lancets 33g, ranitidine, sertraline, and triamterene-hctz.    ROS     ROS: 10 point ROS neg other than the symptoms noted above in the HPI.    Physical Exam   VS: /86 (BP Location: Right arm, Patient Position: Chair, Cuff Size: Adult Large)   Pulse 106   Temp 98.1  F (36.7  C) (Oral)   Wt 134.3 kg (296 lb 1.6 oz)   BMI 39.07 kg/m    GENERAL: AXOX3, NAD, well dressed, answering questions appropriately, appears stated age.  HEENT: No exopthalmous, no proptosis, no lig lag, no retraction  CV: RRR  LUNGS: CTAB  NEUROLOGY: CN grossly intact, no tremors  PSYCH: normal affect and  mood    LABS:  A1c:  Component      Latest Ref Rng & Units 7/26/2018 9/25/2018 2/1/2019   Hemoglobin A1C      0 - 5.6 % 7.7 (H) 8.9 (H) 6.4 (H)     !COMPREHENSIVE Latest Ref Rng & Units 10/9/2018 10/10/2018   SODIUM 133 - 144 mmol/L  138   POTASSIUM 3.4 - 5.3 mmol/L 3.6 3.7   CHLORIDE 94 - 109 mmol/L  101   BUN 7 - 30 mg/dL     Creatinine 0.66 - 1.25 mg/dL 0.60 (L)    Glucose 70 - 99 mg/dL 160 (H)    ANION GAP 3 - 14 mmol/L  4     !LIPID/HEPATIC Latest Ref Rng & Units 7/26/2018   CHOLESTEROL <200 mg/dL 175   TRIGLYCERIDES <150 mg/dL 488 (H)   HDL CHOLESTEROL >39 mg/dL 34 (L)   LDL CHOLESTEROL DIRECT <100 mg/dL 108 (H)   LDL CHOLESTEROL, CALCULATED <100 mg/dL Cannot estimate LDL when triglyceride exceeds 400 mg/dL   NON HDL CHOLESTEROL <130 mg/dL 141 (H)   AST 0 - 45 U/L 37   ALT 0 - 70 U/L 58     !THYROID Latest Ref Rng & Units 7/26/2018   TSH 0.40 - 4.00 mU/L 1.50     Component      Latest Ref Rng & Units 7/26/2018   Creatinine Urine      mg/dL 63   Albumin Urine mg/L      mg/L 7   Albumin Urine mg/g Cr      0 - 17 mg/g Cr 10.85       Records from outside clinic previously reviewed.    All pertinent notes, labs, and images personally reviewed by me.     A/P  Mr.Anthony Jones is a 33 year old here for the evaluation/management of diabetes:    1. DM2 - Controlled  A1c 6.4%.  Diabetes complicated by neuropathy and microalbuminuria.  Body mass index is 39.07 kg/m .    No major episodes of hypoglycemia  Sees Enedelia Pena - diabetes ed    -- Using less insulin as he continues to lose weight.  He's not having any low blood sugars.  -- Continue Tresiba 100 units per day   -- Gradually decrease Humalog dose - ok to stop humalog entirely if postprandial glucose remains < 180  -- continue Trulicity 1.5 mg once a week  -- increase Metformin as tolerated to max dose of 2000 mg every day.    -- He is allergic to sulfa medications so can not be on sulfonylureas    -- Ophthalmology referral provided.     2. Hypertension - Under  Good control.  Continue Maxide, losartan 100 mg      3. Hyperlipidemia -Currently treated with atorvastatin 40 mg - conitnue.      Most Recent Immunizations   Administered Date(s) Administered     HepB 05/15/2017     Influenza (intradermal) 09/20/2016     Influenza Vaccine IM 3yrs+ 4 Valent IIV4 09/25/2018     Pneumo Conj 13-V (2010&after) 07/26/2018     TDAP Vaccine (Adacel) 10/04/2012     All questions were answered.  The patient indicates understanding of the above issues and agrees with the plan set forth.     More than 50% of face to face time spent with Mr. Jones on counseling / coordinating his care.  Total face to face time was 25 minutes.      Follow-up:  3 months with forrest Hobson NP  Endocrinology  [facility]  CC: Haase, Susan Rachele

## 2019-02-01 NOTE — LETTER
2/1/2019         RE: Naman Jones  [address] [facility] [address]        Dear Colleague,    Thank you for referring your patient, Naman Jones, to the [facility]. Please see a copy of my visit note below.    ENDOCRINOLOGY CLINIC NOTE:  Name: Naman Jones  Seen for f/u of Diabetes (Last seen 10/26/2018).  HPI:  Naman Jones is a 33 year old male who presents for the evaluation/management of Diabetes.  10/9/2018: gastric bypass, [facility] weight loss center  Has lost 369-->296 lbs today.  Currently eating about 70% normal diet  Following up every 3 months with [facility] weight loss center  Snacks: 1 protein drinks per day    VITAMINS AND MINERALS:   2 Multivitamin with Minerals-   600 mg Calcium carbonate With Vitamin D TID- with meals  3-1000 International units Vitamin D  500 mcg Vitamin B-12 sublingual      1. Type 2 DM:  Orginally diagnosed at the age of: 30. On routine physical exam. Was losing wt at that time.  On insulin X 2016    Current Regimen: Tresiba 100 Units hs, Humalog 1 unit per 2 gm of CHO + SSI 1:15 >150 - average daily dose about 50 units, Trulicity 1.5 mg once a week.  Metformin 1000 mg daily.    Insulin use decreased since gastric bypass, Tresiba 150 unit(s) --> 100 units/day.    Still taking Humalog before each meal using pre-gastric bypass I:C ratio and correction factor.  No low blood sugars.  Resumed Trulicity on his own - tolerating well, says he feels better since restarting it.  Slowly increasing Metformin, initially caused some nausea    BS checks: Janie personal CGM  Janie: average glucose forgot Janie  at work.     Exercise: not much  Symptoms of hypoglycemia (low blood sugar):  Gets symptoms of hypoglycemia.  Episodes of hypoglycemia: no  Fixed meal pattern: yes  Patient counting carbs: yes    DM Complications:   Nephropathy: no  Retinopathy: no retinopathy - due for eye exam, referral provided.  Neuropathy: + numbness in  toes  Microalbuminuria: Yes- on losartan  CAD/PAD: no  Gastroparesis: no  Hypoglycemia unawareness: no  2. Hypertension:    On Losartan 100 mg and Maxide 75-50 mg daily.  3. Hyperlipidemia: +Atorvastatin 40 mg qd    PMH/PSH:  DM  HTN  Dyslipidemia    Family Hx:  Diabetes: Father and mgm    Social Hx:  Social History     Socioeconomic History     Marital status:      Spouse name: Not on file     Number of children: Not on file     Years of education: Not on file     Highest education level: Not on file   Social Needs     Financial resource strain: Not on file     Food insecurity - worry: Not on file     Food insecurity - inability: Not on file     Transportation needs - medical: Not on file     Transportation needs - non-medical: Not on file   Occupational History     Not on file   Tobacco Use     Smoking status: Never Smoker     Smokeless tobacco: Never Used   Substance and Sexual Activity     Alcohol use: Yes     Drug use: No     Sexual activity: Yes   Other Topics Concern     Parent/sibling w/ CABG, MI or angioplasty before 65F 55M? Not Asked   Social History Narrative     Not on file          MEDICATIONS:  has a current medication list which includes the following prescription(s): atorvastatin, calcium carb-cholecalciferol, vitamin d3, freestyle césar reader, freestyle césar reader, freestyle césar 14 day sensor, continuous blood glucose monitoring, b-12, diphenhydramine hcl, dulaglutide, insulin degludec, insulin lispro, insulin pen needle, lorazepam, losartan, losartan, metformin, childrens multivitamin w/iron, onetouch delica lancets 33g, ranitidine, sertraline, and triamterene-hctz.    ROS     ROS: 10 point ROS neg other than the symptoms noted above in the HPI.    Physical Exam   VS: /86 (BP Location: Right arm, Patient Position: Chair, Cuff Size: Adult Large)   Pulse 106   Temp 98.1  F (36.7  C) (Oral)   Wt 134.3 kg (296 lb 1.6 oz)   BMI 39.07 kg/m     GENERAL: AXOX3, NAD, well dressed,  answering questions appropriately, appears stated age.  HEENT: No exopthalmous, no proptosis, no lig lag, no retraction  CV: RRR  LUNGS: CTAB  NEUROLOGY: CN grossly intact, no tremors  PSYCH: normal affect and mood    LABS:  A1c:  Component      Latest Ref Rng & Units 7/26/2018 9/25/2018 2/1/2019   Hemoglobin A1C      0 - 5.6 % 7.7 (H) 8.9 (H) 6.4 (H)     !COMPREHENSIVE Latest Ref Rng & Units 10/9/2018 10/10/2018   SODIUM 133 - 144 mmol/L  138   POTASSIUM 3.4 - 5.3 mmol/L 3.6 3.7   CHLORIDE 94 - 109 mmol/L  101   BUN 7 - 30 mg/dL     Creatinine 0.66 - 1.25 mg/dL 0.60 (L)    Glucose 70 - 99 mg/dL 160 (H)    ANION GAP 3 - 14 mmol/L  4     !LIPID/HEPATIC Latest Ref Rng & Units 7/26/2018   CHOLESTEROL <200 mg/dL 175   TRIGLYCERIDES <150 mg/dL 488 (H)   HDL CHOLESTEROL >39 mg/dL 34 (L)   LDL CHOLESTEROL DIRECT <100 mg/dL 108 (H)   LDL CHOLESTEROL, CALCULATED <100 mg/dL Cannot estimate LDL when triglyceride exceeds 400 mg/dL   NON HDL CHOLESTEROL <130 mg/dL 141 (H)   AST 0 - 45 U/L 37   ALT 0 - 70 U/L 58     !THYROID Latest Ref Rng & Units 7/26/2018   TSH 0.40 - 4.00 mU/L 1.50     Component      Latest Ref Rng & Units 7/26/2018   Creatinine Urine      mg/dL 63   Albumin Urine mg/L      mg/L 7   Albumin Urine mg/g Cr      0 - 17 mg/g Cr 10.85       Records from outside clinic previously reviewed.    All pertinent notes, labs, and images personally reviewed by me.     A/P  Mr.Anthony Jones is a 33 year old here for the evaluation/management of diabetes:    1. DM2 - Controlled  A1c 6.4%.  Diabetes complicated by neuropathy and microalbuminuria.  Body mass index is 39.07 kg/m .    No major episodes of hypoglycemia  Sees Enedelia Pena - diabetes ed    -- Using less insulin as he continues to lose weight.  He's not having any low blood sugars.  -- Continue Tresiba 100 units per day   -- Gradually decrease Humalog dose - ok to stop humalog entirely if postprandial glucose remains < 180  -- continue Trulicity 1.5 mg once a  week  -- increase Metformin as tolerated to max dose of 2000 mg every day.    -- He is allergic to sulfa medications so can not be on sulfonylureas    -- Ophthalmology referral provided.     2. Hypertension - Under Good control.  Continue Maxide, losartan 100 mg      3. Hyperlipidemia -Currently treated with atorvastatin 40 mg - conitnue.      Most Recent Immunizations   Administered Date(s) Administered     HepB 05/15/2017     Influenza (intradermal) 09/20/2016     Influenza Vaccine IM 3yrs+ 4 Valent IIV4 09/25/2018     Pneumo Conj 13-V (2010&after) 07/26/2018     TDAP Vaccine (Adacel) 10/04/2012     All questions were answered.  The patient indicates understanding of the above issues and agrees with the plan set forth.     More than 50% of face to face time spent with Mr. Jones on counseling / coordinating his care.  Total face to face time was 25 minutes.      Follow-up:  3 months with me    Annette Hobson NP  Endocrinology  [facility]  CC: Haase, Susan Rachele            Again, thank you for allowing me to participate in the care of your patient.        Sincerely,        ARDEN Henderson CNP

## 2019-02-02 NOTE — RESULT ENCOUNTER NOTE
Chirag,  Here's a copy of your recent A1c result for your records.  Congratulations on the improvement.  Annette Hobson NP  Endocrinology

## 2019-02-11 DIAGNOSIS — I10 ESSENTIAL HYPERTENSION: ICD-10-CM

## 2019-02-11 DIAGNOSIS — E78.2 MIXED HYPERLIPIDEMIA: ICD-10-CM

## 2019-02-12 NOTE — TELEPHONE ENCOUNTER
"Requested Prescriptions   Pending Prescriptions Disp Refills     triamterene-HCTZ (MAXZIDE) 75-50 MG tablet [Pharmacy Med Name: TRIAMTERENE 75MG/ HCTZ 50MG TABLETS]  Last Written Prescription Date:  09/25/2018  Last Fill Quantity: 90 tablet,  # refills: 1   Last office visit: 9/25/2018 with prescribing provider:  Haase, Susan Rachele, APRN CNP    Future Office Visit:   Next 5 appointments (look out 90 days)    May 03, 2019  8:30 AM CDT  Return Visit with ARDEN Henderson CNP  [facility] ([facility]) [address]  [facility] [address] [phone]          90 tablet 0     Sig: TAKE 1 TABLET BY MOUTH DAILY    Diuretics (Including Combos) Protocol Failed - 2/11/2019  9:53 PM       Failed - Normal serum creatinine on file in past 12 months    Recent Labs   Lab Test 10/09/18  1650   CR 0.60*             Passed - Blood pressure under 140/90 in past 12 months    BP Readings from Last 3 Encounters:   02/01/19 126/86   10/26/18 119/88   10/23/18 144/79                Passed - Recent (12 mo) or future (30 days) visit within the authorizing provider's specialty    Patient had office visit in the last 12 months or has a visit in the next 30 days with authorizing provider or within the authorizing provider's specialty.  See \"Patient Info\" tab in inbasket, or \"Choose Columns\" in Meds & Orders section of the refill encounter.             Passed - Medication is active on med list       Passed - Patient is age 18 or older       Passed - Normal serum potassium on file in past 12 months    Recent Labs   Lab Test 10/10/18  0814   POTASSIUM 3.7                   Passed - Normal serum sodium on file in past 12 months    Recent Labs   Lab Test 10/10/18  0814                 atorvastatin (LIPITOR) 40 MG tablet [Pharmacy Med Name: ATORVASTATIN 40MG TABLETS]  Last Written Prescription Date:  04/18/2018  Last Fill Quantity: 90 tablet,  # refills: 3   Last office visit: 9/25/2018 " "with prescribing provider:  Haase, Susan Rachele, APRN CNP    Future Office Visit:   Next 5 appointments (look out 90 days)    May 03, 2019  8:30 AM CDT  Return Visit with ARDEN Henderson CNP  [facility] ([facility]) [address]  [facility] [address]  [phone]          90 tablet 0     Sig: TAKE 1 TABLET(40 MG) BY MOUTH DAILY    Statins Protocol Passed - 2/11/2019  9:53 PM       Passed - LDL on file in past 12 months    Recent Labs   Lab Test 07/26/18  0703   LDL Cannot estimate LDL when triglyceride exceeds 400 mg/dL  108*            Passed - No abnormal creatine kinase in past 12 months    No lab results found.            Passed - Recent (12 mo) or future (30 days) visit within the authorizing provider's specialty    Patient had office visit in the last 12 months or has a visit in the next 30 days with authorizing provider or within the authorizing provider's specialty.  See \"Patient Info\" tab in inbasket, or \"Choose Columns\" in Meds & Orders section of the refill encounter.             Passed - Medication is active on med list       Passed - Patient is age 18 or older        losartan (COZAAR) 100 MG tablet [Pharmacy Med Name: LOSARTAN 100MG TABLETS]  Last Written Prescription Date:  12/27/2018  Last Fill Quantity: 90 tablet,  # refills: 1   Last office visit: 9/25/2018 with prescribing provider:  Haase, Susan Rachele, APRN CNP    Future Office Visit:   Next 5 appointments (look out 90 days)    May 03, 2019  8:30 AM CDT  Return Visit with ARDEN Henderson CNP  [facility] ([facility]) [address] [facility] [address]  [phone]          90 tablet 0     Sig: TAKE 1 TABLET BY MOUTH EVERY DAY    Angiotensin-II Receptors Failed - 2/11/2019  9:53 PM       Failed - Normal serum creatinine on file in past 12 months    Recent Labs   Lab Test 10/09/18  1650   CR 0.60*            Passed - Blood " "pressure under 140/90 in past 12 months    BP Readings from Last 3 Encounters:   02/01/19 126/86   10/26/18 119/88   10/23/18 144/79                Passed - Recent (12 mo) or future (30 days) visit within the authorizing provider's specialty    Patient had office visit in the last 12 months or has a visit in the next 30 days with authorizing provider or within the authorizing provider's specialty.  See \"Patient Info\" tab in inbasket, or \"Choose Columns\" in Meds & Orders section of the refill encounter.             Passed - Medication is active on med list       Passed - Patient is age 18 or older       Passed - Normal serum potassium on file in past 12 months    Recent Labs   Lab Test 10/10/18  0814   POTASSIUM 3.7                      "

## 2019-02-14 RX ORDER — TRIAMTERENE AND HYDROCHLOROTHIAZIDE 75; 50 MG/1; MG/1
1 TABLET ORAL DAILY
Qty: 90 TABLET | Refills: 0 | Status: SHIPPED | OUTPATIENT
Start: 2019-02-14 | End: 2019-10-18

## 2019-02-14 RX ORDER — ATORVASTATIN CALCIUM 40 MG/1
TABLET, FILM COATED ORAL
Qty: 90 TABLET | Refills: 0 | Status: SHIPPED | OUTPATIENT
Start: 2019-02-14 | End: 2019-10-17

## 2019-02-14 RX ORDER — LOSARTAN POTASSIUM 100 MG/1
TABLET ORAL
Qty: 90 TABLET | Refills: 0 | Status: SHIPPED | OUTPATIENT
Start: 2019-02-14 | End: 2019-10-17

## 2019-02-14 NOTE — TELEPHONE ENCOUNTER
Routing refill request to provider for review/approval because:  Labs out of range:  CR is lower than September reading (October reading was done by hospital)    Carlos Albright RN, BSN

## 2019-02-15 ENCOUNTER — OFFICE VISIT (OUTPATIENT)
Dept: SURGERY | Facility: CLINIC | Age: 34
End: 2019-02-15
Payer: COMMERCIAL

## 2019-02-15 VITALS
HEART RATE: 86 BPM | WEIGHT: 290.9 LBS | HEIGHT: 73 IN | SYSTOLIC BLOOD PRESSURE: 128 MMHG | OXYGEN SATURATION: 96 % | BODY MASS INDEX: 38.55 KG/M2 | DIASTOLIC BLOOD PRESSURE: 84 MMHG

## 2019-02-15 DIAGNOSIS — Z79.4 TYPE 2 DIABETES MELLITUS WITH HYPERGLYCEMIA, WITH LONG-TERM CURRENT USE OF INSULIN (H): ICD-10-CM

## 2019-02-15 DIAGNOSIS — E66.812 CLASS 2 DRUG-INDUCED OBESITY WITH SERIOUS COMORBIDITY AND BODY MASS INDEX (BMI) OF 38.0 TO 38.9 IN ADULT: ICD-10-CM

## 2019-02-15 DIAGNOSIS — K91.2 POSTSURGICAL MALABSORPTION: ICD-10-CM

## 2019-02-15 DIAGNOSIS — M72.2 PLANTAR FASCIITIS: ICD-10-CM

## 2019-02-15 DIAGNOSIS — Z98.84 BARIATRIC SURGERY STATUS: Primary | ICD-10-CM

## 2019-02-15 DIAGNOSIS — E78.2 MIXED HYPERLIPIDEMIA: ICD-10-CM

## 2019-02-15 DIAGNOSIS — I10 ESSENTIAL HYPERTENSION: ICD-10-CM

## 2019-02-15 DIAGNOSIS — E66.1 CLASS 2 DRUG-INDUCED OBESITY WITH SERIOUS COMORBIDITY AND BODY MASS INDEX (BMI) OF 38.0 TO 38.9 IN ADULT: ICD-10-CM

## 2019-02-15 DIAGNOSIS — E11.65 TYPE 2 DIABETES MELLITUS WITH HYPERGLYCEMIA, WITH LONG-TERM CURRENT USE OF INSULIN (H): ICD-10-CM

## 2019-02-15 PROBLEM — E66.01 MORBID OBESITY WITH BMI OF 45.0-49.9, ADULT (H): Status: RESOLVED | Noted: 2017-06-13 | Resolved: 2019-02-15

## 2019-02-15 PROCEDURE — 97803 MED NUTRITION INDIV SUBSEQ: CPT | Performed by: DIETITIAN, REGISTERED

## 2019-02-15 PROCEDURE — 99213 OFFICE O/P EST LOW 20 MIN: CPT | Performed by: PHYSICIAN ASSISTANT

## 2019-02-15 ASSESSMENT — MIFFLIN-ST. JEOR: SCORE: 2308.39

## 2019-02-15 NOTE — PROGRESS NOTES
"BARIATRIC FOLLOW UP VISIT     February 15, 2019       HISTORY OF PRESENT ILLNESS: Pt returns today for his follow-up appointment status post laparoscopic gastric bypass. Happy with weight loss and is feeling well.    Initial Weight: 349 lb 6.4 oz (158.5 kg)   Current Weight: 290 lb 14.4 oz (132 kg)  Cumulative weight loss (lbs): 58.5  Last Visits Weight: 331 lb 11.2 oz (150.5 kg)     Patient is taking the following bariatric postoperative vitamins:  2 Complete multivitamins with minerals (at different times than calcium)   3000 International Units of Vitamin D daily  1200 mg of Calcium daily in divided doses  500 mcg of Vitamin B12 sl daily      Pt is exercising by walking on treadmill at lunch for 20 minutes.  On weekends tries to do something at least once.  Has been walking or shoveling.  Is also coaching basketball for kindergardeners on weekends       OBESITY RELATED CONDITIONS:  Hypertension - On two medications but lower doses  High cholesterol - On meds  Diabetes Type II - Last A1C was 6.4 on 2/1/29.  Still on insulin working to get off.  Has been on insulin for 2-3 years  Plantar faascittis - Doing a lot better  Low back pain - better  Binge Eating Disorder diagnosed 2016-Continues to work with  Hannah Antonio PsyD at [employer].  Down to once monthly.        SOCIAL HISTORY:  Pt denies smoking.  Pt drank 1 glass of wine last week.  Discussed AUD risk.  Avoids NSAIDS.  No caffeine    REVIEW OF SYSTEMS:     GI:  Nausea- No  Vomiting- No  Diarrhea- No  Constipation- some. Gets in about 60 oz of water.  Was taking miralax before but has not needed much lately  Dysphagia- No  Abdominal Pain- No  Heartburn- No     SKIN:  Intertriginous irritation- No  Hair loss - No         PSYCH:  Depression- No  Anxiety- No      LABS/IMAGING/MEDICAL RECORDS REVIEW: Reviewed recent A1C    PHYSICAL EXAMINATION:   /84 (BP Location: Right arm, Patient Position: Sitting, Cuff Size: Adult Large)   Pulse 86   Ht 6' 1\" " (1.854 m)   Wt 290 lb 14.4 oz (132 kg)   SpO2 96%   BMI 38.38 kg/m      GENERAL: Alert and oriented x3. NAD  HEART: No murmurs, rubs or gallops, Regular rate and rhythm  LUNGS: Breathing unlabored, Lung sounds clear to auscultation bilaterally  ABDOMEN: soft; nontender; nondistended, incision well healed. No hernia  EXTREMITIES: No LE edema bilaterally, Gait normal  SKIN: No intertriginous irritation or rash      ASSESSMENT AND PLAN:      4 months status laparoscopic gastric bypass  Morbid Obesity - Resolved  Obesity- Body mass index is 38.38 kg/m ..  Post surgical malabsorption:   Ordered vitamin B12, vitamin D, PTH, ferritin, TIBC, and iron labs.   Follow food plan per dietitian recommendations.   Continue taking recommended post-op vitamins.  Diabetes Type II - Continue working with endocrinology  Hypertension - Continue working with primary  Binge Eating Disorder - In remission.  Continue working with Hannah Tejada  Reviewed alcohol handout today and discussed 10% increase of Alcohol Use Disorder in patients with bariatric surgery.  Might want to incorporate more/variety of cardio    Return to clinic in 2 months      I spent a total of 17 minutes face to face with Naman during today's office visit. Over 50% of this time was spent counseling the patient and/or coordinating care.

## 2019-02-15 NOTE — LETTER
[facility]          [address]  [address]  [place] [address]                                         Tel:  [phone]  Fax: [phone]    2019    Naman Jones  [address] [address] [address]    : 1985      Dear Naman;     We have identified that you have outstanding lab tests that have . If you would like to have the  labs completed, please contact our clinic at [phone] to have them re-ordered. If you have any questions, please contact our office.  Sincerely,    Moni Aragon CMA

## 2019-02-15 NOTE — PROGRESS NOTES
"NUTRITION POST OP APPOINTMENT  DATE OF VISIT: February 15, 2019    Naman Jones  1985  male  [id number]  33 year old     ASSESSMENT:    REASON FOR VISIT:  Naman is a 33 year old year old male presents today for 3 month PO nutrition follow-up appointment. Patient is accompanied by self.      DIAGNOSIS:  Status post gastric bypass surgery.  Obesity Grade II BMI 35-39.9     ANTHROPOMETRICS:  Last weight: 331 lbs 11.2 oz   Height:  6'1\"  Current Weight: 290 lb 14.4 oz  BMI: 38.38  kg/(m^2).    VITAMINS AND MINERALS:  2 Multivitamin with Minerals  600 mg Calcium With Vitamin D BID  3000 International units Vitamin D  5000 mcg Vitamin B-12 sublingual      NUTRITION HISTORY:  Breakfast: protein shake ( Premier protein)   Lunch: stew chili, stir oquendo   Supper: protein (salmon, chicken) with a little bit of vegetables (frozen - beans, peas and carrot mix)  Snacks: not very much - cereal, oatmeal (avoids sweets, junk food) - does when he is taking insulin when BG is low in before bed   Fluids consumed: Water, Protein Drink and vitamin water zero and flavor water and Fairlife milk   Consuming liquid calories: Yes  Protein intake: ~50 grams/day  Tolerate regular texture food: Yes - can't tolerate tough and dry meat - but has not done, has only done ham   Any foods not tolerated details: no  If any food not tolerated: N/A  Portion size: 1/2-1 cup -   Take 20-30 minutes to consume each meal: Yes   Eat protein foods first: Yes  Fluids and meals separate by at least 30 minutes: Yes  Chew foods thoroughly: Yes  Tolerating diet: Yes  Drinking high protein supplements: Yes  Consuming snacks per day: 1 per day max due to low BG levels   Additional Information:  Pt states that he thinks that he is getting maybe 50g of protein per day would like to work on more. He states that he drinks at least 64 oz per day.        PHYSICAL ACTIVITY:  Type: walking   Frequency (days per week): 5  Duration (min): 20    DIAGNOSIS:  Previous " Nutrition Diagnosis: Altered gastrointestinal function related to alteration in gastrointestinal structure as evidenced by history of gastric bypass surgery.- no change    Previous goals:  Start soft diet at day 28 post op.- met  Continue MVI, calcium with vitamin D, vitamin B12, vitamin D- met  Aim for 60 to 90 grams protein- not met  Start 64 to 80 oz of fluid per day- met (64 oz)    Current Nutrition Diagnosis: Altered gastrointestinal function related to alteration in gastrointestinal structure as evidenced by history of gastric bypass surgery.    INTERVENTION:   Nutrition Prescription: Eat 3 meals a day at regular intervals. Consume 60-90 grams of protein daily. Follow post-surgical vitamin and mineral protocol.  Assessed learning needs and learning preferences.    GOALS:  Consume at least 60g of protein per day   Integrate  different type of exercises  Move towards more than one food group per meal      Follow-Up:   Implementation: Discussed progress toward previous goals; reinforced importance of following bariatric lifestyle changes.    NUTRITION MONITORING AND EVALUATION:  Anticipated compliance: good  Verbalized good understanding.    Follow up: Patient to follow up in 3 months.    TIME SPENT WITH PATIENT:  28 minutes    Stephania Maradiaga RD, LD

## 2019-05-03 ENCOUNTER — OFFICE VISIT (OUTPATIENT)
Dept: ENDOCRINOLOGY | Facility: CLINIC | Age: 34
End: 2019-05-03
Payer: COMMERCIAL

## 2019-05-03 VITALS
SYSTOLIC BLOOD PRESSURE: 111 MMHG | DIASTOLIC BLOOD PRESSURE: 77 MMHG | TEMPERATURE: 98.5 F | RESPIRATION RATE: 12 BRPM | WEIGHT: 273.5 LBS | BODY MASS INDEX: 36.08 KG/M2 | HEART RATE: 86 BPM

## 2019-05-03 DIAGNOSIS — E66.01 MORBID OBESITY (H): ICD-10-CM

## 2019-05-03 DIAGNOSIS — E11.65 TYPE 2 DIABETES MELLITUS WITH HYPERGLYCEMIA, WITH LONG-TERM CURRENT USE OF INSULIN (H): Primary | ICD-10-CM

## 2019-05-03 DIAGNOSIS — E11.69 DIABETES MELLITUS TYPE 2 IN OBESE: ICD-10-CM

## 2019-05-03 DIAGNOSIS — Z79.4 TYPE 2 DIABETES MELLITUS WITH HYPERGLYCEMIA, WITH LONG-TERM CURRENT USE OF INSULIN (H): Primary | ICD-10-CM

## 2019-05-03 DIAGNOSIS — E66.9 DIABETES MELLITUS TYPE 2 IN OBESE: ICD-10-CM

## 2019-05-03 LAB — HBA1C MFR BLD: 6.5 % (ref 0–5.6)

## 2019-05-03 PROCEDURE — 36415 COLL VENOUS BLD VENIPUNCTURE: CPT | Performed by: CLINICAL NURSE SPECIALIST

## 2019-05-03 PROCEDURE — 99214 OFFICE O/P EST MOD 30 MIN: CPT | Performed by: CLINICAL NURSE SPECIALIST

## 2019-05-03 PROCEDURE — 83036 HEMOGLOBIN GLYCOSYLATED A1C: CPT | Performed by: CLINICAL NURSE SPECIALIST

## 2019-05-03 NOTE — PROGRESS NOTES
ENDOCRINOLOGY CLINIC NOTE:  Name: Naman Jones  Seen for f/u of Diabetes (Last seen 2/1/2019).  HPI:  Naman Jones is a 33 year old male who presents for the evaluation/management of Diabetes.  10/9/2018: gastric bypass, [facility] surgical weight loss center  Has lost 369-->296-->273 lbs today.  Currently eating a normal diet    VITAMINS AND MINERALS:   2 Multivitamin with Minerals-   600 mg Calcium carbonate With Vitamin D TID- with meals  3-1000 International units Vitamin D  500 mcg Vitamin B-12 sublingual      1. Type 2 DM:  Orginally diagnosed at the age of: 30. On routine physical exam. Was losing wt at that time.  On insulin X 2016    Current Regimen: Tresiba 70 Units hs, Humalog - usually only taking as a correction if needed at  1:10 >150, Trulicity 1.5 mg once a week.  Metformin 500 mg bid.  Off Trulicity the past month - forgot, also cost.    Insulin use decreased since gastric bypass, Tresiba 150 unit(s) --> 100-->70 units/day.    Taking Humalog only as a correction factor.  No low blood sugars.  Unable to tolerate more than 500 mg Metformin bid.    BS checks: Janie personal CGM  Janie: average glucose 181, 48% above 180, 52% in target range , 0% below    Exercise: not much  Symptoms of hypoglycemia (low blood sugar):  Gets symptoms of hypoglycemia.  Episodes of hypoglycemia: no  Fixed meal pattern: yes  Patient counting carbs: yes     DM Complications:   Nephropathy: no  Retinopathy: no retinopathy - due for eye exam, referral provided.  Neuropathy: + numbness in toes - improving  Microalbuminuria: Yes- on losartan  CAD/PAD: no  Gastroparesis: no  Hypoglycemia unawareness: no  2. Hypertension:    On Losartan 100 mg and Maxide 75-50 mg daily.  3. Hyperlipidemia: +Atorvastatin 40 mg qd    PMH/PSH:  DM  HTN  Dyslipidemia    Family Hx:  Diabetes: Father and mgm    Social Hx:  Social History     Socioeconomic History     Marital status:      Spouse name: Not on file     Number of children:  Not on file     Years of education: Not on file     Highest education level: Not on file   Occupational History     Not on file   Social Needs     Financial resource strain: Not on file     Food insecurity:     Worry: Not on file     Inability: Not on file     Transportation needs:     Medical: Not on file     Non-medical: Not on file   Tobacco Use     Smoking status: Never Smoker     Smokeless tobacco: Never Used   Substance and Sexual Activity     Alcohol use: Yes     Drug use: No     Sexual activity: Yes   Lifestyle     Physical activity:     Days per week: Not on file     Minutes per session: Not on file     Stress: Not on file   Relationships     Social connections:     Talks on phone: Not on file     Gets together: Not on file     Attends Jainism service: Not on file     Active member of club or organization: Not on file     Attends meetings of clubs or organizations: Not on file     Relationship status: Not on file     Intimate partner violence:     Fear of current or ex partner: Not on file     Emotionally abused: Not on file     Physically abused: Not on file     Forced sexual activity: Not on file   Other Topics Concern     Parent/sibling w/ CABG, MI or angioplasty before 65F 55M? Not Asked   Social History Narrative     Not on file          MEDICATIONS:  has a current medication list which includes the following prescription(s): atorvastatin, calcium carb-cholecalciferol, vitamin d3, freestyle césar reader, freestyle césar 14 day sensor, b-12, diphenhydramine hcl, dulaglutide, insulin degludec, insulin lispro, insulin pen needle, lorazepam, losartan, metformin, childrens multivitamin w/iron, onetouch delica lancets 33g, ranitidine, sertraline, and triamterene-hctz.    ROS     ROS: 10 point ROS neg other than the symptoms noted above in the HPI.    Physical Exam   VS: /77 (BP Location: Right arm, Patient Position: Chair, Cuff Size: Adult Large)   Pulse 86   Temp 98.5  F (36.9  C) (Oral)   Resp  12   Wt 124.1 kg (273 lb 8 oz)   BMI 36.08 kg/m    GENERAL: AXOX3, NAD, well dressed, answering questions appropriately, appears stated age.  HEENT: No exopthalmous, no proptosis, no lig lag, no retraction  CV: RRR  LUNGS: CTAB  NEUROLOGY: CN grossly intact, no tremors  PSYCH: normal affect and mood    LABS:  A1c:   Component      Latest Ref Rng & Units 9/25/2018 2/1/2019 5/3/2019   Hemoglobin A1C      0 - 5.6 % 8.9 (H) 6.4 (H) 6.5 (H)     !COMPREHENSIVE Latest Ref Rng & Units 10/9/2018 10/10/2018   SODIUM 133 - 144 mmol/L  138   POTASSIUM 3.4 - 5.3 mmol/L 3.6 3.7   CHLORIDE 94 - 109 mmol/L  101   BUN 7 - 30 mg/dL     Creatinine 0.66 - 1.25 mg/dL 0.60 (L)    Glucose 70 - 99 mg/dL 160 (H)    ANION GAP 3 - 14 mmol/L  4     !LIPID/HEPATIC Latest Ref Rng & Units 7/26/2018   CHOLESTEROL <200 mg/dL 175   TRIGLYCERIDES <150 mg/dL 488 (H)   HDL CHOLESTEROL >39 mg/dL 34 (L)   LDL CHOLESTEROL DIRECT <100 mg/dL 108 (H)   LDL CHOLESTEROL, CALCULATED <100 mg/dL Cannot estimate LDL when triglyceride exceeds 400 mg/dL   NON HDL CHOLESTEROL <130 mg/dL 141 (H)   AST 0 - 45 U/L 37   ALT 0 - 70 U/L 58     !THYROID Latest Ref Rng & Units 7/26/2018   TSH 0.40 - 4.00 mU/L 1.50     Component      Latest Ref Rng & Units 7/26/2018   Creatinine Urine      mg/dL 63   Albumin Urine mg/L      mg/L 7   Albumin Urine mg/g Cr      0 - 17 mg/g Cr 10.85       Records from outside clinic previously reviewed.    All pertinent notes, labs, and images personally reviewed by me.     A/P  Mr.Anthony Jones is a 33 year old here for the evaluation/management of diabetes:    1. DM2 - Controlled  A1c 6.5%.  Diabetes complicated by neuropathy and microalbuminuria.  Body mass index is 36.08 kg/m .    No major episodes of hypoglycemia    -- Using less insulin as he continues to lose weight.  He's not having any low blood sugars.  -- Restart Trulicity.  -- Continue to gradually decrease Tresiba   -- Can continue to use Humalog prn as a correction  -- Continue  Metformin 500 mg bid.    -- He is allergic to sulfa medications so can not be on sulfonylureas    -- Ophthalmology referral provided.     2. Hypertension - Under Good control.  Continue Maxide, losartan 100 mg      3. Hyperlipidemia -Currently treated with atorvastatin 40 mg - conitnue.      Most Recent Immunizations   Administered Date(s) Administered     HepB 05/15/2017     Influenza (intradermal) 09/20/2016     Influenza Vaccine IM 3yrs+ 4 Valent IIV4 09/25/2018     Pneumo Conj 13-V (2010&after) 07/26/2018     TDAP Vaccine (Adacel) 10/04/2012     All questions were answered.  The patient indicates understanding of the above issues and agrees with the plan set forth.     More than 50% of face to face time spent with Mr. Jones on counseling / coordinating his care.  Total face to face time was 25 minutes.      Follow-up:  3 months with forrest Hobson NP  Endocrinology  [facility]  CC: Haase, Susan Rachele

## 2019-05-03 NOTE — PATIENT INSTRUCTIONS
Buy Skin Tac at [employer] - apply before putting the Janie on - it will make your skin stickier.      Component      Latest Ref Rng & Units 9/25/2018 2/1/2019 5/3/2019   Hemoglobin A1C      0 - 5.6 % 8.9 (H) 6.4 (H) 6.5 (H)       Follow up again in 3 months.    Annette Hobson NP  Endocrinology

## 2019-05-03 NOTE — LETTER
5/3/2019         RE: Naman Jones  [address] [facility] [place] [address]        Dear Colleague,    Thank you for referring your patient, Naman Jones, to the [facility]. Please see a copy of my visit note below.    ENDOCRINOLOGY CLINIC NOTE:  Name: Naman Jones  Seen for f/u of Diabetes (Last seen 2/1/2019).  HPI:  Naman Jones is a 33 year old male who presents for the evaluation/management of Diabetes.  10/9/2018: gastric bypass, [facility] weight loss center  Has lost 369-->296-->273 lbs today.  Currently eating a normal diet    VITAMINS AND MINERALS:   2 Multivitamin with Minerals-   600 mg Calcium carbonate With Vitamin D TID- with meals  3-1000 International units Vitamin D  500 mcg Vitamin B-12 sublingual      1. Type 2 DM:  Orginally diagnosed at the age of: 30. On routine physical exam. Was losing wt at that time.  On insulin X 2016    Current Regimen: Tresiba 70 Units hs, Humalog - usually only taking as a correction if needed at  1:10 >150, Trulicity 1.5 mg once a week.  Metformin 500 mg bid.  Off Trulicity the past month - forgot, also cost.    Insulin use decreased since gastric bypass, Tresiba 150 unit(s) --> 100-->70 units/day.    Taking Humalog only as a correction factor.  No low blood sugars.  Unable to tolerate more than 500 mg Metformin bid.    BS checks: Janie personal CGM  Janie: average glucose 181, 48% above 180, 52% in target range , 0% below    Exercise: not much  Symptoms of hypoglycemia (low blood sugar):  Gets symptoms of hypoglycemia.  Episodes of hypoglycemia: no  Fixed meal pattern: yes  Patient counting carbs: yes     DM Complications:   Nephropathy: no  Retinopathy: no retinopathy - due for eye exam, referral provided.  Neuropathy: + numbness in toes - improving  Microalbuminuria: Yes- on losartan  CAD/PAD: no  Gastroparesis: no  Hypoglycemia unawareness: no  2. Hypertension:    On Losartan 100 mg and Maxide 75-50 mg daily.  3.  Hyperlipidemia: +Atorvastatin 40 mg qd    PMH/PSH:  DM  HTN  Dyslipidemia    Family Hx:  Diabetes: Father and mgm    Social Hx:  Social History     Socioeconomic History     Marital status:      Spouse name: Not on file     Number of children: Not on file     Years of education: Not on file     Highest education level: Not on file   Occupational History     Not on file   Social Needs     Financial resource strain: Not on file     Food insecurity:     Worry: Not on file     Inability: Not on file     Transportation needs:     Medical: Not on file     Non-medical: Not on file   Tobacco Use     Smoking status: Never Smoker     Smokeless tobacco: Never Used   Substance and Sexual Activity     Alcohol use: Yes     Drug use: No     Sexual activity: Yes   Lifestyle     Physical activity:     Days per week: Not on file     Minutes per session: Not on file     Stress: Not on file   Relationships     Social connections:     Talks on phone: Not on file     Gets together: Not on file     Attends Church service: Not on file     Active member of club or organization: Not on file     Attends meetings of clubs or organizations: Not on file     Relationship status: Not on file     Intimate partner violence:     Fear of current or ex partner: Not on file     Emotionally abused: Not on file     Physically abused: Not on file     Forced sexual activity: Not on file   Other Topics Concern     Parent/sibling w/ CABG, MI or angioplasty before 65F 55M? Not Asked   Social History Narrative     Not on file          MEDICATIONS:  has a current medication list which includes the following prescription(s): atorvastatin, calcium carb-cholecalciferol, vitamin d3, freestyle césar reader, freestyle césar 14 day sensor, b-12, diphenhydramine hcl, dulaglutide, insulin degludec, insulin lispro, insulin pen needle, lorazepam, losartan, metformin, childrens multivitamin w/iron, onetouch delica lancets 33g, ranitidine, sertraline, and  triamterene-hctz.    ROS     ROS: 10 point ROS neg other than the symptoms noted above in the HPI.    Physical Exam   VS: /77 (BP Location: Right arm, Patient Position: Chair, Cuff Size: Adult Large)   Pulse 86   Temp 98.5  F (36.9  C) (Oral)   Resp 12   Wt 124.1 kg (273 lb 8 oz)   BMI 36.08 kg/m     GENERAL: AXOX3, NAD, well dressed, answering questions appropriately, appears stated age.  HEENT: No exopthalmous, no proptosis, no lig lag, no retraction  CV: RRR  LUNGS: CTAB  NEUROLOGY: CN grossly intact, no tremors  PSYCH: normal affect and mood    LABS:  A1c:   Component      Latest Ref Rng & Units 9/25/2018 2/1/2019 5/3/2019   Hemoglobin A1C      0 - 5.6 % 8.9 (H) 6.4 (H) 6.5 (H)     !COMPREHENSIVE Latest Ref Rng & Units 10/9/2018 10/10/2018   SODIUM 133 - 144 mmol/L  138   POTASSIUM 3.4 - 5.3 mmol/L 3.6 3.7   CHLORIDE 94 - 109 mmol/L  101   BUN 7 - 30 mg/dL     Creatinine 0.66 - 1.25 mg/dL 0.60 (L)    Glucose 70 - 99 mg/dL 160 (H)    ANION GAP 3 - 14 mmol/L  4     !LIPID/HEPATIC Latest Ref Rng & Units 7/26/2018   CHOLESTEROL <200 mg/dL 175   TRIGLYCERIDES <150 mg/dL 488 (H)   HDL CHOLESTEROL >39 mg/dL 34 (L)   LDL CHOLESTEROL DIRECT <100 mg/dL 108 (H)   LDL CHOLESTEROL, CALCULATED <100 mg/dL Cannot estimate LDL when triglyceride exceeds 400 mg/dL   NON HDL CHOLESTEROL <130 mg/dL 141 (H)   AST 0 - 45 U/L 37   ALT 0 - 70 U/L 58     !THYROID Latest Ref Rng & Units 7/26/2018   TSH 0.40 - 4.00 mU/L 1.50     Component      Latest Ref Rng & Units 7/26/2018   Creatinine Urine      mg/dL 63   Albumin Urine mg/L      mg/L 7   Albumin Urine mg/g Cr      0 - 17 mg/g Cr 10.85       Records from outside clinic previously reviewed.    All pertinent notes, labs, and images personally reviewed by me.     A/P  Mr.Anthony Jones is a 33 year old here for the evaluation/management of diabetes:    1. DM2 - Controlled  A1c 6.5%.  Diabetes complicated by neuropathy and microalbuminuria.  Body mass index is 36.08 kg/m .    No  major episodes of hypoglycemia    -- Using less insulin as he continues to lose weight.  He's not having any low blood sugars.  -- Restart Trulicity.  -- Continue to gradually decrease Tresiba   -- Can continue to use Humalog prn as a correction  -- Continue Metformin 500 mg bid.    -- He is allergic to sulfa medications so can not be on sulfonylureas    -- Ophthalmology referral provided.     2. Hypertension - Under Good control.  Continue Maxide, losartan 100 mg      3. Hyperlipidemia -Currently treated with atorvastatin 40 mg - conitnue.      Most Recent Immunizations   Administered Date(s) Administered     HepB 05/15/2017     Influenza (intradermal) 09/20/2016     Influenza Vaccine IM 3yrs+ 4 Valent IIV4 09/25/2018     Pneumo Conj 13-V (2010&after) 07/26/2018     TDAP Vaccine (Adacel) 10/04/2012     All questions were answered.  The patient indicates understanding of the above issues and agrees with the plan set forth.     More than 50% of face to face time spent with Mr. Jones on counseling / coordinating his care.  Total face to face time was 25 minutes.      Follow-up:  3 months with me    Annette Hobson NP  Endocrinology  [facility]  CC: Haase, Susan Rachele            Again, thank you for allowing me to participate in the care of your patient.        Sincerely,        ARDEN Henderson CNP

## 2019-05-06 NOTE — RESULT ENCOUNTER NOTE
Chirag,  Here's a copy of your recent A1c result for your records.  Annette Hobson NP  Endocrinology

## 2019-05-28 ENCOUNTER — OFFICE VISIT (OUTPATIENT)
Dept: OPTOMETRY | Facility: CLINIC | Age: 34
End: 2019-05-28
Payer: COMMERCIAL

## 2019-05-28 DIAGNOSIS — H01.00A BLEPHARITIS OF UPPER AND LOWER EYELIDS OF BOTH EYES, UNSPECIFIED TYPE: ICD-10-CM

## 2019-05-28 DIAGNOSIS — E11.9 TYPE 2 DIABETES MELLITUS WITHOUT RETINOPATHY (H): Primary | ICD-10-CM

## 2019-05-28 DIAGNOSIS — H52.13 MYOPIA OF BOTH EYES WITH ASTIGMATISM: ICD-10-CM

## 2019-05-28 DIAGNOSIS — H52.203 MYOPIA OF BOTH EYES WITH ASTIGMATISM: ICD-10-CM

## 2019-05-28 DIAGNOSIS — H01.00B BLEPHARITIS OF UPPER AND LOWER EYELIDS OF BOTH EYES, UNSPECIFIED TYPE: ICD-10-CM

## 2019-05-28 PROCEDURE — 92015 DETERMINE REFRACTIVE STATE: CPT | Performed by: OPTOMETRIST

## 2019-05-28 PROCEDURE — 92014 COMPRE OPH EXAM EST PT 1/>: CPT | Performed by: OPTOMETRIST

## 2019-05-28 ASSESSMENT — REFRACTION_MANIFEST
OD_AXIS: 046
OS_SPHERE: -4.25
OD_SPHERE: -2.25
METHOD_AUTOREFRACTION: 1
OS_SPHERE: -6.00
OD_CYLINDER: SPHERE
OS_CYLINDER: +0.50
OD_CYLINDER: +0.75
OS_AXIS: 117
OS_AXIS: 166
OD_SPHERE: -5.00
OS_CYLINDER: +0.50

## 2019-05-28 ASSESSMENT — CONF VISUAL FIELD
OD_NORMAL: 1
OS_NORMAL: 1
METHOD: COUNTING FINGERS

## 2019-05-28 ASSESSMENT — VISUAL ACUITY
OD_CC+: -2
OD_SC: 20/100
OS_CC: 20/20
OD_CC: 20/20
OS_SC: 20/125
METHOD: SNELLEN - LINEAR
OS_CC: 20/20
OS_CC+: -1
OD_CC: 20/20
OD_SC+: -1
CORRECTION_TYPE: GLASSES
OS_SC+: -1
METHOD_MR: PRA 1.25

## 2019-05-28 ASSESSMENT — CUP TO DISC RATIO
OS_RATIO: 0.4
OD_RATIO: 0.5

## 2019-05-28 ASSESSMENT — EXTERNAL EXAM - RIGHT EYE: OD_EXAM: ROSACEA

## 2019-05-28 ASSESSMENT — REFRACTION_WEARINGRX
OS_SPHERE: -3.75
OS_AXIS: 114
OS_CYLINDER: +0.50
OD_SPHERE: -2.25
SPECS_TYPE: SVL
OD_AXIS: 154
OD_CYLINDER: +0.25

## 2019-05-28 ASSESSMENT — TONOMETRY
OS_IOP_MMHG: 18
IOP_METHOD: APPLANATION
OD_IOP_MMHG: 18

## 2019-05-28 NOTE — PROGRESS NOTES
Chief Complaint   Patient presents with     Diabetic Eye Exam     Dad has glaucoma    Lab Results   Component Value Date    A1C 6.5 05/03/2019    A1C 6.4 02/01/2019    A1C 8.9 09/25/2018    A1C 7.7 07/26/2018    A1C 9.0 04/12/2018     X 3 y self checks in 120-130 range     Last Eye Exam: 2017 at [facility]  Dilated Previously: Yes    What are you currently using to see?  glasses    Distance Vision Acuity: Satisfied with vision    Near Vision Acuity: Satisfied with vision while reading  with glasses    Eye Comfort: good  Do you use eye drops? : No  Occupation or Hobbies: IT Security    Reena Grullon, Optometric Assistant     Medical, surgical and family histories reviewed and updated 5/28/2019.       OBJECTIVE: See Ophthalmology exam    ASSESSMENT:    ICD-10-CM    1. Type 2 diabetes mellitus without retinopathy (H) E11.9 EYE EXAM (SIMPLE-NONBILLABLE)     REFRACTION   2. Myopia of both eyes with astigmatism H52.13 EYE EXAM (SIMPLE-NONBILLABLE)    H52.203 REFRACTION   3. Blepharitis of upper and lower eyelids of both eyes, unspecified type H01.00A     H01.00B       PLAN:  Warm compresses   Lid hygiene  Glucose control  Naman Jones aware  eye exam results will be sent to Haase, Susan Rachele.    Goldie Georges OD

## 2019-05-28 NOTE — LETTER
5/28/2019         RE: Naman Jones  [address] [facility] [address]        Dear Colleague,    Thank you for referring your patient, Naman Jones, to the [facility]. Please see a copy of my visit note below.    Chief Complaint   Patient presents with     Diabetic Eye Exam     Dad has glaucoma    Lab Results   Component Value Date    A1C 6.5 05/03/2019    A1C 6.4 02/01/2019    A1C 8.9 09/25/2018    A1C 7.7 07/26/2018    A1C 9.0 04/12/2018     X 1 y self checks in 120-130 range     Last Eye Exam: 2017 at [facility]  Dilated Previously: Yes    What are you currently using to see?  glasses    Distance Vision Acuity: Satisfied with vision    Near Vision Acuity: Satisfied with vision while reading  with glasses    Eye Comfort: good  Do you use eye drops? : No  Occupation or Hobbies: IT Security    Reena Grullon, Optometric Assistant     Medical, surgical and family histories reviewed and updated 5/28/2019.       OBJECTIVE: See Ophthalmology exam    ASSESSMENT:    ICD-10-CM    1. Type 2 diabetes mellitus without retinopathy (H) E11.9 EYE EXAM (SIMPLE-NONBILLABLE)     REFRACTION   2. Myopia of both eyes with astigmatism H52.13 EYE EXAM (SIMPLE-NONBILLABLE)    H52.203 REFRACTION   3. Blepharitis of upper and lower eyelids of both eyes, unspecified type H01.00A     H01.00B       PLAN:  Warm compresses   Lid hygiene  Glucose control  Naman Jones aware  eye exam results will be sent to Haase, Susan Rachele.    Goldie Georges OD         Again, thank you for allowing me to participate in the care of your patient.        Sincerely,        Goldie Georges, OD

## 2019-05-28 NOTE — PATIENT INSTRUCTIONS
Patient Education   Diabetes weakens the blood vessels all over the body, including the eyes. Damage to the blood vessels in the eyes can cause swelling or bleeding into part of the eye (called the retina). This is called diabetic retinopathy (PATRICIA-tin-AH-puh-thee). If not treated, this disease can cause vision loss or blindness.   Symptoms may include blurred or distorted vision, but many people have no symptoms. It's important to see your eye doctor regularly to check for problems.   Early treatment and good control can help protect your vision. Here are the things you can do to help prevent vision loss:      1. Keep your blood sugar levels under tight control.      2. Bring high blood pressure under control.      3. No smoking.      4. Have yearly dilated eye exams.    Blepharitis is a chronic or long term inflammation of the eyelids and eyelashes. It affects all ages. Causes include poor eyelid hygiene, excess oil production, staph bacteria or an allergic reaction.    Blepharitis may appear as greasy flakes on the base of the eyelashes, crusting of eyelashes and mild redness of the eyelid margins.  Sometimes it may result in an acute infection of a gland in the eyelid called a stye and sometimes painless firm nodules can form in the eyelid.    Treatment includes warm compresses and lid hygiene with an antimicrobial lid scrub and sometimes a prescription ointment is needed.      Hot compresses/ warm soaks  Warm compresses are very beneficial to the normal functioning of the eye.   They help loosen up the eyelid debris that has collected on the eyelash follicles.  Overabundance of bacterial microorganisms along the eyelashes and lid margins induce stress on the tear film and promote inflammation.  Regular lid hygiene helps diminish the bacterial population to prevent inflammation and infection.  Cleanse lids once daily with a lid cleansing product as directed such as Ocusoft or Sterilid which can be purchased at  most pharmacies. Eyelid cleansers maintain clean and healthy eyelid margins. Ocusoft or sterilid are commercial products that are available as individual wrapped cleansing pads.  Diluted baby shampoo will work, but not as well and is a cheaper alternative.    Directions for warm soaks  There are few methods for hot compresses. Moisten a washcloth with hot water, or microwave for 10 seconds, being careful to not get the cloth too hot.   Then put the washcloth onto your eyelids for 5 minutes. It will cool quickly so a rice pack or eyemask that can be heated and laid on top of the washcloth will help retain the heat.

## 2019-08-30 ENCOUNTER — MYC MEDICAL ADVICE (OUTPATIENT)
Dept: FAMILY MEDICINE | Facility: CLINIC | Age: 34
End: 2019-08-30

## 2019-09-06 ENCOUNTER — OFFICE VISIT (OUTPATIENT)
Dept: ENDOCRINOLOGY | Facility: CLINIC | Age: 34
End: 2019-09-06
Payer: COMMERCIAL

## 2019-09-06 VITALS
RESPIRATION RATE: 12 BRPM | WEIGHT: 255 LBS | HEART RATE: 105 BPM | DIASTOLIC BLOOD PRESSURE: 65 MMHG | TEMPERATURE: 99.2 F | BODY MASS INDEX: 33.64 KG/M2 | SYSTOLIC BLOOD PRESSURE: 130 MMHG

## 2019-09-06 DIAGNOSIS — Z79.4 TYPE 2 DIABETES MELLITUS WITH HYPERGLYCEMIA, WITH LONG-TERM CURRENT USE OF INSULIN (H): Primary | ICD-10-CM

## 2019-09-06 DIAGNOSIS — E11.65 TYPE 2 DIABETES MELLITUS WITH HYPERGLYCEMIA, WITH LONG-TERM CURRENT USE OF INSULIN (H): Primary | ICD-10-CM

## 2019-09-06 LAB — HBA1C MFR BLD: 6.1 % (ref 0–5.6)

## 2019-09-06 PROCEDURE — 36415 COLL VENOUS BLD VENIPUNCTURE: CPT | Performed by: CLINICAL NURSE SPECIALIST

## 2019-09-06 PROCEDURE — 82043 UR ALBUMIN QUANTITATIVE: CPT | Performed by: CLINICAL NURSE SPECIALIST

## 2019-09-06 PROCEDURE — 99214 OFFICE O/P EST MOD 30 MIN: CPT | Performed by: CLINICAL NURSE SPECIALIST

## 2019-09-06 PROCEDURE — 83036 HEMOGLOBIN GLYCOSYLATED A1C: CPT | Performed by: CLINICAL NURSE SPECIALIST

## 2019-09-06 NOTE — LETTER
"    9/6/2019         RE: Naman Jones  [address] [facility][note 3] [id number]        Dear Colleague,    Thank you for referring your patient, Naman Jones, to the [facility]. Please see a copy of my visit note below.    ENDOCRINOLOGY CLINIC NOTE:  Name: Naman \"Chirag\" Robert  Seen for f/u of Diabetes (Last seen 5/3/2019).  HPI:  Naman Jones is a 34 year old male who presents for the evaluation/management of Diabetes.  10/9/2018: gastric bypass, [facility] weight loss center  Has lost 114 lbs, 369-->296-->255 lbs today.  Currently eating a normal diet    VITAMINS AND MINERALS:   2 Multivitamin with Minerals-   600 mg Calcium carbonate With Vitamin D TID- with meals  3-1000 International units Vitamin D  500 mcg Vitamin B-12 sublingual      1. Type 2 DM:  Orginally diagnosed at the age of: 30. On routine physical exam. Was losing wt at that time.    Current Regimen: Tresiba 60 Units hs  Rarely takes humalog at  1:10 >150, Trulicity 1.5 mg once a week.    Metformin 500 mg bid. Unable to tolerate higher doses due to GI side effects  Off Trulicity - DUE TO COST    Insulin use decreased since gastric bypass, Tresiba 150 unit(s) --> 100-->60 units/day.    Ran out of Tresiba - hasn't taken any for the past 3-4 days.  Fasting glucose the past 3 days without Tresiba 112, 131, 129, however postprandial glucose >180 as high as 300.  No low blood sugars..    BS checks: Janie personal CGM  Janie: average glucose 138, 10% above 180, 90% in target range , 0% below    Exercise: not much  Symptoms of hypoglycemia (low blood sugar):  Gets symptoms of hypoglycemia.  Episodes of hypoglycemia: no  Fixed meal pattern: yes  Patient counting carbs: yes     DM Complications:   Nephropathy: no  Retinopathy: no retinopathy   Neuropathy: + numbness in toes - stable  Microalbuminuria: Yes- on losartan  CAD/PAD: no  Gastroparesis: no  Hypoglycemia unawareness: no  2. Hypertension:    On Losartan 100 " mg and Maxide 75-50 mg daily.  3. Hyperlipidemia: +Atorvastatin 40 mg qd    PMH/PSH:  DM  HTN  Dyslipidemia    Family Hx:  Diabetes: Father and mgm    Social Hx:  Social History     Socioeconomic History     Marital status:      Spouse name: Not on file     Number of children: Not on file     Years of education: Not on file     Highest education level: Not on file   Occupational History     Not on file   Social Needs     Financial resource strain: Not on file     Food insecurity:     Worry: Not on file     Inability: Not on file     Transportation needs:     Medical: Not on file     Non-medical: Not on file   Tobacco Use     Smoking status: Never Smoker     Smokeless tobacco: Never Used   Substance and Sexual Activity     Alcohol use: Yes     Drug use: No     Sexual activity: Yes   Lifestyle     Physical activity:     Days per week: Not on file     Minutes per session: Not on file     Stress: Not on file   Relationships     Social connections:     Talks on phone: Not on file     Gets together: Not on file     Attends Islam service: Not on file     Active member of club or organization: Not on file     Attends meetings of clubs or organizations: Not on file     Relationship status: Not on file     Intimate partner violence:     Fear of current or ex partner: Not on file     Emotionally abused: Not on file     Physically abused: Not on file     Forced sexual activity: Not on file   Other Topics Concern     Parent/sibling w/ CABG, MI or angioplasty before 65F 55M? Not Asked   Social History Narrative     Not on file          MEDICATIONS:  has a current medication list which includes the following prescription(s): atorvastatin, calcium carb-cholecalciferol, vitamin d3, freestyle césar reader, freestyle césar 14 day sensor, b-12, diphenhydramine hcl, dulaglutide, insulin degludec, insulin lispro, insulin pen needle, lorazepam, losartan, metformin, childrens multivitamin w/iron, onetouch delica lancets 33g,  ranitidine, sertraline, and triamterene-hctz.    ROS     ROS: 10 point ROS neg other than the symptoms noted above in the HPI.    Physical Exam   VS: /65 (BP Location: Right arm, Patient Position: Chair, Cuff Size: Adult Large)   Pulse 105   Temp 99.2  F (37.3  C) (Oral)   Resp 12   Wt 115.7 kg (255 lb)   BMI 33.64 kg/m     GENERAL: AXOX3, NAD, well dressed, answering questions appropriately, appears stated age.  HEENT: No exopthalmous, no proptosis, no lig lag, no retraction  CV: RRR  LUNGS: CTAB  NEUROLOGY: CN grossly intact, no tremors  PSYCH: normal affect and mood    LABS:  A1c:   Component      Latest Ref Rng & Units 2/1/2019 5/3/2019 9/6/2019   Hemoglobin A1C      0 - 5.6 % 6.4 (H) 6.5 (H) 6.1 (H)     !COMPREHENSIVE Latest Ref Rng & Units 10/9/2018 10/10/2018   SODIUM 133 - 144 mmol/L  138   POTASSIUM 3.4 - 5.3 mmol/L 3.6 3.7   CHLORIDE 94 - 109 mmol/L  101   BUN 7 - 30 mg/dL     Creatinine 0.66 - 1.25 mg/dL 0.60 (L)    Glucose 70 - 99 mg/dL 160 (H)    ANION GAP 3 - 14 mmol/L  4     !LIPID/HEPATIC Latest Ref Rng & Units 7/26/2018   CHOLESTEROL <200 mg/dL 175   TRIGLYCERIDES <150 mg/dL 488 (H)   HDL CHOLESTEROL >39 mg/dL 34 (L)   LDL CHOLESTEROL DIRECT <100 mg/dL 108 (H)   LDL CHOLESTEROL, CALCULATED <100 mg/dL Cannot estimate LDL when triglyceride exceeds 400 mg/dL   NON HDL CHOLESTEROL <130 mg/dL 141 (H)   AST 0 - 45 U/L 37   ALT 0 - 70 U/L 58     !THYROID Latest Ref Rng & Units 7/26/2018   TSH 0.40 - 4.00 mU/L 1.50     Component      Latest Ref Rng & Units 7/26/2018   Creatinine Urine      mg/dL 63   Albumin Urine mg/L      mg/L 7   Albumin Urine mg/g Cr      0 - 17 mg/g Cr 10.85       All pertinent notes, labs, and images personally reviewed by me.     A/P  Mr.Anthony Jones is a 34 year old here for the evaluation/management of diabetes:    1. DM2 - Controlled  A1c 6.1%.  Diabetes complicated by neuropathy and microalbuminuria.  Body mass index is 33.64 kg/m .    -- [place] off Tresiba  -- Start  Januvia 100 mg daily.  -- Continue Metformin 500 mg bid  -- He can resume Tresiba if BG inadequately controlled with Metformin and Januvia aljose ge    -- He is allergic to sulfa medications so can not be on sulfonylureas     2. Hypertension - Under Good control.  Continue Maxide, losartan 100 mg      3. Hyperlipidemia -Currently treated with atorvastatin 40 mg - continue.      Most Recent Immunizations   Administered Date(s) Administered     HepB 05/15/2017     Influenza (intradermal) 09/20/2016     Influenza Vaccine IM > 6 months Valent IIV4 09/25/2018     Pneumo Conj 13-V (2010&after) 07/26/2018     TDAP Vaccine (Adacel) 10/04/2012     All questions were answered.  The patient indicates understanding of the above issues and agrees with the plan set forth.     More than 50% of face to face time spent with Mr. Jones on counseling / coordinating his care.  Total face to face time was 25 minutes.      Follow-up:  3 months with me    Annette Hobson NP  Endocrinology  [facility]  CC: Haase, Susan Rachele            Again, thank you for allowing me to participate in the care of your patient.        Sincerely,        ARDEN Henderson CNP

## 2019-09-06 NOTE — PROGRESS NOTES
"ENDOCRINOLOGY CLINIC NOTE:  Name: Naman \"Chirag\" Robert  Seen for f/u of Diabetes (Last seen 5/3/2019).  HPI:  Naman Jones is a 34 year old male who presents for the evaluation/management of Diabetes.  10/9/2018: gastric bypass, [facility] surgical weight loss center  Has lost 114 lbs, 369-->296-->255 lbs today.  Currently eating a normal diet    VITAMINS AND MINERALS:   2 Multivitamin with Minerals-   600 mg Calcium carbonate With Vitamin D TID- with meals  3-1000 International units Vitamin D  500 mcg Vitamin B-12 sublingual      1. Type 2 DM:  Orginally diagnosed at the age of: 30. On routine physical exam. Was losing wt at that time.    Current Regimen: Tresiba 60 Units hs  Rarely takes humalog at  1:10 >150, Trulicity 1.5 mg once a week.    Metformin 500 mg bid. Unable to tolerate higher doses due to GI side effects  Off Trulicity - DUE TO COST    Insulin use decreased since gastric bypass, Tresiba 150 unit(s) --> 100-->60 units/day.    Ran out of Tresiba - hasn't taken any for the past 3-4 days.  Fasting glucose the past 3 days without Tresiba 112, 131, 129, however postprandial glucose >180 as high as 300.  No low blood sugars..    BS checks: Janie personal CGM  Janie: average glucose 138, 10% above 180, 90% in target range , 0% below    Exercise: not much  Symptoms of hypoglycemia (low blood sugar):  Gets symptoms of hypoglycemia.  Episodes of hypoglycemia: no  Fixed meal pattern: yes  Patient counting carbs: yes     DM Complications:   Nephropathy: no  Retinopathy: no retinopathy   Neuropathy: + numbness in toes - stable  Microalbuminuria: Yes- on losartan  CAD/PAD: no  Gastroparesis: no  Hypoglycemia unawareness: no  2. Hypertension:    On Losartan 100 mg and Maxide 75-50 mg daily.  3. Hyperlipidemia: +Atorvastatin 40 mg qd    PMH/PSH:  DM  HTN  Dyslipidemia    Family Hx:  Diabetes: Father and mgm    Social Hx:  Social History     Socioeconomic History     Marital status:      Spouse name: " Not on file     Number of children: Not on file     Years of education: Not on file     Highest education level: Not on file   Occupational History     Not on file   Social Needs     Financial resource strain: Not on file     Food insecurity:     Worry: Not on file     Inability: Not on file     Transportation needs:     Medical: Not on file     Non-medical: Not on file   Tobacco Use     Smoking status: Never Smoker     Smokeless tobacco: Never Used   Substance and Sexual Activity     Alcohol use: Yes     Drug use: No     Sexual activity: Yes   Lifestyle     Physical activity:     Days per week: Not on file     Minutes per session: Not on file     Stress: Not on file   Relationships     Social connections:     Talks on phone: Not on file     Gets together: Not on file     Attends Buddhist service: Not on file     Active member of club or organization: Not on file     Attends meetings of clubs or organizations: Not on file     Relationship status: Not on file     Intimate partner violence:     Fear of current or ex partner: Not on file     Emotionally abused: Not on file     Physically abused: Not on file     Forced sexual activity: Not on file   Other Topics Concern     Parent/sibling w/ CABG, MI or angioplasty before 65F 55M? Not Asked   Social History Narrative     Not on file          MEDICATIONS:  has a current medication list which includes the following prescription(s): atorvastatin, calcium carb-cholecalciferol, vitamin d3, freestyle césar reader, freestyle césar 14 day sensor, b-12, diphenhydramine hcl, dulaglutide, insulin degludec, insulin lispro, insulin pen needle, lorazepam, losartan, metformin, childrens multivitamin w/iron, onetouch delica lancets 33g, ranitidine, sertraline, and triamterene-hctz.    ROS     ROS: 10 point ROS neg other than the symptoms noted above in the HPI.    Physical Exam   VS: /65 (BP Location: Right arm, Patient Position: Chair, Cuff Size: Adult Large)   Pulse 105    Temp 99.2  F (37.3  C) (Oral)   Resp 12   Wt 115.7 kg (255 lb)   BMI 33.64 kg/m    GENERAL: AXOX3, NAD, well dressed, answering questions appropriately, appears stated age.  HEENT: No exopthalmous, no proptosis, no lig lag, no retraction  CV: RRR  LUNGS: CTAB  NEUROLOGY: CN grossly intact, no tremors  PSYCH: normal affect and mood    LABS:  A1c:   Component      Latest Ref Rng & Units 2/1/2019 5/3/2019 9/6/2019   Hemoglobin A1C      0 - 5.6 % 6.4 (H) 6.5 (H) 6.1 (H)     !COMPREHENSIVE Latest Ref Rng & Units 10/9/2018 10/10/2018   SODIUM 133 - 144 mmol/L  138   POTASSIUM 3.4 - 5.3 mmol/L 3.6 3.7   CHLORIDE 94 - 109 mmol/L  101   BUN 7 - 30 mg/dL     Creatinine 0.66 - 1.25 mg/dL 0.60 (L)    Glucose 70 - 99 mg/dL 160 (H)    ANION GAP 3 - 14 mmol/L  4     !LIPID/HEPATIC Latest Ref Rng & Units 7/26/2018   CHOLESTEROL <200 mg/dL 175   TRIGLYCERIDES <150 mg/dL 488 (H)   HDL CHOLESTEROL >39 mg/dL 34 (L)   LDL CHOLESTEROL DIRECT <100 mg/dL 108 (H)   LDL CHOLESTEROL, CALCULATED <100 mg/dL Cannot estimate LDL when triglyceride exceeds 400 mg/dL   NON HDL CHOLESTEROL <130 mg/dL 141 (H)   AST 0 - 45 U/L 37   ALT 0 - 70 U/L 58     !THYROID Latest Ref Rng & Units 7/26/2018   TSH 0.40 - 4.00 mU/L 1.50     Component      Latest Ref Rng & Units 7/26/2018   Creatinine Urine      mg/dL 63   Albumin Urine mg/L      mg/L 7   Albumin Urine mg/g Cr      0 - 17 mg/g Cr 10.85       All pertinent notes, labs, and images personally reviewed by me.     A/P  Mr.Anthony Jones is a 34 year old here for the evaluation/management of diabetes:    1. DM2 - Controlled  A1c 6.1%.  Diabetes complicated by neuropathy and microalbuminuria.  Body mass index is 33.64 kg/m .    -- [place] off Tresiba  -- Start Januvia 100 mg daily.  -- Continue Metformin 500 mg bid  -- He can resume Tresiba if BG inadequately controlled with Metformin and Januvia alonne    -- He is allergic to sulfa medications so can not be on sulfonylureas     2. Hypertension - Under Good  control.  Continue Maxide, losartan 100 mg      3. Hyperlipidemia -Currently treated with atorvastatin 40 mg - continue.      Most Recent Immunizations   Administered Date(s) Administered     HepB 05/15/2017     Influenza (intradermal) 09/20/2016     Influenza Vaccine IM > 6 months Valent IIV4 09/25/2018     Pneumo Conj 13-V (2010&after) 07/26/2018     TDAP Vaccine (Adacel) 10/04/2012     All questions were answered.  The patient indicates understanding of the above issues and agrees with the plan set forth.     More than 50% of face to face time spent with Mr. Jones on counseling / coordinating his care.  Total face to face time was 25 minutes.      Follow-up:  3 months with forrest Hobson NP  Endocrinology  [facility]  CC: Haase, Susan Rachele

## 2019-09-07 LAB
CREAT UR-MCNC: 127 MG/DL
MICROALBUMIN UR-MCNC: 60 MG/L
MICROALBUMIN/CREAT UR: 47.56 MG/G CR (ref 0–17)

## 2019-09-09 NOTE — RESULT ENCOUNTER NOTE
Chirag,  Your urine protein was slightly elevated.  This is not new - it was slightly elevated in 2017, then normal in 2018.  The treatment is good blood sugar and blood pressure control as both of these are stressful for your kidneys.  Also one of the medications you take, Losartan, protects your kidneys.  Your diabetes is in excellent control so keep up the good work!  Annette Hobson NP  Endocrinology

## 2019-09-25 ENCOUNTER — E-VISIT (OUTPATIENT)
Dept: FAMILY MEDICINE | Facility: CLINIC | Age: 34
End: 2019-09-25
Payer: COMMERCIAL

## 2019-09-25 ENCOUNTER — MYC MEDICAL ADVICE (OUTPATIENT)
Dept: FAMILY MEDICINE | Facility: CLINIC | Age: 34
End: 2019-09-25

## 2019-09-25 DIAGNOSIS — F41.1 GAD (GENERALIZED ANXIETY DISORDER): Primary | ICD-10-CM

## 2019-09-25 PROCEDURE — 99444 ZZC PHYSICIAN ONLINE EVALUATION & MANAGEMENT SERVICE: CPT | Performed by: NURSE PRACTITIONER

## 2019-09-25 ASSESSMENT — ANXIETY QUESTIONNAIRES
5. BEING SO RESTLESS THAT IT IS HARD TO SIT STILL: MORE THAN HALF THE DAYS
6. BECOMING EASILY ANNOYED OR IRRITABLE: SEVERAL DAYS
3. WORRYING TOO MUCH ABOUT DIFFERENT THINGS: SEVERAL DAYS
4. TROUBLE RELAXING: MORE THAN HALF THE DAYS
2. NOT BEING ABLE TO STOP OR CONTROL WORRYING: SEVERAL DAYS
GAD7 TOTAL SCORE: 9
7. FEELING AFRAID AS IF SOMETHING AWFUL MIGHT HAPPEN: SEVERAL DAYS
7. FEELING AFRAID AS IF SOMETHING AWFUL MIGHT HAPPEN: SEVERAL DAYS
GAD7 TOTAL SCORE: 9
1. FEELING NERVOUS, ANXIOUS, OR ON EDGE: SEVERAL DAYS

## 2019-09-26 RX ORDER — HYDROXYZINE HYDROCHLORIDE 25 MG/1
25-50 TABLET, FILM COATED ORAL 3 TIMES DAILY PRN
Qty: 60 TABLET | Refills: 1 | Status: SHIPPED | OUTPATIENT
Start: 2019-09-26 | End: 2019-10-18

## 2019-09-26 ASSESSMENT — ANXIETY QUESTIONNAIRES: GAD7 TOTAL SCORE: 9

## 2019-09-29 NOTE — TELEPHONE ENCOUNTER
HUMALOG KWIKPEN 100 UNIT/ML soln    Last Written Prescription Date: 08/10/2017  Last Fill Quantity: 30ml 08/10/2017, # refills: 0  Last Office Visit with [facility], [facility] or [facility] prescribing provider:  07/10/2017-Dr Crook-AZIZA Flynn Provider        BP Readings from Last 3 Encounters:   07/10/17 118/84   06/13/17 144/88     Lab Results   Component Value Date    MICROL 18 07/10/2017     Lab Results   Component Value Date    UMALCR 32.08 07/10/2017     Creatinine   Date Value Ref Range Status   07/26/2017 0.55 (L) 0.66 - 1.25 mg/dL Final   ]  GFR Estimate   Date Value Ref Range Status   07/26/2017 >90  Non  GFR Calc   >60 mL/min/1.7m2 Final   07/10/2017 >90  Non  GFR Calc   >60 mL/min/1.7m2 Final   03/13/2017 >60 >60 ml/min/1.73m2 Final     GFR Estimate If Black   Date Value Ref Range Status   07/26/2017 >90   GFR Calc   >60 mL/min/1.7m2 Final   07/10/2017 >90   GFR Calc   >60 mL/min/1.7m2 Final   03/13/2017 >60 >60 ml/min/1.73m2 Final     Lab Results   Component Value Date    CHOL 171 03/13/2017     Lab Results   Component Value Date    HDL 37 03/13/2017     Lab Results   Component Value Date     07/10/2017    LDL 97 03/13/2017     Lab Results   Component Value Date    TRIG 183 03/13/2017     No results found for: CHOLHDLRATIO  Lab Results   Component Value Date    AST 25 07/26/2017     Lab Results   Component Value Date    ALT 52 07/26/2017     Lab Results   Component Value Date    A1C 8.5 07/26/2017    A1C 8.4 07/10/2017    A1C 10.4 05/15/2017    A1C 9.4 03/13/2017     Potassium   Date Value Ref Range Status   07/26/2017 3.9 3.4 - 5.3 mmol/L Final        Per CRISTINA Harry give another 0.2 mg Dilaudid.

## 2019-10-17 NOTE — PROGRESS NOTES
"Pre-Visit Planning     Future Appointments   Date Time Provider Department Center   10/18/2019  1:30 PM Haase, Susan Rachele, [place] CNP [facility]   12/13/2019 12:30 PM Annette Hobson APRN CNP [facility] CR     Appointment Notes for this encounter:   Knee injury left in May and med review    Questionnaires Reviewed/Assigned  Additional questionnaires assigned        Patient preferred phone number: [phone]    Spoke to patient via phone. Patient does not have additional questions or concerns.        Visit is not preventive.    Health Maintenance Due   Topic Date Due     URINE DRUG SCREEN  1983     ANNUAL REVIEW OF HM ORDERS  1983     HIV SCREENING  05/15/1998     PNEUMOCOCCAL IMMUNIZATION 19-64 MEDIUM RISK (1 of 1 - PPSV23) 09/20/2018     DIABETIC FOOT EXAM  02/02/2019     PREVENTIVE CARE VISIT  04/18/2019     PHQ-9  07/20/2019     LIPID  07/26/2019     INFLUENZA VACCINE (1) 09/01/2019     BMP  09/25/2019     Patient is due for:  NA  No appointment needed.    [employer]  Patient is active on [employer].    Questionnaire Review   Advised patient to arrive early in order to complete questionnaires.    Call Summary  \"Thank you for your time today.  If anything comes up before your appointment, please feel free to contact us at [phone].\"    Daria Obregon, Christy Nurse   [facility]       "

## 2019-10-18 ENCOUNTER — OFFICE VISIT (OUTPATIENT)
Dept: FAMILY MEDICINE | Facility: CLINIC | Age: 34
End: 2019-10-18
Payer: COMMERCIAL

## 2019-10-18 VITALS
HEART RATE: 102 BPM | RESPIRATION RATE: 20 BRPM | HEIGHT: 73 IN | BODY MASS INDEX: 31.81 KG/M2 | DIASTOLIC BLOOD PRESSURE: 86 MMHG | SYSTOLIC BLOOD PRESSURE: 124 MMHG | OXYGEN SATURATION: 97 % | WEIGHT: 240 LBS | TEMPERATURE: 99.1 F

## 2019-10-18 DIAGNOSIS — M25.562 LEFT KNEE PAIN, UNSPECIFIED CHRONICITY: Primary | ICD-10-CM

## 2019-10-18 DIAGNOSIS — E78.2 MIXED HYPERLIPIDEMIA: ICD-10-CM

## 2019-10-18 DIAGNOSIS — E11.65 TYPE 2 DIABETES MELLITUS WITH HYPERGLYCEMIA, WITH LONG-TERM CURRENT USE OF INSULIN (H): ICD-10-CM

## 2019-10-18 DIAGNOSIS — Z79.4 TYPE 2 DIABETES MELLITUS WITH HYPERGLYCEMIA, WITH LONG-TERM CURRENT USE OF INSULIN (H): ICD-10-CM

## 2019-10-18 DIAGNOSIS — Z23 NEED FOR PROPHYLACTIC VACCINATION AND INOCULATION AGAINST INFLUENZA: ICD-10-CM

## 2019-10-18 DIAGNOSIS — F41.1 GAD (GENERALIZED ANXIETY DISORDER): ICD-10-CM

## 2019-10-18 DIAGNOSIS — I10 ESSENTIAL HYPERTENSION: ICD-10-CM

## 2019-10-18 PROCEDURE — 99214 OFFICE O/P EST MOD 30 MIN: CPT | Mod: 25 | Performed by: NURSE PRACTITIONER

## 2019-10-18 PROCEDURE — 99207 C FOOT EXAM  NO CHARGE: CPT | Mod: 25 | Performed by: NURSE PRACTITIONER

## 2019-10-18 PROCEDURE — 90686 IIV4 VACC NO PRSV 0.5 ML IM: CPT | Performed by: NURSE PRACTITIONER

## 2019-10-18 PROCEDURE — 90471 IMMUNIZATION ADMIN: CPT | Performed by: NURSE PRACTITIONER

## 2019-10-18 RX ORDER — BUSPIRONE HYDROCHLORIDE 5 MG/1
5 TABLET ORAL 2 TIMES DAILY
Qty: 60 TABLET | Refills: 1 | Status: SHIPPED | OUTPATIENT
Start: 2019-10-18 | End: 2019-12-27

## 2019-10-18 RX ORDER — FLASH GLUCOSE SENSOR
1 KIT MISCELLANEOUS
Qty: 2 EACH | Refills: 11 | Status: SHIPPED | OUTPATIENT
Start: 2019-10-18 | End: 2020-02-14

## 2019-10-18 RX ORDER — LOSARTAN POTASSIUM 100 MG/1
100 TABLET ORAL DAILY
Qty: 90 TABLET | Refills: 1 | Status: SHIPPED | OUTPATIENT
Start: 2019-10-18 | End: 2020-01-27

## 2019-10-18 RX ORDER — LORAZEPAM 1 MG/1
1 TABLET ORAL DAILY PRN
Qty: 30 TABLET | Refills: 2 | Status: CANCELLED | OUTPATIENT
Start: 2019-10-18

## 2019-10-18 RX ORDER — ATORVASTATIN CALCIUM 40 MG/1
40 TABLET, FILM COATED ORAL DAILY
Qty: 90 TABLET | Refills: 3 | Status: SHIPPED | OUTPATIENT
Start: 2019-10-18 | End: 2020-09-23

## 2019-10-18 RX ORDER — HYDROXYZINE HYDROCHLORIDE 25 MG/1
25-50 TABLET, FILM COATED ORAL 3 TIMES DAILY PRN
Qty: 60 TABLET | Refills: 1 | Status: SHIPPED | OUTPATIENT
Start: 2019-10-18 | End: 2020-01-16

## 2019-10-18 RX ORDER — TRIAMTERENE AND HYDROCHLOROTHIAZIDE 75; 50 MG/1; MG/1
1 TABLET ORAL DAILY
Qty: 90 TABLET | Refills: 1 | Status: SHIPPED | OUTPATIENT
Start: 2019-10-18 | End: 2020-01-27

## 2019-10-18 ASSESSMENT — PATIENT HEALTH QUESTIONNAIRE - PHQ9
10. IF YOU CHECKED OFF ANY PROBLEMS, HOW DIFFICULT HAVE THESE PROBLEMS MADE IT FOR YOU TO DO YOUR WORK, TAKE CARE OF THINGS AT HOME, OR GET ALONG WITH OTHER PEOPLE: SOMEWHAT DIFFICULT
SUM OF ALL RESPONSES TO PHQ QUESTIONS 1-9: 3
SUM OF ALL RESPONSES TO PHQ QUESTIONS 1-9: 3

## 2019-10-18 ASSESSMENT — MIFFLIN-ST. JEOR: SCORE: 2072.51

## 2019-10-18 NOTE — PROGRESS NOTES
Subjective     Naman Jones is a 34 year old male who presents to clinic today for the following health issues:    HPI   Joint Pain    Onset: 4 months ago    Description:   Location: left knee  Character: Sharp    Intensity: moderate, severe    Progression of Symptoms: same    Accompanying Signs & Symptoms:  Other symptoms: swelling    History:   Previous similar pain: YES      Precipitating factors:   Trauma or overuse: YES    Alleviating factors:  Improved by: nothing    Therapies Tried and outcome: has had left knee pain since May after slipping and falling.  Was seen at [facility] at that time and had xray obtained..  Since that time has continued to have pain and feels that the knee dislocates.  Has been taking tylenol with some relief.        Anxiety:  At lasts visit stopped Zoloft due to side effects of sexual dysfunction. Since that time has been taking atarax several times per day for anxiety with some relief.  Is not interested in starting back on an SNRI or an serotonin specific reuptake inhibitor.     Diabetes Follow-up      How often are you checking your blood sugar? Two times daily    What time of day are you checking your blood sugars (select all that apply)?  After meals    Have you had any blood sugars above 200?  No    Have you had any blood sugars below 70?  No    What symptoms do you notice when your blood sugar is low?  None    What concerns do you have today about your diabetes? None     Do you have any of these symptoms? (Select all that apply)  Numbness in feet     Have you had a diabetic eye exam in the last 12 months? Yes- Date of last eye exam: 5/28/19    BP Readings from Last 2 Encounters:   10/18/19 124/86   09/06/19 130/65     Hemoglobin A1C (%)   Date Value   09/06/2019 6.1 (H)   05/03/2019 6.5 (H)     LDL Cholesterol Calculated (mg/dL)   Date Value   07/26/2018     Cannot estimate LDL when triglyceride exceeds 400 mg/dL   03/13/2017 97     LDL Cholesterol Direct (mg/dL)   Date Value    07/26/2018 108 (H)   07/10/2017 127 (H)     Diabetes Management Resources  Since bariatric surgery has had a weight loss of 100 lbs.    Has been followed by endocrinology for insulin adjustments.     Patient Active Problem List   Diagnosis     Essential hypertension     Mixed hyperlipidemia     Diabetes mellitus type 2 in obese (H)     Plantar fasciitis     Chronic pain of right knee     JOEL (generalized anxiety disorder)     Type 2 diabetes mellitus with hyperglycemia, with long-term current use of insulin (H)     Insomnia, unspecified type     Class 2 drug-induced obesity with serious comorbidity and body mass index (BMI) of 38.0 to 38.9 in adult     Obesity (BMI 35.0-39.9) with comorbidity (H)     Blepharitis of upper and lower eyelids of both eyes, unspecified type     Past Surgical History:   Procedure Laterality Date     LAPAROSCOPIC BYPASS GASTRIC N/A 10/9/2018    Procedure: LAPAROSCOPIC BYPASS GASTRIC;  LAPAROSCOPIC WELLINGTON-EN Y GASTRIC BYPASS;  Surgeon: Alden Mcwilliams MD;  Location: [facility] OR     ORTHOPEDIC SURGERY Right 02/2016       Social History     Tobacco Use     Smoking status: Never Smoker     Smokeless tobacco: Never Used   Substance Use Topics     Alcohol use: Yes     Comment: socially     Family History   Problem Relation Age of Onset     Glaucoma Father      Diabetes Father      Prostate Cancer Paternal Grandfather      Macular Degeneration No family hx of          Current Outpatient Medications   Medication Sig Dispense Refill     atorvastatin (LIPITOR) 40 MG tablet Take 1 tablet (40 mg) by mouth daily 90 tablet 3     busPIRone (BUSPAR) 5 MG tablet Take 1 tablet (5 mg) by mouth 2 times daily 60 tablet 1     Calcium Carb-Cholecalciferol (CALCIUM 600 + D PO) Take 1 tablet by mouth 2 times daily        Cholecalciferol (VITAMIN D3) 1000 units CAPS Take 3 capsules by mouth daily       Continuous Blood Gluc  (FREESTYLE KEILA READER) SUGAR 1 each continuous 1 Device 0     Continuous Blood Gluc  Sensor (FREESTYLE KEILA 14 DAY SENSOR) MISC 1 each every 14 days 2 each 11     Cyanocobalamin (B-12) 500 MCG SUBL Place 1 tablet under the tongue daily       hydrOXYzine (ATARAX) 25 MG tablet Take 1-2 tablets (25-50 mg) by mouth 3 times daily as needed for anxiety 60 tablet 1     insulin degludec (TRESIBA) 200 UNIT/ML pen Inject 60 Units Subcutaneous daily 9 mL 1     LORazepam (ATIVAN) 1 MG tablet Take 1 tablet (1 mg) by mouth daily as needed for anxiety (Patient taking differently: Take 1 mg by mouth as needed for anxiety Taking 1 time daily) 30 tablet 2     losartan (COZAAR) 100 MG tablet Take 1 tablet (100 mg) by mouth daily 90 tablet 1     metFORMIN (GLUCOPHAGE) 500 MG tablet Take 1 tablet (500 mg) by mouth 2 times daily (with meals) 180 tablet 3     multivitamin  peds with iron (FLINTSTONES COMPLETE) 60 MG chewable tablet Take 2 chew tab by mouth every morning        ONETOUCH DELICA LANCETS 33G MISC 1 lancet 4 times daily 100 each 6     sitagliptin (JANUVIA) 100 MG tablet Take 1 tablet (100 mg) by mouth daily 90 tablet 3     triamterene-HCTZ (MAXZIDE) 75-50 MG tablet Take 1 tablet by mouth daily 90 tablet 1     insulin pen needle (32G X 4 MM) 32G X 4 MM miscellaneous Use 4 pen needles daily or as directed. 200 each 6     Recent Labs   Lab Test 09/06/19  0902 05/03/19  0818 02/01/19  0826 10/10/18  0814 10/09/18  1650 10/09/18  0655 09/25/18  0928 07/26/18  0703  07/26/17  1525 07/10/17  1400  03/13/17   A1C 6.1* 6.5* 6.4*  --   --   --  8.9* 7.7*   < > 8.5* 8.4*   < > 9.4*   LDL  --   --   --   --   --   --   --  Cannot estimate LDL when triglyceride exceeds 400 mg/dL  108*  --   --  127*  --  97   HDL  --   --   --   --   --   --   --  34*  --   --   --   --  37*   TRIG  --   --   --   --   --   --   --  488*  --   --   --   --  183*   ALT  --   --   --   --   --   --  77* 58  --  52  --   --   --    CR  --   --   --   --  0.60*  --  0.51* 0.50*  --  0.55* 0.48*  --  0.71*   GFRESTIMATED  --   --   --   --   ">90  --  >90 >90  --  >90  Non  GFR Calc   >90  Non  GFR Calc    --  >60   GFRESTBLACK  --   --   --   --  >90  --  >90 >90  --  >90   GFR Calc   >90   GFR Calc    --  >60   POTASSIUM  --   --   --  3.7  --  3.6 4.0 3.4  --  3.9  --   --  4.2   TSH  --   --   --   --   --   --   --  1.50  --   --  0.90  --   --     < > = values in this interval not displayed.      BP Readings from Last 3 Encounters:   10/18/19 124/86   09/06/19 130/65   05/03/19 111/77    Wt Readings from Last 3 Encounters:   10/18/19 108.9 kg (240 lb)   09/06/19 115.7 kg (255 lb)   05/03/19 124.1 kg (273 lb 8 oz)          Reviewed and updated as needed this visit by Provider         Review of Systems   ROS COMP: CONSTITUTIONAL: NEGATIVE for fever, chills, change in weight  ENT/MOUTH: NEGATIVE for ear, mouth and throat problems  RESP: NEGATIVE for significant cough or SOB  CV: NEGATIVE for chest pain, palpitations or peripheral edema  MUSCULOSKELETAL: see HPI  PSYCHIATRIC: NEGATIVE for changes in mood or affect      Objective    /86 (BP Location: Right arm, Patient Position: Chair, Cuff Size: Adult Large)   Pulse 102   Temp 99.1  F (37.3  C) (Oral)   Resp 20   Ht 1.854 m (6' 1\")   Wt 108.9 kg (240 lb)   SpO2 97%   BMI 31.66 kg/m    Body mass index is 31.66 kg/m .  Physical Exam   GENERAL: healthy, alert and no distress  NECK: no adenopathy, no asymmetry, masses, or scars and thyroid normal to palpation  RESP: lungs clear to auscultation - no rales, rhonchi or wheezes  CV: regular rate and rhythm, normal S1 S2, no S3 or S4, no murmur, click or rub, no peripheral edema and peripheral pulses strong  MS: left lateral knee with tenderness upon palpation, slight edema, walking with a slight limp. no gross musculoskeletal defects noted, Click present with extension and flexion.    PSYCH: mentation appears normal, affect normal/bright          Assessment & Plan " "  Assessment      Plan  1. Left knee pain, unspecified chronicity:  Continue tylenol on prn basis, is not able to take ibuprofen due to bariatric surgery, ice for  min tid.  Will obtain MRI, based on results will refer to  Orthopedics.   - MR Knee Left w/o Contrast; Future    2. JOEL (generalized anxiety disorder):  Will trial Buspar 5 mg bid.  Is aware of risks and side effects of medication.    - hydrOXYzine (ATARAX) 25 MG tablet; Take 1-2 tablets (25-50 mg) by mouth 3 times daily as needed for anxiety  Dispense: 60 tablet; Refill: 1  - busPIRone (BUSPAR) 5 MG tablet; Take 1 tablet (5 mg) by mouth 2 times daily  Dispense: 60 tablet; Refill: 1    3. Type 2 diabetes mellitus with hyperglycemia, with long-term current use of insulin (H): will follow up with endocrinology  - REVIEW OF HEALTH MAINTENANCE PROTOCOL ORDERS  - FOOT EXAM  - Continuous Blood Gluc Sensor (FREESTYLE KEILA 14 DAY SENSOR) MISC; 1 each every 14 days  Dispense: 2 each; Refill: 11    4. Mixed hyperlipidemia: future lab order placed  - REVIEW OF HEALTH MAINTENANCE PROTOCOL ORDERS  - atorvastatin (LIPITOR) 40 MG tablet; Take 1 tablet (40 mg) by mouth daily  Dispense: 90 tablet; Refill: 3  - Lipid panel reflex to direct LDL Fasting; Future    5. Essential hypertension:  /86, well controlled.   - losartan (COZAAR) 100 MG tablet; Take 1 tablet (100 mg) by mouth daily  Dispense: 90 tablet; Refill: 1  - triamterene-HCTZ (MAXZIDE) 75-50 MG tablet; Take 1 tablet by mouth daily  Dispense: 90 tablet; Refill: 1  - CBC with platelets differential; Future  - Comprehensive metabolic panel; Future    6. Need for prophylactic vaccination and inoculation against influenza  - INFLUENZA VACCINE IM > 6 MONTHS VALENT IIV4 [65147]    BMI:   Estimated body mass index is 31.66 kg/m  as calculated from the following:    Height as of this encounter: 1.854 m (6' 1\").    Weight as of this encounter: 108.9 kg (240 lb).       Return in about 2 weeks (around " 11/1/2019) for Lab Work.    Susan Haase, APRN [facility]        Answers for HPI/ROS submitted by the patient on 10/18/2019   If you checked off any problems, how difficult have these problems made it for you to do your work, take care of things at home, or get along with other people?: Somewhat difficult  PHQ9 TOTAL SCORE: 3

## 2019-10-19 ASSESSMENT — PATIENT HEALTH QUESTIONNAIRE - PHQ9: SUM OF ALL RESPONSES TO PHQ QUESTIONS 1-9: 3

## 2019-10-23 ENCOUNTER — HOSPITAL ENCOUNTER (OUTPATIENT)
Dept: MRI IMAGING | Facility: CLINIC | Age: 34
Discharge: HOME | End: 2019-10-23
Attending: NURSE PRACTITIONER | Admitting: NURSE PRACTITIONER
Payer: COMMERCIAL

## 2019-10-23 DIAGNOSIS — M25.562 LEFT KNEE PAIN, UNSPECIFIED CHRONICITY: ICD-10-CM

## 2019-10-23 PROCEDURE — 73721 MRI JNT OF LWR EXTRE W/O DYE: CPT | Mod: LT

## 2019-10-24 ENCOUNTER — TELEPHONE (OUTPATIENT)
Dept: FAMILY MEDICINE | Facility: CLINIC | Age: 34
End: 2019-10-24

## 2019-10-24 DIAGNOSIS — M25.561 CHRONIC PAIN OF RIGHT KNEE: Primary | ICD-10-CM

## 2019-10-24 DIAGNOSIS — G89.29 CHRONIC PAIN OF RIGHT KNEE: Primary | ICD-10-CM

## 2019-10-24 DIAGNOSIS — M25.562 LEFT KNEE PAIN, UNSPECIFIED CHRONICITY: Primary | ICD-10-CM

## 2019-10-24 NOTE — TELEPHONE ENCOUNTER
Please call radha;  His MR of the knee is showing advanced chondromalacia ( arthritis changes between the knee cap and knee) which is the cause of his symptoms, at this time, I do recommend that we see Ortho to see what do they recommend.  I will put the order in and they should be calling the patient.  Galileo Vides MD  [facility]  [phone]

## 2019-10-24 NOTE — TELEPHONE ENCOUNTER
Phone call to patient     Discussed message below from Dr. Vides     Patient states understanding and will await call from ortho     No questions at this time, if any arise patient will return call to clinic     Daria Obregon, Registered Nurse   [facility]

## 2019-10-25 NOTE — RESULT ENCOUNTER NOTE
Harris Beard,  Here is a copy of the MRI result of your knee that  shows advanced chondromalacia (arthritis changes of the knee), please let me know if you have any difficulty with getting an appointment with orthopedics.  Sincerely,    Susan Haase, CNP

## 2019-10-29 ENCOUNTER — ANCILLARY PROCEDURE (OUTPATIENT)
Dept: GENERAL RADIOLOGY | Facility: CLINIC | Age: 34
End: 2019-10-29
Attending: FAMILY MEDICINE
Payer: COMMERCIAL

## 2019-10-29 ENCOUNTER — OFFICE VISIT (OUTPATIENT)
Dept: ORTHOPEDICS | Facility: CLINIC | Age: 34
End: 2019-10-29
Payer: COMMERCIAL

## 2019-10-29 VITALS
BODY MASS INDEX: 31.81 KG/M2 | WEIGHT: 240 LBS | HEIGHT: 73 IN | SYSTOLIC BLOOD PRESSURE: 150 MMHG | DIASTOLIC BLOOD PRESSURE: 102 MMHG

## 2019-10-29 DIAGNOSIS — G89.29 CHRONIC PAIN OF RIGHT KNEE: ICD-10-CM

## 2019-10-29 DIAGNOSIS — M25.561 CHRONIC PAIN OF RIGHT KNEE: Primary | ICD-10-CM

## 2019-10-29 DIAGNOSIS — M17.0 PRIMARY OSTEOARTHRITIS OF BOTH KNEES: ICD-10-CM

## 2019-10-29 DIAGNOSIS — G89.29 CHRONIC PAIN OF RIGHT KNEE: Primary | ICD-10-CM

## 2019-10-29 DIAGNOSIS — M25.561 CHRONIC PAIN OF RIGHT KNEE: ICD-10-CM

## 2019-10-29 PROCEDURE — 73562 X-RAY EXAM OF KNEE 3: CPT

## 2019-10-29 PROCEDURE — 99203 OFFICE O/P NEW LOW 30 MIN: CPT | Performed by: FAMILY MEDICINE

## 2019-10-29 ASSESSMENT — MIFFLIN-ST. JEOR: SCORE: 2072.51

## 2019-10-29 NOTE — PATIENT INSTRUCTIONS
1. Chronic pain of right knee    2. Primary osteoarthritis of both knees      Discussed xray and MRI - osteoarthritis  Knee is otherwise stable  To help you continue to be successful with weight loss recommend cortisone injection  Cortisone will increase your blood sugar for ~ 2 weeks    Follow-up for an ultrasound guided cortisone injection

## 2019-10-29 NOTE — LETTER
10/29/2019         RE: Naman Jones  [id number] [address] [facility] [address]        Dear Colleague,    [facility] you [facility] Please see a copy of my visit note below.    ASSESSMENT & PLAN    1. Chronic pain of right knee    2. Primary osteoarthritis of both knees      Seen in consultation for bilateral knee pain, left greater than right.  Subacute in nature with a remote history of a fall with a left knee.  History of patellar subluxation/dislocations and the distant past.  Discussed xray and MRI - osteoarthritis  Knee is otherwise stable  To help Chirag continue to be successful with weight loss recommend cortisone injection  Cortisone will increase your blood sugar for ~ 2 weeks    Follow-up for an ultrasound guided cortisone injection    -----    SUBJECTIVE  Naman Jones is a/an 34 year old male who is seen in consultation at the request of  Galileo Vides M.D. for evaluation of bilateral knee pain - left knee worse than right knee. The patient is seen by themselves.    Onset: Left knee - 5 month(s) ago. Patient describes injury as he was carrying laundry down the stairs when he missed a step and his left knee buckled and twisted underneath him. Right knee - patient reports chronic pain of right knee. No recent injury or trauma. Patient notes its been about 2 years since he dislocated his right patella.   Location of Pain: left anterior knee just inferior to patella, right medial knee  Rating of Pain at worst: 10/10  Rating of Pain Currently: 4/10  Worsened by: prolonged walking (>15-20 minutes), going up and down stairs is painful on left side  Better with: ice  Treatments tried: rest/activity avoidance, ice and previous imaging (MRI 10/23/19 of left knee)  Associated symptoms: swelling, locking or catching and feeling of instability (left knee worse than right knee)  Orthopedic history: YES - patient reports h/o chronic bilateral  "patellar dislocations and chronic bilateral knee pain since childhood  Relevant surgical history: YES - patient reports h/o right knee arthroscopic surgery Date: 2-3 years ago @ [facility]  Patient Social History: works in IT    Patient's past medical, surgical, social, and family histories were reviewed today and no pertinent history related to patient's presenting problem.    REVIEW OF SYSTEMS:  10 point ROS is negative other than symptoms noted above in HPI, Past Medical History or as stated below  Constitutional: NEGATIVE for fever, chills, change in weight  Skin: NEGATIVE for worrisome rashes, moles or lesions  GI/: NEGATIVE for bowel or bladder changes  Neuro: NEGATIVE for weakness, dizziness or paresthesias    OBJECTIVE:  BP (!) 150/102   Ht 1.854 m (6' 1\")   Wt 108.9 kg (240 lb)   BMI 31.66 kg/m      General: healthy, alert and in no distress  HEENT: no scleral icterus or conjunctival erythema  Skin: no suspicious lesions or rash. No jaundice.  CV: no pedal edema  Resp: normal respiratory effort without conversational dyspnea   Psych: normal mood and affect  Gait: normal steady gait with appropriate coordination and balance  Neuro: Normal light sensory exam of lower extremity  MSK:  BILATERAL KNEE  Inspection:    normal alignment  Palpation:    Tender about the lateral patellar facet, medial patellar facet and medial joint line. Remainder of bony and ligamentous landmarks are nontender.    Mild effusion is present    Patellofemoral crepitus is Present  Range of Motion:     00 extension to 1350 flexion  Strength:    Extensor mechanism intact  Special Tests:    Positive: Patellar grind    Negative: MCL/valgus stress (0 & 30 deg), LCL/varus stress (0 & 30 deg), Lachman's, posterior drawer, Conor's    Independent visualization of the below image:  Recent Results (from the past 24 hour(s))   XR Knee Standing AP Bilat [place] Bilat Lat Right    Narrative    XR KNEE STANDING AP BILAT [place] BILAT LAT RT " 10/29/2019 6:30 PM     HISTORY: Chronic pain of right knee; Chronic pain of right knee    COMPARISON: None.      Impression    IMPRESSION: Mild tricompartmental hypertrophic change. No joint space  narrowing. Trace knee joint effusion. Normal patellar alignment.    DAMIAN PADILLA MD     Results for orders placed or performed during the hospital encounter of 10/23/19   MR Knee Left w/o Contrast    Narrative    MR KNEE LEFT WITHOUT CONTRAST October 23, 2019 7:11 PM    HISTORY: Knee pain, persistent greater than six weeks of conservative  therapy. Left knee pain, unspecified chronicity.    TECHNIQUE: Sagittal proton density and T2, coronal T1, and coronal and  transverse fat suppressed T2 weighted images.    FINDINGS:   Medial Meniscus: No tear, displaced fragment, or extrusion.       Lateral Meniscus: No tear, displaced fragment, or extrusion.       Anterior Cruciate Ligament: Intact. No thickening or intrasubstance  signal abnormality.     Posterior Cruciate Ligament: Intact. No thickening or intrasubstance  signal abnormality.     Medial Collateral Ligament: No sprain or tear identified.    Lateral Collateral Ligament Complex, Popliteus Tendon: The fibular  collateral ligament, biceps femoris tendon, popliteal tendon, and  iliotibial band are intact.    Osseous Structures and Cartilaginous Surfaces: Full-thickness  articular cartilage loss is noted at the patellar apex and medial  facet with subchondral marrow edema noted at the apex. Full-thickness  articular cartilage loss is also noted along the inferolateral margin  of the femoral trochlea. Chondral fissuring and articular cartilage  thinning is also noted in the inferior aspect of the weightbearing  surface of the lateral femoral condyle. Chondral surfaces in the  medial compartment are more grossly normal.    Extensor Mechanism: The patella is slightly laterally subluxed  relative to the femoral trochlea. However, with the knee fully  extended for the exam,  this is of uncertain significance. The  quadriceps and infrapatellar tendons are intact. The medial and  lateral patellar retinacula appear unremarkable.    Joint Space: There is a small joint effusion with a small to moderate  popliteal cyst.    Additional Findings: No semimembranosus-tibial collateral ligament or  pes anserine bursitis.      Impression    IMPRESSION:    1. Patellar apical/medial facet grade 4 chondromalacia and mild  subchondral marrow edema. Grade 3-4 chondromalacia is also noted in  the inferolateral margin of the femoral trochlea and weightbearing  aspect of the lateral femoral condyle.  2. Joint effusion with popliteal cyst.  3. No meniscal, cruciate ligament, or collateral ligament tear.  4. The patella may be slightly laterally subluxed relative to the  femoral trochlea. The significance of this with the knee fully  extended for this scan is uncertain.    MD Mary Jane WALKER,  [facility]      Again, thank you for allowing me to participate in the care of your patient.        Sincerely,        Mary Jane Sanchez DO

## 2019-10-31 ENCOUNTER — TRANSFERRED RECORDS (OUTPATIENT)
Dept: HEALTH INFORMATION MANAGEMENT | Facility: CLINIC | Age: 34
End: 2019-10-31

## 2019-11-01 ENCOUNTER — MEDICAL CORRESPONDENCE (OUTPATIENT)
Dept: HEALTH INFORMATION MANAGEMENT | Facility: CLINIC | Age: 34
End: 2019-11-01

## 2019-11-08 DIAGNOSIS — E11.65 TYPE 2 DIABETES MELLITUS WITH HYPERGLYCEMIA, WITH LONG-TERM CURRENT USE OF INSULIN (H): ICD-10-CM

## 2019-11-08 DIAGNOSIS — Z79.4 TYPE 2 DIABETES MELLITUS WITH HYPERGLYCEMIA, WITH LONG-TERM CURRENT USE OF INSULIN (H): ICD-10-CM

## 2019-11-08 RX ORDER — FLASH GLUCOSE SENSOR
KIT MISCELLANEOUS
Status: SHIPPED
Start: 2019-11-08 | End: 2020-02-21

## 2019-11-16 NOTE — TELEPHONE ENCOUNTER
DIAGNOSIS: Referred Dr Oziel Bran from [facility]. Dx: Patellar Instability and Arthritis. MRI completed 10/23 at [facility]. No surgeries. Appt per pt.    APPOINTMENT DATE: Dec 10, 2019    NOTES STATUS DETAILS   OFFICE NOTE from referring provider Received Dr. Bran 10.31.19 [facility]   OFFICE NOTE from other specialist Internal 10.29.19 Dr. Sanchez   DISCHARGE SUMMARY from hospital N/A    DISCHARGE REPORT from the ER N/A    OPERATIVE REPORT N/A    MEDICATION LIST Internal    IMPLANT RECORD/STICKER N/A    LABS     CBC/DIFF N/A    CULTURES N/A    INJECTIONS DONE IN RADIOLOGY N/A    MRI Internal 2019   CT SCAN N/A    XRAYS (IMAGES & REPORTS) Internal 2019   TUMOR     PATHOLOGY  Slides & report N/A

## 2019-12-02 DIAGNOSIS — M25.561 PAIN IN BOTH KNEES, UNSPECIFIED CHRONICITY: Primary | ICD-10-CM

## 2019-12-02 DIAGNOSIS — M25.562 PAIN IN BOTH KNEES, UNSPECIFIED CHRONICITY: Primary | ICD-10-CM

## 2019-12-10 ENCOUNTER — PRE VISIT (OUTPATIENT)
Dept: ORTHOPEDICS | Facility: CLINIC | Age: 34
End: 2019-12-10

## 2019-12-10 ENCOUNTER — ANCILLARY PROCEDURE (OUTPATIENT)
Dept: GENERAL RADIOLOGY | Facility: CLINIC | Age: 34
End: 2019-12-10
Attending: ORTHOPAEDIC SURGERY
Payer: COMMERCIAL

## 2019-12-10 ENCOUNTER — TELEPHONE (OUTPATIENT)
Dept: ORTHOPEDICS | Facility: CLINIC | Age: 34
End: 2019-12-10

## 2019-12-10 ENCOUNTER — OFFICE VISIT (OUTPATIENT)
Dept: ORTHOPEDICS | Facility: CLINIC | Age: 34
End: 2019-12-10
Payer: COMMERCIAL

## 2019-12-10 ENCOUNTER — THERAPY VISIT (OUTPATIENT)
Dept: PHYSICAL THERAPY | Facility: CLINIC | Age: 34
End: 2019-12-10
Payer: COMMERCIAL

## 2019-12-10 VITALS — WEIGHT: 244.8 LBS | BODY MASS INDEX: 32.44 KG/M2 | HEIGHT: 73 IN

## 2019-12-10 DIAGNOSIS — M25.362 PATELLAR INSTABILITY OF LEFT KNEE: ICD-10-CM

## 2019-12-10 DIAGNOSIS — M21.062 ACQUIRED GENU VALGUM OF LEFT KNEE: Primary | ICD-10-CM

## 2019-12-10 DIAGNOSIS — M25.561 PAIN IN BOTH KNEES, UNSPECIFIED CHRONICITY: ICD-10-CM

## 2019-12-10 DIAGNOSIS — M25.562 PATELLOFEMORAL INSTABILITY OF LEFT KNEE WITH PAIN: ICD-10-CM

## 2019-12-10 DIAGNOSIS — M25.361 PATELLAR INSTABILITY OF RIGHT KNEE: ICD-10-CM

## 2019-12-10 DIAGNOSIS — M25.562 PAIN IN BOTH KNEES, UNSPECIFIED CHRONICITY: ICD-10-CM

## 2019-12-10 DIAGNOSIS — M25.562 LEFT KNEE PAIN, UNSPECIFIED CHRONICITY: ICD-10-CM

## 2019-12-10 DIAGNOSIS — M25.362 PATELLOFEMORAL INSTABILITY OF LEFT KNEE WITH PAIN: ICD-10-CM

## 2019-12-10 PROCEDURE — 97530 THERAPEUTIC ACTIVITIES: CPT | Mod: GP | Performed by: PHYSICAL THERAPIST

## 2019-12-10 PROCEDURE — 97110 THERAPEUTIC EXERCISES: CPT | Mod: GP | Performed by: PHYSICAL THERAPIST

## 2019-12-10 PROCEDURE — 97161 PT EVAL LOW COMPLEX 20 MIN: CPT | Mod: GP | Performed by: PHYSICAL THERAPIST

## 2019-12-10 ASSESSMENT — ACTIVITIES OF DAILY LIVING (ADL)
RAW_SCORE: 29
PAIN: THE SYMPTOM AFFECTS MY ACTIVITY MODERATELY
SWELLING: THE SYMPTOM AFFECTS MY ACTIVITY SLIGHTLY
SQUAT: I AM UNABLE TO DO THE ACTIVITY
HOW_WOULD_YOU_RATE_THE_OVERALL_FUNCTION_OF_YOUR_KNEE_DURING_YOUR_USUAL_DAILY_ACTIVITIES?: SEVERELY ABNORMAL
GO DOWN STAIRS: ACTIVITY IS VERY DIFFICULT
STAND: ACTIVITY IS MINIMALLY DIFFICULT
KNEE_ACTIVITY_OF_DAILY_LIVING_SCORE: 41.43
GO UP STAIRS: ACTIVITY IS VERY DIFFICULT
KNEE_ACTIVITY_OF_DAILY_LIVING_SUM: 29
WALK: ACTIVITY IS SOMEWHAT DIFFICULT
AS_A_RESULT_OF_YOUR_KNEE_INJURY,_HOW_WOULD_YOU_RATE_YOUR_CURRENT_LEVEL_OF_DAILY_ACTIVITY?: ABNORMAL
RISE FROM A CHAIR: ACTIVITY IS MINIMALLY DIFFICULT
LIMPING: THE SYMPTOM AFFECTS MY ACTIVITY MODERATELY
KNEEL ON THE FRONT OF YOUR KNEE: I AM UNABLE TO DO THE ACTIVITY
WEAKNESS: THE SYMPTOM AFFECTS MY ACTIVITY SEVERELY
HOW_WOULD_YOU_RATE_THE_CURRENT_FUNCTION_OF_YOUR_KNEE_DURING_YOUR_USUAL_DAILY_ACTIVITIES_ON_A_SCALE_FROM_0_TO_100_WITH_100_BEING_YOUR_LEVEL_OF_KNEE_FUNCTION_PRIOR_TO_YOUR_INJURY_AND_0_BEING_THE_INABILITY_TO_PERFORM_ANY_OF_YOUR_USUAL_DAILY_ACTIVITIES?: 25
SIT WITH YOUR KNEE BENT: ACTIVITY IS MINIMALLY DIFFICULT
GIVING WAY, BUCKLING OR SHIFTING OF KNEE: THE SYMPTOM AFFECTS MY ACTIVITY SEVERELY
STIFFNESS: THE SYMPTOM AFFECTS MY ACTIVITY SLIGHTLY
FUNCTION,_DAILY_LIVING_SCORE: 39.7

## 2019-12-10 ASSESSMENT — ENCOUNTER SYMPTOMS
MUSCLE WEAKNESS: 0
BACK PAIN: 1
JOINT SWELLING: 1
STIFFNESS: 1
MUSCLE CRAMPS: 0
NECK PAIN: 0
ARTHRALGIAS: 1
MYALGIAS: 1

## 2019-12-10 ASSESSMENT — KOOS JR: HOW SEVERE IS YOUR KNEE STIFFNESS AFTER FIRST WAKING IN MORNING: MODERATE

## 2019-12-10 ASSESSMENT — MIFFLIN-ST. JEOR: SCORE: 2086.67

## 2019-12-10 NOTE — PROGRESS NOTES
PHYSICAL THERAPIST IMPRESSION: Chirag presents to physical therapy with a primary complaint of left knee instability. His most recent dislocation was in May and his knee has yet to return to baseline. He has abnormal patellar positioning, abnormal patellar tracking as well as suspicions for alignment concerns. He has recently lost a significant amount of weight but that has left him with core and proximal hip weakness. He demonstrates pronounced quadriceps atrophy on the left and impaired quad activation. He was apprehensive to the functional movement assessment and his control was poor. He reported both complaints of pain and instability with all single leg tasks. He had a positive response to a lateral>medial glide + tilt and soft tissue unloading technique in regards to both his pain and instability. He would benefit from further physical therapy to improve his strength as well as his movement patterns.     RESPONSIVENESS TO CHICAS TAPE:  Chicas Taping Trial    Pre/posttest activity Step up/down   Taping technique(s) trialed L>M Glide  L>M Glide + tilt  L>M Glide + tilt + STEVEN  MPFL + MPTL   Numerical Pain Scale 7/10 to 5/10 to 4/10 to 3/10 to 3/10    Level of reported stability (0/10= full confidence in knee; 10/10=no confidence in knee) 7/10 to 6/10 to 4/10 to 3/10 to 3/10   Improvement in movement pattern with tape on Smoother ascent onto the stair, less reliance on the UE        PT MODIFIABLE FACTORS PRESENT NOT PRESENT   Proximal LE/Trunk mm weakness X    Quadriceps muscle dysfunction/weakness X    Poor postural stability X    Poor dynamic movement patterns X    Restricted ankle DF X    Previous PF PT Interventions  X     PATIENT BONY ALIGNMENT SUSPICIOUS FOR: (to be determined by MD and imaging studies):    Limb version     Genu valgum     Pes planus bilaterally    Physical Therapy Initial Evaluation: Subjective History    History of Presenting Complaint:  Presenting Complaint: Left knee  instability  Primary Symptoms: primarily instability symptoms, pain with/following instability events and swelling following instability events    Details of most recent instability episode: Date: May 2019, Knee: Left, Mechanism: Slipped on the stairs  Did you fall?: Yes  Mechanism of patellar reduction: spontaneous reduction  Joint reaction to event: Moderate swelling and pain  Did you seek medical care/how soon/what kind?: Went to [facility], placed in an immobilizer, had X-rays    Details of primary dislocation: 12 years old, Knee: Left, Mechanism: Playing [employer]ing[employer]on  Did you fall?: Yes  Mechanism of patellar reduction: spontaneous reduction  Joint reaction to event: Moderate swelling and pain  Did you seek medical care/how soon/what kind?: Yes    Instability behavior between 1st event and most recent event: Has been happening consistently (1 per year per knee). The left knee has been worse since this last dislocation and has not recovered.     Current frequency of instability events:   Subluxation events: 1-2 times/month  Ramón dislocation events: 1 times/year    Compared to past: same        Prior Tests for Current Complaint: x-ray and MRI  Prior Treatment for Current Complaint: PT for the left, had a RIGHT knee scope at [facility].     Symptom Behavior & Functional Limitations:   Constant or Activity/Position Dependent?: constant low grade pain, increased pain levels with activity.   Time of day dependent?: No    Change in symptoms since onset: no change  Worst pain = 5/10 with up stairs.    Symptom Aggravation/Functional Limitations (due to current complaint):   3 worst activities: Squatting (Prior: I) , Stairs (Prior: I), Walking (pain) (Prior: I), has had to modify his golf swing  Best pain = 2/10.  Symptom Relief: Ice, activity modification.      Time frame referenced for prior level of function: Prior to symptom onset/injury date noted above.      Lifestyle & General Medical History:  Employment: IT - desk job.     General Physical Activity Level (within past year): Biking, golfing, hiking.    General health status (as reported by patient): good.     Other orthopaedic history: Right knee scope.     Answers for HPI/ROS submitted by the patient on 12/9/2019   History Reported by Patient  Reason for Visit:: Repeated dislocations of left knee and arthritis  When problem began:: 5/30/2019  Where?: at home  How problem occurred:: This was the latest dislocation caused by falling on the stairs but this knee has dislocated at least 6x since the age of 12  Number scale: 8/10  Pain quality: aching, sharp  Frequency: constant  Pain is: the same all the time  Progression since onset: unchanged  Special tests: MRI, x-ray  Improvement with previous treatment: none  General health as reported by patient: fair  Please check all that apply to your current or past medical history: depression, diabetes, high blood pressure, numbness/tingling, overweight, weakness, other  Other Med Hx Detail: frequent dislocation  Surgeries: orthopedic surgery, other  Other Surgery Detail: gastric bypass - jackie en y  Medications you are currently taking: other  Other Meds Detail: statins, diabetes management, anxiety  Medical allergies: other  Other Allergies Detail: sulfa  Occupation:: IT Security  What are your primary job tasks: computer work  Work restrictions: working in normal job without restrictions  Barriers at home/work: stairs      Lower Extremity/Patellofemoral Exam    Dynamic Movement Screen:  2 leg stance: Genu valgus alignment noted, decreased arch heigh bilaterally, visible quadriceps atrophy noted  2 leg squat: Excessive anterior knee excursion (reduced posterior hip excursion), Restricted ankle DF observed, Reduced squat depth d/t reported pain at knee and apprehensive to move into squat position    1 leg stance:   Right: proprioceptive challenge and required UE support  Left: proprioceptive challenge and felt unstable in regards to patella  stability, required heavy support on UE    1 leg squat:   Right: proprioceptive challenge and required UE support  35 deg  Left: proprioceptive challenge and required UE support, reported complaints of instability  35 deg with pain in posterior knee    Gait: Continued alignment concerns noted, left foot in external rotation (more than right foot)      Anterior Excursion Test Anterior Reach   Uninvolved Extremity 20 cm   Involved Extremity 10 cm   LSI% 50%       Patellofemoral Joint Special Testing:    Static Patellar Positioning in 90 degrees KF (Seated)  Right: Moderate lateral translation and patella baltazar visually observed  Left: Moderate lateral translation and patella baltazar visually observed    Patellar tracking with OKC knee extension (Seated)  Right: Increased lateralization into TKE       Left: Unable to actively reach TKE, j-sign noted when passively brought into full extension    Static Patellar Positioning in full extension (Supine)  Right: Mild lateral tilt, Moderate lateral translation and patella baltazar visually observed      Left: Mild lateral tilt, Moderate lateral translation and patella baltazar visually observed    Patellar Quadrant Mobility Test (Med:Lat)  Right: 2:Apprehensive       Left 2:Apprehensive - unable to reach end feel    Knee Joint AROM   Right Left Difference   Hyperextension 4 deg 4 deg 0 deg   Extension 0 deg 0 deg 0 deg   Flexion 125 deg 130 deg 5 deg     Basic Muscle Activation:  Quadriceps: Right: Depressed intensity, Left: Depressed intensity, SLR took a good amount of effort  Transverse abdominis: poor     Knee Joint Effusion (Stroke Test Assessment):  Right: Trace; Left: Trace    Palpation:   Tender to palpation at the following structures: Global tenderness to palpation on the left knee    Quadriceps Muscle Volume Tracking  Circumferential  Thigh Msmts Right Left Difference   10 cm Proximal 48 cm 45.5 cm 2.5 cm     Lower Extremity Muscle Strength (x/5): globally 4-/5    Lower  Extremity Flexibility Screen:   Right Left   Hamstring + +   Gastroc/ Soleus + +     Assessment/Plan:  Patient is a 34 year old male with left side knee complaints.    Patient has the following significant findings with corresponding treatment plan.                Diagnosis 1:  Left patellar instability  Pain -  hot/cold therapy, manual therapy, STS, splint/taping/bracing/orthotics, self management and education  Decreased ROM/flexibility - manual therapy, therapeutic exercise and home program  Decreased strength - therapeutic exercise, therapeutic activities and home program  Impaired balance - neuro re-education, therapeutic activities and home program  Decreased proprioception - neuro re-education, therapeutic activities and home program  Impaired muscle performance - neuro re-education and home program  Decreased function - therapeutic activities and home program    Therapy Evaluation Codes:   1) History comprised of:   Personal factors that impact the plan of care:      None.    Comorbidity factors that impact the plan of care are:      None.     Medications impacting care: None.  2) Examination of Body Systems comprised of:   Body structures and functions that impact the plan of care:      Knee.   Activity limitations that impact the plan of care are:      Squatting/kneeling, Stairs and Walking.  3) Clinical presentation characteristics are:   Stable/Uncomplicated.  4) Decision-Making    Low complexity using standardized patient assessment instrument and/or measureable assessment of functional outcome.  Cumulative Therapy Evaluation is: Low complexity.    Previous and current functional limitations:  (See Goal Flow Sheet for this information)    Short term and Long term goals: (See Goal Flow Sheet for this information)     Communication ability:  Patient appears to be able to clearly communicate and understand verbal and written communication and follow directions correctly.  Treatment Explanation - The  following has been discussed with the patient:   RX ordered/plan of care  Anticipated outcomes  Possible risks and side effects  This patient would benefit from PT intervention to resume normal activities.   Rehab potential is good.    Frequency:  1 X week, once daily  Duration:  for 1 weeks then reassess based on Dr. Meng's recommendation  Discharge Plan:  Achieve all LTG.  Independent in home treatment program.  Reach maximal therapeutic benefit.    Please refer to the daily flowsheet for treatment today, total treatment time and time spent performing 1:1 timed codes.

## 2019-12-10 NOTE — PROGRESS NOTES
Department of Orthopedic Surgery  Clinic Consultation Note          PATIENT NAME: Naman Jones   MRN: [id number]  AGE: 34 year old  BMI: Body mass index is 32.73 kg/m .  REFERRING PHYSICIAN: Referred Self      CHIEF COMPLAINT: Consult (Referred Dr Oziel Bran from [facility]. Dx: Patellar Instability and Arthritis. MRI completed 10/23 at [facility]. No surgeries. Appt per pt.)      HISTORY OF PRESENT ILLNESS:  Naman Jones is a 34 year old male with past medical history significant for obesity, type 2 diabetes controlled with oral meds, hyperlipidemia, and hypertension.   Seen in consultation for bilateral patellar dislocations.      The patient reports that the first time that he sustained a dislocation was at age 12, since then he has had at least 15 episodes of patellear dislocations, divided roughly equally between both sides.  He has tried physical therapy, weight loss, and bracing; the latter does irritate the skin and he does not use this.      Upon more focused questioning, the patient actually describes his knee episodes as a giving out.  When asked if he feels something shift he said he always hears a pop.  He is never had to relocate his kneecap.  This is always followed by some degree of swelling.  However this is associated with significant pain swelling and discomfort for 2 to 6 weeks.      His last 'swelling dislocation' location was in May 2019 when he slipped on the stairs.  At this time he does not trust his knees even for activities of daily living.  He would like to return back to outdoors activity, particularly hiking.    Today the patient reports that he is having pain in the left knee every day, the right knee occasionally.  This pain happens with both walking and stairclimbing.     When asked what bothers him more lack of knee confidence or pain he reports that lack of any confidence is his biggest issue.    Of note the patient had a Snehal-en-Y bariatric surgery last year  and has gone from 365 pounds to 265 pounds.  Since then his diabetes has been on the control with oral medications ( vs. Insulin before surgery).  Despite the weight loss, he does not believe that his kneecap issues have been improved.  If possible that as he has become more active his knees bother him more.    Patient lives with his wife and 7-year-old son.  He is employed in a sitting job    Pertinent negatives:  Patient has no history of DVT or PE. Discussed risk factors.    ALLERGIES: Sulfa drugs; Oxycodone; Lisinopril; and Onion    MEDICATIONS:     Current Outpatient Medications:      atorvastatin (LIPITOR) 40 MG tablet, Take 1 tablet (40 mg) by mouth daily, Disp: 90 tablet, Rfl: 3     busPIRone (BUSPAR) 5 MG tablet, Take 1 tablet (5 mg) by mouth 2 times daily, Disp: 60 tablet, Rfl: 1     Calcium Carb-Cholecalciferol (CALCIUM 600 + D PO), Take 1 tablet by mouth 2 times daily , Disp: , Rfl:      Cholecalciferol (VITAMIN D3) 1000 units CAPS, Take 3 capsules by mouth daily, Disp: , Rfl:      Continuous Blood Gluc  (FREESTYLE KEILA READER) SUGAR, 1 each continuous, Disp: 1 Device, Rfl: 0     Continuous Blood Gluc Sensor (FREESTYLE KEILA 14 DAY SENSOR) [facility], USE 1 EVERY 14 DAYS, Disp: , Rfl:      Continuous Blood Gluc Sensor (FREESTYLE KEILA 14 DAY SENSOR) [facility], 1 each every 14 days, Disp: 2 each, Rfl: 11     Cyanocobalamin (B-12) 500 MCG SUBL, Place 1 tablet under the tongue daily, Disp: , Rfl:      hydrOXYzine (ATARAX) 25 MG tablet, Take 1-2 tablets (25-50 mg) by mouth 3 times daily as needed for anxiety, Disp: 60 tablet, Rfl: 1     insulin degludec (TRESIBA) 200 UNIT/ML pen, Inject 60 Units Subcutaneous daily, Disp: 9 mL, Rfl: 1     insulin pen needle (32G X 4 MM) 32G X 4 MM miscellaneous, Use 4 pen needles daily or as directed., Disp: 200 each, Rfl: 6     LORazepam (ATIVAN) 1 MG tablet, Take 1 tablet (1 mg) by mouth daily as needed for anxiety (Patient taking differently: Take 1 mg by mouth as needed for  "anxiety Taking 1 time daily), Disp: 30 tablet, Rfl: 2     losartan (COZAAR) 100 MG tablet, Take 1 tablet (100 mg) by mouth daily, Disp: 90 tablet, Rfl: 1     metFORMIN (GLUCOPHAGE) 500 MG tablet, Take 1 tablet (500 mg) by mouth 2 times daily (with meals), Disp: 180 tablet, Rfl: 3     multivitamin  peds with iron (FLINTSTONES COMPLETE) 60 MG chewable tablet, Take 2 chew tab by mouth every morning , Disp: , Rfl:      ONETOUCH DELICA LANCETS 33G MISC, 1 lancet 4 times daily, Disp: 100 each, Rfl: 6     sitagliptin (JANUVIA) 100 MG tablet, Take 1 tablet (100 mg) by mouth daily, Disp: 90 tablet, Rfl: 3     triamterene-HCTZ (MAXZIDE) 75-50 MG tablet, Take 1 tablet by mouth daily, Disp: 90 tablet, Rfl: 1      MEDICAL HISTORY:   Past Medical History:   Diagnosis Date     Diabetes (H)      Hypertension        SURGICAL HISTORY:   Past Surgical History:   Procedure Laterality Date     LAPAROSCOPIC BYPASS GASTRIC N/A 10/9/2018    Procedure: LAPAROSCOPIC BYPASS GASTRIC;  LAPAROSCOPIC WELLINGTON-EN Y GASTRIC BYPASS;  Surgeon: Alden Mcwilliams MD;  Location:  OR     ORTHOPEDIC SURGERY Right 02/2016   He had knee loose body removal in 2016 at [facility].      FAMILY HISTORY:   Family History   Problem Relation Age of Onset     Glaucoma Father      Diabetes Father      Prostate Cancer Paternal Grandfather      Macular Degeneration No family hx of          SOCIAL HISTORY:   Social History     Tobacco Use     Smoking status: Never Smoker     Smokeless tobacco: Never Used   Substance Use Topics     Alcohol use: Yes     Comment: socially         PHYSICAL EXAMINATION:  On physical examination the patient appears the stated age, is in no acute distress, affect is appropriate, and breathing is non-labored.  BMI: Body mass index is 32.73 kg/m .    Gait: patient walks with normal gait.     On physical examination the patient appears the stated age, is in no acute distress, affect is appropriate, and breathing is non-labored.  Ht 1.842 m (6' 0.52\")   Wt " "111 kg (244 lb 12.8 oz)   BMI 32.73 kg/m     6' .52\"  Body mass index is 32.73 kg/m .    LLE:  Significant atrophy of the quadriceps, skin intact.  Prior surgical incision: none  Overall limb alignment is: valgus  Effusion or swelling of the knee: none  Tenderness to palpation: with patellar grind  ROM: 0-125  Pain with knee ROM: yes, under the patella  Crepitance with knee ROM: yes  Extensor lag: none  MCL stability: Stable  Lateral Stability: Stable  Lachman: 1A  Posterior stability: stable  Pain with passive full hip range of motion:   Patellar translation: 1 quadrant medial, 2 quadrants lateral,firm endpoint  Apprehension: mild  Medial patella tilt: no  J-sign: mild J sign, relocates at 35 degrees.     Motor intact distally TA/GSC/EHL/FHL with 5/5 strength. SILT sp/dp/tibial/saph/sural nerves. DP/PT pulses palpable, 2+, toes warm and well perfused.     RLE:     No deformity, prior arthroscopic portals well-healed.  Overall limb alignment is: valgus  Effusion or swelling of the knee: none  Tenderness to palpation: with patellar grind  ROM: 0-130  Pain with knee ROM:  mild  Crepitance with knee ROM: yes, mild  Extensor lag: none  MCL stability: Stable  Lateral Stability: Stable  Lachman: 1A  Posterior stability: stable  Pain with passive full hip range of motion: none  Patellar translation: 1 quadrant medial, 1 quadrants lateral  Apprehension: none  Medial patella tilt: none  J-sign: mild, relocated at 30 degrees     Motor intact distally TA/GSC/EHL/FHL with 5/5 strength. SILT sp/dp/tibial/saph/sural nerves. DP/PT pulses palpable, 2+, toes warm and well perfused.      IMAGING:   Long-leg films obtained today show evidence of bilateral genu valgum, with a mechanical axis line following well into the lateral aspect of the lateral tibial plateau bilaterally, left (10 degrees) worse than right 8 degrees).    Left Knee XR obtained today shows evidence of some joint space narrowing and osteophyte formation on the AP " view.  On the lateral view the patient is noted to have normal height of the patella.  On the sunrise view we can see that there are (+)  osteophytes on the lateral trochlear aspect with some joint space narrowing.  (+) crossing sign.    MRI of the left knee obtained on October 23 of 2019 was reviewed.  This study shows that the patient has focal areas of  full-thickness lateral chondral wear on the femoral condyle, this coincides with the mechanical axis of his weightbearing line on the full length x-rays.    \There is also an area of chondral wear at the apex of the patella, Without concomitant cartilage wear on the trochlear groove side      APIF: patellar tilt is 8 degrees, his TT TG is 21.8 mm, his sulcus angle is 149 degrees.    ASSESSMENT/PLAN: Naman Jones is a 34 year old male with bilateral genu valgum, left-sided lateral compartment early arthritis, and bilateral patellar instability left worse than right.  We had an extensive discussion with the patient regarding his symptoms and potential treatment options.      I believe that he will be helped with surgical intervention, but due to his weight loss and his general deconditioning I would favor some attempt at strengthening and physical therapy prior to surgery    At this time the first thing that we would like to try is physical therapy to work on quadricep strengthening using blood flow restricting therapy, we would like him to do this twice a week for 6 weeks given the magnitude of his deconditioning.      Due to his valgus deformity and early arthritis of the left lateral compartment we feel that the patient is a surgical candidate for a distal femoral osteotomy to correct his valgus alignment, in addition to this we would perform a lateral retinacular release, lateral facetectomy, and (possibly) an MPFL reconstruction versus reefing of the medial tissues to stabilize his patella.  Based on physical exam alone and history I am not convinced he is  having true patella dislocating events.  This could be further examined under anesthesia to see if the kneecap can be dislocated laterally    Surgery would be at the end of January or early February 2020, .After his BFR; this will be done at [facility]   He will return in late February to see Dr. Victor, who will be assisting me with the osteotomy portion of the procedure.  His questions were answered to his satisfaction and he is in agreement with the treatment plan.    Patient was seen, discussed, and personally evaluated by Dr. Meng who agrees with the above assessment and plan.     Room time: 30 min, Consultation time: 20 min, mostly spent discussing the diagnosis and surgical recommendation, including potential complications and time line for return to function.    Jose Alejandro Roldan MD  Orthopaedic Surgery, PGY-4  Pager: [phone]    I have personally examined this patient and have reviewed the clinical presentation and progress note with the resident.  I agree with the treatment plan as outlined.  The plan was formulated with the resident on the day of the resident dictation.    Jennifer Meng MD         Answers for HPI/ROS submitted by the patient on 12/10/2019   General Symptoms: No  Skin Symptoms: No  HENT Symptoms: No  EYE SYMPTOMS: No  HEART SYMPTOMS: No  LUNG SYMPTOMS: No  INTESTINAL SYMPTOMS: No  URINARY SYMPTOMS: No  REPRODUCTIVE SYMPTOMS: No  SKELETAL SYMPTOMS: Yes  BLOOD SYMPTOMS: No  NERVOUS SYSTEM SYMPTOMS: No  MENTAL HEALTH SYMPTOMS: No  Back pain: Yes  Muscle aches: Yes  Neck pain: No  Swollen joints: Yes  Joint pain: Yes  Bone pain: Yes  Muscle cramps: No  Muscle weakness: No  Joint stiffness: Yes  Bone fracture: No

## 2019-12-10 NOTE — NURSING NOTE
Teaching Flowsheet   Relevant Diagnosis: Left patellar instability and arthritis  Teaching Topic: Left distal femoral osteotomy, lateral retinacular lengthening, partial lateral facetectomy.    Health history is positive for HTN in the past, not now, diabetes managed with Metformin and Januvia, hx of gastric bypass (Snehal-En-Y) a year and half ago.     Person(s) involved in teaching:   Patient     Motivation Level:  Asks Questions: Yes  Eager to Learn: Yes  Cooperative: Yes  Receptive (willing/able to accept information): Yes  Any cultural factors/Zoroastrianism beliefs that may influence understanding or compliance? No     Patient demonstrates understanding of the following:  Reason for the appointment, diagnosis and treatment plan: Yes  Knowledge of proper use of medications and conditions for which they are ordered (with special attention to potential side effects or drug interactions): Yes  Which situations necessitate calling provider and whom to contact: Yes     Teaching Concerns Addressed: Patient understands he will need a preop exam 14 to 30 days before surgery. He will begin physical therapy within 1 week after surgery.     Proper use and care of brace, crutches (medical equip, care aids, etc.): Yes  Nutritional needs and diet plan: Yes  Pain management techniques: Yes  Wound Care: Yes  How and/when to access community resources: Yes     Instructional Materials Used/Given: Preoperative teaching packet, surgical soap.

## 2019-12-10 NOTE — TELEPHONE ENCOUNTER
Patient called today.    Patient has scheduled 6 weeks of appointments 2 x a week with PT provider.    Patient also has an order for Chiropractic.    Patient is not sure why?    Please contact patient.    Thank you.    Central Scheduling  Ca CLAROS

## 2019-12-10 NOTE — NURSING NOTE
"Reason For Visit:   Chief Complaint   Patient presents with     Consult     Referred Dr Oziel Bran from [facility]. Dx: Patellar Instability and Arthritis. MRI completed 10/23 at [facility]. No surgeries. Appt per pt.       Primary MD: Haase, Susan Rachele  Ref. MD: Dr. Oziel Bran [facility]    ?  No  Occupation It work.  Currently working? Yes.  Work status?  Full time.    Date of injury: over the years  Type of injury: Dislocations.    Date of surgery: No  Type of surgery: No.    Smoker: No  Request smoking cessation information: No    Ht 1.842 m (6' 0.52\")   Wt 111 kg (244 lb 12.8 oz)   BMI 32.73 kg/m      Pain Assessment  Patient Currently in Pain: Yes  0-10 Pain Scale: 6  Primary Pain Location: Knee(left)  Pain Descriptors: Aching, Shooting  Alleviating Factors: Ice, NSAIDS  Aggravating Factors: Walking, Standing, Stairs, Sitting                                                   Lilibeth Formato, LPN  "

## 2019-12-10 NOTE — LETTER
12/10/2019       RE: Naman Jones  [address] [facility] [address]     Dear Colleague,    Thank you for referring your patient, Naman Jones, to the [facility] ORTHOPAEDIC [facility]INIC at [facility]. Please see a copy of my visit note below.        Department of Orthopedic Surgery  Clinic Consultation Note          PATIENT NAME: Naman Jones   MRN: [id number]  AGE: 34 year old  BMI: Body mass index is 32.73 kg/m .  REFERRING PHYSICIAN: Referred Self      CHIEF COMPLAINT: Consult (Referred Dr Oziel Bran from [facility]. Dx: Patellar Instability and Arthritis. MRI completed 10/23 at [facility]. No surgeries. Appt per pt.)      HISTORY OF PRESENT ILLNESS:  Naman Jones is a 34 year old male with past medical history significant for obesity, type 2 diabetes controlled with oral meds, hyperlipidemia, and hypertension.   Seen in consultation for bilateral patellar dislocations.      The patient reports that the first time that he sustained a dislocation was at age 12, since then he has had at least 15 episodes of patellear dislocations, divided roughly equally between both sides.  He has tried physical therapy, weight loss, and bracing; the latter does irritate the skin and he does not use this.      Upon more focused questioning, the patient actually describes his knee episodes as a giving out.  When asked if he feels something shift he said he always hears a pop.  He is never had to relocate his kneecap.  This is always followed by some degree of swelling.  However this is associated with significant pain swelling and discomfort for 2 to 6 weeks.      His last 'swelling dislocation' location was in May 2019 when he slipped on the stairs.  At this time he does not trust his knees even for activities of daily living.  He would like to return back to outdoors activity, particularly hiking.    Today the patient reports that he is having pain in the left knee  every day, the right knee occasionally.  This pain happens with both walking and stairclimbing.     When asked what bothers him more lack of knee confidence or pain he reports that lack of any confidence is his biggest issue.    Of note the patient had a Snehal-en-Y bariatric surgery last year and has gone from 365 pounds to 265 pounds.  Since then his diabetes has been on the control with oral medications ( vs. Insulin before surgery).  Despite the weight loss, he does not believe that his kneecap issues have been improved.  If possible that as he has become more active his knees bother him more.    Patient lives with his wife and 7-year-old son.  He is employed in a sitting job    Pertinent negatives:  Patient has no history of DVT or PE. Discussed risk factors.    ALLERGIES: Sulfa drugs; Oxycodone; Lisinopril; and Onion    MEDICATIONS:     Current Outpatient Medications:      atorvastatin (LIPITOR) 40 MG tablet, Take 1 tablet (40 mg) by mouth daily, Disp: 90 tablet, Rfl: 3     busPIRone (BUSPAR) 5 MG tablet, Take 1 tablet (5 mg) by mouth 2 times daily, Disp: 60 tablet, Rfl: 1     Calcium Carb-Cholecalciferol (CALCIUM 600 + D PO), Take 1 tablet by mouth 2 times daily , Disp: , Rfl:      Cholecalciferol (VITAMIN D3) 1000 units CAPS, Take 3 capsules by mouth daily, Disp: , Rfl:      Continuous Blood Gluc  (FREESTYLE KEILA READER) SUGAR, 1 each continuous, Disp: 1 Device, Rfl: 0     Continuous Blood Gluc Sensor (FREESTYLE KEILA 14 DAY SENSOR) [facility], USE 1 EVERY 14 DAYS, Disp: , Rfl:      Continuous Blood Gluc Sensor (FREESTYLE KEILA 14 DAY SENSOR) [facility], 1 each every 14 days, Disp: 2 each, Rfl: 11     Cyanocobalamin (B-12) 500 MCG SUBL, Place 1 tablet under the tongue daily, Disp: , Rfl:      hydrOXYzine (ATARAX) 25 MG tablet, Take 1-2 tablets (25-50 mg) by mouth 3 times daily as needed for anxiety, Disp: 60 tablet, Rfl: 1     insulin degludec (TRESIBA) 200 UNIT/ML pen, Inject 60 Units Subcutaneous daily, Disp: 9  mL, Rfl: 1     insulin pen needle (32G X 4 MM) 32G X 4 MM miscellaneous, Use 4 pen needles daily or as directed., Disp: 200 each, Rfl: 6     LORazepam (ATIVAN) 1 MG tablet, Take 1 tablet (1 mg) by mouth daily as needed for anxiety (Patient taking differently: Take 1 mg by mouth as needed for anxiety Taking 1 time daily), Disp: 30 tablet, Rfl: 2     losartan (COZAAR) 100 MG tablet, Take 1 tablet (100 mg) by mouth daily, Disp: 90 tablet, Rfl: 1     metFORMIN (GLUCOPHAGE) 500 MG tablet, Take 1 tablet (500 mg) by mouth 2 times daily (with meals), Disp: 180 tablet, Rfl: 3     multivitamin  peds with iron (FLINTSTONES COMPLETE) 60 MG chewable tablet, Take 2 chew tab by mouth every morning , Disp: , Rfl:      ONETOUCH DELICA LANCETS 33G MISC, 1 lancet 4 times daily, Disp: 100 each, Rfl: 6     sitagliptin (JANUVIA) 100 MG tablet, Take 1 tablet (100 mg) by mouth daily, Disp: 90 tablet, Rfl: 3     triamterene-HCTZ (MAXZIDE) 75-50 MG tablet, Take 1 tablet by mouth daily, Disp: 90 tablet, Rfl: 1      MEDICAL HISTORY:   Past Medical History:   Diagnosis Date     Diabetes (H)      Hypertension        SURGICAL HISTORY:   Past Surgical History:   Procedure Laterality Date     LAPAROSCOPIC BYPASS GASTRIC N/A 10/9/2018    Procedure: LAPAROSCOPIC BYPASS GASTRIC;  LAPAROSCOPIC WELLINGTON-EN Y GASTRIC BYPASS;  Surgeon: Alden Mcwilliams MD;  Location:  OR     ORTHOPEDIC SURGERY Right 02/2016   He had knee loose body removal in 2016 at [facility].      FAMILY HISTORY:   Family History   Problem Relation Age of Onset     Glaucoma Father      Diabetes Father      Prostate Cancer Paternal Grandfather      Macular Degeneration No family hx of          SOCIAL HISTORY:   Social History     Tobacco Use     Smoking status: Never Smoker     Smokeless tobacco: Never Used   Substance Use Topics     Alcohol use: Yes     Comment: socially         PHYSICAL EXAMINATION:  On physical examination the patient appears the stated age, is in no acute distress, affect is  "appropriate, and breathing is non-labored.  BMI: Body mass index is 32.73 kg/m .    Gait: patient walks with normal gait.     On physical examination the patient appears the stated age, is in no acute distress, affect is appropriate, and breathing is non-labored.  Ht 1.842 m (6' 0.52\")   Wt 111 kg (244 lb 12.8 oz)   BMI 32.73 kg/m     6' .52\"  Body mass index is 32.73 kg/m .    LLE:  Significant atrophy of the quadriceps, skin intact.  Prior surgical incision: none  Overall limb alignment is: valgus  Effusion or swelling of the knee: none  Tenderness to palpation: with patellar grind  ROM: 0-125  Pain with knee ROM: yes, under the patella  Crepitance with knee ROM: yes  Extensor lag: none  MCL stability: Stable  Lateral Stability: Stable  Lachman: 1A  Posterior stability: stable  Pain with passive full hip range of motion:   Patellar translation: 1 quadrant medial, 2 quadrants lateral,firm endpoint  Apprehension: mild  Medial patella tilt: no  J-sign: mild J sign, relocates at 35 degrees.     Motor intact distally TA/GSC/EHL/FHL with 5/5 strength. SILT sp/dp/tibial/saph/sural nerves. DP/PT pulses palpable, 2+, toes warm and well perfused.     RLE:     No deformity, prior arthroscopic portals well-healed.  Overall limb alignment is: valgus  Effusion or swelling of the knee: none  Tenderness to palpation: with patellar grind  ROM: 0-130  Pain with knee ROM:  mild  Crepitance with knee ROM: yes, mild  Extensor lag: none  MCL stability: Stable  Lateral Stability: Stable  Lachman: 1A  Posterior stability: stable  Pain with passive full hip range of motion: none  Patellar translation: 1 quadrant medial, 1 quadrants lateral  Apprehension: none  Medial patella tilt: none  J-sign: mild, relocated at 30 degrees     Motor intact distally TA/GSC/EHL/FHL with 5/5 strength. SILT sp/dp/tibial/saph/sural nerves. DP/PT pulses palpable, 2+, toes warm and well perfused.      IMAGING:   Long-leg films obtained today show evidence of " bilateral genu valgum, with a mechanical axis line following well into the lateral aspect of the lateral tibial plateau bilaterally, left (10 degrees) worse than right 8 degrees).    Left Knee XR obtained today shows evidence of some joint space narrowing and osteophyte formation on the AP view.  On the lateral view the patient is noted to have normal height of the patella.  On the sunrise view we can see that there are (+)  osteophytes on the lateral trochlear aspect with some joint space narrowing.  (+) crossing sign.    MRI of the left knee obtained on October 23 of 2019 was reviewed.  This study shows that the patient has focal areas of  full-thickness lateral chondral wear on the femoral condyle, this coincides with the mechanical axis of his weightbearing line on the full length x-rays.    \There is also an area of chondral wear at the apex of the patella, Without concomitant cartilage wear on the trochlear groove side      APIF: patellar tilt is 8 degrees, his TT TG is 21.8 mm, his sulcus angle is 149 degrees.    ASSESSMENT/PLAN: Naman Jones is a 34 year old male with bilateral genu valgum, left-sided lateral compartment early arthritis, and bilateral patellar instability left worse than right.  We had an extensive discussion with the patient regarding his symptoms and potential treatment options.      I believe that he will be helped with surgical intervention, but due to his weight loss and his general deconditioning I would favor some attempt at strengthening and physical therapy prior to surgery    At this time the first thing that we would like to try is physical therapy to work on quadricep strengthening using blood flow restricting therapy, we would like him to do this twice a week for 6 weeks given the magnitude of his deconditioning.      Due to his valgus deformity and early arthritis of the left lateral compartment we feel that the patient is a surgical candidate for a distal femoral osteotomy  to correct his valgus alignment, in addition to this we would perform a lateral retinacular release, lateral facetectomy, and (possibly) an MPFL reconstruction versus reefing of the medial tissues to stabilize his patella.  Based on physical exam alone and history I am not convinced he is having true patella dislocating events.  This could be further examined under anesthesia to see if the kneecap can be dislocated laterally    Surgery would be at the end of January or early February 2020, .After his BFR; this will be done at [place]   He will return in late February to see Dr. Victor, who will be assisting me with the osteotomy portion of the procedure.  His questions were answered to his satisfaction and he is in agreement with the treatment plan.    Patient was seen, discussed, and personally evaluated by Dr. Meng who agrees with the above assessment and plan.     Room time: 30 min, Consultation time: 20 min, mostly spent discussing the diagnosis and surgical recommendation, including potential complications and time line for return to function.    Jose Alejandro Roldan MD  Orthopaedic Surgery, PGY-4  Pager: [phone]    I have personally examined this patient and have reviewed the clinical presentation and progress note with the resident.  I agree with the treatment plan as outlined.  The plan was formulated with the resident on the day of the resident dictation.    Jennifer Meng MD         Answers for HPI/ROS submitted by the patient on 12/10/2019   General Symptoms: No  Skin Symptoms: No  HENT Symptoms: No  EYE SYMPTOMS: No  HEART SYMPTOMS: No  LUNG SYMPTOMS: No  INTESTINAL SYMPTOMS: No  URINARY SYMPTOMS: No  REPRODUCTIVE SYMPTOMS: No  SKELETAL SYMPTOMS: Yes  BLOOD SYMPTOMS: No  NERVOUS SYSTEM SYMPTOMS: No  MENTAL HEALTH SYMPTOMS: No  Back pain: Yes  Muscle aches: Yes  Neck pain: No  Swollen joints: Yes  Joint pain: Yes  Bone pain: Yes  Muscle cramps: No  Muscle weakness: No  Joint stiffness:  Yes  Bone fracture: No      Again, thank you for allowing me to participate in the care of your patient.      Sincerely,    Jennifer Meng MD

## 2019-12-11 ENCOUNTER — PREP FOR PROCEDURE (OUTPATIENT)
Dept: ORTHOPEDICS | Facility: CLINIC | Age: 34
End: 2019-12-11

## 2019-12-11 ENCOUNTER — THERAPY VISIT (OUTPATIENT)
Dept: PHYSICAL THERAPY | Facility: CLINIC | Age: 34
End: 2019-12-11
Attending: ORTHOPAEDIC SURGERY
Payer: COMMERCIAL

## 2019-12-11 DIAGNOSIS — M25.562 LEFT KNEE PAIN, UNSPECIFIED CHRONICITY: ICD-10-CM

## 2019-12-11 DIAGNOSIS — M25.362 PATELLAR INSTABILITY OF LEFT KNEE: ICD-10-CM

## 2019-12-11 DIAGNOSIS — M25.561 PAIN IN BOTH KNEES, UNSPECIFIED CHRONICITY: ICD-10-CM

## 2019-12-11 DIAGNOSIS — M25.562 PAIN IN BOTH KNEES, UNSPECIFIED CHRONICITY: ICD-10-CM

## 2019-12-11 PROBLEM — M25.361 PATELLAR INSTABILITY OF RIGHT KNEE: Status: ACTIVE | Noted: 2019-12-11

## 2019-12-11 PROCEDURE — 97110 THERAPEUTIC EXERCISES: CPT | Mod: GP | Performed by: PHYSICAL THERAPIST

## 2019-12-11 NOTE — PROGRESS NOTES
PROGRESS  REPORT    Progress reporting period is from Dec 10, 2019 to Dec 11, 2019.       SUBJECTIVE  Subjective changes noted by patient:  No major changes since initial eval yesterday.       Current pain level is 5/10  .     Adverse reaction to treatment or activity: None    BFR Screen:  Precautions:   Poor circulation (indicators being shining/scaly skin, brittle/dry nails, extremity hair loss, varicose veins, capillary refill time, etc.)  Obese or with limb tissue that is loose  Patients taking antihypertensives, or creatine supplements  Arterial calcification  Abnormal clotting times  Sickle cell trait  Tumor  General infection  Hypertension  Cardiopulmonary conditions  Renal compromise  Clinically significant acid-base imbalance  Atherosclerotic vessels      Contraindications:  Venous thromboembolism  Impaired circulation or peripheral vascular compromise  Previous revascularization of the extremity  Extremities with dialysis access  Acidosis  Sickle cell anemia  Extremity infection  Tumor distal to the tourniquet  Medication and supplements known to increase clotting risk  Open fracture  Increased intracranial pressure  Open soft tissue injuries  Severe crushing injuries  Severe hypertension  Skin grafts in which all bleeding points must be readily distinguished  Secondary or delayed procedures after immobilization  Vascular grafting  Lymphectomies  Cancer    Patient denies all of the above. Reports the following:   Diabetes - some impairment of sensation in the toes, tingling in the tips.       OBJECTIVE    Please see physical therapy findings/note from visit yesterday (12/10/2019).     Blood Flow Restriction Training: Contraindications and precautions reviewed, pt safe for use of  modality, Risks and benefits discussed; pt gave informed consent  Lingering effects (from previous session): N/A  Target Limb: Left Lower Extremity  Limb Occlusion Pressure (LOP): 203 mmHg  Percent Occlusion (80% for LE, 50% for  UE): 80 %  Personal Tourniquet Pressure (PTP): 162 mmHg  Tourniquet Size: Green    Set   Exercises Performed Total time under tourniquet Immediate effects   Rate of Perceived Exertion   1 (30 reps) SL Leg Press 30 Lbs 2 min  5-6/10   2 (15 reps) SL Leg Press 30 Lbs 1 min  7/10   3 (15 reps) SL Leg Press 30 Lbs 1 min  8/10   4 (15 reps) SL Leg Press 30 Lbs 1 min   9/10       ASSESSMENT/PLAN  Updated problem list and treatment plan: Diagnosis 1:  Left patellar instability  Pain -  hot/cold therapy, manual therapy, STS, splint/taping/bracing/orthotics, self management and education  Decreased ROM/flexibility - manual therapy, therapeutic exercise and home program  Decreased strength - therapeutic exercise, therapeutic activities and home program  Impaired balance - neuro re-education, therapeutic activities and home program  Decreased proprioception - neuro re-education, therapeutic activities and home program  Impaired muscle performance - neuro re-education and home program  Decreased function - therapeutic activities and home program    I have re-evaluated this patient and find that the nature, scope, duration and intensity of the therapy is appropriate for the medical condition of the patient.  Naman continues to require the following intervention to meet STG and LTG's:  PT    Recommendations:  This patient would benefit from continued therapy.     Frequency:  2 X week, once daily  Duration:  for 6 weeks.     This is the recommended prescription by the referring physician to prepare the patient for an upcoming surgery and improve outcomes after surgery by developing more strength prior.     Please refer to the daily flowsheet for treatment today, total treatment time and time spent performing 1:1 timed codes.

## 2019-12-13 ENCOUNTER — THERAPY VISIT (OUTPATIENT)
Dept: PHYSICAL THERAPY | Facility: CLINIC | Age: 34
End: 2019-12-13
Payer: COMMERCIAL

## 2019-12-13 DIAGNOSIS — M25.362 PATELLAR INSTABILITY OF LEFT KNEE: ICD-10-CM

## 2019-12-13 DIAGNOSIS — M25.562 LEFT KNEE PAIN, UNSPECIFIED CHRONICITY: ICD-10-CM

## 2019-12-13 PROCEDURE — 97110 THERAPEUTIC EXERCISES: CPT | Mod: GP | Performed by: PHYSICAL THERAPY ASSISTANT

## 2019-12-13 NOTE — PROGRESS NOTES
Date: Dec 13, 2019    Blood Flow Restriction Training: Contraindications and precautions reviewed, pt safe for use of  modality, Risks and benefits discussed; pt gave informed consent  Lingering effects (from previous session): None  Target Limb: Left   Lower Extremity  Limb Occlusion Pressure (LOP): 165 mmHg  Percent Occlusion (80% for LE, 50% for UE): 80 %  Personal Tourniquet Pressure (PTP): 132 mmHg  Tourniquet Size: Green    Set   Exercises Performed Total time under tourniquet Immediate effects   Rate of Perceived Exertion   1 (30 reps) Left SLR AG 2 min fatigued 8/10   2 (15 reps) Left SLR AG 1 min fatigued 9/10   3 (15 reps) Left SLR AG 1 min fatigued 9/10   4 (15 reps) Left SLR AG 1 min  fatigued 10/10     NOTES:    Set   Exercises Performed Total time under tourniquet Immediate effects   Rate of Perceived Exertion   1 (27 reps) Leg press L 1.5 pl 2 min fatigued 7/10   2 (15 reps) Leg press L 1.5 pl 1 min fatigued 8/10   3 (15 reps) Leg press L 1.5 pl 1 min fatigued 8/10   4 (15 reps) Leg press L 1.5 pl 1 min  fatigued 9/10

## 2019-12-17 ENCOUNTER — THERAPY VISIT (OUTPATIENT)
Dept: PHYSICAL THERAPY | Facility: CLINIC | Age: 34
End: 2019-12-17
Payer: COMMERCIAL

## 2019-12-17 DIAGNOSIS — M25.562 LEFT KNEE PAIN, UNSPECIFIED CHRONICITY: ICD-10-CM

## 2019-12-17 DIAGNOSIS — M25.362 PATELLAR INSTABILITY OF LEFT KNEE: ICD-10-CM

## 2019-12-17 PROCEDURE — 97110 THERAPEUTIC EXERCISES: CPT | Mod: GP | Performed by: PHYSICAL THERAPIST

## 2019-12-17 NOTE — PROGRESS NOTES
Date: Dec 17, 2019    Blood Flow Restriction Training: Contraindications and precautions reviewed, pt safe for use of  modality, Risks and benefits discussed; pt gave informed consent  Lingering effects (from previous session): Just fatigued  Target Limb: Left Lower Extremity  Limb Occlusion Pressure (LOP): 151 mmHg  Percent Occlusion (80% for LE, 50% for UE): 80 %  Personal Tourniquet Pressure (PTP): 121 mmHg  Tourniquet Size: Green    Set   Exercises Performed Total time under tourniquet Immediate effects   Rate of Perceived Exertion   1 (30 reps) SLR AG 2 min  8/10   2 (15 reps) SLR AG 1 min  9/10   3 (15 reps) SLR AG 1 min  10/10   4 (15 reps) SLR AG 1 min  Had to switch to SAQ 10/10       Set   Exercises Performed Total time under tourniquet Immediate effects   Rate of Perceived Exertion   1 (30 reps) SL Leg Press 2 pl 2 min  7/10   2 (15 reps) SL Leg Press 2 pl 1 min  8/10   3 (15 reps) SL Leg Press 2 pl 1 min  9/10   4 (15 reps) SL Leg Press 2 pl 1 min   10/10

## 2019-12-18 NOTE — TELEPHONE ENCOUNTER
Patient was called and it was clarified that Dr. Meng is not referring him to a chiropractor the order that we have in our system include that if it is needed.   He as agreeable and had no other questions.

## 2019-12-20 ENCOUNTER — THERAPY VISIT (OUTPATIENT)
Dept: PHYSICAL THERAPY | Facility: CLINIC | Age: 34
End: 2019-12-20
Payer: COMMERCIAL

## 2019-12-20 DIAGNOSIS — M25.562 LEFT KNEE PAIN, UNSPECIFIED CHRONICITY: ICD-10-CM

## 2019-12-20 DIAGNOSIS — M25.362 PATELLAR INSTABILITY OF LEFT KNEE: ICD-10-CM

## 2019-12-20 PROCEDURE — 97110 THERAPEUTIC EXERCISES: CPT | Mod: GP | Performed by: PHYSICAL THERAPY ASSISTANT

## 2019-12-20 NOTE — PROGRESS NOTES
Date: Dec 20, 2019    Blood Flow Restriction Training: Contraindications and precautions reviewed, pt safe for use of  modality, Risks and benefits discussed; pt gave informed consent  Lingering effects (from previous session): more weakness , vs pain  Target Limb:Left   Lower Extremity  Limb Occlusion Pressure (LOP): 193 mmHg  Percent Occlusion (80% for LE, 50% for UE): 80 %  Personal Tourniquet Pressure (PTP): 154 mmHg  Tourniquet Size: Green    Set   Exercises Performed Total time under tourniquet Immediate effects   Rate of Perceived Exertion   1 (30 reps) SLR Left AG 2 min Getting tired 8/10   2 (15 reps) SLR Left AG 1 min Getting tire 9/10   3 (15 reps) SLR Left AG 1 min fatigued 10/10   4 (15 reps) SLR Left AG 1 min  fatigued 10/10     Set   Exercises Performed Total time under tourniquet Immediate effects   Rate of Perceived Exertion   1 (29 reps) Leg press 2.5 L 2 min Getting tired 8/10   2 (13 reps) Leg press 2.5 L 1 min fatigued 9/10   3 (15 reps) Leg press 2.5 L 1 min fatigued 10/10   4 (15 reps) Leg press 2.5 L 1 min  fatigued 10/10     NOTES:

## 2019-12-24 ENCOUNTER — THERAPY VISIT (OUTPATIENT)
Dept: PHYSICAL THERAPY | Facility: CLINIC | Age: 34
End: 2019-12-24
Payer: COMMERCIAL

## 2019-12-24 DIAGNOSIS — M25.362 PATELLAR INSTABILITY OF LEFT KNEE: ICD-10-CM

## 2019-12-24 DIAGNOSIS — M25.562 LEFT KNEE PAIN, UNSPECIFIED CHRONICITY: ICD-10-CM

## 2019-12-24 PROCEDURE — 97110 THERAPEUTIC EXERCISES: CPT | Mod: GP | Performed by: PHYSICAL THERAPIST

## 2019-12-24 NOTE — PROGRESS NOTES
Date: Dec 24, 2019    Blood Flow Restriction Training: Contraindications and precautions reviewed, pt safe for use of  modality, Risks and benefits discussed; pt gave informed consent  Lingering effects (from previous session): None  Target Limb:  Left Lower Extremity  Limb Occlusion Pressure (LOP): 183 mmHg  Percent Occlusion (80% for LE, 50% for UE): 80 %  Personal Tourniquet Pressure (PTP): 146 mmHg  Tourniquet Size: Green    Set   Exercises Performed Total time under tourniquet Immediate effects   Rate of Perceived Exertion   1 (30 reps) SLR flexion 2 min  8/10   2 (15 reps) SLR flexion 1 min Just tired 9/10   3 (15 reps) SLR flexion 1 min  9/10   4 (15 reps) SLR flexion 1 min   10/10       Set   Exercises Performed Total time under tourniquet Immediate effects   Rate of Perceived Exertion   1 (30 reps) SL Leg Press 3 pl 2 min  9/10   2 (15 reps) SL Leg Press 2.5 pl 1 min Muscle failure 10/10   3 (15 reps) SL Leg Press 2 pl 1 min Muscle failure, sore 10/10   4 (15 reps) SL Leg Press 1.5 pl 1 min  sore 10/10     NOTES: Patient was struggling with leg press today, needed to use his other limb to help towards the end of the leg press round despite being at a weight much lower than he previously had been.

## 2019-12-25 DIAGNOSIS — F41.1 GAD (GENERALIZED ANXIETY DISORDER): ICD-10-CM

## 2019-12-26 ENCOUNTER — THERAPY VISIT (OUTPATIENT)
Dept: PHYSICAL THERAPY | Facility: CLINIC | Age: 34
End: 2019-12-26
Payer: COMMERCIAL

## 2019-12-26 DIAGNOSIS — M25.362 PATELLAR INSTABILITY OF LEFT KNEE: ICD-10-CM

## 2019-12-26 DIAGNOSIS — M25.562 LEFT KNEE PAIN, UNSPECIFIED CHRONICITY: ICD-10-CM

## 2019-12-26 PROCEDURE — 97110 THERAPEUTIC EXERCISES: CPT | Mod: GP | Performed by: PHYSICAL THERAPIST

## 2019-12-26 NOTE — PROGRESS NOTES
Date: Dec 26, 2019    Blood Flow Restriction Training: Contraindications and precautions reviewed, pt safe for use of  modality, Risks and benefits discussed; pt gave informed consent  Lingering effects (from previous session): Was sore for the rest of the day of the previous session  Target Limb:  Left Lower Extremity  Limb Occlusion Pressure (LOP): 193 mmHg  Percent Occlusion (80% for LE, 50% for UE): 80 %  Personal Tourniquet Pressure (PTP): 154 mmHg  Tourniquet Size: Green    Set   Exercises Performed Total time under tourniquet Immediate effects   Rate of Perceived Exertion   1 (30 reps) SLR flexion AG 2 min  7/10   2 (15 reps) SLR flexion AG 1 min  7.5/10   3 (15 reps) SLR flexion AG 1 min  8/10   4 (15 reps) SLR flexion AG 1 min   9/10       Set   Exercises Performed Total time under tourniquet Immediate effects   Rate of Perceived Exertion   1 (30 reps) SL Leg Press 2.5 pl 2 min  8/10   2 (15 reps) SL Leg Press 2.5 pl 1 min Suddenly sore and weak, needs to drop down.  10/10   3 (15 reps) SL Leg Press 2 pl 1 min  9.5/10   4 (15 reps) SL Leg Press 2 pl 1 min   10/10     NOTES:

## 2019-12-27 RX ORDER — BUSPIRONE HYDROCHLORIDE 5 MG/1
TABLET ORAL
Qty: 180 TABLET | Refills: 1 | Status: SHIPPED | OUTPATIENT
Start: 2019-12-27 | End: 2020-07-28

## 2019-12-27 NOTE — TELEPHONE ENCOUNTER
"Prescription approved per [facility] Refill Protocol.    Requested Prescriptions   Pending Prescriptions Disp Refills     busPIRone (BUSPAR) 5 MG tablet [Pharmacy Med Name: BUSPIRONE 5MG TABLETS] 60 tablet 1     Sig: TAKE 1 TABLET(5 MG) BY MOUTH TWICE DAILY       Atypical Antidepressants Protocol Passed - 12/25/2019  7:45 PM        Passed - Recent (12 mo) or future (30 days) visit within the authorizing provider's specialty     Patient has had an office visit with the authorizing provider or a provider within the authorizing providers department within the previous 12 mos or has a future within next 30 days. See \"Patient Info\" tab in inbasket, or \"Choose Columns\" in Meds & Orders section of the refill encounter.              Passed - Medication active on med list        Passed - Patient is age 18 or older        Kassandra Espinoza RN Flex    "

## 2019-12-31 ENCOUNTER — THERAPY VISIT (OUTPATIENT)
Dept: PHYSICAL THERAPY | Facility: CLINIC | Age: 34
End: 2019-12-31
Payer: COMMERCIAL

## 2019-12-31 DIAGNOSIS — M25.362 PATELLAR INSTABILITY OF LEFT KNEE: ICD-10-CM

## 2019-12-31 DIAGNOSIS — M25.562 LEFT KNEE PAIN, UNSPECIFIED CHRONICITY: ICD-10-CM

## 2019-12-31 PROCEDURE — 97110 THERAPEUTIC EXERCISES: CPT | Mod: GP | Performed by: PHYSICAL THERAPIST

## 2019-12-31 NOTE — PROGRESS NOTES
Date: Dec 31, 2019    Blood Flow Restriction Training: Contraindications and precautions reviewed, pt safe for use of  modality, Risks and benefits discussed; pt gave informed consent  Lingering effects (from previous session): None  Target Limb: Left Lower Extremity  Limb Occlusion Pressure (LOP): 186 mmHg  Percent Occlusion (80% for LE, 50% for UE): 80 %  Personal Tourniquet Pressure (PTP): 148 mmHg  Tourniquet Size: Green    Set   Exercises Performed Total time under tourniquet Immediate effects   Rate of Perceived Exertion   1 (30 reps) SLR flexion 2 min  8/10   2 (15 reps) SLR flexion 1 min  9/10   3 (15 reps) SLR flexion 1 min  10/10   4 (15 reps) SLR flexion 1 min   10/10     Set   Exercises Performed Total time under tourniquet Immediate effects   Rate of Perceived Exertion   1 (30 reps) SL Leg Press 2pl 2 min  8/10   2 (15 reps) SL Leg Press 2pl 1 min  9/10   3 (15 reps) SL Leg Press 2pl 1 min  9/10   4 (15 reps) SL Leg Press 2pl 1 min   10/10     NOTES:

## 2020-01-02 ENCOUNTER — THERAPY VISIT (OUTPATIENT)
Dept: PHYSICAL THERAPY | Facility: CLINIC | Age: 35
End: 2020-01-02
Payer: COMMERCIAL

## 2020-01-02 DIAGNOSIS — M25.562 LEFT KNEE PAIN, UNSPECIFIED CHRONICITY: ICD-10-CM

## 2020-01-02 DIAGNOSIS — M25.362 PATELLAR INSTABILITY OF LEFT KNEE: ICD-10-CM

## 2020-01-02 PROCEDURE — 97110 THERAPEUTIC EXERCISES: CPT | Mod: GP | Performed by: PHYSICAL THERAPIST

## 2020-01-02 NOTE — PROGRESS NOTES
Date: Jan 2, 2020    Blood Flow Restriction Training: Contraindications and precautions reviewed, pt safe for use of  modality, Risks and benefits discussed; pt gave informed consent  Lingering effects (from previous session): None  Target Limb:  Left Lower Extremity  Limb Occlusion Pressure (LOP): 205 mmHg  Percent Occlusion (80% for LE, 50% for UE): 80 %  Personal Tourniquet Pressure (PTP): 164 mmHg  Tourniquet Size: Green    SET #1   Exercises Performed Total time under tourniquet Immediate effects   Rate of Perceived Exertion   1 (30 reps) SLR flexion 1 LB 2 min  8.5/10   2 (15 reps) SLR flexion 1 LB 1 min  9/10   3 (15 reps) SLR flexion 1 LB 1 min  10/10   4 (15 reps) SLR flexion 1 LB 1 min   10/10     Notes: Patient felt that it was a lot more than 1 Lb given the amount of additional effort needed. 5 minutes of rest was granted between exercises, rather than the typical 1 minute.     SET #2   Exercises Performed Total time under tourniquet Immediate effects   Rate of Perceived Exertion   1 (30 reps) SL Leg Press 2.25 pl 2 min  7/10   2 (15 reps) SL Leg Press 2.25 pl 1 min  8/10   3 (15 reps) SL Leg Press 2.25 pl 1 min  10/10   4 (15 reps) SL Leg Press 2.25 pl 1 min   11/10

## 2020-01-06 ENCOUNTER — THERAPY VISIT (OUTPATIENT)
Dept: PHYSICAL THERAPY | Facility: CLINIC | Age: 35
End: 2020-01-06
Payer: COMMERCIAL

## 2020-01-06 DIAGNOSIS — M25.362 PATELLAR INSTABILITY OF LEFT KNEE: ICD-10-CM

## 2020-01-06 DIAGNOSIS — M25.562 LEFT KNEE PAIN, UNSPECIFIED CHRONICITY: ICD-10-CM

## 2020-01-06 PROCEDURE — 97110 THERAPEUTIC EXERCISES: CPT | Mod: GP | Performed by: PHYSICAL THERAPIST

## 2020-01-06 NOTE — PROGRESS NOTES
Date: Jan 6, 2020    Blood Flow Restriction Training: Contraindications and precautions reviewed, pt safe for use of  modality, Risks and benefits discussed; pt gave informed consent  Lingering effects (from previous session): None  Target Limb:  Left Lower Extremity  Limb Occlusion Pressure (LOP): 181 mmHg  Percent Occlusion (80% for LE, 50% for UE): 80 %  Personal Tourniquet Pressure (PTP): 144 mmHg  Tourniquet Size: Green    SET #1   Exercises Performed Total time under tourniquet Immediate effects   Rate of Perceived Exertion   1 (30 reps) SL Leg Press 2.25 plates 2 min  7/10   2 (15 reps) SL Leg Press 2.25 plates 1 min  8/10   3 (15 reps) SL Leg Press 2.25 plates 1 min  9/10   4 (15 reps) SL Leg Press 2.25 plates 1 min   10/10       SET #2   Exercises Performed Total time under tourniquet Immediate effects   Rate of Perceived Exertion   1 (30 reps) SLR flexion 1 LB 2 min  8/10   2 (15 reps) SLR flexion 1 LB 1 min  9/10   3 (15 reps) SLR flexion 1 LB 1 min  9/10   4 (15 reps) SLR flexion 1 LB 1 min   10/10     NOTES:  Patient normally would complete the SLR first, but it was mixed up today to see if the outcome/difficulty ratings would change.

## 2020-01-10 ENCOUNTER — THERAPY VISIT (OUTPATIENT)
Dept: PHYSICAL THERAPY | Facility: CLINIC | Age: 35
End: 2020-01-10
Payer: COMMERCIAL

## 2020-01-10 DIAGNOSIS — M25.362 PATELLAR INSTABILITY OF LEFT KNEE: ICD-10-CM

## 2020-01-10 DIAGNOSIS — M25.562 LEFT KNEE PAIN, UNSPECIFIED CHRONICITY: ICD-10-CM

## 2020-01-10 PROCEDURE — 97110 THERAPEUTIC EXERCISES: CPT | Mod: GP | Performed by: PHYSICAL THERAPIST

## 2020-01-10 NOTE — PROGRESS NOTES
Date: Juan 10, 2020    Blood Flow Restriction Training: Contraindications and precautions reviewed, pt safe for use of  modality, Risks and benefits discussed; pt gave informed consent  Lingering effects (from previous session): just sore  Target Limb:  Left Lower Extremity  Limb Occlusion Pressure (LOP): 189 mmHg  Percent Occlusion (80% for LE, 50% for UE): 80 %  Personal Tourniquet Pressure (PTP): 151 mmHg  Tourniquet Size: Green    SET #1   Exercises Performed Total time under tourniquet Immediate effects   Rate of Perceived Exertion   1 (30 reps) SL Leg Press 2.5 pl  2 min  7/10   2 (15 reps) SL Leg Press 2.5 pl  1 min  9/10   3 (15 reps) SL Leg Press 2.5 pl  1 min Switched to 1.5 pl after 4 reps 10/10   4 (15 reps) SL Leg Press 1.5 pl 1 min  Pain in the knee itself, not just the quad. Severe ache, not sharp.  10/10     5 minute rest break permitted between exercises.     SET #2   Exercises Performed Total time under tourniquet Immediate effects   Rate of Perceived Exertion   1 (30 reps) SLR flexion AG 2 min  7/10   2 (15 reps) SLR flexion AG 1 min  8/10   3 (15 reps) SLR flexion AG 1 min  9.5/10   4 (15 reps) SLR flexion AG 1 min   10/10     NOTES: Patient notes that it has been a stressful week and he has not been sleeping well.

## 2020-01-14 ENCOUNTER — THERAPY VISIT (OUTPATIENT)
Dept: PHYSICAL THERAPY | Facility: CLINIC | Age: 35
End: 2020-01-14
Payer: COMMERCIAL

## 2020-01-14 DIAGNOSIS — M25.562 LEFT KNEE PAIN, UNSPECIFIED CHRONICITY: ICD-10-CM

## 2020-01-14 DIAGNOSIS — M25.362 PATELLAR INSTABILITY OF LEFT KNEE: ICD-10-CM

## 2020-01-14 PROCEDURE — 97110 THERAPEUTIC EXERCISES: CPT | Mod: GP | Performed by: PHYSICAL THERAPIST

## 2020-01-14 NOTE — PROGRESS NOTES
Date: Jan 14, 2020    Blood Flow Restriction Training: Contraindications and precautions reviewed, pt safe for use of  modality, Risks and benefits discussed; pt gave informed consent  Lingering effects (from previous session): none presently  Target Limb: Left Lower Extremity  Limb Occlusion Pressure (LOP): 172 mmHg  Percent Occlusion (80% for LE, 50% for UE): 80 %  Personal Tourniquet Pressure (PTP): 137 mmHg  Tourniquet Size: Green    SET #1   Exercises Performed Total time under tourniquet Immediate effects   Rate of Perceived Exertion   1 (30 reps) SL Leg Press 2.5 pl 2 min  7/10   2 (15 reps) SL Leg Press 2.5 pl 1 min  9/10   3 (15 reps) SL Leg Press 2.5 pl 1 min  10/10   4 (15 reps) SL Leg Press 2.5 pl 1 min   10/10       SET #2   Exercises Performed Total time under tourniquet Immediate effects   Rate of Perceived Exertion   1 (30 reps) SLR flexion AG 2 min  7/10   2 (15 reps) SLR flexion AG 1 min  9/10   3 (15 reps) SLR flexion AG 1 min  9/10   4 (15 reps) SLR flexion AG 1 min   10/10     NOTES:

## 2020-01-15 DIAGNOSIS — F41.1 GAD (GENERALIZED ANXIETY DISORDER): ICD-10-CM

## 2020-01-16 RX ORDER — HYDROXYZINE HYDROCHLORIDE 25 MG/1
TABLET, FILM COATED ORAL
Qty: 60 TABLET | Refills: 1 | Status: SHIPPED | OUTPATIENT
Start: 2020-01-16 | End: 2020-02-14

## 2020-01-16 NOTE — TELEPHONE ENCOUNTER
"Requested Prescriptions   Pending Prescriptions Disp Refills     hydrOXYzine (ATARAX) 25 MG tablet [Pharmacy Med Name: HYDROXYZINE HCL 25MG TABS (WHITE)] 60 tablet 1     Sig: TAKE 1 TO 2 TABLETS(25 TO 50 MG) BY MOUTH THREE TIMES DAILY AS NEEDED FOR ITCHING   Last Written Prescription Date:  10/18/2019  Last Fill Quantity: 60,  # refills: 1   Last office visit: 10/18/2019 with prescribing provider   Future Office Visit:        Antihistamines Protocol Passed - 1/15/2020  9:57 PM        Passed - Recent (12 mo) or future (30 days) visit within the authorizing provider's specialty     Patient has had an office visit with the authorizing provider or a provider within the authorizing providers department within the previous 12 mos or has a future within next 30 days. See \"Patient Info\" tab in inbasket, or \"Choose Columns\" in Meds & Orders section of the refill encounter.              Passed - Patient is age 3 or older     Apply age and/or weight-based dosing for peds patients age 3 and older.    Forward request to provider for patients under the age of 3.          Passed - Medication is active on med list        Prescription approved per [facility].  Valentina Mallory RN on 1/16/2020 at 10:18 AM    "

## 2020-01-17 ENCOUNTER — THERAPY VISIT (OUTPATIENT)
Dept: PHYSICAL THERAPY | Facility: CLINIC | Age: 35
End: 2020-01-17
Payer: COMMERCIAL

## 2020-01-17 DIAGNOSIS — M25.562 LEFT KNEE PAIN, UNSPECIFIED CHRONICITY: ICD-10-CM

## 2020-01-17 DIAGNOSIS — M25.362 PATELLAR INSTABILITY OF LEFT KNEE: ICD-10-CM

## 2020-01-17 PROCEDURE — 97110 THERAPEUTIC EXERCISES: CPT | Mod: GP | Performed by: PHYSICAL THERAPIST

## 2020-01-17 NOTE — PROGRESS NOTES
Date: Jan 17, 2020    Blood Flow Restriction Training: Contraindications and precautions reviewed, pt safe for use of  modality, Risks and benefits discussed; pt gave informed consent  Lingering effects (from previous session): None  Target Limb: Left Lower Extremity  Limb Occlusion Pressure (LOP): 210 mmHg  Percent Occlusion (80% for LE, 50% for UE): 80 %  Personal Tourniquet Pressure (PTP): 168 mmHg  Tourniquet Size: Green    SET #1   Exercises Performed Total time under tourniquet Immediate effects   Rate of Perceived Exertion   1 (30 reps) SL Leg Press 2.5pl 2 min  7/10   2 (15 reps) SL Leg Press 2.5pl 1 min  8/10   3 (15 reps) SL Leg Press 2.5pl 1 min  9/10   4 (15 reps) SL Leg Press 2.5pl 1 min   10/10       SET #2   Exercises Performed Total time under tourniquet Immediate effects   Rate of Perceived Exertion   1 (30 reps) SLR flexion AG 2 min  8/10   2 (15 reps) SLR flexion AG 1 min  9/10   3 (15 reps) SLR flexion AG 1 min  9/10   4 (15 reps) SLR flexion AG 1 min   10/10     NOTES: Patient was more flushed red in the face than in previous sessions. Needed cuing to keep breathing.

## 2020-01-21 ENCOUNTER — THERAPY VISIT (OUTPATIENT)
Dept: PHYSICAL THERAPY | Facility: CLINIC | Age: 35
End: 2020-01-21
Payer: COMMERCIAL

## 2020-01-21 DIAGNOSIS — M25.362 PATELLAR INSTABILITY OF LEFT KNEE: ICD-10-CM

## 2020-01-21 DIAGNOSIS — M25.562 LEFT KNEE PAIN, UNSPECIFIED CHRONICITY: ICD-10-CM

## 2020-01-21 PROCEDURE — 97110 THERAPEUTIC EXERCISES: CPT | Mod: GP | Performed by: PHYSICAL THERAPIST

## 2020-01-21 NOTE — PROGRESS NOTES
Date: Jan 21, 2020    Blood Flow Restriction Training: Contraindications and precautions reviewed, pt safe for use of  modality, Risks and benefits discussed; pt gave informed consent  Lingering effects (from previous session): None  Target Limb: Left Lower Extremity  Limb Occlusion Pressure (LOP): 196 mmHg  Percent Occlusion (80% for LE, 50% for UE): 80 %  Personal Tourniquet Pressure (PTP): 156 mmHg  Tourniquet Size: Green    SET #1   Exercises Performed Total time under tourniquet Immediate effects   Rate of Perceived Exertion   1 (30 reps) SL Leg Press 2.5pl 2 min  6/10   2 (15 reps) SL Leg Press 2.5pl 1 min  8/10   3 (15 reps) SL Leg Press 2.5pl 1 min  10/10   4 (15 reps) SL Leg Press 2.5pl 1 min   10/10       SET #2   Exercises Performed Total time under tourniquet Immediate effects   Rate of Perceived Exertion   1 (30 reps) SLR flexion AG 2 min  7/10   2 (15 reps) SLR flexion AG 1 min  9/10   3 (15 reps) SLR flexion AG 1 min  10/10   4 (15 reps) SLR flexion AG 1 min   10/10     NOTES:

## 2020-01-24 ENCOUNTER — THERAPY VISIT (OUTPATIENT)
Dept: PHYSICAL THERAPY | Facility: CLINIC | Age: 35
End: 2020-01-24
Payer: COMMERCIAL

## 2020-01-24 DIAGNOSIS — M25.562 LEFT KNEE PAIN, UNSPECIFIED CHRONICITY: ICD-10-CM

## 2020-01-24 DIAGNOSIS — M25.362 PATELLAR INSTABILITY OF LEFT KNEE: ICD-10-CM

## 2020-01-24 PROCEDURE — 97110 THERAPEUTIC EXERCISES: CPT | Mod: GP | Performed by: PHYSICAL THERAPIST

## 2020-01-24 ASSESSMENT — ACTIVITIES OF DAILY LIVING (ADL)
AS_A_RESULT_OF_YOUR_KNEE_INJURY,_HOW_WOULD_YOU_RATE_YOUR_CURRENT_LEVEL_OF_DAILY_ACTIVITY?: SEVERELY ABNORMAL
SIT WITH YOUR KNEE BENT: ACTIVITY IS SOMEWHAT DIFFICULT
RISE FROM A CHAIR: ACTIVITY IS FAIRLY DIFFICULT
RAW_SCORE: 27
SWELLING: THE SYMPTOM AFFECTS MY ACTIVITY MODERATELY
GIVING WAY, BUCKLING OR SHIFTING OF KNEE: THE SYMPTOM AFFECTS MY ACTIVITY SEVERELY
WALK: ACTIVITY IS SOMEWHAT DIFFICULT
STAND: ACTIVITY IS MINIMALLY DIFFICULT
LIMPING: THE SYMPTOM AFFECTS MY ACTIVITY MODERATELY
KNEE_ACTIVITY_OF_DAILY_LIVING_SCORE: 38.57
HOW_WOULD_YOU_RATE_THE_CURRENT_FUNCTION_OF_YOUR_KNEE_DURING_YOUR_USUAL_DAILY_ACTIVITIES_ON_A_SCALE_FROM_0_TO_100_WITH_100_BEING_YOUR_LEVEL_OF_KNEE_FUNCTION_PRIOR_TO_YOUR_INJURY_AND_0_BEING_THE_INABILITY_TO_PERFORM_ANY_OF_YOUR_USUAL_DAILY_ACTIVITIES?: 27
SQUAT: ACTIVITY IS VERY DIFFICULT
KNEEL ON THE FRONT OF YOUR KNEE: I AM UNABLE TO DO THE ACTIVITY
HOW_WOULD_YOU_RATE_THE_OVERALL_FUNCTION_OF_YOUR_KNEE_DURING_YOUR_USUAL_DAILY_ACTIVITIES?: ABNORMAL
PAIN: THE SYMPTOM AFFECTS MY ACTIVITY SEVERELY
GO DOWN STAIRS: ACTIVITY IS FAIRLY DIFFICULT
WEAKNESS: THE SYMPTOM AFFECTS MY ACTIVITY SEVERELY
GO UP STAIRS: ACTIVITY IS FAIRLY DIFFICULT
KNEE_ACTIVITY_OF_DAILY_LIVING_SUM: 27
STIFFNESS: THE SYMPTOM AFFECTS MY ACTIVITY SLIGHTLY

## 2020-01-24 NOTE — PROGRESS NOTES
DISCHARGE  REPORT    Progress reporting period is from Dec 10, 2019 to Jan 24, 2020.       SUBJECTIVE  Subjective changes noted by patient:  Has consistently been sore for about one day after each BFR session. Can tell that he is able to do the exercises better. Still thinks it is tough to tell if it is stronger. The muscle feels more solid to the touch.       Changes in function:  Yes, some improvement in strength leading to small changes in function.   Adverse reaction to treatment or activity: None    OBJECTIVE  Changes noted in objective findings:  Yes, patient demonstrates some improvement in strength, but it largely not translating to significant gains in function.     Thigh girth 10 cm proximal to knee joint line: R 42cm, L 41-1/2cm    Effusion sweep negative    SL Squat: R 40 degree, L 20 degrees - complains of discomfort and instability with pain in the back of the knee.       Strength:   R L   HIP     Flex 4+ 5   Abd 4- 4+   Ext 4 4+   KNEE     Ext 4+ 5   Flex 4+ 4+         ASSESSMENT/PLAN  Updated problem list and treatment plan: Diagnosis 1:  Left Patellar Instability  Decreased strength - therapeutic exercise, therapeutic activities and home program  Decreased function - therapeutic activities and home program  Instability -  Therapeutic Activity, Therapeutic Exercise, Neuromuscular Re-education, Splinting/Taping/Bracing/Orthotic, and home program    STG/LTGs have been met or progress has been made towards goals:  Yes, some gains in strength, but with minimal conversion to functional improvements  Assessment of Progress: The patient's condition has potential to improve.  Self Management Plans:  Patient has been instructed in a home treatment program.  Patient  has been instructed in self management of symptoms.  I have re-evaluated this patient and find that the nature, scope, duration and intensity of the therapy is appropriate for the medical condition of the patient.  Naman continues to require the  following intervention to meet STG and LTG's:  TBD    Recommendations:  This patient would benefit from further evaluation.  Patient has made modest gains with BFR performed twice per week for 6-7 weeks.     Please refer to the daily flowsheet for treatment today, total treatment time and time spent performing 1:1 timed codes.

## 2020-01-24 NOTE — PROGRESS NOTES
Date: Jan 24, 2020    Blood Flow Restriction Training: Contraindications and precautions reviewed, pt safe for use of  modality, Risks and benefits discussed; pt gave informed consent  Lingering effects (from previous session): None  Target Limb:  Left Lower Extremity  Limb Occlusion Pressure (LOP): 181 mmHg  Percent Occlusion (80% for LE, 50% for UE): 80 %  Personal Tourniquet Pressure (PTP): 144 mmHg  Tourniquet Size: Green    SET #1   Exercises Performed Total time under tourniquet Immediate effects   Rate of Perceived Exertion   1 (30 reps) SL Leg Press 2.75pl 2 min  8/10   2 (15 reps) SL Leg Press 2.75pl 1 min Very fatigued 10/10   3 (15 reps) SL Leg Press 2pl 1 min  10/10   4 (15 reps) SL Leg Press 2pl 1 min   10/10       SET #2   Exercises Performed Total time under tourniquet Immediate effects   Rate of Perceived Exertion   1 (30 reps) SLR flx AG 2 min  7/10   2 (15 reps) SLR flx AG 1 min  8/10   3 (15 reps) SLR flx AG 1 min  9/10   4 (15 reps) SLR flx AG 1 min   10/10

## 2020-01-27 ENCOUNTER — OFFICE VISIT (OUTPATIENT)
Dept: ORTHOPEDICS | Facility: CLINIC | Age: 35
End: 2020-01-27
Payer: COMMERCIAL

## 2020-01-27 VITALS — HEIGHT: 73 IN | BODY MASS INDEX: 32.2 KG/M2 | WEIGHT: 243 LBS | RESPIRATION RATE: 16 BRPM

## 2020-01-27 DIAGNOSIS — M21.062 ACQUIRED GENU VALGUM OF LEFT KNEE: Primary | ICD-10-CM

## 2020-01-27 ASSESSMENT — MIFFLIN-ST. JEOR: SCORE: 2078.5

## 2020-01-27 NOTE — PROGRESS NOTES
Patient seen and examined with the resident.     Assesment: Valgus malalignment left lower extremity, questionable recurrent patellar instability    Plan: Very pleasant 34-year-old male who my partner is planning to perform a large surgical reconstruction of the knee including medial patellofemoral ligament reconstruction lateral lengthening.  Given my experience with osteotomies she is asked me to participate with a distal femoral osteotomy to improve his valgus malalignment.    At a chance to meet the patient in clinic.  I discussed with him the risk benefits complication techniques and alternatives to surgery.  We reviewed the expected course of recovery alternative treatment options.    I discussed with him the expected course of recovery as well as the main complications.    I agree with history, physical and imaging as well as the assessment and plan as detailed by Dr. Ellington

## 2020-01-27 NOTE — LETTER
"2020       RE: Naman Jones  [address] [address] [id number]     Dear Colleague,    Thank you for referring your patient, Naman Jones, to the [facility] [place] at [facility]. Please see a copy of my visit note below.    [facility]  Orthopedics  Angel Madera MD  2020     Name: Naman Jones  MRN: [id number]  Age: 34 year old  : 1985  Referring provider: Jennifer Meng     Chief Complaint: bilateral knee pain     Date of Injury: multiple    History of Present Illness:   Naman Jones is a 34 year old male with a PMH of gastric bypass who presents today for follow-up regarding bilateral knee pain, left>right; and knees which give out. He was seen by Dr. Meng on 12/10/19 at which time they discussed surgical intervention to include distal femoral osteotomy for valgus deformity, lateral retinacular lengthening, and medial patellofemoral ligament reconstruction.  He presents today to see Dr. Madera who will be assisting with the surgery.    The patient has had many many years of feeling unstable on both knees.  He describes patellar dislocations which on further questioning are more likely knee giving out.  His most recent event was in May 2019 in the left knee.  Since then his left knee is giving him the most trouble.  He has had no changes since he was seen last in December by Dr. Meng.  She did send him for blood restriction therapy and quadricep strengthening.  He feels as though he has noticed some hypertrophy of his left quad.    Review of Systems:   A 14-point review of systems was obtained and is negative except for as noted in the HPI.     Physical Examination:  Resp. rate 16, height 1.842 m (6' 0.52\"), weight 110.2 kg (243 lb).  Well-appearing no distress  Left knee:  -valgus deformity   -thin thigh   -laterally tracking patella with ROM  -1-2 quadrants of patella movement  -mild apprehension  "   -ROM: -5 to 140  -stable to varus/valgus stress  -stable to lachman, AP stress  -no specific pain with palpation     Right knee:  -valgus deformity   -thin thigh   -laterally tracking patella with ROM  -1-2 quadrants of patella movement   -no apprehension   -ROM: -5 to 140  -stable to varus/valgus stress  -stable to lachman, AP stress  -no specific pain with palpation       Radiographs:   Radiographs of the left knee   No signs of early arthritis, slight lateral patellar tilt    Long leg alignment films  Valgus deformity bilateral with the WB falling in the outer edge of the joint    MRI left knee  Meniscus, cruciate and collateral ligaments intact.  Arthritic change in the patellofemoral joint no other significant arthritic changes noted.    I have independently reviewed the above imaging studies; the results were discussed with the patient.     Assessment:   34 year old male with a history of gastric bypass who has bilateral knee pain and giving way (left>right). Plan for OR 2/28/20 with Dr. Meng and Dr. Madera for DFO, MPFL, LRL.     Plan:   Surgery scheduled on 2/28/20. Reviewed plan with the patient. Discussed DFO including surgical plan and expected rehab. Plan for TTWB x 6 weeks. Many questions were answered. Discussed possible need for plate removal in the future. Patient did request to limit NSAIDs given history of gastric bypass.     Abhinav Ellington MD   Orthopedic Surgery; PGY-5    This patient was seen, examined and counseled by Dr. Madera.      Patient seen and examined with the resident.     Assesment: Valgus malalignment left lower extremity, questionable recurrent patellar instability    Plan: Very pleasant 34-year-old male who my partner is planning to perform a large surgical reconstruction of the knee including medial patellofemoral ligament reconstruction lateral lengthening.  Given my experience with osteotomies she is asked me to participate with a distal femoral osteotomy to improve  his valgus malalignment.    At a chance to meet the patient in clinic.  I discussed with him the risk benefits complication techniques and alternatives to surgery.  We reviewed the expected course of recovery alternative treatment options.    I discussed with him the expected course of recovery as well as the main complications.    I agree with history, physical and imaging as well as the assessment and plan as detailed by Dr. Ellington      Again, thank you for allowing me to participate in the care of your patient.      Sincerely,    Angel Madera MD

## 2020-01-27 NOTE — PROGRESS NOTES
"[facility]  Orthopedics  Angel Madera MD  2020     Name: Naman Jones  MRN: [id number]  Age: 34 year old  : 1985  Referring provider: Jennifer Meng     Chief Complaint: bilateral knee pain     Date of Injury: multiple    History of Present Illness:   Naman Jones is a 34 year old male with a PMH of gastric bypass who presents today for follow-up regarding bilateral knee pain, left>right; and knees which give out. He was seen by Dr. Meng on 12/10/19 at which time they discussed surgical intervention to include distal femoral osteotomy for valgus deformity, lateral retinacular lengthening, and medial patellofemoral ligament reconstruction.  He presents today to see Dr. Madera who will be assisting with the surgery.    The patient has had many many years of feeling unstable on both knees.  He describes patellar dislocations which on further questioning are more likely knee giving out.  His most recent event was in May 2019 in the left knee.  Since then his left knee is giving him the most trouble.  He has had no changes since he was seen last in December by Dr. Meng.  She did send him for blood restriction therapy and quadricep strengthening.  He feels as though he has noticed some hypertrophy of his left quad.    Review of Systems:   A 14-point review of systems was obtained and is negative except for as noted in the HPI.     Physical Examination:  Resp. rate 16, height 1.842 m (6' 0.52\"), weight 110.2 kg (243 lb).  Well-appearing no distress  Left knee:  -valgus deformity   -thin thigh   -laterally tracking patella with ROM  -1-2 quadrants of patella movement  -mild apprehension    -ROM: -5 to 140  -stable to varus/valgus stress  -stable to lachman, AP stress  -no specific pain with palpation     Right knee:  -valgus deformity   -thin thigh   -laterally tracking patella with ROM  -1-2 quadrants of patella movement   -no apprehension   -ROM: -5 to 140  -stable to " varus/valgus stress  -stable to lachman, AP stress  -no specific pain with palpation       Radiographs:   Radiographs of the left knee   No signs of early arthritis, slight lateral patellar tilt    Long leg alignment films  Valgus deformity bilateral with the WB falling in the outer edge of the joint    MRI left knee  Meniscus, cruciate and collateral ligaments intact.  Arthritic change in the patellofemoral joint no other significant arthritic changes noted.    I have independently reviewed the above imaging studies; the results were discussed with the patient.     Assessment:   34 year old male with a history of gastric bypass who has bilateral knee pain and giving way (left>right). Plan for OR 2/28/20 with Dr. Meng and Dr. Madera for DFO, MPFL, LRL.     Plan:   Surgery scheduled on 2/28/20. Reviewed plan with the patient. Discussed DFO including surgical plan and expected rehab. Plan for TTWB x 6 weeks. Many questions were answered. Discussed possible need for plate removal in the future. Patient did request to limit NSAIDs given history of gastric bypass.     Abhinav Ellington MD   Orthopedic Surgery; PGY-5    This patient was seen, examined and counseled by Dr. Madera.

## 2020-01-27 NOTE — NURSING NOTE
Reason For Visit:   Chief Complaint   Patient presents with     RECHECK     left knee        ?  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  Work status?  Full time.  Smoker: No    Pain Assessment  Patient Currently in Pain: Yes  0-10 Pain Scale: 3  Primary Pain Location: Knee

## 2020-02-10 ENCOUNTER — PREP FOR PROCEDURE (OUTPATIENT)
Dept: ORTHOPEDICS | Facility: CLINIC | Age: 35
End: 2020-02-10

## 2020-02-10 DIAGNOSIS — M21.062 ACQUIRED GENU VALGUM OF LEFT KNEE: Primary | ICD-10-CM

## 2020-02-14 ENCOUNTER — OFFICE VISIT (OUTPATIENT)
Dept: FAMILY MEDICINE | Facility: CLINIC | Age: 35
End: 2020-02-14
Payer: COMMERCIAL

## 2020-02-14 VITALS
RESPIRATION RATE: 14 BRPM | HEART RATE: 80 BPM | DIASTOLIC BLOOD PRESSURE: 88 MMHG | BODY MASS INDEX: 33.02 KG/M2 | TEMPERATURE: 98.3 F | WEIGHT: 247 LBS | OXYGEN SATURATION: 100 % | SYSTOLIC BLOOD PRESSURE: 138 MMHG

## 2020-02-14 DIAGNOSIS — G89.29 CHRONIC PAIN OF RIGHT KNEE: ICD-10-CM

## 2020-02-14 DIAGNOSIS — Z01.818 PREOP GENERAL PHYSICAL EXAM: Primary | ICD-10-CM

## 2020-02-14 DIAGNOSIS — M25.561 CHRONIC PAIN OF RIGHT KNEE: ICD-10-CM

## 2020-02-14 DIAGNOSIS — I10 ESSENTIAL HYPERTENSION: ICD-10-CM

## 2020-02-14 DIAGNOSIS — Z13.220 SCREENING FOR CHOLESTEROL LEVEL: ICD-10-CM

## 2020-02-14 DIAGNOSIS — Z79.4 TYPE 2 DIABETES MELLITUS WITH HYPERGLYCEMIA, WITH LONG-TERM CURRENT USE OF INSULIN (H): ICD-10-CM

## 2020-02-14 DIAGNOSIS — F41.1 GAD (GENERALIZED ANXIETY DISORDER): ICD-10-CM

## 2020-02-14 DIAGNOSIS — E11.65 TYPE 2 DIABETES MELLITUS WITH HYPERGLYCEMIA, WITH LONG-TERM CURRENT USE OF INSULIN (H): ICD-10-CM

## 2020-02-14 DIAGNOSIS — Z11.4 SCREENING FOR HIV (HUMAN IMMUNODEFICIENCY VIRUS): ICD-10-CM

## 2020-02-14 LAB
ALBUMIN SERPL-MCNC: 4.1 G/DL (ref 3.4–5)
ALP SERPL-CCNC: 105 U/L (ref 40–150)
ALT SERPL W P-5'-P-CCNC: 54 U/L (ref 0–70)
ANION GAP SERPL CALCULATED.3IONS-SCNC: 8 MMOL/L (ref 3–14)
AST SERPL W P-5'-P-CCNC: 29 U/L (ref 0–45)
BASOPHILS # BLD AUTO: 0 10E9/L (ref 0–0.2)
BASOPHILS NFR BLD AUTO: 0.6 %
BILIRUB SERPL-MCNC: 0.7 MG/DL (ref 0.2–1.3)
BUN SERPL-MCNC: 9 MG/DL (ref 7–30)
CALCIUM SERPL-MCNC: 8.8 MG/DL (ref 8.5–10.1)
CHLORIDE SERPL-SCNC: 107 MMOL/L (ref 94–109)
CHOLEST SERPL-MCNC: 169 MG/DL
CO2 SERPL-SCNC: 24 MMOL/L (ref 20–32)
CREAT SERPL-MCNC: 0.5 MG/DL (ref 0.66–1.25)
DIFFERENTIAL METHOD BLD: NORMAL
EOSINOPHIL # BLD AUTO: 0.1 10E9/L (ref 0–0.7)
EOSINOPHIL NFR BLD AUTO: 1.8 %
ERYTHROCYTE [DISTWIDTH] IN BLOOD BY AUTOMATED COUNT: 13.2 % (ref 10–15)
GFR SERPL CREATININE-BSD FRML MDRD: >90 ML/MIN/{1.73_M2}
GLUCOSE SERPL-MCNC: 159 MG/DL (ref 70–99)
HBA1C MFR BLD: 7 % (ref 0–5.6)
HCT VFR BLD AUTO: 47 % (ref 40–53)
HDLC SERPL-MCNC: 69 MG/DL
HGB BLD-MCNC: 16.1 G/DL (ref 13.3–17.7)
LDLC SERPL CALC-MCNC: 88 MG/DL
LYMPHOCYTES # BLD AUTO: 2.6 10E9/L (ref 0.8–5.3)
LYMPHOCYTES NFR BLD AUTO: 38.8 %
MCH RBC QN AUTO: 30.7 PG (ref 26.5–33)
MCHC RBC AUTO-ENTMCNC: 34.3 G/DL (ref 31.5–36.5)
MCV RBC AUTO: 90 FL (ref 78–100)
MONOCYTES # BLD AUTO: 0.6 10E9/L (ref 0–1.3)
MONOCYTES NFR BLD AUTO: 8.2 %
NEUTROPHILS # BLD AUTO: 3.4 10E9/L (ref 1.6–8.3)
NEUTROPHILS NFR BLD AUTO: 50.6 %
NONHDLC SERPL-MCNC: 100 MG/DL
PLATELET # BLD AUTO: 218 10E9/L (ref 150–450)
POTASSIUM SERPL-SCNC: 3.9 MMOL/L (ref 3.4–5.3)
PROT SERPL-MCNC: 7.5 G/DL (ref 6.8–8.8)
RBC # BLD AUTO: 5.24 10E12/L (ref 4.4–5.9)
SODIUM SERPL-SCNC: 140 MMOL/L (ref 133–144)
TRIGL SERPL-MCNC: 60 MG/DL
WBC # BLD AUTO: 6.7 10E9/L (ref 4–11)

## 2020-02-14 PROCEDURE — 80061 LIPID PANEL: CPT | Performed by: NURSE PRACTITIONER

## 2020-02-14 PROCEDURE — 36415 COLL VENOUS BLD VENIPUNCTURE: CPT | Performed by: NURSE PRACTITIONER

## 2020-02-14 PROCEDURE — 80053 COMPREHEN METABOLIC PANEL: CPT | Performed by: NURSE PRACTITIONER

## 2020-02-14 PROCEDURE — 85025 COMPLETE CBC W/AUTO DIFF WBC: CPT | Performed by: NURSE PRACTITIONER

## 2020-02-14 PROCEDURE — 87389 HIV-1 AG W/HIV-1&-2 AB AG IA: CPT | Performed by: NURSE PRACTITIONER

## 2020-02-14 PROCEDURE — 83036 HEMOGLOBIN GLYCOSYLATED A1C: CPT | Performed by: NURSE PRACTITIONER

## 2020-02-14 PROCEDURE — 99214 OFFICE O/P EST MOD 30 MIN: CPT | Performed by: NURSE PRACTITIONER

## 2020-02-14 RX ORDER — HYDROXYZINE HYDROCHLORIDE 50 MG/1
50 TABLET, FILM COATED ORAL EVERY 6 HOURS PRN
Qty: 90 TABLET | Refills: 5 | Status: SHIPPED | OUTPATIENT
Start: 2020-02-14 | End: 2020-09-24

## 2020-02-14 SDOH — SOCIAL STABILITY: SOCIAL NETWORK: HOW OFTEN DO YOU GET TOGETHER WITH FRIENDS OR RELATIVES?: ONCE A WEEK

## 2020-02-14 SDOH — ECONOMIC STABILITY: TRANSPORTATION INSECURITY
IN THE PAST 12 MONTHS, HAS THE LACK OF TRANSPORTATION KEPT YOU FROM MEDICAL APPOINTMENTS OR FROM GETTING MEDICATIONS?: NO

## 2020-02-14 SDOH — ECONOMIC STABILITY: INCOME INSECURITY: HOW HARD IS IT FOR YOU TO PAY FOR THE VERY BASICS LIKE FOOD, HOUSING, MEDICAL CARE, AND HEATING?: NOT VERY HARD

## 2020-02-14 SDOH — SOCIAL STABILITY: SOCIAL NETWORK
IN A TYPICAL WEEK, HOW MANY TIMES DO YOU TALK ON THE PHONE WITH FAMILY, FRIENDS, OR NEIGHBORS?: MORE THAN THREE TIMES A WEEK

## 2020-02-14 SDOH — SOCIAL STABILITY: SOCIAL NETWORK: HOW OFTEN DO YOU ATTENT MEETINGS OF THE CLUB OR ORGANIZATION YOU BELONG TO?: 1 TO 4 TIMES PER YEAR

## 2020-02-14 SDOH — HEALTH STABILITY: PHYSICAL HEALTH: ON AVERAGE, HOW MANY MINUTES DO YOU ENGAGE IN EXERCISE AT THIS LEVEL?: 30 MIN

## 2020-02-14 SDOH — HEALTH STABILITY: MENTAL HEALTH
STRESS IS WHEN SOMEONE FEELS TENSE, NERVOUS, ANXIOUS, OR CAN'T SLEEP AT NIGHT BECAUSE THEIR MIND IS TROUBLED. HOW STRESSED ARE YOU?: TO SOME EXTENT

## 2020-02-14 SDOH — SOCIAL STABILITY: SOCIAL NETWORK: ARE YOU MARRIED, WIDOWED, DIVORCED, SEPARATED, NEVER MARRIED, OR LIVING WITH A PARTNER?: MARRIED

## 2020-02-14 SDOH — HEALTH STABILITY: PHYSICAL HEALTH: ON AVERAGE, HOW MANY DAYS PER WEEK DO YOU ENGAGE IN MODERATE TO STRENUOUS EXERCISE (LIKE A BRISK WALK)?: 2 DAYS

## 2020-02-14 SDOH — SOCIAL STABILITY: SOCIAL NETWORK
DO YOU BELONG TO ANY CLUBS OR ORGANIZATIONS SUCH AS CHURCH GROUPS UNIONS, FRATERNAL OR ATHLETIC GROUPS, OR SCHOOL GROUPS?: NO

## 2020-02-14 SDOH — ECONOMIC STABILITY: TRANSPORTATION INSECURITY
IN THE PAST 12 MONTHS, HAS LACK OF TRANSPORTATION KEPT YOU FROM MEETINGS, WORK, OR FROM GETTING THINGS NEEDED FOR DAILY LIVING?: NO

## 2020-02-14 SDOH — SOCIAL STABILITY: SOCIAL NETWORK: HOW OFTEN DO YOU ATTEND CHURCH OR RELIGIOUS SERVICES?: NEVER

## 2020-02-14 SDOH — ECONOMIC STABILITY: FOOD INSECURITY: WITHIN THE PAST 12 MONTHS, THE FOOD YOU BOUGHT JUST DIDN'T LAST AND YOU DIDN'T HAVE MONEY TO GET MORE.: NEVER TRUE

## 2020-02-14 SDOH — ECONOMIC STABILITY: FOOD INSECURITY: WITHIN THE PAST 12 MONTHS, YOU WORRIED THAT YOUR FOOD WOULD RUN OUT BEFORE YOU GOT MONEY TO BUY MORE.: NEVER TRUE

## 2020-02-14 NOTE — RESULT ENCOUNTER NOTE
Harris Beard,  Your CBC (checks for anemia and infection) is normal.  Your A1C is elevated at 7% (please follow up with Annette Hobson as scheduled).  Sincerely,    Susan Haase, CNP

## 2020-02-14 NOTE — PROGRESS NOTES
Pre-Visit Planning     Future Appointments   Date Time Provider Department Center   2020 10:20 AM Haase, Susan Rachele, ARDEN CNP [facility] CR   2020  9:30 AM Annette Hobson APRN CNP CRE CR   3/9/2020 10:00 AM Angel Madera MD [facility]     Arrival Time for this Appointment:  9:55 AM     Appointment Notes for this encounter:   Pre-op visit for knee surgery    Questionnaires Reviewed/Assigned  No additional questionnaires are needed        Patient preferred phone number: [phone]    Patient contact not needed. pvp done same day as visit     HM due pended   Health Maintenance Due   Topic Date Due     URINE DRUG SCREEN  1983     HIV SCREENING  05/15/1998     PNEUMOCOCCAL IMMUNIZATION 19-64 MEDIUM RISK (1 of 1 - PPSV23) 2018     PREVENTIVE CARE VISIT  2019     LIPID  2019     BMP  2019     Daria Obregon, Registered Nurse   [address] [address]  [phone]  Dept: [phone]    PRE-OP EVALUATION:  Today's date: 2020    Naman Jones (: 1985) presents for pre-operative evaluation assessment as requested by Dr. Madera.  He requires evaluation and anesthesia risk assessment prior to undergoing surgery/procedure for treatment of valgus malalignment of left knee.    Fax number for surgical facility: [facility]  Primary Physician: Haase, Susan Rachele  Type of Anesthesia Anticipated: Choice    Patient has a Health Care Directive or Living Will:  NO    Preop Questions 2020   Who is doing your surgery? mervin   What are you having done? distal femoral osteotomy   Date of Surgery/Procedure:    Facility or Hospital where procedure/surgery will be performed: [facility]   1.  Do you have a history of Heart attack, stroke, stent, coronary bypass surgery, or other heart surgery? No   2.  Do you ever have any pain or discomfort in  your chest? No   3.  Do you have a history of  Heart Failure? No   4.   Are you troubled by shortness of breath when:  walking on a level surface, or up a slight hill, or at night? No   5.  Do you currently have a cold, bronchitis or other respiratory infection? No   6.  Do you have a cough, shortness of breath, or wheezing? No   7.  Do you sometimes get pains in the calves of your legs when you walk? No   8. Do you or anyone in your family have previous history of blood clots? No   9.  Do you or does anyone in your family have a serious bleeding problem such as prolonged bleeding following surgeries or cuts? No   10. Have you ever had problems with anemia or been told to take iron pills? No   11. Have you had any abnormal blood loss such as black, tarry or bloody stools? No   12. Have you ever had a blood transfusion? No   13. Have you or any of your relatives ever had problems with anesthesia? No   14. Do you have sleep apnea, excessive snoring or daytime drowsiness? No   15. Do you have any prosthetic heart valves? No   16. Do you have prosthetic joints? No     HPI:     HPI related to upcoming procedure: history of ongoing chronic left knee pain, found to have valgus malalignment of the left lower extremity with recurrent patellar instability.    Type 2 DM:  Fasting blood sugars 140-150, last A1C was 6.1%. Controlled on metformin and januvia.  Anxiety:  Controlled on buspirone and hydroxyzine.     MEDICAL HISTORY:     Patient Active Problem List    Diagnosis Date Noted     Acquired genu valgum of left knee 12/11/2019     Priority: Medium     Added automatically from request for surgery [id number]       Patellar instability of right knee 12/11/2019     Priority: Medium     Added automatically from request for surgery [id number]       Blepharitis of upper and lower eyelids of both eyes, unspecified type 05/28/2019     Priority: Medium     Obesity (BMI 35.0-39.9) with comorbidity (H) 05/03/2019     Priority: Medium      Class 2 drug-induced obesity with serious comorbidity and body mass index (BMI) of 38.0 to 38.9 in adult 02/15/2019     Priority: Medium     Insomnia, unspecified type 07/20/2018     Priority: Medium     Essential hypertension 06/13/2017     Priority: Medium     Mixed hyperlipidemia 06/13/2017     Priority: Medium     Diabetes mellitus type 2 in obese (H) 06/13/2017     Priority: Medium     Plantar fasciitis 06/13/2017     Priority: Medium     Chronic pain of right knee 06/13/2017     Priority: Medium     JOEL (generalized anxiety disorder) 06/13/2017     Priority: Medium     Type 2 diabetes mellitus with hyperglycemia, with long-term current use of insulin (H) 06/13/2017     Priority: Medium      Past Medical History:   Diagnosis Date     Diabetes (H)      Hypertension      Past Surgical History:   Procedure Laterality Date     LAPAROSCOPIC BYPASS GASTRIC N/A 10/9/2018    Procedure: LAPAROSCOPIC BYPASS GASTRIC;  LAPAROSCOPIC [place]-EN Y GASTRIC BYPASS;  Surgeon: Alden Mcwilliams MD;  Location:  OR     ORTHOPEDIC SURGERY Right 02/2016     Current Outpatient Medications   Medication Sig Dispense Refill     atorvastatin (LIPITOR) 40 MG tablet Take 1 tablet (40 mg) by mouth daily 90 tablet 3     busPIRone (BUSPAR) 5 MG tablet TAKE 1 TABLET(5 MG) BY MOUTH TWICE DAILY 180 tablet 1     Calcium Carb-Cholecalciferol (CALCIUM 600 + D PO) Take 1 tablet by mouth 2 times daily        Cholecalciferol (VITAMIN D3) 1000 units CAPS Take 3 capsules by mouth daily       Continuous Blood Gluc  (FREESTYLE KEILA READER) SUGAR 1 each continuous 1 Device 0     Continuous Blood Gluc Sensor (FREESTYLE KEILA 14 DAY SENSOR) MISC USE 1 EVERY 14 DAYS       Continuous Blood Gluc Sensor (FREESTYLE KEILA 14 DAY SENSOR) MISC 1 each every 14 days 2 each 11     Cyanocobalamin (B-12) 500 MCG SUBL Place 1 tablet under the tongue daily       hydrOXYzine (ATARAX) 25 MG tablet TAKE 1 TO 2 TABLETS(25 TO 50 MG) BY MOUTH THREE TIMES DAILY AS NEEDED FOR  ITCHING 60 tablet 1     LORazepam (ATIVAN) 1 MG tablet Take 1 tablet (1 mg) by mouth daily as needed for anxiety (Patient taking differently: Take 1 mg by mouth as needed for anxiety Taking 1 time daily) 30 tablet 2     metFORMIN (GLUCOPHAGE) 500 MG tablet Take 1 tablet (500 mg) by mouth 2 times daily (with meals) 180 tablet 3     multivitamin  peds with iron (FLINTSTONES COMPLETE) 60 MG chewable tablet Take 2 chew tab by mouth every morning        ONETOUCH DELICA LANCETS 33G MISC 1 lancet 4 times daily 100 each 6     sitagliptin (JANUVIA) 100 MG tablet Take 1 tablet (100 mg) by mouth daily 90 tablet 3     OTC products: no recent use of OTC ASA, NSAIDS or Steroids    Allergies   Allergen Reactions     Sulfa Drugs Anaphylaxis     Oxycodone Other (See Comments) and Itching     Flushed     Lisinopril      Other reaction(s): Impotence     Onion      Other reaction(s): Intolerance-Can't Take      Latex Allergy: NO    Social History     Tobacco Use     Smoking status: Never Smoker     Smokeless tobacco: Never Used   Substance Use Topics     Alcohol use: Yes     Comment: socially     History   Drug Use No       REVIEW OF SYSTEMS:   CONSTITUTIONAL: NEGATIVE for fever, chills, change in weight  INTEGUMENTARY/SKIN: NEGATIVE for worrisome rashes, moles or lesions  EYES: NEGATIVE for vision changes or irritation  ENT/MOUTH: NEGATIVE for ear, mouth and throat problems  RESP: NEGATIVE for significant cough or SOB  CV: NEGATIVE for chest pain, palpitations or peripheral edema  GI: NEGATIVE for nausea, abdominal pain, heartburn, or change in bowel habits  : NEGATIVE for frequency, dysuria, or hematuria  MUSCULOSKELETAL: see HPI  NEURO: NEGATIVE for weakness, dizziness or paresthesias  ENDOCRINE: NEGATIVE for temperature intolerance, skin/hair changes  HEME: NEGATIVE for bleeding problems  PSYCHIATRIC: NEGATIVE for changes in mood or affect    EXAM:   /88 (BP Location: Right arm, Patient Position: Chair, Cuff Size: Adult  Regular)   Pulse 80   Temp 98.3  F (36.8  C) (Oral)   Resp 14   Wt 112 kg (247 lb)   SpO2 100%   BMI 33.02 kg/m      GENERAL APPEARANCE: healthy, alert and no distress     HENT: ear canals and TM's normal and nose and mouth without ulcers or lesions     NECK: no adenopathy, no asymmetry, masses, or scars and thyroid normal to palpation     RESP: lungs clear to auscultation - no rales, rhonchi or wheezes     CV: regular rates and rhythm, normal S1 S2, no S3 or S4 and no murmur, click or rub     ABDOMEN:  soft, nontender, no HSM or masses and bowel sounds normal     MS: extremities normal- no gross deformities noted, no evidence of inflammation in joints, FROM in all extremities.     SKIN: no suspicious lesions or rashes     NEURO: Normal strength and tone, sensory exam grossly normal, mentation intact and speech normal     PSYCH: mentation appears normal. and affect normal/bright     LYMPHATICS: No cervical adenopathy    DIAGNOSTICS:   EKG: Not indicated due to non-vascular surgery and low risk of event (age <65 and without cardiac risk factors)  HGB:  16.1  A1C: 7%    IMPRESSION:   Reason for surgery/procedure:valgus malalignment of left knee.   Diagnosis/reason for consult: evaluation and anesthesia risk assessment prior to undergoing surgery      The proposed surgical procedure is considered INTERMEDIATE risk.    REVISED CARDIAC RISK INDEX  The patient has the following serious cardiovascular risks for perioperative complications such as (MI, PE, VFib and 3  AV Block):  No serious cardiac risks  INTERPRETATION: 0 risks: Class I (very low risk - 0.4% complication rate)    The patient has the following additional risks for perioperative complications:    RECOMMENDATIONS:   Naman was seen today for pre-op exam.    Diagnoses and all orders for this visit:    Preop general physical exam  -     CBC with platelets differential    Chronic pain of right knee    Type 2 diabetes mellitus with hyperglycemia, with  long-term current use of insulin (H):  Hold metformin and januvia the morning of surgery  -     Hemoglobin A1c  -     Comprehensive metabolic panel    JOEL (generalized anxiety disorder)  -     hydrOXYzine (ATARAX) 50 MG tablet; Take 1 tablet (50 mg) by mouth every 6 hours as needed for anxiety  Approval given to proceed with proposed procedure, without further diagnostic evaluation  Is aware of NPO orders and need to refrain from taking NSAIDS, Aspirin prior to surgery.  Signed Electronically by: Susan Haase, APRN CNP    Copy of this evaluation report is provided to requesting physician.    [facility] Preop Guidelines    Revised Cardiac Risk Index

## 2020-02-16 NOTE — RESULT ENCOUNTER NOTE
Harris Beard,  Your lab results are as below:  1)  Cholesterol is great at 169,  your LDL (bad cholesterol) and your HDL (good cholesterol) is within normal range. Continue on atorvastatin  and we will recheck this in 1 year.  2)  Glucose is elevated at 159 (normal fasting is <100).    If you have any questions do not hesitate to call the clinic to discuss the results with me further.     Sincerely,    Susan Haase, CNP

## 2020-02-17 LAB — HIV 1+2 AB+HIV1 P24 AG SERPL QL IA: NONREACTIVE

## 2020-02-21 ENCOUNTER — OFFICE VISIT (OUTPATIENT)
Dept: ENDOCRINOLOGY | Facility: CLINIC | Age: 35
End: 2020-02-21
Payer: COMMERCIAL

## 2020-02-21 VITALS
SYSTOLIC BLOOD PRESSURE: 145 MMHG | DIASTOLIC BLOOD PRESSURE: 83 MMHG | BODY MASS INDEX: 33.14 KG/M2 | HEART RATE: 92 BPM | TEMPERATURE: 98.3 F | WEIGHT: 247.9 LBS | RESPIRATION RATE: 18 BRPM

## 2020-02-21 DIAGNOSIS — I10 ESSENTIAL HYPERTENSION: Primary | ICD-10-CM

## 2020-02-21 DIAGNOSIS — E11.65 TYPE 2 DIABETES MELLITUS WITH HYPERGLYCEMIA, WITH LONG-TERM CURRENT USE OF INSULIN (H): ICD-10-CM

## 2020-02-21 DIAGNOSIS — Z79.4 TYPE 2 DIABETES MELLITUS WITH HYPERGLYCEMIA, WITH LONG-TERM CURRENT USE OF INSULIN (H): ICD-10-CM

## 2020-02-21 DIAGNOSIS — E78.2 MIXED HYPERLIPIDEMIA: ICD-10-CM

## 2020-02-21 PROCEDURE — 99214 OFFICE O/P EST MOD 30 MIN: CPT | Performed by: CLINICAL NURSE SPECIALIST

## 2020-02-21 RX ORDER — FLASH GLUCOSE SENSOR
1 KIT MISCELLANEOUS
Qty: 2 EACH | Refills: 11 | Status: SHIPPED | OUTPATIENT
Start: 2020-02-21 | End: 2022-08-03

## 2020-02-21 NOTE — PATIENT INSTRUCTIONS
Component      Latest Ref Rng & Units 5/3/2019 9/6/2019 2/14/2020   Hemoglobin A1C      0 - 5.6 % 6.5 (H) 6.1 (H) 7.0 (H)       Continue the metformin and Januvia for now and try and work harder on your diet.  We'll recheck A1c in another 3 months and decide if anything needs adjusting or changing.    Annette Hobson NP  Endocrinology

## 2020-02-21 NOTE — LETTER
"    2/21/2020         RE: Naman Jones  [id number] [facility] [address]        Dear Colleague,    Thank you for referring your patient, Naman Jones, to the [facility]. Please see a copy of my visit note below.    ENDOCRINOLOGY CLINIC NOTE:  Name: Naman \"Chirag\" Robert  Seen for f/u of Diabetes (Last seen 9/6/2019).  HPI:  Naman Jones is a 34 year old male who presents for the management of Diabetes.  10/9/2018: gastric bypass, [facility]  Has lost 122 lbs, 369-->296-->247 lbs today.  Currently eating a normal diet    VITAMINS AND MINERALS:   2 Multivitamin with Minerals-   600 mg Calcium carbonate With Vitamin D TID- with meals  3-1000 International units Vitamin D  500 mcg Vitamin B-12 sublingual      1. Type 2 DM:  Orginally diagnosed at the age of: 30. On routine physical exam. Was losing wt at that time.    Current Regimen:    Metformin 500 mg bid. Januvia 100 mg daily.  Unable to tolerate higher doses due to GI side effects  Off Trulicity - DUE TO COST    Insulin use decreased following gastric bypass, now off all insulin    BS checks: Janie personal CGM  Janie: average glucose 178, 40% above 180, 60% in target range , 0% below    Exercise: not much  Symptoms of hypoglycemia (low blood sugar):  Gets symptoms of hypoglycemia.  Episodes of hypoglycemia: no  Fixed meal pattern: yes  Patient counting carbs: yes     DM Complications:   Nephropathy: no  Retinopathy: no retinopathy   Neuropathy: Yes, + numbness in toes - stable  Microalbuminuria: Yes- on losartan  CAD/PAD: no  Gastroparesis: no  Hypoglycemia unawareness: no  Upcoming orthopedic surgery - left leg, next week.  Expected recovery is about 3 months  2. Hypertension:    Off BP meds at this time.  Previously on Losartan 100 mg and Maxide 75-50 mg daily.  3. Hyperlipidemia: +Atorvastatin 40 mg qd    PMH/PSH:  DM  HTN  Dyslipidemia    Family Hx:  Diabetes: Father and mgm    Social " Hx:  Social History     Socioeconomic History     Marital status:      Spouse name: Not on file     Number of children: Not on file     Years of education: Not on file     Highest education level: Bachelor's degree (e.g., BA, AB, BS)   Occupational History     Not on file   Social Needs     Financial resource strain: Not very hard     Food insecurity:     Worry: Never true     Inability: Never true     Transportation needs:     Medical: No     Non-medical: No   Tobacco Use     Smoking status: Never Smoker     Smokeless tobacco: Never Used   Substance and Sexual Activity     Alcohol use: Yes     Comment: socially     Drug use: No     Sexual activity: Yes     Partners: Female   Lifestyle     Physical activity:     Days per week: 2 days     Minutes per session: 30 min     Stress: To some extent   Relationships     Social connections:     Talks on phone: More than three times a week     Gets together: Once a week     Attends [employer] service: Never     Active member of club or organization: No     Attends meetings of clubs or organizations: 1 to 4 times per year     Relationship status:      Intimate partner violence:     Fear of current or ex partner: Not on file     Emotionally abused: Not on file     Physically abused: Not on file     Forced sexual activity: Not on file   Other Topics Concern     Parent/sibling w/ CABG, MI or angioplasty before 65F 55M? Not Asked   Social History Narrative     Not on file          MEDICATIONS:  has a current medication list which includes the following prescription(s): freestyle césar 14 day sensor, metformin, atorvastatin, buspirone, calcium carb-cholecalciferol, vitamin d3, freestyle césar reader, b-12, hydroxyzine, childrens multivitamin w/iron, onetouch delica lancets 33g, and sitagliptin.    ROS     ROS: 10 point ROS neg other than the symptoms noted above in the HPI.    Physical Exam   VS: BP (!) 145/83 (BP Location: Right arm, Patient Position: Chair, Cuff Size:  Adult Large)   Pulse 92   Temp 98.3  F (36.8  C) (Oral)   Resp 18   Wt 112.4 kg (247 lb 14.4 oz)   BMI 33.14 kg/m     GENERAL: AXOX3, NAD, well dressed, answering questions appropriately, appears stated age.  HEENT: No exopthalmous, no proptosis, no lig lag, no retraction  CV: RRR  LUNGS: CTAB  NEUROLOGY: CN grossly intact, no tremors  PSYCH: normal affect and mood    LABS:  A1c:   Component      Latest Ref Rng & Units 5/3/2019 9/6/2019 2/14/2020   Hemoglobin A1C      0 - 5.6 % 6.5 (H) 6.1 (H) 7.0 (H)     !COMPREHENSIVE Latest Ref Rng & Units 2/14/2020   SODIUM 133 - 144 mmol/L 140   POTASSIUM 3.4 - 5.3 mmol/L 3.9   CHLORIDE 94 - 109 mmol/L 107   BUN 7 - 30 mg/dL 9   Creatinine 0.66 - 1.25 mg/dL 0.50 (L)   Glucose 70 - 99 mg/dL 159 (H)   ANION GAP 3 - 14 mmol/L 8   CALCIUM 8.5 - 10.1 mg/dL 8.8   ALBUMIN 3.4 - 5.0 g/dL 4.1     !LIPID/HEPATIC Latest Ref Rng & Units 2/14/2020   CHOLESTEROL <200 mg/dL 169   TRIGLYCERIDES <150 mg/dL 60   HDL CHOLESTEROL >39 mg/dL 69   LDL CHOLESTEROL DIRECT <100 mg/dL    LDL CHOLESTEROL, CALCULATED <100 mg/dL 88   NON HDL CHOLESTEROL <130 mg/dL 100   AST 0 - 45 U/L 29   ALT 0 - 70 U/L 54     !THYROID Latest Ref Rng & Units 7/26/2018   TSH 0.40 - 4.00 mU/L 1.50     Component      Latest Ref Rng & Units 7/10/2017 7/26/2018 9/6/2019   Creatinine Urine      mg/dL 56 63 127   Albumin Urine mg/L      mg/L 18 7 60   Albumin Urine mg/g Cr      0 - 17 mg/g Cr 32.08 (H) 10.85 47.56 (H)       All pertinent notes, labs, and images personally reviewed by me.     A/P  Mr.Anthony Jones is a 34 year old here for the management of diabetes:    1. DM2 - Controlled  A1c has increased to 7.0%.  Janie shows 2 week average glucose elevated at 178.  Diabetes complicated by neuropathy and microalbuminuria.  Diabetes is currently treated with metformin 500 mg bid and Januvia 100 mg daily.  He's unable to tolerate any higher doses of Metformin due to GI side effects.  He admits to dietary indiscretions and  feels this is the cause of his higher glucose readings.  He would like to work on making better dietary choices before making any changes to his current diabetes meds.    I think that is very reasonable.  Recommend more attention to dietary intake - limited carbs, calorie controlled, healthier food choices.  If A1c and glucose continue to be elevated with more attention to diet, will plan to discontinue Januvia and start a GLP-1.  Could also consider SGLT2.    Body mass index is 33.14 kg/m .      -- He is allergic to sulfa medications so cannot be on sulfonylureas     2. Hypertension - BP slightly elevated today.  Continue to monitor.  Could resume Losartan if needed in the future.    3. Hyperlipidemia -Currently treated with atorvastatin 40 mg - continue.      Most Recent Immunizations   Administered Date(s) Administered     HepB 05/15/2017     Influenza (intradermal) 09/20/2016     Influenza Vaccine IM > 6 months Valent IIV4 10/18/2019     Pneumo Conj 13-V (2010&after) 07/26/2018     TDAP Vaccine (Adacel) 10/04/2012     More than 50% of the time spent with Ms. Hammonds on counseling / coordinating her care and discussing the above plan of care. The patient indicates understanding of the above issues and agrees with the plan set forth.  Total face to face time was 25 minutes.    Follow-up:  3 months     Annette Hobson NP  Endocrinology  [facility]  CC: Haase, Susan Rachele            Again, thank you for allowing me to participate in the care of your patient.        Sincerely,        ARDEN Henderson CNP

## 2020-02-21 NOTE — PROGRESS NOTES
"ENDOCRINOLOGY CLINIC NOTE:  Name: Naman \"Chiarg\" Robert  Seen for f/u of Diabetes (Last seen 9/6/2019).  HPI:  Naman Jones is a 34 year old male who presents for the management of Diabetes.  10/9/2018: gastric bypass, [facility] weight loss center  Has lost 122 lbs, 369-->296-->247 lbs today.  Currently eating a normal diet    VITAMINS AND MINERALS:   2 Multivitamin with Minerals-   600 mg Calcium carbonate With Vitamin D TID- with meals  3-1000 International units Vitamin D  500 mcg Vitamin B-12 sublingual      1. Type 2 DM:  Orginally diagnosed at the age of: 30. On routine physical exam. Was losing wt at that time.    Current Regimen:    Metformin 500 mg bid. Januvia 100 mg daily.  Unable to tolerate higher doses due to GI side effects  Off Trulicity - DUE TO COST    Insulin use decreased following gastric bypass, now off all insulin    BS checks: Janie personal CGM  Janie: average glucose 178, 40% above 180, 60% in target range , 0% below    Exercise: not much  Symptoms of hypoglycemia (low blood sugar):  Gets symptoms of hypoglycemia.  Episodes of hypoglycemia: no  Fixed meal pattern: yes  Patient counting carbs: yes     DM Complications:   Nephropathy: no  Retinopathy: no retinopathy   Neuropathy: Yes, + numbness in toes - stable  Microalbuminuria: Yes- on losartan  CAD/PAD: no  Gastroparesis: no  Hypoglycemia unawareness: no  Upcoming orthopedic surgery - left leg, next week.  Expected recovery is about 3 months  2. Hypertension:    Off BP meds at this time.  Previously on Losartan 100 mg and Maxide 75-50 mg daily.  3. Hyperlipidemia: +Atorvastatin 40 mg qd    PMH/PSH:  DM  HTN  Dyslipidemia    Family Hx:  Diabetes: Father and mgm    Social Hx:  Social History     Socioeconomic History     Marital status:      Spouse name: Not on file     Number of children: Not on file     Years of education: Not on file     Highest education level: Bachelor's degree (e.g., BA, AB, BS)   Occupational " History     Not on file   Social Needs     Financial resource strain: Not very hard     Food insecurity:     Worry: Never true     Inability: Never true     Transportation needs:     Medical: No     Non-medical: No   Tobacco Use     Smoking status: Never Smoker     Smokeless tobacco: Never Used   Substance and Sexual Activity     Alcohol use: Yes     Comment: socially     Drug use: No     Sexual activity: Yes     Partners: Female   Lifestyle     Physical activity:     Days per week: 2 days     Minutes per session: 30 min     Stress: To some extent   Relationships     Social connections:     Talks on phone: More than three times a week     Gets together: Once a week     Attends Taoist service: Never     Active member of club or organization: No     Attends meetings of clubs or organizations: 1 to 4 times per year     Relationship status:      Intimate partner violence:     Fear of current or ex partner: Not on file     Emotionally abused: Not on file     Physically abused: Not on file     Forced sexual activity: Not on file   Other Topics Concern     Parent/sibling w/ CABG, MI or angioplasty before 65F 55M? Not Asked   Social History Narrative     Not on file          MEDICATIONS:  has a current medication list which includes the following prescription(s): freestyle césar 14 day sensor, metformin, atorvastatin, buspirone, calcium carb-cholecalciferol, vitamin d3, freestyle césar reader, b-12, hydroxyzine, childrens multivitamin w/iron, onetouch delica lancets 33g, and sitagliptin.    ROS     ROS: 10 point ROS neg other than the symptoms noted above in the HPI.    Physical Exam   VS: BP (!) 145/83 (BP Location: Right arm, Patient Position: Chair, Cuff Size: Adult Large)   Pulse 92   Temp 98.3  F (36.8  C) (Oral)   Resp 18   Wt 112.4 kg (247 lb 14.4 oz)   BMI 33.14 kg/m    GENERAL: AXOX3, NAD, well dressed, answering questions appropriately, appears stated age.  HEENT: No exopthalmous, no proptosis, no  lig lag, no retraction  CV: RRR  LUNGS: CTAB  NEUROLOGY: CN grossly intact, no tremors  PSYCH: normal affect and mood    LABS:  A1c:   Component      Latest Ref Rng & Units 5/3/2019 9/6/2019 2/14/2020   Hemoglobin A1C      0 - 5.6 % 6.5 (H) 6.1 (H) 7.0 (H)     !COMPREHENSIVE Latest Ref Rng & Units 2/14/2020   SODIUM 133 - 144 mmol/L 140   POTASSIUM 3.4 - 5.3 mmol/L 3.9   CHLORIDE 94 - 109 mmol/L 107   BUN 7 - 30 mg/dL 9   Creatinine 0.66 - 1.25 mg/dL 0.50 (L)   Glucose 70 - 99 mg/dL 159 (H)   ANION GAP 3 - 14 mmol/L 8   CALCIUM 8.5 - 10.1 mg/dL 8.8   ALBUMIN 3.4 - 5.0 g/dL 4.1     !LIPID/HEPATIC Latest Ref Rng & Units 2/14/2020   CHOLESTEROL <200 mg/dL 169   TRIGLYCERIDES <150 mg/dL 60   HDL CHOLESTEROL >39 mg/dL 69   LDL CHOLESTEROL DIRECT <100 mg/dL    LDL CHOLESTEROL, CALCULATED <100 mg/dL 88   NON HDL CHOLESTEROL <130 mg/dL 100   AST 0 - 45 U/L 29   ALT 0 - 70 U/L 54     !THYROID Latest Ref Rng & Units 7/26/2018   TSH 0.40 - 4.00 mU/L 1.50     Component      Latest Ref Rng & Units 7/10/2017 7/26/2018 9/6/2019   Creatinine Urine      mg/dL 56 63 127   Albumin Urine mg/L      mg/L 18 7 60   Albumin Urine mg/g Cr      0 - 17 mg/g Cr 32.08 (H) 10.85 47.56 (H)       All pertinent notes, labs, and images personally reviewed by me.     A/P  Mr.Anthony Jones is a 34 year old here for the management of diabetes:    1. DM2 - Controlled  A1c has increased to 7.0%.  Janie shows 2 week average glucose elevated at 178.  Diabetes complicated by neuropathy and microalbuminuria.  Diabetes is currently treated with metformin 500 mg bid and Januvia 100 mg daily.  He's unable to tolerate any higher doses of Metformin due to GI side effects.  He admits to dietary indiscretions and feels this is the cause of his higher glucose readings.  He would like to work on making better dietary choices before making any changes to his current diabetes meds.    I think that is very reasonable.  Recommend more attention to dietary intake -  limited carbs, calorie controlled, healthier food choices.  If A1c and glucose continue to be elevated with more attention to diet, will plan to discontinue Januvia and start a GLP-1.  Could also consider SGLT2.    Body mass index is 33.14 kg/m .      -- He is allergic to sulfa medications so cannot be on sulfonylureas     2. Hypertension - BP slightly elevated today.  Continue to monitor.  Could resume Losartan if needed in the future.    3. Hyperlipidemia -Currently treated with atorvastatin 40 mg - continue.      Most Recent Immunizations   Administered Date(s) Administered     HepB 05/15/2017     Influenza (intradermal) 09/20/2016     Influenza Vaccine IM > 6 months Valent IIV4 10/18/2019     Pneumo Conj 13-V (2010&after) 07/26/2018     TDAP Vaccine (Adacel) 10/04/2012     More than 50% of the time spent with Ms. Hammonds on counseling / coordinating her care and discussing the above plan of care. The patient indicates understanding of the above issues and agrees with the plan set forth.  Total face to face time was 25 minutes.    Follow-up:  3 months     Annette Hobson NP  Endocrinology  [facility]  CC: Haase, Susan Rachele

## 2020-02-23 ENCOUNTER — HEALTH MAINTENANCE LETTER (OUTPATIENT)
Age: 35
End: 2020-02-23

## 2020-02-27 ENCOUNTER — ANESTHESIA EVENT (OUTPATIENT)
Dept: SURGERY | Facility: CLINIC | Age: 35
End: 2020-02-27
Payer: COMMERCIAL

## 2020-02-28 ENCOUNTER — APPOINTMENT (OUTPATIENT)
Dept: GENERAL RADIOLOGY | Facility: CLINIC | Age: 35
End: 2020-02-28
Attending: ORTHOPAEDIC SURGERY
Payer: COMMERCIAL

## 2020-02-28 ENCOUNTER — ANESTHESIA (OUTPATIENT)
Dept: SURGERY | Facility: CLINIC | Age: 35
End: 2020-02-28
Payer: COMMERCIAL

## 2020-02-28 ENCOUNTER — HOSPITAL ENCOUNTER (OUTPATIENT)
Facility: CLINIC | Age: 35
Setting detail: OBSERVATION
LOS: 1 days | Discharge: HOME | End: 2020-03-01
Attending: ORTHOPAEDIC SURGERY | Admitting: ORTHOPAEDIC SURGERY
Payer: COMMERCIAL

## 2020-02-28 DIAGNOSIS — M21.062 ACQUIRED GENU VALGUM OF LEFT KNEE: ICD-10-CM

## 2020-02-28 DIAGNOSIS — M25.361 PATELLAR INSTABILITY OF RIGHT KNEE: ICD-10-CM

## 2020-02-28 DIAGNOSIS — M21.062 ACQUIRED GENU VALGUM OF LEFT KNEE: Primary | ICD-10-CM

## 2020-02-28 PROBLEM — Z98.890 STATUS POST SURGERY: Status: ACTIVE | Noted: 2020-02-28

## 2020-02-28 LAB
GLUCOSE BLDC GLUCOMTR-MCNC: 197 MG/DL (ref 70–99)
GLUCOSE BLDC GLUCOMTR-MCNC: 347 MG/DL (ref 70–99)
HBA1C MFR BLD: 6.8 % (ref 0–5.6)

## 2020-02-28 PROCEDURE — 25000125 ZZHC RX 250: Performed by: NURSE ANESTHETIST, CERTIFIED REGISTERED

## 2020-02-28 PROCEDURE — 00000146 ZZHCL STATISTIC GLUCOSE BY METER IP

## 2020-02-28 PROCEDURE — 25800025 ZZH RX 258: Performed by: ORTHOPAEDIC SURGERY

## 2020-02-28 PROCEDURE — 40000171 ZZH STATISTIC PRE-PROCEDURE ASSESSMENT III: Performed by: ORTHOPAEDIC SURGERY

## 2020-02-28 PROCEDURE — 71000015 ZZH RECOVERY PHASE 1 LEVEL 2 EA ADDTL HR: Performed by: ORTHOPAEDIC SURGERY

## 2020-02-28 PROCEDURE — 73560 X-RAY EXAM OF KNEE 1 OR 2: CPT | Mod: LT

## 2020-02-28 PROCEDURE — C1762 CONN TISS, HUMAN(INC FASCIA): HCPCS | Performed by: ORTHOPAEDIC SURGERY

## 2020-02-28 PROCEDURE — G0378 HOSPITAL OBSERVATION PER HR: HCPCS

## 2020-02-28 PROCEDURE — 25000128 H RX IP 250 OP 636: Performed by: ANESTHESIOLOGY

## 2020-02-28 PROCEDURE — 27210794 ZZH OR GENERAL SUPPLY STERILE: Performed by: ORTHOPAEDIC SURGERY

## 2020-02-28 PROCEDURE — 40000278 XR SURGERY CARM FLUORO LESS THAN 5 MIN: Mod: TC

## 2020-02-28 PROCEDURE — 96372 THER/PROPH/DIAG INJ SC/IM: CPT

## 2020-02-28 PROCEDURE — 25000132 ZZH RX MED GY IP 250 OP 250 PS 637: Performed by: INTERNAL MEDICINE

## 2020-02-28 PROCEDURE — 25000128 H RX IP 250 OP 636: Performed by: STUDENT IN AN ORGANIZED HEALTH CARE EDUCATION/TRAINING PROGRAM

## 2020-02-28 PROCEDURE — 25000132 ZZH RX MED GY IP 250 OP 250 PS 637: Performed by: STUDENT IN AN ORGANIZED HEALTH CARE EDUCATION/TRAINING PROGRAM

## 2020-02-28 PROCEDURE — 36000064 ZZH SURGERY LEVEL 4 EA 15 ADDTL MIN - UMMC: Performed by: ORTHOPAEDIC SURGERY

## 2020-02-28 PROCEDURE — 25000566 ZZH SEVOFLURANE, EA 15 MIN: Performed by: ORTHOPAEDIC SURGERY

## 2020-02-28 PROCEDURE — 25800030 ZZH RX IP 258 OP 636: Performed by: ANESTHESIOLOGY

## 2020-02-28 PROCEDURE — 99207 ZZC CONSULT E&M CHANGED TO SUBSEQUENT LEVEL: CPT | Performed by: INTERNAL MEDICINE

## 2020-02-28 PROCEDURE — 37000008 ZZH ANESTHESIA TECHNICAL FEE, 1ST 30 MIN: Performed by: ORTHOPAEDIC SURGERY

## 2020-02-28 PROCEDURE — C1713 ANCHOR/SCREW BN/BN,TIS/BN: HCPCS | Performed by: ORTHOPAEDIC SURGERY

## 2020-02-28 PROCEDURE — 25000125 ZZHC RX 250: Performed by: ORTHOPAEDIC SURGERY

## 2020-02-28 PROCEDURE — 25800030 ZZH RX IP 258 OP 636: Performed by: NURSE ANESTHETIST, CERTIFIED REGISTERED

## 2020-02-28 PROCEDURE — 27211024 ZZHC OR SUPPLY OTHER OPNP: Performed by: ORTHOPAEDIC SURGERY

## 2020-02-28 PROCEDURE — 36415 COLL VENOUS BLD VENIPUNCTURE: CPT | Performed by: STUDENT IN AN ORGANIZED HEALTH CARE EDUCATION/TRAINING PROGRAM

## 2020-02-28 PROCEDURE — 25800030 ZZH RX IP 258 OP 636: Performed by: ORTHOPAEDIC SURGERY

## 2020-02-28 PROCEDURE — 37000009 ZZH ANESTHESIA TECHNICAL FEE, EACH ADDTL 15 MIN: Performed by: ORTHOPAEDIC SURGERY

## 2020-02-28 PROCEDURE — 25800030 ZZH RX IP 258 OP 636: Performed by: STUDENT IN AN ORGANIZED HEALTH CARE EDUCATION/TRAINING PROGRAM

## 2020-02-28 PROCEDURE — 71000014 ZZH RECOVERY PHASE 1 LEVEL 2 FIRST HR: Performed by: ORTHOPAEDIC SURGERY

## 2020-02-28 PROCEDURE — 25000128 H RX IP 250 OP 636: Performed by: ORTHOPAEDIC SURGERY

## 2020-02-28 PROCEDURE — 25000132 ZZH RX MED GY IP 250 OP 250 PS 637: Performed by: ANESTHESIOLOGY

## 2020-02-28 PROCEDURE — 83036 HEMOGLOBIN GLYCOSYLATED A1C: CPT | Performed by: STUDENT IN AN ORGANIZED HEALTH CARE EDUCATION/TRAINING PROGRAM

## 2020-02-28 PROCEDURE — 36000066 ZZH SURGERY LEVEL 4 W FLUORO 1ST 30 MIN - UMMC: Performed by: ORTHOPAEDIC SURGERY

## 2020-02-28 PROCEDURE — 25000125 ZZHC RX 250: Performed by: ANESTHESIOLOGY

## 2020-02-28 PROCEDURE — 99225 ZZC SUBSEQUENT OBSERVATION CARE,LEVEL II: CPT | Performed by: INTERNAL MEDICINE

## 2020-02-28 PROCEDURE — 25000128 H RX IP 250 OP 636: Performed by: NURSE ANESTHETIST, CERTIFIED REGISTERED

## 2020-02-28 DEVICE — GRAFT BONE PUTTY DBX 10ML 038100: Type: IMPLANTABLE DEVICE | Site: KNEE | Status: FUNCTIONAL

## 2020-02-28 DEVICE — SCREW, CANCELLOUS 6.5 X 45MM
Type: IMPLANTABLE DEVICE | Site: KNEE | Status: NON-FUNCTIONAL
Brand: ARTHREX®
Removed: 2020-12-15

## 2020-02-28 DEVICE — IMPLANTABLE DEVICE
Type: IMPLANTABLE DEVICE | Site: KNEE | Status: NON-FUNCTIONAL
Removed: 2020-12-15

## 2020-02-28 DEVICE — SCREW, CORTICAL 4.5 X 48MM
Type: IMPLANTABLE DEVICE | Site: KNEE | Status: NON-FUNCTIONAL
Brand: ARTHREX®
Removed: 2020-12-15

## 2020-02-28 RX ORDER — ATORVASTATIN CALCIUM 40 MG/1
40 TABLET, FILM COATED ORAL DAILY
Status: DISCONTINUED | OUTPATIENT
Start: 2020-02-28 | End: 2020-03-01 | Stop reason: HOSPADM

## 2020-02-28 RX ORDER — FENTANYL CITRATE 50 UG/ML
25-50 INJECTION, SOLUTION INTRAMUSCULAR; INTRAVENOUS
Status: DISCONTINUED | OUTPATIENT
Start: 2020-02-28 | End: 2020-02-28 | Stop reason: HOSPADM

## 2020-02-28 RX ORDER — SODIUM CHLORIDE, SODIUM LACTATE, POTASSIUM CHLORIDE, CALCIUM CHLORIDE 600; 310; 30; 20 MG/100ML; MG/100ML; MG/100ML; MG/100ML
INJECTION, SOLUTION INTRAVENOUS CONTINUOUS
Status: DISCONTINUED | OUTPATIENT
Start: 2020-02-28 | End: 2020-02-28 | Stop reason: HOSPADM

## 2020-02-28 RX ORDER — BUSPIRONE HYDROCHLORIDE 5 MG/1
5 TABLET ORAL 2 TIMES DAILY
Status: DISCONTINUED | OUTPATIENT
Start: 2020-02-28 | End: 2020-03-01 | Stop reason: HOSPADM

## 2020-02-28 RX ORDER — FLUMAZENIL 0.1 MG/ML
0.2 INJECTION, SOLUTION INTRAVENOUS
Status: DISCONTINUED | OUTPATIENT
Start: 2020-02-28 | End: 2020-02-28 | Stop reason: HOSPADM

## 2020-02-28 RX ORDER — SODIUM CHLORIDE 9 MG/ML
INJECTION, SOLUTION INTRAVENOUS
Status: DISPENSED
Start: 2020-02-28 | End: 2020-02-29

## 2020-02-28 RX ORDER — NALOXONE HYDROCHLORIDE 0.4 MG/ML
.1-.4 INJECTION, SOLUTION INTRAMUSCULAR; INTRAVENOUS; SUBCUTANEOUS
Status: DISCONTINUED | OUTPATIENT
Start: 2020-02-28 | End: 2020-03-01 | Stop reason: HOSPADM

## 2020-02-28 RX ORDER — AMOXICILLIN 250 MG
1-2 CAPSULE ORAL 2 TIMES DAILY PRN
Status: DISCONTINUED | OUTPATIENT
Start: 2020-02-28 | End: 2020-03-01 | Stop reason: HOSPADM

## 2020-02-28 RX ORDER — ONDANSETRON 4 MG/1
4 TABLET, ORALLY DISINTEGRATING ORAL EVERY 30 MIN PRN
Status: DISCONTINUED | OUTPATIENT
Start: 2020-02-28 | End: 2020-02-28 | Stop reason: HOSPADM

## 2020-02-28 RX ORDER — DEXAMETHASONE SODIUM PHOSPHATE 4 MG/ML
INJECTION, SOLUTION INTRA-ARTICULAR; INTRALESIONAL; INTRAMUSCULAR; INTRAVENOUS; SOFT TISSUE PRN
Status: DISCONTINUED | OUTPATIENT
Start: 2020-02-28 | End: 2020-02-28

## 2020-02-28 RX ORDER — HYDROXYZINE HYDROCHLORIDE 25 MG/1
50 TABLET, FILM COATED ORAL EVERY 6 HOURS PRN
Status: DISCONTINUED | OUTPATIENT
Start: 2020-02-28 | End: 2020-03-01 | Stop reason: HOSPADM

## 2020-02-28 RX ORDER — FENTANYL CITRATE 50 UG/ML
25-50 INJECTION, SOLUTION INTRAMUSCULAR; INTRAVENOUS
Status: DISCONTINUED | OUTPATIENT
Start: 2020-02-28 | End: 2020-02-28

## 2020-02-28 RX ORDER — MEPERIDINE HYDROCHLORIDE 25 MG/ML
12.5 INJECTION INTRAMUSCULAR; INTRAVENOUS; SUBCUTANEOUS
Status: COMPLETED | OUTPATIENT
Start: 2020-02-28 | End: 2020-02-28

## 2020-02-28 RX ORDER — PROPOFOL 10 MG/ML
INJECTION, EMULSION INTRAVENOUS PRN
Status: DISCONTINUED | OUTPATIENT
Start: 2020-02-28 | End: 2020-02-28

## 2020-02-28 RX ORDER — ONDANSETRON 4 MG/1
4 TABLET, ORALLY DISINTEGRATING ORAL EVERY 6 HOURS PRN
Status: DISCONTINUED | OUTPATIENT
Start: 2020-02-28 | End: 2020-03-01 | Stop reason: HOSPADM

## 2020-02-28 RX ORDER — ACETAMINOPHEN 325 MG/1
650 TABLET ORAL EVERY 4 HOURS
Status: DISCONTINUED | OUTPATIENT
Start: 2020-02-28 | End: 2020-03-01 | Stop reason: HOSPADM

## 2020-02-28 RX ORDER — PROPOFOL 10 MG/ML
INJECTION, EMULSION INTRAVENOUS CONTINUOUS PRN
Status: DISCONTINUED | OUTPATIENT
Start: 2020-02-28 | End: 2020-02-28

## 2020-02-28 RX ORDER — DIPHENHYDRAMINE HYDROCHLORIDE 50 MG/ML
25-50 INJECTION INTRAMUSCULAR; INTRAVENOUS EVERY 6 HOURS PRN
Status: DISCONTINUED | OUTPATIENT
Start: 2020-02-28 | End: 2020-02-28

## 2020-02-28 RX ORDER — DEXTROSE MONOHYDRATE 25 G/50ML
25-50 INJECTION, SOLUTION INTRAVENOUS
Status: DISCONTINUED | OUTPATIENT
Start: 2020-02-28 | End: 2020-03-01 | Stop reason: HOSPADM

## 2020-02-28 RX ORDER — MAGNESIUM HYDROXIDE 1200 MG/15ML
LIQUID ORAL PRN
Status: DISCONTINUED | OUTPATIENT
Start: 2020-02-28 | End: 2020-02-28 | Stop reason: HOSPADM

## 2020-02-28 RX ORDER — ACETAMINOPHEN 325 MG/1
975 TABLET ORAL ONCE
Status: COMPLETED | OUTPATIENT
Start: 2020-02-28 | End: 2020-02-28

## 2020-02-28 RX ORDER — ONDANSETRON 2 MG/ML
4 INJECTION INTRAMUSCULAR; INTRAVENOUS EVERY 6 HOURS PRN
Status: DISCONTINUED | OUTPATIENT
Start: 2020-02-28 | End: 2020-03-01 | Stop reason: HOSPADM

## 2020-02-28 RX ORDER — DIPHENHYDRAMINE HCL 25 MG
25 CAPSULE ORAL EVERY 6 HOURS PRN
Status: DISCONTINUED | OUTPATIENT
Start: 2020-02-28 | End: 2020-03-01 | Stop reason: HOSPADM

## 2020-02-28 RX ORDER — BUPIVACAINE HYDROCHLORIDE AND EPINEPHRINE 2.5; 5 MG/ML; UG/ML
INJECTION, SOLUTION INFILTRATION; PERINEURAL PRN
Status: DISCONTINUED | OUTPATIENT
Start: 2020-02-28 | End: 2020-02-28

## 2020-02-28 RX ORDER — NALOXONE HYDROCHLORIDE 0.4 MG/ML
.1-.4 INJECTION, SOLUTION INTRAMUSCULAR; INTRAVENOUS; SUBCUTANEOUS
Status: DISCONTINUED | OUTPATIENT
Start: 2020-02-28 | End: 2020-02-28 | Stop reason: HOSPADM

## 2020-02-28 RX ORDER — CEFAZOLIN SODIUM 2 G/100ML
2 INJECTION, SOLUTION INTRAVENOUS
Status: COMPLETED | OUTPATIENT
Start: 2020-02-28 | End: 2020-02-28

## 2020-02-28 RX ORDER — ASPIRIN 81 MG/1
162 TABLET ORAL DAILY
Status: DISCONTINUED | OUTPATIENT
Start: 2020-02-28 | End: 2020-03-01 | Stop reason: HOSPADM

## 2020-02-28 RX ORDER — OXYCODONE HYDROCHLORIDE 5 MG/1
5-10 TABLET ORAL EVERY 6 HOURS PRN
Qty: 30 TABLET | Refills: 0 | Status: SHIPPED | OUTPATIENT
Start: 2020-02-28 | End: 2020-03-01

## 2020-02-28 RX ORDER — HYDRALAZINE HYDROCHLORIDE 20 MG/ML
2.5-5 INJECTION INTRAMUSCULAR; INTRAVENOUS EVERY 10 MIN PRN
Status: DISCONTINUED | OUTPATIENT
Start: 2020-02-28 | End: 2020-02-28 | Stop reason: HOSPADM

## 2020-02-28 RX ORDER — LIDOCAINE HYDROCHLORIDE 20 MG/ML
INJECTION, SOLUTION INFILTRATION; PERINEURAL PRN
Status: DISCONTINUED | OUTPATIENT
Start: 2020-02-28 | End: 2020-02-28

## 2020-02-28 RX ORDER — NICOTINE POLACRILEX 4 MG
15-30 LOZENGE BUCCAL
Status: DISCONTINUED | OUTPATIENT
Start: 2020-02-28 | End: 2020-03-01 | Stop reason: HOSPADM

## 2020-02-28 RX ORDER — LIDOCAINE 40 MG/G
CREAM TOPICAL
Status: DISCONTINUED | OUTPATIENT
Start: 2020-02-28 | End: 2020-03-01 | Stop reason: HOSPADM

## 2020-02-28 RX ORDER — SODIUM CHLORIDE 9 MG/ML
INJECTION, SOLUTION INTRAVENOUS CONTINUOUS
Status: DISCONTINUED | OUTPATIENT
Start: 2020-02-28 | End: 2020-02-28

## 2020-02-28 RX ORDER — CEFAZOLIN SODIUM 2 G/100ML
2 INJECTION, SOLUTION INTRAVENOUS EVERY 8 HOURS
Status: COMPLETED | OUTPATIENT
Start: 2020-02-29 | End: 2020-02-29

## 2020-02-28 RX ORDER — ONDANSETRON 2 MG/ML
4 INJECTION INTRAMUSCULAR; INTRAVENOUS EVERY 30 MIN PRN
Status: DISCONTINUED | OUTPATIENT
Start: 2020-02-28 | End: 2020-02-28 | Stop reason: HOSPADM

## 2020-02-28 RX ORDER — OXYCODONE HYDROCHLORIDE 5 MG/1
5-10 TABLET ORAL EVERY 4 HOURS PRN
Status: DISCONTINUED | OUTPATIENT
Start: 2020-02-28 | End: 2020-02-29

## 2020-02-28 RX ORDER — FENTANYL CITRATE 50 UG/ML
INJECTION, SOLUTION INTRAMUSCULAR; INTRAVENOUS PRN
Status: DISCONTINUED | OUTPATIENT
Start: 2020-02-28 | End: 2020-02-28

## 2020-02-28 RX ORDER — KETOROLAC TROMETHAMINE 30 MG/ML
30 INJECTION, SOLUTION INTRAMUSCULAR; INTRAVENOUS EVERY 6 HOURS
Status: COMPLETED | OUTPATIENT
Start: 2020-02-28 | End: 2020-03-01

## 2020-02-28 RX ORDER — BUPIVACAINE HYDROCHLORIDE AND EPINEPHRINE 5; 5 MG/ML; UG/ML
INJECTION, SOLUTION PERINEURAL PRN
Status: DISCONTINUED | OUTPATIENT
Start: 2020-02-28 | End: 2020-02-28 | Stop reason: HOSPADM

## 2020-02-28 RX ORDER — DEXAMETHASONE SODIUM PHOSPHATE 10 MG/ML
INJECTION, SOLUTION INTRAMUSCULAR; INTRAVENOUS PRN
Status: DISCONTINUED | OUTPATIENT
Start: 2020-02-28 | End: 2020-02-28

## 2020-02-28 RX ORDER — CEFAZOLIN SODIUM 1 G/3ML
1 INJECTION, POWDER, FOR SOLUTION INTRAMUSCULAR; INTRAVENOUS SEE ADMIN INSTRUCTIONS
Status: DISCONTINUED | OUTPATIENT
Start: 2020-02-28 | End: 2020-02-28 | Stop reason: HOSPADM

## 2020-02-28 RX ORDER — SODIUM CHLORIDE, SODIUM LACTATE, POTASSIUM CHLORIDE, CALCIUM CHLORIDE 600; 310; 30; 20 MG/100ML; MG/100ML; MG/100ML; MG/100ML
INJECTION, SOLUTION INTRAVENOUS CONTINUOUS PRN
Status: DISCONTINUED | OUTPATIENT
Start: 2020-02-28 | End: 2020-02-28

## 2020-02-28 RX ORDER — HYDROMORPHONE HYDROCHLORIDE 1 MG/ML
.3-.5 INJECTION, SOLUTION INTRAMUSCULAR; INTRAVENOUS; SUBCUTANEOUS
Status: DISCONTINUED | OUTPATIENT
Start: 2020-02-28 | End: 2020-03-01 | Stop reason: HOSPADM

## 2020-02-28 RX ADMIN — DEXAMETHASONE SODIUM PHOSPHATE 4 MG: 4 INJECTION, SOLUTION INTRAMUSCULAR; INTRAVENOUS at 14:53

## 2020-02-28 RX ADMIN — LIDOCAINE HYDROCHLORIDE 100 MG: 20 INJECTION, SOLUTION INFILTRATION; PERINEURAL at 14:21

## 2020-02-28 RX ADMIN — PHENYLEPHRINE HYDROCHLORIDE 100 MCG: 10 INJECTION INTRAVENOUS at 16:25

## 2020-02-28 RX ADMIN — FENTANYL CITRATE 100 MCG: 50 INJECTION, SOLUTION INTRAMUSCULAR; INTRAVENOUS at 14:21

## 2020-02-28 RX ADMIN — DIPHENHYDRAMINE HYDROCHLORIDE 25 MG: 25 CAPSULE ORAL at 23:33

## 2020-02-28 RX ADMIN — Medication 1 G: at 16:28

## 2020-02-28 RX ADMIN — PROPOFOL 50 MCG/KG/MIN: 10 INJECTION, EMULSION INTRAVENOUS at 15:32

## 2020-02-28 RX ADMIN — PROPOFOL 30 MG: 10 INJECTION, EMULSION INTRAVENOUS at 14:53

## 2020-02-28 RX ADMIN — MEPERIDINE HYDROCHLORIDE 12.5 MG: 25 INJECTION INTRAMUSCULAR; INTRAVENOUS; SUBCUTANEOUS at 18:13

## 2020-02-28 RX ADMIN — MEPERIDINE HYDROCHLORIDE 12.5 MG: 25 INJECTION INTRAMUSCULAR; INTRAVENOUS; SUBCUTANEOUS at 18:31

## 2020-02-28 RX ADMIN — MIDAZOLAM HYDROCHLORIDE 2 MG: 1 INJECTION, SOLUTION INTRAMUSCULAR; INTRAVENOUS at 13:54

## 2020-02-28 RX ADMIN — DEXMEDETOMIDINE HYDROCHLORIDE 4 MCG: 100 INJECTION, SOLUTION INTRAVENOUS at 15:16

## 2020-02-28 RX ADMIN — FENTANYL CITRATE 25 MCG: 50 INJECTION, SOLUTION INTRAMUSCULAR; INTRAVENOUS at 13:24

## 2020-02-28 RX ADMIN — HYDROMORPHONE HYDROCHLORIDE 0.5 MG: 1 INJECTION, SOLUTION INTRAMUSCULAR; INTRAVENOUS; SUBCUTANEOUS at 17:57

## 2020-02-28 RX ADMIN — HYDROMORPHONE HYDROCHLORIDE 0.3 MG: 1 INJECTION, SOLUTION INTRAMUSCULAR; INTRAVENOUS; SUBCUTANEOUS at 20:41

## 2020-02-28 RX ADMIN — BUPIVACAINE HYDROCHLORIDE AND EPINEPHRINE BITARTRATE 20 ML: 2.5; .005 INJECTION, SOLUTION INFILTRATION; PERINEURAL at 13:20

## 2020-02-28 RX ADMIN — KETOROLAC TROMETHAMINE 30 MG: 30 INJECTION, SOLUTION INTRAMUSCULAR at 18:39

## 2020-02-28 RX ADMIN — TRANEXAMIC ACID 1 G: 100 INJECTION, SOLUTION INTRAVENOUS at 14:29

## 2020-02-28 RX ADMIN — DEXMEDETOMIDINE HYDROCHLORIDE 20 MCG: 100 INJECTION, SOLUTION INTRAVENOUS at 13:20

## 2020-02-28 RX ADMIN — SODIUM CHLORIDE: 9 INJECTION, SOLUTION INTRAVENOUS at 20:42

## 2020-02-28 RX ADMIN — PROPOFOL 60 MG: 10 INJECTION, EMULSION INTRAVENOUS at 14:26

## 2020-02-28 RX ADMIN — PROPOFOL 160 MG: 10 INJECTION, EMULSION INTRAVENOUS at 14:21

## 2020-02-28 RX ADMIN — ROCURONIUM BROMIDE 50 MG: 10 INJECTION INTRAVENOUS at 14:53

## 2020-02-28 RX ADMIN — ACETAMINOPHEN 650 MG: 325 TABLET, FILM COATED ORAL at 22:52

## 2020-02-28 RX ADMIN — ACETAMINOPHEN 975 MG: 325 TABLET, FILM COATED ORAL at 18:40

## 2020-02-28 RX ADMIN — HYDROMORPHONE HYDROCHLORIDE 0.3 MG: 1 INJECTION, SOLUTION INTRAMUSCULAR; INTRAVENOUS; SUBCUTANEOUS at 22:51

## 2020-02-28 RX ADMIN — SODIUM CHLORIDE, POTASSIUM CHLORIDE, SODIUM LACTATE AND CALCIUM CHLORIDE: 600; 310; 30; 20 INJECTION, SOLUTION INTRAVENOUS at 17:46

## 2020-02-28 RX ADMIN — OXYCODONE HYDROCHLORIDE 10 MG: 5 TABLET ORAL at 23:33

## 2020-02-28 RX ADMIN — ASPIRIN 162 MG: 81 TABLET ORAL at 20:42

## 2020-02-28 RX ADMIN — DEXAMETHASONE SODIUM PHOSPHATE 2 MG: 10 INJECTION, SOLUTION INTRAMUSCULAR; INTRAVENOUS at 13:20

## 2020-02-28 RX ADMIN — CEFAZOLIN SODIUM 2 G: 2 INJECTION, SOLUTION INTRAVENOUS at 23:34

## 2020-02-28 RX ADMIN — FENTANYL CITRATE 50 MCG: 50 INJECTION, SOLUTION INTRAMUSCULAR; INTRAVENOUS at 18:20

## 2020-02-28 RX ADMIN — DEXMEDETOMIDINE HYDROCHLORIDE 4 MCG: 100 INJECTION, SOLUTION INTRAVENOUS at 15:42

## 2020-02-28 RX ADMIN — SUGAMMADEX 200 MG: 100 INJECTION, SOLUTION INTRAVENOUS at 17:47

## 2020-02-28 RX ADMIN — SODIUM CHLORIDE, POTASSIUM CHLORIDE, SODIUM LACTATE AND CALCIUM CHLORIDE: 600; 310; 30; 20 INJECTION, SOLUTION INTRAVENOUS at 13:20

## 2020-02-28 RX ADMIN — OXYCODONE HYDROCHLORIDE 10 MG: 5 TABLET ORAL at 18:39

## 2020-02-28 RX ADMIN — BUSPIRONE HYDROCHLORIDE 5 MG: 5 TABLET ORAL at 20:42

## 2020-02-28 RX ADMIN — FENTANYL CITRATE 50 MCG: 50 INJECTION, SOLUTION INTRAMUSCULAR; INTRAVENOUS at 18:10

## 2020-02-28 RX ADMIN — ATORVASTATIN CALCIUM 40 MG: 40 TABLET, FILM COATED ORAL at 20:42

## 2020-02-28 RX ADMIN — HYDROMORPHONE HYDROCHLORIDE 0.5 MG: 1 INJECTION, SOLUTION INTRAMUSCULAR; INTRAVENOUS; SUBCUTANEOUS at 15:18

## 2020-02-28 RX ADMIN — MIDAZOLAM 2 MG: 1 INJECTION INTRAMUSCULAR; INTRAVENOUS at 13:24

## 2020-02-28 RX ADMIN — Medication 2 G: at 14:30

## 2020-02-28 ASSESSMENT — MIFFLIN-ST. JEOR: SCORE: 2111.75

## 2020-02-28 ASSESSMENT — ENCOUNTER SYMPTOMS: DYSRHYTHMIAS: 0

## 2020-02-28 NOTE — ANESTHESIA PROCEDURE NOTES
Peripheral Nerve Block Procedure Note    Staff:     Anesthesiologist:  Dylon Wilson MD  Location: Pre-op  Procedure Start/Stop TImes:      2/28/2020 1:20 PM     2/28/2020 1:25 PM    patient identified, IV checked, site marked, risks and benefits discussed, informed consent, monitors and equipment checked, pre-op evaluation, at physician/surgeon's request and post-op pain management      Correct Patient: Yes      Correct Position: Yes      Correct Site: Yes      Correct Procedure: Yes      Correct Laterality:  Yes    Site Marked:  Yes  Procedure details:     Procedure:  Adductor canal    ASA:  2    Diagnosis:  Postoperative pain relief    Laterality:  Left    Position:  Supine    Sterile Prep: chloraprep, mask and sterile gloves      Local skin infiltration:  None    Needle:  Insulated    Needle gauge:  21    Needle length (mm):  110    Ultrasound: Yes      Ultrasound used to identify targeted nerve, plexus, or vascular structure and placed a needle adjacent to it      Permanent Image entered into patiient's record      Abnormal pain on injection: No      Blood Aspirated: No      Paresthesias:  No    Bleeding at site: No      Bolus via:  Needle    Infusion Method:  Single Shot    Complications:  None  Assessment/Narrative:     Injection made incrementally with aspirations every (mL):  5

## 2020-02-28 NOTE — ANESTHESIA PREPROCEDURE EVALUATION
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]   Procedure(s):  Examination under anesthesia Left knee,  possible medial patellofemoral ligament reconstruction with allograft versus medial reefing/imbrication  possible possible lateral retinacular lengthening/facetectomy  distal femoral osteotomy     LABS:  CBC:   Lab Results   Component Value Date    WBC 6.7 2020    WBC 9.1 2018    HGB 16.1 2020    HGB 13.6 10/10/2018    HCT 47.0 2020    HCT 47.2 2018     2020     10/09/2018     BMP:   Lab Results   Component Value Date     2020     10/10/2018    POTASSIUM 3.9 2020    POTASSIUM 3.7 10/10/2018    CHLORIDE 107 2020    CHLORIDE 101 10/10/2018    CO2 24 2020    CO2 33 (H) 10/10/2018    BUN 9 2020    BUN 11 2018    CR 0.50 (L) 2020    CR 0.60 (L) 10/09/2018     (H) 2020     (H) 10/09/2018     COAGS: No results found for: PTT, INR, FIBR  POC:   Lab Results   Component Value Date     (H) 2020     OTHER:   Lab Results   Component Value Date    A1C 7.0 (H) 2020    KALINA 8.8 2020    ALBUMIN 4.1 2020    PROTTOTAL 7.5 2020    ALT 54 2020    AST 29 2020    ALKPHOS 105 2020    BILITOTAL 0.7 2020    TSH 1.50 2018        Preop Vitals    BP Readings from Last 3 Encounters:   20 (!) 149/92   20 (!) 145/83   20 138/88    Pulse Readings from Last 3 Encounters:   20 87   20 92   20 80      Resp Readings from Last 3 Encounters:   20 16   20 18   20 14    SpO2 Readings from Last 3 Encounters:   20 97%   20 100%   10/18/19 97%      Temp Readings from Last 1 Encounters:   20 36.9  C (98.4  F) (Oral)    Ht Readings  "from Last 1 Encounters:   02/28/20 1.88 m (6' 2\")      Wt Readings from Last 1 Encounters:   02/28/20 111.2 kg (245 lb 2.4 oz)    Estimated body mass index is 31.48 kg/m  as calculated from the following:    Height as of this encounter: 1.88 m (6' 2\").    Weight as of this encounter: 111.2 kg (245 lb 2.4 oz).     LDA:        Past Medical History:   Diagnosis Date     Diabetes (H)      Hypertension       Past Surgical History:   Procedure Laterality Date     LAPAROSCOPIC BYPASS GASTRIC N/A 10/9/2018    Procedure: LAPAROSCOPIC BYPASS GASTRIC;  LAPAROSCOPIC [place]-EN Y GASTRIC BYPASS;  Surgeon: Alden Mcwilliams MD;  Location: [facility] OR     ORTHOPEDIC SURGERY Right 02/2016      Allergies   Allergen Reactions     Sulfa Drugs Anaphylaxis     Oxycodone Other (See Comments) and Itching     Flushed     Lisinopril      Other reaction(s): Impotence     Onion      Other reaction(s): Intolerance-Can't Take        Anesthesia Evaluation     . Pt has had prior anesthetic.     No history of anesthetic complications          ROS/MED HX    ENT/Pulmonary:     (+)CRISTINE risk factors hypertension, obese, , . .    Neurologic:  - neg neurologic ROS     Cardiovascular:     (+) hypertension----. : . . . :. .      (-) CAD, arrhythmias and stent   METS/Exercise Tolerance:  >4 METS   Hematologic:         Musculoskeletal:  - neg musculoskeletal ROS       GI/Hepatic:  - neg GI/hepatic ROS       Renal/Genitourinary:  - ROS Renal section negative       Endo:     (+) type II DM Obesity, .      Psychiatric:  - neg psychiatric ROS       Infectious Disease:  - neg infectious disease ROS       Malignancy:      - no malignancy   Other:    - neg other ROS                     PHYSICAL EXAM:   Mental Status/Neuro: A/A/O   Airway: Facies: Feasible  Mallampati: I  Mouth/Opening: Full  TM distance: > 6 cm  Neck ROM: Full   Respiratory: Auscultation: CTAB     Resp. Rate: Normal     Resp. Effort: Normal      CV: Rhythm: Regular  Rate: Age appropriate  Heart: Normal " Sounds  Edema: None   Comments:      Dental: Normal Dentition                Assessment:   ASA SCORE: 2    H&P: History and physical reviewed and following examination, relevant changes include:   Smoking Status:  Non-Smoker/Unknown   NPO Status: NPO Appropriate     Plan:   Anes. Type:  General   Pre-Medication: None   Induction:  IV (Standard)   Airway: ETT; Oral   Access/Monitoring: PIV   Maintenance: Balanced     Postop Plan:   Postop Pain: Opioids  Postop Sedation/Airway: Not planned     PONV Management:   Adult Risk Factors:, Non-Smoker, Postop Opioids   Prevention: Ondansetron, Dopamine Antagonist     CONSENT: Direct conversation   Plan and risks discussed with: Patient   Blood Products: Consent Deferred (Minimal Blood Loss)                   Ralph Warren MD

## 2020-02-29 ENCOUNTER — APPOINTMENT (OUTPATIENT)
Dept: PHYSICAL THERAPY | Facility: CLINIC | Age: 35
End: 2020-02-29
Attending: ORTHOPAEDIC SURGERY
Payer: COMMERCIAL

## 2020-02-29 ENCOUNTER — APPOINTMENT (OUTPATIENT)
Dept: OCCUPATIONAL THERAPY | Facility: CLINIC | Age: 35
End: 2020-02-29
Attending: ORTHOPAEDIC SURGERY
Payer: COMMERCIAL

## 2020-02-29 LAB
ANION GAP SERPL CALCULATED.3IONS-SCNC: 7 MMOL/L (ref 3–14)
BUN SERPL-MCNC: 15 MG/DL (ref 7–30)
CALCIUM SERPL-MCNC: 8.3 MG/DL (ref 8.5–10.1)
CHLORIDE SERPL-SCNC: 106 MMOL/L (ref 94–109)
CO2 SERPL-SCNC: 26 MMOL/L (ref 20–32)
CREAT SERPL-MCNC: 0.62 MG/DL (ref 0.66–1.25)
GFR SERPL CREATININE-BSD FRML MDRD: >90 ML/MIN/{1.73_M2}
GLUCOSE BLDC GLUCOMTR-MCNC: 196 MG/DL (ref 70–99)
GLUCOSE BLDC GLUCOMTR-MCNC: 233 MG/DL (ref 70–99)
GLUCOSE BLDC GLUCOMTR-MCNC: 246 MG/DL (ref 70–99)
GLUCOSE BLDC GLUCOMTR-MCNC: 289 MG/DL (ref 70–99)
GLUCOSE SERPL-MCNC: 208 MG/DL (ref 70–99)
POTASSIUM SERPL-SCNC: 4 MMOL/L (ref 3.4–5.3)
SODIUM SERPL-SCNC: 139 MMOL/L (ref 133–144)

## 2020-02-29 PROCEDURE — 80048 BASIC METABOLIC PNL TOTAL CA: CPT | Performed by: STUDENT IN AN ORGANIZED HEALTH CARE EDUCATION/TRAINING PROGRAM

## 2020-02-29 PROCEDURE — 97530 THERAPEUTIC ACTIVITIES: CPT | Mod: GP

## 2020-02-29 PROCEDURE — 99225 ZZC SUBSEQUENT OBSERVATION CARE,LEVEL II: CPT | Performed by: INTERNAL MEDICINE

## 2020-02-29 PROCEDURE — 97161 PT EVAL LOW COMPLEX 20 MIN: CPT | Mod: GP

## 2020-02-29 PROCEDURE — 97535 SELF CARE MNGMENT TRAINING: CPT | Mod: GO

## 2020-02-29 PROCEDURE — 97165 OT EVAL LOW COMPLEX 30 MIN: CPT | Mod: GO

## 2020-02-29 PROCEDURE — 97110 THERAPEUTIC EXERCISES: CPT | Mod: GO

## 2020-02-29 PROCEDURE — 97116 GAIT TRAINING THERAPY: CPT | Mod: GP,59

## 2020-02-29 PROCEDURE — 25000132 ZZH RX MED GY IP 250 OP 250 PS 637: Performed by: STUDENT IN AN ORGANIZED HEALTH CARE EDUCATION/TRAINING PROGRAM

## 2020-02-29 PROCEDURE — 96372 THER/PROPH/DIAG INJ SC/IM: CPT

## 2020-02-29 PROCEDURE — 25000128 H RX IP 250 OP 636: Performed by: STUDENT IN AN ORGANIZED HEALTH CARE EDUCATION/TRAINING PROGRAM

## 2020-02-29 PROCEDURE — 00000146 ZZHCL STATISTIC GLUCOSE BY METER IP

## 2020-02-29 PROCEDURE — 97530 THERAPEUTIC ACTIVITIES: CPT | Mod: GO

## 2020-02-29 PROCEDURE — 25000131 ZZH RX MED GY IP 250 OP 636 PS 637: Performed by: STUDENT IN AN ORGANIZED HEALTH CARE EDUCATION/TRAINING PROGRAM

## 2020-02-29 PROCEDURE — G0378 HOSPITAL OBSERVATION PER HR: HCPCS

## 2020-02-29 PROCEDURE — 36415 COLL VENOUS BLD VENIPUNCTURE: CPT | Performed by: STUDENT IN AN ORGANIZED HEALTH CARE EDUCATION/TRAINING PROGRAM

## 2020-02-29 RX ORDER — HYDROMORPHONE HYDROCHLORIDE 2 MG/1
4 TABLET ORAL
Status: DISCONTINUED | OUTPATIENT
Start: 2020-02-29 | End: 2020-03-01 | Stop reason: HOSPADM

## 2020-02-29 RX ORDER — AMOXICILLIN 250 MG
1-2 CAPSULE ORAL 2 TIMES DAILY
Qty: 30 TABLET | Refills: 0 | Status: SHIPPED | OUTPATIENT
Start: 2020-02-29 | End: 2020-08-06

## 2020-02-29 RX ORDER — ACETAMINOPHEN 325 MG/1
650 TABLET ORAL EVERY 4 HOURS PRN
Qty: 60 TABLET | Refills: 0 | Status: SHIPPED | OUTPATIENT
Start: 2020-02-29 | End: 2020-08-06

## 2020-02-29 RX ORDER — IBUPROFEN 600 MG/1
600 TABLET, FILM COATED ORAL EVERY 6 HOURS PRN
Qty: 60 TABLET | Refills: 0 | Status: SHIPPED | OUTPATIENT
Start: 2020-02-29 | End: 2020-08-06

## 2020-02-29 RX ADMIN — HYDROMORPHONE HYDROCHLORIDE 4 MG: 2 TABLET ORAL at 16:06

## 2020-02-29 RX ADMIN — KETOROLAC TROMETHAMINE 30 MG: 30 INJECTION, SOLUTION INTRAMUSCULAR at 01:22

## 2020-02-29 RX ADMIN — INSULIN ASPART 1 UNITS: 100 INJECTION, SOLUTION INTRAVENOUS; SUBCUTANEOUS at 09:03

## 2020-02-29 RX ADMIN — HYDROXYZINE HYDROCHLORIDE 50 MG: 25 TABLET, FILM COATED ORAL at 02:30

## 2020-02-29 RX ADMIN — ACETAMINOPHEN 650 MG: 325 TABLET, FILM COATED ORAL at 06:04

## 2020-02-29 RX ADMIN — HYDROMORPHONE HYDROCHLORIDE 0.5 MG: 1 INJECTION, SOLUTION INTRAMUSCULAR; INTRAVENOUS; SUBCUTANEOUS at 02:31

## 2020-02-29 RX ADMIN — ACETAMINOPHEN 650 MG: 325 TABLET, FILM COATED ORAL at 22:28

## 2020-02-29 RX ADMIN — ACETAMINOPHEN 650 MG: 325 TABLET, FILM COATED ORAL at 02:31

## 2020-02-29 RX ADMIN — HYDROXYZINE HYDROCHLORIDE 50 MG: 25 TABLET, FILM COATED ORAL at 10:39

## 2020-02-29 RX ADMIN — HYDROMORPHONE HYDROCHLORIDE 4 MG: 2 TABLET ORAL at 13:29

## 2020-02-29 RX ADMIN — HYDROMORPHONE HYDROCHLORIDE 4 MG: 2 TABLET ORAL at 11:26

## 2020-02-29 RX ADMIN — ASPIRIN 162 MG: 81 TABLET ORAL at 08:15

## 2020-02-29 RX ADMIN — HYDROMORPHONE HYDROCHLORIDE 4 MG: 2 TABLET ORAL at 20:25

## 2020-02-29 RX ADMIN — BUSPIRONE HYDROCHLORIDE 5 MG: 5 TABLET ORAL at 20:05

## 2020-02-29 RX ADMIN — KETOROLAC TROMETHAMINE 30 MG: 30 INJECTION, SOLUTION INTRAMUSCULAR at 20:05

## 2020-02-29 RX ADMIN — ACETAMINOPHEN 650 MG: 325 TABLET, FILM COATED ORAL at 10:39

## 2020-02-29 RX ADMIN — CEFAZOLIN SODIUM 2 G: 2 INJECTION, SOLUTION INTRAVENOUS at 10:39

## 2020-02-29 RX ADMIN — ATORVASTATIN CALCIUM 40 MG: 40 TABLET, FILM COATED ORAL at 20:05

## 2020-02-29 RX ADMIN — HYDROMORPHONE HYDROCHLORIDE 4 MG: 2 TABLET ORAL at 06:04

## 2020-02-29 RX ADMIN — KETOROLAC TROMETHAMINE 30 MG: 30 INJECTION, SOLUTION INTRAMUSCULAR at 14:08

## 2020-02-29 RX ADMIN — HYDROMORPHONE HYDROCHLORIDE 4 MG: 2 TABLET ORAL at 22:28

## 2020-02-29 RX ADMIN — BUSPIRONE HYDROCHLORIDE 5 MG: 5 TABLET ORAL at 08:15

## 2020-02-29 RX ADMIN — HYDROMORPHONE HYDROCHLORIDE 4 MG: 2 TABLET ORAL at 09:02

## 2020-02-29 RX ADMIN — KETOROLAC TROMETHAMINE 30 MG: 30 INJECTION, SOLUTION INTRAMUSCULAR at 08:15

## 2020-02-29 RX ADMIN — HYDROMORPHONE HYDROCHLORIDE 4 MG: 2 TABLET ORAL at 18:23

## 2020-02-29 NOTE — PROGRESS NOTES
[facility]    Medicine Progress Note - Hospitalist Service       Date of Admission:  2/28/2020  Assessment & Plan       Naman Jones is a 34 year old male admitted on 2/28/2020. He dyslipidemia, depression and DM type 2. He underwent examination under anesthesia Left knee,  medial reefing/imbrication, lateral retinacular lengthening and distal femoral osteotomy today.   Internal medicine consulted for post operative co management       #S/P Left knee medial reefing/imbrication, lateral retinacular lengthening and distal femoral osteotomy today, POD#1:  Management by primary team. Please see their notes for further details  Stop IV fluids as patient is already able to tolerate diet   Pain control with acetaminophen 975 mg every 8 hrs for 3 days,  Ketorolac 30 mg Q 6 hrs for 6 doses, Oxycodone 5-10 mg every 4 hrs PRN and IV hydromorphone 0.3-0.5 mg q 2 hrs for breakthrough pain   DVT prophylaxis with  SCDs while in hospital, 162 mg aspirin  x4 weeks total.  PT/OT as per protocol   Incentive spirometry and aggressive bowel regimen  We will monitor for acute blood loss anemia. Pre op Hgb at 16.1     #DM Type 2:  Holding home dose of metformin and Sitagliptin   Continue medium intensity sliding scale insulin     #Dyslipidemia:   Stable. continue home dose of Lipitor 40 mg        #Depression  Stable. Continue Buspar 5 mg BID           Disposition Plan   Expected discharge: defer to primary team  Entered: Enmanuel Lea MD 02/29/2020, 3:24 PM       The patient's care was discussed with the Bedside Nurse and Patient.    Enmanuel Lea MD  Hospitalist Service  [facility]    ______________________________________________________________________    Interval History   Incisional pain  Anxiety  No fever or chills  No cp or sob  No NV  No other concern.     Data reviewed today: I reviewed all medications, new labs and imaging results over the last  24 hours. I personally reviewed no images or EKG's today.    Physical Exam   Vital Signs: Temp: 99.4  F (37.4  C) Temp src: Oral BP: 110/55 Pulse: 70 Heart Rate: 94 Resp: 16 SpO2: 100 % O2 Device: None (Room air) Oxygen Delivery: 6 LPM  Weight: 245 lbs 2.42 oz    General: alert, interactive, NAD  HEENT: AT/NC, Anicteric, Moist MM  Respi/Chest: Non labored, CTA BL.  CVS/Heart: S1S2 regular, no murmur.   GI/Abd: Soft, non tender, non distended  MSK/Extremities: Distally warm, well perfused.     Neuro: AO x 4, Grossly non focal.   Psychiatry: Stable mood.   Skin: no rash on exposed areas.         Data   Recent Labs   Lab 02/29/20  0652      POTASSIUM 4.0   CHLORIDE 106   CO2 26   BUN 15   CR 0.62*   ANIONGAP 7   KALINA 8.3*   *     Recent Results (from the past 24 hour(s))   XR Surgery LORENZO L/T 5 Min Fluoro    Narrative    This exam was marked as non-reportable because it will not be read by a   radiologist or a [place] non-radiologist provider.             XR Knee Left 1/2 Views    Narrative    views left knee radiographs 2/29/2020 8:25 AM    History: s/p distal femoral osteotomy    Comparison: 12/10/2019    Findings:    AP and lateral views of the left knee were obtained.     There are postsurgical changes of new osteotomy of the distal femur  with plate and screw fixation.  In the far distal screw appears to be  short-term and abuts the most distal long screw. There is an  additional external fixator.    Small joint effusion. Soft tissue swelling and air in the soft  tissues.    No substantial degenerative change.    No patellar tilt or lateral subluxation.  Soft tissue is unremarkable.      Impression    Impression:  1. Postsurgical changes of osteotomy, soft tissue swelling and air in  the soft tissues   2. No substantial degenerative change.    JUTTA ELLERMANN, MD     Medications       acetaminophen  650 mg Oral Q4H     aspirin  162 mg Oral Daily     atorvastatin  40 mg Oral Daily     busPIRone  5 mg  Oral BID     insulin aspart  1-3 Units Subcutaneous TID AC     insulin aspart  1-3 Units Subcutaneous At Bedtime     ketorolac  30 mg Intravenous Q6H     sodium chloride (PF)  3 mL Intracatheter Q8H

## 2020-02-29 NOTE — OR NURSING
PACU to Inpatient Nursing Handoff    Patient Naman Jones is a 34 year old male who speaks English.   Procedure Procedure(s):  Examination under anesthesia Left knee,  medial reefing/imbrication  lateral retinacular lengthening  distal femoral osteotomy   Surgeon(s) Primary: Angel Madera MD  Resident - Assisting: Abhinav Ellington MD     Allergies   Allergen Reactions     Sulfa Drugs Anaphylaxis     Oxycodone Other (See Comments) and Itching     Flushed     Lisinopril      Other reaction(s): Impotence     Onion      Other reaction(s): Intolerance-Can't Take       Isolation  [unfilled]     Past Medical History   has a past medical history of Diabetes (H) and Hypertension.    Anesthesia General   Dermatome Level     Preop Meds Not applicable   Nerve block Adductor canal.  Location:left. Med:bupivacaine. Time given: 1330   Intraop Meds dexamethasone (Decadron)  dexmedetomidine (Precedex): 28 mcg total  fentanyl (Sublimaze): 100 mcg total  hydromorphone (Dilaudid): 1 mg total   Local Meds No   Antibiotics cefazolin (Ancef) - last given at 1545     Pain Patient Currently in Pain: yes  Comfort: comfortably manageable  Pain Control: partially effective   PACU meds  acetaminophen (Tylenol): 975 mg (total dose) last given at 1839   fentanyl (Sublimaze): 100 mcg (total dose) last given at 1820   ketorolac (Toradol): 30 mg last given at 1839  oxycodone (Roxicodone): 10 mg (total dose) last given at 1839    PCA / epidural No   Capnography     Telemetry ECG Rhythm: Sinus rhythm   Inpatient Telemetry Monitor Ordered? No        Labs Glucose Lab Results   Component Value Date     02/14/2020       Hgb Lab Results   Component Value Date    HGB 16.1 02/14/2020       INR No results found for: INR   PACU Imaging Not applicable     Wound/Incision Incision/Surgical Site 10/09/18 Bilateral Abdomen (Active)   Number of days: 507       Incision/Surgical Site 02/28/20 Left Leg (Active)   Incision  Assessment UTV 2/28/2020  6:01 PM   Closure CHAYO 2/28/2020  6:01 PM   Dressing Intervention Clean, dry, intact 2/28/2020  6:01 PM   Number of days: 0      CMS        Equipment ice pack and hinged knee brace   Other LDA       IV Access Peripheral IV 02/28/20 Right Hand (Active)   Site Assessment WDL 2/28/2020  6:00 PM   Line Status Saline locked 2/28/2020  6:00 PM   Phlebitis Scale 0-->no symptoms 2/28/2020  6:00 PM   Infiltration Scale 0 2/28/2020  6:00 PM   Dressing Intervention New dressing  2/28/2020 12:29 PM   Number of days: 0       Peripheral IV 02/28/20 Left Wrist (Active)   Site Assessment WDL 2/28/2020  6:00 PM   Line Status Infusing 2/28/2020  6:00 PM   Phlebitis Scale 0-->no symptoms 2/28/2020  6:00 PM   Infiltration Scale 0 2/28/2020  6:00 PM   Extravasation? No 2/28/2020  6:00 PM   Number of days: 0      Blood Products Not applicable    mL   Intake/Output Date 02/28/20 0700 - 02/29/20 0659   Shift 3328-8270 2923-7144 7292-0096 24 Hour Total   INTAKE   P.O.  240  240   I.V. 200 900  1100   Shift Total(mL/kg) 200(1.8) 1140(10.25)  1340(12.05)   OUTPUT   Blood  200  200   Shift Total(mL/kg)  200(1.8)  200(1.8)   Weight (kg) 111.2 111.2 111.2 111.2      Drains / Starks     Time of void PreOp Void Prior to Procedure: 1000 (02/28/20 1156)    PostOp      Diapered? No   Bladder Scan      mL (02/28/20 1830)  water, ice chips and apple sauce      Vitals    B/P: 134/85  T: 97.9  F (36.6  C)    Temp src: Oral  P:  Pulse: 80 (02/28/20 1845)    Heart Rate: 86 (02/28/20 1845)     R: 12  O2:  SpO2: 95 %    O2 Device: None (Room air) (02/28/20 1845)    Oxygen Delivery: 6 LPM (02/28/20 1815)         Family/support present mother and significant other   Patient belongings     Patient transported on cart   DC meds/scripts (obs/outpt) Yes, scripts   Inpatient Pain Meds Released? Yes       Special needs/considerations None   Tasks needing completion None       Carmelina Lemon, RN  [facility] [id number]

## 2020-02-29 NOTE — PROGRESS NOTES
Orthopaedic Surgery Progress Note 02/29/2020    S: No acute events overnight.  Pain control better this AM after medication change.  Denies numbness or tingling, block resolving.  Denies chest pain, SOB, nausea/vomiting. Tolerating a diet. Voiding spontaneously.  Has not been out of bed ambulating yet.    O:  Temp: 98.4  F (36.9  C) Temp src: Axillary BP: 113/62 Pulse: 91 Heart Rate: 63 Resp: 18 SpO2: 96 % O2 Device: None (Room air) Oxygen Delivery: 6 LPM    Exam:  Gen: No acute distress, resting comfortably in bed.  Resp: Non-labored breathing  MSK:  LLE:  - Dressings c/d/i  - Brace in place  - SILT femoral/tibial/sural/saphenous/DP/SP nerves  - Fires Quad, TA, EHL, FHL, GaSC  - PT/DP pulses 2+, foot wwp      No lab results found in last 7 days.    Invalid input(s): SEDRATE      Assessment: Naman Jones is a 34 year old male s/p L femoral osteotomy with medial reefing and lateral retinacular release on 2/28 with Dr. Madera. Doing well.    Plan:  Orthopedics Primary  Activity: Up with assist.  Crutches and hinged knee brace locked in full extension when up  Weight bearing status: TTWB LLE.  Antibiotics: Ancef x24 hours perioperatively.  Diet: Begin with clear fluids and progress diet as tolerated.  DVT prophylaxis: ASA 162mg and mechanical while in the hospital, discharge on ASA 162mg x4 weeks.  Bracing/Splinting: HKB to be locked in extension when up, ok to be unlocked 0-90 in bed.  Elevation: Elevate LLE as much as possible.  Wound Care: Keep C/D/I x3 days  Drains: None  Pain management: Transition from IV to orals as tolerated.   X-rays: PACU  Physical Therapy: ROM, ADL's.  Occupational Therapy: ADL's.  Labs: Trend Hgb on POD #1.  Cultures: None  Consults: PT, OT. Medicine, appreciate assistance in caring for this patient.  Follow-up: Clinic with Dr. Madera on 3/9.  Future Appointments   Date Time Provider Department Center   2/29/2020 10:00 AM Rossana Alfaro, [facility]   2/29/2020  2:00 PM  Bayron Shelley, PT [facility] [place]   2/29/2020  7:00 PM UR OT OVERFLOW [facility] [place]   3/9/2020 10:00 AM [facility]   6/12/2020  9:00 AM Annette Hobson, ARDEN CNP [facility]       Disposition: Pending progress with therapies, pain control on orals, and medical stability, anticipate discharge to home on POD #1-2.    Patient discussed with Santy.    Farooq Jacobs MD  Orthopaedic Surgery PGY-4  Pager [phone]    Please page me directly with any questions/concerns during regular weekday hours before 5pm. If there is no response, if it is a weekend, or if it is during evening hours then please page the orthopaedic surgery resident on call.

## 2020-02-29 NOTE — BRIEF OP NOTE
[facility], [place]    Brief Operative Note    Pre-operative diagnosis: Acquired genu valgum of left knee [M21.062]  Post-operative diagnosis Same as pre-operative diagnosis    Procedure: Procedure(s):  Examination under anesthesia Left knee,  medial reefing/imbrication  lateral retinacular lengthening  distal femoral osteotomy  Surgeon: Surgeon(s) and Role:     * Angel Madera MD - Primary     * Abhinav Ellington MD - Resident - Assisting  Anesthesia: General   Estimated blood loss: 200 ml  Drains: None  Specimens: * No specimens in log *  Findings:   None.  Complications: None.  Implants:   Implant Name Type Inv. Item Serial No.  Lot No. LRB No. Used   GRAFT BONE PUTTY DBX 10ML [id number] Bone/Tissue/Biologic GRAFT BONE PUTTY DBX 10ML [id number] MUSCULOSKELETAL CERVANTES  Left 1   Arthrex ContourLock Femoral Osteotomy Plate Left, S/M    ARTHREX [id number] Left 1   Arthrex Screw, Cancellous 6.5 x 70mm    ARTHREX [id number] Left 1   SCR BONE 6.5MM 45MML TI CANC F/OSTEOTOMY Metallic Hardware/[place] SCR BONE 6.5MM 45MML TI CANC F/OSTEOTOMY  ARTHREX [id number] Left 1   Arthrex Screw, Cancellous 6.5 x 35mm    ARTHREX [id number] Left 1   Arthrex Screw, Cortical 4.5 x 60mm    ARTHREX [id number] Left 1   SCR BONE 4.5MM 48MML TI LAURIE F/OSTEOTOMY Metallic Hardware/[place] SCR BONE 4.5MM 48MML TI LAURIE F/OSTEOTOMY  ARTHREX [id number] Left 1   Arthrex Screw, Cortical 4.5 x 42mm     ARTHREX [id number] Left 1   Arthrex Screw, Cortical 4.5 x 42mm    ARTHREX [id number] Left 1   Arthrex Low Profile Locking Screw 4.5 x 70mm    ARTHREX [id number] Left 1         Ortho Primary  Activity: Up with assist until independent. Crutches. HKB locked when up  Weight bearing status: TTWB LLE  Pain management: PO as tolerated with IV for breakthrough. Toradol scheduled.    Antibiotics: Ancef x 24 hours.  Diet: Begin with clear fluids and progress diet as tolerated.   DVT prophylaxis:   mg daily x 4 weeks and mechanical while in the hospital  Imaging: PACU L knee xray.  Labs: BMP and Hgb POD#1.  Bracing/Splinting: HKB locked when up, ok to be unlock in bed  Dressings: Keep clean, dry and intact x 3 days.   Elevation: Elevate LLE on pillows to keep above the level of the heart as much as possible.   Drains: none  Physical Therapy/Occupational Therapy: Eval and treat.  Consults: Hospitalist.  Follow-up: Clinic with Dr. Madera on 3/9/20  Disposition: Pending progress with therapies, pain control on orals, and medical stability, anticipate discharge to home on POD #1-2.    Patient discussed with Dr. Madera.     Abhinav Ellington MD  Orthopaedic Surgery, PGY-5  Pager: [phone]

## 2020-02-29 NOTE — PROGRESS NOTES
02/29/20 1024   Quick Adds   Type of Visit Initial PT Evaluation       Present no   Language English   Living Environment   Lives With spouse;child(willian), dependent   Living Arrangements house   Home Accessibility stairs to enter home   Number of Stairs, Main Entrance 3   Stair Railings, Main Entrance none   Transportation Anticipated family or friend will provide   Self-Care   Usual Activity Tolerance good   Current Activity Tolerance moderate   Regular Exercise No   Equipment Currently Used at Home none   Functional Level Prior   Ambulation 0-->independent   Transferring 0-->independent   Toileting 0-->independent   Bathing 0-->independent   Communication 0-->understands/communicates without difficulty   Swallowing 0-->swallows foods/liquids without difficulty   Cognition 0 - no cognition issues reported   Fall history within last six months yes   Number of times patient has fallen within last six months 1   Which of the above functional risks had a recent onset or change? ambulation;transferring   General Information   Onset of Illness/Injury or Date of Surgery - Date 02/28/20   Referring Physician Bayron Godinez MD   Patient/Family Goals Statement Did not endorse   Pertinent History of Current Problem (include personal factors and/or comorbidities that impact the POC) 34 year old male s/p L femoral osteotomy with medial reefing and lateral retinacular release on 2/28 with Dr. Madera. Doing well.   Precautions/Limitations fall precautions  (HKB donned and locked)   Weight-Bearing Status - LUE full weight-bearing   Weight-Bearing Status - RUE full weight-bearing   Weight-Bearing Status - LLE toe touch weight-bearing   Weight-Bearing Status - RLE full weight-bearing   General Observations Supine in bed upon arrival, agreeable   General Info Comments IV, HKB   Cognitive Status Examination   Orientation orientation to person, place and time   Level of Consciousness alert   Follows  Commands and Answers Questions 100% of the time;able to follow multistep instructions   Personal Safety and Judgment impaired;intact   Memory intact   Pain Assessment   Patient Currently in Pain Yes, see Vital Sign flowsheet   Integumentary/Edema   Integumentary/Edema Comments Patient with normal post-surgical swelling present   Posture    Posture Not impaired   Range of Motion (ROM)   ROM Comment Did not formally assess, demonstrates functional ROM with mobility   Strength   Strength Comments Did not formally assess, demonstrates functional strength with mobility   Bed Mobility   Bed Mobility Comments Completes supine<>sit transfer With SBA using UEs to assist L LE in/out of bed   Transfer Skills   Transfer Comments Completes sit<>stand transfer with SBA and use of walker   Gait   Gait Comments Tolerates only short distance ambulation with use of walker and SBA, TTWB maintianed throughout   Balance   Balance Comments Independent sitting balance, SBA for standing balance with UE support from walker   Sensory Examination   Sensory Perception no deficits were identified   General Therapy Interventions   Planned Therapy Interventions balance training;bed mobility training;gait training;ROM;strengthening;transfer training;risk factor education;home program guidelines;progressive activity/exercise   Clinical Impression   Criteria for Skilled Therapeutic Intervention yes, treatment indicated   PT Diagnosis impaired functional mobility   Influenced by the following impairments increased post-op pain, precautions, decreased L LE strength and ROM   Functional limitations due to impairments impaired bed mobility, transfers and ambulation   Clinical Presentation Stable/Uncomplicated   Clinical Presentation Rationale 34 year old male s/p L femoral osteotomy with medial reefing and lateral retinacular release on 2/28 with Dr. Madera. Doing well. Mobilizing appropriately   Clinical Decision Making (Complexity) Low complexity  "  Therapy Frequency 2x/day   Predicted Duration of Therapy Intervention (days/wks) 2   Anticipated Equipment Needs at Discharge front wheeled walker;crutches   Anticipated Discharge Disposition Home with Assist;Home with Home Therapy   Risk & Benefits of therapy have been explained Yes   Patient, Family & other staff in agreement with plan of care Yes   [facility] TM \"6 Clicks\"   2016, Trustees of [facility], under license to [employer].  All rights reserved.   6 Clicks Short Forms Basic Mobility Inpatient Short Form   [facility]  \"6 Clicks\" V.2 Basic Mobility Inpatient Short Form   1. Turning from your back to your side while in a flat bed without using bedrails? 4 - None   2. Moving from lying on your back to sitting on the side of a flat bed without using bedrails? 4 - None   3. Moving to and from a bed to a chair (including a wheelchair)? 4 - None   4. Standing up from a chair using your arms (e.g., wheelchair, or bedside chair)? 4 - None   5. To walk in hospital room? 4 - None   6. Climbing 3-5 steps with a railing? 3 - A Little   Basic Mobility Raw Score (Score out of 24.Lower scores equate to lower levels of function) 23   Total Evaluation Time   Total Evaluation Time (Minutes) 8     "

## 2020-02-29 NOTE — PLAN OF CARE
VS: Vitals stable. Capno:Stable   O2: Sats > 92% on RA   Output: Voids adequate amount through urinal   Last BM: 2/27 per pt report    Activity: Decline to get out of bed. TTWB   Skin: Dry and intact   Pain: Oxy changed to PO Dilaudid.  Block wears off,he was in a lot of pain, IV dilauid, PO Dilaudid, Tylenol, Hydroxyzine, sched Toradol  and ice pack applied   CMS: Baseline neuropathy in both feet.    Dressing: CDI  Hinge brace: locked when out of the bed   Diet: Given crackers and apple sauce-tolerating.  Advance to regular.   LDA: PIV: SL between abx   Equipment: IV pole, Capno, Personal belongings   Plan: TBD   Additional Info: Admitted in  5 Ortho around 1945

## 2020-02-29 NOTE — PLAN OF CARE
Discharge Planner PT   Patient plan for discharge: Home with assist  Current status: PT evaluation complete and treatment initiated. Overall struggling with mobility d/t pain. Patient fairly anxious throughout. Completes supine<>sit transfer with supervision using UEs to assist L LE in/out of bed. Sits EOB with fair tolerance. Completes sit<>stand transfer with SBA and use of walker. Tolerates very short distance ambulation and use of walker, TTWB maintained. Unable to attempt stairs this am.   Barriers to return to prior living situation: safety, independence, pain, precautions, stairs  Recommendations for discharge: Home with assist and home PT  Rationale for recommendations: For safety evaluation and progression       Entered by: Rossana Alfaro 02/29/2020 11:02 AM

## 2020-02-29 NOTE — PLAN OF CARE
Discharge Planner OT   Patient plan for discharge: Home   Current status: Evaluation completed and treatment initiated. Completed supine > sit, and sit> stand with SBA. Ambulates to bathroom remaining within weight bearing status. Provided education shower transfers and recommended shower chair, pt verbalized understanding. Educated patient on DME for LB dressing, and pt demonstrates understanding with don/doffing socks and pants with eq. Patient demonstrates decreased UB strength during functional transfers throughout session. Will provide UB HEP. Min A for LLE for sit> supine, left in bed with all needs met.   Barriers to return to prior living situation: pain, weight bearing status, strength  Recommendations for discharge: Home with assist  Rationale for recommendations: To maximize safety and IND with self-cares and functional mobility       Entered by: Wilmer Padron 02/29/2020 1:18 PM

## 2020-02-29 NOTE — DISCHARGE SUMMARY
ORTHOPAEDIC DISCHARGE SUMMARY     Date of Admission: 2/28/2020  Date of Discharge: 3/1/2020  2:38 PM  Disposition: Home  Staff Physician: Angel Madera*  Primary Care Provider: Haase, Susan Rachele    DISCHARGE DIAGNOSIS:  Acquired genu valgum of left knee [M21.062]    PROCEDURES: Procedure(s):  Examination under anesthesia Left knee,  medial reefing/imbrication  lateral retinacular lengthening  distal femoral osteotomy on 2/28/2020    BRIEF HISTORY:  Naman Jones is a 34 year old male with acquired left knee genu valgum and patellofemoral pain who had exhausted all non-operative treatments including medications, injections, therapy, and activity modification.  They were briefed on the risks, benefits, and alternatives to surgery and agreed to the above.  Consent was signed pre-operatively.      HOSPITAL COURSE:    Surgery was uncomplicated. Naman Jones has done well post-operatively. Medicine was consulted post operatively to aid in management of medical comorbidities. See final recommendations below. The patient received routine nursing cares and is medically stable. Vital signs are stable. The patient is tolerating a regular diet without GI distress/nausea or vomiting. Voiding spontaneously. All PT/OT goals have been met for safe mobility. Pain is now controlled on oral medications which will be available on discharge. Stool softeners have been used while taking pain medications to help prevent constipation. Naman Jones is deemed medically safe to discharge.     Antibiotics:  Ancef given periop and 24 hours postop.   DVT prophylaxis:  ASA 162mg daily for 4 weeks  PT Progress: Has met PT/OT goals for safe mobility.    Pain Meds:  Weaned off all IV pain meds by discharge.  Inpatient Events: No significant events or complications.     PHYSICAL EXAM:  Gen: No acute distress, resting comfortably in bed.  Resp: Non-labored breathing  MSK:  LLE:  - Dressings c/d/i  - Brace in place  - SILT  femoral/tibial/sural/saphenous/DP/SP nerves  - Fires Quad, TA, EHL, FHL, GaSC  - PT/DP pulses 2+, foot wwp     Discharge orders and instructions as below.    FOLLOWUP:    Follow up with Dr. Madera at 2 weeks postoperatively.  Future Appointments   Date Time Provider Department Center   2/29/2020 10:00 AM Rossana Alfaro, PT [facility] [place]   2/29/2020  2:00 PM Bayron Shelley, PT [facility] [place]   2/29/2020  7:00 PM UR OT OVERFLOW [facility] [place]   3/9/2020 10:00 AM [facility]   6/12/2020  9:00 AM Annette Hobson APRN CNP [facility]       Appointments on [facility]. Call [phone] if you haven't heard regarding these appointments within 7 days of discharge.    PLANNED DISCHARGE ORDERS:           Discharge Medication List as of 3/1/2020  2:02 PM      START taking these medications    Details   acetaminophen (TYLENOL) 325 MG tablet Take 2 tablets (650 mg) by mouth every 4 hours as needed for other (mild pain), Disp-60 tablet, R-0, E-Prescribe      aspirin (ASA) 81 MG EC tablet Take 2 tablets (162 mg) by mouth daily for 28 days, Disp-56 tablet, R-0, E-Prescribe      ibuprofen (ADVIL/MOTRIN) 600 MG tablet Take 1 tablet (600 mg) by mouth every 6 hours as needed for other (mild pain), Disp-60 tablet, R-0, E-Prescribe      senna-docusate (SENOKOT-S/PERICOLACE) 8.6-50 MG tablet Take 1-2 tablets by mouth 2 times daily, Disp-30 tablet, R-0, E-PrescribeWhile on narcotics         CONTINUE these medications which have CHANGED    Details   HYDROmorphone (DILAUDID) 2 MG tablet Take 1-2 tablets (2-4 mg) by mouth every 3 hours as needed for moderate to severe pain, Disp-40 tablet, R-0, Local Print         CONTINUE these medications which have NOT CHANGED    Details   atorvastatin (LIPITOR) 40 MG tablet Take 1 tablet (40 mg) by mouth daily, Disp-90 tablet, R-3, E-Prescribe      busPIRone (BUSPAR) 5 MG tablet TAKE 1 TABLET(5 MG) BY MOUTH TWICE DAILY, Disp-180  tablet, R-1, E-Prescribe      Calcium Carb-Cholecalciferol (CALCIUM 600 + D PO) Take 1 tablet by mouth 2 times daily , Historical      Cholecalciferol (VITAMIN D3) 1000 units CAPS Take 3 capsules by mouth daily, Historical      Continuous Blood Gluc  (FREESTYLE KEILA READER) SUGAR 1 each continuous, Disp-1 Device, R-0, E-Prescribe      Continuous Blood Gluc Sensor (FREESTYLE KEILA 14 DAY SENSOR) XX MISC Inject 1 each Subcutaneous every 14 days, Disp-2 each, R-11, E-Prescribe      Cyanocobalamin (B-12) 500 MCG SUBL Place 1 tablet under the tongue daily, Historical      hydrOXYzine (ATARAX) 50 MG tablet Take 1 tablet (50 mg) by mouth every 6 hours as needed for anxiety, Disp-90 tablet, R-5, E-Prescribe      metFORMIN 500 MG PO tablet Take 1 tablet (500 mg) by mouth 2 times daily (with meals), Disp-180 tablet, R-3, E-Prescribe      multivitamin  peds with iron (FLINTSTONES COMPLETE) 60 MG chewable tablet Take 2 chew tab by mouth every morning , Historical      ONETOUCH DELICA LANCETS 33G MISC 1 lancet 4 times daily, Disp-100 each, R-6, E-Prescribe      sitagliptin (JANUVIA) 100 MG tablet Take 1 tablet (100 mg) by mouth daily, Disp-90 tablet, R-3, E-Prescribe               Discharge Procedure Orders   Ice to affected area   Order Comments: Ice to operative site, as needed.     Discharge Instructions   Order Comments: -Pain control: Take medication as prescribed, but only as often as necessary. Do not drive or drink alcohol while you are taking pain medication. Remember that Tylenol and anti-inflammatories can be used for pain relief as you wean off the narcotics. Do not exceed 4,000 mg of tylenol in 24 hours.    -Activity: Toe touch weightbearing left lower extremity    -DVT prophylaxis:  mg daily for 4 weeks    -Call our clinic at [phone] during regular office hours, or [phone] for the nurse triage line after hours for questions - NURSES AND ON-CALL RESIDENTS DO NOT HAVE ABILITY TO PRESCRIBE  NARCOTICS AFTER HOURS OR ON WEEKENDS. If narcotic pain medication prescriptions are required during these times, you will have to go to an Urgent Care or Emergency Department, or wait until clinic opens.    -Reasons to call clinic: Pain in your surgical area persists or worsens in the first few days after discharge; excessive redness or drainage of cloudy or bloody material from the wounds (some light drainage is often normal in the first week after surgery); drainage of any kind greater than one week after surgery; temperature elevation greater than 101.0 ; You have pain, swelling or redness in your calf that does not improve with elevation; new numbness or weakness in your leg or foot which does not improve with repositioning.  Call your primary doctor or seek medical care if you have chest pain or shortness of breath.     Reason for your hospital stay   Order Comments: Left knee surgery     Adult [facility] Follow-up and recommended labs and tests   Order Comments: As scheduled    Appointments on [place] and/or [facility] (with [facility] or [facility] provider or service). Call [phone] if you haven't heard regarding these appointments within 7 days of discharge.     Activity   Order Comments: Your activity upon discharge: activity as tolerated     Order Specific Question Answer Comments   Is discharge order? Yes      Diet   Order Comments: Follow this diet upon discharge: Orders Placed This Encounter      Advance Diet as Tolerated: Regular Diet Adult     Order Specific Question Answer Comments   Is discharge order? Yes        Farooq Jacobs MD   Orthopedic Surgery Resident, PGY-4  [phone]

## 2020-02-29 NOTE — PLAN OF CARE
Discharge Planner OT   Patient plan for discharge: Home  Current status: Educated patient on UE HEP. Patient completes exercises reporting fatigue throughout. Instructed patient on completing HEP 3x/ day, patient verbalized understanding.   Barriers to return to prior living situation: pain, HKB  Recommendations for discharge: Home with assist  Rationale for recommendations: continue to maximize safety an IND with self-cares and functional mobility.        Entered by: Wilmer Padron 02/29/2020 2:29 PM

## 2020-02-29 NOTE — ANESTHESIA POSTPROCEDURE EVALUATION
Anesthesia POST Procedure Evaluation    Patient: Naman Jones   MRN:     [id number] Gender:   male   Age:    34 year old :      1985        Preoperative Diagnosis: Acquired genu valgum of left knee [M21.062]   Procedure(s):  Examination under anesthesia Left knee,  medial reefing/imbrication  lateral retinacular lengthening  distal femoral osteotomy   Postop Comments: No value filed.     Anesthesia Type: General       Disposition: Admission   Postop Pain Control: Uneventful            Sign Out: Well controlled pain   PONV: No   Neuro/Psych: Uneventful            Sign Out: Acceptable/Baseline neuro status   Airway/Respiratory: Uneventful            Sign Out: Acceptable/Baseline resp. status   CV/Hemodynamics: Uneventful            Sign Out: Acceptable CV status   Other NRE: NONE   DID A NON-ROUTINE EVENT OCCUR? No         Last Anesthesia Record Vitals:  CRNA VITALS  2020 1728 - 2020 1828      2020             Pulse:  93    SpO2:  97 %          Last PACU Vitals:  Vitals Value Taken Time   /64 2020  7:00 PM   Temp     Pulse 95 2020  7:00 PM   Resp 10 2020  6:50 PM   SpO2 96 % 2020  7:07 PM   Temp src     NIBP     Pulse     SpO2     Resp     Temp     Ht Rate     Temp 2     Vitals shown include unvalidated device data.      Electronically Signed By: Crystal Pérez MD, 2020, 7:08 PM

## 2020-02-29 NOTE — PROGRESS NOTES
02/29/20 1200   Quick Adds   Type of Visit Initial Occupational Therapy Evaluation   Living Environment   Lives With child(willian), dependent   Living Arrangements house   Home Accessibility stairs to enter home   Number of Stairs, Main Entrance 3   Stair Railings, Main Entrance none   Transportation Anticipated family or friend will provide   Living Environment Comment Can live on the main level once into the house   Self-Care   Usual Activity Tolerance good   Current Activity Tolerance moderate   Regular Exercise No   Equipment Currently Used at Home none   Functional Level   Ambulation 0-->independent   Transferring 0-->independent   Toileting 0-->independent   Bathing 0-->independent   Dressing 0-->independent   Eating 0-->independent   Communication 0-->understands/communicates without difficulty   Swallowing 0-->swallows foods/liquids without difficulty   Cognition 0 - no cognition issues reported   Fall history within last six months yes   Number of times patient has fallen within last six months 1   Which of the above functional risks had a recent onset or change? ambulation;transferring   General Information   Onset of Illness/Injury or Date of Surgery - Date 02/28/20   Referring Physician Bayron Godinez   Patient/Family Goals Statement Did not endorse   Additional Occupational Profile Info/Pertinent History of Current Problem 34 year old male s/p L femoral osteotomy with medial reefing and lateral retinacular release on 2/28 with Dr. Madera. Doing well.   Precautions/Limitations other (see comments)  (Hinged knee braced locked in ext when during any activity )   Weight-Bearing Status - LLE toe touch weight-bearing   General Info Comments Locked KHB worn at all times OOB   Cognitive Status Examination   Orientation orientation to person, place and time   Level of Consciousness alert   Follows Commands (Cognition) WNL   Memory intact   Attention No deficits were identified   Organization/Problem  Solving No deficits were identified   Executive Function No deficits were identified   Visual Perception   Visual Perception Wears glasses   Pain Assessment   Patient Currently in Pain Yes, see Vital Sign flowsheet   Mobility   Bed Mobility Bed mobility skill: Sit to supine;Bed mobility skill: Supine to sit;Bed mobility skill: Scooting/Bridging   Bed Mobility Skill: Scooting/Bridging   Level of [place]: Scooting/Bridging independent   Bed Mobility Skill: Sit to Supine   Level of [place]: Sit/Supine minimum assist (75% patients effort)   Physical Assist/Nonphysical Assist: Sit/Supine 1 person assist   Bed Mobility Skill: Supine to Sit   Level of [place]: Supine/Sit stand-by assist   Physical Assist/Nonphysical Assist: Supine/Sit supervision   Transfer Skills   Transfer Transfer Skill: Stand to Sit   Transfer Skill: Sit to Stand   Level of [place]: Sit/Stand stand-by assist   Physical Assist/Nonphysical Assist: Sit/Stand supervision   Transfer Skill: Sit to Stand toe touch weight-bearing   Toilet Transfer   Toilet Transfer Comments declined to practice due to lower toilet here in hospital. Reports he has a toilet riser in home setting   Tub/Shower Transfer   Tub/Shower Transfer Transfer Skill: Tub/Shower Transfers   Transfer Skill: Tub/Shower Transfer   Level of [place]: Tub/Shower stand-by assist   Physical Assist/Nonphysical Assist: Tub/Shower supervision;verbal cues   Weight-Bearing Restrictions: Tub/Shower toe touch weight-bearing   Assistive Device rolling walker  (shower chair)   Upper Body Dressing   Level of [place]: Dress Upper Body independent   Lower Body Dressing   Level of [place]: Dress Lower Body stand-by assist   Physical Assist/Nonphysical Assist: Dress Lower Body set-up required;supervision   Toileting   Level of [place]: Toilet independent   Grooming   Level of [place]: Grooming independent   Eating/Self Feeding   Level of [place]: Eating independent  "  Activities of Daily Living Analysis   Impairments Contributing to Impaired Activities of Daily Living pain;post surgical precautions;ROM decreased   General Therapy Interventions   Planned Therapy Interventions ADL retraining;transfer training;bed mobility training;strengthening   Clinical Impression   Criteria for Skilled Therapeutic Interventions Met yes, treatment indicated   OT Diagnosis impaired functional mobility   Influenced by the following impairments pain, HKB, strength   Assessment of Occupational Performance 1-3 Performance Deficits   Identified Performance Deficits dressing, transfers   Clinical Decision Making (Complexity) Low complexity   Therapy Frequency Daily   Predicted Duration of Therapy Intervention (days/wks) 2-3   Anticipated Equipment Needs at Discharge sock aide;reacher;shower chair   Anticipated Discharge Disposition Home with Assist   Risks and Benefits of Treatment have been explained. Yes   Patient, Family & other staff in agreement with plan of care Yes   [facility] TM \"6 Clicks\"   2016, Trustees of [facility], under license to [employer].  All rights reserved.   6 Clicks Short Forms Daily Activity Inpatient Short Form   [facility]  \"6 Clicks\" Daily Activity Inpatient Short Form   1. Putting on and taking off regular lower body clothing? 3 - A Little   2. Bathing (including washing, rinsing, drying)? 3 - A Little   3. Toileting, which includes using toilet, bedpan or urinal? 3 - A Little   4. Putting on and taking off regular upper body clothing? 4 - None   5. Taking care of personal grooming such as brushing teeth? 4 - None   6. Eating meals? 4 - None   Daily Activity Raw Score (Score out of 24.Lower scores equate to lower levels of function) 21   Total Evaluation Time   Total Evaluation Time (Minutes) 8     "

## 2020-02-29 NOTE — ANESTHESIA CARE TRANSFER NOTE
Patient: Naman Jones    Procedure(s):  Examination under anesthesia Left knee,  medial reefing/imbrication  lateral retinacular lengthening  distal femoral osteotomy    Diagnosis: Acquired genu valgum of left knee [M21.062]  Diagnosis Additional Information: No value filed.    Anesthesia Type:   General     Note:  Airway :Face Mask  Patient transferred to:PACU  Handoff Report: Identifed the Patient, Identified the Reponsible Provider, Reviewed the pertinent medical history, Discussed the surgical course, Reviewed Intra-OP anesthesia mangement and issues during anesthesia, Set expectations for post-procedure period and Allowed opportunity for questions and acknowledgement of understanding      Vitals: (Last set prior to Anesthesia Care Transfer)    CRNA VITALS  2/28/2020 1728 - 2/28/2020 1803      2/28/2020             Pulse:  93    SpO2:  97 %                Electronically Signed By: ARDEN Hatch CRNA  February 28, 2020  6:03 PM

## 2020-02-29 NOTE — PROVIDER NOTIFICATION
[id number]  Paged on call ortho. Pt having severe pain after therapy. Pt stated doesn't seem like current pain regimen is working. MD advised to try IV dilaudid to get over hump and then continue with PO Dilaudid and IV toradol. If pt doesn't improve then will change regimen later today. Will continue to monitor.

## 2020-02-29 NOTE — PROGRESS NOTES
OBS Goals: 0700-1930    [id number]  -Adequate pain control using oral analgesics: In progress  -Tolerates oral intake: Met  -Cleared for discharge per provider: In progress-discharge orders in but not cleared by PT     2575-7964  -Adequate pain control using oral analgesics: In progress  -Tolerates oral intake: Met  -Cleared for discharge per provider: In progress-discharge orders in but not cleared by PT    6681-0642  -Adequate pain control using oral analgesics: In progress  -Tolerates oral intake: Met  -Cleared for discharge per provider: In progress-discharge orders in but not cleared by PT    0879-6415  -Adequate pain control using oral analgesics: In progress  -Tolerates oral intake: Met  -Cleared for discharge per provider: In progress-discharge orders in but not cleared by PT    9510-3366  -Adequate pain control using oral analgesics: In progress  -Tolerates oral intake: Met  -Cleared for discharge per provider: In progress-discharge orders in but not cleared by PT    9737-7121  -Adequate pain control using oral analgesics: In progress  -Tolerates oral intake: Met  -Cleared for discharge per provider: In progress-discharge orders in but not cleared by PT

## 2020-02-29 NOTE — PLAN OF CARE
[id number]  Observation Goal:  -Adequate pain control using oral analgesics-Given PRN IV dilaudid and ice pack.  -Tolerates oral intake-Given crackers and apple sauce , tolerating well.       2145-2345  Observation Goal:  -Adequate pain control using oral analgesics- Given PRN Oxycodone, ice pack and IV Dilaudid 1x.  -Tolerates oral intake-Tolerating sips of water    2345-0145  Observation Goal:  -Adequate pain control using oral analgesics-Given schedule Toradol, Oxy changed to Dilaudid, ice pack applied.   -Tolerates oral intake-Yes    0145-0345  Observation Goal:  -Adequate pain control using oral analgesics-- Has been sleeping with no signs of pain.  -Tolerates oral intake-Sleeping      0408-6220  Observation Goal:  -Adequate pain control using oral analgesics-Has been sleeping with no signs of pain.  -Tolerates oral intake-Yes      2780-5451  Observation Goal:  -Adequate pain control using oral analgesics-Given PO Dilaudid given   -Tolerates oral intake-Yes

## 2020-02-29 NOTE — CONSULTS
[facility], [place]  Consult Note - Hospitalist Service     Date of Admission:  2/28/2020  Consult Requested by: Abhinav Ellington MD  Reason for Consult: Post op co management     Assessment & Plan   Naman Jones is a 34 year old male admitted on 2/28/2020. He dyslipidemia, depression and DM type 2. He underwent examination under anesthesia Left knee,  medial reefing/imbrication, lateral retinacular lengthening and distal femoral osteotomy today.   Internal medicine consulted for post operative co management  Individual problems and their management are outlined below    #S/P Left knee medial reefing/imbrication, lateral retinacular lengthening and distal femoral osteotomy today, POD#0:  Management by primary team. Please see their notes for further details  Stop IV fluids as patient is already able to tolerate diet   Pain control with acetaminophen 975 mg every 8 hrs for 3 days,  Ketorolac 30 mg Q 6 hrs for 6 doses, Oxycodone 5-10 mg every 4 hrs PRN and IV hydromorphone 0.3-0.5 mg q 2 hrs for breakthrough pain   DVT prophylaxis with  SCDs while in hospital, 162 mg aspirin  x4 weeks total.  PT/OT as per protocol   Incentive spirometry and aggressive bowel regimen  We will monitor for acute blood loss anemia. Pre op Hgb at 16.1    #DM Type 2:  Hold home dose of metformin and Sitagliptin   Continue medium intensity sliding scale insulin   can resume to home meds tomorrow if BMP stable and consider stopping sliding scale insulin    #Dyslipidemia:   Stable. continue home dose of Lipitor 40 mg      #Depression  Stable. Continue Buspar 5 mg BID         The patient's care was discussed with the Bedside Nurse and Patient.    Chirag Christopher MD  [facility]    ______________________________________________________________________    Chief Complaint   S/P Examination under anesthesia Left knee,  medial reefing/imbrication, lateral retinacular  lengthening and  distal femoral osteotomy    History is obtained from the patient, pre-op physical and perioperative note     History of Present Illness   Naman Jones is a 34 year old male who underwent the above procedure today.  The procedure itself was and was uneventful with estimated blood loss of 200 mL.  Patient received about 1100 mL of IV fluids as perioperative fluids.  Postoperatively patient recovered well.  I met patient upstairs on floor 5 A where he was resting comfortably.  He denies any chest pain or shortness of breath.  He denies any fevers or chills.  He denies any nausea, vomiting or diarrhea.  He denies any tingling or numbness in his left foot.  He is in a pleasant mood.  He is already had something to eat for dinner.    Review of Systems   The 10 point Review of Systems is negative other than noted in the HPI.    Past Medical History    I have reviewed this patient's medical history and updated it with pertinent information if needed.   Past Medical History:   Diagnosis Date     Diabetes (H)      Hypertension        Past Surgical History   I have reviewed this patient's surgical history and updated it with pertinent information if needed.  Past Surgical History:   Procedure Laterality Date     LAPAROSCOPIC BYPASS GASTRIC N/A 10/9/2018    Procedure: LAPAROSCOPIC BYPASS GASTRIC;  LAPAROSCOPIC [place]-EN Y GASTRIC BYPASS;  Surgeon: Alden Mcwilliams MD;  Location:  OR     ORTHOPEDIC SURGERY Right 02/2016       Social History   I have reviewed this patient's social history and updated it with pertinent information if needed.  Social History     Tobacco Use     Smoking status: Never Smoker     Smokeless tobacco: Never Used   Substance Use Topics     Alcohol use: Yes     Comment: socially     Drug use: No       Family History   I have reviewed this patient's family history and updated it with pertinent information if needed.   Family History   Problem Relation Age of Onset     Glaucoma Father       Diabetes Father      Prostate Cancer Paternal Grandfather      Macular Degeneration No family hx of      Medications   Medications Prior to Admission   Medication Sig Dispense Refill Last Dose     atorvastatin (LIPITOR) 40 MG tablet Take 1 tablet (40 mg) by mouth daily 90 tablet 3 2/27/2020 at Unknown time     busPIRone (BUSPAR) 5 MG tablet TAKE 1 TABLET(5 MG) BY MOUTH TWICE DAILY 180 tablet 1 2/28/2020 at Unknown time     Calcium Carb-Cholecalciferol (CALCIUM 600 + D PO) Take 1 tablet by mouth 2 times daily    2/27/2020 at Unknown time     Cholecalciferol (VITAMIN D3) 1000 units CAPS Take 3 capsules by mouth daily   2/27/2020 at Unknown time     Continuous Blood Gluc  (FREESTYLE KEILA READER) SUGAR 1 each continuous 1 Device 0 Past Week at Unknown time     Continuous Blood Gluc Sensor (FREESTYLE KEILA 14 DAY SENSOR) XX MISC Inject 1 each Subcutaneous every 14 days 2 each 11 Past Week at Unknown time     Cyanocobalamin (B-12) 500 MCG SUBL Place 1 tablet under the tongue daily   Past Week at Unknown time     hydrOXYzine (ATARAX) 50 MG tablet Take 1 tablet (50 mg) by mouth every 6 hours as needed for anxiety (Patient taking differently: Take 50 mg by mouth 2 times daily May take up to 3 times daily if needed) 90 tablet 5 2/28/2020 at Unknown time     metFORMIN 500 MG PO tablet Take 1 tablet (500 mg) by mouth 2 times daily (with meals) 180 tablet 3 2/27/2020 at Unknown time     multivitamin  peds with iron (FLINTSTONES COMPLETE) 60 MG chewable tablet Take 2 chew tab by mouth every morning    2/27/2020 at Unknown time     ONETOUCH DELICA LANCETS 33G MISC 1 lancet 4 times daily 100 each 6 Past Week at Unknown time     sitagliptin (JANUVIA) 100 MG tablet Take 1 tablet (100 mg) by mouth daily 90 tablet 3 2/27/2020 at Unknown time       Allergies   Allergies   Allergen Reactions     Sulfa Drugs Anaphylaxis     Oxycodone Other (See Comments) and Itching     Flushed     Lisinopril      Other reaction(s): Impotence      Onion      Other reaction(s): Intolerance-Can't Take       Physical Exam   Vital Signs: Temp: 99.3  F (37.4  C) Temp src: Oral BP: 125/69 Pulse: 91 Heart Rate: 92 Resp: 12 SpO2: 96 % O2 Device: None (Room air) Oxygen Delivery: 6 LPM  Weight: 245 lbs 2.42 oz    Constitutional: awake, alert, cooperative, no apparent distress, and appears stated age  Eyes: Lids and lashes normal, pupils equal, round and reactive to light, extra ocular muscles intact, sclera clear, conjunctiva normal  ENT: Normocephalic, without obvious abnormality, atraumatic, sinuses nontender on palpation, external ears without lesions, oral pharynx with moist mucous membranes  Respiratory: No increased work of breathing, good air exchange, clear to auscultation bilaterally, no crackles or wheezing  Cardiovascular: Normal apical impulse, regular rate and rhythm, normal S1 and S2, no S3 or S4, and no murmur noted  GI: Normal bowel sounds, soft, non-distended, non-tender, no masses palpated, no hepatosplenomegally  Skin: no bruising or bleeding  Musculoskeletal: Left knee covered with dressings. LLE in a brace. No drain. No distal neurovascular deficits   Neurologic: Awake, alert, oriented to name, place and time.  Cranial nerves II-XII are grossly intact.      Data   Results for orders placed or performed during the hospital encounter of 02/28/20 (from the past 24 hour(s))   POC US Guidance Needle Placement    Impression    Left adductor canal block   Glucose by meter   Result Value Ref Range    Glucose 197 (H) 70 - 99 mg/dL   XR Surgery LORENZO L/T 5 Min Fluoro    Narrative    This exam was marked as non-reportable because it will not be read by a   radiologist or a [facility] non-radiologist provider.             Hemoglobin A1c   Result Value Ref Range    Hemoglobin A1C 6.8 (H) 0 - 5.6 %     Pre op labs:  Lab Results   Component Value Date    WBC 6.7 02/14/2020     Lab Results   Component Value Date    RBC 5.24 02/14/2020     Lab Results   Component  Value Date    HGB 16.1 02/14/2020     Lab Results   Component Value Date    HCT 47.0 02/14/2020     No components found for: MCT  Lab Results   Component Value Date    MCV 90 02/14/2020     Lab Results   Component Value Date    MCH 30.7 02/14/2020     Lab Results   Component Value Date    MCHC 34.3 02/14/2020     Lab Results   Component Value Date    RDW 13.2 02/14/2020     Lab Results   Component Value Date     02/14/2020       Na : 140, K:3.9, Creat: 0.5

## 2020-03-01 ENCOUNTER — PATIENT OUTREACH (OUTPATIENT)
Dept: CARE COORDINATION | Facility: CLINIC | Age: 35
End: 2020-03-01

## 2020-03-01 ENCOUNTER — APPOINTMENT (OUTPATIENT)
Dept: PHYSICAL THERAPY | Facility: CLINIC | Age: 35
End: 2020-03-01
Attending: ORTHOPAEDIC SURGERY
Payer: COMMERCIAL

## 2020-03-01 VITALS
SYSTOLIC BLOOD PRESSURE: 125 MMHG | HEIGHT: 74 IN | RESPIRATION RATE: 16 BRPM | BODY MASS INDEX: 31.46 KG/M2 | DIASTOLIC BLOOD PRESSURE: 76 MMHG | OXYGEN SATURATION: 97 % | WEIGHT: 245.15 LBS | HEART RATE: 70 BPM | TEMPERATURE: 99.5 F

## 2020-03-01 LAB
GLUCOSE BLDC GLUCOMTR-MCNC: 177 MG/DL (ref 70–99)
GLUCOSE BLDC GLUCOMTR-MCNC: 197 MG/DL (ref 70–99)
GLUCOSE BLDC GLUCOMTR-MCNC: 213 MG/DL (ref 70–99)

## 2020-03-01 PROCEDURE — 97535 SELF CARE MNGMENT TRAINING: CPT | Mod: GO

## 2020-03-01 PROCEDURE — G0378 HOSPITAL OBSERVATION PER HR: HCPCS

## 2020-03-01 PROCEDURE — 99225 ZZC SUBSEQUENT OBSERVATION CARE,LEVEL II: CPT | Performed by: INTERNAL MEDICINE

## 2020-03-01 PROCEDURE — 97116 GAIT TRAINING THERAPY: CPT | Mod: GP

## 2020-03-01 PROCEDURE — 25000128 H RX IP 250 OP 636: Performed by: STUDENT IN AN ORGANIZED HEALTH CARE EDUCATION/TRAINING PROGRAM

## 2020-03-01 PROCEDURE — 97110 THERAPEUTIC EXERCISES: CPT | Mod: GO

## 2020-03-01 PROCEDURE — 96372 THER/PROPH/DIAG INJ SC/IM: CPT

## 2020-03-01 PROCEDURE — 99207 ZZC CDG-MDM COMPONENT: MEETS MODERATE - UP CODED: CPT | Performed by: INTERNAL MEDICINE

## 2020-03-01 PROCEDURE — 25000132 ZZH RX MED GY IP 250 OP 250 PS 637: Performed by: STUDENT IN AN ORGANIZED HEALTH CARE EDUCATION/TRAINING PROGRAM

## 2020-03-01 PROCEDURE — 00000146 ZZHCL STATISTIC GLUCOSE BY METER IP

## 2020-03-01 RX ORDER — HYDROMORPHONE HYDROCHLORIDE 2 MG/1
2-4 TABLET ORAL
Qty: 40 TABLET | Refills: 0 | Status: SHIPPED | OUTPATIENT
Start: 2020-03-01 | End: 2020-03-04

## 2020-03-01 RX ORDER — HYDROMORPHONE HYDROCHLORIDE 4 MG/1
4 TABLET ORAL
Qty: 30 TABLET | Refills: 0 | Status: SHIPPED | OUTPATIENT
Start: 2020-03-01 | End: 2020-03-01

## 2020-03-01 RX ADMIN — HYDROMORPHONE HYDROCHLORIDE 4 MG: 2 TABLET ORAL at 07:10

## 2020-03-01 RX ADMIN — ASPIRIN 162 MG: 81 TABLET ORAL at 09:02

## 2020-03-01 RX ADMIN — HYDROMORPHONE HYDROCHLORIDE 4 MG: 2 TABLET ORAL at 10:27

## 2020-03-01 RX ADMIN — HYDROMORPHONE HYDROCHLORIDE 4 MG: 2 TABLET ORAL at 01:30

## 2020-03-01 RX ADMIN — HYDROMORPHONE HYDROCHLORIDE 4 MG: 2 TABLET ORAL at 04:31

## 2020-03-01 RX ADMIN — ACETAMINOPHEN 650 MG: 325 TABLET, FILM COATED ORAL at 10:27

## 2020-03-01 RX ADMIN — INSULIN ASPART 1 UNITS: 100 INJECTION, SOLUTION INTRAVENOUS; SUBCUTANEOUS at 09:03

## 2020-03-01 RX ADMIN — ACETAMINOPHEN 650 MG: 325 TABLET, FILM COATED ORAL at 13:38

## 2020-03-01 RX ADMIN — BUSPIRONE HYDROCHLORIDE 5 MG: 5 TABLET ORAL at 09:03

## 2020-03-01 RX ADMIN — HYDROXYZINE HYDROCHLORIDE 50 MG: 25 TABLET, FILM COATED ORAL at 00:16

## 2020-03-01 RX ADMIN — KETOROLAC TROMETHAMINE 30 MG: 30 INJECTION, SOLUTION INTRAMUSCULAR at 01:30

## 2020-03-01 RX ADMIN — ACETAMINOPHEN 650 MG: 325 TABLET, FILM COATED ORAL at 07:10

## 2020-03-01 RX ADMIN — ACETAMINOPHEN 650 MG: 325 TABLET, FILM COATED ORAL at 01:30

## 2020-03-01 RX ADMIN — HYDROMORPHONE HYDROCHLORIDE 4 MG: 2 TABLET ORAL at 13:38

## 2020-03-01 NOTE — PROGRESS NOTES
[facility]    Medicine Progress Note - Hospitalist Service       Date of Admission:  2/28/2020  Assessment & Plan       Naman Jones is a 34 year old male admitted on 2/28/2020. He dyslipidemia, depression and DM type 2. He underwent examination under anesthesia Left knee,  medial reefing/imbrication, lateral retinacular lengthening and distal femoral osteotomy today.   Internal medicine consulted for post operative co management       #S/P Left knee medial reefing/imbrication, lateral retinacular lengthening and distal femoral osteotomy today, POD#2:  Management by primary team. Please see their notes for further details  Stop IV fluids as patient is already able to tolerate diet   Pain control per primary. Better.   DVT prophylaxis with  SCDs while in hospital, 162 mg aspirin  x4 weeks total.  PT/OT as per protocol   Incentive spirometry and aggressive bowel regimen  Monitor for acute blood loss anemia. Pre op Hgb at 16.1      #DM Type 2:  Holding home dose of metformin and Sitagliptin: resume at discharge.    Continue medium intensity sliding scale insulin     #Dyslipidemia:   Stable. continue home dose of Lipitor 40 mg        #Depression  Stable. Continue Buspar 5 mg BID           Disposition Plan   Expected discharge: defer to primary team  Entered: Enmanuel Lea MD 03/01/2020, 9:25 AM       The patient's care was discussed with the Bedside Nurse and Patient.    Enmanuel Lea MD  Hospitalist Service  [facility]    ______________________________________________________________________    Interval History   Incisional pain- improving.   No fever or chills  No cp or sob  No NV  No other concern.     Data reviewed today: I reviewed all medications, new labs and imaging results over the last 24 hours. I personally reviewed no images or EKG's today.    Physical Exam   Vital Signs: Temp: 99.5  F (37.5  C) Temp src: Oral BP: 125/76    Heart Rate: 70 Resp: 16 SpO2: 97 % O2 Device: None (Room air)    Weight: 245 lbs 2.42 oz    General: alert, interactive, NAD  HEENT: AT/NC, Moist MM  Respi/Chest: Non labored, CTA BL.  CVS/Heart: S1S2 regular  GI/Abd: Soft, non tender, non distended  MSK/Extremities: Distally warm, well perfused.     Neuro: AO x 4  Psychiatry: Stable mood.   Skin: no rash on exposed areas.         Data   Recent Labs   Lab 02/29/20  0652      POTASSIUM 4.0   CHLORIDE 106   CO2 26   BUN 15   CR 0.62*   ANIONGAP 7   KALINA 8.3*   *     No results found for this or any previous visit (from the past 24 hour(s)).  Medications       acetaminophen  650 mg Oral Q4H     aspirin  162 mg Oral Daily     atorvastatin  40 mg Oral Daily     busPIRone  5 mg Oral BID     insulin aspart  1-3 Units Subcutaneous TID AC     insulin aspart  1-3 Units Subcutaneous At Bedtime     sodium chloride (PF)  3 mL Intracatheter Q8H

## 2020-03-01 NOTE — PROGRESS NOTES
Orthopaedic Surgery Progress Note 03/01/2020    S: No acute events overnight.  Pain control stable, not requiring IV narcotics.  Denies numbness or tingling.  Denies chest pain, SOB, nausea/vomiting. Tolerating a diet. Voiding spontaneously.  Ambulated minimally yesterday.  Is hoping to discharge home today.    O:  Temp: 96.7  F (35.9  C) Temp src: Oral BP: 115/64 Pulse: 70 Heart Rate: 81 Resp: 16 SpO2: 98 % O2 Device: None (Room air)      Exam:  Gen: No acute distress, resting comfortably in bed.  Resp: Non-labored breathing  MSK:  LLE:  - Dressings c/d/i  - Brace in place  - SILT femoral/tibial/sural/saphenous/DP/SP nerves  - Fires Quad, TA, EHL, FHL, GaSC  - PT/DP pulses 2+, foot wwp      No lab results found in last 7 days.    Invalid input(s): SEDRATE      Assessment: Naman Jones is a 34 year old male s/p L femoral osteotomy with medial reefing and lateral retinacular release on 2/28 with Dr. Madera. Doing well.    Plan:  Orthopedics Primary  Activity: Up with assist.  Crutches and hinged knee brace locked in full extension when up  Weight bearing status: TTWB LLE.  Antibiotics: Ancef x24 hours perioperatively.  Diet: Begin with clear fluids and progress diet as tolerated.  DVT prophylaxis: ASA 162mg and mechanical while in the hospital, discharge on ASA 162mg x4 weeks.  Bracing/Splinting: HKB to be locked in extension when up, ok to be unlocked 0-90 in bed.  Elevation: Elevate LLE as much as possible.  Wound Care: Keep C/D/I x3 days  Drains: None  Pain management: Transition from IV to orals as tolerated.   X-rays: PACU  Physical Therapy: ROM, ADL's.  Occupational Therapy: ADL's.  Labs: Trend Hgb on POD #1.  Cultures: None  Consults: PT, OT. Medicine, appreciate assistance in caring for this patient.  Follow-up: Clinic with Dr. Madera on 3/9.  Future Appointments   Date Time Provider Department [place]   2/29/2020 10:00 AM Rossana Alfaro, KIERRA [facility]   2/29/2020  2:00 PM Bayron Shelley, PT  URPT [place]   2/29/2020  7:00 PM UR OT OVERFLOW UROT [place]   3/9/2020 10:00 AM Angel Madera MD [facility]   6/12/2020  9:00 AM Annette Hobson, APRN CNP [facility]       Disposition: Pending progress with therapies, pain control on orals, and medical stability, anticipate discharge to home later today is pain is controlled.      Patient discussed with Santy.    Farooq Jacobs MD  Orthopaedic Surgery PGY-4  Pager [phone]    Please page me directly with any questions/concerns during regular weekday hours before 5pm. If there is no response, if it is a weekend, or if it is during evening hours then please page the orthopaedic surgery resident on call.

## 2020-03-01 NOTE — PLAN OF CARE
Discharge Planner OT   Patient plan for discharge: Home with assist  Current status: Patient agreeable to therapy. Completes bed mobility IND, and sit > stand with SBA and ambulates to bathroom to complete shower transfer with shower chair with min A to stabilize shower chair. Reviews HEP for UB exercise and pt completes demonstrating fatigue throughout task.   Barriers to return to prior living situation: pain, strength  Recommendations for discharge: Home with assist  Rationale for recommendations: Patient able to live on one level once inside home and has appropriate DME in bathroom to complete transfers. Will have help as needed for simple g/h tasks.       Entered by: Wilmer Padron 03/01/2020 11:17 AM     Occupational Therapy Discharge Summary    Reason for therapy discharge:    All goals and outcomes met, no further needs identified.    Progress towards therapy goal(s). See [facility] for goal details.  Goals met    Therapy recommendation(s):    No further therapy is recommended.

## 2020-03-01 NOTE — PLAN OF CARE
VS: VSS.   O2: Room air >90%.    Output: Voiding without difficulty.    Last BM: 2/27. Pt stated it is common for him to go days without BM d/t gastric bypass surgery.    Activity: Did not get OOB during shift. TTWB wth Ax1.   Skin: Intact except incision.    Pain: Managed with PO dilaudid Q2H, ice as needed. Pt was able to spread out pain meds Q3H overnight.   CMS: Baseline neuropathy. Pt stated it comes and goes.   Dressing: CDI.    Diet: Regular.    LDA: PIV saline locked.    Equipment: Personal belongings, knee brace, urinal near bedside.    Plan: TBD.    Additional Info: Observation.     -Adequate pain control using oral analgesics: In process.    -Tolerates oral intake: Met.    -Cleared for discharge per provider: In process.

## 2020-03-01 NOTE — PLAN OF CARE
Discharge Planner PT   Patient plan for discharge: Home with assist  Current status: Continues to be a bit self limiting and shaky throughout. Independent with bed mobility and supervision for transfers and ambulation with use of walker. Attempted multiple times to complete stairs but unable. Educated how to have people assist as I do not anticipate patient being able to complete stairs in any amount of reasonable time.   Barriers to return to prior living situation: No PT barriers  Recommendations for discharge: Home with assist and home PT  Rationale for recommendations: For safety evaluation and progression    Physical Therapy Discharge Summary    Reason for therapy discharge:    All goals and outcomes met, no further needs identified.    Progress towards therapy goal(s). See goals on Care Plan in [facility] electronic health record for goal details.  Goals met    Therapy recommendation(s):    Continued therapy is recommended.  Rationale/Recommendations:  See above.           Entered by: Rossana Alfaro 03/01/2020 11:53 AM

## 2020-03-01 NOTE — PLAN OF CARE
Patient A & O x4. VSS. Pt up with A1 with walker or crutches with gait belt. Lung sounds . Patient denies chest pain, SOB, lightheadedness, dizziness, nausea, and vomiting. Bowel sounds active, last BM 2/27, passing flatus, and tolerating regular diet. Drinking well and voiding spontaneously without difficulties. PIV removed. Patient able to wiggle toes. CMS intact baseline neuropathy. Dressing clean, dry, and intact. Locked hinge KI on when OOB. Pain is tolerable and patient taking PO dilaudid for pain, ice pack applied.Went over discharge instructions and meds with pt and mother. Answered all questions. Educated pt on how to properly remove KI when laying in bed. Patient demonstrates ability to use call light appropriately. Will continue to monitor patient.     Pt discharged home with mother at 1430.

## 2020-03-02 NOTE — PROGRESS NOTES
[facility] Call Center    Phone Message    May a detailed message be left on voicemail: yes     Reason for Call: Other: Siri returning Robyn's call.  Please call her or Chirag back at your earliest convenience     Action Taken: Message routed to:  [facility]    Travel Screening: Not Applicable

## 2020-03-03 NOTE — TELEPHONE ENCOUNTER
Patient was called three times and no answer so post 24 hr DC follow up calls will be closed out    Robyn Werner CMA   Post Hospital Discharge Team

## 2020-03-04 ENCOUNTER — PATIENT OUTREACH (OUTPATIENT)
Dept: ORTHOPEDICS | Facility: CLINIC | Age: 35
End: 2020-03-04

## 2020-03-04 DIAGNOSIS — M21.062 ACQUIRED GENU VALGUM OF LEFT KNEE: Primary | ICD-10-CM

## 2020-03-04 DIAGNOSIS — M21.062 ACQUIRED GENU VALGUM OF LEFT KNEE: ICD-10-CM

## 2020-03-04 RX ORDER — HYDROMORPHONE HYDROCHLORIDE 2 MG/1
2-4 TABLET ORAL EVERY 4 HOURS PRN
Qty: 40 TABLET | Refills: 0 | Status: SHIPPED | OUTPATIENT
Start: 2020-03-04 | End: 2020-03-09

## 2020-03-04 NOTE — TELEPHONE ENCOUNTER
Patient requests a refill of his postop pain medications; see patient outreach encounter for further details. Refilled per standing protocol. Will go to [facility].

## 2020-03-04 NOTE — PROGRESS NOTES
Patient has not been able to move around; he has been taking pain medication Dilaudid 1 every 4 hours, 2 at night. Needs refill of medication sent to [facility] in [place]. He has a follow up appointment scheduled with Dr. Madera on 3/9 Monday. He will call today to schedule physical therapy at [facility]; order placed in [facility] for PT. Patient will ice, elevate. He will call with any concerns or questions.

## 2020-03-05 ENCOUNTER — TELEPHONE (OUTPATIENT)
Dept: ORTHOPEDICS | Facility: CLINIC | Age: 35
End: 2020-03-05

## 2020-03-05 DIAGNOSIS — Z53.9 ERRONEOUS ENCOUNTER--DISREGARD: Primary | ICD-10-CM

## 2020-03-05 NOTE — TELEPHONE ENCOUNTER
Prior Authorization Retail Medication Request    Medication/Dose: Hydromorphone 2 mg, 1 to 2 tabs every 4 hours.  ICD code (if different than what is on RX):  NA  Previously Tried and Failed:  NA  Rationale:  Knee surgery    Insurance Name:  See Chart  Insurance ID:  See Chart      Pharmacy Information (if different than what is on RX)  Name:  See chart  Phone:  See chart

## 2020-03-09 ENCOUNTER — OFFICE VISIT (OUTPATIENT)
Dept: ORTHOPEDICS | Facility: CLINIC | Age: 35
End: 2020-03-09
Payer: COMMERCIAL

## 2020-03-09 ENCOUNTER — ANCILLARY PROCEDURE (OUTPATIENT)
Dept: GENERAL RADIOLOGY | Facility: CLINIC | Age: 35
End: 2020-03-09
Attending: ORTHOPAEDIC SURGERY
Payer: COMMERCIAL

## 2020-03-09 DIAGNOSIS — M21.062 ACQUIRED GENU VALGUM OF LEFT KNEE: ICD-10-CM

## 2020-03-09 DIAGNOSIS — Z98.890 S/P RIGHT KNEE SURGERY: Primary | ICD-10-CM

## 2020-03-09 DIAGNOSIS — Z98.890 S/P RIGHT KNEE SURGERY: ICD-10-CM

## 2020-03-09 RX ORDER — HYDROMORPHONE HYDROCHLORIDE 2 MG/1
1 TABLET ORAL EVERY 4 HOURS PRN
Qty: 40 TABLET | Refills: 0 | Status: SHIPPED | OUTPATIENT
Start: 2020-03-09 | End: 2020-09-23

## 2020-03-09 NOTE — TELEPHONE ENCOUNTER
Central Prior Authorization Team   Phone: [phone]      PA Initiation-Sent via RightFax    Medication: DROmorphone (DILAUDID) 2 MG tablet -PA intiated  Insurance Company: [employer] - Phone [phone] Fax [phone]  Pharmacy Filling the Rx: [id number] - [facility] [address]  Filling Pharmacy Phone: [phone]  Filling Pharmacy Fax:    Start Date: 3/9/2020

## 2020-03-09 NOTE — PROGRESS NOTES
DIAGNOSIS:   1. Recurrent patellar instability, valgus malalignment    PROCEDURES:  1. Knee arthroscopy, chondroplasty, distal femoral osteotomy, medial retinacular imbrication and lateral retinacular lengthening; date of surgery 2/28/2020    HISTORY:  Doing reasonably well 1 week following surgery.  Pain controlled.  Weaning from his narcotics.  Observant of our restrictions.    EXAM:     General: Awake, Alert, and oriented. Articulates and communicates with a normal affect     Left lower Extremity:    Incisions well healed without evidence of infection    Normal post-operative effusion and ecchymosis    Range of motion and stability exam not performed    Neurovascularly intact    IMAGING:  AP lateral radiographs show hardware in good position and alignment appears appropriate following distal femoral osteotomy    ASSESSMENT:  1. 1 week following distal femoral osteotomy and medial retinacular imbrication    PLAN:     Toe-touch weightbearing left lower extremity    Range of motion 0 to 90 degrees when sitting or doing therapy    Hinged knee brace on and locked when up and around, off for unlocked for sitting or therapy    Sutures removed in clinic    Leave steri-strips in place until they fall off    OK to shower allowing water to run over incision    No soaking, scrubbing, baths, or lake for 1 additional week    Continue PT as scheduled     Pain medications reviewed and no refills required.     Operative report provided and arthroscopic images reviewed    Follow up at 6 weeks from the date of surgery with new X-Rays needed

## 2020-03-09 NOTE — LETTER
3/9/2020       RE: Naman Jones  [id number] [facility] [id number]     Dear Colleague,    Thank you for referring your patient, Naman Jones, to the [facility] at [facility]. Please see a copy of my visit note below.    DIAGNOSIS:   1. Recurrent patellar instability, valgus malalignment    PROCEDURES:  1. Knee arthroscopy, chondroplasty, distal femoral osteotomy, medial retinacular imbrication and lateral retinacular lengthening; date of surgery 2/28/2020    HISTORY:  Doing reasonably well 1 week following surgery.  Pain controlled.  Weaning from his narcotics.  Observant of our restrictions.    EXAM:     General: Awake, Alert, and oriented. Articulates and communicates with a normal affect     Left lower Extremity:    Incisions well healed without evidence of infection    Normal post-operative effusion and ecchymosis    Range of motion and stability exam not performed    Neurovascularly intact    IMAGING:  AP lateral radiographs show hardware in good position and alignment appears appropriate following distal femoral osteotomy    ASSESSMENT:  1. 1 week following distal femoral osteotomy and medial retinacular imbrication    PLAN:     Toe-touch weightbearing left lower extremity    Range of motion 0 to 90 degrees when sitting or doing therapy    Hinged knee brace on and locked when up and around, off for unlocked for sitting or therapy    Sutures removed in clinic    Leave steri-strips in place until they fall off    OK to shower allowing water to run over incision    No soaking, scrubbing, baths, or lake for 1 additional week    Continue PT as scheduled     Pain medications reviewed and no refills required.     Operative report provided and arthroscopic images reviewed    Follow up at 6 weeks from the date of surgery with new X-Rays needed         Again, thank you for allowing me to participate in the care of your patient.       Sincerely,    Angel Madera MD

## 2020-03-10 ENCOUNTER — THERAPY VISIT (OUTPATIENT)
Dept: PHYSICAL THERAPY | Facility: CLINIC | Age: 35
End: 2020-03-10
Attending: ORTHOPAEDIC SURGERY
Payer: COMMERCIAL

## 2020-03-10 DIAGNOSIS — M21.062 ACQUIRED GENU VALGUM OF LEFT KNEE: ICD-10-CM

## 2020-03-10 DIAGNOSIS — M25.362 PATELLAR INSTABILITY OF LEFT KNEE: ICD-10-CM

## 2020-03-10 DIAGNOSIS — Z47.89 SURGICAL AFTERCARE, MUSCULOSKELETAL SYSTEM: ICD-10-CM

## 2020-03-10 PROCEDURE — 97161 PT EVAL LOW COMPLEX 20 MIN: CPT | Mod: GP | Performed by: PHYSICAL THERAPIST

## 2020-03-10 PROCEDURE — 97110 THERAPEUTIC EXERCISES: CPT | Mod: GP | Performed by: PHYSICAL THERAPIST

## 2020-03-10 ASSESSMENT — ACTIVITIES OF DAILY LIVING (ADL)
SIT WITH YOUR KNEE BENT: ACTIVITY IS VERY DIFFICULT
KNEE_ACTIVITY_OF_DAILY_LIVING_SCORE: 5.71
GO DOWN STAIRS: I AM UNABLE TO DO THE ACTIVITY
STIFFNESS: THE SYMPTOM PREVENTS ME FROM ALL DAILY ACTIVITIES
SQUAT: I AM UNABLE TO DO THE ACTIVITY
HOW_WOULD_YOU_RATE_THE_CURRENT_FUNCTION_OF_YOUR_KNEE_DURING_YOUR_USUAL_DAILY_ACTIVITIES_ON_A_SCALE_FROM_0_TO_100_WITH_100_BEING_YOUR_LEVEL_OF_KNEE_FUNCTION_PRIOR_TO_YOUR_INJURY_AND_0_BEING_THE_INABILITY_TO_PERFORM_ANY_OF_YOUR_USUAL_DAILY_ACTIVITIES?: 99
SWELLING: THE SYMPTOM PREVENTS ME FROM ALL DAILY ACTIVITIES
WALK: I AM UNABLE TO DO THE ACTIVITY
STAND: I AM UNABLE TO DO THE ACTIVITY
HOW_WOULD_YOU_RATE_THE_OVERALL_FUNCTION_OF_YOUR_KNEE_DURING_YOUR_USUAL_DAILY_ACTIVITIES?: SEVERELY ABNORMAL
GO UP STAIRS: I AM UNABLE TO DO THE ACTIVITY
GIVING WAY, BUCKLING OR SHIFTING OF KNEE: THE SYMPTOM AFFECTS MY ACTIVITY SEVERELY
KNEEL ON THE FRONT OF YOUR KNEE: I AM UNABLE TO DO THE ACTIVITY
RAW_SCORE: 4
KNEE_ACTIVITY_OF_DAILY_LIVING_SUM: 4
LIMPING: THE SYMPTOM PREVENTS ME FROM ALL DAILY ACTIVITIES
PAIN: THE SYMPTOM PREVENTS ME FROM ALL DAILY ACTIVITIES
WEAKNESS: THE SYMPTOM PREVENTS ME FROM ALL DAILY ACTIVITIES
AS_A_RESULT_OF_YOUR_KNEE_INJURY,_HOW_WOULD_YOU_RATE_YOUR_CURRENT_LEVEL_OF_DAILY_ACTIVITY?: SEVERELY ABNORMAL
RISE FROM A CHAIR: ACTIVITY IS FAIRLY DIFFICULT

## 2020-03-10 NOTE — TELEPHONE ENCOUNTER
Prior Authorization Not Needed per Insurance    Medication: DROmorphone (DILAUDID) 2 MG tablet -PA not needed  Insurance Company: [employer] - Phone [phone] Fax [phone]  Expected CoPay:      Pharmacy Filling the Rx: [id number] - [place], [place] - [address]  Pharmacy Notified: Yes  Patient Notified: No-Pharmacy will contact

## 2020-03-10 NOTE — PROGRESS NOTES
[facility] Initial Evaluation  Subjective:  The history is provided by the patient. No  was used.   Patient Health History  Naman Jones being seen for Left knee post distal femoral osteotomy.     Problem began: 2/28/2020.   Problem occurred: Surgery to fix valgus   Pain is reported as 5/10 on pain scale.  General health as reported by patient is good.  Pertinent medical history includes: depression, diabetes, numbness/tingling and osteoarthritis.     Medical allergies: none.   Surgeries include:  Orthopedic surgery and other. Other surgery history details: Snehal en y gastric bypass.    Current medications:  Anti-depressants, anti-inflammatory and pain medication.    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   Primary job tasks include:  Computer work.                  Therapist Generated HPI Evaluation  Problem details: Patient had a history of left knee valgus deformity and patellar instability and underwent a medial retinacular reefing/imbrication, lateral retinacular lengthening and distal femoral osteotomy on 2/28/20.  Chief complaints are of constant left knee pain with pain meds, knee stiffness, swelling, weakness and limited function..         Type of problem:  Left knee.    This is a new condition.  Condition occurred with:  Insidious onset.  Where condition occurred: for unknown reasons.  Patient reports pain:  Anterior, lateral, medial, posterior and sub patellar.  Pain is described as aching and sharp and is constant.  Pain is worse during the night.  Since onset symptoms are gradually improving.  Associated symptoms:  Loss of strength, edema and loss of motion/stiffness. Symptoms are exacerbated by walking and bending/squatting  and relieved by ice, analgesics and rest.      Restrictions due to condition include:  Currently not working due to present treatment.                          Objective:    Gait:  Hinged brace locked in full extension  Gait Type:  Antalgic    Weight Bearing Status:  NWB   Assistive Devices:  Crutches and brace                                                        Knee Evaluation:  ROM:  Arom wnl knee: L ankle DF -10 degrees from neutral actively, -5 degrees passively, R ankle DF actively to 0 deg.    AROM    Hyperextension:  Left:  NA    Right: 5  Extension:  Left: NA    Right:  0  Flexion: Left: 35    Right: 140  PROM    Hyperextension: Left: 0   Right:   Extension: Left: -5   Right:   Flexion: Left: 45   Right:       Strength:     Extension:  Left: 2+/5   Pain:      Right: 5/5   Pain:  Flexion:  Left: 3+/5   Pain:      Right: 5/5   Pain:    Quad Set Left: Poor    Pain:   Quad Set Right: Good    Pain:  Ligament Testing:  Not Assessed                Special Tests: Not Assessed      Palpation:  Palpation of knee: incisions clean and dry with steristrips in place.  Left knee tenderness present at:  Incisional    Edema:  Edema of the knee: Moderate generalized left knee and lower leg edema. Moderate posterior left knee eccymosis.    Mobility Testing:      Patellofemoral Medial:  Left: hypomobile        Patellofemoral Superior:  Left: hypomobile      Patellofemoral Inferior:  Left: hypomobile            General     ROS    Assessment/Plan:    Patient is a 34 year old male with left side knee complaints.    Patient has the following significant findings with corresponding treatment plan.                Diagnosis 1:  S/p L tibial osteotomy and patellar realignment on 2/28/20  Pain -  hot/cold therapy and education  Decreased ROM/flexibility - manual therapy and therapeutic exercise  Decreased strength - therapeutic exercise and therapeutic activities  Edema - cold therapy  Impaired gait - gait training  Impaired muscle performance - neuro re-education  Decreased function - therapeutic activities    Therapy Evaluation Codes:   1) History comprised of:   Personal factors that impact the plan of care:      None.    Comorbidity factors that impact the plan of care  are:        2) Examination of Body Systems comprised of:   Body structures and functions that impact the plan of care:      Knee.   Activity limitations that impact the plan of care are:      Driving, Dressing, Squatting/kneeling, Stairs, Standing, Walking, Working and Sleeping.  3) Clinical presentation characteristics are:   Stable/Uncomplicated.  4) Decision-Making    Low complexity using standardized patient assessment instrument and/or measureable assessment of functional outcome.  Cumulative Therapy Evaluation is: Low complexity.    Previous and current functional limitations:  (See Goal Flow Sheet for this information)    Short term and Long term goals: (See Goal Flow Sheet for this information)     Communication ability:  Patient appears to be able to clearly communicate and understand verbal and written communication and follow directions correctly.  Treatment Explanation - The following has been discussed with the patient:   RX ordered/plan of care  Anticipated outcomes  Possible risks and side effects  This patient would benefit from PT intervention to resume normal activities.   Rehab potential is good.    Frequency:  2 X week, once daily  Duration:  for 3 weeks tapering to 1 X a week over 8 weeks  Discharge Plan:  Achieve all LTG.  Independent in home treatment program.  Reach maximal therapeutic benefit.    Please refer to the daily flowsheet for treatment today, total treatment time and time spent performing 1:1 timed codes.

## 2020-03-16 ENCOUNTER — THERAPY VISIT (OUTPATIENT)
Dept: PHYSICAL THERAPY | Facility: CLINIC | Age: 35
End: 2020-03-16
Payer: COMMERCIAL

## 2020-03-16 DIAGNOSIS — M25.562 LEFT KNEE PAIN, UNSPECIFIED CHRONICITY: Primary | ICD-10-CM

## 2020-03-16 DIAGNOSIS — Z47.89 SURGICAL AFTERCARE, MUSCULOSKELETAL SYSTEM: ICD-10-CM

## 2020-03-16 DIAGNOSIS — M25.362 PATELLAR INSTABILITY OF LEFT KNEE: ICD-10-CM

## 2020-03-16 DIAGNOSIS — M21.062 ACQUIRED GENU VALGUM OF LEFT KNEE: ICD-10-CM

## 2020-03-16 PROCEDURE — 97530 THERAPEUTIC ACTIVITIES: CPT | Mod: GP | Performed by: PHYSICAL THERAPIST

## 2020-03-16 PROCEDURE — 97110 THERAPEUTIC EXERCISES: CPT | Mod: GP | Performed by: PHYSICAL THERAPIST

## 2020-03-27 ENCOUNTER — THERAPY VISIT (OUTPATIENT)
Dept: PHYSICAL THERAPY | Facility: CLINIC | Age: 35
End: 2020-03-27
Payer: COMMERCIAL

## 2020-03-27 DIAGNOSIS — M25.562 ACUTE PAIN OF LEFT KNEE: ICD-10-CM

## 2020-03-27 DIAGNOSIS — Z47.89 SURGICAL AFTERCARE, MUSCULOSKELETAL SYSTEM: ICD-10-CM

## 2020-03-27 DIAGNOSIS — M21.062 ACQUIRED GENU VALGUM OF LEFT KNEE: ICD-10-CM

## 2020-03-27 DIAGNOSIS — M25.362 PATELLAR INSTABILITY OF LEFT KNEE: ICD-10-CM

## 2020-03-27 PROCEDURE — 99207 C NONPHYSICIAN TELEPHONE ASSESSMENT 21-30 MIN: CPT | Mod: GP | Performed by: PHYSICAL THERAPY ASSISTANT

## 2020-03-27 NOTE — PROGRESS NOTES
"The patient has been notified of following:     \"This virtual visit will be conducted between you and your provider. We have found that certain health care needs can be provided without the need for physical presence.  This service lets us provide the care you need with a short virtual visit.\"    Due to external, as well as internal [facility] management of COVID-19 Virus, Naman Jones was not seen in our clinic.  As a substitution, we implemented a virtual visit to manage this patient's condition utilizing the [employer] virtual visit platform via the patient s existing code.  Video Visit:  The [employer] platform uses a synchronous HIPAA compliant video stream for this patient encounter.      S: Naman Jones is a 34 year old male. Connected virtually on the [employer] platform with Naman Jones to discuss their condition/progress. They noted improvements in Patient reports that he is feeling that the knee is getting more ROM of the left knee.  He has some swelling in the quad mm.  Patient has been spending most of the time in bed and working from home.  Patient is wearing the brace most of the time.  They noted ongoing pain or limitations with 2-3/10 pain.  He was icing the left knee 1 time a day..     Current pain level: 3/10      O: Patient demonstrated Patient was able to get to approx 90 degrees of flexion of the left knee only done by virtual and visual, no measurements with the goniometry.  Patient has weakness with the quad and encourage patient to engage the quad set with the SLR of flexion and abduction.  Patient does use the brace with the SLR's.  Patient demonstrated flexion at AG x 8-10 reps, hip abduction AG X 15. Observed and pt performed the patella mobilization.  Instructed patient on the self edema reduction to the left quad mm.  Instructed patient on icing more throughout the day 5-6 times a day for 10 min's.  Discussed with the patient about the advancing of the ROM per protocol for the 4-6 week " on the protocol to advance to 120 degrees.    PT/PTA combination visit today.       A:   Continue with the exercise program per MD protocol.    P: Patient will continue with the exercise program assigned on their PTRx code and will add the following measures to manage their pain/condition: Recommendation is that the patient will come in for a in person visit next appt.          Virtual visit contact time    Total Time for set up, visit, and documentation 45 minutes    Procedure Code/s (For internal tracking only, please document any charges you would apply)  Therapeutic Exercise (15511): 15 minutes.  Self Care / Home Management Training (62678): 10 minutes.    I have reviewed the note as documented above.  This accurately captures the substance of my conversation with the patient.      Any subjective info about the quality of the visit, ease of use: Virtual visit was very easy  Patient's opinion about reasonable cost for this visit Patient felt that is was beneficial to have a virtual visit, but does not know the ROM of the left knee objectively.

## 2020-03-30 ENCOUNTER — THERAPY VISIT (OUTPATIENT)
Dept: PHYSICAL THERAPY | Facility: CLINIC | Age: 35
End: 2020-03-30
Payer: COMMERCIAL

## 2020-03-30 DIAGNOSIS — M25.562 ACUTE PAIN OF LEFT KNEE: ICD-10-CM

## 2020-03-30 DIAGNOSIS — M25.362 PATELLAR INSTABILITY OF LEFT KNEE: ICD-10-CM

## 2020-03-30 DIAGNOSIS — M21.062 ACQUIRED GENU VALGUM OF LEFT KNEE: ICD-10-CM

## 2020-03-30 DIAGNOSIS — Z47.89 SURGICAL AFTERCARE, MUSCULOSKELETAL SYSTEM: ICD-10-CM

## 2020-03-30 PROCEDURE — 97110 THERAPEUTIC EXERCISES: CPT | Mod: GP | Performed by: PHYSICAL THERAPY ASSISTANT

## 2020-04-02 ENCOUNTER — THERAPY VISIT (OUTPATIENT)
Dept: PHYSICAL THERAPY | Facility: CLINIC | Age: 35
End: 2020-04-02
Payer: COMMERCIAL

## 2020-04-02 DIAGNOSIS — M25.362 PATELLAR INSTABILITY OF LEFT KNEE: ICD-10-CM

## 2020-04-02 DIAGNOSIS — Z47.89 SURGICAL AFTERCARE, MUSCULOSKELETAL SYSTEM: ICD-10-CM

## 2020-04-02 DIAGNOSIS — M21.062 ACQUIRED GENU VALGUM OF LEFT KNEE: ICD-10-CM

## 2020-04-02 DIAGNOSIS — M25.562 ACUTE PAIN OF LEFT KNEE: ICD-10-CM

## 2020-04-02 PROCEDURE — 97110 THERAPEUTIC EXERCISES: CPT | Mod: GP | Performed by: PHYSICAL THERAPIST

## 2020-04-02 PROCEDURE — 97140 MANUAL THERAPY 1/> REGIONS: CPT | Mod: GP | Performed by: PHYSICAL THERAPIST

## 2020-04-06 ENCOUNTER — THERAPY VISIT (OUTPATIENT)
Dept: PHYSICAL THERAPY | Facility: CLINIC | Age: 35
End: 2020-04-06
Payer: COMMERCIAL

## 2020-04-06 DIAGNOSIS — Z47.89 SURGICAL AFTERCARE, MUSCULOSKELETAL SYSTEM: ICD-10-CM

## 2020-04-06 DIAGNOSIS — M25.562 ACUTE PAIN OF LEFT KNEE: ICD-10-CM

## 2020-04-06 DIAGNOSIS — M21.062 ACQUIRED GENU VALGUM OF LEFT KNEE: ICD-10-CM

## 2020-04-06 DIAGNOSIS — M25.362 PATELLAR INSTABILITY OF LEFT KNEE: ICD-10-CM

## 2020-04-06 DIAGNOSIS — Z98.890 S/P RIGHT KNEE SURGERY: Primary | ICD-10-CM

## 2020-04-06 PROCEDURE — 97140 MANUAL THERAPY 1/> REGIONS: CPT | Mod: GP | Performed by: PHYSICAL THERAPY ASSISTANT

## 2020-04-06 PROCEDURE — 97110 THERAPEUTIC EXERCISES: CPT | Mod: GP | Performed by: PHYSICAL THERAPY ASSISTANT

## 2020-04-08 ENCOUNTER — APPOINTMENT (OUTPATIENT)
Dept: GENERAL RADIOLOGY | Facility: CLINIC | Age: 35
End: 2020-04-08
Payer: COMMERCIAL

## 2020-04-08 ENCOUNTER — ANCILLARY PROCEDURE (OUTPATIENT)
Dept: GENERAL RADIOLOGY | Facility: CLINIC | Age: 35
End: 2020-04-08
Attending: ORTHOPAEDIC SURGERY
Payer: COMMERCIAL

## 2020-04-08 DIAGNOSIS — Z98.890 S/P RIGHT KNEE SURGERY: ICD-10-CM

## 2020-04-08 PROCEDURE — 73560 X-RAY EXAM OF KNEE 1 OR 2: CPT | Mod: LT

## 2020-04-09 NOTE — RESULT ENCOUNTER NOTE
I had a chance to review the imaging study on Naman Jones  Can you please call the patient and explain the following:  xrays show good position of osteotomy and plate and screws, no evidence of fracture, good healing. Will allow to start wbat, rom as jenny, wean from crutches and brace.      Please document that you contact the patient, ok to offer a followup visit to discuss with me if the patient wants.

## 2020-04-10 ENCOUNTER — THERAPY VISIT (OUTPATIENT)
Dept: PHYSICAL THERAPY | Facility: CLINIC | Age: 35
End: 2020-04-10
Payer: COMMERCIAL

## 2020-04-10 ENCOUNTER — TELEPHONE (OUTPATIENT)
Dept: ORTHOPEDICS | Facility: CLINIC | Age: 35
End: 2020-04-10

## 2020-04-10 DIAGNOSIS — M25.562 ACUTE PAIN OF LEFT KNEE: ICD-10-CM

## 2020-04-10 DIAGNOSIS — Z47.89 SURGICAL AFTERCARE, MUSCULOSKELETAL SYSTEM: ICD-10-CM

## 2020-04-10 DIAGNOSIS — M25.362 PATELLAR INSTABILITY OF LEFT KNEE: ICD-10-CM

## 2020-04-10 DIAGNOSIS — M21.062 ACQUIRED GENU VALGUM OF LEFT KNEE: ICD-10-CM

## 2020-04-10 PROCEDURE — 97530 THERAPEUTIC ACTIVITIES: CPT | Mod: GP | Performed by: PHYSICAL THERAPY ASSISTANT

## 2020-04-10 PROCEDURE — 97110 THERAPEUTIC EXERCISES: CPT | Mod: GP | Performed by: PHYSICAL THERAPY ASSISTANT

## 2020-04-10 NOTE — TELEPHONE ENCOUNTER
----- Message from Angel Madera MD sent at 4/9/2020  3:45 PM CDT -----  I had a chance to review the imaging study on Naman Jones  Can you please call the patient and explain the following:  xrays show good position of osteotomy and plate and screws, no evidence of fracture, good healing. Will allow to start wbat, rom as jenny, wean from crutches and brace.      Please document that you contact the patient, ok to offer a followup visit to discuss with me if the patient wants.

## 2020-04-13 ENCOUNTER — TELEPHONE (OUTPATIENT)
Dept: ORTHOPEDICS | Facility: CLINIC | Age: 35
End: 2020-04-13

## 2020-04-13 ENCOUNTER — VIRTUAL VISIT (OUTPATIENT)
Dept: ORTHOPEDICS | Facility: CLINIC | Age: 35
End: 2020-04-13
Payer: COMMERCIAL

## 2020-04-13 DIAGNOSIS — Z98.890 S/P RIGHT KNEE SURGERY: Primary | ICD-10-CM

## 2020-04-13 ASSESSMENT — PAIN SCALES - GENERAL: PAINLEVEL: MILD PAIN (3)

## 2020-04-13 NOTE — PROGRESS NOTES
"Naman Jones is a 34 year old male who is being evaluated via a billable video visit.      The patient has been notified of following:     \"This video visit will be conducted via a call between you and your physician/provider. We have found that certain health care needs can be provided without the need for an in-person physical exam.  This service lets us provide the care you need with a video conversation.  If a prescription is necessary we can send it directly to your pharmacy.  If lab work is needed we can place an order for that and you can then stop by our lab to have the test done at a later time.    Video visits are billed at different rates depending on your insurance coverage.  Please reach out to your insurance provider with any questions.    If during the course of the call the physician/provider feels a video visit is not appropriate, you will not be charged for this service.\"    Patient has given verbal consent for Video visit? Yes    How would you like to obtain your AVS? Rj    Patient would like the video invitation sent by: Send to e-mail at: [email or web address]      Video Start Time: 1048    Additional provider notes:      DIAGNOSIS:   1. Recurrent patellar instability, valgus malalignment    PROCEDURES:  1. Knee arthroscopy, chondroplasty, distal femoral osteotomy, medial retinacular imbrication and lateral retinacular lengthening; date of surgery 2/28/2020    HISTORY:  Doing well following surgery.  Pain controlled.  Taking only ibuprofen.  Working from home.  Started to do some gentle weightbearing with therapy last week.  Reports that his flexion is to up to approximately 90 degrees..    EXAM:     General: Awake, Alert, and oriented. Articulates and communicates with a normal affect     Left lower Extremity:    Incisions well healed without evidence of infection no concerns by the patient    Normal post-operative effusion and ecchymosis    Range of motion is reported as full " extension to approximately 90 degrees of flexion    Stability exam was not performed    Neurovascularly intact    IMAGING:  AP lateral radiographs from last week show hardware in good position and alignment appears appropriate following distal femoral osteotomy    ASSESSMENT:  1. 6 weeks following distal femoral osteotomy and medial retinacular imbrication    PLAN:   WBAT  ROM as tolerated  OK for walking and bike, no running or dangerous activities  OK to be done with ASA  Working via tel - no work note needed  F/u in 6 weeks with ap/lat and standing alignment      Video-Visit Details    Type of service:  Video Visit    Video End Time (time video stopped): 1054    Originating Location (pt. Location): home    Distant Location (provider location):  [facility]     Mode of Communication:  [facility] Conference [facility]      Angel Madera MD

## 2020-04-17 ENCOUNTER — THERAPY VISIT (OUTPATIENT)
Dept: PHYSICAL THERAPY | Facility: CLINIC | Age: 35
End: 2020-04-17
Payer: COMMERCIAL

## 2020-04-17 DIAGNOSIS — M25.362 PATELLAR INSTABILITY OF LEFT KNEE: ICD-10-CM

## 2020-04-17 DIAGNOSIS — M25.562 ACUTE PAIN OF LEFT KNEE: ICD-10-CM

## 2020-04-17 DIAGNOSIS — Z47.89 SURGICAL AFTERCARE, MUSCULOSKELETAL SYSTEM: ICD-10-CM

## 2020-04-17 DIAGNOSIS — M21.062 ACQUIRED GENU VALGUM OF LEFT KNEE: ICD-10-CM

## 2020-04-17 DIAGNOSIS — R26.9 GAIT ABNORMALITY: ICD-10-CM

## 2020-04-17 PROCEDURE — 97140 MANUAL THERAPY 1/> REGIONS: CPT | Mod: GP | Performed by: PHYSICAL THERAPIST

## 2020-04-17 PROCEDURE — 97116 GAIT TRAINING THERAPY: CPT | Mod: GP | Performed by: PHYSICAL THERAPIST

## 2020-04-17 PROCEDURE — 97110 THERAPEUTIC EXERCISES: CPT | Mod: GP | Performed by: PHYSICAL THERAPIST

## 2020-04-24 ENCOUNTER — THERAPY VISIT (OUTPATIENT)
Dept: PHYSICAL THERAPY | Facility: CLINIC | Age: 35
End: 2020-04-24
Payer: COMMERCIAL

## 2020-04-24 DIAGNOSIS — R26.9 GAIT ABNORMALITY: ICD-10-CM

## 2020-04-24 DIAGNOSIS — M25.562 ACUTE PAIN OF LEFT KNEE: ICD-10-CM

## 2020-04-24 DIAGNOSIS — M25.362 PATELLAR INSTABILITY OF LEFT KNEE: ICD-10-CM

## 2020-04-24 DIAGNOSIS — Z47.89 SURGICAL AFTERCARE, MUSCULOSKELETAL SYSTEM: ICD-10-CM

## 2020-04-24 DIAGNOSIS — M21.062 ACQUIRED GENU VALGUM OF LEFT KNEE: ICD-10-CM

## 2020-04-24 PROCEDURE — 97530 THERAPEUTIC ACTIVITIES: CPT | Mod: GP | Performed by: PHYSICAL THERAPY ASSISTANT

## 2020-04-24 PROCEDURE — 97140 MANUAL THERAPY 1/> REGIONS: CPT | Mod: GP | Performed by: PHYSICAL THERAPY ASSISTANT

## 2020-04-24 PROCEDURE — 97110 THERAPEUTIC EXERCISES: CPT | Mod: GP | Performed by: PHYSICAL THERAPY ASSISTANT

## 2020-04-29 ENCOUNTER — THERAPY VISIT (OUTPATIENT)
Dept: PHYSICAL THERAPY | Facility: CLINIC | Age: 35
End: 2020-04-29
Payer: COMMERCIAL

## 2020-04-29 DIAGNOSIS — M21.062 ACQUIRED GENU VALGUM OF LEFT KNEE: ICD-10-CM

## 2020-04-29 DIAGNOSIS — Z47.89 SURGICAL AFTERCARE, MUSCULOSKELETAL SYSTEM: ICD-10-CM

## 2020-04-29 DIAGNOSIS — M25.362 PATELLAR INSTABILITY OF LEFT KNEE: ICD-10-CM

## 2020-04-29 DIAGNOSIS — M25.562 ACUTE PAIN OF LEFT KNEE: ICD-10-CM

## 2020-04-29 DIAGNOSIS — R26.9 GAIT ABNORMALITY: ICD-10-CM

## 2020-04-29 PROCEDURE — 97110 THERAPEUTIC EXERCISES: CPT | Mod: GP | Performed by: PHYSICAL THERAPY ASSISTANT

## 2020-04-29 PROCEDURE — 97140 MANUAL THERAPY 1/> REGIONS: CPT | Mod: GP | Performed by: PHYSICAL THERAPY ASSISTANT

## 2020-04-29 PROCEDURE — 97530 THERAPEUTIC ACTIVITIES: CPT | Mod: GP | Performed by: PHYSICAL THERAPY ASSISTANT

## 2020-05-01 ENCOUNTER — THERAPY VISIT (OUTPATIENT)
Dept: PHYSICAL THERAPY | Facility: CLINIC | Age: 35
End: 2020-05-01
Payer: COMMERCIAL

## 2020-05-01 DIAGNOSIS — R26.9 GAIT ABNORMALITY: ICD-10-CM

## 2020-05-01 DIAGNOSIS — M25.562 ACUTE PAIN OF LEFT KNEE: ICD-10-CM

## 2020-05-01 DIAGNOSIS — M21.062 ACQUIRED GENU VALGUM OF LEFT KNEE: ICD-10-CM

## 2020-05-01 DIAGNOSIS — Z47.89 SURGICAL AFTERCARE, MUSCULOSKELETAL SYSTEM: ICD-10-CM

## 2020-05-01 DIAGNOSIS — M25.362 PATELLAR INSTABILITY OF LEFT KNEE: ICD-10-CM

## 2020-05-01 PROCEDURE — 97530 THERAPEUTIC ACTIVITIES: CPT | Mod: GP | Performed by: PHYSICAL THERAPY ASSISTANT

## 2020-05-01 PROCEDURE — 97110 THERAPEUTIC EXERCISES: CPT | Mod: GP | Performed by: PHYSICAL THERAPY ASSISTANT

## 2020-05-05 ENCOUNTER — THERAPY VISIT (OUTPATIENT)
Dept: PHYSICAL THERAPY | Facility: CLINIC | Age: 35
End: 2020-05-05
Payer: COMMERCIAL

## 2020-05-05 DIAGNOSIS — Z47.89 SURGICAL AFTERCARE, MUSCULOSKELETAL SYSTEM: ICD-10-CM

## 2020-05-05 DIAGNOSIS — M25.562 ACUTE PAIN OF LEFT KNEE: ICD-10-CM

## 2020-05-05 DIAGNOSIS — M21.062 ACQUIRED GENU VALGUM OF LEFT KNEE: ICD-10-CM

## 2020-05-05 DIAGNOSIS — M25.362 PATELLAR INSTABILITY OF LEFT KNEE: ICD-10-CM

## 2020-05-05 DIAGNOSIS — R26.9 GAIT ABNORMALITY: ICD-10-CM

## 2020-05-05 PROCEDURE — 97112 NEUROMUSCULAR REEDUCATION: CPT | Mod: GP | Performed by: PHYSICAL THERAPY ASSISTANT

## 2020-05-05 PROCEDURE — 97530 THERAPEUTIC ACTIVITIES: CPT | Mod: GP | Performed by: PHYSICAL THERAPY ASSISTANT

## 2020-05-05 PROCEDURE — 97110 THERAPEUTIC EXERCISES: CPT | Mod: GP | Performed by: PHYSICAL THERAPY ASSISTANT

## 2020-05-08 ENCOUNTER — THERAPY VISIT (OUTPATIENT)
Dept: PHYSICAL THERAPY | Facility: CLINIC | Age: 35
End: 2020-05-08
Payer: COMMERCIAL

## 2020-05-08 DIAGNOSIS — M25.362 PATELLAR INSTABILITY OF LEFT KNEE: ICD-10-CM

## 2020-05-08 DIAGNOSIS — M21.062 ACQUIRED GENU VALGUM OF LEFT KNEE: ICD-10-CM

## 2020-05-08 DIAGNOSIS — M25.562 ACUTE PAIN OF LEFT KNEE: ICD-10-CM

## 2020-05-08 DIAGNOSIS — R26.9 GAIT ABNORMALITY: ICD-10-CM

## 2020-05-08 DIAGNOSIS — Z47.89 SURGICAL AFTERCARE, MUSCULOSKELETAL SYSTEM: ICD-10-CM

## 2020-05-08 PROCEDURE — 97530 THERAPEUTIC ACTIVITIES: CPT | Mod: GP | Performed by: PHYSICAL THERAPY ASSISTANT

## 2020-05-08 PROCEDURE — 97110 THERAPEUTIC EXERCISES: CPT | Mod: GP | Performed by: PHYSICAL THERAPY ASSISTANT

## 2020-05-12 ENCOUNTER — THERAPY VISIT (OUTPATIENT)
Dept: PHYSICAL THERAPY | Facility: CLINIC | Age: 35
End: 2020-05-12
Payer: COMMERCIAL

## 2020-05-12 DIAGNOSIS — M25.362 PATELLAR INSTABILITY OF LEFT KNEE: ICD-10-CM

## 2020-05-12 DIAGNOSIS — M25.562 ACUTE PAIN OF LEFT KNEE: ICD-10-CM

## 2020-05-12 DIAGNOSIS — Z47.89 SURGICAL AFTERCARE, MUSCULOSKELETAL SYSTEM: ICD-10-CM

## 2020-05-12 DIAGNOSIS — R26.9 GAIT ABNORMALITY: ICD-10-CM

## 2020-05-12 DIAGNOSIS — M21.062 ACQUIRED GENU VALGUM OF LEFT KNEE: ICD-10-CM

## 2020-05-12 PROCEDURE — 97110 THERAPEUTIC EXERCISES: CPT | Mod: GP | Performed by: PHYSICAL THERAPY ASSISTANT

## 2020-05-12 PROCEDURE — 97530 THERAPEUTIC ACTIVITIES: CPT | Mod: GP | Performed by: PHYSICAL THERAPY ASSISTANT

## 2020-05-12 ASSESSMENT — ACTIVITIES OF DAILY LIVING (ADL)
RAW_SCORE: 26
GO UP STAIRS: ACTIVITY IS FAIRLY DIFFICULT
LIMPING: THE SYMPTOM AFFECTS MY ACTIVITY MODERATELY
KNEE_ACTIVITY_OF_DAILY_LIVING_SUM: 26
HOW_WOULD_YOU_RATE_THE_CURRENT_FUNCTION_OF_YOUR_KNEE_DURING_YOUR_USUAL_DAILY_ACTIVITIES_ON_A_SCALE_FROM_0_TO_100_WITH_100_BEING_YOUR_LEVEL_OF_KNEE_FUNCTION_PRIOR_TO_YOUR_INJURY_AND_0_BEING_THE_INABILITY_TO_PERFORM_ANY_OF_YOUR_USUAL_DAILY_ACTIVITIES?: 25
GIVING WAY, BUCKLING OR SHIFTING OF KNEE: THE SYMPTOM AFFECTS MY ACTIVITY MODERATELY
KNEE_ACTIVITY_OF_DAILY_LIVING_SCORE: 37.14
KNEEL ON THE FRONT OF YOUR KNEE: I AM UNABLE TO DO THE ACTIVITY
HOW_WOULD_YOU_RATE_THE_OVERALL_FUNCTION_OF_YOUR_KNEE_DURING_YOUR_USUAL_DAILY_ACTIVITIES?: ABNORMAL
SQUAT: I AM UNABLE TO DO THE ACTIVITY
RISE FROM A CHAIR: ACTIVITY IS MINIMALLY DIFFICULT
STIFFNESS: THE SYMPTOM AFFECTS MY ACTIVITY SEVERELY
STAND: ACTIVITY IS SOMEWHAT DIFFICULT
WALK: ACTIVITY IS FAIRLY DIFFICULT
GO DOWN STAIRS: ACTIVITY IS VERY DIFFICULT
WEAKNESS: THE SYMPTOM AFFECTS MY ACTIVITY MODERATELY
SIT WITH YOUR KNEE BENT: ACTIVITY IS FAIRLY DIFFICULT
PAIN: THE SYMPTOM AFFECTS MY ACTIVITY SLIGHTLY
SWELLING: THE SYMPTOM AFFECTS MY ACTIVITY MODERATELY
AS_A_RESULT_OF_YOUR_KNEE_INJURY,_HOW_WOULD_YOU_RATE_YOUR_CURRENT_LEVEL_OF_DAILY_ACTIVITY?: ABNORMAL

## 2020-05-14 ENCOUNTER — THERAPY VISIT (OUTPATIENT)
Dept: PHYSICAL THERAPY | Facility: CLINIC | Age: 35
End: 2020-05-14
Payer: COMMERCIAL

## 2020-05-14 DIAGNOSIS — M21.062 ACQUIRED GENU VALGUM OF LEFT KNEE: ICD-10-CM

## 2020-05-14 DIAGNOSIS — M25.562 ACUTE PAIN OF LEFT KNEE: ICD-10-CM

## 2020-05-14 DIAGNOSIS — M25.362 PATELLAR INSTABILITY OF LEFT KNEE: ICD-10-CM

## 2020-05-14 DIAGNOSIS — R26.9 GAIT ABNORMALITY: ICD-10-CM

## 2020-05-14 DIAGNOSIS — Z47.89 SURGICAL AFTERCARE, MUSCULOSKELETAL SYSTEM: ICD-10-CM

## 2020-05-14 PROCEDURE — 97110 THERAPEUTIC EXERCISES: CPT | Mod: GP | Performed by: PHYSICAL THERAPIST

## 2020-05-14 PROCEDURE — 97530 THERAPEUTIC ACTIVITIES: CPT | Mod: GP | Performed by: PHYSICAL THERAPIST

## 2020-05-21 ENCOUNTER — THERAPY VISIT (OUTPATIENT)
Dept: PHYSICAL THERAPY | Facility: CLINIC | Age: 35
End: 2020-05-21
Payer: COMMERCIAL

## 2020-05-21 DIAGNOSIS — R26.9 GAIT ABNORMALITY: ICD-10-CM

## 2020-05-21 DIAGNOSIS — M25.562 ACUTE PAIN OF LEFT KNEE: ICD-10-CM

## 2020-05-21 DIAGNOSIS — Z47.89 SURGICAL AFTERCARE, MUSCULOSKELETAL SYSTEM: ICD-10-CM

## 2020-05-21 DIAGNOSIS — M21.062 ACQUIRED GENU VALGUM OF LEFT KNEE: ICD-10-CM

## 2020-05-21 DIAGNOSIS — M25.362 PATELLAR INSTABILITY OF LEFT KNEE: ICD-10-CM

## 2020-05-21 PROCEDURE — 97110 THERAPEUTIC EXERCISES: CPT | Mod: GP | Performed by: PHYSICAL THERAPY ASSISTANT

## 2020-05-21 PROCEDURE — 97530 THERAPEUTIC ACTIVITIES: CPT | Mod: GP | Performed by: PHYSICAL THERAPY ASSISTANT

## 2020-05-27 ENCOUNTER — THERAPY VISIT (OUTPATIENT)
Dept: PHYSICAL THERAPY | Facility: CLINIC | Age: 35
End: 2020-05-27
Payer: COMMERCIAL

## 2020-05-27 DIAGNOSIS — Z47.89 SURGICAL AFTERCARE, MUSCULOSKELETAL SYSTEM: ICD-10-CM

## 2020-05-27 DIAGNOSIS — R26.9 GAIT ABNORMALITY: ICD-10-CM

## 2020-05-27 DIAGNOSIS — M25.562 ACUTE PAIN OF LEFT KNEE: ICD-10-CM

## 2020-05-27 DIAGNOSIS — M21.062 ACQUIRED GENU VALGUM OF LEFT KNEE: ICD-10-CM

## 2020-05-27 DIAGNOSIS — M25.362 PATELLAR INSTABILITY OF LEFT KNEE: ICD-10-CM

## 2020-05-27 PROCEDURE — 97110 THERAPEUTIC EXERCISES: CPT | Mod: GP | Performed by: PHYSICAL THERAPIST

## 2020-05-27 PROCEDURE — 97140 MANUAL THERAPY 1/> REGIONS: CPT | Mod: GP | Performed by: PHYSICAL THERAPIST

## 2020-05-29 ENCOUNTER — ANCILLARY PROCEDURE (OUTPATIENT)
Dept: GENERAL RADIOLOGY | Facility: CLINIC | Age: 35
End: 2020-05-29
Attending: ORTHOPAEDIC SURGERY
Payer: COMMERCIAL

## 2020-05-29 ENCOUNTER — THERAPY VISIT (OUTPATIENT)
Dept: PHYSICAL THERAPY | Facility: CLINIC | Age: 35
End: 2020-05-29
Payer: COMMERCIAL

## 2020-05-29 ENCOUNTER — APPOINTMENT (OUTPATIENT)
Dept: GENERAL RADIOLOGY | Facility: CLINIC | Age: 35
End: 2020-05-29
Payer: COMMERCIAL

## 2020-05-29 DIAGNOSIS — Z98.890 S/P LEFT KNEE SURGERY: Primary | ICD-10-CM

## 2020-05-29 DIAGNOSIS — M25.362 PATELLAR INSTABILITY OF LEFT KNEE: ICD-10-CM

## 2020-05-29 DIAGNOSIS — Z47.89 SURGICAL AFTERCARE, MUSCULOSKELETAL SYSTEM: ICD-10-CM

## 2020-05-29 DIAGNOSIS — Z98.890 S/P LEFT KNEE SURGERY: ICD-10-CM

## 2020-05-29 DIAGNOSIS — R26.9 GAIT ABNORMALITY: ICD-10-CM

## 2020-05-29 DIAGNOSIS — M25.562 ACUTE PAIN OF LEFT KNEE: ICD-10-CM

## 2020-05-29 DIAGNOSIS — M21.062 ACQUIRED GENU VALGUM OF LEFT KNEE: ICD-10-CM

## 2020-05-29 PROCEDURE — 97110 THERAPEUTIC EXERCISES: CPT | Mod: GP | Performed by: PHYSICAL THERAPY ASSISTANT

## 2020-05-29 PROCEDURE — 97112 NEUROMUSCULAR REEDUCATION: CPT | Mod: GP | Performed by: PHYSICAL THERAPY ASSISTANT

## 2020-05-29 PROCEDURE — 97140 MANUAL THERAPY 1/> REGIONS: CPT | Mod: GP | Performed by: PHYSICAL THERAPY ASSISTANT

## 2020-05-29 PROCEDURE — 73560 X-RAY EXAM OF KNEE 1 OR 2: CPT | Mod: LT

## 2020-05-29 ASSESSMENT — ACTIVITIES OF DAILY LIVING (ADL)
RAW_SCORE: 31
KNEEL ON THE FRONT OF YOUR KNEE: I AM UNABLE TO DO THE ACTIVITY
AS_A_RESULT_OF_YOUR_KNEE_INJURY,_HOW_WOULD_YOU_RATE_YOUR_CURRENT_LEVEL_OF_DAILY_ACTIVITY?: ABNORMAL
GO DOWN STAIRS: ACTIVITY IS VERY DIFFICULT
HOW_WOULD_YOU_RATE_THE_OVERALL_FUNCTION_OF_YOUR_KNEE_DURING_YOUR_USUAL_DAILY_ACTIVITIES?: ABNORMAL
SQUAT: ACTIVITY IS FAIRLY DIFFICULT
WEAKNESS: THE SYMPTOM AFFECTS MY ACTIVITY SEVERELY
SWELLING: I HAVE THE SYMPTOM BUT IT DOES NOT AFFECT MY ACTIVITY
HOW_WOULD_YOU_RATE_THE_CURRENT_FUNCTION_OF_YOUR_KNEE_DURING_YOUR_USUAL_DAILY_ACTIVITIES_ON_A_SCALE_FROM_0_TO_100_WITH_100_BEING_YOUR_LEVEL_OF_KNEE_FUNCTION_PRIOR_TO_YOUR_INJURY_AND_0_BEING_THE_INABILITY_TO_PERFORM_ANY_OF_YOUR_USUAL_DAILY_ACTIVITIES?: 33
LIMPING: THE SYMPTOM AFFECTS MY ACTIVITY SEVERELY
GO UP STAIRS: ACTIVITY IS FAIRLY DIFFICULT
PAIN: THE SYMPTOM AFFECTS MY ACTIVITY SLIGHTLY
STIFFNESS: THE SYMPTOM AFFECTS MY ACTIVITY SEVERELY
KNEE_ACTIVITY_OF_DAILY_LIVING_SCORE: 44.29
WALK: ACTIVITY IS FAIRLY DIFFICULT
RISE FROM A CHAIR: ACTIVITY IS MINIMALLY DIFFICULT
KNEE_ACTIVITY_OF_DAILY_LIVING_SUM: 31
STAND: ACTIVITY IS MINIMALLY DIFFICULT
SIT WITH YOUR KNEE BENT: ACTIVITY IS FAIRLY DIFFICULT
GIVING WAY, BUCKLING OR SHIFTING OF KNEE: I HAVE THE SYMPTOM BUT IT DOES NOT AFFECT MY ACTIVITY

## 2020-05-29 NOTE — PROGRESS NOTES
Subjective:  HPI  Physical Exam       Knee Activity of Daily Living Score: 44.29            Objective:  System                                                Knee Evaluation:  ROM:      PROM    Hyperextension: Left: 0   Right:   Extension: Left: 0   Right:   Flexion: Left: 106   Right:       Strength:     Extension:  Left: 4+/5   Pain:        Flexion:  Left: 4+/5   Pain:                            General     ROS    Assessment/Plan:    PROGRESS  REPORT    Progress reporting period is from 3/10/20 to 5/29/20.      SUBJECTIVE  Subjective changes noted by patient:  Patient feels the tightness in the left knee. The knee will click but slowly moving better.    Uses single CR only outside of the house.   Current pain level is 2/10.       Previous pain level was:      Changes in function:  Yes (See Goal flowsheet attached for changes in current functional level)     Adverse reaction to treatment or activity: None     OBJECTIVE  Changes noted in objective findings:  Yes,  Patient is progressing with his gait without the CR and the brace.  He is mking progress with the WB of the left. ROM of the left knee has been slowly making progress.  This has been difficult to increase.  Stretches in multiple planes on the leg press, wall slides, Graston for tissue moblity.      ASSESSMENT/PLAN  Updated problem list and treatment plan: Diagnosis 1:  Left tibial osteotomy and patellar realignment    STG/LTGs have been met:  Yes (See Goal flow sheet completed today.)  Progress toward STG/LTGs have been made:  Yes (See Goal flow sheet completed today.)  Assessment of Progress: The patient's condition has potential to improve.  Self Management Plans:  Patient has been instructed in a home treatment program.    Naman continues to require the following intervention to meet STG and LT's:  PT    Recommendations:  This patient would benefit from continued therapy.     Frequency:  2 X week, once daily  Duration:  for 4 weeks tapering to 1 X a  week over 4 weeks        Please refer to the daily flowsheet for treatment today, total treatment time and time spent performing 1:1 timed codes.

## 2020-06-01 ENCOUNTER — VIRTUAL VISIT (OUTPATIENT)
Dept: ORTHOPEDICS | Facility: CLINIC | Age: 35
End: 2020-06-01
Payer: COMMERCIAL

## 2020-06-01 DIAGNOSIS — Z98.890 S/P RIGHT KNEE SURGERY: Primary | ICD-10-CM

## 2020-06-01 NOTE — PROGRESS NOTES
"Naman Jones is a 35 year old male who is being evaluated via a billable video visit.      The patient has been notified of following:     \"This video visit will be conducted via a call between you and your physician/provider. We have found that certain health care needs can be provided without the need for an in-person physical exam.  This service lets us provide the care you need with a video conversation.  If a prescription is necessary we can send it directly to your pharmacy.  If lab work is needed we can place an order for that and you can then stop by our lab to have the test done at a later time.    Video visits are billed at different rates depending on your insurance coverage.  Please reach out to your insurance provider with any questions.    If during the course of the call the physician/provider feels a video visit is not appropriate, you will not be charged for this service.\"    Patient has given verbal consent for Video visit? Yes    How would you like to obtain your AVS? Rj    Patient would like the video invitation sent by: Text to cell phone: [phone]    Will anyone else be joining your video visit? No        DIAGNOSIS:   1. Recurrent patellar instability, valgus malalignment    PROCEDURES:  1. Knee arthroscopy, chondroplasty, distal femoral osteotomy, medial retinacular imbrication and lateral retinacular lengthening; date of surgery 2/28/2020    HISTORY: Doing well 3 months out from the above surgery.  Pain seems to be largely controlled.  Making progress with therapy.  Motion is progressing.  Feels like he might be a little bit behind in his motion but overall he feels that he is making good progress.  Denies any recurrent instability of his patella.      EXAM:     General: Awake, Alert, and oriented. Articulates and communicates with a normal affect     Left lower Extremity:    Incisions well healed without evidence of infection no concerns by the patient    No definite effusion or " ecchymosis     Range of motion i as seen on this video exam today is from full extension to approximately 100 degrees    Stability exam was not performed    Neurovascularly intact    IMAGING:  AP lateral radiographs obtained from last week show hardware in good position and excellent healing across the osteotomy    ASSESSMENT:  1. 12 weeks following distal femoral osteotomy and medial retinacular imbrication    PLAN:   Doing well.  Weightbearing as tolerated range of motion as tolerated.  Continue to progress strength and motion is much as possible.    He is going to follow-up with me in 3 months time at that time we will get standing alignment films as well as lateral radiographs of both knees.  In advance of that visit we will get an MRI of his right knee as he is having similar symptoms on that side.  We may consider surgical intervention at that time he will then be 6 months from his left knee surgery      Video-Visit Details    Type of service:  Video Visit    Video Start Time: 0850  Video End Time: 9:05 AM    Originating Location (pt. Location): Home    Distant Location (provider location):  [facility]     Platform used for Video Visit: Nicki Madera MD

## 2020-06-01 NOTE — LETTER
"    6/1/2020         RE: Naman Jones  [id number] [address] [facility] [address]        Dear Colleague,    Thank you for referring your patient, Naman Jones, to the [facility]. Please see a copy of my visit note below.    Naman Jones is a 35 year old male who is being evaluated via a billable video visit.      The patient has been notified of following:     \"This video visit will be conducted via a call between you and your physician/provider. We have found that certain health care needs can be provided without the need for an in-person physical exam.  This service lets us provide the care you need with a video conversation.  If a prescription is necessary we can send it directly to your pharmacy.  If lab work is needed we can place an order for that and you can then stop by our lab to have the test done at a later time.    Video visits are billed at different rates depending on your insurance coverage.  Please reach out to your insurance provider with any questions.    If during the course of the call the physician/provider feels a video visit is not appropriate, you will not be charged for this service.\"    Patient has given verbal consent for Video visit? Yes    How would you like to obtain your AVS? [facility]    Patient would like the video invitation sent by: Text to cell phone: [phone]    Will anyone else be joining your video visit? No        DIAGNOSIS:   1. Recurrent patellar instability, valgus malalignment    PROCEDURES:  1. Knee arthroscopy, chondroplasty, distal femoral osteotomy, medial retinacular imbrication and lateral retinacular lengthening; date of surgery 2/28/2020    HISTORY: Doing well 3 months out from the above surgery.  Pain seems to be largely controlled.  Making progress with therapy.  Motion is progressing.  Feels like he might be a little bit behind in his motion but overall he feels that he is making good progress.  Denies any recurrent instability of " his patella.      EXAM:     General: Awake, Alert, and oriented. Articulates and communicates with a normal affect     Left lower Extremity:    Incisions well healed without evidence of infection no concerns by the patient    No definite effusion or ecchymosis     Range of motion i as seen on this video exam today is from full extension to approximately 100 degrees    Stability exam was not performed    Neurovascularly intact    IMAGING:  AP lateral radiographs obtained from last week show hardware in good position and excellent healing across the osteotomy    ASSESSMENT:  1. 12 weeks following distal femoral osteotomy and medial retinacular imbrication    PLAN:   Doing well.  Weightbearing as tolerated range of motion as tolerated.  Continue to progress strength and motion is much as possible.    He is going to follow-up with me in 3 months time at that time we will get standing alignment films as well as lateral radiographs of both knees.  In advance of that visit we will get an MRI of his right knee as he is having similar symptoms on that side.  We may consider surgical intervention at that time he will then be 6 months from his left knee surgery      Video-Visit Details    Type of service:  Video Visit    Video Start Time: 0850  Video End Time: 9:05 AM    Originating Location (pt. Location): Home    Distant Location (provider location):  [facility]     Platform used for Video Visit: Nicki Madera MD

## 2020-06-01 NOTE — NURSING NOTE
Reason For Visit:   Chief Complaint   Patient presents with     RECHECK     followup left knee // no recent trauma or injury to it // s/p left knee surgery DOS 2/28/20       There were no vitals taken for this visit.    Pain Assessment  Patient Currently in Pain: Yes  0-10 Pain Scale: 2  Primary Pain Location: Knee(left)    Eve Pabon ATC

## 2020-06-02 ENCOUNTER — THERAPY VISIT (OUTPATIENT)
Dept: PHYSICAL THERAPY | Facility: CLINIC | Age: 35
End: 2020-06-02
Payer: COMMERCIAL

## 2020-06-02 DIAGNOSIS — R26.9 GAIT ABNORMALITY: ICD-10-CM

## 2020-06-02 DIAGNOSIS — M21.062 ACQUIRED GENU VALGUM OF LEFT KNEE: ICD-10-CM

## 2020-06-02 DIAGNOSIS — M25.562 ACUTE PAIN OF LEFT KNEE: ICD-10-CM

## 2020-06-02 DIAGNOSIS — Z47.89 SURGICAL AFTERCARE, MUSCULOSKELETAL SYSTEM: ICD-10-CM

## 2020-06-02 DIAGNOSIS — M25.362 PATELLAR INSTABILITY OF LEFT KNEE: ICD-10-CM

## 2020-06-02 PROCEDURE — 97110 THERAPEUTIC EXERCISES: CPT | Mod: GP | Performed by: PHYSICAL THERAPIST

## 2020-06-02 PROCEDURE — 97112 NEUROMUSCULAR REEDUCATION: CPT | Mod: GP | Performed by: PHYSICAL THERAPIST

## 2020-06-08 ENCOUNTER — THERAPY VISIT (OUTPATIENT)
Dept: PHYSICAL THERAPY | Facility: CLINIC | Age: 35
End: 2020-06-08
Payer: COMMERCIAL

## 2020-06-08 DIAGNOSIS — M21.062 ACQUIRED GENU VALGUM OF LEFT KNEE: ICD-10-CM

## 2020-06-08 DIAGNOSIS — R26.9 GAIT ABNORMALITY: ICD-10-CM

## 2020-06-08 DIAGNOSIS — Z47.89 SURGICAL AFTERCARE, MUSCULOSKELETAL SYSTEM: ICD-10-CM

## 2020-06-08 DIAGNOSIS — M25.362 PATELLAR INSTABILITY OF LEFT KNEE: ICD-10-CM

## 2020-06-08 DIAGNOSIS — M25.562 ACUTE PAIN OF LEFT KNEE: ICD-10-CM

## 2020-06-08 PROCEDURE — 97112 NEUROMUSCULAR REEDUCATION: CPT | Mod: GP | Performed by: PHYSICAL THERAPY ASSISTANT

## 2020-06-08 PROCEDURE — 97110 THERAPEUTIC EXERCISES: CPT | Mod: GP | Performed by: PHYSICAL THERAPY ASSISTANT

## 2020-06-10 ENCOUNTER — MYC MEDICAL ADVICE (OUTPATIENT)
Dept: ENDOCRINOLOGY | Facility: CLINIC | Age: 35
End: 2020-06-10

## 2020-06-10 NOTE — TELEPHONE ENCOUNTER
Sent the following [employer] message to the patient:     Harris Beard,    I resent the invitation for the [employer] website today at 1:58 p.m.  Let me know if you have any problems.  I sent the invitation to: [email or web address].      Thank you,  Sylvia Huddleston M.A.  [facility]  [phone] [facility]

## 2020-06-11 ENCOUNTER — THERAPY VISIT (OUTPATIENT)
Dept: PHYSICAL THERAPY | Facility: CLINIC | Age: 35
End: 2020-06-11
Payer: COMMERCIAL

## 2020-06-11 DIAGNOSIS — M25.362 PATELLAR INSTABILITY OF LEFT KNEE: ICD-10-CM

## 2020-06-11 DIAGNOSIS — Z47.89 SURGICAL AFTERCARE, MUSCULOSKELETAL SYSTEM: ICD-10-CM

## 2020-06-11 DIAGNOSIS — M21.062 ACQUIRED GENU VALGUM OF LEFT KNEE: ICD-10-CM

## 2020-06-11 DIAGNOSIS — R26.9 GAIT ABNORMALITY: ICD-10-CM

## 2020-06-11 DIAGNOSIS — M25.562 ACUTE PAIN OF LEFT KNEE: ICD-10-CM

## 2020-06-11 PROCEDURE — 97110 THERAPEUTIC EXERCISES: CPT | Mod: GP | Performed by: PHYSICAL THERAPY ASSISTANT

## 2020-06-11 PROCEDURE — 97112 NEUROMUSCULAR REEDUCATION: CPT | Mod: GP | Performed by: PHYSICAL THERAPY ASSISTANT

## 2020-06-12 ENCOUNTER — VIRTUAL VISIT (OUTPATIENT)
Dept: ENDOCRINOLOGY | Facility: CLINIC | Age: 35
End: 2020-06-12
Payer: COMMERCIAL

## 2020-06-12 DIAGNOSIS — E78.2 MIXED HYPERLIPIDEMIA: ICD-10-CM

## 2020-06-12 DIAGNOSIS — Z79.4 TYPE 2 DIABETES MELLITUS WITH HYPERGLYCEMIA, WITH LONG-TERM CURRENT USE OF INSULIN (H): Primary | ICD-10-CM

## 2020-06-12 DIAGNOSIS — E11.65 TYPE 2 DIABETES MELLITUS WITH HYPERGLYCEMIA, WITH LONG-TERM CURRENT USE OF INSULIN (H): Primary | ICD-10-CM

## 2020-06-12 PROCEDURE — 99213 OFFICE O/P EST LOW 20 MIN: CPT | Mod: GT | Performed by: CLINICAL NURSE SPECIALIST

## 2020-06-12 RX ORDER — SEMAGLUTIDE 1.34 MG/ML
1 INJECTION, SOLUTION SUBCUTANEOUS
Qty: 1 PEN | Refills: 5 | Status: SHIPPED | OUTPATIENT
Start: 2020-06-12 | End: 2020-06-19

## 2020-06-12 NOTE — PROGRESS NOTES
"Naman Jones is a 35 year old male who is being evaluated via a billable video visit.      The patient has been notified of following:     \"This video visit will be conducted via a call between you and your physician/provider. We have found that certain health care needs can be provided without the need for an in-person physical exam.  This service lets us provide the care you need with a video conversation.  If a prescription is necessary we can send it directly to your pharmacy.  If lab work is needed we can place an order for that and you can then stop by our lab to have the test done at a later time.    Video visits are billed at different rates depending on your insurance coverage.  Please reach out to your insurance provider with any questions.    If during the course of the call the physician/provider feels a video visit is not appropriate, you will not be charged for this service.\"    Patient has given verbal consent for Video visit? Yes    Will anyone else be joining your video visit? No        Video-Visit Details    Type of service:  Video Visit    Video Start Time: 9:10 am  Video End Time: 9:25 am    Originating Location (pt. Location): Home    Distant Location (provider location):  [facility]     Platform used for Video Visit: [facility]      ENDOCRINOLOGY CLINIC NOTE:  Name: Naman \"Chirag\" Robert  Seen for f/u of Diabetes (Last seen 2/21/2020).  HPI:  Naman Jones is a 35 year old male who presents via video visit for the management of Diabetes.  10/9/2018: gastric bypass, [facility]  Has lost 122 lbs, 369-->296-->247 lbs.  Currently eating a normal diet    VITAMINS AND MINERALS:   2 Multivitamin with Minerals-   600 mg Calcium carbonate With Vitamin D TID- with meals  3-1000 International units Vitamin D  500 mcg Vitamin B-12 sublingual      1. Type 2 DM:  Orginally diagnosed at the age of: 30. On routine physical exam. Unexplained weight loss at that " time.    Current Regimen:    Metformin 500 mg bid. Januvia 100 mg daily.  Unable to tolerate higher doses due to GI side effects  Off Trulicity - DUE TO COST    Insulin use decreased following gastric bypass, now off all insulin    BS checks: Janie personal CGM  Janie: average glucose 185, 51% above 180, 49% in target range , 0% below    Exercise: not much  Symptoms of hypoglycemia (low blood sugar):  Gets symptoms of hypoglycemia.  Episodes of hypoglycemia: no  Fixed meal pattern: yes  Patient counting carbs: yes     DM Complications:   Nephropathy: yes - elevated urine microalbumin  Retinopathy: no retinopathy - annual exam due but is delayed due COVID-19  Neuropathy: Yes, + numbness in toes - stable  Microalbuminuria: Yes- on losartan  CAD/PAD: no  Gastroparesis: no  Hypoglycemia unawareness: no  Upcoming orthopedic surgery - left leg, next week.  Expected recovery is about 3 months  2. Hypertension:    Off BP meds at this time.  Previously on Losartan 100 mg and Maxide 75-50 mg daily.  3. Hyperlipidemia: +Atorvastatin 40 mg qd    PMH/PSH:  DM  HTN  Dyslipidemia    Family Hx:  Diabetes: Father and mgm    Social Hx:  Social History     Socioeconomic History     Marital status:      Spouse name: Not on file     Number of children: Not on file     Years of education: Not on file     Highest education level: Bachelor's degree (e.g., BA, AB, BS)   Occupational History     Not on file   Social Needs     Financial resource strain: Not very hard     Food insecurity     Worry: Never true     Inability: Never true     Transportation needs     Medical: No     Non-medical: No   Tobacco Use     Smoking status: Never Smoker     Smokeless tobacco: Never Used   Substance and Sexual Activity     Alcohol use: Yes     Comment: socially     Drug use: No     Sexual activity: Yes     Partners: Female   Lifestyle     Physical activity     Days per week: 2 days     Minutes per session: 30 min     Stress: To some extent    Relationships     Social connections     Talks on phone: More than three times a week     Gets together: Once a week     Attends [employer] service: Never     Active member of club or organization: No     Attends meetings of clubs or organizations: 1 to 4 times per year     Relationship status:      Intimate partner violence     Fear of current or ex partner: Not on file     Emotionally abused: Not on file     Physically abused: Not on file     Forced sexual activity: Not on file   Other Topics Concern     Parent/sibling w/ CABG, MI or angioplasty before 65F 55M? Not Asked   Social History Narrative     Not on file          MEDICATIONS:  has a current medication list which includes the following prescription(s): acetaminophen, atorvastatin, buspirone, calcium carb-cholecalciferol, vitamin d3, freestyle césar reader, freestyle césar 14 day sensor, b-12, hydromorphone, hydroxyzine, ibuprofen, metformin, childrens multivitamin w/iron, onetouch delica lancets 33g, senna-docusate, and sitagliptin.    ROS     ROS: 10 point ROS neg other than the symptoms noted above in the HPI.    Physical Exam   VS: There were no vitals taken for this visit.  GENERAL: AXOX3, NAD, well dressed, answering questions appropriately, appears stated age.  HEENT: No exopthalmous, no proptosis, no lig lag, no retraction  CV:   LUNGS: Normal respiratory effort  NEUROLOGY:   PSYCH: normal affect and mood    LABS:  A1c:   Component      Latest Ref Rng & Units 5/3/2019 9/6/2019 2/14/2020   Hemoglobin A1C      0 - 5.6 % 6.5 (H) 6.1 (H) 7.0 (H)     !COMPREHENSIVE Latest Ref Rng & Units 2/14/2020   SODIUM 133 - 144 mmol/L 140   POTASSIUM 3.4 - 5.3 mmol/L 3.9   CHLORIDE 94 - 109 mmol/L 107   BUN 7 - 30 mg/dL 9   Creatinine 0.66 - 1.25 mg/dL 0.50 (L)   Glucose 70 - 99 mg/dL 159 (H)   ANION GAP 3 - 14 mmol/L 8   CALCIUM 8.5 - 10.1 mg/dL 8.8   ALBUMIN 3.4 - 5.0 g/dL 4.1     !LIPID/HEPATIC Latest Ref Rng & Units 2/14/2020   CHOLESTEROL <200 mg/dL 169    TRIGLYCERIDES <150 mg/dL 60   HDL CHOLESTEROL >39 mg/dL 69   LDL CHOLESTEROL DIRECT <100 mg/dL    LDL CHOLESTEROL, CALCULATED <100 mg/dL 88   NON HDL CHOLESTEROL <130 mg/dL 100   AST 0 - 45 U/L 29   ALT 0 - 70 U/L 54     !THYROID Latest Ref Rng & Units 7/26/2018   TSH 0.40 - 4.00 mU/L 1.50     Component      Latest Ref Rng & Units 7/10/2017 7/26/2018 9/6/2019   Creatinine Urine      mg/dL 56 63 127   Albumin Urine mg/L      mg/L 18 7 60   Albumin Urine mg/g Cr      0 - 17 mg/g Cr 32.08 (H) 10.85 47.56 (H)       All pertinent notes, labs, and images personally reviewed by me.     A/P  Mr.Anthony Jones is a 35 year old being evaluated via video visit for the management of diabetes:    1. DM2 - uncontrolled.   Diabetes complicated by neuropathy and microalbuminuria.  Diabetes is currently treated with metformin 500 mg bid and Januvia 100 mg daily.  He's unable to tolerate any higher doses of Metformin due to GI side effects.  Discontinue Januvia.  Start Ozempic 0.25 mg weekly x 4 weeks then increase to 0.5 mg weekly.  Consider increasing Ozempic further to 1 mg weekly in the future if needed.  Recommend he continue to be attentive to dietary intake - limited carbs, calorie controlled, healthier food choices.    There is no height or weight on file to calculate BMI.    -- He is allergic to sulfa medications so cannot be on sulfonylureas    2. Hyperlipidemia -Currently treated with atorvastatin 40 mg - continue.    Most Recent Immunizations   Administered Date(s) Administered     HepB 05/15/2017     Influenza (intradermal) 09/20/2016     Influenza Vaccine IM > 6 months Valent IIV4 10/18/2019     Pneumo Conj 13-V (2010&after) 07/26/2018     TDAP Vaccine (Adacel) 10/04/2012       Follow-up:  3-4 months     Annette Hobson NP  Endocrinology  [facility]  CC: Haase, Susan Rachele

## 2020-06-12 NOTE — LETTER
"    6/12/2020         RE: Naman Jones  [address] [facility] [address]        Dear Colleague,    Thank you for referring your patient, Naman Jones, to the [facility]. Please see a copy of my visit note below.    Naman Jones is a 35 year old male who is being evaluated via a billable video visit.      The patient has been notified of following:     \"This video visit will be conducted via a call between you and your physician/provider. We have found that certain health care needs can be provided without the need for an in-person physical exam.  This service lets us provide the care you need with a video conversation.  If a prescription is necessary we can send it directly to your pharmacy.  If lab work is needed we can place an order for that and you can then stop by our lab to have the test done at a later time.    Video visits are billed at different rates depending on your insurance coverage.  Please reach out to your insurance provider with any questions.    If during the course of the call the physician/provider feels a video visit is not appropriate, you will not be charged for this service.\"    Patient has given verbal consent for Video visit? Yes    Will anyone else be joining your video visit? No        Video-Visit Details    Type of service:  Video Visit    Video Start Time: 9:10 am  Video End Time: 9:25 am    Originating Location (pt. Location): Home    Distant Location (provider location):  [facility]     Platform used for Video Visit: [facility]      ENDOCRINOLOGY CLINIC NOTE:  Name: Naman \"Chirag\" Robert  Seen for f/u of Diabetes (Last seen 2/21/2020).  HPI:  Naman Jones is a 35 year old male who presents via video visit for the management of Diabetes.  10/9/2018: gastric bypass, [facility] weight loss [facility]  Has lost 122 lbs, 369-->296-->247 lbs.  Currently eating a normal diet    VITAMINS AND MINERALS:   2 Multivitamin with " Minerals-   600 mg Calcium carbonate With Vitamin D TID- with meals  3-1000 International units Vitamin D  500 mcg Vitamin B-12 sublingual      1. Type 2 DM:  Orginally diagnosed at the age of: 30. On routine physical exam. Unexplained weight loss at that time.    Current Regimen:    Metformin 500 mg bid. Januvia 100 mg daily.  Unable to tolerate higher doses due to GI side effects  Off Trulicity - DUE TO COST    Insulin use decreased following gastric bypass, now off all insulin    BS checks: Janie personal CGM  Janie: average glucose 185, 51% above 180, 49% in target range , 0% below    Exercise: not much  Symptoms of hypoglycemia (low blood sugar):  Gets symptoms of hypoglycemia.  Episodes of hypoglycemia: no  Fixed meal pattern: yes  Patient counting carbs: yes     DM Complications:   Nephropathy: yes - elevated urine microalbumin  Retinopathy: no retinopathy - annual exam due but is delayed due COVID-19  Neuropathy: Yes, + numbness in toes - stable  Microalbuminuria: Yes- on losartan  CAD/PAD: no  Gastroparesis: no  Hypoglycemia unawareness: no  Upcoming orthopedic surgery - left leg, next week.  Expected recovery is about 3 months  2. Hypertension:    Off BP meds at this time.  Previously on Losartan 100 mg and Maxide 75-50 mg daily.  3. Hyperlipidemia: +Atorvastatin 40 mg qd    PMH/PSH:  DM  HTN  Dyslipidemia    Family Hx:  Diabetes: Father and mgm    Social Hx:  Social History     Socioeconomic History     Marital status:      Spouse name: Not on file     Number of children: Not on file     Years of education: Not on file     Highest education level: Bachelor's degree (e.g., BA, AB, BS)   Occupational History     Not on file   Social Needs     Financial resource strain: Not very hard     Food insecurity     Worry: Never true     Inability: Never true     Transportation needs     Medical: No     Non-medical: No   Tobacco Use     Smoking status: Never Smoker     Smokeless tobacco: Never Used    Substance and Sexual Activity     Alcohol use: Yes     Comment: socially     Drug use: No     Sexual activity: Yes     Partners: Female   Lifestyle     Physical activity     Days per week: 2 days     Minutes per session: 30 min     Stress: To some extent   Relationships     Social connections     Talks on phone: More than three times a week     Gets together: Once a week     Attends Sikh service: Never     Active member of club or organization: No     Attends meetings of clubs or organizations: 1 to 4 times per year     Relationship status:      Intimate partner violence     Fear of current or ex partner: Not on file     Emotionally abused: Not on file     Physically abused: Not on file     Forced sexual activity: Not on file   Other Topics Concern     Parent/sibling w/ CABG, MI or angioplasty before 65F 55M? Not Asked   Social History Narrative     Not on file          MEDICATIONS:  has a current medication list which includes the following prescription(s): acetaminophen, atorvastatin, buspirone, calcium carb-cholecalciferol, vitamin d3, freestyle césar reader, freestyle césar 14 day sensor, b-12, hydromorphone, hydroxyzine, ibuprofen, metformin, childrens multivitamin w/iron, onetouch delica lancets 33g, senna-docusate, and sitagliptin.    ROS     ROS: 10 point ROS neg other than the symptoms noted above in the HPI.    Physical Exam   VS: There were no vitals taken for this visit.  GENERAL: AXOX3, NAD, well dressed, answering questions appropriately, appears stated age.  HEENT: No exopthalmous, no proptosis, no lig lag, no retraction  CV:   LUNGS: Normal respiratory effort  NEUROLOGY:   PSYCH: normal affect and mood    LABS:  A1c:   Component      Latest Ref Rng & Units 5/3/2019 9/6/2019 2/14/2020   Hemoglobin A1C      0 - 5.6 % 6.5 (H) 6.1 (H) 7.0 (H)     !COMPREHENSIVE Latest Ref Rng & Units 2/14/2020   SODIUM 133 - 144 mmol/L 140   POTASSIUM 3.4 - 5.3 mmol/L 3.9   CHLORIDE 94 - 109 mmol/L 107    BUN 7 - 30 mg/dL 9   Creatinine 0.66 - 1.25 mg/dL 0.50 (L)   Glucose 70 - 99 mg/dL 159 (H)   ANION GAP 3 - 14 mmol/L 8   CALCIUM 8.5 - 10.1 mg/dL 8.8   ALBUMIN 3.4 - 5.0 g/dL 4.1     !LIPID/HEPATIC Latest Ref Rng & Units 2/14/2020   CHOLESTEROL <200 mg/dL 169   TRIGLYCERIDES <150 mg/dL 60   HDL CHOLESTEROL >39 mg/dL 69   LDL CHOLESTEROL DIRECT <100 mg/dL    LDL CHOLESTEROL, CALCULATED <100 mg/dL 88   NON HDL CHOLESTEROL <130 mg/dL 100   AST 0 - 45 U/L 29   ALT 0 - 70 U/L 54     !THYROID Latest Ref Rng & Units 7/26/2018   TSH 0.40 - 4.00 mU/L 1.50     Component      Latest Ref Rng & Units 7/10/2017 7/26/2018 9/6/2019   Creatinine Urine      mg/dL 56 63 127   Albumin Urine mg/L      mg/L 18 7 60   Albumin Urine mg/g Cr      0 - 17 mg/g Cr 32.08 (H) 10.85 47.56 (H)       All pertinent notes, labs, and images personally reviewed by me.     A/P  Mr.Anthony Jones is a 35 year old being evaluated via video visit for the management of diabetes:    1. DM2 - uncontrolled.   Diabetes complicated by neuropathy and microalbuminuria.  Diabetes is currently treated with metformin 500 mg bid and Januvia 100 mg daily.  He's unable to tolerate any higher doses of Metformin due to GI side effects.  Discontinue Januvia.  Start Ozempic 0.25 mg weekly x 4 weeks then increase to 0.5 mg weekly.  Consider increasing Ozempic further to 1 mg weekly in the future if needed.  Recommend he continue to be attentive to dietary intake - limited carbs, calorie controlled, healthier food choices.    There is no height or weight on file to calculate BMI.    -- He is allergic to sulfa medications so cannot be on sulfonylureas    2. Hyperlipidemia -Currently treated with atorvastatin 40 mg - continue.    Most Recent Immunizations   Administered Date(s) Administered     HepB 05/15/2017     Influenza (intradermal) 09/20/2016     Influenza Vaccine IM > 6 months Valent IIV4 10/18/2019     Pneumo Conj 13-V (2010&after) 07/26/2018     TDAP Vaccine (Adacel)  10/04/2012       Follow-up:  3-4 months     Annette Hobson NP  Endocrinology  [facility]  CC: Haase, Susan Rachele            Again, thank you for allowing me to participate in the care of your patient.        Sincerely,        ARDEN Henderson CNP

## 2020-06-18 ENCOUNTER — THERAPY VISIT (OUTPATIENT)
Dept: PHYSICAL THERAPY | Facility: CLINIC | Age: 35
End: 2020-06-18
Payer: COMMERCIAL

## 2020-06-18 DIAGNOSIS — M25.362 PATELLAR INSTABILITY OF LEFT KNEE: ICD-10-CM

## 2020-06-18 DIAGNOSIS — R26.9 GAIT ABNORMALITY: ICD-10-CM

## 2020-06-18 DIAGNOSIS — M21.062 ACQUIRED GENU VALGUM OF LEFT KNEE: ICD-10-CM

## 2020-06-18 DIAGNOSIS — M25.562 ACUTE PAIN OF LEFT KNEE: ICD-10-CM

## 2020-06-18 DIAGNOSIS — Z47.89 SURGICAL AFTERCARE, MUSCULOSKELETAL SYSTEM: ICD-10-CM

## 2020-06-18 PROCEDURE — 97110 THERAPEUTIC EXERCISES: CPT | Mod: GP | Performed by: PHYSICAL THERAPIST

## 2020-06-18 PROCEDURE — 97530 THERAPEUTIC ACTIVITIES: CPT | Mod: GP | Performed by: PHYSICAL THERAPIST

## 2020-06-18 PROCEDURE — 97112 NEUROMUSCULAR REEDUCATION: CPT | Mod: GP | Performed by: PHYSICAL THERAPIST

## 2020-06-19 ENCOUNTER — TELEPHONE (OUTPATIENT)
Dept: ENDOCRINOLOGY | Facility: CLINIC | Age: 35
End: 2020-06-19

## 2020-06-19 DIAGNOSIS — E11.65 TYPE 2 DIABETES MELLITUS WITH HYPERGLYCEMIA, WITH LONG-TERM CURRENT USE OF INSULIN (H): Primary | ICD-10-CM

## 2020-06-19 DIAGNOSIS — Z79.4 TYPE 2 DIABETES MELLITUS WITH HYPERGLYCEMIA, WITH LONG-TERM CURRENT USE OF INSULIN (H): Primary | ICD-10-CM

## 2020-06-19 RX ORDER — SEMAGLUTIDE 1.34 MG/ML
INJECTION, SOLUTION SUBCUTANEOUS
Qty: 1 PEN | Refills: 5 | Status: SHIPPED | OUTPATIENT
Start: 2020-06-19 | End: 2020-10-16

## 2020-06-19 NOTE — TELEPHONE ENCOUNTER
from [facility] called for clarification on the RX for ozempic. Pleaser call him back [phone]

## 2020-06-19 NOTE — TELEPHONE ENCOUNTER
Call returned.  Clarification on Ozempic Rx provided. New Rx with corrected directions resent.  Annette Hobson NP  Endocrinology

## 2020-06-22 ENCOUNTER — TELEPHONE (OUTPATIENT)
Dept: ENDOCRINOLOGY | Facility: CLINIC | Age: 35
End: 2020-06-22

## 2020-06-22 DIAGNOSIS — E11.65 TYPE 2 DIABETES MELLITUS WITH HYPERGLYCEMIA, WITH LONG-TERM CURRENT USE OF INSULIN (H): Primary | ICD-10-CM

## 2020-06-22 DIAGNOSIS — Z79.4 TYPE 2 DIABETES MELLITUS WITH HYPERGLYCEMIA, WITH LONG-TERM CURRENT USE OF INSULIN (H): Primary | ICD-10-CM

## 2020-06-22 NOTE — TELEPHONE ENCOUNTER
Fax from [facility]ure[facility] KRUEGER that Ozempic needs PA.  Please advise.  Nazanin Rahman RN      Formerly [facility], [facility] [place]- [id number]

## 2020-06-24 NOTE — TELEPHONE ENCOUNTER
Central Prior Authorization Team   Phone: [phone]    PA Initiation    Medication: Ozempic  Insurance Company: [employer] - Phone [phone] Fax [phone]  Pharmacy Filling the Rx: [id number] - [place], [place] - [address]  [address]  Filling Pharmacy Phone: [phone]  Filling Pharmacy Fax: [phone]  Start Date: 6/24/2020

## 2020-06-24 NOTE — TELEPHONE ENCOUNTER
PA justification:  Dx: Uncontrolled type 2 diabetes.  Diabetes is complicated by diabetic nephropathy and neuropathy.  Failed treatment with Metformin 500 mg bid and Januvia 100 mg daily.  Unable to tolerate higher dose of metformin due to GI side effects.  Sulfonylurea contraindicated due to sulfa allergy.  Ozempic was started 6/2020.  Request approval due to the above.  Annette Hobson NP  Endocrinology

## 2020-06-30 ENCOUNTER — THERAPY VISIT (OUTPATIENT)
Dept: PHYSICAL THERAPY | Facility: CLINIC | Age: 35
End: 2020-06-30
Payer: COMMERCIAL

## 2020-06-30 DIAGNOSIS — Z47.89 SURGICAL AFTERCARE, MUSCULOSKELETAL SYSTEM: ICD-10-CM

## 2020-06-30 DIAGNOSIS — M25.562 ACUTE PAIN OF LEFT KNEE: ICD-10-CM

## 2020-06-30 DIAGNOSIS — M21.062 ACQUIRED GENU VALGUM OF LEFT KNEE: ICD-10-CM

## 2020-06-30 DIAGNOSIS — R26.9 GAIT ABNORMALITY: ICD-10-CM

## 2020-06-30 DIAGNOSIS — M25.362 PATELLAR INSTABILITY OF LEFT KNEE: ICD-10-CM

## 2020-06-30 PROCEDURE — 97530 THERAPEUTIC ACTIVITIES: CPT | Mod: GP | Performed by: PHYSICAL THERAPIST

## 2020-06-30 PROCEDURE — 97110 THERAPEUTIC EXERCISES: CPT | Mod: GP | Performed by: PHYSICAL THERAPIST

## 2020-07-09 ENCOUNTER — THERAPY VISIT (OUTPATIENT)
Dept: PHYSICAL THERAPY | Facility: CLINIC | Age: 35
End: 2020-07-09
Payer: COMMERCIAL

## 2020-07-09 DIAGNOSIS — Z47.89 SURGICAL AFTERCARE, MUSCULOSKELETAL SYSTEM: ICD-10-CM

## 2020-07-09 DIAGNOSIS — R26.9 GAIT ABNORMALITY: ICD-10-CM

## 2020-07-09 DIAGNOSIS — M21.062 ACQUIRED GENU VALGUM OF LEFT KNEE: ICD-10-CM

## 2020-07-09 DIAGNOSIS — M25.562 ACUTE PAIN OF LEFT KNEE: ICD-10-CM

## 2020-07-09 DIAGNOSIS — M25.362 PATELLAR INSTABILITY OF LEFT KNEE: ICD-10-CM

## 2020-07-09 PROCEDURE — 97110 THERAPEUTIC EXERCISES: CPT | Mod: GP | Performed by: PHYSICAL THERAPIST

## 2020-07-10 ENCOUNTER — ANCILLARY PROCEDURE (OUTPATIENT)
Dept: MRI IMAGING | Facility: CLINIC | Age: 35
End: 2020-07-10
Attending: ORTHOPAEDIC SURGERY
Payer: COMMERCIAL

## 2020-07-10 DIAGNOSIS — Z98.890 S/P RIGHT KNEE SURGERY: ICD-10-CM

## 2020-07-15 NOTE — RESULT ENCOUNTER NOTE
I had a chance to review the imaging study on Naman Jones  Can you please call the patient and explain the following:  We have received the MRI on his right knee.  We should continue with our previous plan of scheduling a follow-up visit 3 months after our last visit and at that time we will obtain standing alignment films as well as knee x-rays.    I do think that this MRI demonstrates a little bit more arthritis of his patellofemoral compartment than on the other side.  We will have a discussion regarding this knee at our clinic visit.      Please document that you contact the patient, ok to offer a followup visit to discuss with me if the patient wants.

## 2020-07-16 ENCOUNTER — TELEPHONE (OUTPATIENT)
Dept: ORTHOPEDICS | Facility: CLINIC | Age: 35
End: 2020-07-16

## 2020-07-16 NOTE — TELEPHONE ENCOUNTER
"Called patient at the request of Dr. Madera to discuss his right knee MRI results.     \"I do think that this MRI demonstrates a little bit more arthritis of his patellofemoral compartment than on the other side.  We will have a discussion regarding this knee at our clinic visit. We should continue with our previous plan of following up at the end of August and at that time we will obtain standing alignment films as well as knee x-rays.\"    Patient verbalized understanding and had no further questions.  "

## 2020-07-27 DIAGNOSIS — F41.1 GAD (GENERALIZED ANXIETY DISORDER): ICD-10-CM

## 2020-07-28 RX ORDER — BUSPIRONE HYDROCHLORIDE 5 MG/1
TABLET ORAL
Qty: 180 TABLET | Refills: 1 | Status: ON HOLD | OUTPATIENT
Start: 2020-07-28 | End: 2020-10-04

## 2020-07-28 NOTE — TELEPHONE ENCOUNTER
Prescription approved per [facility] Refill Protocol.  Carlos Albright RN, BSN

## 2020-08-06 ENCOUNTER — E-VISIT (OUTPATIENT)
Dept: FAMILY MEDICINE | Facility: CLINIC | Age: 35
End: 2020-08-06
Payer: COMMERCIAL

## 2020-08-06 DIAGNOSIS — G47.00 INSOMNIA, UNSPECIFIED TYPE: Primary | ICD-10-CM

## 2020-08-06 PROCEDURE — 99421 OL DIG E/M SVC 5-10 MIN: CPT | Performed by: NURSE PRACTITIONER

## 2020-08-06 RX ORDER — SITAGLIPTIN 100 MG/1
TABLET, FILM COATED ORAL
COMMUNITY
Start: 2020-04-05 | End: 2020-09-23

## 2020-08-06 RX ORDER — SEMAGLUTIDE 1.34 MG/ML
INJECTION, SOLUTION SUBCUTANEOUS
COMMUNITY
Start: 2020-07-17 | End: 2020-09-23

## 2020-08-06 RX ORDER — ZOLPIDEM TARTRATE 5 MG/1
5 TABLET ORAL
Qty: 30 TABLET | Refills: 1 | Status: SHIPPED | OUTPATIENT
Start: 2020-08-06 | End: 2020-09-24

## 2020-08-11 ENCOUNTER — MYC MEDICAL ADVICE (OUTPATIENT)
Dept: ORTHOPEDICS | Facility: CLINIC | Age: 35
End: 2020-08-11

## 2020-08-11 DIAGNOSIS — G89.29 CHRONIC PAIN OF LEFT KNEE: Primary | ICD-10-CM

## 2020-08-11 DIAGNOSIS — M25.562 CHRONIC PAIN OF LEFT KNEE: Primary | ICD-10-CM

## 2020-08-24 RX ORDER — DICLOFENAC SODIUM 75 MG/1
75 TABLET, DELAYED RELEASE ORAL 2 TIMES DAILY
Qty: 60 TABLET | Refills: 0 | Status: SHIPPED | OUTPATIENT
Start: 2020-08-24 | End: 2020-09-22

## 2020-08-24 NOTE — TELEPHONE ENCOUNTER
Patient reached out is that he is having some lateral sided knee pain.  Administered him on an anti-inflammatory in preparation of our visit next week to see if this helps quiet down.  We will plan for diclofenac.  He should take it on a scheduled basis twice a day both morning and night until seen in follow-up.

## 2020-08-26 DIAGNOSIS — Z98.890 S/P LEFT KNEE SURGERY: Primary | ICD-10-CM

## 2020-08-28 ENCOUNTER — THERAPY VISIT (OUTPATIENT)
Dept: PHYSICAL THERAPY | Facility: CLINIC | Age: 35
End: 2020-08-28
Payer: COMMERCIAL

## 2020-08-28 DIAGNOSIS — M25.362 PATELLAR INSTABILITY OF LEFT KNEE: ICD-10-CM

## 2020-08-28 DIAGNOSIS — M25.562 ACUTE PAIN OF LEFT KNEE: ICD-10-CM

## 2020-08-28 DIAGNOSIS — M21.062 ACQUIRED GENU VALGUM OF LEFT KNEE: ICD-10-CM

## 2020-08-28 DIAGNOSIS — R26.9 GAIT ABNORMALITY: ICD-10-CM

## 2020-08-28 DIAGNOSIS — Z47.89 SURGICAL AFTERCARE, MUSCULOSKELETAL SYSTEM: ICD-10-CM

## 2020-08-28 PROCEDURE — 97164 PT RE-EVAL EST PLAN CARE: CPT | Mod: GP | Performed by: PHYSICAL THERAPIST

## 2020-08-28 PROCEDURE — 97140 MANUAL THERAPY 1/> REGIONS: CPT | Mod: GP | Performed by: PHYSICAL THERAPIST

## 2020-08-28 PROCEDURE — 97110 THERAPEUTIC EXERCISES: CPT | Mod: GP | Performed by: PHYSICAL THERAPIST

## 2020-08-28 NOTE — PROGRESS NOTES
PROGRESS  REPORT    Progress reporting period is from May 29, 2020 to Aug 28, 2020.       SUBJECTIVE  Subjective changes noted by patient: Has been having a lot of problems recently as he adds more activity. He contacted the surgeon who states that the IT band is getting pain and swelling where it comes into the plate and along the incision. He was prescribed Voltaren gel which helps, but it continues to be swollen. The symptoms increased as he began to add in resistance with cardio stationary bike. This has been making walking and bend difficult because it generates more pain and tightness.     Current pain level is 3/10 (notes that he applied gel just a little bit ago).     Previous pain level was  5/10  .   Changes in function:  See goal flowsheet for updated function  Adverse reaction to treatment or activity: None    OBJECTIVE  Changes noted in objective findings:  Yes, patient demonstrates improvements in ROM, but continues to have deficits in strength and gait.     Gait: patient appears to ambulate with poor left quad activation, swings leg through during swing phase and stands with hyper extension in stance phase.         AROM: (* indicates patient's pain)      R  L   Hyperextension 0    Extension 0    Flexion 128        Palpation: extremely tender to palpation at the distal end of the lateral femoral osteotomy incision.     Other:  - Visibly apparent weight shift onto right lower extremity during double leg bridge exercise.   - Trunk substitution with fire hydrant exercise.       ASSESSMENT/PLAN  Updated problem list and treatment plan: Diagnosis 1:   Left tibial osteotomy and patellar realignment   Pain -  hot/cold therapy, manual therapy, splint/taping/bracing/orthotics, self management, education and home program  Decreased strength - therapeutic exercise, therapeutic activities and home program  Impaired gait - gait training and home program  Impaired muscle performance - neuro re-education and home  program  Decreased function - therapeutic activities and home program  STG/LTGs have been met or progress has been made towards goals:  Yes (See Goal flow sheet completed today.)  Assessment of Progress: The patient's condition is improving.  The patient's condition has potential to improve.  Self Management Plans:  Patient has been instructed in a home treatment program.  Patient  has been instructed in self management of symptoms.  I have re-evaluated this patient and find that the nature, scope, duration and intensity of the therapy is appropriate for the medical condition of the patient.  Naman continues to require the following intervention to meet STG and LTG's:  PT    Recommendations:  This patient would benefit from continued therapy.     Frequency:  1 X week, once daily  Duration:  for 4 weeks tapering to 2 X a month over 12 weeks    Please refer to the daily flowsheet for treatment today, total treatment time and time spent performing 1:1 timed codes.

## 2020-08-31 ENCOUNTER — TELEPHONE (OUTPATIENT)
Dept: SURGERY | Facility: CLINIC | Age: 35
End: 2020-08-31

## 2020-08-31 ENCOUNTER — ANCILLARY PROCEDURE (OUTPATIENT)
Dept: GENERAL RADIOLOGY | Facility: CLINIC | Age: 35
End: 2020-08-31
Attending: ORTHOPAEDIC SURGERY
Payer: COMMERCIAL

## 2020-08-31 ENCOUNTER — MYC MEDICAL ADVICE (OUTPATIENT)
Dept: SURGERY | Facility: CLINIC | Age: 35
End: 2020-08-31

## 2020-08-31 ENCOUNTER — OFFICE VISIT (OUTPATIENT)
Dept: ORTHOPEDICS | Facility: CLINIC | Age: 35
End: 2020-08-31
Payer: COMMERCIAL

## 2020-08-31 ENCOUNTER — NURSE TRIAGE (OUTPATIENT)
Dept: NURSING | Facility: CLINIC | Age: 35
End: 2020-08-31

## 2020-08-31 ENCOUNTER — TELEPHONE (OUTPATIENT)
Dept: ORTHOPEDICS | Facility: CLINIC | Age: 35
End: 2020-08-31

## 2020-08-31 DIAGNOSIS — G89.29 CHRONIC PAIN OF RIGHT KNEE: Primary | ICD-10-CM

## 2020-08-31 DIAGNOSIS — M25.561 CHRONIC PAIN OF RIGHT KNEE: Primary | ICD-10-CM

## 2020-08-31 DIAGNOSIS — M25.562 CHRONIC PAIN OF LEFT KNEE: ICD-10-CM

## 2020-08-31 DIAGNOSIS — G89.29 CHRONIC PAIN OF LEFT KNEE: ICD-10-CM

## 2020-08-31 DIAGNOSIS — Z98.890 S/P LEFT KNEE SURGERY: ICD-10-CM

## 2020-08-31 NOTE — NURSING NOTE
Teaching Flowsheet   Relevant Diagnosis: Examination under anesthesia right knee, right knee arthroscopy, distal femoral osteotomy, medial imbrication, open lateral retinacular lengthening   Teaching Topic: Pre operative care     Person(s) involved in teaching:   Patient and Wife (on the phone)     Motivation Level:  Asks Questions: Yes  Eager to Learn: Yes  Cooperative: Yes  Receptive (willing/able to accept information): Yes  Any cultural factors/Moravian beliefs that may influence understanding or compliance? No     Patient demonstrates understanding of the following:  Reason for the appointment, diagnosis and treatment plan: Yes  Knowledge of proper use of medications and conditions for which they are ordered (with special attention to potential side effects or drug interactions): Yes  Which situations necessitate calling provider and whom to contact: Yes     Teaching Concerns Addressed:   Comments: Pre operative care      Proper use and care of brace and crutches (medical equip, care aids, etc.): No, explain: will be supplied at surgery if needed  Nutritional needs and diet plan: NA  Pain management techniques: Yes  Wound Care: Yes  How and/when to access community resources: NA     Instructional Materials Used/Given: Pre operative care instructions, surgical soap     Time spent with patient: 15 minutes.    **Patient understands that he will be contacted by Reyna, nestor-op , to pick date. He will complete pre-op physical Susan Haase, CNP at [facility]. He understands that he will be contacted to schedule COVID test with 72 hours of surgery**

## 2020-08-31 NOTE — PROGRESS NOTES
DIAGNOSIS:   1. Recurrent patellar instability, valgus malalignment    PROCEDURES:  1. Knee arthroscopy, chondroplasty, distal femoral osteotomy, medial retinacular imbrication and lateral retinacular lengthening; date of surgery 2/28/2020    HISTORY: Overall doing well 6 months out from the above surgery.  He is not taking any narcotics.  He states that he was doing very well until approximately 3 weeks ago when he noted the onset of lateral sided pain.  It came on abruptly after doing a lot of biking.  He reached out to my office we ordered him an oral anti-inflammatory, diclofenac, he states this is really bothered his stomach and is not tolerated particularly well.  He also admits that his right knee is doing fairly poorly.  I did a detailed discussion with him today regarding his left knee he describes a preoperative SANE score of 25, as of 3 weeks ago prior to this onset of new lateral sided pain he described a's SANE score of 80 and states that today he is 60 on the SANE score.  Overall he is happy with how his knee is doing he states that his stability is much better and his confidence is improved greatly.  When asked about his right knee he describes it as a 30 today..      EXAM:     General: Awake, Alert, and oriented. Articulates and communicates with a normal affect     Left lower Extremity:    Incisions well healed without evidence of infection no concerns by the patient    No definite effusion or ecchymosis     Range of motion shows full extension and flexion to 130 degrees or so    Tenderness is noted over the distal lateral aspect of the incision    Stability exam was not performed    Neurovascularly intact    IMAGING:  AP lateral radiographs today including standing alignment films show overall what I think is a definitive union however when resumed on the standing alignment films there is a little bit of a lucency on the far lateral cortex overall his alignment is improved however some residual  valgus is noted on the left side.  I think this may be partly secondary to rotation of the imaging    ASSESSMENT:  1. 6 months following distal femoral osteotomy and medial retinacular imbrication  2. Excellent improvement in regards to stability, confidence and SANE score  3. Some irritation of the plate on the left side  4. Similar feelings of right knee instability and pain    PLAN:   I had a very long discussion with the patient at this time I propose the following course of action:  CT scan of his left knee to confirm definitive union as it is only been 6 months  Plan for right knee surgery, very similar procedure, right knee arthroscopy, distal femoral osteotomy, open medial imbrication/lateral retinacular lengthening.  If the CT scan shows definitive union we will plan for hardware removal on the left at the same time.    I will call him to discuss the results after the CT scan is complete.  We will complete preoperative teaching today.  Overall I think the patient is made excellent progress and I am happy with how he is doing.  The patient is also happy and I think you will probably get some improvement after we remove this plate and screws    Addendum: I reviewed the CT scan which does show progressive healing across the osteotomy site however I do not feel that this is healed all the way yet.  I think given this information we should try to delay plate removal until definitive union has occurred.  I believe the patient is currently scheduled for surgery in early October.  At this time I think we should wait until either the very end of 2020 or closer to his one-year anniversary of surgery and late February 2021.  We will call and communicate to the patient.

## 2020-08-31 NOTE — LETTER
8/31/2020         RE: Naman Jones  [address] [facility] [address]        Dear Colleague,    Thank you for referring your patient, Naman Jones, to the [facility]. Please see a copy of my visit note below.      DIAGNOSIS:   1. Recurrent patellar instability, valgus malalignment    PROCEDURES:  1. Knee arthroscopy, chondroplasty, distal femoral osteotomy, medial retinacular imbrication and lateral retinacular lengthening; date of surgery 2/28/2020    HISTORY: Overall doing well 6 months out from the above surgery.  He is not taking any narcotics.  He states that he was doing very well until approximately 3 weeks ago when he noted the onset of lateral sided pain.  It came on abruptly after doing a lot of biking.  He reached out to my office we ordered him an oral anti-inflammatory, diclofenac, he states this is really bothered his stomach and is not tolerated particularly well.  He also admits that his right knee is doing fairly poorly.  I did a detailed discussion with him today regarding his left knee he describes a preoperative SANE score of 25, as of 3 weeks ago prior to this onset of new lateral sided pain he described a's SANE score of 80 and states that today he is 60 on the SANE score.  Overall he is happy with how his knee is doing he states that his stability is much better and his confidence is improved greatly.  When asked about his right knee he describes it as a 30 today..      EXAM:     General: Awake, Alert, and oriented. Articulates and communicates with a normal affect     Left lower Extremity:    Incisions well healed without evidence of infection no concerns by the patient    No definite effusion or ecchymosis     Range of motion shows full extension and flexion to 130 degrees or so    Tenderness is noted over the distal lateral aspect of the incision    Stability exam was not performed    Neurovascularly intact    IMAGING:  AP lateral radiographs today  including standing alignment films show overall what I think is a definitive union however when resumed on the standing alignment films there is a little bit of a lucency on the far lateral cortex overall his alignment is improved however some residual valgus is noted on the left side.  I think this may be partly secondary to rotation of the imaging    ASSESSMENT:  1. 6 months following distal femoral osteotomy and medial retinacular imbrication  2. Excellent improvement in regards to stability, confidence and SANE score  3. Some irritation of the plate on the left side  4. Similar feelings of right knee instability and pain    PLAN:   I had a very long discussion with the patient at this time I propose the following course of action:  CT scan of his left knee to confirm definitive union as it is only been 6 months  Plan for right knee surgery, very similar procedure, right knee arthroscopy, distal femoral osteotomy, open medial imbrication/lateral retinacular lengthening.  If the CT scan shows definitive union we will plan for hardware removal on the left at the same time.    I will call him to discuss the results after the CT scan is complete.  We will complete preoperative teaching today.  Overall I think the patient is made excellent progress and I am happy with how he is doing.  The patient is also happy and I think you will probably get some improvement after we remove this plate and screws      Again, thank you for allowing me to participate in the care of your patient.        Sincerely,        Angel Madera MD

## 2020-08-31 NOTE — TELEPHONE ENCOUNTER
6/13/17 OUMAR on file to speak to wife.   Looking for recommendation for an alternative pain medication. Tylenol and ibuprofen was not enough to assist with the pain so he was prescribed  Votlaren 75 take twice daily by his surgeon.   Wife states this is an NSAID and they are supposed to avoid NSAID medications.   The surgeon recommended contacting provider for other recommendations due to bariatric surgery.     Wife is wondering if there is an alternative or if there is a topical medication they can try.   RN offered to send note to PCP.  Wife states they wanted the bariatric surgeon's recommendations.  RN transferred wife to their office to leave message on their nurse line.    Kajal Clarke RN 08/31/20 3:09 PM  [facility] Nurse Advisor

## 2020-08-31 NOTE — TELEPHONE ENCOUNTER
Patient is scheduled for surgery with Dr. Madera    Spoke or left message with: Patient    Date of Surgery: 10/2/20    Location: [facility]    Informed patient they will need an adult  : Yes    Post op: 1 week, scheduled    Pre-op with surgeon (if applicable): Complete    H&P: Patient will schedule with PCP    Additional imaging/appointments: N/A    Surgery packet: Received in clinic     Additional comments: Patient aware he will be contacted to set up COVID test

## 2020-08-31 NOTE — TELEPHONE ENCOUNTER
Patient's wife Siri called.  Have Consent to Communicate with her signed by patient 6/13/16 w/no end date.    Siri stated her  had knee surgery and was to start on an NSAID for 4 weeks.  She is wondering if there is a topical or a better option.    Noted order written for Votaren 75 mg 1 tab twice daily 8/24/2020.  Patient scheduled for R TKA/JESSE  10/2/2020.    Patient had RNY 10/9/18.  No H/O ulcers or GERD seen in chart  ASA use not in meds in chart  Antacid use not in meds in chart  Any use of NSAID'S currently - unknown.    Tried to connect with patient's wife this evening but unsuccessfu.  LM to check MyChart for questions that I have to help assess patient.  Sena Barrios, MS, RD, RN

## 2020-08-31 NOTE — NURSING NOTE
Reason For Visit:   Chief Complaint   Patient presents with     Surgical Followup     DOS 2/28/20 Left knee surgery       Date of surgery: 2/28/200  Type of surgery:   PROCEDURE:  1. Examination under anesthesia left knee  2. Left knee arthroscopy  3. Chondroplasty lateral trochlea left knee  4. Medial retinacular imbrication left knee  5. Lateral retinacular lengthening left knee  6. Distal femoral osteotomy      Smoker: No  Request smoking cessation information: No    Pain Assessment  Patient Currently in Pain: Yes  0-10 Pain Scale: 5  Primary Pain Location: Knee    There were no vitals taken for this visit.         Allergies   Allergen Reactions     Sulfa Drugs Anaphylaxis     Oxycodone Other (See Comments) and Itching     Flushed     Lisinopril      Other reaction(s): Impotence     Onion      Other reaction(s): Intolerance-Can't Take       Current Outpatient Medications   Medication     atorvastatin (LIPITOR) 40 MG tablet     busPIRone (BUSPAR) 5 MG tablet     Calcium Carb-Cholecalciferol (CALCIUM 600 + D PO)     Cholecalciferol (VITAMIN D3) 1000 units CAPS     Continuous Blood Gluc  (FREESTYLE KEILA READER) SUGAR     Continuous Blood Gluc Sensor (FREESTYLE KEILA 14 DAY SENSOR) XX MISC     Cyanocobalamin (B-12) 500 MCG SUBL     diclofenac (VOLTAREN) 75 MG EC tablet     HYDROmorphone (DILAUDID) 2 MG tablet     hydrOXYzine (ATARAX) 50 MG tablet     JANUVIA 100 MG tablet     metFORMIN 500 MG PO tablet     multivitamin  peds with iron (FLINTSTONES COMPLETE) 60 MG chewable tablet     ONETOUCH DELICA LANCETS 33G MISC     OZEMPIC, 0.25 OR 0.5 MG/DOSE, 2 MG/1.5ML SOPN     semaglutide (OZEMPIC, 1 MG/DOSE,) 2 MG/1.5ML pen     zolpidem (AMBIEN) 5 MG tablet     No current facility-administered medications for this visit.          Lucy Barillas, ATC

## 2020-08-31 NOTE — TELEPHONE ENCOUNTER
Additional Information    Negative: MORE THAN A DOUBLE DOSE of a prescription or over-the-counter (OTC) drug    Negative: DOUBLE DOSE (an extra dose or lesser amount) of over-the-counter (OTC) drug and any symptoms (e.g., dizziness, nausea, pain, sleepiness)    Negative: DOUBLE DOSE (an extra dose or lesser amount) of prescription drug and any symptoms (e.g., dizziness, nausea, pain, sleepiness)    Negative: Took another person's prescription drug    Negative: Drug overdose and triager unable to answer question    Negative: Caller requesting information unrelated to medicine    Negative: Caller requesting a prescription for Strep throat and has a positive culture result    Negative: Rash while taking a medication or within 3 days of stopping it    Negative: Immunization reaction suspected    Negative: Asthma and having symptoms of asthma (cough, wheezing, etc.)    Negative: Breastfeeding questions about mother's medicines and diet    Negative: DOUBLE DOSE (an extra dose or lesser amount) of prescription drug and NO symptoms (Exception: a double dose of antibiotics)    Negative: Diabetes drug error or overdose (e.g., took wrong type of insulin or took extra dose)    Caller has NON-URGENT medication question about med that PCP prescribed and triager unable to answer question    Negative: Request for URGENT new prescription or refill of 'essential' medication (i.e., likelihood of harm to patient if not taken) and triager unable to fill per department policy    Negative: Prescription not at pharmacy and was prescribed today by PCP    Negative: Pharmacy calling with prescription questions and triager unable to answer question    Negative: Caller has urgent medication question about med that PCP prescribed and triager unable to answer question    Negative: Caller has medication question about med not prescribed by PCP and triager unable to answer question (e.g., compatibility with other med, storage)    Protocols used:  MEDICATION QUESTION CALL-A-OH

## 2020-09-01 ENCOUNTER — MYC MEDICAL ADVICE (OUTPATIENT)
Dept: FAMILY MEDICINE | Facility: CLINIC | Age: 35
End: 2020-09-01

## 2020-09-01 ENCOUNTER — MYC MEDICAL ADVICE (OUTPATIENT)
Dept: ORTHOPEDICS | Facility: CLINIC | Age: 35
End: 2020-09-01

## 2020-09-01 DIAGNOSIS — M25.562 CHRONIC PAIN OF LEFT KNEE: Primary | ICD-10-CM

## 2020-09-01 DIAGNOSIS — G89.29 CHRONIC PAIN OF LEFT KNEE: Primary | ICD-10-CM

## 2020-09-01 DIAGNOSIS — Z11.59 ENCOUNTER FOR SCREENING FOR OTHER VIRAL DISEASES: Primary | ICD-10-CM

## 2020-09-01 NOTE — TELEPHONE ENCOUNTER
Spoke with Oj Luong PA-C.  The recommendation to avoid NSAID's still stands.  There will always be a strong risk for an ulcer or perforation.     The topical NSAID would be a better choice and enteric coated better than not although both still have risk of ulcer or perforation.    Recommend patient discuss other options for pain control/healing with ortho.  We have had joint replacements who haven't used NSAID's, but they have taken longer to heal and not as comfortable.     If patient and provider choose to move forward with NSAID, it is imperative that the patient be started by the ortho provider on a PPI/antacid something similar to Omeprazole/Prilosec at least 20 mg.  daily.   Take the NSAID with food.    Spoke with patient's wife Brit.  She stated he took the Votaren since it was prescribed a couple of times with food and had no issues.  Also tried Votaren OTC and received no relief.     Above discussed with Brit.  Will send in [employer] message as well.  Patient and wife plan to discuss with ortho provider.  Sena Barrios, MS, RD, RN

## 2020-09-02 NOTE — TELEPHONE ENCOUNTER
RN PAL placed call to patient after reviewing my chart message -     Scheduled appt for preop with pcp on 9/24/2020     Next 5 appointments (look out 90 days)    Sep 24, 2020 11:00 AM CDT  (Arrive by 10:40 AM)  Office Visit with Susan Rachele Haase, APRN [facility] [address] [place] [id number] [phone]   Oct 16, 2020  9:30 AM CDT  (Arrive by 9:15 AM)  Return Visit with ARDEN Henderson [facility] ([facility] [address] [place] [id number] [phone]        Daria Obregon, Registered Nurse, PAL (Patient Advocate Liason)   [facility]   [phone]

## 2020-09-08 ENCOUNTER — HOSPITAL ENCOUNTER (OUTPATIENT)
Dept: CT IMAGING | Facility: CLINIC | Age: 35
Discharge: HOME | End: 2020-09-08
Attending: ORTHOPAEDIC SURGERY | Admitting: ORTHOPAEDIC SURGERY
Payer: COMMERCIAL

## 2020-09-08 DIAGNOSIS — M25.562 CHRONIC PAIN OF LEFT KNEE: ICD-10-CM

## 2020-09-08 DIAGNOSIS — G89.29 CHRONIC PAIN OF LEFT KNEE: ICD-10-CM

## 2020-09-08 PROCEDURE — 73700 CT LOWER EXTREMITY W/O DYE: CPT | Mod: LT

## 2020-09-16 ENCOUNTER — TELEPHONE (OUTPATIENT)
Dept: ORTHOPEDICS | Facility: CLINIC | Age: 35
End: 2020-09-16

## 2020-09-16 NOTE — TELEPHONE ENCOUNTER
[facility] Call Center    Phone Message    May a detailed message be left on voicemail: yes     Reason for Call: Other: Pt returning Lucy's call, pt would like to be called back today.       Action Taken: Message routed to:  [facility] Sports    Travel Screening: Not Applicable

## 2020-09-16 NOTE — TELEPHONE ENCOUNTER
Returned call to patient, informing him that Dr. Madera has reviewed the CT results of the left knee which does show progressive healing, but not all the way healed. He does not feel that the hardware is ready to be removed during the patient's scheduled surgery on 10/2/20. Patient verbalized understanding. He was given the option to wait until the one year anniversary of his surgery (February 2021) or have the knee re-imaged in December, to determine if the hardware could be removed before the end of the year. Due to insurance, the patient would like this done before the end of the year, if possible. The plan will be to proceed with the right knee surgery on 10/2/20 and re-image the left knee at the beginning of December. Patient verbalized understanding and is in agreement of this plan. His pre-op physical is set up for 9/24/20. He will be contacted to schedule COVID test.     He had one additional question regarding his pain management. He wants to make sure that he is safe continuing the Diclofenac and Prilosec until his surgery in October. He has found this combination to be helpful. He had no additional questions.

## 2020-09-16 NOTE — TELEPHONE ENCOUNTER
----- Message from Angel Madera MD sent at 9/15/2020  9:32 AM CDT -----  I reviewed the CT scan on this patient and placed an addendum which I have cut and paste below.  At this time I do not think that his osteotomy on his left knee has fully healed or I am not yet comfortable removing the plate and screws.In light of this I think we could proceed with the following options:  Proceed with right knee surgery only, continue to allow the left side to heal and remove closer to the one-year anniversary of surgery which would be late February 2021.    Proceed with right knee surgery only, continue to allow the left side to heal and re-image in mid December 2020 and remove prior to the end of calendar year 2020.    Delay right knee surgery until the left side is definitively healed either in December 2020 or late February 2021.    Does this make sense?  Can you please call the patient, communicate this to them, and let me know what the result of this discussion is.  Please document.      Addendum: I reviewed the CT scan which does show progressive healing across the osteotomy site however I do not feel that this is healed all the way yet.  I think given this information we should try to delay plate removal until definitive union has occurred.  I believe the patient is currently scheduled for surgery in early October.  At this time I think we should wait until either the very end of 2020 or closer to his one-year anniversary of surgery and late February 2021.  We will call and communicate to the patient.

## 2020-09-16 NOTE — TELEPHONE ENCOUNTER
Left voicemail for patient to discuss CT results and plan moving forward. Left voicemail with callback number.

## 2020-09-18 ENCOUNTER — THERAPY VISIT (OUTPATIENT)
Dept: PHYSICAL THERAPY | Facility: CLINIC | Age: 35
End: 2020-09-18
Payer: COMMERCIAL

## 2020-09-18 ENCOUNTER — MYC MEDICAL ADVICE (OUTPATIENT)
Dept: FAMILY MEDICINE | Facility: CLINIC | Age: 35
End: 2020-09-18

## 2020-09-18 DIAGNOSIS — M25.362 PATELLAR INSTABILITY OF LEFT KNEE: ICD-10-CM

## 2020-09-18 DIAGNOSIS — M25.562 ACUTE PAIN OF LEFT KNEE: ICD-10-CM

## 2020-09-18 DIAGNOSIS — M21.062 ACQUIRED GENU VALGUM OF LEFT KNEE: ICD-10-CM

## 2020-09-18 DIAGNOSIS — R26.9 GAIT ABNORMALITY: ICD-10-CM

## 2020-09-18 DIAGNOSIS — Z47.89 SURGICAL AFTERCARE, MUSCULOSKELETAL SYSTEM: ICD-10-CM

## 2020-09-18 PROCEDURE — 97110 THERAPEUTIC EXERCISES: CPT | Mod: GP | Performed by: PHYSICAL THERAPIST

## 2020-09-18 NOTE — TELEPHONE ENCOUNTER
Haase, Susan Rachele APRN CNP     Please see my chart message - last visit to discuss insomnia 8/6/2020 Evisit     Would you like another visit to discuss?     Daria Obregon, Registered Nurse, PAL (Patient Advocate Liason)   [facility]   [phone]

## 2020-09-18 NOTE — TELEPHONE ENCOUNTER
When I did the E visit I sent the following information to him:    I would like you to set up a follow up phone/video visit with me in about 6 weeks to see how you are doing on the medication.      Please ask him to follow the above.  Thanks,  Susan Haase, CNP

## 2020-09-18 NOTE — PROGRESS NOTES
Patient is going to be having the same procedure on the right knee on October 2nd. Patient can begin PT 3-5 days after surgery.     Left knee still has the pain along the IT Band. They believe the plate is rubbing on the ligament and they would like to take the plate out once the bone has healed (which is not yet). They will do imaging in mid-November to check the bone again.     Patient would like to modify the exercises to minimize moment in the knee.

## 2020-09-21 ENCOUNTER — PREP FOR PROCEDURE (OUTPATIENT)
Dept: ORTHOPEDICS | Facility: CLINIC | Age: 35
End: 2020-09-21

## 2020-09-22 DIAGNOSIS — G89.29 CHRONIC PAIN OF LEFT KNEE: ICD-10-CM

## 2020-09-22 DIAGNOSIS — M25.562 CHRONIC PAIN OF LEFT KNEE: ICD-10-CM

## 2020-09-22 RX ORDER — DICLOFENAC SODIUM 75 MG/1
TABLET, DELAYED RELEASE ORAL
Qty: 60 TABLET | Refills: 0 | Status: ON HOLD | OUTPATIENT
Start: 2020-09-22 | End: 2020-10-05

## 2020-09-23 NOTE — PROGRESS NOTES
Pre-Visit Planning     Future Appointments   Date Time Provider Department Center   9/24/2020 11:00 AM Haase, Susan Rachele, APRN CNP [facility]   9/25/2020  4:05 PM Ronnie Rodgers [facility]   10/5/2020  8:10 AM Cuca Ibrahim PT [facility]     Appointment Notes for this encounter:   sh reviewed Pre Op - right knee surgery 10/2/2020   (was planning bilateral however now will be doing left knee in a few months)     Questionnaires Reviewed/Assigned  No additional questionnaires are needed    Hospital/ER visits since last pcp appt? NO     Imaging:   Impression: CT Left knee 9/8/2020  1. Postoperative changes of lateral wedge osteotomy of the left distal  femur with plate and screw fixation hardware. No evidence of hardware  complication. There is evidence of osseous healing medially along the  osteotomy site, however there is only a small amount of mineralization  within the lateral aspect of the osteotomy.  2. Degenerative changes of the left knee with osteophytosis.  3. Diffuse bony demineralization.  Lab review: no concerns per RN review     MyChart  Patient is active on [facility].     Specialty Visits - 8/31/2020 Orthopedics, Endo 6/12/2020     Patient preferred phone number: [phone]    RN PAL placed call to patient -   He does not have any other concerns to discuss during visit   Medications reviewed - pended refills needed    Daria Obregon, Registered Nurse, PAL (Patient Advocate Liason)   [facility]   [phone]

## 2020-09-24 ENCOUNTER — OFFICE VISIT (OUTPATIENT)
Dept: FAMILY MEDICINE | Facility: CLINIC | Age: 35
End: 2020-09-24
Payer: COMMERCIAL

## 2020-09-24 VITALS
OXYGEN SATURATION: 99 % | HEIGHT: 73 IN | SYSTOLIC BLOOD PRESSURE: 123 MMHG | TEMPERATURE: 98.8 F | HEART RATE: 79 BPM | BODY MASS INDEX: 28.89 KG/M2 | RESPIRATION RATE: 18 BRPM | WEIGHT: 218 LBS | DIASTOLIC BLOOD PRESSURE: 85 MMHG

## 2020-09-24 DIAGNOSIS — E78.2 MIXED HYPERLIPIDEMIA: ICD-10-CM

## 2020-09-24 DIAGNOSIS — G47.00 INSOMNIA, UNSPECIFIED TYPE: ICD-10-CM

## 2020-09-24 DIAGNOSIS — M25.361 PATELLAR INSTABILITY OF RIGHT KNEE: ICD-10-CM

## 2020-09-24 DIAGNOSIS — E11.65 TYPE 2 DIABETES MELLITUS WITH HYPERGLYCEMIA, WITH LONG-TERM CURRENT USE OF INSULIN (H): ICD-10-CM

## 2020-09-24 DIAGNOSIS — F41.1 GAD (GENERALIZED ANXIETY DISORDER): ICD-10-CM

## 2020-09-24 DIAGNOSIS — Z01.818 PREOP GENERAL PHYSICAL EXAM: Primary | ICD-10-CM

## 2020-09-24 DIAGNOSIS — Z79.4 TYPE 2 DIABETES MELLITUS WITH HYPERGLYCEMIA, WITH LONG-TERM CURRENT USE OF INSULIN (H): ICD-10-CM

## 2020-09-24 PROBLEM — Z98.890 STATUS POST SURGERY: Status: RESOLVED | Noted: 2020-02-28 | Resolved: 2020-09-24

## 2020-09-24 PROBLEM — Z47.89 SURGICAL AFTERCARE, MUSCULOSKELETAL SYSTEM: Status: RESOLVED | Noted: 2020-03-10 | Resolved: 2020-09-24

## 2020-09-24 PROCEDURE — 99207 C FOOT EXAM  NO CHARGE: CPT | Mod: 25 | Performed by: NURSE PRACTITIONER

## 2020-09-24 PROCEDURE — 99214 OFFICE O/P EST MOD 30 MIN: CPT | Performed by: NURSE PRACTITIONER

## 2020-09-24 RX ORDER — ZOLPIDEM TARTRATE 5 MG/1
5 TABLET ORAL
Qty: 30 TABLET | Refills: 1 | Status: CANCELLED | OUTPATIENT
Start: 2020-09-24

## 2020-09-24 RX ORDER — HYDROXYZINE HYDROCHLORIDE 50 MG/1
50 TABLET, FILM COATED ORAL 2 TIMES DAILY
Qty: 90 TABLET | Refills: 4 | Status: SHIPPED | OUTPATIENT
Start: 2020-09-24 | End: 2022-04-06

## 2020-09-24 RX ORDER — ZOLPIDEM TARTRATE 5 MG/1
5 TABLET ORAL
Qty: 30 TABLET | Refills: 5 | Status: SHIPPED | OUTPATIENT
Start: 2020-09-24 | End: 2020-09-29

## 2020-09-24 RX ORDER — ATORVASTATIN CALCIUM 40 MG/1
40 TABLET, FILM COATED ORAL DAILY
Qty: 90 TABLET | Refills: 3 | Status: SHIPPED | OUTPATIENT
Start: 2020-09-24 | End: 2021-12-02

## 2020-09-24 ASSESSMENT — MIFFLIN-ST. JEOR: SCORE: 1967.72

## 2020-09-24 ASSESSMENT — PAIN SCALES - GENERAL: PAINLEVEL: MILD PAIN (2)

## 2020-09-24 NOTE — PROGRESS NOTES
[facility]  [address] [facility] [address]  [phone]  Dept: [phone]    PRE-OP EVALUATION:  Today's date: 2020    Naman Jones (: 1985) presents for pre-operative evaluation assessment as requested by Dr. Angel Daigle.  He requires evaluation and anesthesia risk assessment prior to undergoing surgery/procedure for treatment of valgus malalignment of right knee  .    Proposed Surgery/ Procedure: right distal femoral osteotomy   Answers for HPI/ROS submitted by the patient on 2020   Chronic problems general questions HPI Form  How many servings of fruits and vegetables do you eat daily?: 2-3  On average, how many sweetened beverages do you drink each day (Examples: soda, juice, sweet tea, etc.  Do NOT count diet or artificially sweetened beverages)?: 0  How many minutes a day do you exercise enough to make your heart beat faster?: 10 to 19  How many days a week do you exercise enough to make your heart beat faster?: 4  How many days per week do you miss taking your medication?: 0    Date of Surgery/ Procedure: 10/02/2020  Time of Surgery/ Procedure: 11:00am  Hospital/Surgical Facility: [facility]   Surgery Fax Number: Note does not need to be faxed, will be available electronically in Epic.  Primary Physician: Haase, Susan Rachele  Type of Anesthesia Anticipated: general    Preoperative Questionnaire:   No - Have you ever had a heart attack or stroke?  No - Have you ever had surgery on your heart or blood vessels, such as a stent, coronary (heart) bypass, or surgery on an artery in the head, neck, heart, or legs?  No - Do you have chest pain when you are physically active?  No - Do you have a history of heart failure?  No - Do you currently have a cold, bronchitis, or symptoms of other respiratory (head and chest) infections?  No - Do you have a cough, shortness of breath, or wheezing?  No - Do you or anyone in your  family have a history of blood clots?  No - Do you or anyone in your family have a serious bleeding problem, such as long-lasting bleeding after surgeries or cuts?  No - Have you ever had anemia or been told to take iron pills?  No - Have you had any abnormal blood loss such as black, tarry or bloody stools, or abnormal vaginal bleeding?  No - Have you ever had a blood transfusion?  Yes - Are you willing to have a blood transfusion if it is medically needed before, during, or after your surgery?  No - Have you or anyone in your family ever had problems with anesthesia (sedation for surgery)?  No - Do you have sleep apnea, excessive snoring, or daytime drowsiness?   No - Do you have any artifical heart valves or other implanted medical devices, such as a pacemaker, defibrillator, or continuous glucose monitor?  No - Do you have any artifical joints?  No - Are you allergic to latex?  No - Is there any chance that you may be pregnant?    Patient has a Health Care Directive or Living Will:  NO    HPI:     HPI related to upcoming procedure: chronic right knee pain, found to have valgus malalignment of right lower extremity with recurrent patellar instability.    Type 2 DM:  Fasting blood sugar 130-150. Controlled on loemic and metformin. Last A1C was 6.8% in 2/2020.     MEDICAL HISTORY:     Patient Active Problem List    Diagnosis Date Noted     Chronic pain of right knee 08/31/2020     Priority: Medium     Added automatically from request for surgery [id number]       Gait abnormality 04/17/2020     Priority: Medium     Surgical aftercare, musculoskeletal system 03/10/2020     Priority: Medium     Status post surgery 02/28/2020     Priority: Medium     Acquired genu valgum of left knee 12/11/2019     Priority: Medium     Added automatically from request for surgery [id number]       Patellar instability of right knee 12/11/2019     Priority: Medium     Added automatically from request for surgery [id number]       Left knee pain  12/10/2019     Priority: Medium     Patellar instability of left knee 12/10/2019     Priority: Medium     Blepharitis of upper and lower eyelids of both eyes, unspecified type 05/28/2019     Priority: Medium     Obesity (BMI 35.0-39.9) with comorbidity (H) 05/03/2019     Priority: Medium     Class 2 drug-induced obesity with serious comorbidity and body mass index (BMI) of 38.0 to 38.9 in adult 02/15/2019     Priority: Medium     Insomnia, unspecified type 07/20/2018     Priority: Medium     Essential hypertension 06/13/2017     Priority: Medium     Mixed hyperlipidemia 06/13/2017     Priority: Medium     Plantar fasciitis 06/13/2017     Priority: Medium     JOEL (generalized anxiety disorder) 06/13/2017     Priority: Medium     Type 2 diabetes mellitus with hyperglycemia, with long-term current use of insulin (H) 06/13/2017     Priority: Medium      Past Medical History:   Diagnosis Date     Diabetes (H)      Hypertension      Past Surgical History:   Procedure Laterality Date     ARTHROSCOPY KNEE WITH PATELLAR REALIGNMENT Left 2/28/2020    Procedure: Examination under anesthesia Left knee,  medial reefing/imbrication;  Surgeon: Angel Madera MD;  Location: [facility] OR     ARTHROSCOPY KNEE WITH RETINACULAR RELEASE Left 2/28/2020    Procedure: Left lateral retinacular lengthening;  Surgeon: Angel Madera MD;  Location: [facility] OR     LAPAROSCOPIC BYPASS GASTRIC N/A 10/9/2018    Procedure: LAPAROSCOPIC BYPASS GASTRIC;  LAPAROSCOPIC WELLINGTON-EN Y GASTRIC BYPASS;  Surgeon: Alden Mcwilliams MD;  Location:  OR     ORTHOPEDIC SURGERY Right 02/2016     OSTEOTOMY FEMUR DISTAL Left 2/28/2020    Procedure: Left distal femoral osteotomy;  Surgeon: Angel Madera MD;  Location: [facility] OR     Current Outpatient Medications   Medication Sig Dispense Refill     atorvastatin (LIPITOR) 40 MG tablet Take 1 tablet (40 mg) by mouth daily 90 tablet 3     busPIRone (BUSPAR) 5 MG tablet TAKE 1 TABLET(5 MG) BY  MOUTH TWICE DAILY 180 tablet 1     Calcium Carb-Cholecalciferol (CALCIUM 600 + D PO) Take 1 tablet by mouth 2 times daily        Continuous Blood Gluc  (FREESTYLE KEILA READER) SUGAR 1 each continuous 1 Device 0     Continuous Blood Gluc Sensor (FREESTYLE KEILA 14 DAY SENSOR) XX MISC Inject 1 each Subcutaneous every 14 days 2 each 11     Cyanocobalamin (B-12) 500 MCG SUBL Place 1 tablet under the tongue daily       diclofenac (VOLTAREN) 75 MG EC tablet TAKE 1 TABLET(75 MG) BY MOUTH TWICE DAILY 60 tablet 0     hydrOXYzine (ATARAX) 50 MG tablet Take 1 tablet (50 mg) by mouth every 6 hours as needed for anxiety (Patient taking differently: Take 50 mg by mouth 2 times daily May take up to 3 times daily if needed) 90 tablet 5     metFORMIN 500 MG PO tablet Take 1 tablet (500 mg) by mouth 2 times daily (with meals) 180 tablet 3     multivitamin  peds with iron (FLINTSTONES COMPLETE) 60 MG chewable tablet Take 2 chew tab by mouth every morning        omeprazole (PRILOSEC) 20 MG DR capsule Take 1 capsule (20 mg) by mouth daily 60 capsule 3     semaglutide (OZEMPIC, 1 MG/DOSE,) 2 MG/1.5ML pen Inject 0.25 mg weekly x 4 weeks then increase to 0.5 mg weekly 1 pen 5     zolpidem (AMBIEN) 5 MG tablet Take 1 tablet (5 mg) by mouth nightly as needed for sleep 30 tablet 1     OTC products: None, except as noted above    Allergies   Allergen Reactions     Sulfa Drugs Anaphylaxis     Oxycodone Other (See Comments) and Itching     Flushed     Lisinopril      Other reaction(s): Impotence     Onion      Other reaction(s): Intolerance-Can't Take      Latex Allergy: NO    Social History     Tobacco Use     Smoking status: Never Smoker     Smokeless tobacco: Never Used   Substance Use Topics     Alcohol use: Yes     Comment: socially     History   Drug Use No       REVIEW OF SYSTEMS:   CONSTITUTIONAL: NEGATIVE for fever, chills, change in weight  INTEGUMENTARY/SKIN: NEGATIVE for worrisome rashes, moles or lesions  EYES: NEGATIVE for  vision changes or irritation  ENT/MOUTH: NEGATIVE for ear, mouth and throat problems  RESP: NEGATIVE for significant cough or SOB  BREAST: NEGATIVE for masses, tenderness or discharge  CV: NEGATIVE for chest pain, palpitations or peripheral edema  GI: NEGATIVE for nausea, abdominal pain, heartburn, or change in bowel habits  : NEGATIVE for frequency, dysuria, or hematuria  MUSCULOSKELETAL: NEGATIVE for significant arthralgias or myalgia  NEURO: NEGATIVE for weakness, dizziness or paresthesias  ENDOCRINE: NEGATIVE for temperature intolerance, skin/hair changes  HEME: NEGATIVE for bleeding problems  PSYCHIATRIC: NEGATIVE for changes in mood or affect    EXAM:   There were no vitals taken for this visit.    GENERAL APPEARANCE: healthy, alert and no distress     EYES: EOMI,  PERRL     HENT: ear canals and TM's normal and nose and mouth without ulcers or lesions     NECK: no adenopathy, no asymmetry, masses, or scars and thyroid normal to palpation     RESP: lungs clear to auscultation - no rales, rhonchi or wheezes     CV: regular rates and rhythm, normal S1 S2, no S3 or S4 and no murmur, click or rub     ABDOMEN:  soft, nontender, no HSM or masses and bowel sounds normal     MS: extremities normal- no gross deformities noted, no evidence of inflammation in joints, FROM in all extremities.     SKIN: no suspicious lesions or rashes     NEURO: Normal strength and tone, sensory exam grossly normal, mentation intact and speech normal     PSYCH: mentation appears normal. and affect normal/bright     LYMPHATICS: No cervical adenopathy    DIAGNOSTICS:   EKG: Not indicated due to non-vascular surgery and low risk of event (age <65 and without cardiac risk factors)  Labs: pending.     IMPRESSION:   Reason for surgery/procedure: valgus malalignment of right knee   Diagnosis/reason for consult: evaluation and anesthesia risk assessment prior to undergoing surgery/procedure for treatment of valgus malalignment of right knee   .      The proposed surgical procedure is considered INTERMEDIATE risk.    REVISED CARDIAC RISK INDEX  The patient has the following serious cardiovascular risks for perioperative complications such as (MI, PE, VFib and 3  AV Block):  No serious cardiac risks  INTERPRETATION: 0 risks: Class I (very low risk - 0.4% complication rate)    The patient has the following additional risks for perioperative complications:  No identified additional risks       RECOMMENDATIONS:   Naman was seen today for pre-op exam.    Diagnoses and all orders for this visit:    Preop general physical exam  -     CBC with platelets; Future    Patellar instability of right knee    Type 2 diabetes mellitus with hyperglycemia, with long-term current use of insulin (H): hold metformin the day of surgery.   -     REVIEW OF HEALTH MAINTENANCE PROTOCOL ORDERS  -     OPTOMETRY REFERRAL  -     FOOT EXAM  -     Hemoglobin A1c; Future  -     Comprehensive metabolic panel; Future  -     Albumin Random Urine Quantitative with Creat Ratio; Future    Mixed hyperlipidemia: Hold atorvastatin the day of surgery  -     atorvastatin (LIPITOR) 40 MG tablet; Take 1 tablet (40 mg) by mouth daily  -     Lipid panel reflex to direct LDL Fasting; Future    Stop diclofenac 1 day prior to surgery.   APPROVAL GIVEN to proceed with proposed procedure, labs pending.      Signed Electronically by: Susan Haase, APRN CNP    Copy of this evaluation report is provided to requesting physician.    [facility] Preop Guidelines    Revised Cardiac Risk Index

## 2020-09-29 DIAGNOSIS — Z79.4 TYPE 2 DIABETES MELLITUS WITH HYPERGLYCEMIA, WITH LONG-TERM CURRENT USE OF INSULIN (H): ICD-10-CM

## 2020-09-29 DIAGNOSIS — E11.65 TYPE 2 DIABETES MELLITUS WITH HYPERGLYCEMIA, WITH LONG-TERM CURRENT USE OF INSULIN (H): ICD-10-CM

## 2020-09-29 DIAGNOSIS — Z01.818 PREOP GENERAL PHYSICAL EXAM: ICD-10-CM

## 2020-09-29 DIAGNOSIS — E78.2 MIXED HYPERLIPIDEMIA: ICD-10-CM

## 2020-09-29 DIAGNOSIS — Z11.59 ENCOUNTER FOR SCREENING FOR OTHER VIRAL DISEASES: ICD-10-CM

## 2020-09-29 LAB
ALBUMIN SERPL-MCNC: 4.1 G/DL (ref 3.4–5)
ALP SERPL-CCNC: 121 U/L (ref 40–150)
ALT SERPL W P-5'-P-CCNC: 53 U/L (ref 0–70)
ANION GAP SERPL CALCULATED.3IONS-SCNC: 5 MMOL/L (ref 3–14)
AST SERPL W P-5'-P-CCNC: 15 U/L (ref 0–45)
BILIRUB SERPL-MCNC: 0.8 MG/DL (ref 0.2–1.3)
BUN SERPL-MCNC: 8 MG/DL (ref 7–30)
CALCIUM SERPL-MCNC: 9.2 MG/DL (ref 8.5–10.1)
CHLORIDE SERPL-SCNC: 109 MMOL/L (ref 94–109)
CHOLEST SERPL-MCNC: 122 MG/DL
CO2 SERPL-SCNC: 26 MMOL/L (ref 20–32)
CREAT SERPL-MCNC: 0.57 MG/DL (ref 0.66–1.25)
CREAT UR-MCNC: 251 MG/DL
ERYTHROCYTE [DISTWIDTH] IN BLOOD BY AUTOMATED COUNT: 13.4 % (ref 10–15)
GFR SERPL CREATININE-BSD FRML MDRD: >90 ML/MIN/{1.73_M2}
GLUCOSE SERPL-MCNC: 170 MG/DL (ref 70–99)
HBA1C MFR BLD: 7.3 % (ref 0–5.6)
HCT VFR BLD AUTO: 46.1 % (ref 40–53)
HDLC SERPL-MCNC: 39 MG/DL
HGB BLD-MCNC: 16 G/DL (ref 13.3–17.7)
LDLC SERPL CALC-MCNC: 64 MG/DL
MCH RBC QN AUTO: 30 PG (ref 26.5–33)
MCHC RBC AUTO-ENTMCNC: 34.7 G/DL (ref 31.5–36.5)
MCV RBC AUTO: 86 FL (ref 78–100)
MICROALBUMIN UR-MCNC: 29 MG/L
MICROALBUMIN/CREAT UR: 11.47 MG/G CR (ref 0–17)
NONHDLC SERPL-MCNC: 83 MG/DL
PLATELET # BLD AUTO: 257 10E9/L (ref 150–450)
POTASSIUM SERPL-SCNC: 3.8 MMOL/L (ref 3.4–5.3)
PROT SERPL-MCNC: 7.5 G/DL (ref 6.8–8.8)
RBC # BLD AUTO: 5.34 10E12/L (ref 4.4–5.9)
SODIUM SERPL-SCNC: 140 MMOL/L (ref 133–144)
TRIGL SERPL-MCNC: 96 MG/DL
WBC # BLD AUTO: 6.3 10E9/L (ref 4–11)

## 2020-09-29 PROCEDURE — 85027 COMPLETE CBC AUTOMATED: CPT | Performed by: NURSE PRACTITIONER

## 2020-09-29 PROCEDURE — 83036 HEMOGLOBIN GLYCOSYLATED A1C: CPT | Performed by: NURSE PRACTITIONER

## 2020-09-29 PROCEDURE — U0003 INFECTIOUS AGENT DETECTION BY NUCLEIC ACID (DNA OR RNA); SEVERE ACUTE RESPIRATORY SYNDROME CORONAVIRUS 2 (SARS-COV-2) (CORONAVIRUS DISEASE [COVID-19]), AMPLIFIED PROBE TECHNIQUE, MAKING USE OF HIGH THROUGHPUT TECHNOLOGIES AS DESCRIBED BY CMS-2020-01-R: HCPCS | Performed by: ORTHOPAEDIC SURGERY

## 2020-09-29 PROCEDURE — 80053 COMPREHEN METABOLIC PANEL: CPT | Performed by: NURSE PRACTITIONER

## 2020-09-29 PROCEDURE — 80061 LIPID PANEL: CPT | Performed by: NURSE PRACTITIONER

## 2020-09-29 PROCEDURE — 36415 COLL VENOUS BLD VENIPUNCTURE: CPT | Performed by: NURSE PRACTITIONER

## 2020-09-29 PROCEDURE — 82043 UR ALBUMIN QUANTITATIVE: CPT | Performed by: NURSE PRACTITIONER

## 2020-09-29 RX ORDER — ESZOPICLONE 2 MG/1
2 TABLET, FILM COATED ORAL AT BEDTIME
COMMUNITY
End: 2022-08-03

## 2020-09-29 RX ORDER — LORAZEPAM 1 MG/1
1 TABLET ORAL EVERY 6 HOURS PRN
COMMUNITY
End: 2022-08-03

## 2020-09-30 LAB
SARS-COV-2 RNA SPEC QL NAA+PROBE: NOT DETECTED
SPECIMEN SOURCE: NORMAL

## 2020-09-30 NOTE — RESULT ENCOUNTER NOTE
Harris Beard,  It was a pleasure seeing you this past week.  Your lab results are as below:  1)  TSH (thyroid level) 1.56 which is normal (range 0.4-5)  2)  Cholesterol is normal at 122,  your LDL (bad cholesterol) and your HDL (good cholesterol) are also within normal range. Continue on lipitor and we will recheck this in 1 year.  3)  Glucose is elevated at 170  (normal fasting is <100). Your A1C has increased to 7.3%, what dose of ozempic are your taking?     4)  CBC (checks for anemia and infection) is normal.     Please send me a [employer] message with the dosage of ozempic you are taking.      Sincerely,    Susan Haase, CNP

## 2020-10-01 ENCOUNTER — ANESTHESIA EVENT (OUTPATIENT)
Dept: SURGERY | Facility: CLINIC | Age: 35
DRG: 481 | End: 2020-10-01
Payer: COMMERCIAL

## 2020-10-02 ENCOUNTER — ANESTHESIA (OUTPATIENT)
Dept: SURGERY | Facility: CLINIC | Age: 35
DRG: 481 | End: 2020-10-02
Payer: COMMERCIAL

## 2020-10-02 ENCOUNTER — HOSPITAL ENCOUNTER (INPATIENT)
Facility: CLINIC | Age: 35
LOS: 3 days | Discharge: HOME | DRG: 481 | End: 2020-10-05
Attending: ORTHOPAEDIC SURGERY | Admitting: ORTHOPAEDIC SURGERY
Payer: COMMERCIAL

## 2020-10-02 ENCOUNTER — APPOINTMENT (OUTPATIENT)
Dept: GENERAL RADIOLOGY | Facility: CLINIC | Age: 35
DRG: 481 | End: 2020-10-02
Attending: STUDENT IN AN ORGANIZED HEALTH CARE EDUCATION/TRAINING PROGRAM
Payer: COMMERCIAL

## 2020-10-02 ENCOUNTER — APPOINTMENT (OUTPATIENT)
Dept: GENERAL RADIOLOGY | Facility: CLINIC | Age: 35
DRG: 481 | End: 2020-10-02
Attending: ORTHOPAEDIC SURGERY
Payer: COMMERCIAL

## 2020-10-02 DIAGNOSIS — M25.561 CHRONIC PAIN OF RIGHT KNEE: ICD-10-CM

## 2020-10-02 DIAGNOSIS — Z98.890 STATUS POST KNEE SURGERY: ICD-10-CM

## 2020-10-02 DIAGNOSIS — G89.29 CHRONIC PAIN OF RIGHT KNEE: ICD-10-CM

## 2020-10-02 DIAGNOSIS — Z98.890 S/P RIGHT KNEE SURGERY: Primary | ICD-10-CM

## 2020-10-02 LAB
GLUCOSE BLDC GLUCOMTR-MCNC: 145 MG/DL (ref 70–99)
GLUCOSE BLDC GLUCOMTR-MCNC: 158 MG/DL (ref 70–99)
GLUCOSE BLDC GLUCOMTR-MCNC: 165 MG/DL (ref 70–99)
GLUCOSE BLDC GLUCOMTR-MCNC: 173 MG/DL (ref 70–99)
GLUCOSE BLDC GLUCOMTR-MCNC: 217 MG/DL (ref 70–99)
GLUCOSE SERPL-MCNC: 165 MG/DL (ref 70–99)

## 2020-10-02 PROCEDURE — 73552 X-RAY EXAM OF FEMUR 2/>: CPT | Mod: RT

## 2020-10-02 PROCEDURE — 250N000011 HC RX IP 250 OP 636: Performed by: ORTHOPAEDIC SURGERY

## 2020-10-02 PROCEDURE — 29877 ARTHRS KNEE SURG DBRDMT/SHVG: CPT | Mod: RT | Performed by: ORTHOPAEDIC SURGERY

## 2020-10-02 PROCEDURE — 258N000003 HC RX IP 258 OP 636: Performed by: NURSE ANESTHETIST, CERTIFIED REGISTERED

## 2020-10-02 PROCEDURE — 250N000009 HC RX 250: Performed by: ORTHOPAEDIC SURGERY

## 2020-10-02 PROCEDURE — 250N000011 HC RX IP 250 OP 636: Performed by: STUDENT IN AN ORGANIZED HEALTH CARE EDUCATION/TRAINING PROGRAM

## 2020-10-02 PROCEDURE — 0MNN0ZZ RELEASE RIGHT KNEE BURSA AND LIGAMENT, OPEN APPROACH: ICD-10-PCS | Performed by: ORTHOPAEDIC SURGERY

## 2020-10-02 PROCEDURE — 99207 PR CONSULT E&M CHANGED TO INITIAL LEVEL: CPT | Performed by: INTERNAL MEDICINE

## 2020-10-02 PROCEDURE — 360N000029 HC SURGERY LEVEL 4 EA 15 ADDTL MIN - UMMC: Performed by: ORTHOPAEDIC SURGERY

## 2020-10-02 PROCEDURE — 250N000013 HC RX MED GY IP 250 OP 250 PS 637: Performed by: INTERNAL MEDICINE

## 2020-10-02 PROCEDURE — 82947 ASSAY GLUCOSE BLOOD QUANT: CPT | Performed by: ORTHOPAEDIC SURGERY

## 2020-10-02 PROCEDURE — 0QSB04Z REPOSITION RIGHT LOWER FEMUR WITH INTERNAL FIXATION DEVICE, OPEN APPROACH: ICD-10-PCS | Performed by: ORTHOPAEDIC SURGERY

## 2020-10-02 PROCEDURE — 999N000180 XR SURGERY CARM FLUORO LESS THAN 5 MIN: Mod: TC

## 2020-10-02 PROCEDURE — 370N000001 HC ANESTHESIA TECHNICAL FEE, 1ST 30 MIN: Performed by: ORTHOPAEDIC SURGERY

## 2020-10-02 PROCEDURE — 250N000013 HC RX MED GY IP 250 OP 250 PS 637: Performed by: ORTHOPAEDIC SURGERY

## 2020-10-02 PROCEDURE — 250N000011 HC RX IP 250 OP 636: Performed by: NURSE ANESTHETIST, CERTIFIED REGISTERED

## 2020-10-02 PROCEDURE — C1713 ANCHOR/SCREW BN/BN,TIS/BN: HCPCS | Performed by: ORTHOPAEDIC SURGERY

## 2020-10-02 PROCEDURE — 761N000002 HC RECOVERY PHASE 1 LEVEL 1 EA ADDTL HR: Performed by: ORTHOPAEDIC SURGERY

## 2020-10-02 PROCEDURE — 999N000065 XR KNEE PORT RT 1/2 VW: Mod: RT

## 2020-10-02 PROCEDURE — 761N000001 HC RECOVERY PHASE 1 LEVEL 1 FIRST HR: Performed by: ORTHOPAEDIC SURGERY

## 2020-10-02 PROCEDURE — 272N000001 HC OR GENERAL SUPPLY STERILE: Performed by: ORTHOPAEDIC SURGERY

## 2020-10-02 PROCEDURE — 250N000013 HC RX MED GY IP 250 OP 250 PS 637: Performed by: STUDENT IN AN ORGANIZED HEALTH CARE EDUCATION/TRAINING PROGRAM

## 2020-10-02 PROCEDURE — 0SBC4ZZ EXCISION OF RIGHT KNEE JOINT, PERCUTANEOUS ENDOSCOPIC APPROACH: ICD-10-PCS | Performed by: ORTHOPAEDIC SURGERY

## 2020-10-02 PROCEDURE — 120N000002 HC R&B MED SURG/OB UMMC

## 2020-10-02 PROCEDURE — 272N000002 HC OR SUPPLY OTHER OPNP: Performed by: ORTHOPAEDIC SURGERY

## 2020-10-02 PROCEDURE — 73560 X-RAY EXAM OF KNEE 1 OR 2: CPT | Mod: 26 | Performed by: RADIOLOGY

## 2020-10-02 PROCEDURE — 27396 TRANSPLANT OF THIGH TENDON: CPT | Mod: RT | Performed by: ORTHOPAEDIC SURGERY

## 2020-10-02 PROCEDURE — 258N000003 HC RX IP 258 OP 636: Performed by: STUDENT IN AN ORGANIZED HEALTH CARE EDUCATION/TRAINING PROGRAM

## 2020-10-02 PROCEDURE — 99232 SBSQ HOSP IP/OBS MODERATE 35: CPT | Performed by: INTERNAL MEDICINE

## 2020-10-02 PROCEDURE — 360N000031 HC SURGERY LEVEL 4 W FLUORO 1ST 30 MIN - UMMC: Performed by: ORTHOPAEDIC SURGERY

## 2020-10-02 PROCEDURE — 999N001017 HC STATISTIC GLUCOSE BY METER IP

## 2020-10-02 PROCEDURE — 999N000139 HC STATISTIC PRE-PROCEDURE ASSESSMENT II: Performed by: ORTHOPAEDIC SURGERY

## 2020-10-02 PROCEDURE — 27450 INCISION OF THIGH: CPT | Mod: RT | Performed by: ORTHOPAEDIC SURGERY

## 2020-10-02 PROCEDURE — 250N000009 HC RX 250: Performed by: NURSE ANESTHETIST, CERTIFIED REGISTERED

## 2020-10-02 PROCEDURE — 258N000003 HC RX IP 258 OP 636: Performed by: ORTHOPAEDIC SURGERY

## 2020-10-02 PROCEDURE — 250N000003 HC SEVOFLURANE, EA 15 MIN: Performed by: ORTHOPAEDIC SURGERY

## 2020-10-02 PROCEDURE — 370N000002 HC ANESTHESIA TECHNICAL FEE, EACH ADDTL 15 MIN: Performed by: ORTHOPAEDIC SURGERY

## 2020-10-02 PROCEDURE — 250N000009 HC RX 250: Performed by: STUDENT IN AN ORGANIZED HEALTH CARE EDUCATION/TRAINING PROGRAM

## 2020-10-02 PROCEDURE — C1762 CONN TISS, HUMAN(INC FASCIA): HCPCS | Performed by: ORTHOPAEDIC SURGERY

## 2020-10-02 DEVICE — IMPLANTABLE DEVICE: Type: IMPLANTABLE DEVICE | Site: LEG | Status: FUNCTIONAL

## 2020-10-02 DEVICE — GRAFT BONE PUTTY DBX 05ML 038050: Type: IMPLANTABLE DEVICE | Site: LEG | Status: FUNCTIONAL

## 2020-10-02 RX ORDER — AMOXICILLIN 250 MG
1-2 CAPSULE ORAL 2 TIMES DAILY PRN
Qty: 30 TABLET | Refills: 0 | Status: SHIPPED | OUTPATIENT
Start: 2020-10-02 | End: 2020-11-16

## 2020-10-02 RX ORDER — PROPOFOL 10 MG/ML
INJECTION, EMULSION INTRAVENOUS PRN
Status: DISCONTINUED | OUTPATIENT
Start: 2020-10-02 | End: 2020-10-02

## 2020-10-02 RX ORDER — CEFAZOLIN SODIUM 2 G/100ML
2 INJECTION, SOLUTION INTRAVENOUS
Status: COMPLETED | OUTPATIENT
Start: 2020-10-02 | End: 2020-10-02

## 2020-10-02 RX ORDER — FENTANYL CITRATE-0.9 % NACL/PF 10 MCG/ML
PLASTIC BAG, INJECTION (ML) INTRAVENOUS PRN
Status: DISCONTINUED | OUTPATIENT
Start: 2020-10-02 | End: 2020-10-02

## 2020-10-02 RX ORDER — LABETALOL HYDROCHLORIDE 5 MG/ML
10 INJECTION, SOLUTION INTRAVENOUS
Status: DISCONTINUED | OUTPATIENT
Start: 2020-10-02 | End: 2020-10-02 | Stop reason: HOSPADM

## 2020-10-02 RX ORDER — BUSPIRONE HYDROCHLORIDE 15 MG/1
15 TABLET ORAL 2 TIMES DAILY
Status: DISCONTINUED | OUTPATIENT
Start: 2020-10-02 | End: 2020-10-05 | Stop reason: HOSPADM

## 2020-10-02 RX ORDER — SODIUM CHLORIDE, SODIUM LACTATE, POTASSIUM CHLORIDE, CALCIUM CHLORIDE 600; 310; 30; 20 MG/100ML; MG/100ML; MG/100ML; MG/100ML
INJECTION, SOLUTION INTRAVENOUS CONTINUOUS
Status: DISCONTINUED | OUTPATIENT
Start: 2020-10-02 | End: 2020-10-02 | Stop reason: HOSPADM

## 2020-10-02 RX ORDER — LIDOCAINE 40 MG/G
CREAM TOPICAL
Status: DISCONTINUED | OUTPATIENT
Start: 2020-10-02 | End: 2020-10-05 | Stop reason: HOSPADM

## 2020-10-02 RX ORDER — CEFAZOLIN SODIUM 2 G/100ML
2 INJECTION, SOLUTION INTRAVENOUS EVERY 8 HOURS
Status: DISCONTINUED | OUTPATIENT
Start: 2020-10-02 | End: 2020-10-02

## 2020-10-02 RX ORDER — LIDOCAINE 40 MG/G
CREAM TOPICAL
Status: DISCONTINUED | OUTPATIENT
Start: 2020-10-02 | End: 2020-10-02 | Stop reason: HOSPADM

## 2020-10-02 RX ORDER — FENTANYL CITRATE 50 UG/ML
25-50 INJECTION, SOLUTION INTRAMUSCULAR; INTRAVENOUS
Status: DISCONTINUED | OUTPATIENT
Start: 2020-10-02 | End: 2020-10-02 | Stop reason: HOSPADM

## 2020-10-02 RX ORDER — ONDANSETRON 4 MG/1
4 TABLET, ORALLY DISINTEGRATING ORAL EVERY 6 HOURS PRN
Status: DISCONTINUED | OUTPATIENT
Start: 2020-10-02 | End: 2020-10-05 | Stop reason: HOSPADM

## 2020-10-02 RX ORDER — HYDROMORPHONE HYDROCHLORIDE 2 MG/1
2-4 TABLET ORAL
Status: DISCONTINUED | OUTPATIENT
Start: 2020-10-02 | End: 2020-10-04

## 2020-10-02 RX ORDER — HYDROMORPHONE HYDROCHLORIDE 2 MG/1
2 TABLET ORAL EVERY 4 HOURS PRN
Qty: 30 TABLET | Refills: 0 | Status: SHIPPED | OUTPATIENT
Start: 2020-10-02 | End: 2020-10-05

## 2020-10-02 RX ORDER — ONDANSETRON 2 MG/ML
INJECTION INTRAMUSCULAR; INTRAVENOUS PRN
Status: DISCONTINUED | OUTPATIENT
Start: 2020-10-02 | End: 2020-10-02

## 2020-10-02 RX ORDER — CALCIUM CARBONATE 500 MG/1
1000 TABLET, CHEWABLE ORAL 4 TIMES DAILY PRN
Status: DISCONTINUED | OUTPATIENT
Start: 2020-10-02 | End: 2020-10-05 | Stop reason: HOSPADM

## 2020-10-02 RX ORDER — ESZOPICLONE 2 MG/1
2 TABLET, FILM COATED ORAL AT BEDTIME
Status: DISCONTINUED | OUTPATIENT
Start: 2020-10-02 | End: 2020-10-05 | Stop reason: HOSPADM

## 2020-10-02 RX ORDER — BUPIVACAINE HYDROCHLORIDE AND EPINEPHRINE 2.5; 5 MG/ML; UG/ML
INJECTION, SOLUTION INFILTRATION; PERINEURAL PRN
Status: DISCONTINUED | OUTPATIENT
Start: 2020-10-02 | End: 2020-10-02 | Stop reason: HOSPADM

## 2020-10-02 RX ORDER — DEXTROSE MONOHYDRATE 25 G/50ML
25-50 INJECTION, SOLUTION INTRAVENOUS
Status: DISCONTINUED | OUTPATIENT
Start: 2020-10-02 | End: 2020-10-03

## 2020-10-02 RX ORDER — FLUMAZENIL 0.1 MG/ML
0.2 INJECTION, SOLUTION INTRAVENOUS
Status: DISCONTINUED | OUTPATIENT
Start: 2020-10-02 | End: 2020-10-02 | Stop reason: HOSPADM

## 2020-10-02 RX ORDER — MAGNESIUM HYDROXIDE 1200 MG/15ML
LIQUID ORAL PRN
Status: DISCONTINUED | OUTPATIENT
Start: 2020-10-02 | End: 2020-10-02 | Stop reason: HOSPADM

## 2020-10-02 RX ORDER — ACETAMINOPHEN 325 MG/1
650 TABLET ORAL EVERY 4 HOURS
Qty: 120 TABLET | Refills: 0 | Status: SHIPPED | OUTPATIENT
Start: 2020-10-02 | End: 2022-08-03

## 2020-10-02 RX ORDER — ONDANSETRON 2 MG/ML
4 INJECTION INTRAMUSCULAR; INTRAVENOUS EVERY 6 HOURS PRN
Status: DISCONTINUED | OUTPATIENT
Start: 2020-10-02 | End: 2020-10-05 | Stop reason: HOSPADM

## 2020-10-02 RX ORDER — LORAZEPAM 1 MG/1
1 TABLET ORAL EVERY 6 HOURS PRN
Status: DISCONTINUED | OUTPATIENT
Start: 2020-10-02 | End: 2020-10-05 | Stop reason: HOSPADM

## 2020-10-02 RX ORDER — NALOXONE HYDROCHLORIDE 0.4 MG/ML
.1-.4 INJECTION, SOLUTION INTRAMUSCULAR; INTRAVENOUS; SUBCUTANEOUS
Status: DISCONTINUED | OUTPATIENT
Start: 2020-10-02 | End: 2020-10-02 | Stop reason: HOSPADM

## 2020-10-02 RX ORDER — NICOTINE POLACRILEX 4 MG
15-30 LOZENGE BUCCAL
Status: DISCONTINUED | OUTPATIENT
Start: 2020-10-02 | End: 2020-10-03

## 2020-10-02 RX ORDER — ONDANSETRON 4 MG/1
4 TABLET, ORALLY DISINTEGRATING ORAL EVERY 30 MIN PRN
Status: DISCONTINUED | OUTPATIENT
Start: 2020-10-02 | End: 2020-10-02 | Stop reason: HOSPADM

## 2020-10-02 RX ORDER — ONDANSETRON 2 MG/ML
4 INJECTION INTRAMUSCULAR; INTRAVENOUS EVERY 30 MIN PRN
Status: DISCONTINUED | OUTPATIENT
Start: 2020-10-02 | End: 2020-10-02 | Stop reason: HOSPADM

## 2020-10-02 RX ORDER — ROPIVACAINE HYDROCHLORIDE 2 MG/ML
INJECTION, SOLUTION EPIDURAL; INFILTRATION; PERINEURAL PRN
Status: DISCONTINUED | OUTPATIENT
Start: 2020-10-02 | End: 2020-10-02

## 2020-10-02 RX ORDER — FENTANYL CITRATE 50 UG/ML
INJECTION, SOLUTION INTRAMUSCULAR; INTRAVENOUS PRN
Status: DISCONTINUED | OUTPATIENT
Start: 2020-10-02 | End: 2020-10-02

## 2020-10-02 RX ORDER — SODIUM CHLORIDE, SODIUM LACTATE, POTASSIUM CHLORIDE, CALCIUM CHLORIDE 600; 310; 30; 20 MG/100ML; MG/100ML; MG/100ML; MG/100ML
INJECTION, SOLUTION INTRAVENOUS CONTINUOUS
Status: DISCONTINUED | OUTPATIENT
Start: 2020-10-02 | End: 2020-10-05 | Stop reason: HOSPADM

## 2020-10-02 RX ORDER — NALOXONE HYDROCHLORIDE 0.4 MG/ML
.1-.4 INJECTION, SOLUTION INTRAMUSCULAR; INTRAVENOUS; SUBCUTANEOUS
Status: DISCONTINUED | OUTPATIENT
Start: 2020-10-02 | End: 2020-10-05 | Stop reason: HOSPADM

## 2020-10-02 RX ORDER — NALOXONE HYDROCHLORIDE 0.4 MG/ML
.1-.4 INJECTION, SOLUTION INTRAMUSCULAR; INTRAVENOUS; SUBCUTANEOUS
Status: DISCONTINUED | OUTPATIENT
Start: 2020-10-02 | End: 2020-10-03

## 2020-10-02 RX ORDER — POLYETHYLENE GLYCOL 3350 17 G/17G
1 POWDER, FOR SOLUTION ORAL DAILY
Qty: 255 G | Refills: 0 | Status: SHIPPED | OUTPATIENT
Start: 2020-10-02 | End: 2020-10-05

## 2020-10-02 RX ORDER — CEFAZOLIN SODIUM 2 G/100ML
2 INJECTION, SOLUTION INTRAVENOUS EVERY 8 HOURS
Status: COMPLETED | OUTPATIENT
Start: 2020-10-02 | End: 2020-10-03

## 2020-10-02 RX ORDER — ACETAMINOPHEN 325 MG/1
975 TABLET ORAL EVERY 8 HOURS
Status: DISCONTINUED | OUTPATIENT
Start: 2020-10-02 | End: 2020-10-05 | Stop reason: HOSPADM

## 2020-10-02 RX ORDER — DEXTROSE MONOHYDRATE 25 G/50ML
25-50 INJECTION, SOLUTION INTRAVENOUS
Status: DISCONTINUED | OUTPATIENT
Start: 2020-10-02 | End: 2020-10-05 | Stop reason: HOSPADM

## 2020-10-02 RX ORDER — LIDOCAINE HYDROCHLORIDE 20 MG/ML
INJECTION, SOLUTION INFILTRATION; PERINEURAL PRN
Status: DISCONTINUED | OUTPATIENT
Start: 2020-10-02 | End: 2020-10-02

## 2020-10-02 RX ORDER — KETOROLAC TROMETHAMINE 30 MG/ML
15 INJECTION, SOLUTION INTRAMUSCULAR; INTRAVENOUS EVERY 6 HOURS
Status: COMPLETED | OUTPATIENT
Start: 2020-10-02 | End: 2020-10-03

## 2020-10-02 RX ORDER — ASPIRIN 81 MG/1
162 TABLET ORAL DAILY
Status: DISCONTINUED | OUTPATIENT
Start: 2020-10-03 | End: 2020-10-05 | Stop reason: HOSPADM

## 2020-10-02 RX ORDER — PROCHLORPERAZINE MALEATE 10 MG
10 TABLET ORAL EVERY 6 HOURS PRN
Status: DISCONTINUED | OUTPATIENT
Start: 2020-10-02 | End: 2020-10-05 | Stop reason: HOSPADM

## 2020-10-02 RX ORDER — NICOTINE POLACRILEX 4 MG
15-30 LOZENGE BUCCAL
Status: DISCONTINUED | OUTPATIENT
Start: 2020-10-02 | End: 2020-10-05 | Stop reason: HOSPADM

## 2020-10-02 RX ORDER — TRANEXAMIC ACID 650 MG/1
1950 TABLET ORAL ONCE
Status: COMPLETED | OUTPATIENT
Start: 2020-10-02 | End: 2020-10-02

## 2020-10-02 RX ORDER — ATORVASTATIN CALCIUM 40 MG/1
40 TABLET, FILM COATED ORAL DAILY
Status: DISCONTINUED | OUTPATIENT
Start: 2020-10-03 | End: 2020-10-05 | Stop reason: HOSPADM

## 2020-10-02 RX ORDER — CEFAZOLIN SODIUM 1 G/3ML
1 INJECTION, POWDER, FOR SOLUTION INTRAMUSCULAR; INTRAVENOUS SEE ADMIN INSTRUCTIONS
Status: DISCONTINUED | OUTPATIENT
Start: 2020-10-02 | End: 2020-10-02 | Stop reason: HOSPADM

## 2020-10-02 RX ORDER — HYDROMORPHONE HYDROCHLORIDE 1 MG/ML
.3-.5 INJECTION, SOLUTION INTRAMUSCULAR; INTRAVENOUS; SUBCUTANEOUS EVERY 5 MIN PRN
Status: DISCONTINUED | OUTPATIENT
Start: 2020-10-02 | End: 2020-10-02 | Stop reason: HOSPADM

## 2020-10-02 RX ORDER — HYDROMORPHONE HYDROCHLORIDE 1 MG/ML
.2-.4 INJECTION, SOLUTION INTRAMUSCULAR; INTRAVENOUS; SUBCUTANEOUS
Status: DISCONTINUED | OUTPATIENT
Start: 2020-10-02 | End: 2020-10-05 | Stop reason: HOSPADM

## 2020-10-02 RX ADMIN — Medication 100 MCG: at 13:01

## 2020-10-02 RX ADMIN — HYDROMORPHONE HYDROCHLORIDE 0.25 MG: 1 INJECTION, SOLUTION INTRAMUSCULAR; INTRAVENOUS; SUBCUTANEOUS at 14:25

## 2020-10-02 RX ADMIN — HUMAN INSULIN 3 UNITS: 100 INJECTION, SOLUTION SUBCUTANEOUS at 10:23

## 2020-10-02 RX ADMIN — KETOROLAC TROMETHAMINE 15 MG: 30 INJECTION, SOLUTION INTRAMUSCULAR at 17:46

## 2020-10-02 RX ADMIN — CEFAZOLIN SODIUM 2 G: 2 INJECTION, SOLUTION INTRAVENOUS at 22:16

## 2020-10-02 RX ADMIN — HYDROMORPHONE HYDROCHLORIDE 0.25 MG: 1 INJECTION, SOLUTION INTRAMUSCULAR; INTRAVENOUS; SUBCUTANEOUS at 14:30

## 2020-10-02 RX ADMIN — MIDAZOLAM 2 MG: 1 INJECTION INTRAMUSCULAR; INTRAVENOUS at 11:29

## 2020-10-02 RX ADMIN — HYDROMORPHONE HYDROCHLORIDE 2 MG: 2 TABLET ORAL at 18:31

## 2020-10-02 RX ADMIN — Medication 1 G: at 13:41

## 2020-10-02 RX ADMIN — HYDROMORPHONE HYDROCHLORIDE 0.25 MG: 1 INJECTION, SOLUTION INTRAMUSCULAR; INTRAVENOUS; SUBCUTANEOUS at 13:54

## 2020-10-02 RX ADMIN — ONDANSETRON 4 MG: 2 INJECTION INTRAMUSCULAR; INTRAVENOUS at 14:30

## 2020-10-02 RX ADMIN — Medication 100 MCG: at 13:47

## 2020-10-02 RX ADMIN — DEXMEDETOMIDINE HYDROCHLORIDE 8 MCG: 100 INJECTION, SOLUTION INTRAVENOUS at 12:50

## 2020-10-02 RX ADMIN — Medication 100 MCG: at 13:12

## 2020-10-02 RX ADMIN — PROPOFOL 200 MG: 10 INJECTION, EMULSION INTRAVENOUS at 11:41

## 2020-10-02 RX ADMIN — FENTANYL CITRATE 25 MCG: 50 INJECTION, SOLUTION INTRAMUSCULAR; INTRAVENOUS at 15:25

## 2020-10-02 RX ADMIN — HYDROMORPHONE HYDROCHLORIDE 0.5 MG: 1 INJECTION, SOLUTION INTRAMUSCULAR; INTRAVENOUS; SUBCUTANEOUS at 16:02

## 2020-10-02 RX ADMIN — SODIUM CHLORIDE, POTASSIUM CHLORIDE, SODIUM LACTATE AND CALCIUM CHLORIDE: 600; 310; 30; 20 INJECTION, SOLUTION INTRAVENOUS at 22:17

## 2020-10-02 RX ADMIN — Medication 100 MCG: at 12:14

## 2020-10-02 RX ADMIN — BUSPIRONE HYDROCHLORIDE 15 MG: 15 TABLET ORAL at 21:45

## 2020-10-02 RX ADMIN — FENTANYL CITRATE 25 MCG: 50 INJECTION, SOLUTION INTRAMUSCULAR; INTRAVENOUS at 11:50

## 2020-10-02 RX ADMIN — SODIUM CHLORIDE, POTASSIUM CHLORIDE, SODIUM LACTATE AND CALCIUM CHLORIDE: 600; 310; 30; 20 INJECTION, SOLUTION INTRAVENOUS at 13:28

## 2020-10-02 RX ADMIN — TRANEXAMIC ACID 1950 MG: 650 TABLET ORAL at 09:24

## 2020-10-02 RX ADMIN — ACETAMINOPHEN 975 MG: 325 TABLET, FILM COATED ORAL at 22:47

## 2020-10-02 RX ADMIN — FENTANYL CITRATE 50 MCG: 50 INJECTION, SOLUTION INTRAMUSCULAR; INTRAVENOUS at 14:46

## 2020-10-02 RX ADMIN — LIDOCAINE HYDROCHLORIDE 100 MG: 20 INJECTION, SOLUTION INFILTRATION; PERINEURAL at 11:41

## 2020-10-02 RX ADMIN — ESZOPICLONE 2 MG: 2 TABLET, FILM COATED ORAL at 22:47

## 2020-10-02 RX ADMIN — ACETAMINOPHEN 975 MG: 325 TABLET, FILM COATED ORAL at 16:03

## 2020-10-02 RX ADMIN — FENTANYL CITRATE 50 MCG: 50 INJECTION, SOLUTION INTRAMUSCULAR; INTRAVENOUS at 15:43

## 2020-10-02 RX ADMIN — SODIUM CHLORIDE, POTASSIUM CHLORIDE, SODIUM LACTATE AND CALCIUM CHLORIDE: 600; 310; 30; 20 INJECTION, SOLUTION INTRAVENOUS at 11:20

## 2020-10-02 RX ADMIN — HYDROMORPHONE HYDROCHLORIDE 0.25 MG: 1 INJECTION, SOLUTION INTRAMUSCULAR; INTRAVENOUS; SUBCUTANEOUS at 13:38

## 2020-10-02 RX ADMIN — DEXMEDETOMIDINE HYDROCHLORIDE 8 MCG: 100 INJECTION, SOLUTION INTRAVENOUS at 11:47

## 2020-10-02 RX ADMIN — DEXMEDETOMIDINE HYDROCHLORIDE 4 MCG: 100 INJECTION, SOLUTION INTRAVENOUS at 11:51

## 2020-10-02 RX ADMIN — FENTANYL CITRATE 50 MCG: 50 INJECTION, SOLUTION INTRAMUSCULAR; INTRAVENOUS at 11:13

## 2020-10-02 RX ADMIN — HYDROMORPHONE HYDROCHLORIDE 0.2 MG: 1 INJECTION, SOLUTION INTRAMUSCULAR; INTRAVENOUS; SUBCUTANEOUS at 21:39

## 2020-10-02 RX ADMIN — MIDAZOLAM 1 MG: 1 INJECTION INTRAMUSCULAR; INTRAVENOUS at 11:13

## 2020-10-02 RX ADMIN — Medication 100 MCG: at 11:59

## 2020-10-02 RX ADMIN — FENTANYL CITRATE 25 MCG: 50 INJECTION, SOLUTION INTRAMUSCULAR; INTRAVENOUS at 12:31

## 2020-10-02 RX ADMIN — Medication 2 G: at 11:45

## 2020-10-02 RX ADMIN — FENTANYL CITRATE 25 MCG: 50 INJECTION, SOLUTION INTRAMUSCULAR; INTRAVENOUS at 15:18

## 2020-10-02 RX ADMIN — HYDROMORPHONE HYDROCHLORIDE 2 MG: 2 TABLET ORAL at 19:00

## 2020-10-02 RX ADMIN — ROPIVACAINE HYDROCHLORIDE 20 ML: 2 INJECTION, SOLUTION EPIDURAL; INFILTRATION at 11:17

## 2020-10-02 RX ADMIN — Medication 100 MCG: at 11:49

## 2020-10-02 ASSESSMENT — MIFFLIN-ST. JEOR: SCORE: 1971.75

## 2020-10-02 NOTE — PLAN OF CARE
Brief orthopaedic update:    Post-op XR shows periprosthetic femur fracture proximal to the plate.  Will obtain full-length femur XR for better characterization of fracture.    Keep bedrest for now until full fracture able to be visualized.     Patient was discussed with Dr. Madera, attending surgeon.    Jean Claude Burks MD  Orthopaedic Surgery PGY-4  #: [phone]

## 2020-10-02 NOTE — ANESTHESIA PROCEDURE NOTES
Peripheral Nerve Block Procedure Note      Staff -   Anesthesiologist:  Chris Longoria MD  Resident/Fellow: Dipesh Gonzalez MD  Performed By: anesthesiologist and with fellow  Location: Pre-op  Procedure Start/Stop TImes:     patient identified, IV checked, site marked, risks and benefits discussed, informed consent, monitors and equipment checked, pre-op evaluation, at physician/surgeon's request and post-op pain management      Correct Patient: Yes      Correct Position: Yes      Correct Site: Yes      Correct Procedure: Yes      Correct Laterality:  Yes    Site Marked:  Yes  Procedure details:     Procedure:  Adductor canal    Diagnosis:  Postoperative pain relief    Laterality:  Right    Sterile Prep: chloraprep, mask and sterile gloves      Local skin infiltration:  None    Needle:  Insulated    Needle gauge:  21    Needle length (mm):  110    Ultrasound: Yes      Ultrasound used to identify targeted nerve, plexus, or vascular structure and placed a needle adjacent to it      Permanent Image entered into patiient's record      Abnormal pain on injection: No      Blood Aspirated: No      Paresthesias:  No    Bleeding at site: No      Bolus via:  Needle    Infusion Method:  Single Shot    Complications:  None  Assessment/Narrative:     Injection made incrementally with aspirations every (mL):  5

## 2020-10-02 NOTE — OP NOTE
PREOPERATIVE DIAGNOSIS:   1. Right knee valgus  2. Recurrent patellar instability  3. Lateral compartment chondrosis    POSTOPERATIVE DIAGNOSIS:  1. Right knee valgus  2. Recurrent patellar instability  3. Lateral compartment chondrosis    PROCEDURE:  1. Examination under anesthesia right knee  2. Right knee arthroscopy  3. Chondroplasty lateral femoral condyle and lateral trochlea  4. Medial retinacular imbrication  5. Open lateral retinacular lengthening  6. Distal femoral osteotomy    DATE OF SURGERY: 10/2/2020    SURGEON: Angel Madera MD    ASSISTANT: None.     RESIDENT OR FELLOW: Bayron Miranda MD    OPERATIVE INDICATIONS: Naman Jones is a pleasant 35 year old male who I saw through my orthopedic clinic with a history, physical, imaging consistent with valgus of his right knee as well as recurrent patellar instability.  In approximately March of this year I performed a left distal femoral osteotomy, lateral retinacular lengthening and medial imbrication.  The patient is doing well in the side with improvement of his preoperative state.  In a routine clinic visit at 6 months we did obtain a CT scan as he seemed to have some pain over the plate.  I did not feel that his bones were fully united and we elected to defer plate removal until approximately the one-year viviana.  The patient states that his right knee has become his worse knee.  He was less confident in light of this he want to proceed with surgical reconstruction.  We plan for a similar surgical approach..  I reviewed with the patient the risks, benefits, complications, techniques and alternatives to surgery.  We reviewed the expected course of recovery and the potential expected outcomes.  The patient understood both the risks and benefits and desired to proceed despite the risks.    OPERATIVE DETAILS: In the preoperative area the patient's informed consent was reviewed and they desired to proceed.  The right leg was marked and the patient  was in agreement.  The patient was taken to the operating room where a timeout was performed and all parties were in agreement.  Preoperative antibiotics were given within 1 hour of the time of incision.  The patient was placed in the supine position and surrendered to LMA anesthesia.  No tourniquet was applied.  Egg crate was placed beneath the well leg and a side post was utilized.  The operative leg was prepped and draped in the usual sterile fashion.     Examination Under Anesthesia:    Valgus posture was noted to his right leg.  Range of motion 0 to 135 degrees.  Stable to varus valgus stress testing.  Stable posterior drawer testing.  Lachman 0, no pivot shift.    Anterior medial anterolateral arthroscopic portals were created and diagnostic arthroscopy was performed the following findings: Medial femoral condyle, medial tibial plateau was normal.  Medial meniscus normal.  ACL PCL normal.  Medial patella facet central ridge normal.  Grade 3+ chondrosis to the lateral facet.  Grade 3 chondral necrosis to the distal lateral trochlea as well as areas of grade 4 chondrosis to the distal lateral trochlea.  Central trochlea appeared normal.  Lateral femoral condyle did show evidence of grade 3-4 change.  Lateral tibial plateau was intact.  Lateral meniscus was intact.  At this time a shaver was introduced and a chondroplasty of the lateral femoral condyle lateral trochlea was performed to [place] stable rim of cartilage remained.    The portal sites were closed and we turned our attention to the osteotomy.  A 7 cm incision was made over the lateral thigh carried onto the skin and subcutaneous tissues meticulous hemostasis was insured.  We identified the location of the lateral femur and performed a periosteal elevation of the vastus lateralis.  2 drill tip pins were placed in a proximal lateral to inferomedial direction angling at the femoral insertion medial collateral ligament.  I was satisfied with the position  of the pins in both the AP and lateral radiographs.  Then under direct fluoroscopic control with retractors placed anterior and posteriorly an osteotomy was completed the lateral cortex.  We then used our retractors and completed our anterior and posterior osteotomies.  This was finished with an osteotome.  Spreading device was introduced and the Arthrex Contour lock plate was utilized.  4 screws were placed approximately 3 screws were placed distally.  Excellent purchase.  Spreading across the osteotomy site prior to final fixation met with our preoperative plan of approximately 1 cm.  Further utilizing the alignment leon intraoperative the with x-rays of both the hip and the ankle showed good positioning with the weightbearing axis falling to the midportion of the tibial spines.  10 cc of DBX were placed into the osteotomy site.  And a lateral incision was closed.  At this time we completed a medial retinacular imbrication.  To do this we enlarged the medial portal carried the dissection through the skin and subcutaneous tissues we identified the layers between 2 and 3 and a FiberWire grasping sutures placed in the medial retinaculum.  This was a tightened up onto the anterior aspect and we oversewed this with FiberWire.  The patella was examined showing 1 quadrant lateral translation tilt to neutral.  We then completed our lateral lengthening by dividing the IT band layer away from the deep retinacular layer to allow the patella to be well centered within the femoral groove.    Copious irrigation was performed an a layered closure was initiated, sterile dressings were applied and the patient was transferred to the recovery room in stable condition with stable vital signs.    ESTIMATED BLOOD LOSS: 200 mL.    TOURNIQUET TIME: No tourniquet was placed.    COMPLICATIONS: None apparent.    DRAINS: None.    SPECIMENS: None.     POSTOPERATIVE PLAN:  1. Patient be admitted to the hospital.  They can discharge to the  hospital in the clear same-day discharge criteria as well as physical therapy  2. Toe-touch weightbearing right lower extremity x6 weeks  3. Hinged knee brace on and locked when up and around, off for unlocked for sitting in therapy  4. 162 mg aspirin daily for 4 weeks  5. Follow-up 1 week with AP and lateral radiographs  6. Follow-up with 6 weeks with AP lateral radiographs and at 3 months with AP lateral radiographs as well as standing alignment films  7. At 6 weeks will allow weightbearing as tolerated range of motion as tolerated wean from brace

## 2020-10-02 NOTE — ANESTHESIA PREPROCEDURE EVALUATION
"Anesthesia Pre-Procedure Evaluation    Patient: Naman Jones   MRN:     [id number] Gender:   male   Age:    35 year old :      1985        Preoperative Diagnosis: Chronic pain of right knee [M25.561, G89.29]   Procedure(s):  Examination under anesthesia right knee, right knee arthroscopy, open lateral retinacular lengthening  left distal femoral osteotomy, medial imbrication     LABS:  CBC:   Lab Results   Component Value Date    WBC 6.3 2020    WBC 6.7 2020    HGB 16.0 2020    HGB 16.1 2020    HCT 46.1 2020    HCT 47.0 2020     2020     2020     BMP:   Lab Results   Component Value Date     2020     2020    POTASSIUM 3.8 2020    POTASSIUM 4.0 2020    CHLORIDE 109 2020    CHLORIDE 106 2020    CO2 26 2020    CO2 26 2020    BUN 8 2020    BUN 15 2020    CR 0.57 (L) 2020    CR 0.62 (L) 2020     (H) 2020     (H) 2020     COAGS: No results found for: PTT, INR, FIBR  POC:   Lab Results   Component Value Date     (H) 10/02/2020     OTHER:   Lab Results   Component Value Date    A1C 7.3 (H) 2020    KALINA 9.2 2020    ALBUMIN 4.1 2020    PROTTOTAL 7.5 2020    ALT 53 2020    AST 15 2020    ALKPHOS 121 2020    BILITOTAL 0.8 2020    TSH 1.50 2018        Preop Vitals    BP Readings from Last 3 Encounters:   10/02/20 120/83   20 123/85   20 125/76    Pulse Readings from Last 3 Encounters:   10/02/20 90   20 79   20 70      Resp Readings from Last 3 Encounters:   10/02/20 16   20 18   20 16    SpO2 Readings from Last 3 Encounters:   10/02/20 98%   20 99%   20 97%      Temp Readings from Last 1 Encounters:   10/02/20 37.4  C (99.3  F) (Oral)    Ht Readings from Last 1 Encounters:   10/02/20 1.854 m (6' 0.99\")      Wt Readings from Last 1 " "Encounters:   10/02/20 99.3 kg (218 lb 14.7 oz)    Estimated body mass index is 28.89 kg/m  as calculated from the following:    Height as of this encounter: 1.854 m (6' 0.99\").    Weight as of this encounter: 99.3 kg (218 lb 14.7 oz).     LDA:        Past Medical History:   Diagnosis Date     Diabetes (H)      Hypertension       Past Surgical History:   Procedure Laterality Date     ARTHROSCOPY KNEE WITH PATELLAR REALIGNMENT Left 2/28/2020    Procedure: Examination under anesthesia Left knee,  medial reefing/imbrication;  Surgeon: Angel Madera MD;  Location: [facility] OR     ARTHROSCOPY KNEE WITH RETINACULAR RELEASE Left 2/28/2020    Procedure: Left lateral retinacular lengthening;  Surgeon: Angel Madera MD;  Location: [facility]     LAPAROSCOPIC BYPASS GASTRIC N/A 10/9/2018    Procedure: LAPAROSCOPIC BYPASS GASTRIC;  LAPAROSCOPIC WELLINGTON-EN Y GASTRIC BYPASS;  Surgeon: Alden Mcwilliams MD;  Location: [facility] OR     ORTHOPEDIC SURGERY Right 02/2016     OSTEOTOMY FEMUR DISTAL Left 2/28/2020    Procedure: Left distal femoral osteotomy;  Surgeon: Angel Madera MD;  Location: [facility] OR      Allergies   Allergen Reactions     Sulfa Drugs Anaphylaxis     Oxycodone Other (See Comments) and Itching     Flushed     Lisinopril      Other reaction(s): Impotence     Onion      Other reaction(s): Intolerance-Can't Take        Anesthesia Evaluation     . Pt has had prior anesthetic. Type: General    No history of anesthetic complications          ROS/MED HX    ENT/Pulmonary:  - neg pulmonary ROS     Neurologic:  - neg neurologic ROS     Cardiovascular:     (+) hypertension----. : . . . :. .       METS/Exercise Tolerance:     Hematologic:  - neg hematologic  ROS       Musculoskeletal:  - neg musculoskeletal ROS       GI/Hepatic:  - neg GI/hepatic ROS       Renal/Genitourinary:  - ROS Renal section negative       Endo:     (+) type II DM .      Psychiatric:     (+) psychiatric history anxiety      Infectious " Disease:  - neg infectious disease ROS       Malignancy:      - no malignancy   Other:    - neg other ROS                 JZ[facility] F[facility] AN PHYSICAL EXAM    Assessment:   ASA SCORE: 2    H&P: History and physical reviewed and following examination; no interval change.    NPO Status: NPO Appropriate     Plan:   Anes. Type:  General   Pre-Medication: None   Induction:  IV (Standard)   Airway: LMA   Access/Monitoring: PIV   Maintenance: Balanced     Postop Plan:   Postop Pain: Opioids  Postop Sedation/Airway: Not planned  Disposition: Outpatient     PONV Management:   Adult Risk Factors:, Postop Opioids   Prevention: Ondansetron, Dexamethasone     CONSENT: Direct conversation   Plan and risks discussed with: Patient   Blood Products: Consented (ALL Blood Products)                   Todd Esparza MD

## 2020-10-02 NOTE — OR NURSING
Dr. Esparza notified of patients pre-op blood glucose of 217. Orders received for insulin injection and recheck BG 30 minutes after insulin has been given.

## 2020-10-02 NOTE — OR NURSING
PACU to Inpatient Nursing Handoff    Patient Naman Jones is a 35 year old male who speaks English.   Procedure Procedure(s):  Examination under anesthesia right knee, right knee arthroscopy, open lateral retinacular lengthening  left distal femoral osteotomy, medial imbrication   Surgeon(s) Primary: Angel Madera MD  Resident - Assisting: Bayron Miranda MD     Allergies   Allergen Reactions     Sulfa Drugs Anaphylaxis     Oxycodone Other (See Comments) and Itching     Flushed     Lisinopril      Other reaction(s): Impotence     Onion      Other reaction(s): Intolerance-Can't Take       Isolation  [unfilled]     Past Medical History   has a past medical history of Diabetes (H) and Hypertension.    Anesthesia Combined General with Block   Dermatome Level     Preop Meds TXA - time given: 0924   Nerve block Adductor canal.  Location:left. Med:bupivacaine and epinephrine. Time given: 1126   Intraop Meds dexmedetomidine (Precedex): 20 mcg total  fentanyl (Sublimaze): 100 mcg total  hydromorphone (Dilaudid): 1 mg total  ondansetron (Zofran): last given at 1430  Phenylephrine, Lidocaine, versed (preop)   Local Meds No   Antibiotics cefazolin (Ancef) - last given at 1341     Pain Patient Currently in Pain: yes  Comfort: comfortably manageable  Pain Control: partially effective   PACU meds  acetaminophen (Tylenol): 975 mg (total dose) last given at 1602   fentanyl (Sublimaze): 100 mcg (total dose) last given at 1545   hydromorphone (Dilaudid): 0.5 mg (total dose) last given at 1602   Ephedrine 10 mg 1442 Low BP   PCA / epidural No   Capnography     Telemetry ECG Rhythm: Normal sinus rhythm   Inpatient Telemetry Monitor Ordered? No        Labs Glucose Lab Results   Component Value Date     10/02/2020       Hgb Lab Results   Component Value Date    HGB 16.0 09/29/2020       INR No results found for: INR   PACU Imaging Completed     Wound/Incision Incision/Surgical Site 10/09/18  Bilateral Abdomen (Active)   Number of days: 724       Incision/Surgical Site 02/28/20 Left Leg (Active)   Number of days: 217       Incision/Surgical Site 10/02/20 Right Knee (Active)   Incision Assessment UTV 10/02/20 1546   Closure CHAYO 10/02/20 1546   Dressing Intervention Clean, dry, intact 10/02/20 1546   Number of days: 0       Incision/Surgical Site 10/02/20 Right Leg (Active)   Incision Assessment UTV 10/02/20 1546   Closure CHAYO 10/02/20 1546   Dressing Intervention Clean, dry, intact 10/02/20 1546   Number of days: 0      CMS        Equipment ice pack and knee brace   Other LDA       IV Access Peripheral IV 10/02/20 Left Hand (Active)   Site Assessment WDL 10/02/20 1500   Line Status Infusing;Checked every 1 hour 10/02/20 1500   Dressing Intervention New dressing  10/02/20 0955   Phlebitis Scale 0-->no symptoms 10/02/20 1500   Infiltration Scale 0 10/02/20 1500   Number of days: 0      Blood Products Not applicable  mL   Intake/Output Date 10/02/20 0700 - 10/03/20 0659   Shift 0398-7461 8393-5861 1830-6099 24 Hour Total   INTAKE   I.V. 1300   1300   Shift Total(mL/kg) 1300(13.09)   1300(13.09)   OUTPUT   Blood 200   200   Shift Total(mL/kg) 200(2.01)   200(2.01)   Weight (kg) 99.3 99.3 99.3 99.3      Drains / Starks     Time of void PreOp Void Prior to Procedure: 0915 (10/02/20 0930)    PostOp      Diapered? No   Bladder Scan     PO    crackers, water and ice chips     Vitals    B/P: 115/62  T: 98.8  F (37.1  C)    Temp src: Oral  P:  Pulse: 65 (10/02/20 1535)          R: 8  O2:  SpO2: 99 %    O2 Device: Nasal cannula (10/02/20 1540)    Oxygen Delivery: 2 LPM (10/02/20 1540)         Family/support present significant other   Patient belongings     Patient transported on cart   DC meds/scripts (obs/outpt) Yes, meds and given to Karel CHASE 5th floor charge nurse   Inpatient Pain Meds Released? Yes       Special needs/considerations Locking hinged knee brace in place   Tasks needing completion None        Therese Bennett, RN  ASCOM [id number]

## 2020-10-02 NOTE — BRIEF OP NOTE
[facility]     Brief Operative Note    Pre-operative diagnosis: Chronic pain of right knee [M25.561, G89.29]  Post-operative diagnosis Lateral compartment OA; chronic pain right knee; RLE valgus deformity through the knee    Procedure: Procedure(s):  Examination under anesthesia right knee, right knee arthroscopy, open lateral retinacular lengthening  left distal femoral osteotomy, medial imbrication  Surgeon: Surgeon(s) and Role:     * Angel Madera MD - Primary     * Bayron Miranda MD - Resident - Assisting  Anesthesia: Combined General with Block   Estimated blood loss: 100 ml  Drains: None  Specimens: * No specimens in log *  Findings:   None.  Complications: None.  Implants:   Implant Name Type Inv. Item Serial No.  Lot No. LRB No. Used Action   ContourLock Femoral Osteotomy Plate, Right, S/M    [employer] [id number] Right 1 Implanted   6.5 X 60 MM CANCELLOUS SCREW    [employer] [id number] Right 1 Implanted   6.5 X 50 MM CANCELLOUS SCREW    [employer] [id number] Right 1 Implanted   4.5 X 48 MM CORTICAL SCREW    [employer] [id number] Right 1 Implanted   4.5 x 50mm cortical screw    [employer] [id number] Right 1 Implanted   4.5 x 40mm cortical screw    [employer] [id number] Right 2 Wasted   4.5 x 44mm cortical screw    [employer] [id number] Right 1 Implanted   4.5 x 44mm cortical screw    [employer] [id number] Right 1 Implanted   6.5 x 60mm cancellous screw    [employer] [id number] Right 1 Implanted   GRAFT BONE PUTTY DBX [id number] Bone/Tissue/Biologic GRAFT BONE PUTTY DBX [id number] MUSCULOSKELETAL CERVANTES  Right 1 Implanted     Post-Op Plan:  Assessment/Plan: Naman Jones is a 35 year old male with valgus deformity of the RLE leading to chronic knee pain and lateral compartment OA now s/p Rt opening wedge DFO, LRL, open medial capsule imbrication on 10/2/20 with Dr. Madera    Activity: Up with assist and assistive devices as needed until  independent. Knee ROM as tolerated when at rest   HKB at all times   Locked in extension when OOB   Unlocked 0-90 when at rst, in chair, or with PT  Weight bearing status: TTWB RLE  Antibiotics: Ancef x 24 hours.  Diet: Begin with clear fluids and progress diet as tolerated. Bowel regimen. Anti-emetics PRN.   DVT prophylaxis: mechanical and ASA while inpatient,  discharge on ASA 162mg daily  x 4 weeks.  Wound Care: steristrips, adaptic, 4x4, ABD, cast padding, ace wrap; dressing until POD 4  Pain management: transition from IV to orals as tolerated.   X-rays: XR of Rt knee in PACU  Physical Therapy:  ROM, ADL's.   Occupational Therapy: ADL's  Labs: Trend Hgb on POD #1, 2.   Consults: PT, OT. Hospitalist,, appreciate assistance in caring for this patient.   Follow-up: Clinic in 2 weeks for wound check with Dr. Madera; then at 6 weeks post op with repeat XR of operative knee    Disposition: Pending progress with therapies, pain control on orals, and medical stability, anticipate discharge to Home on POD #1-2.    Bayron Miranda MD 10/02/2020  Orthopaedic Surgery Resident  Pager: [phone]

## 2020-10-02 NOTE — ANESTHESIA CARE TRANSFER NOTE
Patient: Naman Jones    Procedure(s):  Examination under anesthesia right knee, right knee arthroscopy, open lateral retinacular lengthening  left distal femoral osteotomy, medial imbrication    Diagnosis: Chronic pain of right knee [M25.561, G89.29]  Diagnosis Additional Information: No value filed.    Anesthesia Type:   General     Note:  Airway :Face Mask  Patient transferred to:PACU  Comments: Patient is drowsy, easily arousable, VSS, normothermic. Complains of incisional pain, fentanyl titrated. Report to RN.Handoff Report: Identifed the Patient, Identified the Reponsible Provider, Reviewed the pertinent medical history, Discussed the surgical course, Reviewed Intra-OP anesthesia mangement and issues during anesthesia, Set expectations for post-procedure period and Allowed opportunity for questions and acknowledgement of understanding      Vitals: (Last set prior to Anesthesia Care Transfer)    CRNA VITALS  10/2/2020 1410 - 10/2/2020 1451      10/2/2020             NIBP:  108/70    Pulse:  74    NIBP Mean:  83    Temp:  37.1  C (98.8  F)    SpO2:  100 %    Resp Rate (observed):  16    EKG:  Sinus rhythm                Electronically Signed By: ARDEN Price CRNA  October 2, 2020  2:51 PM

## 2020-10-02 NOTE — CONSULTS
HOSPITALIST CONSULTATION     REQUESTING PHYSICIAN: Angel Madera MD    REASON FOR CONSULTATION: Evaluation, Recommendations and Co-management of Medical Comorbidities.     ASSESSMENT & PLAN :     Naman Jones is a 35 year old male with valgus deformity of the RLE leading to chronic knee pain and lateral compartment OA now s/p Rt opening wedge DFO, LRL, open medial capsule imbrication on 10/2/20 with Dr. Madera  EBL: 100 ml. No complication.     PMH, Pre Op eval reviewed.   Currently incisional pain otherwise doing well.        # Orthopedic Surgery:     Post Op Management per Primary team.     Hemodynamics: stable. Continue on gentle IV fluids, until adequate PO. Monitor I/o.   Post op capnography per protocol.     Pain control- per Primary team and/or Pain team.    Minimize use of narcotics as able. Consider bowel regimen with narcotics.   Encourage Incentive spirometry to prevent atelectasis.  DVT prophylaxis- per Primary team. mechanical and ASA while inpatient,  discharge on ASA 162mg daily  x 4 weeks.  Activity, antibiotics, wound and/or drain care - per Primary team. : Ancef x 24 hours.  Anemia of acute blood loss: Pre op Hgb 16.0 . Monitor hgb for anemia of acute blood loss. Transfuse for hgb <7.0    #DM Type 2: 09/29: A1C: 7.3  Hold home dose of metformin and Sitagliptin  Continue medium intensity sliding scale insulin  Can resume to home meds tomorrow if BMP stable   Monitor BG     #Dyslipidemia:  Continue home dose of Lipitor 40 mg     #Depression, anxiety disorder:   Continue Buspar 5 mg BID , lorazepam, hydroxyzine prn.      # GERD: PTA PPI      Thank you for the consultation. Please page with any question. Hospitalist team to follow.      Enmanuel Lea MD   Hospitalist, Internal Medicine  Pager: [phone].     CHIEF COMPLAINT: Incisional pain.     HISTORY OF PRESENT ILLNESS: Obtained from the patient and chart review including Pre op evaluation,  procedure note.    35 year old year old male  with above discussed medical problems s/p above procedure admitted on 10/2/2020  for post op care and monitoring  (for further details for indication of surgery and operative note, please refer to Angel Madera MD note). Medicine consulted to evaluate, recommend and/or co manage medical co morbidities.   No documented hypotension, hypoxemia or other significant complications intra or post operative.   Currently: Incisional Pain +. Denies any chest pain, shortness of breath or LH or palpitations. Denies any nausea, vomiting or pain abdomen. No fever or chills.  Denies any new rash.   Medical issues as discussed above.   Denies any other medical concern.     PAST MEDICAL HISTORY:   Past Medical History:   Diagnosis Date     Diabetes (H)      Hypertension        PAST SURGICAL HISTORY:   Past Surgical History:   Procedure Laterality Date     ARTHROSCOPY KNEE WITH PATELLAR REALIGNMENT Left 2/28/2020    Procedure: Examination under anesthesia Left knee,  medial reefing/imbrication;  Surgeon: Angel Madera MD;  Location: [facility] OR     ARTHROSCOPY KNEE WITH RETINACULAR RELEASE Left 2/28/2020    Procedure: Left lateral retinacular lengthening;  Surgeon: Angel Madera MD;  Location: [facility]     LAPAROSCOPIC BYPASS GASTRIC N/A 10/9/2018    Procedure: LAPAROSCOPIC BYPASS GASTRIC;  LAPAROSCOPIC WELLINGTON-EN Y GASTRIC BYPASS;  Surgeon: Alden Mcwilliams MD;  Location:  OR     ORTHOPEDIC SURGERY Right 02/2016     OSTEOTOMY FEMUR DISTAL Left 2/28/2020    Procedure: Left distal femoral osteotomy;  Surgeon: Angel Madera MD;  Location: [facility] OR       FH: reviewed.     Family History   Problem Relation Age of Onset     Glaucoma Father      Diabetes Father      Prostate Cancer Paternal Grandfather      Macular Degeneration No family hx of         SH: reviewed.     Social History     Socioeconomic History     Marital status:      Spouse  name: None     Number of children: None     Years of education: None     Highest education level: Bachelor's degree (e.g., BA, AB, BS)   Occupational History     None   Social Needs     Financial resource strain: Not very hard     Food insecurity     Worry: Never true     Inability: Never true     Transportation needs     Medical: No     Non-medical: No   Tobacco Use     Smoking status: Never Smoker     Smokeless tobacco: Never Used   Substance and Sexual Activity     Alcohol use: Yes     Comment: socially     Drug use: No     Sexual activity: Yes     Partners: Female   Lifestyle     Physical activity     Days per week: 2 days     Minutes per session: 30 min     Stress: To some extent   Relationships     Social connections     Talks on phone: More than three times a week     Gets together: Once a week     Attends Shinto service: Never     Active member of club or organization: No     Attends meetings of clubs or organizations: 1 to 4 times per year     Relationship status:      Intimate partner violence     Fear of current or ex partner: None     Emotionally abused: None     Physically abused: None     Forced sexual activity: None   Other Topics Concern     Parent/sibling w/ CABG, MI or angioplasty before 65F 55M? Not Asked   Social History Narrative     None       ALLERGIES:   Allergies   Allergen Reactions     Sulfa Drugs Anaphylaxis     Oxycodone Other (See Comments) and Itching     Flushed     Lisinopril      Other reaction(s): Impotence     Onion      Other reaction(s): Intolerance-Can't Take         HOME MEDICATIONS:     Prior to Admission medications    Medication Sig Start Date End Date Taking? Authorizing Provider   acetaminophen (TYLENOL) 325 MG tablet Take 2 tablets (650 mg) by mouth every 4 hours 10/2/20  Yes Angel Madera MD   aspirin (ASA) 81 MG EC tablet Take 2 tablets (162 mg) by mouth daily for 28 days 10/2/20 10/30/20 Yes Angel Madera MD   atorvastatin  (LIPITOR) 40 MG tablet Take 1 tablet (40 mg) by mouth daily 9/24/20  Yes Haase, Susan Rachele, APRN CNP   busPIRone (BUSPAR) 5 MG tablet TAKE 1 TABLET(5 MG) BY MOUTH TWICE DAILY  Patient taking differently: 15 mg 2 times daily  7/28/20  Yes Haase, Susan Rachele, APRN CNP   Calcium Carb-Cholecalciferol (CALCIUM 600 + D PO) Take 1 tablet by mouth 2 times daily    Yes Reported, Patient   Continuous Blood Gluc  (FREESTYLE KEILA READER) SUGAR 1 each continuous 10/26/18  Yes Annette Hobson APRN CNP   Continuous Blood Gluc Sensor (FREESTYLE KEILA 14 DAY SENSOR) XX MISC Inject 1 each Subcutaneous every 14 days 2/21/20  Yes Annette Hobson APRN CNP   Cyanocobalamin (B-12) 500 MCG SUBL Place 1 tablet under the tongue daily   Yes Reported, Patient   diclofenac (VOLTAREN) 75 MG EC tablet TAKE 1 TABLET(75 MG) BY MOUTH TWICE DAILY 9/22/20  Yes Angel Madera MD   eszopiclone (LUNESTA) 2 MG tablet Take 2 mg by mouth At Bedtime   Yes Reported, Patient   HYDROmorphone (DILAUDID) 2 MG tablet Take 1 tablet (2 mg) by mouth every 4 hours as needed for pain 10/2/20  Yes Jim Rod PA-C   hydrOXYzine (ATARAX) 50 MG tablet Take 1 tablet (50 mg) by mouth 2 times daily May take up to 3 times daily if needed 9/24/20  Yes Haase, Susan Rachele, APRN CNP   LORazepam (ATIVAN) 1 MG tablet Take 1 mg by mouth every 6 hours as needed for anxiety   Yes Reported, Patient   metFORMIN (GLUCOPHAGE) 500 MG tablet Take 1 tablet (500 mg) by mouth 2 times daily (with meals) 9/24/20  Yes Haase, Susan Rachele, APRN CNP   multivitamin  peds with iron (FLINTSTONES COMPLETE) 60 MG chewable tablet Take 2 chew tab by mouth every morning    Yes Reported, Patient   omeprazole (PRILOSEC) 20 MG DR capsule Take 1 capsule (20 mg) by mouth daily 9/2/20  Yes Angel Madera MD   polyethylene glycol (MIRALAX) 17 g packet Take 17 g by mouth daily 10/2/20  Yes Angel Madera MD   semaglutide (OZEMPIC, 1  "MG/DOSE,) 2 MG/1.5ML pen Inject 0.25 mg weekly x 4 weeks then increase to 0.5 mg weekly 20  Yes Annette Hobson APRN CNP   senna-docusate (SENOKOT-S/PERICOLACE) 8.6-50 MG tablet Take 1-2 tablets by mouth 2 times daily as needed for constipation Take while on oral narcotics to prevent or treat constipation. 10/2/20  Yes Angel Madera MD       CURRENT MEDICATIONS:    Current Facility-Administered Medications   Medication     acetaminophen (TYLENOL) tablet 975 mg     [START ON 10/3/2020] aspirin EC tablet 162 mg     benzocaine-menthol (CEPACOL) 15-3.6 MG lozenge 1-2 lozenge     calcium carbonate (TUMS) chewable tablet 1,000 mg     ceFAZolin (ANCEF) intermittent infusion 2 g in 100 mL dextrose PRE-MIX     glucose gel 15-30 g    Or     dextrose 50 % injection 25-50 mL    Or     glucagon injection 1 mg     HYDROmorphone (DILAUDID) tablet 2-4 mg     HYDROmorphone (PF) (DILAUDID) injection 0.2-0.4 mg     ketorolac (TORADOL) injection 15 mg     lactated ringers infusion     lidocaine (LMX4) cream     lidocaine 1 % 0.1-1 mL     naloxone (NARCAN) injection 0.1-0.4 mg     naloxone (NARCAN) injection 0.1-0.4 mg     ondansetron (ZOFRAN-ODT) ODT tab 4 mg    Or     ondansetron (ZOFRAN) injection 4 mg     prochlorperazine (COMPAZINE) injection 10 mg    Or     prochlorperazine (COMPAZINE) tablet 10 mg     sodium chloride (PF) 0.9% PF flush 3 mL     sodium chloride (PF) 0.9% PF flush 3 mL         ROS: 10 point ROS neg other than the symptoms noted above in the HPI.    PHYSICAL EXAMINATION:     /72 (BP Location: Left arm)   Pulse 68   Temp 98.5  F (36.9  C) (Oral)   Resp 8   Ht 1.854 m (6' 0.99\")   Wt 99.3 kg (218 lb 14.7 oz)   SpO2 99%   BMI 28.89 kg/m    Temp (24hrs), Av.7  F (37.1  C), Min:98.2  F (36.8  C), Max:99.3  F (37.4  C)      BMI= Body mass index is 28.89 kg/m .      Intake/Output Summary (Last 24 hours) at 10/2/2020 9420  Last data filed at 10/2/2020 1639  Gross per 24 hour   Intake " 2700 ml   Output 200 ml   Net 2500 ml       General: Alert, interactive, NAD.   HEENT: AT/NC. Anicteric.Moist MM.    Neck: Supple.   Heart/CVS: Normal S1 and S2. Regular.   Chest/Respi: Non labored breathing. CTA BL.   Abdomen/GI: Soft, non distended, non tender. No g/r.  Extremities/MSK: Distal pulses 2+, well perfused. Rest per ortho.   Neuro: Alert and oriented x4. Cranial nerves II-XII are grossly intact.    Skin:  No new rash over exposed areas.       LABORATORY DATA: reviewed.     Recent Results (from the past 24 hour(s))   Glucose by meter    Collection Time: 10/02/20  9:05 AM   Result Value Ref Range    Glucose 217 (H) 70 - 99 mg/dL   Glucose by meter    Collection Time: 10/02/20 11:02 AM   Result Value Ref Range    Glucose 173 (H) 70 - 99 mg/dL   Glucose    Collection Time: 10/02/20 12:41 PM   Result Value Ref Range    Glucose 165 (H) 70 - 99 mg/dL   Glucose by meter    Collection Time: 10/02/20  3:06 PM   Result Value Ref Range    Glucose 158 (H) 70 - 99 mg/dL       Recent Results (from the past 24 hour(s))   POC US Guidance Needle Placement    Impression    Right adductor canal saphenous nerve block   XR Surgery LORENZO L/T 5 Min Fluoro    Narrative    This exam was marked as non-reportable because it will not be read by a   radiologist or a [place] non-radiologist provider.         XR Knee Port Right 1/2 Views    Narrative    Exam: XR KNEE PORT RT 1/2 VW, 10/2/2020 4:25 PM    Indication: Post-Op Total Knee    Comparison: Radiographs 8/31/2020.    Findings:   AP and lateral views of the right knee.     Postsurgical changes of distal femoral osteotomy with lateral plate  and screw fixation. The most proximal and third most proximal screws  slightly protruded through the medial femoral cortex. There is  approximately 2 mm lucency between lateral femoral cortex and the  distal portion of the fixation plate. On the lateral view, there is a  longitudinal lucent line which extends from the proximal aspect of  the  fixation plate and tapers along its proximal course, concerning for a  periprosthetic fracture.     There is soft tissue swelling about the knee and air within the joint  space and overlying soft tissues, presumably postprocedural.     Moderate knee effusion is present. The medial lateral compartment  joint spaces are preserved. Tiny osteophytes. Enthesopathic changes of  the distal quadriceps tendon with discontinuous fragment.      Impression    Impression:   1. Periprosthetic fracture in the mid/distal femur.  2. New postsurgical changes of distal femoral osteotomy and plate and  screw fixation.     Finding #1 discussed with Dr. NICHOLE at 5:13 PM by Dr. Garcia.      I have personally reviewed the examination and initial interpretation  and I agree with the findings.    FREDY Lea MD

## 2020-10-02 NOTE — PROVIDER NOTIFICATION
Low BP 60s/40s map 40s. Anesthesiologiss at bedside  ml bolus and Ephedrine 10 mg IV given BP recovered to SBP 110s Map 70s

## 2020-10-03 LAB
BUN SERPL-MCNC: 9 MG/DL (ref 7–30)
CALCIUM SERPL-MCNC: 8.1 MG/DL (ref 8.5–10.1)
CHLORIDE SERPL-SCNC: 106 MMOL/L (ref 94–109)
CO2 SERPL-SCNC: 24 MMOL/L (ref 20–32)
CREAT SERPL-MCNC: 0.56 MG/DL (ref 0.66–1.25)
GFR SERPL CREATININE-BSD FRML MDRD: >90 ML/MIN/{1.73_M2}
GLUCOSE BLDC GLUCOMTR-MCNC: 138 MG/DL (ref 70–99)
GLUCOSE BLDC GLUCOMTR-MCNC: 193 MG/DL (ref 70–99)
GLUCOSE BLDC GLUCOMTR-MCNC: 233 MG/DL (ref 70–99)
GLUCOSE BLDC GLUCOMTR-MCNC: 233 MG/DL (ref 70–99)
GLUCOSE SERPL-MCNC: 218 MG/DL (ref 70–99)
HGB BLD-MCNC: 13.5 G/DL (ref 13.3–17.7)
POTASSIUM SERPL-SCNC: 3.8 MMOL/L (ref 3.4–5.3)
SODIUM SERPL-SCNC: 138 MMOL/L (ref 133–144)

## 2020-10-03 PROCEDURE — 96372 THER/PROPH/DIAG INJ SC/IM: CPT | Performed by: INTERNAL MEDICINE

## 2020-10-03 PROCEDURE — 80048 BASIC METABOLIC PNL TOTAL CA: CPT | Performed by: INTERNAL MEDICINE

## 2020-10-03 PROCEDURE — 250N000012 HC RX MED GY IP 250 OP 636 PS 637: Performed by: INTERNAL MEDICINE

## 2020-10-03 PROCEDURE — 120N000002 HC R&B MED SURG/OB UMMC

## 2020-10-03 PROCEDURE — 85018 HEMOGLOBIN: CPT | Performed by: ORTHOPAEDIC SURGERY

## 2020-10-03 PROCEDURE — 999N001017 HC STATISTIC GLUCOSE BY METER IP

## 2020-10-03 PROCEDURE — 250N000013 HC RX MED GY IP 250 OP 250 PS 637: Performed by: ORTHOPAEDIC SURGERY

## 2020-10-03 PROCEDURE — 258N000003 HC RX IP 258 OP 636: Performed by: ORTHOPAEDIC SURGERY

## 2020-10-03 PROCEDURE — 36415 COLL VENOUS BLD VENIPUNCTURE: CPT | Performed by: ORTHOPAEDIC SURGERY

## 2020-10-03 PROCEDURE — 250N000011 HC RX IP 250 OP 636: Performed by: ORTHOPAEDIC SURGERY

## 2020-10-03 PROCEDURE — 99232 SBSQ HOSP IP/OBS MODERATE 35: CPT | Performed by: INTERNAL MEDICINE

## 2020-10-03 PROCEDURE — 250N000013 HC RX MED GY IP 250 OP 250 PS 637: Performed by: INTERNAL MEDICINE

## 2020-10-03 PROCEDURE — 36415 COLL VENOUS BLD VENIPUNCTURE: CPT | Performed by: INTERNAL MEDICINE

## 2020-10-03 PROCEDURE — 250N000011 HC RX IP 250 OP 636: Performed by: INTERNAL MEDICINE

## 2020-10-03 RX ORDER — KETOROLAC TROMETHAMINE 15 MG/ML
15 INJECTION, SOLUTION INTRAMUSCULAR; INTRAVENOUS EVERY 6 HOURS PRN
Status: DISCONTINUED | OUTPATIENT
Start: 2020-10-03 | End: 2020-10-04 | Stop reason: ALTCHOICE

## 2020-10-03 RX ORDER — KETOROLAC TROMETHAMINE 30 MG/ML
30 INJECTION, SOLUTION INTRAMUSCULAR; INTRAVENOUS ONCE
Status: COMPLETED | OUTPATIENT
Start: 2020-10-03 | End: 2020-10-03

## 2020-10-03 RX ADMIN — ATORVASTATIN CALCIUM 40 MG: 40 TABLET, FILM COATED ORAL at 08:57

## 2020-10-03 RX ADMIN — INSULIN ASPART 2 UNITS: 100 INJECTION, SOLUTION INTRAVENOUS; SUBCUTANEOUS at 19:33

## 2020-10-03 RX ADMIN — BUSPIRONE HYDROCHLORIDE 15 MG: 15 TABLET ORAL at 08:57

## 2020-10-03 RX ADMIN — HYDROMORPHONE HYDROCHLORIDE 0.4 MG: 1 INJECTION, SOLUTION INTRAMUSCULAR; INTRAVENOUS; SUBCUTANEOUS at 20:40

## 2020-10-03 RX ADMIN — KETOROLAC TROMETHAMINE 15 MG: 30 INJECTION, SOLUTION INTRAMUSCULAR at 12:07

## 2020-10-03 RX ADMIN — HYDROMORPHONE HYDROCHLORIDE 4 MG: 2 TABLET ORAL at 00:09

## 2020-10-03 RX ADMIN — HYDROMORPHONE HYDROCHLORIDE 4 MG: 2 TABLET ORAL at 16:24

## 2020-10-03 RX ADMIN — HYDROMORPHONE HYDROCHLORIDE 4 MG: 2 TABLET ORAL at 03:30

## 2020-10-03 RX ADMIN — KETOROLAC TROMETHAMINE 15 MG: 30 INJECTION, SOLUTION INTRAMUSCULAR at 00:12

## 2020-10-03 RX ADMIN — HYDROMORPHONE HYDROCHLORIDE 4 MG: 2 TABLET ORAL at 12:54

## 2020-10-03 RX ADMIN — KETOROLAC TROMETHAMINE 15 MG: 30 INJECTION, SOLUTION INTRAMUSCULAR at 06:47

## 2020-10-03 RX ADMIN — METFORMIN HYDROCHLORIDE 500 MG: 500 TABLET ORAL at 19:31

## 2020-10-03 RX ADMIN — Medication 500 MCG: at 09:01

## 2020-10-03 RX ADMIN — ACETAMINOPHEN 975 MG: 325 TABLET, FILM COATED ORAL at 16:24

## 2020-10-03 RX ADMIN — HYDROMORPHONE HYDROCHLORIDE 4 MG: 2 TABLET ORAL at 19:31

## 2020-10-03 RX ADMIN — HYDROMORPHONE HYDROCHLORIDE 4 MG: 2 TABLET ORAL at 09:58

## 2020-10-03 RX ADMIN — KETOROLAC TROMETHAMINE 30 MG: 30 INJECTION, SOLUTION INTRAMUSCULAR at 22:07

## 2020-10-03 RX ADMIN — INSULIN ASPART 2 UNITS: 100 INJECTION, SOLUTION INTRAVENOUS; SUBCUTANEOUS at 09:02

## 2020-10-03 RX ADMIN — LORAZEPAM 1 MG: 1 TABLET ORAL at 12:18

## 2020-10-03 RX ADMIN — ACETAMINOPHEN 975 MG: 325 TABLET, FILM COATED ORAL at 06:46

## 2020-10-03 RX ADMIN — OMEPRAZOLE 20 MG: 20 CAPSULE, DELAYED RELEASE ORAL at 08:57

## 2020-10-03 RX ADMIN — ASPIRIN 162 MG: 81 TABLET, COATED ORAL at 08:56

## 2020-10-03 RX ADMIN — SODIUM CHLORIDE, POTASSIUM CHLORIDE, SODIUM LACTATE AND CALCIUM CHLORIDE: 600; 310; 30; 20 INJECTION, SOLUTION INTRAVENOUS at 10:06

## 2020-10-03 RX ADMIN — INSULIN ASPART 2 UNITS: 100 INJECTION, SOLUTION INTRAVENOUS; SUBCUTANEOUS at 12:05

## 2020-10-03 RX ADMIN — HYDROMORPHONE HYDROCHLORIDE 4 MG: 2 TABLET ORAL at 23:28

## 2020-10-03 RX ADMIN — BUSPIRONE HYDROCHLORIDE 15 MG: 15 TABLET ORAL at 20:39

## 2020-10-03 RX ADMIN — HYDROMORPHONE HYDROCHLORIDE 4 MG: 2 TABLET ORAL at 06:46

## 2020-10-03 RX ADMIN — LORAZEPAM 1 MG: 1 TABLET ORAL at 19:31

## 2020-10-03 RX ADMIN — CEFAZOLIN SODIUM 2 G: 2 INJECTION, SOLUTION INTRAVENOUS at 06:49

## 2020-10-03 RX ADMIN — ACETAMINOPHEN 975 MG: 325 TABLET, FILM COATED ORAL at 23:28

## 2020-10-03 NOTE — PLAN OF CARE
Brief ortho update:    Reviewed full length femur XR -- fracture extends proximally but is non-displaced and does not appear to involve both cortices.    Will return to previous activity and WB precautions -- TTWB RLE with HKB on and locked when up    Patient discussed with Dr. Madera.    Jean Claude Burks MD  Orthopaedic Surgery PGY-4  #: [phone]

## 2020-10-03 NOTE — PLAN OF CARE
Attempted to see 2x to initiate PT evaluation, but pt reports significant pain and states he is unable to participate in PT today. Per pt request, will reschedule initial PT evaluation for tomorrow.

## 2020-10-03 NOTE — PLAN OF CARE
Pt. A&Ox4. VSS. Temp 99.6 at HS, last check was 97.2. Lung sounds CTA. Maintaining sats on RA. IS encouraged, tolerating well, achieving 3250mL. Bowel sounds active, LBM 10/1, flatus +. CMS and neuro's are intact. Denies numbness and tingling in all extremities. Denies nausea, shortness of breath, and chest pain. RLE pain managed w/ scheduled Tylenol, Toradol, prn PO Dilaudid 4mg and ice applied. Voids spontaneously without difficulty via urinal, last PVR 108mL. Tolerating regular diet. RLE incisional dressing is CDI. HKB in place, 0-90, to be locked when OOB. TTWB. Pt not OOB overnight. PIV is patent and infusing. Pt declined PCDs overnight as he stated he wasn't able to sleep with them. Bilateral heels are elevated off the bed. Call light is within reach, pt able to make needs known and is resting comfortably between cares. Will continue to monitor.

## 2020-10-03 NOTE — PROGRESS NOTES
Patient called on phone. Overall seems to be doing well. Stuggling with pain control. Seems worse this time than last by patient report    Reports moving toes  Vitals and labs stable    A: POD 1 DFO    P: TTWB x 6 weeks  Hinged knee brace on and locked when up and around  ASA x 1 month  Pain control  PT/OT tomorrow  F/u with me 1 week postop

## 2020-10-03 NOTE — PROGRESS NOTES
"[facility], [place]   Internal Medicine Daily Note           Interval History/Events       Overnight events reviewed  Hand off taken  Pain control has not been adequate  No chest pain, shortness of breath  No fever, chills  No lightheadedness or dizziness         Review of Systems        4 point ROS including Respiratory, CV, GI and , other than that noted above is negative      Medications   I have reviewed current medications  in the \"current medication\" section of Epic.  Relevant changes include:     Physical Exam   General:       Vital signs:    Blood pressure 129/73, pulse 86, temperature 98  F (36.7  C), temperature source Oral, resp. rate 8, height 1.854 m (6' 0.99\"), weight 99.3 kg (218 lb 14.7 oz), SpO2 96 %.  Estimated body mass index is 28.89 kg/m  as calculated from the following:    Height as of this encounter: 1.854 m (6' 0.99\").    Weight as of this encounter: 99.3 kg (218 lb 14.7 oz).      Intake/Output Summary (Last 24 hours) at 10/3/2020 1331  Last data filed at 10/3/2020 1210  Gross per 24 hour   Intake 1940 ml   Output 1250 ml   Net 690 ml        Constitutional: Laying in bed in no acute distress  Eye: No icterus, no pallor  Mouth/ENT: Normal oral mucosa  Cardiovascular: S1, S2 normal.   Respiratory: B/l CTA  GI: Soft, NT, BS+  : No ayala's catheter  Neurology:   Psych:   MSK: Right leg dressing in place.   Integumentary:   Heme/Lymph/Imm:      Laboratory and Imaging Studies     I have reviewed  laboratory and imaging studies in the Epic. Pertinent findings are as below:    BMP  Recent Labs   Lab 10/03/20  0731 10/02/20  1241 09/29/20  0810     --  140   POTASSIUM 3.8  --  3.8   CHLORIDE 106  --  109   KALINA 8.1*  --  9.2   CO2 24  --  26   BUN 9  --  8   CR 0.56*  --  0.57*   * 165* 170*     CBC  Recent Labs   Lab 10/03/20  0731 09/29/20  0810   WBC  --  6.3   RBC  --  5.34   HGB 13.5 16.0   HCT  --  46.1   MCV  --  86   MCH  --  30.0   MCHC  --  34.7 "   RDW  --  13.4   PLT  --  257     INRNo lab results found in last 7 days.  LFTs  Recent Labs   Lab 09/29/20  0810   ALKPHOS 121   AST 15   ALT 53   BILITOTAL 0.8   PROTTOTAL 7.5   ALBUMIN 4.1      PANCNo lab results found in last 7 days.        Impression/Plan      35 year old male with valgus deformity of the RLE leading to chronic knee pain and lateral compartment OA now s/p Rt opening wedge DFO, LRL, open medial capsule imbrication on 10/2/20 with Dr. Madera  EBL: 100 ml. No complication.      PMH, Pre Op eval reviewed.   Currently incisional pain otherwise doing well.        # s/p  Rt opening wedge DFO, LRL, open medial capsule imbrication on 10/2/20    Management primarily per Ortho team.  - Wound cares, Dressings, Surgical pain management, DVT Prophylaxis  per primary team.   - Monitor anemia, hemodynamics, Input/Output, Capnography (for 24 hours)  - Encourage Incentive spirometry  - Laxatives for constipation prophylaxis       # DM Type 2: 09/29: A1C: 7.3  PTA on Metformin, and Semaglutide weekly  Blood sugar high  - Restart Metformin  - Continue  medium intensity correction     #Dyslipidemia:  Continue home dose of Lipitor 40 mg     #Depression, anxiety disorder:   Continue Buspar 5 mg BID , lorazepam, hydroxyzine prn.      # GERD: PTA PPI                         Pt's care was discussed with bedside RN, patient and  during Care Team Rounds.               Agustin Paris MD  Hospitalist ( Internal medicine)  Pager: [phone]

## 2020-10-03 NOTE — PLAN OF CARE
OT: Per PT evaluation, patient in significant pain today. Plan to reschedule evaluation for tomorrow 10/4.

## 2020-10-03 NOTE — PROGRESS NOTES
VS: VSS   O2: >90% on room air    Output: Waiting on void.    Last BM: 10/1/2020.    Activity: Pt was on bedrest prior to Xray. After Xray Ortho put in order for pt to be Toe Touch.    Up for meals? In bed   Skin: Incision on Right Knee. Skin otherwise in tact. Scar from previous surgery on the other knee (left knee)   Pain: Pt pain has been high. Giving PRN dilaudid IV or oral. See MAR   CMS: Intact. Some baseline lack of sensation per pt in lower extremities but he doesn't describe them as numbness or tingling    Dressing: CDI   Diet: Regular. Has been tolerating well   LDA: PIV in left hand. infusing   Equipment: IV pole. Hinged knee brace    Plan: tbd    Additional Info: Pt is diabetic.

## 2020-10-03 NOTE — PROGRESS NOTES
Orthopaedic Surgery Progress Note 10/03/2020    E: No acute events overnight.    S: Pain well controlled on oral dilaudid, tylenol. Has not been out of bed yet. Denies numbness or tingling.  States this side is more painful than the left.  Medications helping.  No numbness/tingling.  Discussed intra-operative fracture, need for additional XRs yesterday. Patient understanding.  Plan of care reviewed and questions answered    O:  Temp: 97.2  F (36.2  C) Temp src: Oral BP: 131/75 Pulse: 71   Resp: 11 SpO2: 98 % O2 Device: None (Room air) Oxygen Delivery: 2 LPM    Exam:  Gen: No acute distress, resting comfortably in bed.  Resp: Non-labored breathing    MSK:   Focused exam of RLE shows HKB in place and fitting appropriately.    ACE c/d/i  Distally, SILT s/s/sp/dp/t nerve distributions  Firing EHL/FHL/TA/GSC  Foot is wwp, palpable DP pulse      Recent Labs   Lab 09/29/20  0810   WBC 6.3   HGB 16.0            Assessment/Plan: Naman Jones is a 35 year old male with valgus deformity of the RLE leading to chronic knee pain and lateral compartment OA now s/p Rt opening wedge DFO, LRL, open medial capsule imbrication on 10/2/20 with Dr. Madera, complicated by intra-operative periprosthetic fracture diagnosed on post-operative X-ray.      Intra-op fracture is non-displaced as confirmed on full length femur films on 10/2.  Will maintain TTWB RLE, same brace restrictions (see below).  No plans for RTOR for fracture fixation.     Activity: Up with assist and assistive devices as needed until independent. Knee ROM as tolerated when at rest   HKB may be off at rest, on when OOB                  Locked in extension when OOB                  Unlocked 0-90 when at rst, in chair, or with PT  Weight bearing status: TTWB RLE  Antibiotics: Ancef x 24 hours.  Diet: Begin with clear fluids and progress diet as tolerated. Bowel regimen. Anti-emetics PRN.   DVT prophylaxis: mechanical and ASA while inpatient,  discharge on ASA  162mg daily  x 4 weeks.  Wound Care: steristrips, adaptic, 4x4, ABD, cast padding, ace wrap; dressing until POD 4  Pain management: transition from IV to orals as tolerated.   X-rays: XR of Rt knee in PACU  Physical Therapy:  ROM, ADL's.   Occupational Therapy: ADL's  Labs: Trend Hgb on POD #1, 2.   Consults: PT, OT. Hospitalist,, appreciate assistance in caring for this patient.   Follow-up: Clinic in 2 weeks for wound check with Dr. Madera; then at 6 weeks post op with repeat XR of operative knee     Disposition: Pending progress with therapies, pain control on orals, and medical stability, anticipate discharge to Home on POD #1-2.  Patient's pain will likely limit progress with PT today.  Plan for discharge to home tomorrow, 10/4, if passes PT and pain is controlled on oral medications.      Jean Claude Burks MD 10/03/2020  Orthopaedic Surgery Resident, PGY-4  Pager: [phone]    For questions about this patient, please attempt to contact me at my pager prior to contacting the orthopaedics resident on call. Thank you!

## 2020-10-03 NOTE — PLAN OF CARE
Patient A&O x4 and able to make his needs known. Bowel sounds active in all 4Qs and passing gas. Denied SOB/SHIPLEY. Denied CP, lightheadedness, dizziness, numbness, tingling.  Piv fluids infusing and pt is  drinking well and voiding spontaneously without difficulties. Able to wiggle toes and CMS is intact. Pain is tolerable and taking Dilaudid but pt wanted dose to be increased. Writer spoke with OC and stated will increase the dose. Applied  ice pack  and encouraged incentive spirometer and done several times, repositioned and turned in bed, heels elevated off bed, demonstrates the ability to use call light appropriately, will continue with POC.

## 2020-10-04 ENCOUNTER — APPOINTMENT (OUTPATIENT)
Dept: PHYSICAL THERAPY | Facility: CLINIC | Age: 35
DRG: 481 | End: 2020-10-04
Attending: ORTHOPAEDIC SURGERY
Payer: COMMERCIAL

## 2020-10-04 LAB
GLUCOSE BLDC GLUCOMTR-MCNC: 162 MG/DL (ref 70–99)
GLUCOSE BLDC GLUCOMTR-MCNC: 169 MG/DL (ref 70–99)
GLUCOSE BLDC GLUCOMTR-MCNC: 169 MG/DL (ref 70–99)
GLUCOSE BLDC GLUCOMTR-MCNC: 185 MG/DL (ref 70–99)

## 2020-10-04 PROCEDURE — 99232 SBSQ HOSP IP/OBS MODERATE 35: CPT | Performed by: INTERNAL MEDICINE

## 2020-10-04 PROCEDURE — 97161 PT EVAL LOW COMPLEX 20 MIN: CPT | Mod: GP | Performed by: CLINIC/CENTER

## 2020-10-04 PROCEDURE — 120N000002 HC R&B MED SURG/OB UMMC

## 2020-10-04 PROCEDURE — 250N000013 HC RX MED GY IP 250 OP 250 PS 637: Performed by: ORTHOPAEDIC SURGERY

## 2020-10-04 PROCEDURE — 97530 THERAPEUTIC ACTIVITIES: CPT | Mod: GP | Performed by: CLINIC/CENTER

## 2020-10-04 PROCEDURE — 999N001017 HC STATISTIC GLUCOSE BY METER IP

## 2020-10-04 PROCEDURE — 250N000013 HC RX MED GY IP 250 OP 250 PS 637: Performed by: INTERNAL MEDICINE

## 2020-10-04 PROCEDURE — 250N000011 HC RX IP 250 OP 636: Performed by: INTERNAL MEDICINE

## 2020-10-04 PROCEDURE — 250N000011 HC RX IP 250 OP 636: Performed by: STUDENT IN AN ORGANIZED HEALTH CARE EDUCATION/TRAINING PROGRAM

## 2020-10-04 PROCEDURE — 250N000013 HC RX MED GY IP 250 OP 250 PS 637: Performed by: STUDENT IN AN ORGANIZED HEALTH CARE EDUCATION/TRAINING PROGRAM

## 2020-10-04 RX ORDER — POLYETHYLENE GLYCOL 3350 17 G/17G
1 POWDER, FOR SOLUTION ORAL DAILY
Status: ON HOLD | COMMUNITY
End: 2020-10-05

## 2020-10-04 RX ORDER — HYDROMORPHONE HYDROCHLORIDE 2 MG/1
4-6 TABLET ORAL
Status: DISCONTINUED | OUTPATIENT
Start: 2020-10-04 | End: 2020-10-05 | Stop reason: HOSPADM

## 2020-10-04 RX ORDER — HYDROXYZINE HYDROCHLORIDE 50 MG/1
50 TABLET, FILM COATED ORAL 2 TIMES DAILY
Status: DISCONTINUED | OUTPATIENT
Start: 2020-10-04 | End: 2020-10-05 | Stop reason: HOSPADM

## 2020-10-04 RX ORDER — BUSPIRONE HYDROCHLORIDE 15 MG/1
15 TABLET ORAL 2 TIMES DAILY
COMMUNITY
End: 2022-04-06

## 2020-10-04 RX ORDER — METHOCARBAMOL 500 MG/1
500 TABLET, FILM COATED ORAL 4 TIMES DAILY PRN
Status: DISCONTINUED | OUTPATIENT
Start: 2020-10-04 | End: 2020-10-05 | Stop reason: HOSPADM

## 2020-10-04 RX ORDER — KETOROLAC TROMETHAMINE 15 MG/ML
15 INJECTION, SOLUTION INTRAMUSCULAR; INTRAVENOUS EVERY 6 HOURS
Status: COMPLETED | OUTPATIENT
Start: 2020-10-04 | End: 2020-10-04

## 2020-10-04 RX ADMIN — METHOCARBAMOL 500 MG: 500 TABLET ORAL at 12:34

## 2020-10-04 RX ADMIN — INSULIN ASPART 1 UNITS: 100 INJECTION, SOLUTION INTRAVENOUS; SUBCUTANEOUS at 13:44

## 2020-10-04 RX ADMIN — OMEPRAZOLE 20 MG: 20 CAPSULE, DELAYED RELEASE ORAL at 09:06

## 2020-10-04 RX ADMIN — Medication 500 MCG: at 09:06

## 2020-10-04 RX ADMIN — HYDROXYZINE HYDROCHLORIDE 50 MG: 50 TABLET, FILM COATED ORAL at 20:12

## 2020-10-04 RX ADMIN — HYDROMORPHONE HYDROCHLORIDE 6 MG: 2 TABLET ORAL at 20:11

## 2020-10-04 RX ADMIN — HYDROMORPHONE HYDROCHLORIDE 6 MG: 2 TABLET ORAL at 09:23

## 2020-10-04 RX ADMIN — INSULIN ASPART 1 UNITS: 100 INJECTION, SOLUTION INTRAVENOUS; SUBCUTANEOUS at 18:44

## 2020-10-04 RX ADMIN — METFORMIN HYDROCHLORIDE 500 MG: 500 TABLET ORAL at 09:05

## 2020-10-04 RX ADMIN — HYDROMORPHONE HYDROCHLORIDE 4 MG: 2 TABLET ORAL at 02:36

## 2020-10-04 RX ADMIN — ATORVASTATIN CALCIUM 40 MG: 40 TABLET, FILM COATED ORAL at 09:05

## 2020-10-04 RX ADMIN — HYDROMORPHONE HYDROCHLORIDE 4 MG: 2 TABLET ORAL at 05:43

## 2020-10-04 RX ADMIN — HYDROMORPHONE HYDROCHLORIDE 6 MG: 2 TABLET ORAL at 12:34

## 2020-10-04 RX ADMIN — KETOROLAC TROMETHAMINE 15 MG: 15 INJECTION, SOLUTION INTRAMUSCULAR; INTRAVENOUS at 22:05

## 2020-10-04 RX ADMIN — ASPIRIN 162 MG: 81 TABLET, COATED ORAL at 09:05

## 2020-10-04 RX ADMIN — METFORMIN HYDROCHLORIDE 500 MG: 500 TABLET ORAL at 20:12

## 2020-10-04 RX ADMIN — HYDROXYZINE HYDROCHLORIDE 50 MG: 50 TABLET, FILM COATED ORAL at 09:10

## 2020-10-04 RX ADMIN — BUSPIRONE HYDROCHLORIDE 15 MG: 15 TABLET ORAL at 20:12

## 2020-10-04 RX ADMIN — BUSPIRONE HYDROCHLORIDE 15 MG: 15 TABLET ORAL at 09:05

## 2020-10-04 RX ADMIN — KETOROLAC TROMETHAMINE 15 MG: 15 INJECTION, SOLUTION INTRAMUSCULAR; INTRAVENOUS at 16:10

## 2020-10-04 RX ADMIN — LORAZEPAM 1 MG: 1 TABLET ORAL at 12:35

## 2020-10-04 RX ADMIN — ACETAMINOPHEN 975 MG: 325 TABLET, FILM COATED ORAL at 16:46

## 2020-10-04 RX ADMIN — INSULIN ASPART 1 UNITS: 100 INJECTION, SOLUTION INTRAVENOUS; SUBCUTANEOUS at 09:18

## 2020-10-04 RX ADMIN — HYDROMORPHONE HYDROCHLORIDE 4 MG: 2 TABLET ORAL at 16:46

## 2020-10-04 RX ADMIN — ESZOPICLONE 2 MG: 2 TABLET, FILM COATED ORAL at 00:35

## 2020-10-04 RX ADMIN — KETOROLAC TROMETHAMINE 15 MG: 15 INJECTION, SOLUTION INTRAMUSCULAR; INTRAVENOUS at 05:15

## 2020-10-04 RX ADMIN — ACETAMINOPHEN 975 MG: 325 TABLET, FILM COATED ORAL at 09:05

## 2020-10-04 RX ADMIN — METHOCARBAMOL 500 MG: 500 TABLET ORAL at 09:23

## 2020-10-04 RX ADMIN — KETOROLAC TROMETHAMINE 15 MG: 15 INJECTION, SOLUTION INTRAMUSCULAR; INTRAVENOUS at 09:04

## 2020-10-04 NOTE — PROGRESS NOTES
"  VS: VSS   O2: >90% on room air   Output: Voiding without difficulty    Last BM: Patient has urge to have BM today. Wants to use commode tonight once pain is managed. Passing flatus. Last BM 10/1   Activity: hasnt been out of bed yet due to pain. TTWB   Up for meals? In bed   Skin: Intact other than incision    Pain: Pain is 8/10 at all times this shift. \"I am very uncomfortable\" per pt. IV toradol seems to be the medication that gives the pt relief    CMS: Pt reports surgical leg feels heavy and the toes on the right foot \"feel odd\"   Dressing: ACE wrap was removed this shift and dressing was CDI   Diet: Regular diet. Pt has been in too much pain to eat this evening   LDA: PIV in L hand is infusing    Equipment: IV pole, hinged knee brace   Plan: TBD   Additional Info: Pt is diabetic. Started metformin today      Patient vital signs are at baseline: Yes  Patient able to ambulate as they were prior to admission or with assist devices provided by therapies during their stay:  No,  Reason:  Pt pain has kept them in bed due to pain   Patient MUST void prior to discharge:  Yes  Patient able to tolerate oral intake:  Yes  Pain has adequate pain control using Oral analgesics:  No,  Reason:  Pt pain has not been controlled using oral medications. Taking IV toradol and dilaudid     "

## 2020-10-04 NOTE — PLAN OF CARE
OT: Attempted to see patient this PM. Patient and wife report due to all the pain medications and lack of sleep, patient has been very lethargic and asked to hold OT until 10/5. Will reschedule OT evaluation for 10/5.

## 2020-10-04 NOTE — PLAN OF CARE
PT: Attempted to see patient for afternoon PT session. Patient continues to report significant pain and states he is very tired from the medications and lack of sleep. Pt and wife request to cancel PT session. Will see tomorrow per PT plan of care.

## 2020-10-04 NOTE — PHARMACY-ADMISSION MEDICATION HISTORY
Admission Medication History Completed by Pharmacy    See [facility] Admission Navigator for allergy information, preferred outpatient pharmacy, prior to admission medications and immunization status.     Medication History Sources:     Patient, [place], [place]       Changes made to PTA medication list (reason):    Added: Miralax    Deleted: None    Changed: buspirone from 5 mg to 15 mg BID per patient and full history, clarified current dose of semaglutide is 0.5 mg on Mondays    Additional Information:    Patient is an excellent historian. He knew is medication names and doses.     Please note that patient is only taking omeprazole while he is on po diclofenac. If diclofenac is discontinued, please consider if omeprazole should also be discontinued.     Prior to Admission medications    Medication Sig Last Dose Taking? Auth Provider   atorvastatin (LIPITOR) 40 MG tablet Take 1 tablet (40 mg) by mouth daily 10/1/2020 at 0800 Yes Haase, Susan Rachele, APRN CNP   busPIRone (BUSPAR) 15 MG tablet Take 15 mg by mouth 2 times daily 10/2/2020 at 0700 Yes Unknown, Entered By History   Calcium Carb-Cholecalciferol (CALCIUM 600 + D PO) Take 1 tablet by mouth 2 times daily  10/1/2020 at 0800 Yes Reported, Patient   Continuous Blood Gluc  (FREESTYLE KEILA READER) SUGAR 1 each continuous Past Week at Unknown time Yes Annette Hobson APRN CNP   Continuous Blood Gluc Sensor (FREESTYLE KEILA 14 DAY SENSOR) XX MISC Inject 1 each Subcutaneous every 14 days Past Week at Unknown time Yes Annette Hobson APRN CNP   Cyanocobalamin (B-12) 500 MCG SUBL Place 1 tablet under the tongue daily 10/1/2020 at 0800 Yes Reported, Patient   diclofenac (VOLTAREN) 75 MG EC tablet TAKE 1 TABLET(75 MG) BY MOUTH TWICE DAILY Past Week at Unknown time Yes Angel Madera MD   eszopiclone (LUNESTA) 2 MG tablet Take 2 mg by mouth At Bedtime 10/1/2020 at 2300 Yes Reported, Patient   hydrOXYzine (ATARAX) 50 MG tablet Take 1 tablet  (50 mg) by mouth 2 times daily May take up to 3 times daily if needed 10/2/2020 at 0700 Yes Haase, Susan Rachele, APRN CNP   LORazepam (ATIVAN) 1 MG tablet Take 1 mg by mouth every 6 hours as needed for anxiety 10/1/2020 at 2300 Yes Reported, Patient   metFORMIN (GLUCOPHAGE) 500 MG tablet Take 1 tablet (500 mg) by mouth 2 times daily (with meals) 10/1/2020 at 2300 Yes Haase, Susan Rachele, APRN CNP   multivitamin  peds with iron (FLINTSTONES COMPLETE) 60 MG chewable tablet Take 2 chew tab by mouth every morning  10/1/2020 at 0800 Yes Reported, Patient   omeprazole (PRILOSEC) 20 MG DR capsule Take 1 capsule (20 mg) by mouth daily Past Week at Unknown time Yes Angel Madera MD   polyethylene glycol (MIRALAX) 17 g packet Take 1 packet by mouth daily 10/1/2020 at 0800 Yes Unknown, Entered By History   semaglutide (OZEMPIC, 1 MG/DOSE,) 2 MG/1.5ML pen Inject 0.25 mg weekly x 4 weeks then increase to 0.5 mg weekly  Patient taking differently: Inject 0.5 mg Subcutaneous every 7 days Takes on Mondays 9/28/2020 Yes Annette Hobson APRN CNP       Date completed: 10/04/20    Medication history completed by: Vipin [facility]

## 2020-10-04 NOTE — PROGRESS NOTES
"S: Pt is POD 2 from right knee osteotomy with post-op complication of r. Femur fracture. On 10/4, pt reported pain was not controlled with IV toradol, PO dilaudid 4 mg, and PO tylenol 975 mg. Pt stated he had pain 8/10 in R. Knee with muscle spasms radiating into r. Shin/calf and r. Hip. Pt's pain is preventing him from participating in therapy.    B: Contacted MD and requested muscle relaxant and increased PO dilaudid dose. Administered both to patient.    A: 1 hour later pt reported pain as 7/10 and \"tolerable\". Pt stated that muscle spasms were improved but still radiating into r. Shin/calf. Pt stated he was \"feeling fuzzy\" and \"not normal\". VS were within normal limits. Pt stated he was okay with \"feeling fuzzy\" because the pain was more tolerable.   /70 (BP Location: Right arm)   Pulse 89   Temp 98.4  F (36.9  C) (Oral)   Resp 14   Ht 1.854 m (6' 0.99\")   Wt 99.3 kg (218 lb 14.7 oz)   SpO2 96%   BMI 28.89 kg/m      R: Recommend following up with patient regarding his pain and ability to participate in PT and OT today.   Jaclyn AGUIRRE    "

## 2020-10-04 NOTE — PROGRESS NOTES
Orthopaedic Surgery Progress Note 10/04/2020    E: No acute events overnight.    S: Pain moderately well controlled with toradol, dilaudid, tylenol.  Was unable to participate in therapy yesterday due to pain. Denies numbness or tingling.  No chest pain/SOB/fever/chills. Plan of care reviewed and questions answered    O:  Temp: 98.1  F (36.7  C) Temp src: Axillary BP: 123/76 Pulse: 98   Resp: 16 SpO2: 99 % O2 Device: None (Room air)      Exam:  Gen: No acute distress, resting comfortably in bed.  Resp: Non-labored breathing     MSK:   Focused exam of RLE shows HKB in place and fitting appropriately.    ACE c/d/i  Distally, SILT s/s/sp/dp/t nerve distributions  Firing EHL/FHL/TA/GSC  Foot is wwp, palpable DP pulse      Recent Labs   Lab 10/03/20  0731 09/29/20  0810   WBC  --  6.3   HGB 13.5 16.0   PLT  --  257         Assessment/Plan: Naman Jones is a 35 year old male with valgus deformity of the RLE leading to chronic knee pain and lateral compartment OA now s/p Rt opening wedge DFO, LRL, open medial capsule imbrication on 10/2/20 with Dr. Madera, complicated by intra-operative periprosthetic fracture diagnosed on post-operative X-ray.      Intra-op fracture is non-displaced as confirmed on full length femur films on 10/2.  Will maintain TTWB RLE, same brace restrictions (see below).  No plans for RTOR for fracture fixation.      Activity: Up with assist and assistive devices as needed until independent. Knee ROM as tolerated when at rest   HKB may be off at rest, on when OOB                  Locked in extension when OOB                  Unlocked 0-90 when at rst, in chair, or with PT  Weight bearing status: TTWB RLE  Antibiotics: Ancef x 24 hours.  Diet: Begin with clear fluids and progress diet as tolerated. Bowel regimen. Anti-emetics PRN.   DVT prophylaxis: mechanical and ASA while inpatient,  discharge on ASA 162mg daily  x 4 weeks.  Wound Care: steristrips, adaptic, 4x4, ABD, cast padding, ace wrap;  dressing until POD 4  Pain management: transition from IV to orals as tolerated.   X-rays: XR of Rt knee in PACU  Physical Therapy:  ROM, ADL's.   Occupational Therapy: ADL's  Labs: Trend Hgb on POD #1, 2.   Consults: PT, OT. Hospitalist,, appreciate assistance in caring for this patient.   Follow-up: Clinic in 1 week for wound check with Dr. Madera; then at 6 weeks post op with repeat XR of operative knee     Disposition: Pending progress with therapies, pain control on orals, and medical stability, anticipate discharge to home today if pain well controlled and passes PT.  If unable to pass PT today, may need an additional day.      Jean Claude Burks MD 10/04/2020  Orthopaedic Surgery Resident, PGY-4  Pager: [phone]    For questions about this patient, please attempt to contact me at my pager prior to contacting the orthopaedics resident on call. Thank you!

## 2020-10-04 NOTE — PROGRESS NOTES
Patient A&O x4 and able to make needs known. Denied SOB/SHIPLEY and lungs sound clear.  Bowel sounds active in all 4Qs, passing gas and had a large BM in the commode. Denied CP, lightheadedness, dizziness, numbness, tingling.  Pt is drinking well and voiding spontaneously without difficulties, able to wiggle toes and CMS intact. Pt's pain tolerable and taking Dilaudid. Writer spoke with the provider for pain med to be increased  Dilaudid 6mg, Robaxin and Atarax. Since pain is controlled pt needs to participate therapy. Ice pack applied to right knee and  incentive spirometer encouraged and done several times, repositioned and turned in bed, heels elevated off bed, demonstrates the ability to use call light appropriately, will continue with POC.

## 2020-10-04 NOTE — PLAN OF CARE
Pt. A&Ox4. VSS. Afebrile. Lung sounds CTA. Maintaining sats on RA. IS encouraged. Bowel sounds active, LBM 10/4. PP+ DP+. CMS and neuro's are intact. Denies numbness and tingling in all extremities. Denies nausea, shortness of breath, and chest pain. RLE pain managed w/ scheduled Tylenol, prn PO Dilaudid 4mg, Toradol and ice applied. Voids spontaneously without difficulty via urinal. Tolerating regular diet.  at HS.  RLE incisional dressing is CDI. RLE elevated on pillow. HKB in place, locked when OOB. Pt up with ax1, walker and gaitbelt. PIV is patent and SL. Call light is within reach, pt able to make needs known and is resting comfortably between cares. Will continue to monitor.  Pt states he has constant aching and intermittent sharp pain and spasms, text page sent to ortho on call early am. If possible pt may benefit from increased PO Dilaudid or adding Robaxin.

## 2020-10-04 NOTE — PROGRESS NOTES
10/04/20 1100   Quick Adds   Type of Visit Initial PT Evaluation       Present no   Language English   Living Environment   People in home significant other   Current Living Arrangements house   Home Accessibility no concerns   Transportation Anticipated family or friend will provide   Living Environment Comments Lives in single story home with ramp to enter and assist available as needed. Will be staying at his mom's house with one step to enter and no concerns once inside.   Self-Care   Usual Activity Tolerance good   Current Activity Tolerance fair   Regular Exercise No   Equipment Currently Used at Home cane, straight;crutches;raised toilet seat;shower chair;walker, rolling   Disability/Function   Hearing Difficulty or Deaf no   Wear Glasses or Blind yes   Vision Management glasses for driving   Concentrating, Remembering or Making Decisions Difficulty no   Difficulty Communicating no   Difficulty Eating/Swallowing no   Walking or Climbing Stairs Difficulty no   Dressing/Bathing Difficulty no   Toileting no   Doing Errands Independently Difficulty (such as shopping) no   Fall history within last six months no   Change in Functional Status Since Onset of Current Illness/Injury yes   General Information   Onset of Illness/Injury or Date of Surgery 10/02/20   Referring Physician Angel Madera MD   Patient/Family Therapy Goals Statement (PT) pt states he wants to go home and be able to ride his bike for exercise again   Pertinent History of Current Problem (include personal factors and/or comorbidities that impact the POC) Pt is a 35 year old male with valgus deformity of the RLE leading to chronic knee pain and lateral compartment OA now s/p Rt opening wedge DFO, LRL, open medial capsule imbrication, complicated by intra-operative periprosthetic fracture diagnosed on post-operative X-ray   Existing Precautions/Restrictions brace worn when out of bed;weight bearing  "  Weight-Bearing Status - LUE full weight-bearing   Weight-Bearing Status - RUE full weight-bearing   Weight-Bearing Status - LLE full weight-bearing   Weight-Bearing Status - RLE toe touch weight-bearing   General Observations pt appears tired, states he feels \"loopy\" and \"fuzzy\" right now, states pain is 7/10   Cognition   Orientation Status (Cognition) oriented x 4   Affect/Mental Status (Cognition) WFL   Follows Commands (Cognition) WNL   Pain Assessment   Patient Currently in Pain Yes, see Vital Sign flowsheet   Integumentary/Edema   Integumentary/Edema Comments incision not observed, surgical dressing and HKB in place   Posture    Posture Forward head position   Range of Motion (ROM)   ROM Comment unable to formally assess, ROM limited by pain    Strength   Strength Comments did not formally assess, demonstrates RLE weakness secondary to pain   Bed Mobility   Bed Mobility scooting/bridging;supine-sit;sit-supine   Scooting/Bridging Dundy (Bed Mobility) verbal cues;supervision   Supine-Sit D[place] (Bed Mobility) moderate assist (50% patient effort)   Sit-Supine [place] (Bed Mobility) moderate assist (50% patient effort)   Bed Mobility Limitations decreased ability to use legs for bridging/pushing   Impairments Contributing to Impaired Bed Mobility pain;decreased strength   Assistive Device (Bed Mobility) bed rails   Comment (Bed Mobility) modA needed for RLE   Transfers   Transfer Safety Comments unable to assess due to pain and dizziness   Gait/Stairs (Locomotion)   Dundy Level (Gait) unable to assess   Balance   Balance Comments pt demonstrates good sitting balance EOB   Sensory Examination   Sensory Perception WNL   Coordination   Coordination no deficits were identified   Muscle Tone   Muscle Tone no deficits were identified   Clinical Impression   Criteria for Skilled Therapeutic Intervention yes, treatment indicated   PT Diagnosis (PT) Impaired functional mobility   Influenced by " "the following impairments pain, weakness, decreased activity tolerance   Functional limitations due to impairments impaired bed mobility, transfers and gait   Clinical Presentation Stable/Uncomplicated   Clinical Presentation Rationale pt is otherwise healthy with no comorbidities affecting PT plan of care   Clinical Decision Making (Complexity) low complexity   Therapy Frequency (PT) 2x/day   Predicted Duration of Therapy Intervention (days/wks) 3 days   Planned Therapy Interventions (PT) balance training;bed mobility training;gait training;home exercise program;patient/family education;ROM (range of motion);strengthening;stair training;stretching;transfer training   Anticipated Equipment Needs at Discharge (PT)   (pt owns FWW)   Risk & Benefits of therapy have been explained evaluation/treatment results reviewed;care plan/treatment goals reviewed;risks/benefits reviewed;participants voiced agreement with care plan   PT Discharge Planning    PT Discharge Recommendation (DC Rec) home with outpatient physical therapy   PT Rationale for DC Rec anticipate pt will improve mobility over length of stay once pain is managed, pt has OP PT set up once he is discharged   PT Brief overview of current status  pt participation is currently significantly limited by pain, completes bed mobility with modA   [facility] Accept Software TM \"6 Clicks\"   2016, Trustees of [facility], under license to [employer].  All rights reserved.   6 Clicks Short Forms Basic Mobility Inpatient Short Form   [facility] AM-PAC  \"6 Clicks\" V.2 Basic Mobility Inpatient Short Form   1. Turning from your back to your side while in a flat bed without using bedrails? 3 - A Little   2. Moving from lying on your back to sitting on the side of a flat bed without using bedrails? 3 - A Little   3. Moving to and from a bed to a chair (including a wheelchair)? 2 - A Lot   4. Standing up from a chair using your arms (e.g., wheelchair, or bedside " chair)? 2 - A Lot   5. To walk in hospital room? 2 - A Lot   6. Climbing 3-5 steps with a railing? 1 - Total   Basic Mobility Raw Score (Score out of 24.Lower scores equate to lower levels of function) 13   Total Evaluation Time   Total Evaluation Time (Minutes) 8

## 2020-10-04 NOTE — PROGRESS NOTES
"[facility], [place]   Internal Medicine Daily Note           Interval History/Events     Overnight events reviewed  Feels weak and tired  Pain controlled  Had BM   No nausea, and vomiting  No chest pain, shortness of breath  No lightheadedness or dizziness.        Review of Systems        4 point ROS including Respiratory, CV, GI and , other than that noted above is negative      Medications   I have reviewed current medications  in the \"current medication\" section of Epic.  Relevant changes include:     Physical Exam   General:       Vital signs:    Blood pressure 122/70, pulse 89, temperature 98.4  F (36.9  C), temperature source Oral, resp. rate 14, height 1.854 m (6' 0.99\"), weight 99.3 kg (218 lb 14.7 oz), SpO2 96 %.  Estimated body mass index is 28.89 kg/m  as calculated from the following:    Height as of this encounter: 1.854 m (6' 0.99\").    Weight as of this encounter: 99.3 kg (218 lb 14.7 oz).      Intake/Output Summary (Last 24 hours) at 10/3/2020 1331  Last data filed at 10/3/2020 1210  Gross per 24 hour   Intake 1940 ml   Output 1250 ml   Net 690 ml        Constitutional: Laying in bed in no acute distress  Eye: No icterus, no pallor  Mouth/ENT: Normal oral mucosa  Cardiovascular: S1, S2 normal.   Respiratory: B/l CTA  GI: Soft, NT, BS+  : No ayala's catheter  Neurology:   Psych:   MSK: Right leg dressing in place.   Integumentary:   Heme/Lymph/Imm:      Laboratory and Imaging Studies     I have reviewed  laboratory and imaging studies in the Epic. Pertinent findings are as below:    BMP  Recent Labs   Lab 10/03/20  0731 10/02/20  1241 09/29/20  0810     --  140   POTASSIUM 3.8  --  3.8   CHLORIDE 106  --  109   KALINA 8.1*  --  9.2   CO2 24  --  26   BUN 9  --  8   CR 0.56*  --  0.57*   * 165* 170*     CBC  Recent Labs   Lab 10/03/20  0731 09/29/20  0810   WBC  --  6.3   RBC  --  5.34   HGB 13.5 16.0   HCT  --  46.1   MCV  --  86   MCH  --  30.0   MCHC  --  " 34.7   RDW  --  13.4   PLT  --  257     INRNo lab results found in last 7 days.  LFTs  Recent Labs   Lab 09/29/20  0810   ALKPHOS 121   AST 15   ALT 53   BILITOTAL 0.8   PROTTOTAL 7.5   ALBUMIN 4.1      PANCNo lab results found in last 7 days.        Impression/Plan      35 year old male with valgus deformity of the RLE leading to chronic knee pain and lateral compartment OA now s/p Rt opening wedge DFO, LRL, open medial capsule imbrication on 10/2/20 with Dr. Madera  EBL: 100 ml. No complication.      PMH, Pre Op eval reviewed.   Currently incisional pain otherwise doing well.        # s/p  Rt opening wedge DFO, LRL, open medial capsule imbrication on 10/2/20    Management primarily per Ortho team.  - Wound cares, Dressings, Surgical pain management, DVT Prophylaxis  per primary team.   - Monitor anemia, hemodynamics, Input/Output, Capnography (for 24 hours)  - Encourage Incentive spirometry  - Laxatives for constipation prophylaxis       # DM Type 2: 09/29: A1C: 7.3  PTA on Metformin, and Semaglutide weekly  Blood sugar higher side.   - Restart Metformin  - Continue  medium intensity correction     #Dyslipidemia:  Continue home dose of Lipitor 40 mg     #Depression, anxiety disorder:   Continue Buspar 5 mg BID , lorazepam, hydroxyzine prn.      # GERD: PTA PPI                         Pt's care was discussed with bedside RN, patient and  during Care Team Rounds.               Agustin Paris MD  Hospitalist ( Internal medicine)  Pager: [phone]

## 2020-10-05 ENCOUNTER — PATIENT OUTREACH (OUTPATIENT)
Dept: CARE COORDINATION | Facility: CLINIC | Age: 35
End: 2020-10-05

## 2020-10-05 ENCOUNTER — APPOINTMENT (OUTPATIENT)
Dept: OCCUPATIONAL THERAPY | Facility: CLINIC | Age: 35
DRG: 481 | End: 2020-10-05
Attending: ORTHOPAEDIC SURGERY
Payer: COMMERCIAL

## 2020-10-05 ENCOUNTER — APPOINTMENT (OUTPATIENT)
Dept: PHYSICAL THERAPY | Facility: CLINIC | Age: 35
DRG: 481 | End: 2020-10-05
Attending: ORTHOPAEDIC SURGERY
Payer: COMMERCIAL

## 2020-10-05 VITALS
OXYGEN SATURATION: 98 % | WEIGHT: 218.92 LBS | TEMPERATURE: 98.5 F | DIASTOLIC BLOOD PRESSURE: 83 MMHG | HEIGHT: 73 IN | SYSTOLIC BLOOD PRESSURE: 134 MMHG | BODY MASS INDEX: 29.01 KG/M2 | HEART RATE: 84 BPM | RESPIRATION RATE: 16 BRPM

## 2020-10-05 LAB
GLUCOSE BLDC GLUCOMTR-MCNC: 149 MG/DL (ref 70–99)
GLUCOSE BLDC GLUCOMTR-MCNC: 178 MG/DL (ref 70–99)
GLUCOSE BLDC GLUCOMTR-MCNC: 182 MG/DL (ref 70–99)

## 2020-10-05 PROCEDURE — 999N001017 HC STATISTIC GLUCOSE BY METER IP

## 2020-10-05 PROCEDURE — 97530 THERAPEUTIC ACTIVITIES: CPT | Mod: GO

## 2020-10-05 PROCEDURE — 250N000013 HC RX MED GY IP 250 OP 250 PS 637: Performed by: STUDENT IN AN ORGANIZED HEALTH CARE EDUCATION/TRAINING PROGRAM

## 2020-10-05 PROCEDURE — 250N000013 HC RX MED GY IP 250 OP 250 PS 637: Performed by: INTERNAL MEDICINE

## 2020-10-05 PROCEDURE — 99232 SBSQ HOSP IP/OBS MODERATE 35: CPT | Performed by: INTERNAL MEDICINE

## 2020-10-05 PROCEDURE — 97116 GAIT TRAINING THERAPY: CPT | Mod: GP

## 2020-10-05 PROCEDURE — 97530 THERAPEUTIC ACTIVITIES: CPT | Mod: GP

## 2020-10-05 PROCEDURE — 97165 OT EVAL LOW COMPLEX 30 MIN: CPT | Mod: GO

## 2020-10-05 PROCEDURE — 250N000013 HC RX MED GY IP 250 OP 250 PS 637: Performed by: ORTHOPAEDIC SURGERY

## 2020-10-05 RX ORDER — METHOCARBAMOL 500 MG/1
500 TABLET, FILM COATED ORAL 4 TIMES DAILY PRN
Qty: 30 TABLET | Refills: 0 | Status: SHIPPED | OUTPATIENT
Start: 2020-10-05 | End: 2020-10-22

## 2020-10-05 RX ORDER — HYDROMORPHONE HYDROCHLORIDE 4 MG/1
4-6 TABLET ORAL EVERY 4 HOURS PRN
Qty: 40 TABLET | Refills: 0 | Status: SHIPPED | OUTPATIENT
Start: 2020-10-05 | End: 2020-10-08

## 2020-10-05 RX ORDER — POLYETHYLENE GLYCOL 3350 17 G/17G
1 POWDER, FOR SOLUTION ORAL DAILY
Qty: 255 G | Refills: 0 | Status: SHIPPED | OUTPATIENT
Start: 2020-10-05 | End: 2022-04-06

## 2020-10-05 RX ADMIN — Medication 500 MCG: at 08:13

## 2020-10-05 RX ADMIN — METFORMIN HYDROCHLORIDE 500 MG: 500 TABLET ORAL at 08:13

## 2020-10-05 RX ADMIN — HYDROMORPHONE HYDROCHLORIDE 6 MG: 2 TABLET ORAL at 06:38

## 2020-10-05 RX ADMIN — BUSPIRONE HYDROCHLORIDE 15 MG: 15 TABLET ORAL at 08:13

## 2020-10-05 RX ADMIN — HYDROMORPHONE HYDROCHLORIDE 6 MG: 2 TABLET ORAL at 00:10

## 2020-10-05 RX ADMIN — ASPIRIN 162 MG: 81 TABLET, COATED ORAL at 08:11

## 2020-10-05 RX ADMIN — ATORVASTATIN CALCIUM 40 MG: 40 TABLET, FILM COATED ORAL at 08:13

## 2020-10-05 RX ADMIN — HYDROMORPHONE HYDROCHLORIDE 6 MG: 2 TABLET ORAL at 10:02

## 2020-10-05 RX ADMIN — ACETAMINOPHEN 975 MG: 325 TABLET, FILM COATED ORAL at 08:12

## 2020-10-05 RX ADMIN — INSULIN ASPART 1 UNITS: 100 INJECTION, SOLUTION INTRAVENOUS; SUBCUTANEOUS at 08:15

## 2020-10-05 RX ADMIN — ESZOPICLONE 2 MG: 2 TABLET, FILM COATED ORAL at 00:10

## 2020-10-05 RX ADMIN — ACETAMINOPHEN 975 MG: 325 TABLET, FILM COATED ORAL at 00:10

## 2020-10-05 RX ADMIN — HYDROXYZINE HYDROCHLORIDE 50 MG: 50 TABLET, FILM COATED ORAL at 08:13

## 2020-10-05 ASSESSMENT — ACTIVITIES OF DAILY LIVING (ADL): PREVIOUS_RESPONSIBILITIES: MEAL PREP;HOUSEKEEPING;LAUNDRY;MEDICATION MANAGEMENT;FINANCES;DRIVING

## 2020-10-05 NOTE — PLAN OF CARE
Occupational Therapy Discharge Summary    Reason for therapy discharge:    OT goals adequate for discharge. Pt has no OT concerns for discharge home     Progress towards therapy goal(s). See goals on Care Plan in [facility] electronic health record for goal details.  Adequate for discharge     Therapy recommendation(s):    No further therapy is recommended. Recommend tub transfer bench, spouse planning to purchase.

## 2020-10-05 NOTE — PROGRESS NOTES
"[facility], [place]   Internal Medicine Daily Note           Interval History/Events     Overnight events reviewed  Reports doing well  Pain is better controlled.   Had BM   Tolerating diet well.   No lightheadedness or dizziness.        Review of Systems        4 point ROS including Respiratory, CV, GI and , other than that noted above is negative      Medications   I have reviewed current medications  in the \"current medication\" section of Epic.  Relevant changes include:     Physical Exam   General:       Vital signs:    Blood pressure 134/83, pulse 84, temperature 98.5  F (36.9  C), temperature source Oral, resp. rate 16, height 1.854 m (6' 0.99\"), weight 99.3 kg (218 lb 14.7 oz), SpO2 98 %.  Estimated body mass index is 28.89 kg/m  as calculated from the following:    Height as of this encounter: 1.854 m (6' 0.99\").    Weight as of this encounter: 99.3 kg (218 lb 14.7 oz).      Intake/Output Summary (Last 24 hours) at 10/3/2020 1331  Last data filed at 10/3/2020 1210  Gross per 24 hour   Intake 1940 ml   Output 1250 ml   Net 690 ml        Constitutional: Laying in bed in no acute distress  Eye: No icterus, no pallor  Mouth/ENT: Normal oral mucosa  Cardiovascular: S1, S2 normal.   Respiratory: B/l CTA  GI: Soft, NT, BS+  : No ayala's catheter  Neurology:   Psych:   MSK: Right leg dressing in place.   Integumentary:   Heme/Lymph/Imm:      Laboratory and Imaging Studies     I have reviewed  laboratory and imaging studies in the Epic. Pertinent findings are as below:    BMP  Recent Labs   Lab 10/03/20  0731 10/02/20  1241 09/29/20  0810     --  140   POTASSIUM 3.8  --  3.8   CHLORIDE 106  --  109   KALINA 8.1*  --  9.2   CO2 24  --  26   BUN 9  --  8   CR 0.56*  --  0.57*   * 165* 170*     CBC  Recent Labs   Lab 10/03/20  0731 09/29/20  0810   WBC  --  6.3   RBC  --  5.34   HGB 13.5 16.0   HCT  --  46.1   MCV  --  86   MCH  --  30.0   MCHC  --  34.7   RDW  --  13.4   PLT  " --  257     INRNo lab results found in last 7 days.  LFTs  Recent Labs   Lab 09/29/20  0810   ALKPHOS 121   AST 15   ALT 53   BILITOTAL 0.8   PROTTOTAL 7.5   ALBUMIN 4.1      PANCNo lab results found in last 7 days.        Impression/Plan      35 year old male with valgus deformity of the RLE leading to chronic knee pain and lateral compartment OA now s/p Rt opening wedge DFO, LRL, open medial capsule imbrication on 10/2/20 with Dr. Madera  EBL: 100 ml. No complication.      PMH, Pre Op eval reviewed.   Currently incisional pain otherwise doing well.        # s/p  Rt opening wedge DFO, LRL, open medial capsule imbrication on 10/2/20    Management primarily per Ortho team.  - Wound cares, Dressings, Surgical pain management, DVT Prophylaxis  per primary team.   - Monitor anemia, hemodynamics, Input/Output, Capnography (for 24 hours)  - Encourage Incentive spirometry  - Laxatives for constipation prophylaxis       # DM Type 2: 09/29: A1C: 7.3  PTA on Metformin, and Semaglutide weekly  Blood sugar higher side.   - Restart Metformin  - Continue Semaglutide weekly  - Follow up with primary care as before     #Dyslipidemia:  Continue home dose of Lipitor 40 mg     #Depression, anxiety disorder:   Continue Buspar 5 mg BID , lorazepam, hydroxyzine prn.      # GERD: PTA PPI                         Pt's care was discussed with bedside RN, patient and  during Care Team Rounds.               Agustin Paris MD  Hospitalist ( Internal medicine)  Pager: [phone]

## 2020-10-05 NOTE — PLAN OF CARE
Pt is A&Ox4. VSS. LS clear, on RA. BS active, LBM on 10/4/2020. Voiding well. Pain managed with toradol and PO dilaudid 6mg. R knee surgical dressing is c/d/I. Hinged brace in place. Pt up with 1 assist. L PIv is patent and SL. BGs in 180, covered with sliding scale. Continue to monitor.

## 2020-10-05 NOTE — PROGRESS NOTES
"  VS: VSS   O2: >90% on room air   Output: Voiding spontaneously in urinal   Last BM: Today large BM at bedside commode   Activity: ax1-2. Pt was out of bed earlier today. Pt stated the pain was a lot but he felt accomplished to be out of bed   Up for meals? In bed   Skin: Incision on R knee. Otherwise intact   Pain: Pain is being managed by scheduled toradol. And PRN dilaudid 4-6mg. 6mg was given last. Robaxin is also available for muscle spasms    CMS: Denies numbness and tingling. States R foot feels \"odd\"   Dressing: CDI   Diet: regular   LDA Saline locked    Equipment: IV pole. Hinged knee brace. PCD   Plan: TBD   Additional Info:        "

## 2020-10-05 NOTE — PROGRESS NOTES
Orthopaedic Surgery Progress Note 10/05/2020    E: No acute events overnight.    S: Pain well controlled this AM. Unable to participate in therapy yesterday due to pain. Tolerating diet, no n/v. Voiding, +BM. Denies numbness or tingling.  No chest pain/SOB/fever/chills. Plan of care reviewed and questions answered. Patient hoping to discharge home today.    O:  Temp: 97.8  F (36.6  C) Temp src: Oral BP: 121/78 Pulse: 78   Resp: 16 SpO2: 98 % O2 Device: None (Room air)      Exam:  Gen: No acute distress, resting comfortably in bed.  Resp: Non-labored breathing     MSK:   Focused exam of RLE shows HKB in place and fitting appropriately.    ACE c/d/i  Distally, SILT s/s/sp/dp/t nerve distributions  Firing EHL/FHL/TA/GSC  Foot is wwp, palpable DP pulse      Recent Labs   Lab 10/03/20  0731 09/29/20  0810   WBC  --  6.3   HGB 13.5 16.0   PLT  --  257         Assessment/Plan: Naman Jones is a 35 year old male with valgus deformity of the RLE leading to chronic knee pain and lateral compartment OA now s/p Rt opening wedge DFO, LRL, open medial capsule imbrication on 10/2/20 with Dr. Madera, complicated by intra-operative periprosthetic fracture diagnosed on post-operative X-ray.      Intra-op fracture is non-displaced as confirmed on full length femur films on 10/2.  Will maintain TTWB RLE, same brace restrictions (see below).  No plans for RTOR for fracture fixation.      Activity: Up with assist and assistive devices as needed until independent. Knee ROM as tolerated when at rest   HKB may be off at rest, on when OOB                  Locked in extension when OOB                  Unlocked 0-90 when at rst, in chair, or with PT  Weight bearing status: TTWB RLE  Antibiotics: Ancef x 24 hours completed  Diet: Begin with clear fluids and progress diet as tolerated. Bowel regimen. Anti-emetics PRN.   DVT prophylaxis: mechanical and ASA while inpatient,  discharge on ASA 162mg daily  x 4 weeks.  Wound Care: steristrips,  adaptic, 4x4, ABD, cast padding, ace wrap; dressing until POD 4  Pain management: transition from IV to orals as tolerated.   X-rays: XR of Rt knee in PACU  Physical Therapy:  ROM, ADL's.   Occupational Therapy: ADL's  Labs: Trend Hgb on POD #1, 2.   Consults: PT, OT. Hospitalist,, appreciate assistance in caring for this patient.   Follow-up: Clinic in 1 week for wound check with Dr. Madera; then at 6 weeks post op with repeat XR of operative knee     Disposition: Pending progress with therapies, pain control on orals, and medical stability, anticipate discharge to home today if pain well controlled and passes PT.     David Rob MD  Orthopaedic Surgery, PGY4  [phone]      For questions about this patient, please attempt to contact me at my pager prior to contacting the orthopaedics resident on call. Thank you!

## 2020-10-05 NOTE — PROGRESS NOTES
10/05/20 0908   Quick Adds   Type of Visit Initial Occupational Therapy Evaluation       Present no   Living Environment   People in home significant other   Current Living Arrangements house   Transportation Anticipated family or friend will provide   Living Environment Comments pt planning to d/c to moms house for first month. mom's house has tub shower. will have RTS on toilet.    Self-Care   Usual Activity Tolerance good   Current Activity Tolerance fair   Regular Exercise No   Equipment Currently Used at Home none   Activity/Exercise/Self-Care Comment pt has walker at home.    Disability/Function   Hearing Difficulty or Deaf no   Wear Glasses or Blind yes   Vision Management glasses   Concentrating, Remembering or Making Decisions Difficulty no   Difficulty Communicating no   Difficulty Eating/Swallowing no   Walking or Climbing Stairs Difficulty no   Dressing/Bathing Difficulty no   Toileting no   Doing Errands Independently Difficulty (such as shopping) no   Fall history within last six months no   General Information   Onset of Illness/Injury or Date of Surgery 10/02/20   Referring Physician Santy   Patient/Family Therapy Goal Statement (OT) to mom's house initially    Additional Occupational Profile Info/Pertinent History of Current Problem Naman Jones is a 35 year old male with valgus deformity of the RLE leading to chronic knee pain and lateral compartment OA now s/p Rt opening wedge DFO, LRL, open medial capsule imbrication on 10/2/20 with Dr. Madera, complicated by intra-operative periprosthetic fracture diagnosed on post-operative X-ray.     Existing Precautions/Restrictions fall;weight bearing   Right Lower Extremity (Weight-bearing Status) toe touch weight-bearing (TTWB)   General Observations and Info Hinged Knee brace with OOB locked in extension.    Cognitive Status Examination   Orientation Status orientation to person, place and time   Affect/Mental Status  "(Cognitive) WNL   Follows Commands WNL   Visual Perception   Visual Impairment/Limitations WNL   Sensory   Sensory Quick Adds No deficits were identified   Pain Assessment   Patient Currently in Pain Yes, see Vital Sign flowsheet   Integumentary/Edema   Integumentary/Edema no deficits were identifed   Posture   Posture not impaired   Range of Motion Comprehensive   General Range of Motion no range of motion deficits identified   Strength Comprehensive (MMT)   General Manual Muscle Testing (MMT) Assessment no strength deficits identified   Muscle Tone Assessment   Muscle Tone Quick Adds No deficits were identified   Coordination   Upper Extremity Coordination No deficits were identified   Instrumental Activities of Daily Living (IADL)   Previous Responsibilities meal prep;housekeeping;laundry;medication management;finances;driving   IADL Comments family to A at d/c    Clinical Impression   Criteria for Skilled Therapeutic Interventions Met (OT) yes   OT Diagnosis dec IND with ADLs and functional transfers   OT Problem List-Impairments impacting ADL balance;post-surgical precautions   Assessment of Occupational Performance 1-3 Performance Deficits   Identified Performance Deficits tub transfer   Planned Therapy Interventions (OT) transfer training   Clinical Decision Making Complexity (OT) low complexity   Therapy Frequency (OT) 1x eval and treat   Predicted Duration of Therapy 1 tx only   Anticipated Equipment Needs Upon Discharge (OT) tub bench   Risks and Benefits of Treatment have been explained. Yes   Patient, Family & other staff in agreement with plan of care Yes   OT Discharge Planning    OT Discharge Recommendation (DC Rec) Home with assist   OT Rationale for DC Rec pt has been through previous surgery and has appropriate AE at home for safe d/c. pt planning to d/c to mom's house where she will be home to A 24/7 with ADL/IADL's. No further OT needs.    [facility] AM-PAC TM \"6 Clicks\"   2016, Trustees " "of [facility], under license to [employer].  All rights reserved.   6 Clicks Short Forms Daily Activity Inpatient Short Form   [facility] AM-PAC  \"6 Clicks\" Daily Activity Inpatient Short Form   1. Putting on and taking off regular lower body clothing? 3 - A Little   2. Bathing (including washing, rinsing, drying)? 3 - A Little   3. Toileting, which includes using toilet, bedpan or urinal? 3 - A Little   4. Putting on and taking off regular upper body clothing? 4 - None   5. Taking care of personal grooming such as brushing teeth? 4 - None   6. Eating meals? 4 - None   Daily Activity Raw Score (Score out of 24.Lower scores equate to lower levels of function) 21   Total Evaluation Time (Minutes)   Total Evaluation Time (Minutes) 8     "

## 2020-10-05 NOTE — PLAN OF CARE
Physical Therapy Discharge Summary    Reason for therapy discharge:    Discharged to home with outpatient therapy.    Progress towards therapy goal(s). See goals on Care Plan in [facility] electronic health record for goal details.  Goals met    Therapy recommendation(s):    Continued therapy is recommended.  Rationale/Recommendations:  OP PT to progress post-op ROM, strength and functional mobility.

## 2020-10-06 NOTE — DISCHARGE SUMMARY
Orthopaedic Surgery Discharge Summary          Name:  Naman Jones  MRN:  [id number]  YOB: 1985      Date of Admission:  10/2/2020  Date of Discharge::  10/5/2020  Discharge Physician:  Dr. Madera             Admission Diagnoses:   Chronic pain of right knee [M25.561, G89.29]          Discharge Diagnosis:   Status-post knee surgery            Procedures:   Surgery: right knee arthroscopy, distal femoral osteotomy, open lateral retinacular lengthening  Date: 10/2/2020          Discharge Medications:     Discharge Medication List as of 10/5/2020 10:23 AM      START taking these medications    Details   acetaminophen (TYLENOL) 325 MG tablet Take 2 tablets (650 mg) by mouth every 4 hours, Disp-120 tablet, R-0, E-Prescribe      aspirin (ASA) 81 MG EC tablet Take 2 tablets (162 mg) by mouth daily for 28 days, Disp-28 tablet, R-0, E-Prescribe      HYDROmorphone (DILAUDID) 4 MG tablet Take 1-1.5 tablets (4-6 mg) by mouth every 4 hours as needed, Disp-40 tablet, R-0, E-PrescribeDischarge today      methocarbamol (ROBAXIN) 500 MG tablet Take 1 tablet (500 mg) by mouth 4 times daily as needed for muscle spasms, Disp-30 tablet, R-0, E-Prescribe      senna-docusate (SENOKOT-S/PERICOLACE) 8.6-50 MG tablet Take 1-2 tablets by mouth 2 times daily as needed for constipation Take while on oral narcotics to prevent or treat constipation., Disp-30 tablet, R-0, E-PrescribeWhile taking narcotics         CONTINUE these medications which have CHANGED    Details   polyethylene glycol (MIRALAX) 17 g packet Take 17 g by mouth daily, Disp-255 g, R-0, E-Prescribe         CONTINUE these medications which have NOT CHANGED    Details   atorvastatin (LIPITOR) 40 MG tablet Take 1 tablet (40 mg) by mouth daily, Disp-90 tablet,R-3, E-PrescribeProfile Rx: patient will contact pharmacy when needed      busPIRone (BUSPAR) 15 MG tablet Take 15 mg by mouth 2 times daily, Historical      Calcium Carb-Cholecalciferol (CALCIUM 600 + D  PO) Take 1 tablet by mouth 2 times daily , Historical      Continuous Blood Gluc  (FREESTYLE KEILA READER) SUGAR 1 each continuous, Disp-1 Device, R-0, E-Prescribe      Continuous Blood Gluc Sensor (FREESTYLE KEILA 14 DAY SENSOR) XX MISC Inject 1 each Subcutaneous every 14 days, Disp-2 each, R-11, E-Prescribe      Cyanocobalamin (B-12) 500 MCG SUBL Place 1 tablet under the tongue daily, Historical      eszopiclone (LUNESTA) 2 MG tablet Take 2 mg by mouth At Bedtime, Historical      hydrOXYzine (ATARAX) 50 MG tablet Take 1 tablet (50 mg) by mouth 2 times daily May take up to 3 times daily if needed, Disp-90 tablet,R-4, E-Prescribe      LORazepam (ATIVAN) 1 MG tablet Take 1 mg by mouth every 6 hours as needed for anxiety, Historical      metFORMIN (GLUCOPHAGE) 500 MG tablet Take 1 tablet (500 mg) by mouth 2 times daily (with meals), Disp-180 tablet,R-3, E-Prescribe      multivitamin  peds with iron (FLINTSTONES COMPLETE) 60 MG chewable tablet Take 2 chew tab by mouth every morning , Historical      omeprazole (PRILOSEC) 20 MG DR capsule Take 1 capsule (20 mg) by mouth daily, Disp-60 capsule,R-3, E-Prescribe      semaglutide (OZEMPIC, 1 MG/DOSE,) 2 MG/1.5ML pen Inject 0.25 mg weekly x 4 weeks then increase to 0.5 mg weekly, Disp-1 pen,R-5, E-Prescribe         STOP taking these medications       diclofenac (VOLTAREN) 75 MG EC tablet Comments:   Reason for Stopping:                     Consultations:   Consultation during this admission received from internal medicine.  Discharge plan:    # DM Type 2: 09/29: A1C: 7.3  PTA on Metformin, and Semaglutide weekly  Blood sugar higher side.   - Restart Metformin  - Continue Semaglutide weekly  - Follow up with primary care as before     #Dyslipidemia:  Continue home dose of Lipitor 40 mg     #Depression, anxiety disorder:   Continue Buspar 5 mg BID , lorazepam, hydroxyzine prn.      # GERD: PTA PPI          Brief History of Illness:   Copied from Dr. Madera's operative  "report, 10/2/2020:    \"Naman Jones is a pleasant 35 year old male who I saw through my orthopedic clinic with a history, physical, imaging consistent with valgus of his right knee as well as recurrent patellar instability.  In approximately March of this year I performed a left distal femoral osteotomy, lateral retinacular lengthening and medial imbrication.  The patient is doing well in the side with improvement of his preoperative state.  In a routine clinic visit at 6 months we did obtain a CT scan as he seemed to have some pain over the plate.  I did not feel that his bones were fully united and we elected to defer plate removal until approximately the one-year viviana.  The patient states that his right knee has become his worse knee.  He was less confident in light of this he want to proceed with surgical reconstruction.  We plan for a similar surgical approach..  I reviewed with the patient the risks, benefits, complications, techniques and alternatives to surgery.  We reviewed the expected course of recovery and the potential expected outcomes.  The patient understood both the risks and benefits and desired to proceed despite the risks.\"           Hospital Course:   The patient was admitted to the hospital after the above listed procedure.  Hospital course was uneventful outside of higher than expected pain.    On POD#0, it was discovered on routine postoperative imaging that a non-displaced periprosthetic fracture had occurred in the femur, proximal to the hardware.  Thus, full-length femur films were obtained to better characterize the fracture.  There was no displacement of the fracture, thus protected weight bearing was continued and it was determined there was no role for operative fixation of the fracture.  The patient was made aware of this on POD#1.    Patient worked with PT and OT beginning POD#2.  He reported his pain to be too great to work with PT on POD#1.  Pain gradually improved, with an " additional day of toradol and the addition of a muscle relaxant.  On POD#3, patient was tolerating a regular diet, voiding on own, had pain well controlled on oral pain medications and was felt to be medically stable for d/c to home.    Exam at time of Discharge:   Focused exam of RLE shows HKB in place and fitting appropriately.    ACE c/d/i  Distally, SILT s/s/sp/dp/t nerve distributions  Firing EHL/FHL/TA/GSC  Foot is wwp, palpable DP pulse     DVT prophylaxis:  ASA 162mg daily  x 4 weeks.  Blood Transfusion: Not needed          Discharge Instructions and Follow-Up:     Discharge Procedure Orders   Discharge Instructions   Order Comments: DISTAL FEMORAL OSTEOTOMY POST OPERATIVE INSTRUCTIONS     TTWB WITH HINGED KNEE BRACE PROTOCOL      FOLLOW UP APPOINTMENT  A follow-up appointment with Dr. Madear should be scheduled approximately one to two weeks after surgery. If your appointment was not scheduled prior to surgery, please call [phone].    Your follow up appointment will be at the location that you regularly see Dr. Madera:    [facility]  [address] [place] [id number] [phone]    [place] [address]  [phone]    Physical therapy:   Physical therapy should begin one week after surgery. An order for physical therapy will be provided by Dr. Madera's office but it will be your responsibility to schedule the first appointment at the location of your choice.     ACTIVITY  Weight bearing status:   You will be allowed to toe touch weight bear on your operative leg using assistive devices (crutches) as needed. The hinged knee brace should remain on and locked at 0 degrees at all times when up.     Hinged knee brace:  The brace should be set at 0 degrees to 90 degrees. It is to be locked at 0 degrees at all times when up out of bed until seen in clinic. You  may unlock the brace to begin range of motion from 0-90 degrees when safely seated or at rest or with PT. Sleep with the brace on until directed.    Exercises:   Perform the following exercises at least three times per day for the first four weeks after surgery to prevent complications, such as blood clots in your legs:  1) Point and flex your feet  2) Move your ankle around in big circles  3) Wiggle your toes   Also, perform thigh muscle tightening exercises for 10 to 15 minutes at least three times per day for the first four weeks after surgery.    Athletic Activities:  Activities such as swimming, bicycling, jogging, running, and stop-and-go sports should be avoided until permitted by your provider.    Driving:  Driving is not permitted until directed by your provider. Typically, driving is restricted for three to four weeks after right knee surgery and three weeks after left knee surgery. Under no circumstance are you permitted to drive while using narcotic pain medications.    Return to Work:  Return to work as soon as possible.  Your ability to work depends on a number of factors - your level of discomfort and how much demand your job puts on your knees.  If you have any questions, please call Dr. Madera's office.      COMFORT AND PAIN MANAGEMENT  Elevation:   During times of inactivity throughout the first two weeks after surgery, make an effort to decrease swelling by elevating your operative extremity. This is most effectively done by lying down and placing several pillows lengthwise under your thigh and calf to raise your toes above the level of your nose. To ensure that your knee remains in full extension, do not place pillows directly under your knee.     Icing:  An ice pack will be provided to control swelling and discomfort after surgery. Place a thin towel on your skin and apply the ice pack overtop. You may apply ice for 20 minutes as often as two times per hour.    Pain Medications:  You will be  discharged with acetaminophen (Tylenol) and a narcotic medication for pain management after surgery. Acetaminophen is most effective when it is taken per the schedule outlined by your provider (every four, six, or eight hours as prescribed). You may safely use acetaminophen as prescribed for the first four weeks after surgery provided you do not exceed the maximum daily dose prescribed by your provider (usually 3000 mg - 4000 mg). The narcotic pain medication should only be taken on an as-needed basis when necessary and should be reserved for severe pain that is not controlled with scheduled acetaminophen. In the first three days following surgery, your symptoms may warrant use of the narcotic pain medication every three, four, or six hours as prescribed. After three days, focus your efforts on decreasing (tapering) use of narcotic medications.   The most successful tapering strategy is to first, decrease the dose (number of tablets) and second, decrease the interval (time in between doses). For example, if you begin taking two tablets every four hours after surgery, start your taper by decreasing one of these doses to one tablet. Every one to two days, decrease another dose to one tablet until you are eventually taking one tablet every four hours. Once this is achieved, focus on increasing the number of hours between doses, moving from one tablet every four hours to one tablet every six hours. As tolerated, continue to increase the interval to eight and twelve hours. Eventually, taper to one dose every evening and discontinue when no longer needed.      ANTICOAGULATION  Depending on your risk factors, your provider may prescribe aspirin to prevent blood clots. If prescribed, take aspirin 162mg daily for the first four weeks after surgery.      WOUND CARE AND SHOWERING  Wound care:  Keep the initial post-op dressing on, clean, and dry for the first three days after surgery. On the fourth day after surgery, you may  remove the dressing. Your surgical incisions were closed with steristrips (small white tape that is directly on the incision areas) that should be left on until they fall off or are removed at your first office visit. All sutures will also be removed at your first office visit. Under no circumstance should you pick or scratch your incision.    Showering:  You may shower on the fourth day after surgery (immediatly after the dressing is removed) provided your incision is intact and dry without drainage. If there is fluid at the incision site, cover the incision with a non-permeable plastic bag. You may allow water to run over the incision, but do not soak or submerge the incision. Do not scrub the incision.     Tub Bathing:  Tub bathing, swimming, or any other activities that cause your incision to be submerged should be avoided until allowed by your provider. Typically, patients are allowed to return to these activities four weeks after surgery.      CONTACTING YOUR PHYSICIAN:  You may experience symptoms that require follow-up before your scheduled appointment. Please contact Dr. Madera's office if you experience:  1) Pain in your knee that persists or worsens in the first few days after surgery  2) Excessive redness or drainage of cloudy or bloody material from the wounds (clear red tinted fluid and some mild drainage should be expected) or drainage of any kind five days after surgery  3) A temperature elevation greater than 101.5 F   4) Pain, swelling or redness in your calf  5) Numbness or weakness in your leg or foot      Regular business hours (Monday - Friday, 8am - 5pm):  [facility]: [phone]  [facility]: [phone]    After hours and weekends:  [facility] on call Orthopedic resident: [phone]     Weight bearing status - Partial   Order Comments: Toe Touch Weight Bearing RLE at all times      Ice to affected area   Order Comments: Ice to operative site PRN     Miscellaneous DME Order   Order Comments: DME Documentation:   Describe the reason for need to support medical necessity: gait instability.     I, the undersigned, certify that the above prescribed supplies are medically necessary for this patient and is both reasonable and necessary in reference to accepted standards of medical and necessary in reference to accepted standards of medical practice in the treatment of this patient's condition and is not prescribed as a convenience.     Order Specific Question Answer Comments   DME Provider: [facility]    DME Item Needed: crutches    Length of Need: indefinite               Discharge Disposition:     Discharged to home      Patient was discussed with Dr. Madera on day of discharge.    Jean Claude Burks MD  Orthopaedic Surgery PGY-4

## 2020-10-06 NOTE — PROGRESS NOTES
[facility]: Post-Discharge Note  SITUATION                                                      Admission:    Admission Date: 10/02/20   Reason for Admission: right knee, right knee arthroscopy, open lateral retinacular lengthening  Discharge:   Discharge Date: 10/05/20  Discharge Diagnosis: right knee, right knee arthroscopy, open lateral retinacular lengthening    BACKGROUND                                                      Pt is POD 2 from right knee osteotomy with post-op complication of r. Femur fracture. On 10/4, pt reported pain was not controlled with IV toradol, PO dilaudid 4 mg, and PO tylenol 975 mg. Pt stated he had pain 8/10 in R. Knee with muscle spasms radiating into r. Shin/calf and r. Hip. Pt's pain is preventing him from participating in therapy.    ASSESSMENT      Discharge Assessment  Patient reports symptoms are: Improved  Does the patient have all of their medications?: Yes  Does patient know what their new medications are for?: Yes  Does patient have a follow-up appointment scheduled?: No  Does patient have any other questions or concerns?: No    Post-op  Did the patient have surgery or a procedure: Yes  Incision: healing  Drainage: No  Bleeding: none  Fever: No  Chills: No  Redness: No  Warmth: No  Swelling: No  Incision site pain: No  Eating & Drinking: eating and drinking without complaints/concerns  PO Intake: regular diet  Bowel Function: normal  Urinary Status: voiding without complaint/concerns        PLAN                                                      Outpatient Plan:  : Clinic in 1 week for wound check with Dr. Madrea; then at 6 weeks post op with repeat XR of operative knee    Future Appointments   Date Time Provider Department Center   10/9/2020  8:55 AM Ronnie Rodgers [facility]   10/12/2020 12:00 PM Angel Madera MD [facility]   10/13/2020 12:50 PM Ronnie Rodgers [facility]   10/16/2020  9:30 AM Annette Hobson APRN CNP  [facility]   10/16/2020  3:50 PM Ronnie Rodgers [facility]           Laina Price, [facility]

## 2020-10-07 ENCOUNTER — TELEPHONE (OUTPATIENT)
Dept: ENDOCRINOLOGY | Facility: CLINIC | Age: 35
End: 2020-10-07

## 2020-10-07 ENCOUNTER — PATIENT OUTREACH (OUTPATIENT)
Dept: ORTHOPEDICS | Facility: CLINIC | Age: 35
End: 2020-10-07

## 2020-10-07 NOTE — TELEPHONE ENCOUNTER
DOS: 10/2/2020 Right knee arthroscopy, chondroplasty lateral femoral condyle and lateral trochlea, medial retinacular imbrication, open lateral retinacular lengthening, distal femoral osteotomy.     Patient has his 1 week follow up scheduled with Dr. Madera on 10/12/2020. He has his first 3 PT visits scheduled, the first being 10/9/2020.     Patient states he is doing okay. He is in quite a bit of pain and is taking Dilaudid every 4 hours (6 mg at a time) and supplementing with Tylenol. The pain meds bring his pain down to about a 6 where it is uncomfortable but tolerable. Patient will count his medication and call tomorrow if he is going to run out over the weekend so we can send a refill in anticipation. Reassured patient that he is doing everything he should and that this first week is going to be the toughest; patient had previous osteotomy on the other side. Patient expressed understanding and will call with any further questions or concerns.

## 2020-10-07 NOTE — TELEPHONE ENCOUNTER
The following [employer] message sent to the patient:    Hi Chirag,    You are scheduled to see Annette Hobson NP, next Friday, October 16th at 9:30 a.m.  Unfortunately we are not seeing patients in clinic at this time as we are trying to minimize patient exposure and keep our patients healthy while also practicing social distancing due to COVID19.  We would be happy to do your visit by video via [employer] or Smartphone as scheduled for 10/16/2020 at 9:30 a.m.      If you log in to your [employer], you will see that [employer] video visit instructions have been sent to you.  We would be happy to do a video visit via your smartphone if you prefer and I will plan to discuss how to connect virtually when I call you to prepare for the appointment.  I will call you up to 15 minutes prior to your scheduled appointment time to confirm you are ready for the appointment and answer any questions you may have about connecting online.  If this works for you, no need to call us back.  If not, please call us to discuss alternatives for your appointment.     You will need to upload your Janie to the [facility] website several days prior to your appointment.  If you have questions or are unable to do this, you can arrange a time to come see me at the clinic and I will upload it for you.  Please call me at [phone].      Thank you,  Sylvia Huddleston M.A.  [facility]  [phone] [facility]

## 2020-10-08 DIAGNOSIS — Z98.890 STATUS POST KNEE SURGERY: ICD-10-CM

## 2020-10-08 RX ORDER — HYDROMORPHONE HYDROCHLORIDE 4 MG/1
4-6 TABLET ORAL EVERY 4 HOURS PRN
Qty: 40 TABLET | Refills: 0 | Status: SHIPPED | OUTPATIENT
Start: 2020-10-08 | End: 2020-10-12

## 2020-10-08 NOTE — TELEPHONE ENCOUNTER
DOS: 10/2/2020 Right knee arthroscopy, chrondroplasty, medial retinacular imbrication, open lateral retinacular lengthening, distal femoral osteotomy.     This writer spoke with patient yesterday for his clinic postop call. Per our discussion, patient reviewed his remaining pain medication and is calling today to request a refill in anticipation of the upcoming weekend. Patient has had a tougher time with pain management with this surgery; he states that his current regimen brings his pain down to around 6 which is tolerable. Refill send to patient's pharmacy; pended to Dr. Madera for signature.

## 2020-10-08 NOTE — TELEPHONE ENCOUNTER
[facility] Call Center    Phone Message    May a detailed message be left on voicemail: yes     Reason for Call: Other: patient calling regarding medication for this weekend for Dilotted refill. Please call patient back at [phone]     Action Taken: Message routed to:  [facility]o and Other: ortho    Travel Screening: Not Applicable

## 2020-10-09 ENCOUNTER — THERAPY VISIT (OUTPATIENT)
Dept: PHYSICAL THERAPY | Facility: CLINIC | Age: 35
End: 2020-10-09
Payer: COMMERCIAL

## 2020-10-09 DIAGNOSIS — M25.561 RIGHT KNEE PAIN: ICD-10-CM

## 2020-10-09 DIAGNOSIS — Z47.89 AFTERCARE FOLLOWING SURGERY OF THE MUSCULOSKELETAL SYSTEM: Primary | ICD-10-CM

## 2020-10-09 DIAGNOSIS — Z98.890 S/P RIGHT KNEE SURGERY: Primary | ICD-10-CM

## 2020-10-09 PROCEDURE — 97110 THERAPEUTIC EXERCISES: CPT | Mod: GP | Performed by: PHYSICAL THERAPIST

## 2020-10-09 PROCEDURE — 97162 PT EVAL MOD COMPLEX 30 MIN: CPT | Mod: GP | Performed by: PHYSICAL THERAPIST

## 2020-10-09 NOTE — PROGRESS NOTES
[facility]: Physical Therapy Initial Evaluation   Oct 9, 2020  Comments/Precautions/Restrictions/Physician instructions:   Per Orders: TTWB for 6 weeks ; Hinged brace locked when up, unlocked for therapy.     Subjective:    Chief Complaint: S/P Right Knee Examination under anesthesia right knee ; Right knee arthroscopy ; Chondroplasty lateral femoral condyle and lateral trochlea ; Medial retinacular imbrication ; Open lateral retinacular lengthening ; Distal femoral osteotomy   Pain: right knee in the front, medially, does radiate downwards. Also gets spasms above the knee that radiate downwards as well.    Numbness/Tingling: None  DOS: 10/2/2020 - Dr. Madera  Referral Date: 8/31/2020 - Angel Madera MD  PMH/surgical history/trauma:    - depression   - diabetes   - high blood pressure   - overweight   - gastric bypass - jackie en y   - History of Right knee arthroscopy  Medications: see chart  Imaging: Arthroscopic visualization  Pain: 8/10  Progression of Symptoms since onset: worse   Transportation to Therapy: Dependent for transportation, ride from wife today  Live with Others: Wife and child normally, currently staying with his mom during recovery due to better home layout without stairs    Patient's goal(s): Build the strength back up, get the ROM back, learn to walk the correct way on it.       Weightbearing Restrictions: TTWB 6 weeks      Objective:    Bed Transfers: modified independent    Skin/Surgical Site: WNL, no excess redness or signs of infection    Gait/AD: TTWB with 2 crutches      ROM:   L R   KNEE     Hyper Ext  0   Ext  12   Flex  57     Quad Set: Able to get a mild quadriceps contraction. Extensor lag not assessed as SLR into flexion is contraindicated on the protocol.         Assessment/Plan:    Patient is a 35 year old male with right side knee complaints.    Patient has the following significant findings with corresponding treatment plan.                 Referring Diagnosis: S/P Right Knee Arthroscopy with Chondroplasty and Distal Femoral Osteotomy  Pain -  hot/cold therapy, manual therapy, splint/taping/bracing/orthotics, self management, education and home program  Decreased ROM/flexibility - manual therapy, therapeutic exercise, therapeutic activity and home program  Decreased joint mobility - manual therapy, therapeutic exercise, therapeutic activity and home program  Decreased strength - therapeutic exercise, therapeutic activities and home program  Impaired balance - neuro re-education, therapeutic activities and home program  Inflammation - cold therapy and self management/home program  Edema - vasopneumatics, cold therapy, cryocuff and self management/home program  Impaired gait - gait training, assistive devices and home program  Impaired muscle performance - neuro re-education and home program  Decreased function - therapeutic activities and home program  Impaired posture - neuro re-education, therapeutic activities and home program        Therapy Evaluation Codes:   1) History comprised of:   Personal factors that impact the plan of care:      Living environment and Past/current experiences.    Comorbidity factors that impact the plan of care are:      Diabetes, Depression and Overweight.     Medications impacting care: None.  2) Examination of Body Systems comprised of:   Body structures and functions that impact the plan of care:      Hip and Knee.   Activity limitations that impact the plan of care are:      Squatting/kneeling, Stairs, Standing and Walking.  3) Clinical presentation characteristics are:   Evolving/Changing.  4) Decision-Making    Moderate complexity using standardized patient assessment instrument and/or measureable assessment of functional outcome.  Cumulative Therapy Evaluation is: Moderate complexity.    Previous and current functional limitations:  (See Goal Flow Sheet for this information)    Short term and Long term goals: (See  Goal Flow Sheet for this information)     Communication ability:  Patient appears to be able to clearly communicate and understand verbal and written communication and follow directions correctly.  Treatment Explanation - The following has been discussed with the patient:   RX ordered/plan of care  Anticipated outcomes  Possible risks and side effects  This patient would benefit from PT intervention to resume normal activities.   Rehab potential is good.    Frequency:  2 X week, once daily  Duration:  for 4 weeks tapering to 2 X a month over 16 weeks  Discharge Plan:  Achieve all LTG.  Independent in home treatment program.  Reach maximal therapeutic benefit.    Please refer to the daily flowsheet for treatment today, total treatment time and time spent performing 1:1 timed codes.

## 2020-10-12 ENCOUNTER — ANCILLARY PROCEDURE (OUTPATIENT)
Dept: GENERAL RADIOLOGY | Facility: CLINIC | Age: 35
End: 2020-10-12
Attending: ORTHOPAEDIC SURGERY
Payer: COMMERCIAL

## 2020-10-12 ENCOUNTER — OFFICE VISIT (OUTPATIENT)
Dept: ORTHOPEDICS | Facility: CLINIC | Age: 35
End: 2020-10-12
Payer: COMMERCIAL

## 2020-10-12 DIAGNOSIS — Z98.890 S/P RIGHT KNEE SURGERY: ICD-10-CM

## 2020-10-12 DIAGNOSIS — Z98.890 STATUS POST KNEE SURGERY: ICD-10-CM

## 2020-10-12 PROCEDURE — 99024 POSTOP FOLLOW-UP VISIT: CPT | Performed by: ORTHOPAEDIC SURGERY

## 2020-10-12 PROCEDURE — 73560 X-RAY EXAM OF KNEE 1 OR 2: CPT | Mod: RT | Performed by: RADIOLOGY

## 2020-10-12 RX ORDER — HYDROMORPHONE HYDROCHLORIDE 4 MG/1
4-6 TABLET ORAL EVERY 4 HOURS PRN
Qty: 40 TABLET | Refills: 0 | Status: SHIPPED | OUTPATIENT
Start: 2020-10-12 | End: 2020-10-22

## 2020-10-12 NOTE — ANESTHESIA POSTPROCEDURE EVALUATION
Anesthesia POST Procedure Evaluation    Patient: Naman Jones   MRN:     [id number] Gender:   male   Age:    35 year old :      1985        Preoperative Diagnosis: Chronic pain of right knee [M25.561, G89.29]   Procedure(s):  Examination under anesthesia right knee, right knee arthroscopy, chondralplasty of lateral femoral condyle and lateral trochlea, medial retinacular imbrication, open lateral retinacular lengthening  Left distal femoral osteotomy   Postop Comments: No value filed.     Anesthesia Type: General       Disposition: Outpatient   Postop Pain Control: Uneventful            Sign Out: Well controlled pain   PONV: No   Neuro/Psych: Uneventful            Sign Out: Acceptable/Baseline neuro status   Airway/Respiratory: Uneventful            Sign Out: Acceptable/Baseline resp. status   CV/Hemodynamics: Uneventful            Sign Out: Acceptable CV status   Other NRE: NONE   DID A NON-ROUTINE EVENT OCCUR? No         Last Anesthesia Record Vitals:  CRNA VITALS  10/2/2020 1410 - 10/2/2020 1510      10/2/2020             NIBP:  108/70    Pulse:  74    NIBP Mean:  83    Temp:  37.1  C (98.8  F)    SpO2:  100 %    Resp Rate (observed):  16    EKG:  Sinus rhythm          Last PACU Vitals:  Vitals Value Taken Time   /75 10/02/20 1630   Temp 36.8  C (98.2  F) 10/02/20 1600   Pulse 61 10/02/20 1630   Resp 9 10/02/20 1630   SpO2 100 % 10/02/20 1630   Temp src     NIBP 108/70 10/02/20 1444   Pulse 74 10/02/20 1444   SpO2 100 % 10/02/20 1444   Resp     Temp 37.1  C (98.8  F) 10/02/20 1444   Ht Rate     Temp 2           Electronically Signed By: Todd Esparza MD, 2020, 3:00 PM

## 2020-10-12 NOTE — PROGRESS NOTES
DIAGNOSIS:   1. Recurrent patellar instability, valgus malalignment right knee  2. Recurrent patellar instability, valgus malalignment left knee    PROCEDURES:  1. Right knee arthroscopy, chondroplasty, distal femoral osteotomy, medial retinacular imbrication and lateral retinacular lengthening; date of surgery 2/28/2020  2. Left knee arthroscopy, chondroplasty, distal femoral osteotomy, medial retinacular imbrication and lateral retinacular lengthening; date of surgery 10/2/2020    HISTORY: Doing well 1 week out from surgery.  Pain controlled.  Still using narcotics though he is trying to wean.  Feels that the site is been harder than the other side.  Has been taking his aspirin.  Seeing physical therapy.      EXAM:     General: Awake, Alert, and oriented. Articulates and communicates with a normal affect     Right lower Extremity:    Incisions healing well without evidence of infection no concerns by the patient    Range of motion not tested    Stability exam was not performed    Neurovascularly intact    IMAGING:  AP and lateral radiographs today show osteotomy in good position.  A mild lucency is noted in the lateral radiograph consistent with the immediate postoperative period.  However this was noted on the contralateral side as well.  Unclear etiology.    ASSESSMENT:  1. 8 months following distal femoral osteotomy medial retinacular imbrication left knee.  Doing well.  2. 1 week following distal femoral osteotomy and medial retinacular imbrication right knee.  Doing well.    PLAN:     Toe-touch weightbearing x6 weeks    Range of motion as tolerated    Hinged knee brace on and locked when up and around, off or unlocked for sitting or therapy    Sutures removed in clinic    Leave steri-strips in place until they fall off    OK to shower allowing water to run over incision    No soaking, scrubbing, baths, or lake for 1 additional week    Continue PT as scheduled     Pain medications were refilled today.  He  will try to wean.    Operative report provided and arthroscopic images reviewed    Follow up at 6 weeks from the date of surgery with AP and lateral radiographs

## 2020-10-12 NOTE — LETTER
10/12/2020         RE: Naman Jones  [id number] [address] [id number]        Dear Colleague,    Thank you for referring your patient, Naman Jones, to the [facility]. Please see a copy of my visit note below.      DIAGNOSIS:   1. Recurrent patellar instability, valgus malalignment right knee  2. Recurrent patellar instability, valgus malalignment left knee    PROCEDURES:  1. Right knee arthroscopy, chondroplasty, distal femoral osteotomy, medial retinacular imbrication and lateral retinacular lengthening; date of surgery 2/28/2020  2. Left knee arthroscopy, chondroplasty, distal femoral osteotomy, medial retinacular imbrication and lateral retinacular lengthening; date of surgery 10/2/2020    HISTORY: Doing well 1 week out from surgery.  Pain controlled.  Still using narcotics though he is trying to wean.  Feels that the site is been harder than the other side.  Has been taking his aspirin.  Seeing physical therapy.      EXAM:     General: Awake, Alert, and oriented. Articulates and communicates with a normal affect     Right lower Extremity:    Incisions healing well without evidence of infection no concerns by the patient    Range of motion not tested    Stability exam was not performed    Neurovascularly intact    IMAGING:  AP and lateral radiographs today show osteotomy in good position.  A mild lucency is noted in the lateral radiograph consistent with the immediate postoperative period.  However this was noted on the contralateral side as well.  Unclear etiology.    ASSESSMENT:  1. 8 months following distal femoral osteotomy medial retinacular imbrication left knee.  Doing well.  2. 1 week following distal femoral osteotomy and medial retinacular imbrication right knee.  Doing well.    PLAN:     Toe-touch weightbearing x6 weeks    Range of motion as tolerated    Hinged knee brace on and locked when up and around, off or unlocked for sitting or  therapy    Sutures removed in clinic    Leave steri-strips in place until they fall off    OK to shower allowing water to run over incision    No soaking, scrubbing, baths, or lake for 1 additional week    Continue PT as scheduled     Pain medications were refilled today.  He will try to wean.    Operative report provided and arthroscopic images reviewed    Follow up at 6 weeks from the date of surgery with AP and lateral radiographs           Again, thank you for allowing me to participate in the care of your patient.        Sincerely,        Angel Madera MD

## 2020-10-13 ENCOUNTER — THERAPY VISIT (OUTPATIENT)
Dept: PHYSICAL THERAPY | Facility: CLINIC | Age: 35
End: 2020-10-13
Payer: COMMERCIAL

## 2020-10-13 DIAGNOSIS — M25.561 RIGHT KNEE PAIN: ICD-10-CM

## 2020-10-13 DIAGNOSIS — Z47.89 AFTERCARE FOLLOWING SURGERY OF THE MUSCULOSKELETAL SYSTEM: ICD-10-CM

## 2020-10-13 PROCEDURE — 97530 THERAPEUTIC ACTIVITIES: CPT | Mod: GP | Performed by: PHYSICAL THERAPIST

## 2020-10-13 PROCEDURE — 97110 THERAPEUTIC EXERCISES: CPT | Mod: GP | Performed by: PHYSICAL THERAPIST

## 2020-10-16 ENCOUNTER — THERAPY VISIT (OUTPATIENT)
Dept: PHYSICAL THERAPY | Facility: CLINIC | Age: 35
End: 2020-10-16
Payer: COMMERCIAL

## 2020-10-16 ENCOUNTER — VIRTUAL VISIT (OUTPATIENT)
Dept: ENDOCRINOLOGY | Facility: CLINIC | Age: 35
End: 2020-10-16
Payer: COMMERCIAL

## 2020-10-16 ENCOUNTER — TELEPHONE (OUTPATIENT)
Dept: ORTHOPEDICS | Facility: CLINIC | Age: 35
End: 2020-10-16

## 2020-10-16 DIAGNOSIS — E11.29 TYPE 2 DIABETES MELLITUS WITH MICROALBUMINURIA, WITHOUT LONG-TERM CURRENT USE OF INSULIN (H): Primary | ICD-10-CM

## 2020-10-16 DIAGNOSIS — R80.9 TYPE 2 DIABETES MELLITUS WITH MICROALBUMINURIA, WITHOUT LONG-TERM CURRENT USE OF INSULIN (H): Primary | ICD-10-CM

## 2020-10-16 DIAGNOSIS — Z47.89 AFTERCARE FOLLOWING SURGERY OF THE MUSCULOSKELETAL SYSTEM: ICD-10-CM

## 2020-10-16 DIAGNOSIS — M25.561 RIGHT KNEE PAIN: ICD-10-CM

## 2020-10-16 DIAGNOSIS — Z98.84 STATUS POST GASTRIC BYPASS FOR OBESITY: ICD-10-CM

## 2020-10-16 DIAGNOSIS — E83.51 HYPOCALCEMIA: ICD-10-CM

## 2020-10-16 PROCEDURE — 99214 OFFICE O/P EST MOD 30 MIN: CPT | Mod: 95 | Performed by: CLINICAL NURSE SPECIALIST

## 2020-10-16 PROCEDURE — 97110 THERAPEUTIC EXERCISES: CPT | Mod: GP | Performed by: PHYSICAL THERAPIST

## 2020-10-16 RX ORDER — SEMAGLUTIDE 1.34 MG/ML
INJECTION, SOLUTION SUBCUTANEOUS
Qty: 2 PEN | Refills: 11 | Status: SHIPPED | OUTPATIENT
Start: 2020-10-16 | End: 2022-04-06

## 2020-10-16 RX ORDER — BLOOD SUGAR DIAGNOSTIC
STRIP MISCELLANEOUS
Qty: 300 STRIP | Refills: 3 | Status: SHIPPED | OUTPATIENT
Start: 2020-10-16 | End: 2023-02-10

## 2020-10-16 RX ORDER — LANCETS 33 GAUGE
1 EACH MISCELLANEOUS 3 TIMES DAILY
Qty: 300 EACH | Refills: 3 | Status: SHIPPED | OUTPATIENT
Start: 2020-10-16 | End: 2023-02-10

## 2020-10-16 NOTE — TELEPHONE ENCOUNTER
[facility] Call Center    Phone Message    May a detailed message be left on voicemail: yes     Reason for Call: Medication Question or concern regarding medication   Prescription Clarification  Name of Medication: Hydromorphone  Prescribing Provider: Dr. Madera   Pharmacy: [place] [facility]   What on the order needs clarification? Pt's wife Brit is calling asking to speak with Valentina.  Pt's pharmacy is requesting a prior auth on this medication.  Brit stated they will pay out of pocket for this Rx but they are only able to get 36 tablets vs the 40 ordered.  Brit is wanting a call back on this today please          Action Taken: Message routed to:  [facility]o    Travel Screening: Not Applicable

## 2020-10-16 NOTE — TELEPHONE ENCOUNTER
Chirag underwent Right knee scope and femoral osteotomy on 10/2/20.  Chirag states he did not fill the dilaudid ordered 10/12/20 until today, they filled 36 tabs, and he is fine for the weekend and will let us know next week if he needs a refill. He is taking tylenol along with dilaudid.   He is allergic to oxycodone.  Cherrie Sanon RN

## 2020-10-16 NOTE — PATIENT INSTRUCTIONS
Chirag,    I renewed your metformin and Ozempic prescriptions.  I renewed the Ozempic at the 1 mg/week dose which I think is a better dose for your right now - it will help the most with weight loss and give you better blood sugar control.    Remember to get your flu shot!    Schedule a lab appointment (nonfasting) December 29 or 30/31st.  I'll let you know results when available.    Let me know if you have any problems getting the phone lupe for your Janie to work.    Annette Hobson NP  Endocrinology

## 2020-10-16 NOTE — PROGRESS NOTES
"Naman Jones is a 35 year old male who is being evaluated via a billable video visit.      The patient has been notified of following:     \"This video visit will be conducted via a call between you and your physician/provider. We have found that certain health care needs can be provided without the need for an in-person physical exam.  This service lets us provide the care you need with a video conversation.  If a prescription is necessary we can send it directly to your pharmacy.  If lab work is needed we can place an order for that and you can then stop by our lab to have the test done at a later time.    Video visits are billed at different rates depending on your insurance coverage.  Please reach out to your insurance provider with any questions.    If during the course of the call the physician/provider feels a video visit is not appropriate, you will not be charged for this service.\"    Patient has given verbal consent for Video visit? Yes  How would you like to obtain your AVS? MyChart  If you are dropped from the video visit, the video invite should be resent to: Text to cell phone: [phone]  Will anyone else be joining your video visit? No        Video-Visit Details    Type of service:  Video Visit    Video Start Time: 9:30 AM  Video End Time: 9:44 AM    Originating Location (pt. Location): Home    Distant Location (provider location):  [facility]     Platform used for Video Visit: [facility]    ENDOCRINOLOGY CLINIC NOTE:  Name: Naman \"Oliva" Robert  Seen for f/u of Diabetes (Last seen 6/12/2020).  HPI:  Naman Jones is a 35 year old male who presents via video visit for the management of Diabetes.  10/9/2018: gastric bypass, [facility]  Has lost 151 lbs, 369-->296-->247-->218 lbs.  Currently eating a normal diet    VITAMINS AND MINERALS:   2 Multivitamin with Minerals-   600 mg Calcium carbonate With Vitamin D TID- with meals  3-1000 " International units Vitamin D  500 mcg Vitamin B-12 sublingual      1. Type 2 DM:  Orginally diagnosed at the age of: 30. On routine physical exam. Unexplained weight loss at that time.    Current Regimen:    Metformin 500 mg bid. Ozempic 1 mg weekly.    Unable to tolerate higher doses due to GI side effects.  Januvia discontinued and Ozempic started 6/2020.    Insulin use decreased following gastric bypass, now off all insulin    BS checks: Janie personal CGM  Janie: has not been wearing his Janie recently - misplaced the reader.  Checking BG via meter: review of most recent readings: 149,130,135.    Exercise: not much  Symptoms of hypoglycemia (low blood sugar):  Gets symptoms of hypoglycemia.  Episodes of hypoglycemia: no  Fixed meal pattern: yes  Patient counting carbs: yes     DM Complications:   Nephropathy: yes - elevated urine microalbumin  Retinopathy: no retinopathy - annual exam due but is delayed due COVID-19  Neuropathy: Yes, + numbness in toes - stable  Microalbuminuria: Yes- on losartan  CAD/PAD: no  Gastroparesis: no  Hypoglycemia unawareness: no  Orthopedic surgery - left leg, 6/2020.  Expected recovery is about 3 months.  Returning to work, limited hours.  First day back is today.  2. Hypertension:    Off BP meds at this time.  Previously on Losartan 100 mg and Maxide 75-50 mg daily.  3. Hyperlipidemia: +Atorvastatin 40 mg qd    PMH/PSH:  DM  HTN  Dyslipidemia    Family Hx:  Diabetes: Father and mgm    Social Hx:  Social History     Socioeconomic History     Marital status:      Spouse name: Not on file     Number of children: Not on file     Years of education: Not on file     Highest education level: Bachelor's degree (e.g., BA, AB, BS)   Occupational History     Not on file   Social Needs     Financial resource strain: Not very hard     Food insecurity     Worry: Never true     Inability: Never true     Transportation needs     Medical: No     Non-medical: No   Tobacco Use     Smoking  status: Never Smoker     Smokeless tobacco: Never Used   Substance and Sexual Activity     Alcohol use: Yes     Comment: socially     Drug use: No     Sexual activity: Yes     Partners: Female   Lifestyle     Physical activity     Days per week: 2 days     Minutes per session: 30 min     Stress: To some extent   Relationships     Social connections     Talks on phone: More than three times a week     Gets together: Once a week     Attends Zoroastrianism service: Never     Active member of club or organization: No     Attends meetings of clubs or organizations: 1 to 4 times per year     Relationship status:      Intimate partner violence     Fear of current or ex partner: Not on file     Emotionally abused: Not on file     Physically abused: Not on file     Forced sexual activity: Not on file   Other Topics Concern     Parent/sibling w/ CABG, MI or angioplasty before 65F 55M? Not Asked   Social History Narrative     Not on file          MEDICATIONS:  has a current medication list which includes the following prescription(s): acetaminophen, aspirin, atorvastatin, buspirone, calcium carb-cholecalciferol, freestyle césar reader, freestyle césar 14 day sensor, b-12, eszopiclone, hydromorphone, hydroxyzine, lorazepam, metformin, methocarbamol, childrens multivitamin w/iron, naloxone, omeprazole, polyethylene glycol, ozempic (1 mg/dose), and senna-docusate.    ROS     ROS: 10 point ROS neg other than the symptoms noted above in the HPI.    Physical Exam   VS: There were no vitals taken for this visit.  GENERAL: AXOX3, NAD, well dressed, answering questions appropriately, appears stated age.  HEENT: No exopthalmous, no proptosis, no lig lag, no retraction  CV:   LUNGS: Normal respiratory effort  NEUROLOGY:   PSYCH: normal affect and mood    LABS:  A1c:   Component      Latest Ref Rng & Units 2/14/2020 2/28/2020 9/29/2020   Hemoglobin A1C      0 - 5.6 % 7.0 (H) 6.8 (H) 7.3 (H)     !COMPREHENSIVE Latest Ref Rng & Units  10/3/2020   SODIUM 133 - 144 mmol/L 138   POTASSIUM 3.4 - 5.3 mmol/L 3.8   CHLORIDE 94 - 109 mmol/L 106   BUN 7 - 30 mg/dL 9   Creatinine 0.66 - 1.25 mg/dL 0.56 (L)   Glucose 70 - 99 mg/dL 218 (H)   ANION GAP 3 - 14 mmol/L    CALCIUM 8.5 - 10.1 mg/dL 8.1 (L)     !LIPID/HEPATIC Latest Ref Rng & Units 9/29/2020   CHOLESTEROL <200 mg/dL 122   TRIGLYCERIDES <150 mg/dL 96   HDL CHOLESTEROL >39 mg/dL 39 (L)   LDL CHOLESTEROL DIRECT <100 mg/dL    LDL CHOLESTEROL, CALCULATED <100 mg/dL 64   NON HDL CHOLESTEROL <130 mg/dL 83   AST 0 - 45 U/L 15   ALT 0 - 70 U/L 53     !THYROID Latest Ref Rng & Units 7/26/2018   TSH 0.40 - 4.00 mU/L 1.50     Component      Latest Ref Rng & Units 9/29/2020   Creatinine Urine      mg/dL 251   Albumin Urine mg/L      mg/L 29   Albumin Urine mg/g Cr      0 - 17 mg/g Cr 11.47     All pertinent notes, labs, and images personally reviewed by me.     A/P  Mr.Anthony Jones is a 35 year old being evaluated via video visit for the management of diabetes:    1. DM2 - Reasonable control.   Diabetes complicated by neuropathy and microalbuminuria.  Diabetes is currently treated with metformin 500 mg bid and Ozempic 1 mg weekly.  He's unable to tolerate any higher doses of Metformin due to GI side effects.  Continue Ozempic 1 mg weekly and metformin 500 mg bid.  Recommend he continue to be attentive to dietary intake - limited carbs, calorie controlled, healthier food choices.  He'll download the lupe for freestyle reader and start wearing his Janie again.  If not wearing Janie, test 3 times daily - before each meal.    There is no height or weight on file to calculate BMI.    -- He is allergic to sulfa medications so cannot be on sulfonylureas    2. Hypocalcemia -s/p gastric bypass - Recent labs significant for low serum calcium.  States he wasn't taking his calcium with D supplements but has since restarted.  Plan to recheck BMP and vitamin D level with next labs in December.    Most Recent Immunizations    Administered Date(s) Administered     HepB 05/15/2017     Influenza (intradermal) 09/20/2016     Influenza Vaccine IM > 6 months Valent IIV4 10/18/2019     Pneumo Conj 13-V (2010&after) 07/26/2018     TDAP Vaccine (Adacel) 10/04/2012       Follow-up:  3-4 months with labs prior    Annette Hobson NP  [facility]  CC: Haase, Susan Rachele

## 2020-10-16 NOTE — LETTER
"    10/16/2020         RE: Naman Jones  [address] [address] [place] [address]        Dear Colleague,    Thank you for referring your patient, Naman Jones, to the [facility]. Please see a copy of my visit note below.    Naman Jones is a 35 year old male who is being evaluated via a billable video visit.      The patient has been notified of following:     \"This video visit will be conducted via a call between you and your physician/provider. We have found that certain health care needs can be provided without the need for an in-person physical exam.  This service lets us provide the care you need with a video conversation.  If a prescription is necessary we can send it directly to your pharmacy.  If lab work is needed we can place an order for that and you can then stop by our lab to have the test done at a later time.    Video visits are billed at different rates depending on your insurance coverage.  Please reach out to your insurance provider with any questions.    If during the course of the call the physician/provider feels a video visit is not appropriate, you will not be charged for this service.\"    Patient has given verbal consent for Video visit? Yes  How would you like to obtain your AVS? MyChart  If you are dropped from the video visit, the video invite should be resent to: Text to cell phone: [phone]  Will anyone else be joining your video visit? No        Video-Visit Details    Type of service:  Video Visit    Video Start Time: 9:30 AM  Video End Time: 9:44 AM    Originating Location (pt. Location): Home    Distant Location (provider location):  [facility]     Platform used for Video Visit: [facility]    ENDOCRINOLOGY CLINIC NOTE:  Name: Naman \"Chirag\" Robert  Seen for f/u of Diabetes (Last seen 6/12/2020).  HPI:  Naman Jones is a 35 year old male who presents via video visit for the management of Diabetes.  10/9/2018: " gastric bypass, [facility]  Has lost 151 lbs, 369-->296-->247-->218 lbs.  Currently eating a normal diet    VITAMINS AND MINERALS:   2 Multivitamin with Minerals-   600 mg Calcium carbonate With Vitamin D TID- with meals  3-1000 International units Vitamin D  500 mcg Vitamin B-12 sublingual      1. Type 2 DM:  Orginally diagnosed at the age of: 30. On routine physical exam. Unexplained weight loss at that time.    Current Regimen:    Metformin 500 mg bid. Ozempic 1 mg weekly.    Unable to tolerate higher doses due to GI side effects.  Januvia discontinued and Ozempic started 6/2020.    Insulin use decreased following gastric bypass, now off all insulin    BS checks: Janie personal CGM  Janie: has not been wearing his Janie recently - misplaced the reader.  Checking BG via meter: review of most recent readings: 149,130,135.    Exercise: not much  Symptoms of hypoglycemia (low blood sugar):  Gets symptoms of hypoglycemia.  Episodes of hypoglycemia: no  Fixed meal pattern: yes  Patient counting carbs: yes     DM Complications:   Nephropathy: yes - elevated urine microalbumin  Retinopathy: no retinopathy - annual exam due but is delayed due COVID-19  Neuropathy: Yes, + numbness in toes - stable  Microalbuminuria: Yes- on losartan  CAD/PAD: no  Gastroparesis: no  Hypoglycemia unawareness: no  Orthopedic surgery - left leg, 6/2020.  Expected recovery is about 3 months.  Returning to work, limited hours.  First day back is today.  2. Hypertension:    Off BP meds at this time.  Previously on Losartan 100 mg and Maxide 75-50 mg daily.  3. Hyperlipidemia: +Atorvastatin 40 mg qd    PMH/PSH:  DM  HTN  Dyslipidemia    Family Hx:  Diabetes: Father and mgm    Social Hx:  Social History     Socioeconomic History     Marital status:      Spouse name: Not on file     Number of children: Not on file     Years of education: Not on file     Highest education level: Bachelor's degree (e.g., BA, AB, BS)    Occupational History     Not on file   Social Needs     Financial resource strain: Not very hard     Food insecurity     Worry: Never true     Inability: Never true     Transportation needs     Medical: No     Non-medical: No   Tobacco Use     Smoking status: Never Smoker     Smokeless tobacco: Never Used   Substance and Sexual Activity     Alcohol use: Yes     Comment: socially     Drug use: No     Sexual activity: Yes     Partners: Female   Lifestyle     Physical activity     Days per week: 2 days     Minutes per session: 30 min     Stress: To some extent   Relationships     Social connections     Talks on phone: More than three times a week     Gets together: Once a week     Attends Nondenominational service: Never     Active member of club or organization: No     Attends meetings of clubs or organizations: 1 to 4 times per year     Relationship status:      Intimate partner violence     Fear of current or ex partner: Not on file     Emotionally abused: Not on file     Physically abused: Not on file     Forced sexual activity: Not on file   Other Topics Concern     Parent/sibling w/ CABG, MI or angioplasty before 65F 55M? Not Asked   Social History Narrative     Not on file          MEDICATIONS:  has a current medication list which includes the following prescription(s): acetaminophen, aspirin, atorvastatin, buspirone, calcium carb-cholecalciferol, freestyle césar reader, freestyle césar 14 day sensor, b-12, eszopiclone, hydromorphone, hydroxyzine, lorazepam, metformin, methocarbamol, childrens multivitamin w/iron, naloxone, omeprazole, polyethylene glycol, ozempic (1 mg/dose), and senna-docusate.    ROS     ROS: 10 point ROS neg other than the symptoms noted above in the HPI.    Physical Exam   VS: There were no vitals taken for this visit.  GENERAL: AXOX3, NAD, well dressed, answering questions appropriately, appears stated age.  HEENT: No exopthalmous, no proptosis, no lig lag, no retraction  CV:   LUNGS:  Normal respiratory effort  NEUROLOGY:   PSYCH: normal affect and mood    LABS:  A1c:   Component      Latest Ref Rng & Units 2/14/2020 2/28/2020 9/29/2020   Hemoglobin A1C      0 - 5.6 % 7.0 (H) 6.8 (H) 7.3 (H)     !COMPREHENSIVE Latest Ref Rng & Units 10/3/2020   SODIUM 133 - 144 mmol/L 138   POTASSIUM 3.4 - 5.3 mmol/L 3.8   CHLORIDE 94 - 109 mmol/L 106   BUN 7 - 30 mg/dL 9   Creatinine 0.66 - 1.25 mg/dL 0.56 (L)   Glucose 70 - 99 mg/dL 218 (H)   ANION GAP 3 - 14 mmol/L    CALCIUM 8.5 - 10.1 mg/dL 8.1 (L)     !LIPID/HEPATIC Latest Ref Rng & Units 9/29/2020   CHOLESTEROL <200 mg/dL 122   TRIGLYCERIDES <150 mg/dL 96   HDL CHOLESTEROL >39 mg/dL 39 (L)   LDL CHOLESTEROL DIRECT <100 mg/dL    LDL CHOLESTEROL, CALCULATED <100 mg/dL 64   NON HDL CHOLESTEROL <130 mg/dL 83   AST 0 - 45 U/L 15   ALT 0 - 70 U/L 53     !THYROID Latest Ref Rng & Units 7/26/2018   TSH 0.40 - 4.00 mU/L 1.50     Component      Latest Ref Rng & Units 9/29/2020   Creatinine Urine      mg/dL 251   Albumin Urine mg/L      mg/L 29   Albumin Urine mg/g Cr      0 - 17 mg/g Cr 11.47     All pertinent notes, labs, and images personally reviewed by me.     A/P  Mr.Anthony Jones is a 35 year old being evaluated via video visit for the management of diabetes:    1. DM2 - Reasonable control.   Diabetes complicated by neuropathy and microalbuminuria.  Diabetes is currently treated with metformin 500 mg bid and Ozempic 1 mg weekly.  He's unable to tolerate any higher doses of Metformin due to GI side effects.  Continue Ozempic 1 mg weekly and metformin 500 mg bid.  Recommend he continue to be attentive to dietary intake - limited carbs, calorie controlled, healthier food choices.  He'll download the lupe for freestyle reader and start wearing his Janie again.  If not wearing Janie, test 3 times daily - before each meal.    There is no height or weight on file to calculate BMI.    -- He is allergic to sulfa medications so cannot be on sulfonylureas    2.  Hypocalcemia -s/p gastric bypass - Recent labs significant for low serum calcium.  States he wasn't taking his calcium with D supplements but has since restarted.  Plan to recheck BMP and vitamin D level with next labs in December.    Most Recent Immunizations   Administered Date(s) Administered     HepB 05/15/2017     Influenza (intradermal) 09/20/2016     Influenza Vaccine IM > 6 months Valent IIV4 10/18/2019     Pneumo Conj 13-V (2010&after) 07/26/2018     TDAP Vaccine (Adacel) 10/04/2012       Follow-up:  3-4 months with labs prior    Annette Hobson NP  Endocrinology  [facility]  CC: Haase, Susan Rachele            Again, thank you for allowing me to participate in the care of your patient.        Sincerely,        ARDEN Henderson CNP

## 2020-10-19 ENCOUNTER — TELEPHONE (OUTPATIENT)
Dept: ORTHOPEDICS | Facility: CLINIC | Age: 35
End: 2020-10-19

## 2020-10-19 NOTE — TELEPHONE ENCOUNTER
Prior Authorization Retail Medication Request    Medication/Dose: Hydromorphone 1 to 1-1/2 tablets by mouth every 4 hours as needed for severe pain  ICD code (if different than what is on RX):  See Chart  Previously Tried and Failed:  See Chart  Rationale:  Postop femoral osteotomy    Insurance Name:  See Chart  Insurance ID:  See Chart      Pharmacy Information (if different than what is on RX)  Name:  See Chart  Phone:  See Chart

## 2020-10-20 NOTE — TELEPHONE ENCOUNTER
Central Prior Authorization Team   Phone: [phone]      PA Initiation-Initiated via RightFax    Medication: hydromorphone 4 mg-PA initiated  Insurance Company: [employer] - Phone [phone] Fax [phone]  Pharmacy Filling the Rx: [id number] [id number]  Filling Pharmacy Phone: [phone]  Filling Pharmacy Fax:    Start Date: 10/20/2020

## 2020-10-21 NOTE — TELEPHONE ENCOUNTER
Health Call Center    Phone Message    May a detailed message be left on voicemail: yes     Reason for Call: Medication Refill Request    Has the patient contacted the pharmacy for the refill? Yes   Name of medication being requested: Hydromorphone  Provider who prescribed the medication: Dr. Angel Madera  Pharmacy: [facility]  Date medication is needed: 10/21/20     This pt's wife called to request a refill order of the pt's Hydromorphone. The pt's previous prescription filled on Oct 12th was incomplete. (The pharmacy did not have the full 40 pills). The pt has 8 pills remaining. Due to the difficulty that the pt had refilling his prescription the first time, the pt is reaching out in advance. Please have Valentina call the pt's wife, Siri, back at [phone] when the request has been processed.       Action Taken: Message routed to:  [facility]    Travel Screening: Not Applicable

## 2020-10-21 NOTE — TELEPHONE ENCOUNTER
Prior Authorization Not Needed per Insurance    Medication: hydromorphone 4 mg-PA Not Needed  Insurance Company: [employer] - Phone [phone] Fax [phone]  Expected CoPay:      Pharmacy Filling the Rx: [facility] #[id number] - [place], [place] - [address]  Pharmacy Notified: Yes  Patient Notified: No

## 2020-10-22 DIAGNOSIS — Z98.890 STATUS POST KNEE SURGERY: ICD-10-CM

## 2020-10-22 RX ORDER — HYDROMORPHONE HYDROCHLORIDE 4 MG/1
4 TABLET ORAL EVERY 6 HOURS PRN
Qty: 30 TABLET | Refills: 0 | Status: SHIPPED | OUTPATIENT
Start: 2020-10-22 | End: 2020-10-28

## 2020-10-22 RX ORDER — METHOCARBAMOL 500 MG/1
500 TABLET, FILM COATED ORAL 4 TIMES DAILY PRN
Qty: 30 TABLET | Refills: 0 | Status: SHIPPED | OUTPATIENT
Start: 2020-10-22 | End: 2020-12-07

## 2020-10-22 NOTE — TELEPHONE ENCOUNTER
DOS: 10/2/2020 Right knee EUA, arthroscopy, Chondroplasty, Lateral retinacular lengthening, medial retinacular imbrication, distal femoral osteotomy.    Patient requests refill of his methocarbamol as well as Dilaudid (he is allergic to Oyxcodone). Pended for Dr. Madera;s review and signature.

## 2020-10-22 NOTE — TELEPHONE ENCOUNTER
[facility] Call Center    Phone Message    May a detailed message be left on voicemail: yes     Reason for Call: Medication Refill Request    Has the patient contacted the pharmacy for the refill? Yes   Name of medication being requested: methocarbamol (ROBAXIN) 500 MG tablet  Provider who prescribed the medication:   Pharmacy:[facility]  Date medication is needed: 10/22       Action Taken: Other: [facility]    Travel Screening: Not Applicable

## 2020-10-23 ENCOUNTER — TELEPHONE (OUTPATIENT)
Dept: ORTHOPEDICS | Facility: CLINIC | Age: 35
End: 2020-10-23

## 2020-10-23 NOTE — TELEPHONE ENCOUNTER
[facility] Call Center    Phone Message    May a detailed message be left on voicemail: yes     Reason for Call: Other: Pt of Dr. Madera calling in stating he did send a My Chart message last night but just calling in because it's Friday.  Pt is supposed to return to work on Monday and is calling stating he doesnt feel like he's ready to go back yet.  Pt is asking if Dr. Madera can extend his out of work for longer?  Pt is requesting a call back on this today please     Action Taken: Message routed to:  [facility] Ortho    Travel Screening: Not Applicable

## 2020-10-27 ENCOUNTER — THERAPY VISIT (OUTPATIENT)
Dept: PHYSICAL THERAPY | Facility: CLINIC | Age: 35
End: 2020-10-27
Payer: COMMERCIAL

## 2020-10-27 DIAGNOSIS — M25.561 RIGHT KNEE PAIN: ICD-10-CM

## 2020-10-27 DIAGNOSIS — Z47.89 AFTERCARE FOLLOWING SURGERY OF THE MUSCULOSKELETAL SYSTEM: ICD-10-CM

## 2020-10-27 PROCEDURE — 97110 THERAPEUTIC EXERCISES: CPT | Mod: GP | Performed by: PHYSICAL THERAPIST

## 2020-10-27 PROCEDURE — 97530 THERAPEUTIC ACTIVITIES: CPT | Mod: GP | Performed by: PHYSICAL THERAPIST

## 2020-10-27 PROCEDURE — 97112 NEUROMUSCULAR REEDUCATION: CPT | Mod: GP | Performed by: PHYSICAL THERAPIST

## 2020-10-28 ENCOUNTER — MYC REFILL (OUTPATIENT)
Dept: ORTHOPEDICS | Facility: CLINIC | Age: 35
End: 2020-10-28

## 2020-10-28 DIAGNOSIS — Z98.890 STATUS POST KNEE SURGERY: Primary | ICD-10-CM

## 2020-10-28 RX ORDER — HYDROMORPHONE HYDROCHLORIDE 4 MG/1
4 TABLET ORAL EVERY 8 HOURS PRN
Qty: 20 TABLET | Refills: 0 | Status: SHIPPED | OUTPATIENT
Start: 2020-10-28 | End: 2020-10-29

## 2020-10-28 NOTE — TELEPHONE ENCOUNTER
Patient requests refill of postop pain medications. He is actively weaning off medication. He states he is taking now mostly after PT and at bedtime.

## 2020-10-29 DIAGNOSIS — Z98.890 STATUS POST KNEE SURGERY: ICD-10-CM

## 2020-10-29 RX ORDER — HYDROMORPHONE HYDROCHLORIDE 4 MG/1
4 TABLET ORAL EVERY 8 HOURS PRN
Qty: 20 TABLET | Refills: 0 | Status: SHIPPED | OUTPATIENT
Start: 2020-10-29 | End: 2020-12-07

## 2020-10-29 NOTE — TELEPHONE ENCOUNTER
Patient's refill yesterday defaulted to local print and did not route to pharmacy. Patient wishes today to have it routed to [facility]. Re-pended to Dr. Madera for signature.

## 2020-10-29 NOTE — TELEPHONE ENCOUNTER
[facility] Call Center    Phone Message    May a detailed message be left on voicemail: yes     Reason for Call: Medication Question or concern regarding medication   Prescription Clarification  Name of Medication: Hydromorphone  Prescribing Provider: Dr. Madera   Pharmacy:  [facility]   What on the order needs clarification? Pt called in stating the [facility] that the Rx was sent to yesterday says they never received the Rx for the Pt.  Pt is requesting the Rx be sent to a different pharmacy, he would like it sent to the  [facility].    Pt would like a call when this has been done, he's requesting this be done today please          Action Taken: Message routed to:  [facility] Ortho    Travel Screening: Not Applicable

## 2020-11-06 ENCOUNTER — THERAPY VISIT (OUTPATIENT)
Dept: PHYSICAL THERAPY | Facility: CLINIC | Age: 35
End: 2020-11-06
Payer: COMMERCIAL

## 2020-11-06 DIAGNOSIS — M25.561 RIGHT KNEE PAIN: ICD-10-CM

## 2020-11-06 DIAGNOSIS — Z47.89 AFTERCARE FOLLOWING SURGERY OF THE MUSCULOSKELETAL SYSTEM: ICD-10-CM

## 2020-11-06 PROCEDURE — 97112 NEUROMUSCULAR REEDUCATION: CPT | Mod: GP | Performed by: PHYSICAL THERAPIST

## 2020-11-06 PROCEDURE — 97110 THERAPEUTIC EXERCISES: CPT | Mod: GP | Performed by: PHYSICAL THERAPIST

## 2020-11-13 DIAGNOSIS — Z98.890 S/P RIGHT KNEE SURGERY: Primary | ICD-10-CM

## 2020-11-16 ENCOUNTER — ANCILLARY PROCEDURE (OUTPATIENT)
Dept: GENERAL RADIOLOGY | Facility: CLINIC | Age: 35
End: 2020-11-16
Attending: ORTHOPAEDIC SURGERY
Payer: COMMERCIAL

## 2020-11-16 ENCOUNTER — TELEPHONE (OUTPATIENT)
Dept: ORTHOPEDICS | Facility: CLINIC | Age: 35
End: 2020-11-16

## 2020-11-16 ENCOUNTER — OFFICE VISIT (OUTPATIENT)
Dept: ORTHOPEDICS | Facility: CLINIC | Age: 35
End: 2020-11-16
Payer: COMMERCIAL

## 2020-11-16 DIAGNOSIS — M25.562 CHRONIC PAIN OF LEFT KNEE: Primary | ICD-10-CM

## 2020-11-16 DIAGNOSIS — G89.29 CHRONIC PAIN OF LEFT KNEE: Primary | ICD-10-CM

## 2020-11-16 DIAGNOSIS — Z98.890 S/P RIGHT KNEE SURGERY: ICD-10-CM

## 2020-11-16 PROCEDURE — 73560 X-RAY EXAM OF KNEE 1 OR 2: CPT | Mod: LT | Performed by: RADIOLOGY

## 2020-11-16 PROCEDURE — 99024 POSTOP FOLLOW-UP VISIT: CPT | Mod: GC | Performed by: ORTHOPAEDIC SURGERY

## 2020-11-16 RX ORDER — TRAMADOL HYDROCHLORIDE 50 MG/1
50 TABLET ORAL EVERY 6 HOURS PRN
Qty: 60 TABLET | Refills: 0 | Status: SHIPPED | OUTPATIENT
Start: 2020-11-16 | End: 2020-11-19

## 2020-11-16 NOTE — TELEPHONE ENCOUNTER
Prior Authorization Retail Medication Request    Medication/Dose: Tramadol 50 mg 1 tablet every 6 hours as needed for severe pain  ICD code (if different than what is on RX):  See Chart  Previously Tried and Failed:  See chart  Rationale:  Postop    Insurance Name:  See Chart  Insurance ID:  See Chart      Pharmacy Information (if different than what is on RX)  Name:  See chart  Phone:  See chart

## 2020-11-16 NOTE — LETTER
11/16/2020         RE: Naman Jones  [id number] [facility] [address]        Dear Colleague,    Thank you for referring your patient, Naman Jones, to the [facility] [place]. Please see a copy of my visit note below.    Patient seen and examined with the resident.     Assesment: 6 weeks following right distal femoral osteotomy with progressive union.  Doing well.  Symptomatic hardware left knee 9 months status post distal femoral osteotomy    Plan: Long discussion today with the patient regarding his right knee.  We will now make him weightbearing as tolerated, range of motion as tolerated.  No dangerous or at risk activities for the right side.    We previously had plan to perform an open hardware removal from his left distal femur however I was concerned that the prior CT scan did not show definitive union.  In light of this we elected to delay it.  He would now like to get this scheduled.  We will plan to do this before the end of the year.    I discussed the patient the risk benefits complication techniques and alternatives.  We reviewed the expected course of recovery alternative treatment options.  A look for time and this can be completed.    I agree with history, physical and imaging as well as the assessment and plan as detailed by Dr. Roldan.         PROCEDURES:  Right knee arthroscopy, lateral femoral condyle and trochlea chondroplasty, medial retinacular imbrication, lateral retinacular lengthening, distal femoral osteotomy.    HISTORY:  Patient reports that he is doing well after the surgery.  He is participating with physical therapy and has regained all his range of motion.  Occasionally will have cramps in his thigh, particularly at night.  Taking Tylenol for this, feels that this does not completely help with the pain.    EXAM:     General: Awake, Alert, and oriented. Articulates and communicates with a normal affect     Right lower  Extremity:    Incisions well healed without evidence of infection    Range of motion from 0 to 110 degrees    Range of motion and stability exam not performed    Neurovascularly intact    IMAGING:  X-rays of the bilateral knees obtained today were reviewed.  On the left side his previous distal femoral osteotomy has evidence of complete healing.  On the right side there is evidence of interval healing, but not completely matured.  Hardware looks stable and intact bilaterally.    ASSESSMENT:  1. Status post bilateral distal femoral osteotomies    PLAN:   We had a good discussion with the patient regarding his symptoms.  He is doing well from his most recent surgery on the right side.  He can advance to weightbearing as tolerated and range of motion as tolerated on the right side.  Regarding the left side he continues to have some pain, most likely stemming from IT band irritation.  We discussed plate removal before the end of the year, he will be scheduled for this at his preference.    Seen and discussed with Dr. Santy Roldan MD  Orthopaedic Surgery, PGY-5    Again, thank you for allowing me to participate in the care of your patient.      Sincerely,      Angel Madera MD

## 2020-11-16 NOTE — NURSING NOTE
Reason For Visit:   Chief Complaint   Patient presents with     Surgical Followup     DOS 10/2/20 Examination under anesthesia right knee, right knee arthroscopy, chondralplasty of lateral femoral condyle and lateral trochlea, medial retinacular imbrication, open lateral retinacular lengthening, distal femoral osteotomy       Date of surgery: 10/2/20  Type of surgery:   PROCEDURE:  1. Examination under anesthesia right knee  2. Right knee arthroscopy  3. Chondroplasty lateral femoral condyle and lateral trochlea  4. Medial retinacular imbrication  5. Open lateral retinacular lengthening  6. Distal femoral osteotomy      Smoker: No  Request smoking cessation information: No    Pain Assessment  Patient Currently in Pain: Yes  0-10 Pain Scale: 1  Primary Pain Location: Knee    There were no vitals taken for this visit.         Allergies   Allergen Reactions     Sulfa Drugs Anaphylaxis     Oxycodone Other (See Comments) and Itching     Flushed     Lisinopril      Other reaction(s): Impotence     Onion      Other reaction(s): Intolerance-Can't Take       Current Outpatient Medications   Medication     acetaminophen (TYLENOL) 325 MG tablet     atorvastatin (LIPITOR) 40 MG tablet     blood glucose (ONETOUCH ULTRA) test strip     busPIRone (BUSPAR) 15 MG tablet     Calcium Carb-Cholecalciferol (CALCIUM 600 + D PO)     Continuous Blood Gluc  (FREESTYLE KEILA READER) SUGAR     Continuous Blood Gluc Sensor (FREESTYLE KEILA 14 DAY SENSOR) XX MISC     Cyanocobalamin (B-12) 500 MCG SUBL     eszopiclone (LUNESTA) 2 MG tablet     HYDROmorphone (DILAUDID) 4 MG tablet     hydrOXYzine (ATARAX) 50 MG tablet     LORazepam (ATIVAN) 1 MG tablet     metFORMIN (GLUCOPHAGE) 500 MG tablet     methocarbamol (ROBAXIN) 500 MG tablet     multivitamin  peds with iron (FLINTSTONES COMPLETE) 60 MG chewable tablet     naloxone (NARCAN) 4 MG/0.1ML nasal spray     OneTouch Delica Lancets 33G MISC     polyethylene glycol (MIRALAX) 17 g packet      semaglutide (OZEMPIC, 1 MG/DOSE,) 2 MG/1.5ML pen     No current facility-administered medications for this visit.          Lucy Barillas, ATC

## 2020-11-16 NOTE — PROGRESS NOTES
PROCEDURES:  Right knee arthroscopy, lateral femoral condyle and trochlea chondroplasty, medial retinacular imbrication, lateral retinacular lengthening, distal femoral osteotomy.    HISTORY:  Patient reports that he is doing well after the surgery.  He is participating with physical therapy and has regained all his range of motion.  Occasionally will have cramps in his thigh, particularly at night.  Taking Tylenol for this, feels that this does not completely help with the pain.    EXAM:     General: Awake, Alert, and oriented. Articulates and communicates with a normal affect     Right lower Extremity:    Incisions well healed without evidence of infection    Range of motion from 0 to 110 degrees    Range of motion and stability exam not performed    Neurovascularly intact    IMAGING:  X-rays of the bilateral knees obtained today were reviewed.  On the left side his previous distal femoral osteotomy has evidence of complete healing.  On the right side there is evidence of interval healing, but not completely matured.  Hardware looks stable and intact bilaterally.    ASSESSMENT:  1. Status post bilateral distal femoral osteotomies    PLAN:   We had a good discussion with the patient regarding his symptoms.  He is doing well from his most recent surgery on the right side.  He can advance to weightbearing as tolerated and range of motion as tolerated on the right side.  Regarding the left side he continues to have some pain, most likely stemming from IT band irritation.  We discussed plate removal before the end of the year, he will be scheduled for this at his preference.    Seen and discussed with Dr. Santy Roldan MD  Orthopaedic Surgery, PGY-5      
Patient seen and examined with the resident.     Assesment: 6 weeks following right distal femoral osteotomy with progressive union.  Doing well.  Symptomatic hardware left knee 9 months status post distal femoral osteotomy    Plan: Long discussion today with the patient regarding his right knee.  We will now make him weightbearing as tolerated, range of motion as tolerated.  No dangerous or at risk activities for the right side.    We previously had plan to perform an open hardware removal from his left distal femur however I was concerned that the prior CT scan did not show definitive union.  In light of this we elected to delay it.  He would now like to get this scheduled.  We will plan to do this before the end of the year.    I discussed the patient the risk benefits complication techniques and alternatives.  We reviewed the expected course of recovery alternative treatment options.  A look for time and this can be completed.    I agree with history, physical and imaging as well as the assessment and plan as detailed by Dr. Roldan.     
08-Dec-2017 04:08

## 2020-11-17 ENCOUNTER — HOSPITAL ENCOUNTER (OUTPATIENT)
Facility: AMBULATORY SURGERY CENTER | Age: 35
End: 2020-11-17
Attending: ORTHOPAEDIC SURGERY
Payer: COMMERCIAL

## 2020-11-17 ENCOUNTER — TELEPHONE (OUTPATIENT)
Dept: ORTHOPEDICS | Facility: CLINIC | Age: 35
End: 2020-11-17

## 2020-11-17 ENCOUNTER — THERAPY VISIT (OUTPATIENT)
Dept: PHYSICAL THERAPY | Facility: CLINIC | Age: 35
End: 2020-11-17
Payer: COMMERCIAL

## 2020-11-17 DIAGNOSIS — M25.562 CHRONIC PAIN OF LEFT KNEE: Primary | ICD-10-CM

## 2020-11-17 DIAGNOSIS — Z47.89 AFTERCARE FOLLOWING SURGERY OF THE MUSCULOSKELETAL SYSTEM: ICD-10-CM

## 2020-11-17 DIAGNOSIS — M25.561 RIGHT KNEE PAIN: ICD-10-CM

## 2020-11-17 DIAGNOSIS — G89.29 CHRONIC PAIN OF LEFT KNEE: Primary | ICD-10-CM

## 2020-11-17 PROCEDURE — 97110 THERAPEUTIC EXERCISES: CPT | Mod: GP | Performed by: PHYSICAL THERAPIST

## 2020-11-17 NOTE — TELEPHONE ENCOUNTER
Central Prior Authorization Team   Phone: [phone]      PA Initiation    Medication: traMADol (ULTRAM) 50 MG tablet-PA initiated  Insurance Company: [employer] - Phone [phone] Fax [phone]  Pharmacy Filling the Rx: [facility] #[id number] [place], [place] - [address]  Filling Pharmacy Phone: [phone]  Filling Pharmacy Fax:    Start Date: 11/17/2020

## 2020-11-17 NOTE — TELEPHONE ENCOUNTER
Patient is scheduled for surgery with Dr. Madera    Spoke or left message with: Patient    Date of Surgery: 12/15/20    Location: [facility]    Post op: 1 week, scheduled    Pre-op with surgeon (if applicable): Complete    H&P: Patient will schedule with PCP, [facility]    Additional imaging/appointments: N/A    Surgery packet: Received in clinic     Additional comments: Patient aware he will be contacted to set up COVID test

## 2020-11-18 NOTE — TELEPHONE ENCOUNTER
Prior Authorization Not Needed per Insurance    Medication: traMADol (ULTRAM) 50 MG tablet-PA Not needed  Insurance Company: [employer] - Phone [phone] Fax [phone]  Expected CoPay:      Pharmacy Filling the Rx: [id number] - [facility] [address]  Pharmacy Notified: Yes  Patient Notified: No

## 2020-11-19 DIAGNOSIS — M25.562 CHRONIC PAIN OF LEFT KNEE: Primary | ICD-10-CM

## 2020-11-19 DIAGNOSIS — G89.29 CHRONIC PAIN OF LEFT KNEE: Primary | ICD-10-CM

## 2020-11-21 DIAGNOSIS — Z11.59 ENCOUNTER FOR SCREENING FOR OTHER VIRAL DISEASES: Primary | ICD-10-CM

## 2020-12-04 ENCOUNTER — MYC MEDICAL ADVICE (OUTPATIENT)
Dept: SURGERY | Facility: AMBULATORY SURGERY CENTER | Age: 35
End: 2020-12-04

## 2020-12-04 ENCOUNTER — THERAPY VISIT (OUTPATIENT)
Dept: PHYSICAL THERAPY | Facility: CLINIC | Age: 35
End: 2020-12-04
Payer: COMMERCIAL

## 2020-12-04 DIAGNOSIS — Z47.89 AFTERCARE FOLLOWING SURGERY OF THE MUSCULOSKELETAL SYSTEM: ICD-10-CM

## 2020-12-04 DIAGNOSIS — M25.561 RIGHT KNEE PAIN: ICD-10-CM

## 2020-12-04 PROCEDURE — 97116 GAIT TRAINING THERAPY: CPT | Mod: GP | Performed by: PHYSICAL THERAPIST

## 2020-12-04 PROCEDURE — 97140 MANUAL THERAPY 1/> REGIONS: CPT | Mod: GP | Performed by: PHYSICAL THERAPIST

## 2020-12-07 ENCOUNTER — OFFICE VISIT (OUTPATIENT)
Dept: FAMILY MEDICINE | Facility: CLINIC | Age: 35
End: 2020-12-07
Payer: COMMERCIAL

## 2020-12-07 VITALS
RESPIRATION RATE: 16 BRPM | TEMPERATURE: 98.3 F | HEART RATE: 69 BPM | SYSTOLIC BLOOD PRESSURE: 102 MMHG | HEIGHT: 73 IN | OXYGEN SATURATION: 97 % | WEIGHT: 203.7 LBS | DIASTOLIC BLOOD PRESSURE: 64 MMHG | BODY MASS INDEX: 27 KG/M2

## 2020-12-07 DIAGNOSIS — Z01.818 PREOP GENERAL PHYSICAL EXAM: Primary | ICD-10-CM

## 2020-12-07 DIAGNOSIS — M25.562 LEFT KNEE PAIN, UNSPECIFIED CHRONICITY: ICD-10-CM

## 2020-12-07 DIAGNOSIS — Z23 NEED FOR PROPHYLACTIC VACCINATION AND INOCULATION AGAINST INFLUENZA: ICD-10-CM

## 2020-12-07 PROBLEM — E66.01 MORBID OBESITY (H): Status: RESOLVED | Noted: 2019-05-03 | Resolved: 2020-12-07

## 2020-12-07 PROBLEM — E66.1 CLASS 2 DRUG-INDUCED OBESITY WITH SERIOUS COMORBIDITY AND BODY MASS INDEX (BMI) OF 38.0 TO 38.9 IN ADULT: Status: RESOLVED | Noted: 2019-02-15 | Resolved: 2020-12-07

## 2020-12-07 PROBLEM — E66.812 CLASS 2 DRUG-INDUCED OBESITY WITH SERIOUS COMORBIDITY AND BODY MASS INDEX (BMI) OF 38.0 TO 38.9 IN ADULT: Status: RESOLVED | Noted: 2019-02-15 | Resolved: 2020-12-07

## 2020-12-07 PROCEDURE — 90686 IIV4 VACC NO PRSV 0.5 ML IM: CPT | Performed by: FAMILY MEDICINE

## 2020-12-07 PROCEDURE — 90471 IMMUNIZATION ADMIN: CPT | Performed by: FAMILY MEDICINE

## 2020-12-07 PROCEDURE — 99215 OFFICE O/P EST HI 40 MIN: CPT | Mod: 25 | Performed by: FAMILY MEDICINE

## 2020-12-07 SDOH — SOCIAL STABILITY: SOCIAL NETWORK: IN A TYPICAL WEEK, HOW MANY TIMES DO YOU TALK ON THE PHONE WITH FAMILY, FRIENDS, OR NEIGHBORS?: ONCE A WEEK

## 2020-12-07 SDOH — SOCIAL STABILITY: SOCIAL NETWORK: HOW OFTEN DO YOU GET TOGETHER WITH FRIENDS OR RELATIVES?: PATIENT DECLINED

## 2020-12-07 SDOH — HEALTH STABILITY: PHYSICAL HEALTH: ON AVERAGE, HOW MANY MINUTES DO YOU ENGAGE IN EXERCISE AT THIS LEVEL?: 0 MIN

## 2020-12-07 SDOH — HEALTH STABILITY: MENTAL HEALTH: HOW MANY STANDARD DRINKS CONTAINING ALCOHOL DO YOU HAVE ON A TYPICAL DAY?: PATIENT DECLINED

## 2020-12-07 SDOH — ECONOMIC STABILITY: TRANSPORTATION INSECURITY
IN THE PAST 12 MONTHS, HAS THE LACK OF TRANSPORTATION KEPT YOU FROM MEDICAL APPOINTMENTS OR FROM GETTING MEDICATIONS?: YES

## 2020-12-07 SDOH — ECONOMIC STABILITY: TRANSPORTATION INSECURITY
IN THE PAST 12 MONTHS, HAS LACK OF TRANSPORTATION KEPT YOU FROM MEETINGS, WORK, OR FROM GETTING THINGS NEEDED FOR DAILY LIVING?: PATIENT DECLINED

## 2020-12-07 SDOH — HEALTH STABILITY: MENTAL HEALTH: HOW OFTEN DO YOU HAVE A DRINK CONTAINING ALCOHOL?: NEVER

## 2020-12-07 SDOH — ECONOMIC STABILITY: INCOME INSECURITY: IN THE LAST 12 MONTHS, WAS THERE A TIME WHEN YOU WERE NOT ABLE TO PAY THE MORTGAGE OR RENT ON TIME?: PATIENT REFUSED

## 2020-12-07 SDOH — HEALTH STABILITY: MENTAL HEALTH: HOW OFTEN DO YOU HAVE 6 OR MORE DRINKS ON ONE OCCASION?: NEVER

## 2020-12-07 SDOH — HEALTH STABILITY: PHYSICAL HEALTH: ON AVERAGE, HOW MANY DAYS PER WEEK DO YOU ENGAGE IN MODERATE TO STRENUOUS EXERCISE (LIKE A BRISK WALK)?: 0 DAYS

## 2020-12-07 ASSESSMENT — ANXIETY QUESTIONNAIRES
7. FEELING AFRAID AS IF SOMETHING AWFUL MIGHT HAPPEN: SEVERAL DAYS
7. FEELING AFRAID AS IF SOMETHING AWFUL MIGHT HAPPEN: SEVERAL DAYS
3. WORRYING TOO MUCH ABOUT DIFFERENT THINGS: SEVERAL DAYS
2. NOT BEING ABLE TO STOP OR CONTROL WORRYING: SEVERAL DAYS
5. BEING SO RESTLESS THAT IT IS HARD TO SIT STILL: SEVERAL DAYS
GAD7 TOTAL SCORE: 7
GAD7 TOTAL SCORE: 7
6. BECOMING EASILY ANNOYED OR IRRITABLE: SEVERAL DAYS
GAD7 TOTAL SCORE: 7
1. FEELING NERVOUS, ANXIOUS, OR ON EDGE: SEVERAL DAYS
4. TROUBLE RELAXING: SEVERAL DAYS

## 2020-12-07 ASSESSMENT — PATIENT HEALTH QUESTIONNAIRE - PHQ9
SUM OF ALL RESPONSES TO PHQ QUESTIONS 1-9: 5
10. IF YOU CHECKED OFF ANY PROBLEMS, HOW DIFFICULT HAVE THESE PROBLEMS MADE IT FOR YOU TO DO YOUR WORK, TAKE CARE OF THINGS AT HOME, OR GET ALONG WITH OTHER PEOPLE: SOMEWHAT DIFFICULT
SUM OF ALL RESPONSES TO PHQ QUESTIONS 1-9: 5

## 2020-12-07 ASSESSMENT — MIFFLIN-ST. JEOR: SCORE: 1902.86

## 2020-12-07 NOTE — PATIENT INSTRUCTIONS

## 2020-12-07 NOTE — PROGRESS NOTES
[facility]  [address] [facility] [address]  Phone: [phone]  Primary Provider: Haase, Susan Rachele  Pre-op Performing Provider: NEFTALI DOBSON    PREOPERATIVE EVALUATION:  Today's date: 12/7/2020    Naman Jones is a 35 year old male who presents for a preoperative evaluation.    Surgical Information:  Surgery/Procedure: left knee examination under anesthesia, open hardware removal left knee  Surgery Location:   [facility] [place]    Surgeon: Angel Madera MD  Surgery Date: 12/15/2020  Time of Surgery:   Where patient plans to recover: At home with family  Fax number for surgical facility: Note does not need to be faxed, will be available electronically in Epic.    Type of Anesthesia Anticipated: General    Subjective     HPI related to upcoming procedure:     36 y/o male with history of chronic bilateral knee pain and recurrent patellar instability, valgus malalignment.   He did have left knee surgery 2/28/2020. Due to his current pain knee exploration/removal of hardware is deemed next best step in management .      Preop Questions 12/7/2020   1. Have you ever had a heart attack or stroke? No   2. Have you ever had surgery on your heart or blood vessels, such as a stent placement, a coronary artery bypass, or surgery on an artery in your head, neck, heart, or legs? No   3. Do you have chest pain with activity? No   4. Do you have a history of  heart failure? No   5. Do you currently have a cold, bronchitis or symptoms of other infection? No   6. Do you have a cough, shortness of breath, or wheezing? No   7. Do you or anyone in your family have previous history of blood clots? No   8. Do you or does anyone in your family have a serious bleeding problem such as prolonged bleeding following surgeries or cuts? No   9. Have you ever had problems with anemia or been told to take iron pills? No    10. Have you had any abnormal blood loss such as black, tarry or bloody stools? No   11. Have you ever had a blood transfusion? No   12. Are you willing to have a blood transfusion if it is medically needed before, during, or after your surgery? Yes   13. Have you or any of your relatives ever had problems with anesthesia? No   14. Do you have sleep apnea, excessive snoring or daytime drowsiness? No   15. Do you have any artifical heart valves or other implanted medical devices like a pacemaker, defibrillator, or continuous glucose monitor? No   16. Do you have artificial joints? UNKNOWN    17. Are you allergic to latex? No       Health Care Directive:  Patient does not have a Health Care Directive or Living Will: NO     Preoperative Review of :   reviewed - no current use      Status of Chronic Conditions:  See problem list for active medical problems.  Problems all longstanding and stable, except as noted/documented.  See ROS for pertinent symptoms related to these conditions.      Review of Systems  Constitutional, neuro, ENT, endocrine, pulmonary, cardiac, gastrointestinal, genitourinary, musculoskeletal, integument and psychiatric systems are negative, except as otherwise noted.    Patient Active Problem List    Diagnosis Date Noted     Chronic pain of left knee 11/17/2020     Priority: Medium     Added automatically from request for surgery [id number]       Status post knee surgery 10/02/2020     Priority: Medium     S/P right knee surgery 10/02/2020     Priority: Medium     Right knee pain 08/31/2020     Priority: Medium     Added automatically from request for surgery [id number]       Gait abnormality 04/17/2020     Priority: Medium     Aftercare following surgery of the musculoskeletal system 03/10/2020     Priority: Medium     Acquired genu valgum of left knee 12/11/2019     Priority: Medium     Added automatically from request for surgery [id number]       Patellar instability of right knee 12/11/2019      Priority: Medium     Added automatically from request for surgery [id number]       Left knee pain 12/10/2019     Priority: Medium     Patellar instability of left knee 12/10/2019     Priority: Medium     Blepharitis of upper and lower eyelids of both eyes, unspecified type 05/28/2019     Priority: Medium     Insomnia, unspecified type 07/20/2018     Priority: Medium     Essential hypertension 06/13/2017     Priority: Medium     Mixed hyperlipidemia 06/13/2017     Priority: Medium     Plantar fasciitis 06/13/2017     Priority: Medium     JOEL (generalized anxiety disorder) 06/13/2017     Priority: Medium     Type 2 diabetes mellitus with microalbuminuria, without long-term current use of insulin (H) 06/13/2017     Priority: Medium      Past Medical History:   Diagnosis Date     Diabetes (H)      Hypertension      Past Surgical History:   Procedure Laterality Date     ARTHROSCOPY KNEE WITH PATELLAR REALIGNMENT Left 2/28/2020    Procedure: Examination under anesthesia Left knee,  medial reefing/imbrication;  Surgeon: Angel Madera MD;  Location: [facility]     ARTHROSCOPY KNEE WITH RETINACULAR RELEASE Left 2/28/2020    Procedure: Left lateral retinacular lengthening;  Surgeon: Angel Madera MD;  [facility]     ARTHROSCOPY KNEE WITH RETINACULAR RELEASE Right 10/2/2020    Procedure: Examination under anesthesia right knee, right knee arthroscopy, chondralplasty of lateral femoral condyle and lateral trochlea, medial retinacular imbrication, open lateral retinacular lengthening;  Surgeon: Angel Madera MD;  [facility]     LAPAROSCOPIC BYPASS GASTRIC N/A 10/9/2018    Procedure: LAPAROSCOPIC BYPASS GASTRIC;  LAPAROSCOPIC WELLINGTON-EN Y GASTRIC BYPASS;  Surgeon: Alden Mcwilliams MD;  Location:  OR     ORTHOPEDIC SURGERY Right 02/2016     OSTEOTOMY FEMUR DISTAL Left 2/28/2020    Procedure: Left distal femoral osteotomy;  Surgeon: Angel Madera MD;  [facility]      OSTEOTOMY FEMUR DISTAL Right 10/2/2020    Procedure: Left distal femoral osteotomy;  Surgeon: Angel Madera MD;  [facility]     Current Outpatient Medications   Medication Sig Dispense Refill     acetaminophen (TYLENOL) 325 MG tablet Take 2 tablets (650 mg) by mouth every 4 hours 120 tablet 0     atorvastatin (LIPITOR) 40 MG tablet Take 1 tablet (40 mg) by mouth daily 90 tablet 3     blood glucose (ONETOUCH ULTRA) test strip Use to test blood sugar 3 times daily or as directed. 300 strip 3     busPIRone (BUSPAR) 15 MG tablet Take 15 mg by mouth 2 times daily       Calcium Carb-Cholecalciferol (CALCIUM 600 + D PO) Take 1 tablet by mouth 2 times daily        Continuous Blood Gluc  (FREESTYLE KEILA READER) SUGAR 1 each continuous 1 Device 0     Continuous Blood Gluc Sensor (FREESTYLE KEILA 14 DAY SENSOR) XX MISC Inject 1 each Subcutaneous every 14 days 2 each 11     Cyanocobalamin (B-12) 500 MCG SUBL Place 1 tablet under the tongue daily       eszopiclone (LUNESTA) 2 MG tablet Take 2 mg by mouth At Bedtime       hydrOXYzine (ATARAX) 50 MG tablet Take 1 tablet (50 mg) by mouth 2 times daily May take up to 3 times daily if needed 90 tablet 4     LORazepam (ATIVAN) 1 MG tablet Take 1 mg by mouth every 6 hours as needed for anxiety       metFORMIN (GLUCOPHAGE) 500 MG tablet Take 1 tablet (500 mg) by mouth 2 times daily (with meals) 180 tablet 3     multivitamin  peds with iron (FLINTSTONES COMPLETE) 60 MG chewable tablet Take 2 chew tab by mouth every morning        naloxone (NARCAN) 4 MG/0.1ML nasal spray Spray 1 spray (4 mg) into one nostril alternating nostrils once as needed for opioid reversal every 2-3 minutes until assistance arrives 0.2 mL 1     OneTouch Delica Lancets 33G MISC 1 each 3 times daily 300 each 3     polyethylene glycol (MIRALAX) 17 g packet Take 17 g by mouth daily 255 g 0     semaglutide (OZEMPIC, 1 MG/DOSE,) 2 MG/1.5ML pen Inject 1 mg subcutaneous weekly 2 pen 11  "      Allergies   Allergen Reactions     Sulfa Drugs Anaphylaxis     Oxycodone Other (See Comments) and Itching     Flushed     Lisinopril      Other reaction(s): Impotence     Onion      Other reaction(s): Intolerance-Can't Take        Social History     Tobacco Use     Smoking status: Never Smoker     Smokeless tobacco: Never Used   Substance Use Topics     Alcohol use: Not Currently     Frequency: Never     Drinks per session: Patient refused     Binge frequency: Never     Comment: socially     Family History   Problem Relation Age of Onset     Glaucoma Father      Diabetes Father      Prostate Cancer Paternal Grandfather      Macular Degeneration No family hx of      History   Drug Use No         Objective     /64 (BP Location: Right arm, Patient Position: Chair, Cuff Size: Adult Regular)   Pulse 69   Temp 98.3  F (36.8  C) (Oral)   Resp 16   Ht 1.854 m (6' 1\")   Wt 92.4 kg (203 lb 11.2 oz)   SpO2 97%   BMI 26.87 kg/m      Physical Exam    GENERAL APPEARANCE: healthy, alert and no distress     EYES: EOMI,  PERRL     HENT: ear canals and TM's normal and nose and mouth without ulcers or lesions     NECK: no adenopathy, no asymmetry, masses, or scars and thyroid normal to palpation     RESP: lungs clear to auscultation - no rales, rhonchi or wheezes     CV: regular rates and rhythm, normal S1 S2, no S3 or S4 and no murmur, click or rub     ABDOMEN:  soft, nontender, no HSM or masses and bowel sounds normal     MS: extremities normal- no gross deformities noted, no evidence of inflammation in joints, FROM in all extremities.     SKIN: no suspicious lesions or rashes     NEURO: Normal strength and tone, sensory exam grossly normal, mentation intact and speech normal     PSYCH: mentation appears normal. and affect normal/bright     LYMPHATICS: No cervical adenopathy    Recent Labs   Lab Test 10/03/20  0731 09/29/20  0810 02/28/20 2003 02/28/20 2003 02/14/20  1050   HGB 13.5 16.0  --   --  16.1   PLT  -- "  257  --   --  218    140   < >  --  140   POTASSIUM 3.8 3.8   < >  --  3.9   CR 0.56* 0.57*   < >  --  0.50*   A1C  --  7.3*  --  6.8* 7.0*    < > = values in this interval not displayed.        Diagnostics:  No labs were ordered during this visit.   No EKG required, no history of coronary heart disease, significant arrhythmia, peripheral arterial disease or other structural heart disease.    Revised Cardiac Risk Index (RCRI):  The patient has the following serious cardiovascular risks for perioperative complications:   - No serious cardiac risks = 0 points     RCRI Interpretation: 0 points: Class I (very low risk - 0.4% complication rate)           Assessment & Plan   The proposed surgical procedure is considered INTERMEDIATE risk.    Naman was seen today for pre-op exam and imm/inj.    Diagnoses and all orders for this visit:    Preop general physical exam    Left knee pain, unspecified chronicity    Need for prophylactic vaccination and inoculation against influenza  -     INFLUENZA VACCINE IM > 6 MONTHS VALENT IIV4 [80030]  -     ADMIN 1st VACCINE           Risks and Recommendations:  The patient has the following additional risks and recommendations for perioperative complications:   - Consult Hospitalist / IM to assist with post-op medical management  Diabetes:  - Patient is not on insulin therapy: regular NPO guidelines can be followed.     Medication Instructions:   - metformin: HOLD day of surgery.   - GLP-1 Injectable (exenitide, liraglutide, semaglutide, dulaglutide, etc.): HOLD day of surgery     RECOMMENDATION:  APPROVAL GIVEN to proceed with proposed procedure, without further diagnostic evaluation.    Signed Electronically by: Melinda Hickey MD    Copy of this evaluation report is provided to requesting physician.    [facility] Pre Guidelines    Revised Cardiac Risk Index

## 2020-12-08 DIAGNOSIS — Z98.890 S/P RIGHT KNEE SURGERY: Primary | ICD-10-CM

## 2020-12-08 ASSESSMENT — ANXIETY QUESTIONNAIRES: GAD7 TOTAL SCORE: 7

## 2020-12-08 ASSESSMENT — PATIENT HEALTH QUESTIONNAIRE - PHQ9: SUM OF ALL RESPONSES TO PHQ QUESTIONS 1-9: 5

## 2020-12-08 NOTE — TELEPHONE ENCOUNTER
REfill of Tramadol approved by Dr. Madera. Sent to patient's pharmacy in [place] after signature.   negative...

## 2020-12-08 NOTE — TELEPHONE ENCOUNTER
Yes, okay to refill tramadol.  I did review the picture.  I think it looks okay.  I do think we will perform an arthroscopy at the time of his surgery.    MIKE

## 2020-12-09 RX ORDER — TRAMADOL HYDROCHLORIDE 50 MG/1
50 TABLET ORAL EVERY 6 HOURS PRN
Qty: 30 TABLET | Refills: 0 | Status: SHIPPED | OUTPATIENT
Start: 2020-12-09 | End: 2022-04-06

## 2020-12-09 RX ORDER — CEFAZOLIN SODIUM 1 G/50ML
1 SOLUTION INTRAVENOUS SEE ADMIN INSTRUCTIONS
Status: CANCELLED | OUTPATIENT
Start: 2020-12-15

## 2020-12-09 RX ORDER — CEFAZOLIN SODIUM 2 G/50ML
2 SOLUTION INTRAVENOUS
Status: CANCELLED | OUTPATIENT
Start: 2020-12-15

## 2020-12-11 ENCOUNTER — THERAPY VISIT (OUTPATIENT)
Dept: PHYSICAL THERAPY | Facility: CLINIC | Age: 35
End: 2020-12-11
Payer: COMMERCIAL

## 2020-12-11 DIAGNOSIS — Z11.59 ENCOUNTER FOR SCREENING FOR OTHER VIRAL DISEASES: ICD-10-CM

## 2020-12-11 DIAGNOSIS — M25.561 RIGHT KNEE PAIN: ICD-10-CM

## 2020-12-11 DIAGNOSIS — Z47.89 AFTERCARE FOLLOWING SURGERY OF THE MUSCULOSKELETAL SYSTEM: ICD-10-CM

## 2020-12-11 PROCEDURE — U0003 INFECTIOUS AGENT DETECTION BY NUCLEIC ACID (DNA OR RNA); SEVERE ACUTE RESPIRATORY SYNDROME CORONAVIRUS 2 (SARS-COV-2) (CORONAVIRUS DISEASE [COVID-19]), AMPLIFIED PROBE TECHNIQUE, MAKING USE OF HIGH THROUGHPUT TECHNOLOGIES AS DESCRIBED BY CMS-2020-01-R: HCPCS | Performed by: ORTHOPAEDIC SURGERY

## 2020-12-11 PROCEDURE — 97140 MANUAL THERAPY 1/> REGIONS: CPT | Mod: GP | Performed by: PHYSICAL THERAPIST

## 2020-12-11 PROCEDURE — 97110 THERAPEUTIC EXERCISES: CPT | Mod: GP | Performed by: PHYSICAL THERAPIST

## 2020-12-12 LAB
SARS-COV-2 RNA SPEC QL NAA+PROBE: NOT DETECTED
SPECIMEN SOURCE: NORMAL

## 2020-12-14 ENCOUNTER — ANESTHESIA EVENT (OUTPATIENT)
Dept: SURGERY | Facility: AMBULATORY SURGERY CENTER | Age: 35
End: 2020-12-14

## 2020-12-15 ENCOUNTER — HOSPITAL ENCOUNTER (OUTPATIENT)
Facility: AMBULATORY SURGERY CENTER | Age: 35
Discharge: HOME | End: 2020-12-15
Attending: ORTHOPAEDIC SURGERY | Admitting: ORTHOPAEDIC SURGERY
Payer: COMMERCIAL

## 2020-12-15 ENCOUNTER — ANESTHESIA (OUTPATIENT)
Dept: SURGERY | Facility: AMBULATORY SURGERY CENTER | Age: 35
End: 2020-12-15

## 2020-12-15 VITALS
BODY MASS INDEX: 27 KG/M2 | RESPIRATION RATE: 9 BRPM | HEART RATE: 66 BPM | DIASTOLIC BLOOD PRESSURE: 70 MMHG | TEMPERATURE: 97.8 F | OXYGEN SATURATION: 96 % | WEIGHT: 203.71 LBS | SYSTOLIC BLOOD PRESSURE: 107 MMHG | HEIGHT: 73 IN

## 2020-12-15 DIAGNOSIS — G89.29 CHRONIC PAIN OF LEFT KNEE: ICD-10-CM

## 2020-12-15 DIAGNOSIS — M25.562 CHRONIC PAIN OF LEFT KNEE: Primary | ICD-10-CM

## 2020-12-15 DIAGNOSIS — G89.29 CHRONIC PAIN OF LEFT KNEE: Primary | ICD-10-CM

## 2020-12-15 DIAGNOSIS — M25.562 CHRONIC PAIN OF LEFT KNEE: ICD-10-CM

## 2020-12-15 LAB
GLUCOSE BLDC GLUCOMTR-MCNC: 140 MG/DL (ref 70–99)
GLUCOSE BLDC GLUCOMTR-MCNC: 147 MG/DL (ref 70–99)

## 2020-12-15 PROCEDURE — 20680 REMOVAL OF IMPLANT DEEP: CPT | Mod: LT

## 2020-12-15 PROCEDURE — 29875 ARTHRS KNEE SURG SYNVCT LMTD: CPT | Mod: LT

## 2020-12-15 RX ORDER — NALOXONE HYDROCHLORIDE 0.4 MG/ML
0.4 INJECTION, SOLUTION INTRAMUSCULAR; INTRAVENOUS; SUBCUTANEOUS
Status: DISCONTINUED | OUTPATIENT
Start: 2020-12-15 | End: 2020-12-16 | Stop reason: HOSPADM

## 2020-12-15 RX ORDER — ONDANSETRON 4 MG/1
4-8 TABLET, ORALLY DISINTEGRATING ORAL EVERY 8 HOURS PRN
Qty: 4 TABLET | Refills: 0 | Status: SHIPPED | OUTPATIENT
Start: 2020-12-15 | End: 2022-04-06

## 2020-12-15 RX ORDER — CEFAZOLIN SODIUM 1 G/50ML
1 SOLUTION INTRAVENOUS SEE ADMIN INSTRUCTIONS
Status: DISCONTINUED | OUTPATIENT
Start: 2020-12-15 | End: 2020-12-15 | Stop reason: HOSPADM

## 2020-12-15 RX ORDER — MEPERIDINE HYDROCHLORIDE 25 MG/ML
12.5 INJECTION INTRAMUSCULAR; INTRAVENOUS; SUBCUTANEOUS
Status: DISCONTINUED | OUTPATIENT
Start: 2020-12-15 | End: 2020-12-16 | Stop reason: HOSPADM

## 2020-12-15 RX ORDER — ACETAMINOPHEN 325 MG/1
650 TABLET ORAL
Status: DISCONTINUED | OUTPATIENT
Start: 2020-12-15 | End: 2020-12-16 | Stop reason: HOSPADM

## 2020-12-15 RX ORDER — NALOXONE HYDROCHLORIDE 0.4 MG/ML
0.2 INJECTION, SOLUTION INTRAMUSCULAR; INTRAVENOUS; SUBCUTANEOUS
Status: DISCONTINUED | OUTPATIENT
Start: 2020-12-15 | End: 2020-12-16 | Stop reason: HOSPADM

## 2020-12-15 RX ORDER — CEFAZOLIN SODIUM 1 G/3ML
INJECTION, POWDER, FOR SOLUTION INTRAMUSCULAR; INTRAVENOUS PRN
Status: DISCONTINUED | OUTPATIENT
Start: 2020-12-15 | End: 2020-12-15

## 2020-12-15 RX ORDER — ONDANSETRON 2 MG/ML
INJECTION INTRAMUSCULAR; INTRAVENOUS PRN
Status: DISCONTINUED | OUTPATIENT
Start: 2020-12-15 | End: 2020-12-15

## 2020-12-15 RX ORDER — BUPIVACAINE HYDROCHLORIDE AND EPINEPHRINE 2.5; 5 MG/ML; UG/ML
INJECTION, SOLUTION INFILTRATION; PERINEURAL PRN
Status: DISCONTINUED | OUTPATIENT
Start: 2020-12-15 | End: 2020-12-15 | Stop reason: HOSPADM

## 2020-12-15 RX ORDER — ONDANSETRON 4 MG/1
4 TABLET, ORALLY DISINTEGRATING ORAL EVERY 30 MIN PRN
Status: DISCONTINUED | OUTPATIENT
Start: 2020-12-15 | End: 2020-12-16 | Stop reason: HOSPADM

## 2020-12-15 RX ORDER — ONDANSETRON 2 MG/ML
4 INJECTION INTRAMUSCULAR; INTRAVENOUS EVERY 30 MIN PRN
Status: DISCONTINUED | OUTPATIENT
Start: 2020-12-15 | End: 2020-12-16 | Stop reason: HOSPADM

## 2020-12-15 RX ORDER — GABAPENTIN 300 MG/1
300 CAPSULE ORAL ONCE
Status: COMPLETED | OUTPATIENT
Start: 2020-12-15 | End: 2020-12-15

## 2020-12-15 RX ORDER — HYDROXYZINE HYDROCHLORIDE 25 MG/1
25 TABLET, FILM COATED ORAL
Status: DISCONTINUED | OUTPATIENT
Start: 2020-12-15 | End: 2020-12-16 | Stop reason: HOSPADM

## 2020-12-15 RX ORDER — PROPOFOL 10 MG/ML
INJECTION, EMULSION INTRAVENOUS PRN
Status: DISCONTINUED | OUTPATIENT
Start: 2020-12-15 | End: 2020-12-15

## 2020-12-15 RX ORDER — SODIUM CHLORIDE, SODIUM LACTATE, POTASSIUM CHLORIDE, CALCIUM CHLORIDE 600; 310; 30; 20 MG/100ML; MG/100ML; MG/100ML; MG/100ML
INJECTION, SOLUTION INTRAVENOUS CONTINUOUS
Status: DISCONTINUED | OUTPATIENT
Start: 2020-12-15 | End: 2020-12-16 | Stop reason: HOSPADM

## 2020-12-15 RX ORDER — HYDROMORPHONE HYDROCHLORIDE 2 MG/1
2 TABLET ORAL
Status: DISCONTINUED | OUTPATIENT
Start: 2020-12-15 | End: 2020-12-16 | Stop reason: HOSPADM

## 2020-12-15 RX ORDER — DEXAMETHASONE SODIUM PHOSPHATE 4 MG/ML
INJECTION, SOLUTION INTRA-ARTICULAR; INTRALESIONAL; INTRAMUSCULAR; INTRAVENOUS; SOFT TISSUE PRN
Status: DISCONTINUED | OUTPATIENT
Start: 2020-12-15 | End: 2020-12-15

## 2020-12-15 RX ORDER — HYDROMORPHONE HYDROCHLORIDE 2 MG/1
1-2 TABLET ORAL EVERY 4 HOURS PRN
Qty: 20 TABLET | Refills: 0 | Status: SHIPPED | OUTPATIENT
Start: 2020-12-15 | End: 2020-12-18

## 2020-12-15 RX ORDER — ROPIVACAINE HYDROCHLORIDE 5 MG/ML
INJECTION, SOLUTION EPIDURAL; INFILTRATION; PERINEURAL PRN
Status: DISCONTINUED | OUTPATIENT
Start: 2020-12-15 | End: 2020-12-15

## 2020-12-15 RX ORDER — CEFAZOLIN SODIUM 2 G/50ML
2 SOLUTION INTRAVENOUS
Status: DISCONTINUED | OUTPATIENT
Start: 2020-12-15 | End: 2020-12-15 | Stop reason: HOSPADM

## 2020-12-15 RX ORDER — LIDOCAINE 40 MG/G
CREAM TOPICAL
Status: DISCONTINUED | OUTPATIENT
Start: 2020-12-15 | End: 2020-12-15 | Stop reason: HOSPADM

## 2020-12-15 RX ORDER — PROPOFOL 10 MG/ML
INJECTION, EMULSION INTRAVENOUS CONTINUOUS PRN
Status: DISCONTINUED | OUTPATIENT
Start: 2020-12-15 | End: 2020-12-15

## 2020-12-15 RX ORDER — KETOROLAC TROMETHAMINE 30 MG/ML
INJECTION, SOLUTION INTRAMUSCULAR; INTRAVENOUS PRN
Status: DISCONTINUED | OUTPATIENT
Start: 2020-12-15 | End: 2020-12-15

## 2020-12-15 RX ORDER — ACETAMINOPHEN 325 MG/1
650 TABLET ORAL EVERY 4 HOURS PRN
Qty: 50 TABLET | Refills: 0 | Status: SHIPPED | OUTPATIENT
Start: 2020-12-15 | End: 2022-04-06

## 2020-12-15 RX ORDER — LIDOCAINE HYDROCHLORIDE 20 MG/ML
INJECTION, SOLUTION INFILTRATION; PERINEURAL PRN
Status: DISCONTINUED | OUTPATIENT
Start: 2020-12-15 | End: 2020-12-15

## 2020-12-15 RX ORDER — FENTANYL CITRATE 50 UG/ML
INJECTION, SOLUTION INTRAMUSCULAR; INTRAVENOUS PRN
Status: DISCONTINUED | OUTPATIENT
Start: 2020-12-15 | End: 2020-12-15

## 2020-12-15 RX ORDER — HYDROXYZINE PAMOATE 25 MG/1
25-50 CAPSULE ORAL 4 TIMES DAILY PRN
Qty: 30 CAPSULE | Refills: 0 | Status: SHIPPED | OUTPATIENT
Start: 2020-12-15 | End: 2022-04-06

## 2020-12-15 RX ORDER — ACETAMINOPHEN 325 MG/1
975 TABLET ORAL ONCE
Status: COMPLETED | OUTPATIENT
Start: 2020-12-15 | End: 2020-12-15

## 2020-12-15 RX ORDER — AMOXICILLIN 250 MG
1-2 CAPSULE ORAL 2 TIMES DAILY
Qty: 30 TABLET | Refills: 0 | Status: SHIPPED | OUTPATIENT
Start: 2020-12-15 | End: 2022-04-06

## 2020-12-15 RX ORDER — OXYCODONE HYDROCHLORIDE 5 MG/1
5 TABLET ORAL
Status: DISCONTINUED | OUTPATIENT
Start: 2020-12-15 | End: 2020-12-16 | Stop reason: HOSPADM

## 2020-12-15 RX ORDER — ONDANSETRON 4 MG/1
4 TABLET, ORALLY DISINTEGRATING ORAL
Status: DISCONTINUED | OUTPATIENT
Start: 2020-12-15 | End: 2020-12-16 | Stop reason: HOSPADM

## 2020-12-15 RX ORDER — FENTANYL CITRATE 50 UG/ML
25-50 INJECTION, SOLUTION INTRAMUSCULAR; INTRAVENOUS
Status: DISCONTINUED | OUTPATIENT
Start: 2020-12-15 | End: 2020-12-16 | Stop reason: HOSPADM

## 2020-12-15 RX ORDER — GLYCOPYRROLATE 0.2 MG/ML
INJECTION, SOLUTION INTRAMUSCULAR; INTRAVENOUS PRN
Status: DISCONTINUED | OUTPATIENT
Start: 2020-12-15 | End: 2020-12-15

## 2020-12-15 RX ORDER — SODIUM CHLORIDE, SODIUM LACTATE, POTASSIUM CHLORIDE, CALCIUM CHLORIDE 600; 310; 30; 20 MG/100ML; MG/100ML; MG/100ML; MG/100ML
INJECTION, SOLUTION INTRAVENOUS CONTINUOUS
Status: DISCONTINUED | OUTPATIENT
Start: 2020-12-15 | End: 2020-12-15 | Stop reason: HOSPADM

## 2020-12-15 RX ADMIN — PROPOFOL 50 MCG/KG/MIN: 10 INJECTION, EMULSION INTRAVENOUS at 14:00

## 2020-12-15 RX ADMIN — ONDANSETRON 4 MG: 2 INJECTION INTRAMUSCULAR; INTRAVENOUS at 14:53

## 2020-12-15 RX ADMIN — PROPOFOL 200 MG: 10 INJECTION, EMULSION INTRAVENOUS at 14:00

## 2020-12-15 RX ADMIN — ROPIVACAINE HYDROCHLORIDE 20 ML: 5 INJECTION, SOLUTION EPIDURAL; INFILTRATION; PERINEURAL at 15:35

## 2020-12-15 RX ADMIN — KETOROLAC TROMETHAMINE 30 MG: 30 INJECTION, SOLUTION INTRAMUSCULAR; INTRAVENOUS at 14:53

## 2020-12-15 RX ADMIN — FENTANYL CITRATE 50 MCG: 50 INJECTION, SOLUTION INTRAMUSCULAR; INTRAVENOUS at 14:19

## 2020-12-15 RX ADMIN — SODIUM CHLORIDE, SODIUM LACTATE, POTASSIUM CHLORIDE, CALCIUM CHLORIDE: 600; 310; 30; 20 INJECTION, SOLUTION INTRAVENOUS at 13:07

## 2020-12-15 RX ADMIN — LIDOCAINE HYDROCHLORIDE 50 MG: 20 INJECTION, SOLUTION INFILTRATION; PERINEURAL at 14:00

## 2020-12-15 RX ADMIN — Medication 0.5 MG: at 15:05

## 2020-12-15 RX ADMIN — CEFAZOLIN SODIUM 2 G: 1 INJECTION, POWDER, FOR SOLUTION INTRAMUSCULAR; INTRAVENOUS at 14:10

## 2020-12-15 RX ADMIN — PROPOFOL 100 MG: 10 INJECTION, EMULSION INTRAVENOUS at 14:02

## 2020-12-15 RX ADMIN — ACETAMINOPHEN 975 MG: 325 TABLET ORAL at 13:04

## 2020-12-15 RX ADMIN — FENTANYL CITRATE 50 MCG: 50 INJECTION, SOLUTION INTRAMUSCULAR; INTRAVENOUS at 13:58

## 2020-12-15 RX ADMIN — SODIUM CHLORIDE, SODIUM LACTATE, POTASSIUM CHLORIDE, CALCIUM CHLORIDE: 600; 310; 30; 20 INJECTION, SOLUTION INTRAVENOUS at 13:45

## 2020-12-15 RX ADMIN — GABAPENTIN 300 MG: 300 CAPSULE ORAL at 13:04

## 2020-12-15 RX ADMIN — FENTANYL CITRATE 50 MCG: 50 INJECTION, SOLUTION INTRAMUSCULAR; INTRAVENOUS at 15:45

## 2020-12-15 RX ADMIN — HYDROMORPHONE HYDROCHLORIDE 2 MG: 2 TABLET ORAL at 15:43

## 2020-12-15 RX ADMIN — FENTANYL CITRATE 50 MCG: 50 INJECTION, SOLUTION INTRAMUSCULAR; INTRAVENOUS at 15:49

## 2020-12-15 RX ADMIN — DEXAMETHASONE SODIUM PHOSPHATE 4 MG: 4 INJECTION, SOLUTION INTRA-ARTICULAR; INTRALESIONAL; INTRAMUSCULAR; INTRAVENOUS; SOFT TISSUE at 14:05

## 2020-12-15 RX ADMIN — GLYCOPYRROLATE 0.2 MG: 0.2 INJECTION, SOLUTION INTRAMUSCULAR; INTRAVENOUS at 13:58

## 2020-12-15 ASSESSMENT — MIFFLIN-ST. JEOR: SCORE: 1902.75

## 2020-12-15 NOTE — DISCHARGE INSTRUCTIONS
"[facility]  Home Care Following Anesthesia  For 24 hours after surgery:  1. Get plenty of rest.  A responsible adult must stay with you for at least 24 hours after you leave the surgery center.  2. Do not drive or use heavy equipment.  If you have weakness or tingling, don't drive or use heavy equipment until this feeling goes away.   3. Do not drink alcohol.   4. Avoid strenuous or risky activities.  Ask for help when climbing stairs.  5. You may feel lightheaded.  IF so, sit for a few minutes before standing.  Have someone help you get up.   6. If you have nausea (feel sick to your stomach): Drink only clear liquids such as apple juice, ginger ale, broth or 7-Up.  Rest may also help.  Be sure to drink enough fluids.  Move to a regular diet as you feel able.   7. You may have a slight fever.  Call the doctor if your fever is over 100 F (37.7 C) (taken under the tongue) or lasts longer than 24 hours.  8. You may have a dry mouth, a sore throat, muscle aches or trouble sleeping. These should go away after 24 hours.  9. Do not make important or legal decisions.        Today you received a Marcaine or bupivacaine block to numb the nerves near your surgery site.  This is a block using local anesthetic or \"numbing\" medication injected around the nerves to anesthetize or \"numb\" the area supplied by those nerves.  This block is injected into the muscle layer near your surgical site.  The medication may numb the location where you had surgery for 6-18 hours, but may last up to 24 hours.  If your surgical site is an arm or leg you should be careful with your affected limb, since it is possible to injure your limb without being aware of it due to the numbing.  Until full feeling returns, you should guard against bumping or hitting your limb, and avoid extreme hot or cold temperatures on the skin.  As the block wears off, the feeling will return as a tingling or prickly sensation near your " surgical site.  You will experience more discomfort from your incision as the feeling returns.  You may want to take a pain pill (a narcotic or Tylenol if this was prescribed by your surgeon) when you start to experience mild pain before the pain beccomes more severe.  If your pain medications do not control your pain you should notifiy your surgeon.    Tips for taking pain medications  To get the best pain relief possible, remember these points:    Take pain medications as directed, before pain becomes severe.    Pain medication can upset your stomach: taking it with food may help.    Constipation is a common side effect of pain medication. Drink plenty of  fluids.    Eat foods high in fiber. Take a stool softener if recommended by your doctor or pharmacist.    Do not drink alcohol, drive or operate machinery while taking pain medications.    Ask about other ways to control pain, such as with heat, ice or relaxation.    Tylenol/Acetaminophen Consumption  To help encourage the safe use of acetaminophen, the makers of TYLENOL  have lowered the maximum daily dose for single-ingredient Extra Strength TYLENOL  (acetaminophen) products sold in the [place] from 8 pills per day (4,000 mg) to 6 pills per day (3,000 mg). The dosing interval has also changed from 2 pills every 4-6 hours to 2 pills every 6 hours.    If you feel your pain relief is insufficient, you may take Tylenol/Acetaminophen in addition to your narcotic pain medication.     Be careful not to exceed 3,000 mg of Tylenol/Acetaminophen in a 24 hour period from all sources.    If you are taking extra strength Tylenol/acetaminophen (500 mg), the maximum dose is 6 tablets in 24 hours.    If you are taking regular strength acetaminophen (325 mg), the maximum dose is 9 tablets in 24 hours.    Call a doctor for any of the followin. Signs of infection (fever, growing tenderness at the surgery site, a large amount of drainage or bleeding, severe pain, foul-smelling  "drainage, redness, swelling).  2. It has been over 8 to 10 hours since surgery and you are still not able to urinate (pass water).  3. Headache for over 24 hours.  4. Numbness, tingling or weakness the day after surgery (if you had spinal anesthesia).  5. Signs of Covid-19 infection (temperature over 100 degrees, shortness of breath, cough, loss of taste/smell, generalized body aches, persistent headache, chills, sore throat, nausea/vomiting/diarrhea)  Your doctor is:       Dr. Angel Madera, Orthopaedics: [phone]               Or dial [phone] and ask for the resident on call for:  Orthopaedics  For emergency care, call the:  [facility] Emergency Department: [phone] (TTY for hearing impaired: [phone])      Safety Tips for Using Crutches    Crutch Fit:    Assume good standing posture with shoulders relaxed and crutch tips 6-8 inches out from the side of the foot.    The underarm pad should fall 2-3 fingers width below the armpit.    The handgrip is positioned level with the wrist to allow 30  flexion at the elbow.    Safety Tips:    Bear weight on your hands, not on your armpits.    Do not add extra padding to the underarm pad. This will, in effect, lengthen the crutches and increase risk of nerve injury.    Wear flat, properly fitting shoes. Do not walk in stocking feet, high heels or slippers.    Household hazards:  --Throw rugs should be removed from floors.  --Stairs should be cleared of obstacles.  --Use extra caution on slippery, highly polished, littered or uneven floor surfaces.  --Check for electric cords.    Check crutch tips for excessive wear and keep wing nuts tight.    While walking, look forward with  head up  and  eyes open.  Take equal length steps.    Use BOTH crutches.    Stairs Sequence:    UP: \"Good\" leg first, followed by  bad  leg, then crutches.    DOWN: Crutches, followed by  bad  leg, \"good\" leg.     Walking with Crutches:    Move both crutches forward at the same " "time.    Non-Weight Bearing (NWB):  Hold the involved leg up and swing through the crutches with the involved leg. The involved leg does not touch the floor.    Toe Touch Weight Bearing (TTWB): Move the involved leg forward. Rest it lightly on the floor for balance only. Step through the crutches with the uninvolved leg.    Partial Weight Bearing (PWB): Move the involved leg forward. Step down the weight of the leg only.  Step through the crutches with the uninvolved leg.    Weight Bearing As Tolerated (WBAT): Move the involved leg forward. Put as much pressure through the involved leg as you can tolerate comfortably. Then step through the crutches with the uninvolved leg.    Information about liposomal bupivacaine (Exparel)    What is Liposomal Bupivacaine?    Liposomal Bupivacaine is a numbing medication that can help you manage your pain after surgery.  This medication is similar to \"novacaine,\" which is often used by the dentist.  Liposomal bupivacaine is released slowly and can help control pain for up to 72 hours.    What is the purpose of Liposomal Bupivacaine?    To manage your pain after surgery    To help you sleep better, take deep breaths, walk more comfortable, and feel up to visiting with others    How is the procedure done?    Liposomal bupivacaine is a medication given by an injection.    It is usually given right before your surgery.  If this is the case, you will be awake or sedated, but you should experience minimal pain during the procedure.    For some people, the injection may be given at the very end of your surgery.  It all depends on the type of surgery and your situation.    The procedure usually takes about 5-15 minutes.  An ultrasound machine will help the anesthesiologist insert it in the right place or the surgeon will inject it under direct vision.     A needle is used to place the numbing medication under your skin.  It provides pain relief by numbing the tissue in the area where your " surgeon will make the incision.    What can I expect?    You may experience numbness, tingling, or a feeling of heaviness around the area that was injected.    If you experience any of the follow symptoms IMMEDIATELY CALL THE REGIONAL ANESTHESIA PAIN SERVICE:    Numbness or tingling occurs in areas other than around the injection site    Blurry vision    Ringing in your ears    A metallic taste in your mouth    PAGE: Dial [phone].  When prompted, enter the following 4-digit ID number:  [id number].  You will be prompted to enter your phone number; and then enter the # sign.  The clinician on call will call you back.    OR    CALL: Dial [phone].  Let the hospital  know that you are having a problem with a nerve block and that you would like to speak to the regional anesthesia pain service right away.    You should not receive any other type of numbing medication within 4 days after receiving liposomal bupivacaine unless your anesthesiologist approves.    Post Operative Instructions: Regional Anesthetic for Lower Extremity with Liposomal Bupivacaine  General Information:   Regional anesthesia is when local anesthetic or  numbing  medication is injected around the nerves to anesthetize or  numb  the area supplied by that set of nerves. It is a type of analgesia used to control pain and decreases the need for narcotics following surgery.    Types of Regional Blocks:  Femoral: A block injected into the groin area of the operative leg of a patient having thigh or knee surgery.  Adductor Canal: A block injected into the mid thigh of the operative leg of a patient having knee or ankle surgery.  Popliteal or Distal Sciatic: A block injected into the back of the knee of the operative leg of patients having foot or ankle surgery.   Ankle:  An anesthetic medication is injected into the ankle of the operative leg of a patient having foot or toe surgery.     Procedure:   The type of anesthesia your doctor used to numb  your leg will usually not wear off for 24-48 hours, but may last as long as 72 hours. You should be careful during that period, since it is possible to injure your leg without being aware of the injury. While your leg is numb you should:    Use crutches (minimal weight bearing until your motor and strength is completely back to normal)    Avoid striking or bumping your leg    Avoid extreme hot or cold    Discomfort:  You will have a tingling and prickly sensation in your leg as the feeling begins to return; you can also expect some discomfort. The amount of discomfort is unpredictable, but if you have more pain than can be controlled with pain medication, you should notify your physician.     Pain Medicine:   Begin taking your oral pain pills before bedtime and during the night to avoid a sudden onset of pain as part of the block wears off. Do not engage in drinking, driving, or hazardous occupations while taking pain medication.

## 2020-12-15 NOTE — ANESTHESIA PREPROCEDURE EVALUATION
"Anesthesia Pre-Procedure Evaluation    Patient: Naman Jones   MRN:     [id number] Gender:   male   Age:    35 year old :      1985        Preoperative Diagnosis: Chronic pain of left knee [M25.562, G89.29]   Procedure(s):  left knee examination under anesthesia, left knee arthroscopy  LEFT KNEE HARDWARE REMOVAL     LABS:  CBC:   Lab Results   Component Value Date    WBC 6.3 2020    WBC 6.7 2020    HGB 13.5 10/03/2020    HGB 16.0 2020    HCT 46.1 2020    HCT 47.0 2020     2020     2020     BMP:   Lab Results   Component Value Date     10/03/2020     2020    POTASSIUM 3.8 10/03/2020    POTASSIUM 3.8 2020    CHLORIDE 106 10/03/2020    CHLORIDE 109 2020    CO2 24 10/03/2020    CO2 26 2020    BUN 9 10/03/2020    BUN 8 2020    CR 0.56 (L) 10/03/2020    CR 0.57 (L) 2020     (H) 10/03/2020     (H) 10/02/2020     COAGS: No results found for: PTT, INR, FIBR  POC:   Lab Results   Component Value Date     (H) 12/15/2020     OTHER:   Lab Results   Component Value Date    A1C 7.3 (H) 2020    KALINA 8.1 (L) 10/03/2020    ALBUMIN 4.1 2020    PROTTOTAL 7.5 2020    ALT 53 2020    AST 15 2020    ALKPHOS 121 2020    BILITOTAL 0.8 2020    TSH 1.50 2018        Preop Vitals    BP Readings from Last 3 Encounters:   12/15/20 105/78   20 102/64   10/05/20 134/83    Pulse Readings from Last 3 Encounters:   20 69   10/05/20 84   20 79      Resp Readings from Last 3 Encounters:   12/15/20 18   20 16   10/05/20 16    SpO2 Readings from Last 3 Encounters:   12/15/20 99%   20 97%   10/05/20 98%      Temp Readings from Last 1 Encounters:   12/15/20 36.1  C (96.9  F) (Temporal)    Ht Readings from Last 1 Encounters:   12/15/20 1.854 m (6' 0.99\")      Wt Readings from Last 1 Encounters:   12/15/20 92.4 kg (203 lb 11.3 oz)    Estimated body " "mass index is 26.88 kg/m  as calculated from the following:    Height as of this encounter: 1.854 m (6' 0.99\").    Weight as of this encounter: 92.4 kg (203 lb 11.3 oz).     LDA:  Peripheral IV 12/15/20 Left Lower forearm (Active)   Site Assessment WDL 12/15/20 1304   Line Status Infusing 12/15/20 1304   Phlebitis Scale 0-->no symptoms 12/15/20 1304   Infiltration Scale 0 12/15/20 1304   Infiltration Site Treatment Method  None 12/15/20 1304   Number of days: 0       Supraglottic Airway 5 LMA Unique (Active)   Number of days: 0        Past Medical History:   Diagnosis Date     Diabetes (H)      Hypertension       Past Surgical History:   Procedure Laterality Date     ARTHROSCOPY KNEE WITH PATELLAR REALIGNMENT Left 2/28/2020    Procedure: Examination under anesthesia Left knee,  medial reefing/imbrication;  Surgeon: Angel Madera MD;  Location: [facility] OR     ARTHROSCOPY KNEE WITH RETINACULAR RELEASE Left 2/28/2020    Procedure: Left lateral retinacular lengthening;  Surgeon: Angel Madera MD;  Location: [facility] OR     ARTHROSCOPY KNEE WITH RETINACULAR RELEASE Right 10/2/2020    Procedure: Examination under anesthesia right knee, right knee arthroscopy, chondralplasty of lateral femoral condyle and lateral trochlea, medial retinacular imbrication, open lateral retinacular lengthening;  Surgeon: Angel Madera MD;  Location: [facility] OR     LAPAROSCOPIC BYPASS GASTRIC N/A 10/9/2018    Procedure: LAPAROSCOPIC BYPASS GASTRIC;  LAPAROSCOPIC WELLINGTON-EN Y GASTRIC BYPASS;  Surgeon: Alden Mcwilliams MD;  Location: [facility] OR     ORTHOPEDIC SURGERY Right 02/2016     OSTEOTOMY FEMUR DISTAL Left 2/28/2020    Procedure: Left distal femoral osteotomy;  Surgeon: Angel Madera MD;  Location: [facility]     OSTEOTOMY FEMUR DISTAL Right 10/2/2020    Procedure: Left distal femoral osteotomy;  Surgeon: Angel Madera MD;  Location: [facility] OR      Allergies   Allergen Reactions     Sulfa Drugs " Anaphylaxis     Oxycodone Other (See Comments) and Itching     Flushed     Lisinopril      Other reaction(s): Impotence     Onion      Other reaction(s): Intolerance-Can't Take        Anesthesia Evaluation     . Pt has had prior anesthetic. Type: General    No history of anesthetic complications          ROS/MED HX    ENT/Pulmonary:  - neg pulmonary ROS     Neurologic:  - neg neurologic ROS     Cardiovascular:     (+) hypertension----. : . . . :. .       METS/Exercise Tolerance:     Hematologic:  - neg hematologic  ROS       Musculoskeletal:  - neg musculoskeletal ROS       GI/Hepatic:  - neg GI/hepatic ROS       Renal/Genitourinary:  - ROS Renal section negative       Endo:     (+) type II DM .      Psychiatric:     (+) psychiatric history anxiety      Infectious Disease:  - neg infectious disease ROS       Malignancy:      - no malignancy   Other:    - neg other ROS                     PHYSICAL EXAM:   Mental Status/Neuro: A/A/O   Airway:   Mallampati: I  Mouth/Opening: Full  TM distance: > 6 cm  Neck ROM: Full   Respiratory: Auscultation: CTAB     Resp. Rate: Normal     Resp. Effort: Normal      CV: Rhythm: Regular  Rate: Age appropriate  Heart: Normal Sounds  Edema: None   Comments:      Dental: Normal Dentition                Assessment:   ASA SCORE: 2    H&P: History and physical reviewed and following examination; no interval change.   Smoking Status:  Non-Smoker/Unknown   NPO Status: NPO Appropriate     Plan:   Anes. Type:  General; Peripheral Nerve Block     Block Details: Single Shot; Adductor C.   Pre-Medication: None   Induction:  IV (Standard)   Airway: LMA   Access/Monitoring: PIV   Maintenance: Balanced     Postop Plan:   Postop Pain: Opioids; Regional  Postop Sedation/Airway: Not planned  Disposition: Outpatient     PONV Management:   Adult Risk Factors:, Non-Smoker, Postop Opioids   Prevention: Ondansetron     CONSENT: Direct conversation   Plan and risks discussed with: Patient   Blood Products:  Consent Deferred (Minimal Blood Loss)       Comments for Plan/Consent:  Patient consented for potential rescue block - adductor canal in PACU if necessary for improved pain control                 Ethan Hill MD

## 2020-12-15 NOTE — ANESTHESIA CARE TRANSFER NOTE
Patient: Naman Jones    Procedure(s):  left knee examination under anesthesia, left knee arthroscopy  LEFT KNEE HARDWARE REMOVAL    Diagnosis: Chronic pain of left knee [M25.562, G89.29]  Diagnosis Additional Information: No value filed.    Anesthesia Type:   General     Note:  Airway :Nasal Cannula  Patient transferred to:PACU  Comments: Uneventful transport to PACU; VSS; Report given to RN; Pt comfortable; IV patent: pt exchanging wellHandoff Report: Identifed the Patient, Identified the Reponsible Provider, Reviewed the pertinent medical history, Discussed the surgical course, Reviewed Intra-OP anesthesia mangement and issues during anesthesia, Set expectations for post-procedure period and Allowed opportunity for questions and acknowledgement of understanding      Vitals: (Last set prior to Anesthesia Care Transfer)    CRNA VITALS  12/15/2020 1442 - 12/15/2020 1513      12/15/2020             Resp Rate (set):  10                Electronically Signed By: ARDEN Olmos CRNA  December 15, 2020  3:13 PM

## 2020-12-15 NOTE — ANESTHESIA PROCEDURE NOTES
Peripheral Nerve Block Procedure Note      Staff -   Anesthesiologist:  Ethan Hill MD  Performed By: anesthesiologist  Location: PACU  Procedure Start/Stop TImes:      12/15/2020 3:30 PM     12/15/2020 3:35 PM    patient identified, IV checked, site marked, risks and benefits discussed, informed consent, monitors and equipment checked, pre-op evaluation, at physician/surgeon's request and post-op pain management      Correct Patient: Yes      Correct Position: Yes      Correct Site: Yes      Correct Procedure: Yes      Correct Laterality:  Yes    Site Marked:  Yes  Procedure details:     Procedure:  Adductor canal    ASA:  2    Laterality:  Left    Position:  Supine    Sterile Prep: chloraprep, mask and sterile gloves      Local skin infiltration:  None    Needle:  Insulated    Needle gauge:  21    Needle length (mm):  100    Ultrasound: Yes      Ultrasound used to identify targeted nerve, plexus, or vascular structure and placed a needle adjacent to it      Permanent Image entered into patiient's record      Abnormal pain on injection: No      Blood Aspirated: No      Paresthesias:  No    Bleeding at site: No      Bolus via:  Needle    Infusion Method:  Single Shot    Complications:  None

## 2020-12-15 NOTE — OP NOTE
PREOPERATIVE DIAGNOSIS:   1. Retained hardware left distal femur status post distal femoral osteotomy    POSTOPERATIVE DIAGNOSIS:  1. Retained hardware left distal femur status post left distal femoral osteotomy    PROCEDURE:  1. Examination under anesthesia left knee  2. Left knee arthroscopy  3. Debridement of anterior chamber the knee for scar tissue  4. Open removal of hardware left distal femur    DATE OF SURGERY: 12/15/2020    SURGEON: Angel Madera MD    ASSISTANT: None.     RESIDENT OR FELLOW: Bladimir Hsu MD    OPERATIVE INDICATIONS: Naman Jones is a pleasant 35 year old male who I saw through my orthopedic clinic with a history, physical, imaging consistent with pain that isolates to the left distal femur and the lateral aspect.  He status post distal femoral osteotomy.  I felt that the plate was irritating him so I plan to remove it.  We will plan to perform an arthroscopy to do an evaluation of the intra-articular structures..  I reviewed with the patient the risks, benefits, complications, techniques and alternatives to surgery.  We reviewed the expected course of recovery and the potential expected outcomes.  The patient understood both the risks and benefits and desired to proceed despite the risks.    OPERATIVE DETAILS: In the preoperative area the patient's informed consent was reviewed and they desired to proceed.  The left leg was marked and the patient was in agreement.  The patient was taken to the operating room where a timeout was performed and all parties were in agreement.  Preoperative antibiotics were given within 1 hour of the time of incision.  The patient was placed in the supine position and surrendered to LMA anesthesia.  No tourniquet was applied.  Egg crate was placed beneath the well leg and a side post was utilized.  The operative leg was prepped and draped in the usual sterile fashion.     Examination Under Anesthesia:    Range of motion was 0 to 135 degrees.  Stable  to varus and valgus stress testing.  Stable anterior posterior drawer testing.  No pivot shift.    Anterior medial and anterolateral arthroscopic portals were created chondrosis of the lateral trochlea was noted.  Central trochlea patella was largely intact.  Medial femoral condyle medial tibial plateau was largely normal.  Lateral femoral condyle did show some evidence of chondrosis though its not quite as bad as what I remember From our prior arthroscopy approximately 1 year ago.  At that time I had graded at grade 3 or 4.  Not convinced that it is ultimately healthier now that I would have only thought it was grade 2 or possibly grade 3 at this time.  Lateral meniscus lateral tibial plateau was normal there was a large amount of scar tissue in the anterior intercondylar notch presumably from his prior surgery.  This was taken out with motorized shaver.    This time the patient's old incision was utilized carried to the skin and subcutaneous tissues meticulous hemostasis was insured.  We immediately identified the IT band.  This was opened longitudinally.  Retractors were placed.  The plate and screws were taken out without complication.  These were sent for processing.    Copious irrigation was performed an a layered closure was initiated, sterile dressings were applied and the patient was transferred to the recovery room in stable condition with stable vital signs.    ESTIMATED BLOOD LOSS: 10 mL.    TOURNIQUET TIME: No tourniquet was placed.    COMPLICATIONS: None apparent.    DRAINS: None.    SPECIMENS: None.     POSTOPERATIVE PLAN:  1. Weightbearing as tolerated  2. Range of motion as tolerated  3. No brace necessary  4. Shower on day 3.  No submerging the wounds for 2 weeks  5. No running or jumping for at least 6 weeks

## 2020-12-15 NOTE — ANESTHESIA POSTPROCEDURE EVALUATION
Anesthesia POST Procedure Evaluation    Patient: Naman Jones   MRN:     [id number] Gender:   male   Age:    35 year old :      1985        Preoperative Diagnosis: Chronic pain of left knee [M25.562, G89.29]   Procedure(s):  left knee examination under anesthesia, left knee arthroscopy  LEFT KNEE HARDWARE REMOVAL   Postop Comments: No value filed.     Anesthesia Type: General       Disposition: Outpatient   Postop Pain Control: Uneventful            Sign Out: Well controlled pain   PONV: No   Neuro/Psych: Uneventful            Sign Out: Acceptable/Baseline neuro status   Airway/Respiratory: Uneventful            Sign Out: Acceptable/Baseline resp. status   CV/Hemodynamics: Uneventful            Sign Out: Acceptable CV status   Other NRE: NONE   DID A NON-ROUTINE EVENT OCCUR? No         Last Anesthesia Record Vitals:  CRNA VITALS  12/15/2020 1442 - 12/15/2020 1542      12/15/2020             Resp Rate (set):  10          Last PACU Vitals:  Vitals Value Taken Time   /70 12/15/20 1600   Temp 36.5  C (97.7  F) 12/15/20 1545   Pulse 66 12/15/20 1600   Resp 12 12/15/20 1600   SpO2 93 % 12/15/20 1600   Temp src     NIBP     Pulse     SpO2     Resp     Temp     Ht Rate     Temp 2           Electronically Signed By: Ethan Hill MD, December 15, 2020, 4:41 PM

## 2020-12-18 DIAGNOSIS — G89.29 CHRONIC PAIN OF LEFT KNEE: ICD-10-CM

## 2020-12-18 DIAGNOSIS — M25.562 CHRONIC PAIN OF LEFT KNEE: ICD-10-CM

## 2020-12-18 RX ORDER — HYDROMORPHONE HYDROCHLORIDE 2 MG/1
1-2 TABLET ORAL EVERY 6 HOURS PRN
Qty: 20 TABLET | Refills: 0 | Status: SHIPPED | OUTPATIENT
Start: 2020-12-18 | End: 2020-12-23

## 2020-12-18 NOTE — TELEPHONE ENCOUNTER
Patient requests refill of his postop pain medications. One refill per standing orders. Pended to Dr. Madera for approval and signature.

## 2020-12-18 NOTE — TELEPHONE ENCOUNTER
[facility] Call Center    Phone Message    May a detailed message be left on voicemail: yes     Reason for Call: Medication Refill Request    Has the patient contacted the pharmacy for the refill? Yes   Name of medication being requested: hydromorphone  Provider who prescribed the medication: Dr. Madera  Pharmacy: [facility]  Date medication is needed: as soon as possible     Please call patient @ home number if you have questions.      Action Taken: Message routed to:  [facility] ([facility] ortho    Travel Screening: Not Applicable

## 2020-12-22 ENCOUNTER — THERAPY VISIT (OUTPATIENT)
Dept: PHYSICAL THERAPY | Facility: CLINIC | Age: 35
End: 2020-12-22
Payer: COMMERCIAL

## 2020-12-22 DIAGNOSIS — M25.561 ACUTE PAIN OF RIGHT KNEE: ICD-10-CM

## 2020-12-22 DIAGNOSIS — Z47.89 AFTERCARE FOLLOWING SURGERY OF THE MUSCULOSKELETAL SYSTEM: ICD-10-CM

## 2020-12-22 PROCEDURE — 97110 THERAPEUTIC EXERCISES: CPT | Mod: GP | Performed by: PHYSICAL THERAPY ASSISTANT

## 2020-12-22 PROCEDURE — 97112 NEUROMUSCULAR REEDUCATION: CPT | Mod: GP | Performed by: PHYSICAL THERAPY ASSISTANT

## 2020-12-22 NOTE — PROGRESS NOTES
[facility] Initial Evaluation  Subjective:    Therapist Generated HPI Evaluation  Problem details: Chirag is being seen today for physical therapy for his left knee s/p examination under anesthesia and hardware removal on 12/15/2020..         Type of problem:  Left knee.    This is a new condition.  Occurance: surgery.      Pain is described as aching and sharp   Pain is the same all the time.  Since onset symptoms are gradually improving.  Associated symptoms:  Edema. Symptoms are exacerbated by bending/squatting, ascending stairs, descending stairs, lying on the extremity, standing, weight bearing, transfers and walking      Previous treatment includes surgery.                           Objective:  System                                                Knee Evaluation:  ROM:    AROM      Extension: Left: 15    Right:   Flexion: Left: 72   Right:  PROM      Extension: Left: 6   Right:   Flexion: Left: 72   Right:       Strength:         Quad Set Left:  Poor    Pain: +           Edema:  Edema of the knee: positive for 3+ post-surgical edema.    Mobility Testing:      Patellofemoral Medial:  Right: normal  Patellofemoral Lateral:  Right: normal  Patellofemoral Superior:  Right: normal  Patellofemoral Inferior:  Right: normal  Functional Testing:  not assessed                  General     ROS    Assessment/Plan:    Patient is a 35 year old male with left side knee complaints.    Patient has the following significant findings with corresponding treatment plan.                Diagnosis 1:  S/p Left knee EUA and hardware removal  Pain -  hot/cold therapy, electric stimulation, manual therapy, splint/taping/bracing/orthotics, self management, education and home program  Decreased ROM/flexibility - manual therapy and therapeutic exercise  Decreased strength - therapeutic exercise and therapeutic activities  Impaired balance - neuro re-education and therapeutic activities  Decreased proprioception - neuro  re-education and therapeutic activities  Edema - vasopneumatics  Impaired gait - gait training  Impaired muscle performance - neuro re-education  Decreased function - therapeutic activities    Therapy Evaluation Codes:   1) History comprised of:   Personal factors that impact the plan of care:      None.    Comorbidity factors that impact the plan of care are:      None.     Medications impacting care: Anti-inflammatory and Pain.  2) Examination of Body Systems comprised of:   Body structures and functions that impact the plan of care:      Knee.   Activity limitations that impact the plan of care are:      Bathing, Bending, Cooking, Driving, Dressing, Lifting, Squatting/kneeling, Stairs, Standing and Walking.  3) Clinical presentation characteristics are:   Stable/Uncomplicated.  4) Decision-Making    Low complexity using standardized patient assessment instrument and/or measureable assessment of functional outcome.  Cumulative Therapy Evaluation is: Low complexity.    Previous and current functional limitations:  (See Goal Flow Sheet for this information)    Short term and Long term goals: (See Goal Flow Sheet for this information)     Communication ability:  Patient appears to be able to clearly communicate and understand verbal and written communication and follow directions correctly.  Treatment Explanation - The following has been discussed with the patient:   RX ordered/plan of care  Anticipated outcomes  Possible risks and side effects  This patient would benefit from PT intervention to resume normal activities.   Rehab potential is good.    Frequency:  1 X week, once daily  Duration:  for 8 weeks  Discharge Plan:  Achieve all LTG.  Independent in home treatment program.  Reach maximal therapeutic benefit.    Please refer to the daily flowsheet for treatment today, total treatment time and time spent performing 1:1 timed codes.

## 2020-12-23 ENCOUNTER — OFFICE VISIT (OUTPATIENT)
Dept: ORTHOPEDICS | Facility: CLINIC | Age: 35
End: 2020-12-23
Payer: COMMERCIAL

## 2020-12-23 DIAGNOSIS — G89.29 CHRONIC PAIN OF LEFT KNEE: ICD-10-CM

## 2020-12-23 DIAGNOSIS — M25.562 CHRONIC PAIN OF LEFT KNEE: ICD-10-CM

## 2020-12-23 PROCEDURE — 99024 POSTOP FOLLOW-UP VISIT: CPT | Performed by: ORTHOPAEDIC SURGERY

## 2020-12-23 RX ORDER — HYDROMORPHONE HYDROCHLORIDE 2 MG/1
1-2 TABLET ORAL EVERY 6 HOURS PRN
Qty: 20 TABLET | Refills: 0 | Status: SHIPPED | OUTPATIENT
Start: 2020-12-23 | End: 2022-04-06

## 2020-12-23 NOTE — PROGRESS NOTES
DIAGNOSIS:   1. Recurrent patellar instability left knee  2. Valgus left knee  3. Recurrent patellar instability right knee  4. Valgus right knee    PROCEDURES:  1. Distal femoral osteotomy and medial retinacular imbrication, left knee 2/28/2020  2. Open removal of hardware left knee date of surgery, 12/15/2020  3. Distal femoral osteotomy and medial retinacular imbrication right knee, 10/2/2020    HISTORY:  Doing well 1 week out from surgery.  Pain is improving.  Weaning from narcotics.  Started therapy.    EXAM:     General: Awake, Alert, and oriented. Articulates and communicates with a normal affect     Left lower Extremity:    Incisions well healed without evidence of infection    Normal post-operative effusion and ecchymosis    Range of motion and stability exam not performed    Neurovascularly intact    IMAGING:  None    ASSESSMENT:  1. 1 week following knee arthroscopy and open hardware removal    PLAN:     Weightbearing as tolerated    Range of motion as tolerated    Sutures removed in clinic    Leave steri-strips in place until they fall off    OK to shower allowing water to run over incision    No soaking, scrubbing, baths, or lake for 1 additional week    Continue PT as scheduled     Pain medications reviewed and no refills required.     Operative report provided and arthroscopic images reviewed    Follow up at 6 weeks from the date of surgery with no new X-Rays needed

## 2020-12-23 NOTE — LETTER
12/23/2020         RE: Naman Jones  [address] [facility] [address]        Dear Colleague,    Thank you for referring your patient, Naman Jones, to the [facility] [place]. Please see a copy of my visit note below.    DIAGNOSIS:   1. Recurrent patellar instability left knee  2. Valgus left knee  3. Recurrent patellar instability right knee  4. Valgus right knee    PROCEDURES:  1. Distal femoral osteotomy and medial retinacular imbrication, left knee 2/28/2020  2. Open removal of hardware left knee date of surgery, 12/15/2020  3. Distal femoral osteotomy and medial retinacular imbrication right knee, 10/2/2020    HISTORY:  Doing well 1 week out from surgery.  Pain is improving.  Weaning from narcotics.  Started therapy.    EXAM:     General: Awake, Alert, and oriented. Articulates and communicates with a normal affect     Left lower Extremity:    Incisions well healed without evidence of infection    Normal post-operative effusion and ecchymosis    Range of motion and stability exam not performed    Neurovascularly intact    IMAGING:  None    ASSESSMENT:  1. 1 week following knee arthroscopy and open hardware removal    PLAN:     Weightbearing as tolerated    Range of motion as tolerated    Sutures removed in clinic    Leave steri-strips in place until they fall off    OK to shower allowing water to run over incision    No soaking, scrubbing, baths, or lake for 1 additional week    Continue PT as scheduled     Pain medications reviewed and no refills required.     Operative report provided and arthroscopic images reviewed    Follow up at 6 weeks from the date of surgery with no new X-Rays needed           Again, thank you for allowing me to participate in the care of your patient.        Sincerely,        Angel Madera MD

## 2020-12-31 ENCOUNTER — THERAPY VISIT (OUTPATIENT)
Dept: PHYSICAL THERAPY | Facility: CLINIC | Age: 35
End: 2020-12-31
Payer: COMMERCIAL

## 2020-12-31 DIAGNOSIS — M25.561 ACUTE PAIN OF RIGHT KNEE: ICD-10-CM

## 2020-12-31 DIAGNOSIS — Z47.89 AFTERCARE FOLLOWING SURGERY OF THE MUSCULOSKELETAL SYSTEM: ICD-10-CM

## 2020-12-31 PROCEDURE — 97110 THERAPEUTIC EXERCISES: CPT | Mod: GP | Performed by: PHYSICAL THERAPIST

## 2020-12-31 PROCEDURE — 97140 MANUAL THERAPY 1/> REGIONS: CPT | Mod: GP | Performed by: PHYSICAL THERAPIST

## 2021-01-08 ENCOUNTER — THERAPY VISIT (OUTPATIENT)
Dept: PHYSICAL THERAPY | Facility: CLINIC | Age: 36
End: 2021-01-08
Payer: COMMERCIAL

## 2021-01-08 DIAGNOSIS — Z47.89 AFTERCARE FOLLOWING SURGERY OF THE MUSCULOSKELETAL SYSTEM: ICD-10-CM

## 2021-01-08 DIAGNOSIS — M25.561 ACUTE PAIN OF RIGHT KNEE: ICD-10-CM

## 2021-01-08 PROCEDURE — 97140 MANUAL THERAPY 1/> REGIONS: CPT | Mod: GP | Performed by: PHYSICAL THERAPIST

## 2021-01-08 PROCEDURE — 97110 THERAPEUTIC EXERCISES: CPT | Mod: GP | Performed by: PHYSICAL THERAPIST

## 2021-01-13 ENCOUNTER — THERAPY VISIT (OUTPATIENT)
Dept: PHYSICAL THERAPY | Facility: CLINIC | Age: 36
End: 2021-01-13
Payer: COMMERCIAL

## 2021-01-13 DIAGNOSIS — M25.561 ACUTE PAIN OF RIGHT KNEE: ICD-10-CM

## 2021-01-13 DIAGNOSIS — Z47.89 AFTERCARE FOLLOWING SURGERY OF THE MUSCULOSKELETAL SYSTEM: ICD-10-CM

## 2021-01-13 PROCEDURE — 97110 THERAPEUTIC EXERCISES: CPT | Mod: GP | Performed by: PHYSICAL THERAPY ASSISTANT

## 2021-01-13 PROCEDURE — 97112 NEUROMUSCULAR REEDUCATION: CPT | Mod: GP | Performed by: PHYSICAL THERAPY ASSISTANT

## 2021-01-22 ENCOUNTER — THERAPY VISIT (OUTPATIENT)
Dept: PHYSICAL THERAPY | Facility: CLINIC | Age: 36
End: 2021-01-22
Payer: COMMERCIAL

## 2021-01-22 DIAGNOSIS — M25.561 ACUTE PAIN OF RIGHT KNEE: ICD-10-CM

## 2021-01-22 DIAGNOSIS — Z47.89 AFTERCARE FOLLOWING SURGERY OF THE MUSCULOSKELETAL SYSTEM: ICD-10-CM

## 2021-01-22 PROCEDURE — 97116 GAIT TRAINING THERAPY: CPT | Mod: GP | Performed by: PHYSICAL THERAPIST

## 2021-01-22 PROCEDURE — 97110 THERAPEUTIC EXERCISES: CPT | Mod: GP | Performed by: PHYSICAL THERAPIST

## 2021-01-22 NOTE — PROGRESS NOTES
ALTER-G:  Time Stamp WB-ing % Speed (mph) Response   Initial 40% 1.0 A little tight   2:30 30% 1.0 No different - resume 40%   3:20 40% 0.0 Getting a little sore, stopped to rest   4:30 40% 1.0 Soreness had gone down some, but not quite to previous level.    7:00 100% 0.0 terminated

## 2021-01-29 ENCOUNTER — THERAPY VISIT (OUTPATIENT)
Dept: PHYSICAL THERAPY | Facility: CLINIC | Age: 36
End: 2021-01-29
Payer: COMMERCIAL

## 2021-01-29 DIAGNOSIS — Z47.89 AFTERCARE FOLLOWING SURGERY OF THE MUSCULOSKELETAL SYSTEM: ICD-10-CM

## 2021-01-29 DIAGNOSIS — M25.561 ACUTE PAIN OF RIGHT KNEE: ICD-10-CM

## 2021-01-29 PROCEDURE — 97110 THERAPEUTIC EXERCISES: CPT | Mod: GP | Performed by: PHYSICAL THERAPIST

## 2021-01-29 PROCEDURE — 97530 THERAPEUTIC ACTIVITIES: CPT | Mod: GP | Performed by: PHYSICAL THERAPIST

## 2021-01-29 NOTE — Clinical Note
Harris Madera,  You see Chirag later this week. His right knee is improving, mostly as assessed by ROM. The left knee continues to be a limiting factor. His pain has decreased a bit, but he is still on crutches and we are not able to do much by way of strengthening because of the left knee. Any insight you can provide on decreasing the left knee pain so that we can move forward with the rehab would be very welcome.     --Ronnie

## 2021-01-29 NOTE — PROGRESS NOTES
"PROGRESS  REPORT    Progress reporting period is from Dec 22, 2020 to Jan 29, 2021.         SUBJECTIVE  Subjective changes noted by patient:  Things are going ok, \"strength and flexability going well\" Still having problems with pain, stiffness and swelling in left knee. Follow up next Wednesday with doctor. Using 1 crutch most of the time, 2 crutches in community or if really sore. Okay to put weight on the left lower extremity, but hurts to bend the knee with weight.        Current pain level is 4/10 (left more than right knee)  .     Initial Pain level: 8/10.   Changes in function:  Remains limited  Adverse reaction to treatment or activity: activity - unlocking the left knee with weight.     OBJECTIVE  Changes noted in objective findings:  Yes, Patient demonstrates ongoing improvements in the right knee. The left knee has made some progress since the surgery in December.     Antalgic gait avoiding weightbearing on the left lower extremity to the point of literally leaning to the right when using a single crutch. Reports that there is a lot of discomfort and sensation of weakness.     ROM: R 5-0-129  ; L 0-5-127    Patient still has pain in the left knee with AROM. PROM is only painful at end ranges.       ASSESSMENT/PLAN  Updated problem list and treatment plan: Diagnosis 1:  S/P Right Knee Arthroscopy with Chondroplasty and Distal Femoral Osteotomy  Diagnosis 2:  S/p Left knee EUA and hardware removal   Pain -  hot/cold therapy, manual therapy, splint/taping/bracing/orthotics, self management, education and home program  Decreased ROM/flexibility - manual therapy, therapeutic exercise, therapeutic activity and home program  Decreased strength - therapeutic exercise, therapeutic activities and home program  Impaired balance - neuro re-education, gait training, therapeutic activities, adaptive equipment/assistive device and home program  Impaired gait - gait training, assistive devices and home program  Impaired " muscle performance - electric stimulation, neuro re-education and home program  Decreased function - therapeutic activities and home program  Impaired posture - neuro re-education, therapeutic activities and home program  STG/LTGs have been met or progress has been made towards goals:  Progress since surgery in December has lashanda slow, limited largely by ongoing pain in the left knee.   Assessment of Progress: The patient's condition has potential to improve.  Self Management Plans:  Patient has been instructed in a home treatment program.  Patient  has been instructed in self management of symptoms.  I have re-evaluated this patient and find that the nature, scope, duration and intensity of the therapy is appropriate for the medical condition of the patient.  Naman continues to require the following intervention to meet STG and LTG's:  PT    Recommendations:  This patient would benefit from further evaluation. The patient remains largely limited by the left knee. The right knee rehab hindered by the left knee.     Physical therapy is not complete. However, recommendations will depend on physician findings and assessment.     Please refer to the daily flowsheet for treatment today, total treatment time and time spent performing 1:1 timed codes.

## 2021-02-03 ENCOUNTER — ANCILLARY PROCEDURE (OUTPATIENT)
Dept: GENERAL RADIOLOGY | Facility: CLINIC | Age: 36
End: 2021-02-03
Attending: ORTHOPAEDIC SURGERY
Payer: COMMERCIAL

## 2021-02-03 ENCOUNTER — OFFICE VISIT (OUTPATIENT)
Dept: ORTHOPEDICS | Facility: CLINIC | Age: 36
End: 2021-02-03
Payer: COMMERCIAL

## 2021-02-03 VITALS — WEIGHT: 200 LBS | BODY MASS INDEX: 27.09 KG/M2 | HEIGHT: 72 IN

## 2021-02-03 DIAGNOSIS — G89.29 CHRONIC PAIN OF LEFT KNEE: Primary | ICD-10-CM

## 2021-02-03 DIAGNOSIS — M25.562 CHRONIC PAIN OF LEFT KNEE: Primary | ICD-10-CM

## 2021-02-03 PROCEDURE — 99024 POSTOP FOLLOW-UP VISIT: CPT | Performed by: ORTHOPAEDIC SURGERY

## 2021-02-03 PROCEDURE — 73560 X-RAY EXAM OF KNEE 1 OR 2: CPT | Mod: RT | Performed by: RADIOLOGY

## 2021-02-03 RX ORDER — TRIAMCINOLONE ACETONIDE 40 MG/ML
40 INJECTION, SUSPENSION INTRA-ARTICULAR; INTRAMUSCULAR
Status: DISCONTINUED | OUTPATIENT
Start: 2021-02-03 | End: 2022-12-02

## 2021-02-03 RX ORDER — LIDOCAINE HYDROCHLORIDE 5 MG/ML
8 INJECTION, SOLUTION INFILTRATION; INTRAVENOUS
Status: DISCONTINUED | OUTPATIENT
Start: 2021-02-03 | End: 2022-12-02

## 2021-02-03 RX ADMIN — TRIAMCINOLONE ACETONIDE 40 MG: 40 INJECTION, SUSPENSION INTRA-ARTICULAR; INTRAMUSCULAR at 09:50

## 2021-02-03 RX ADMIN — LIDOCAINE HYDROCHLORIDE 8 ML: 5 INJECTION, SOLUTION INFILTRATION; INTRAVENOUS at 09:50

## 2021-02-03 ASSESSMENT — MIFFLIN-ST. JEOR: SCORE: 1870.19

## 2021-02-03 NOTE — PROGRESS NOTES
Large Joint Injection: L knee joint    Date/Time: 2/3/2021 9:50 AM  Performed by: Angel Madera MD  Authorized by: Angel Madera MD     Indications:  Pain  Needle Size:  22 G  Guidance: landmark guided    Approach:  Anterolateral  Location:  Knee      Medications:  40 mg triamcinolone 40 MG/ML; 8 mL lidocaine (PF) 0.5 %  Outcome:  Tolerated well, no immediate complications  Procedure discussed: discussed risks, benefits, and alternatives    Consent Given by:  Patient  Timeout: timeout called immediately prior to procedure    Prep: patient was prepped and draped in usual sterile fashion

## 2021-02-03 NOTE — NURSING NOTE
Reason For Visit:   Chief Complaint   Patient presents with     RECHECK     DOS: 12/15/20 left knee arthroscopy and hardware removal.           Date of surgery: 12/15/20  Type of surgery:   PROCEDURE:  1. Examination under anesthesia left knee  2. Left knee arthroscopy  3. Debridement of anterior chamber the knee for scar tissue  4. Open removal of hardware left distal femur    He is still having pain with weight bearing and he still has pain with flexion and extension.     Pain Assessment  Patient Currently in Pain: Yes  0-10 Pain Scale: 5  Primary Pain Location: Knee    Ht 1.829 m (6')   Wt 90.7 kg (200 lb)   BMI 27.12 kg/m           Allergies   Allergen Reactions     Sulfa Drugs Anaphylaxis     Oxycodone Other (See Comments) and Itching     Flushed     Lisinopril      Other reaction(s): Impotence     Onion      Other reaction(s): Intolerance-Can't Take       Current Outpatient Medications   Medication     acetaminophen (TYLENOL) 325 MG tablet     acetaminophen (TYLENOL) 325 MG tablet     atorvastatin (LIPITOR) 40 MG tablet     blood glucose (ONETOUCH ULTRA) test strip     busPIRone (BUSPAR) 15 MG tablet     Calcium Carb-Cholecalciferol (CALCIUM 600 + D PO)     Continuous Blood Gluc  (FREESTYLE KEILA READER) SUGAR     Continuous Blood Gluc Sensor (FREESTYLE KEILA 14 DAY SENSOR) XX MISC     Cyanocobalamin (B-12) 500 MCG SUBL     eszopiclone (LUNESTA) 2 MG tablet     HYDROmorphone (DILAUDID) 2 MG tablet     hydrOXYzine (ATARAX) 50 MG tablet     hydrOXYzine (VISTARIL) 25 MG capsule     LORazepam (ATIVAN) 1 MG tablet     metFORMIN (GLUCOPHAGE) 500 MG tablet     multivitamin  peds with iron (FLINTSTONES COMPLETE) 60 MG chewable tablet     naloxone (NARCAN) 4 MG/0.1ML nasal spray     OneTouch Delica Lancets 33G MISC     polyethylene glycol (MIRALAX) 17 g packet     semaglutide (OZEMPIC, 1 MG/DOSE,) 2 MG/1.5ML pen     traMADol (ULTRAM) 50 MG tablet     ondansetron (ZOFRAN-ODT) 4 MG ODT tab     senna-docusate  (SENOKOT-S/PERICOLACE) 8.6-50 MG tablet     No current facility-administered medications for this visit.          Lucinda Tyler ATC

## 2021-02-03 NOTE — PROGRESS NOTES
DIAGNOSIS:   1. Recurrent patellar instability left knee  2. Valgus left knee  3. Recurrent patellar instability right knee  4. Valgus right knee    PROCEDURES:  1. Distal femoral osteotomy and medial retinacular imbrication, left knee 2/28/2020  2. Open removal of hardware left knee date of surgery, 12/15/2020  3. Distal femoral osteotomy and medial retinacular imbrication right knee, 10/2/2020    HISTORY:  The patient returns to clinic today 6 weeks following his plate removal on the left side.  He still really struggling the side.  He said continued pain.  He keeps from doing the things that he wants to do.  He has had trouble weaning off crutches.  His therapist reached out to me as well.    Overall he admits that he has seen good improvement of his patellar instability following surgery with no repeat dislocations on either side since the surgery.    His biggest difficulty at this time is pain about his left knee.  He actually feels that the right side is doing better.      EXAM:     General: Awake, Alert, and oriented. Articulates and communicates with a normal affect     Left lower Extremity:    Incisions well healed without evidence of infection    No post-operative effusion or ecchymosis    Range of motion shows full extension and greater than 100 degrees of flexion     Patella stable to examination    Neurovascularly intact  Right lower extremity    Incisions well-healed without evidence of infection    No effusion or ecchymosis    Range of motion shows full extension and 125 degrees of flexion    Patella stable to examination    Neurovascularly intact    IMAGING:  AP lateral radiographs today demonstrate plate and screws in place in the right side with progressive union across his osteotomy.  His left distal femur is gone on to consolidate well I think this is united.  The plate and screws have been removed.    ASSESSMENT:  1. 6 weeks following knee arthroscopy and open hardware removal    PLAN:      Weightbearing as tolerated    Range of motion as tolerated    At this time comfort based on the x-rays and there is nothing surgically imminent going on with his knee.  I did offer him a steroid injection in his knee to try to quiet things down.  I think this may be due to try to decrease his pain to allow him to continue to function build strength build his motion.    After written informed consent obtained and signed, after sufficient prepping and sterile technique, 40 mg of kenalog and , 8 cc of 1% lidocaine were injected without complication into the left knee. The patient tolerated the injection well and a sterile dressing was applied.    At this time would like the patient to come to see how this goes for the next 4 weeks or so.  If he is not seeing any improvement after that time I had like him to reach out to my office and we may need to consider further imaging in preparation of his follow-up visit with me in 6 weeks time.  At that 6-week visit we will get standing alignment films as well as AP lateral radiographs of his knee.

## 2021-02-03 NOTE — LETTER
2/3/2021         RE: Naman Jones  [address] [address] [facility] [address]        Dear Colleague,    Thank you for referring your patient, Naman Jones, to the [facility] [place]. Please see a copy of my visit note below.    DIAGNOSIS:   1. Recurrent patellar instability left knee  2. Valgus left knee  3. Recurrent patellar instability right knee  4. Valgus right knee    PROCEDURES:  1. Distal femoral osteotomy and medial retinacular imbrication, left knee 2/28/2020  2. Open removal of hardware left knee date of surgery, 12/15/2020  3. Distal femoral osteotomy and medial retinacular imbrication right knee, 10/2/2020    HISTORY:  The patient returns to clinic today 6 weeks following his plate removal on the left side.  He still really struggling the side.  He said continued pain.  He keeps from doing the things that he wants to do.  He has had trouble weaning off crutches.  His therapist reached out to me as well.    Overall he admits that he has seen good improvement of his patellar instability following surgery with no repeat dislocations on either side since the surgery.    His biggest difficulty at this time is pain about his left knee.  He actually feels that the right side is doing better.      EXAM:     General: Awake, Alert, and oriented. Articulates and communicates with a normal affect     Left lower Extremity:    Incisions well healed without evidence of infection    No post-operative effusion or ecchymosis    Range of motion shows full extension and greater than 100 degrees of flexion     Patella stable to examination    Neurovascularly intact  Right lower extremity    Incisions well-healed without evidence of infection    No effusion or ecchymosis    Range of motion shows full extension and 125 degrees of flexion    Patella stable to examination    Neurovascularly intact    IMAGING:  AP lateral radiographs today demonstrate plate and screws in place in the  right side with progressive union across his osteotomy.  His left distal femur is gone on to consolidate well I think this is united.  The plate and screws have been removed.    ASSESSMENT:  1. 6 weeks following knee arthroscopy and open hardware removal    PLAN:     Weightbearing as tolerated    Range of motion as tolerated    At this time comfort based on the x-rays and there is nothing surgically imminent going on with his knee.  I did offer him a steroid injection in his knee to try to quiet things down.  I think this may be due to try to decrease his pain to allow him to continue to function build strength build his motion.    After written informed consent obtained and signed, after sufficient prepping and sterile technique, 40 mg of kenalog and , 8 cc of 1% lidocaine were injected without complication into the left knee. The patient tolerated the injection well and a sterile dressing was applied.    At this time would like the patient to come to see how this goes for the next 4 weeks or so.  If he is not seeing any improvement after that time I had like him to reach out to my office and we may need to consider further imaging in preparation of his follow-up visit with me in 6 weeks time.  At that 6-week visit we will get standing alignment films as well as AP lateral radiographs of his knee.                Large Joint Injection: L knee joint    Date/Time: 2/3/2021 9:50 AM  Performed by: Angel Madera MD  Authorized by: Angel Madera MD     Indications:  Pain  Needle Size:  22 G  Guidance: landmark guided    Approach:  Anterolateral  Location:  Knee      Medications:  40 mg triamcinolone 40 MG/ML; 8 mL lidocaine (PF) 0.5 %  Outcome:  Tolerated well, no immediate complications  Procedure discussed: discussed risks, benefits, and alternatives    Consent Given by:  Patient  Timeout: timeout called immediately prior to procedure    Prep: patient was prepped and draped in usual sterile  fashion          Again, thank you for allowing me to participate in the care of your patient.        Sincerely,        Angel Madera MD

## 2021-02-03 NOTE — NURSING NOTE
[address] [address]  Dept: [phone]  ______________________________________________________________________________    Patient: Naman Jones   : 1985   MRN: [id number]   February 3, 2021    INVASIVE PROCEDURE SAFETY CHECKLIST    Date: 2/3/21   Procedure: Left knee kenalog injection  Patient Name: Naman Jones  MRN: [id number]  YOB: 1985    Action: Complete sections as appropriate. Any discrepancy results in a HARD COPY until resolved.     PRE PROCEDURE:  Patient ID verified with 2 identifiers (name and  or MRN): Yes  Procedure and site verified with patient/designee (when able): Yes  Accurate consent documentation in medical record: Yes  H&P (or appropriate assessment) documented in medical record: Yes  H&P must be up to 20 days prior to procedure and updates within 24 hours of procedure as applicable: NA  Relevant diagnostic and radiology test results appropriately labeled and displayed as applicable: Yes  Procedure site(s) marked with provider initials: NA    TIMEOUT:  Time-Out performed immediately prior to starting procedure, including verbal and active participation of all team members addressing the following:Yes  * Correct patient identify  * Confirmed that the correct side and site are marked  * An accurate procedure consent form  * Agreement on the procedure to be done  * Correct patient position  * Relevant images and results are properly labeled and appropriately displayed  * The need to administer antibiotics or fluids for irrigation purposes during the procedure as applicable   * Safety precautions based on patient history or medication use    DURING PROCEDURE: Verification of correct person, site, and procedures any time the responsibility for care of the patient is transferred to another member of the care team.       Prior to injection, verified patient identity using patient's name and date of  birth.  Due to injection administration, patient instructed to remain in clinic for 15 minutes  afterwards, and to report any adverse reaction to me immediately.    Left knee joint injection    Drug Amount Wasted:  Yes: 42 mg/ml   Vial/Syringe: Single dose vial  Expiration Date:  04/2024        Lucinda Tyler, ABBY  February 3, 2021

## 2021-02-12 ENCOUNTER — THERAPY VISIT (OUTPATIENT)
Dept: PHYSICAL THERAPY | Facility: CLINIC | Age: 36
End: 2021-02-12
Payer: COMMERCIAL

## 2021-02-12 DIAGNOSIS — M25.561 ACUTE PAIN OF RIGHT KNEE: ICD-10-CM

## 2021-02-12 DIAGNOSIS — Z47.89 AFTERCARE FOLLOWING SURGERY OF THE MUSCULOSKELETAL SYSTEM: ICD-10-CM

## 2021-02-12 PROCEDURE — 97112 NEUROMUSCULAR REEDUCATION: CPT | Mod: GP | Performed by: PHYSICAL THERAPIST

## 2021-02-12 PROCEDURE — 97110 THERAPEUTIC EXERCISES: CPT | Mod: GP | Performed by: PHYSICAL THERAPIST

## 2021-02-14 NOTE — PROGRESS NOTES
Gait: 1 crutch with gait. Improving    HIP: (* indicates patient's pain)   MMT R MMT L   Flexion 5 5-   abduction 5 4   Extension 4+ 4-   KNEE     Ext 5 4   Flx 5 5   Left knee extension revealed an unstable contraction of the left quadriceps. Patient reports it is not painful (which is had been previously).     Palpation: Continues to to have tenderness at the lateral femoral condyle of the left knee.

## 2021-02-26 ENCOUNTER — THERAPY VISIT (OUTPATIENT)
Dept: PHYSICAL THERAPY | Facility: CLINIC | Age: 36
End: 2021-02-26
Payer: COMMERCIAL

## 2021-02-26 DIAGNOSIS — M25.561 ACUTE PAIN OF RIGHT KNEE: ICD-10-CM

## 2021-02-26 DIAGNOSIS — Z47.89 AFTERCARE FOLLOWING SURGERY OF THE MUSCULOSKELETAL SYSTEM: ICD-10-CM

## 2021-02-26 PROCEDURE — 97110 THERAPEUTIC EXERCISES: CPT | Mod: GP | Performed by: PHYSICAL THERAPIST

## 2021-02-26 PROCEDURE — 97112 NEUROMUSCULAR REEDUCATION: CPT | Mod: GP | Performed by: PHYSICAL THERAPIST

## 2021-03-05 ENCOUNTER — THERAPY VISIT (OUTPATIENT)
Dept: PHYSICAL THERAPY | Facility: CLINIC | Age: 36
End: 2021-03-05
Payer: COMMERCIAL

## 2021-03-05 DIAGNOSIS — M25.561 ACUTE PAIN OF RIGHT KNEE: ICD-10-CM

## 2021-03-05 DIAGNOSIS — Z47.89 AFTERCARE FOLLOWING SURGERY OF THE MUSCULOSKELETAL SYSTEM: ICD-10-CM

## 2021-03-05 PROCEDURE — 97112 NEUROMUSCULAR REEDUCATION: CPT | Mod: GP | Performed by: PHYSICAL THERAPIST

## 2021-03-05 PROCEDURE — 97110 THERAPEUTIC EXERCISES: CPT | Mod: GP | Performed by: PHYSICAL THERAPIST

## 2021-03-12 ENCOUNTER — THERAPY VISIT (OUTPATIENT)
Dept: PHYSICAL THERAPY | Facility: CLINIC | Age: 36
End: 2021-03-12
Payer: COMMERCIAL

## 2021-03-12 DIAGNOSIS — Z47.89 AFTERCARE FOLLOWING SURGERY OF THE MUSCULOSKELETAL SYSTEM: ICD-10-CM

## 2021-03-12 DIAGNOSIS — M25.561 ACUTE PAIN OF RIGHT KNEE: ICD-10-CM

## 2021-03-12 PROCEDURE — 97116 GAIT TRAINING THERAPY: CPT | Mod: GP | Performed by: PHYSICAL THERAPIST

## 2021-03-12 PROCEDURE — 97112 NEUROMUSCULAR REEDUCATION: CPT | Mod: GP | Performed by: PHYSICAL THERAPIST

## 2021-03-19 ENCOUNTER — THERAPY VISIT (OUTPATIENT)
Dept: PHYSICAL THERAPY | Facility: CLINIC | Age: 36
End: 2021-03-19
Payer: COMMERCIAL

## 2021-03-19 DIAGNOSIS — Z47.89 AFTERCARE FOLLOWING SURGERY OF THE MUSCULOSKELETAL SYSTEM: ICD-10-CM

## 2021-03-19 DIAGNOSIS — M25.561 ACUTE PAIN OF RIGHT KNEE: ICD-10-CM

## 2021-03-19 PROCEDURE — 97110 THERAPEUTIC EXERCISES: CPT | Mod: GP | Performed by: PHYSICAL THERAPIST

## 2021-03-19 PROCEDURE — 97112 NEUROMUSCULAR REEDUCATION: CPT | Mod: GP | Performed by: PHYSICAL THERAPIST

## 2021-03-24 ENCOUNTER — IMMUNIZATION (OUTPATIENT)
Dept: NURSING | Facility: CLINIC | Age: 36
End: 2021-03-24
Payer: COMMERCIAL

## 2021-03-24 PROCEDURE — 0001A PR COVID VAC PFIZER DIL RECON 30 MCG/0.3 ML IM: CPT

## 2021-03-24 PROCEDURE — 91300 PR COVID VAC PFIZER DIL RECON 30 MCG/0.3 ML IM: CPT

## 2021-03-26 ENCOUNTER — THERAPY VISIT (OUTPATIENT)
Dept: PHYSICAL THERAPY | Facility: CLINIC | Age: 36
End: 2021-03-26
Payer: COMMERCIAL

## 2021-03-26 DIAGNOSIS — M25.561 ACUTE PAIN OF RIGHT KNEE: ICD-10-CM

## 2021-03-26 DIAGNOSIS — Z47.89 AFTERCARE FOLLOWING SURGERY OF THE MUSCULOSKELETAL SYSTEM: ICD-10-CM

## 2021-03-26 PROCEDURE — 97112 NEUROMUSCULAR REEDUCATION: CPT | Mod: GP | Performed by: PHYSICAL THERAPIST

## 2021-03-31 ENCOUNTER — THERAPY VISIT (OUTPATIENT)
Dept: PHYSICAL THERAPY | Facility: CLINIC | Age: 36
End: 2021-03-31
Payer: COMMERCIAL

## 2021-03-31 DIAGNOSIS — M25.561 ACUTE PAIN OF RIGHT KNEE: ICD-10-CM

## 2021-03-31 DIAGNOSIS — Z47.89 AFTERCARE FOLLOWING SURGERY OF THE MUSCULOSKELETAL SYSTEM: ICD-10-CM

## 2021-03-31 PROCEDURE — 97110 THERAPEUTIC EXERCISES: CPT | Mod: GP | Performed by: PHYSICAL THERAPY ASSISTANT

## 2021-04-05 DIAGNOSIS — Z98.890 S/P LEFT KNEE SURGERY: Primary | ICD-10-CM

## 2021-04-06 ENCOUNTER — THERAPY VISIT (OUTPATIENT)
Dept: PHYSICAL THERAPY | Facility: CLINIC | Age: 36
End: 2021-04-06
Payer: COMMERCIAL

## 2021-04-06 DIAGNOSIS — M25.561 ACUTE PAIN OF RIGHT KNEE: ICD-10-CM

## 2021-04-06 DIAGNOSIS — Z47.89 AFTERCARE FOLLOWING SURGERY OF THE MUSCULOSKELETAL SYSTEM: ICD-10-CM

## 2021-04-06 PROCEDURE — 97112 NEUROMUSCULAR REEDUCATION: CPT | Mod: GP | Performed by: PHYSICAL THERAPY ASSISTANT

## 2021-04-06 ASSESSMENT — ACTIVITIES OF DAILY LIVING (ADL)
LIMPING: THE SYMPTOM AFFECTS MY ACTIVITY MODERATELY
RISE FROM A CHAIR: ACTIVITY IS FAIRLY DIFFICULT
GO UP STAIRS: ACTIVITY IS SOMEWHAT DIFFICULT
HOW_WOULD_YOU_RATE_THE_OVERALL_FUNCTION_OF_YOUR_KNEE_DURING_YOUR_USUAL_DAILY_ACTIVITIES?: ABNORMAL
SIT WITH YOUR KNEE BENT: ACTIVITY IS NOT DIFFICULT
WEAKNESS: THE SYMPTOM AFFECTS MY ACTIVITY SLIGHTLY
KNEE_ACTIVITY_OF_DAILY_LIVING_SCORE: 52.86
WALK: ACTIVITY IS SOMEWHAT DIFFICULT
GIVING WAY, BUCKLING OR SHIFTING OF KNEE: I HAVE THE SYMPTOM BUT IT DOES NOT AFFECT MY ACTIVITY
SWELLING: THE SYMPTOM AFFECTS MY ACTIVITY MODERATELY
KNEE_ACTIVITY_OF_DAILY_LIVING_SUM: 37
AS_A_RESULT_OF_YOUR_KNEE_INJURY,_HOW_WOULD_YOU_RATE_YOUR_CURRENT_LEVEL_OF_DAILY_ACTIVITY?: ABNORMAL
KNEEL ON THE FRONT OF YOUR KNEE: ACTIVITY IS VERY DIFFICULT
SQUAT: ACTIVITY IS FAIRLY DIFFICULT
RAW_SCORE: 37
STIFFNESS: THE SYMPTOM AFFECTS MY ACTIVITY MODERATELY
HOW_WOULD_YOU_RATE_THE_CURRENT_FUNCTION_OF_YOUR_KNEE_DURING_YOUR_USUAL_DAILY_ACTIVITIES_ON_A_SCALE_FROM_0_TO_100_WITH_100_BEING_YOUR_LEVEL_OF_KNEE_FUNCTION_PRIOR_TO_YOUR_INJURY_AND_0_BEING_THE_INABILITY_TO_PERFORM_ANY_OF_YOUR_USUAL_DAILY_ACTIVITIES?: 66
PAIN: THE SYMPTOM AFFECTS MY ACTIVITY MODERATELY
STAND: ACTIVITY IS MINIMALLY DIFFICULT
GO DOWN STAIRS: ACTIVITY IS FAIRLY DIFFICULT

## 2021-04-06 NOTE — Clinical Note
Hi Dr. Madera,  You'll be seeing Chirag here shortly. We have been doing more work with the E-stim to try and maximally strengthen the quadriceps. Overall, strengthening has been difficult because he is getting a lot more pain/strain in the lateral left knee with any loaded knee flexion after just a few repetitions so our options have been limited with what we can do. Straight leg raises with E-stim do seem to be helping with pain and gait. Hopefully we can get him bending his knee soon, but if we can't, I don't see a clear path forward. Any insight you have is always greatly appreciated.

## 2021-04-07 ENCOUNTER — ANCILLARY PROCEDURE (OUTPATIENT)
Dept: GENERAL RADIOLOGY | Facility: CLINIC | Age: 36
End: 2021-04-07
Payer: COMMERCIAL

## 2021-04-07 ENCOUNTER — OFFICE VISIT (OUTPATIENT)
Dept: ORTHOPEDICS | Facility: CLINIC | Age: 36
End: 2021-04-07
Payer: COMMERCIAL

## 2021-04-07 VITALS — HEIGHT: 73 IN | BODY MASS INDEX: 25.84 KG/M2 | WEIGHT: 195 LBS

## 2021-04-07 DIAGNOSIS — Z98.890 S/P LEFT KNEE SURGERY: Primary | ICD-10-CM

## 2021-04-07 DIAGNOSIS — Z98.890 S/P LEFT KNEE SURGERY: ICD-10-CM

## 2021-04-07 PROCEDURE — 99213 OFFICE O/P EST LOW 20 MIN: CPT | Performed by: ORTHOPAEDIC SURGERY

## 2021-04-07 PROCEDURE — 73560 X-RAY EXAM OF KNEE 1 OR 2: CPT | Mod: XU | Performed by: RADIOLOGY

## 2021-04-07 PROCEDURE — 77073 BONE LENGTH STUDIES: CPT | Performed by: RADIOLOGY

## 2021-04-07 ASSESSMENT — MIFFLIN-ST. JEOR: SCORE: 1863.39

## 2021-04-07 NOTE — PROGRESS NOTES
Subjective:  HPI  Physical Exam       Knee Activity of Daily Living Score: 52.86            Objective:  System    Physical Exam    General     ROS    Assessment/Plan:    PROGRESS  REPORT    Progress reporting period is from 1/29/21 to 4/6/21.      SUBJECTIVE  Subjective changes noted by patient:  Repetitive motion liking squatting and or biking is painful and fearful of performing this due to the pain in the left knee on the lateral aspect.   Did more walking over the weekend felt that he had more of an improved strength with his activity.   Kneeling is difficult  with the act of the kneeling.  Patient reports that he can cross his legs and that just started this weekend and has not been able to do this for many months.   Current pain level is 3/10.     Previous pain level was:   Initial Pain level: 8/10   Changes in function:  Yes (See Goal flowsheet attached for changes in current functional level)     Adverse reaction to treatment or activity: None     OBJECTIVE  Changes noted in objective findings:  Yes,  Attempted to bike but when he went around the knee felt discomfort and had to stop. Patient does have tightness in the left fibula head and mobilization to this area did give some pain relief.  Did Sd Rubio tape the fibular head anteriorly and patient was able to walk with less pain.  Sent patient home with the tape and instructions.  Continue to strengthen the left quad with Russian stim and weights.  This has been affective and gaining strength with his ADL.  Left knee AROM is 0-0-120.  Pain increases as he gets into increased flexion past 120.

## 2021-04-07 NOTE — LETTER
4/7/2021         RE: Naman Jones  [address] [address] [facility] [address]        Dear Colleague,    Thank you for referring your patient, Naman Jones, to the [facility] [place]. Please see a copy of my visit note below.    DIAGNOSIS:   1. Recurrent patellar instability left knee  2. Valgus left knee  3. Recurrent patellar instability right knee  4. Valgus right knee    PROCEDURES:  1. Distal femoral osteotomy and medial retinacular imbrication, left knee 2/28/2020  2. Open removal of hardware left knee date of surgery, 12/15/2020  3. Distal femoral osteotomy and medial retinacular imbrication right knee, 10/2/2020    HISTORY:  Patient returns to my clinic today to discuss both of his knees.  He reports that overall he is doing well.  On the right knee which had the surgery completed in October 2020 he describes a SANE score of approximately 85.  This is improved from a 50 in the preoperative state.  He is pain is well controlled he feels like his strength is improving.  He denies any further or recurrent instability of his patella.  He is really quite happy with how the site is doing.    On the patient's left side which was completed first in approximately February 2020 the patient reports continued pain.  He describes a SANE score of approximately 50 preoperatively this was a 10 or 20.  He is admits that he is better than before surgeries especially in regards to patellar stability as he is not having any further instability events.  His pain and inability to strengthen has been a problem on the side.  His incisions are well-healed.  He is using a cane today.  He is not taking any narcotics.      EXAM:     General: Awake, Alert, and oriented. Articulates and communicates with a normal affect     Left lower Extremity:    Incisions well healed without evidence of infection    No post-operative effusion or ecchymosis    Range of motion shows full extension and greater  than 100 degrees of flexion     Patella stable to examination    Neurovascularly intact  Right lower extremity    Incisions well-healed without evidence of infection    No effusion or ecchymosis    Range of motion shows full extension and 125 degrees of flexion    Patella stable to examination    Neurovascularly intact    IMAGING:  Standing alignment films today show the weightbearing axis to fall to the midportion of the right knee.  Some residual valgus is present on the left side.  It is improved from the preoperative state however residual valgus is present.    Excellent consolidation across the osteotomy site is noted to both knees.  The hardware is been removed in the left side    ASSESSMENT:  1. Status post bilateral distal femoral osteotomies as described above    PLAN:   I had a long discussion with the patient regarding both knees.  At this time I think the right side is doing very well.  He has motion as tolerated no specific limitations.  He can return to desired activities as able I think the osteotomy is well-healed and he should avoid dangerous or at risk activities but he otherwise he really has no specific restrictions.    In regards to the left knee I do recognize he is having continued pain.  I think this may be because his knee started out in a worse situation and had more symptoms before surgery.  He also has more areas of wear-and-tear arthritis particular the lateral femoral condyle as well as the patella.  I do not know how much this portion of his symptoms will improve.  With that said I want to quantify exactly what is going on with an MRI of his left knee.  He will return to discuss the results when this is complete.    I think it will be important to ultimately understand if his continued symptoms are secondary to the wear-and-tear of the cartilage or from some residual valgus.  If the symptoms are from residual valgus we may need to consider revision distal femoral osteotomy.  We will  continue this discussion after we have the MRI to try to better understand where the source of his pain is.         Again, thank you for allowing me to participate in the care of your patient.        Sincerely,        Angel Madera MD

## 2021-04-07 NOTE — NURSING NOTE
"Reason For Visit:   Chief Complaint   Patient presents with     RECHECK     DOS:12/15/20 left knee arthroscopy, hardware removal           Date of surgery: 12/15/20  Type of surgery:   PROCEDURE:  1. Examination under anesthesia left knee  2. Left knee arthroscopy  3. Debridement of anterior chamber the knee for scar tissue  4. Open removal of hardware left distal femur    Overall he is slowly making progress. He is still having tightness and pain in his knee. He has continued with PT.     Pain Assessment  Patient Currently in Pain: Yes  0-10 Pain Scale: 3  Primary Pain Location: Knee    Ht 1.854 m (6' 1\")   Wt 88.5 kg (195 lb)   BMI 25.73 kg/m           Allergies   Allergen Reactions     Sulfa Drugs Anaphylaxis     Oxycodone Other (See Comments) and Itching     Flushed     Lisinopril      Other reaction(s): Impotence     Onion      Other reaction(s): Intolerance-Can't Take       Current Outpatient Medications   Medication     acetaminophen (TYLENOL) 325 MG tablet     acetaminophen (TYLENOL) 325 MG tablet     atorvastatin (LIPITOR) 40 MG tablet     blood glucose (ONETOUCH ULTRA) test strip     busPIRone (BUSPAR) 15 MG tablet     Calcium Carb-Cholecalciferol (CALCIUM 600 + D PO)     Continuous Blood Gluc  (FREESTYLE KEILA READER) SUGAR     Continuous Blood Gluc Sensor (FREESTYLE KEILA 14 DAY SENSOR) XX MISC     Cyanocobalamin (B-12) 500 MCG SUBL     eszopiclone (LUNESTA) 2 MG tablet     HYDROmorphone (DILAUDID) 2 MG tablet     hydrOXYzine (ATARAX) 50 MG tablet     hydrOXYzine (VISTARIL) 25 MG capsule     LORazepam (ATIVAN) 1 MG tablet     metFORMIN (GLUCOPHAGE) 500 MG tablet     multivitamin  peds with iron (FLINTSTONES COMPLETE) 60 MG chewable tablet     naloxone (NARCAN) 4 MG/0.1ML nasal spray     OneTouch Delica Lancets 33G MISC     polyethylene glycol (MIRALAX) 17 g packet     semaglutide (OZEMPIC, 1 MG/DOSE,) 2 MG/1.5ML pen     traMADol (ULTRAM) 50 MG tablet     ondansetron (ZOFRAN-ODT) 4 MG ODT tab     " senna-docusate (SENOKOT-S/PERICOLACE) 8.6-50 MG tablet     Current Facility-Administered Medications   Medication     lidocaine (PF) 0.5 % injection SOLN 8 mL     triamcinolone (KENALOG-40) injection 40 mg         Lucinda Tyler ATC

## 2021-04-07 NOTE — PROGRESS NOTES
DIAGNOSIS:   1. Recurrent patellar instability left knee  2. Valgus left knee  3. Recurrent patellar instability right knee  4. Valgus right knee    PROCEDURES:  1. Distal femoral osteotomy and medial retinacular imbrication, left knee 2/28/2020  2. Open removal of hardware left knee date of surgery, 12/15/2020  3. Distal femoral osteotomy and medial retinacular imbrication right knee, 10/2/2020    HISTORY:  Patient returns to my clinic today to discuss both of his knees.  He reports that overall he is doing well.  On the right knee which had the surgery completed in October 2020 he describes a SANE score of approximately 85.  This is improved from a 50 in the preoperative state.  He is pain is well controlled he feels like his strength is improving.  He denies any further or recurrent instability of his patella.  He is really quite happy with how the site is doing.    On the patient's left side which was completed first in approximately February 2020 the patient reports continued pain.  He describes a SANE score of approximately 50 preoperatively this was a 10 or 20.  He is admits that he is better than before surgeries especially in regards to patellar stability as he is not having any further instability events.  His pain and inability to strengthen has been a problem on the side.  His incisions are well-healed.  He is using a cane today.  He is not taking any narcotics.      EXAM:     General: Awake, Alert, and oriented. Articulates and communicates with a normal affect     Left lower Extremity:    Incisions well healed without evidence of infection    No post-operative effusion or ecchymosis    Range of motion shows full extension and greater than 100 degrees of flexion     Patella stable to examination    Neurovascularly intact  Right lower extremity    Incisions well-healed without evidence of infection    No effusion or ecchymosis    Range of motion shows full extension and 125 degrees of flexion    Patella  stable to examination    Neurovascularly intact    IMAGING:  Standing alignment films today show the weightbearing axis to fall to the midportion of the right knee.  Some residual valgus is present on the left side.  It is improved from the preoperative state however residual valgus is present.    Excellent consolidation across the osteotomy site is noted to both knees.  The hardware is been removed in the left side    ASSESSMENT:  1. Status post bilateral distal femoral osteotomies as described above    PLAN:   I had a long discussion with the patient regarding both knees.  At this time I think the right side is doing very well.  He has motion as tolerated no specific limitations.  He can return to desired activities as able I think the osteotomy is well-healed and he should avoid dangerous or at risk activities but he otherwise he really has no specific restrictions.    In regards to the left knee I do recognize he is having continued pain.  I think this may be because his knee started out in a worse situation and had more symptoms before surgery.  He also has more areas of wear-and-tear arthritis particular the lateral femoral condyle as well as the patella.  I do not know how much this portion of his symptoms will improve.  With that said I want to quantify exactly what is going on with an MRI of his left knee.  He will return to discuss the results when this is complete.    I think it will be important to ultimately understand if his continued symptoms are secondary to the wear-and-tear of the cartilage or from some residual valgus.  If the symptoms are from residual valgus we may need to consider revision distal femoral osteotomy.  We will continue this discussion after we have the MRI to try to better understand where the source of his pain is.

## 2021-04-12 ENCOUNTER — ANCILLARY PROCEDURE (OUTPATIENT)
Dept: MRI IMAGING | Facility: CLINIC | Age: 36
End: 2021-04-12
Attending: ORTHOPAEDIC SURGERY
Payer: COMMERCIAL

## 2021-04-12 DIAGNOSIS — Z98.890 S/P LEFT KNEE SURGERY: ICD-10-CM

## 2021-04-12 PROCEDURE — 73721 MRI JNT OF LWR EXTRE W/O DYE: CPT | Mod: LT | Performed by: RADIOLOGY

## 2021-04-14 ENCOUNTER — OFFICE VISIT (OUTPATIENT)
Dept: NURSING | Facility: CLINIC | Age: 36
End: 2021-04-14
Attending: INTERNAL MEDICINE
Payer: COMMERCIAL

## 2021-04-14 PROCEDURE — 0002A PR COVID VAC PFIZER DIL RECON 30 MCG/0.3 ML IM: CPT

## 2021-04-14 PROCEDURE — 91300 PR COVID VAC PFIZER DIL RECON 30 MCG/0.3 ML IM: CPT

## 2021-04-17 ENCOUNTER — HEALTH MAINTENANCE LETTER (OUTPATIENT)
Age: 36
End: 2021-04-17

## 2021-04-21 ENCOUNTER — OFFICE VISIT (OUTPATIENT)
Dept: ORTHOPEDICS | Facility: CLINIC | Age: 36
End: 2021-04-21
Payer: COMMERCIAL

## 2021-04-21 VITALS — WEIGHT: 195 LBS | HEIGHT: 73 IN | BODY MASS INDEX: 25.84 KG/M2

## 2021-04-21 DIAGNOSIS — M25.562 ACUTE PAIN OF LEFT KNEE: Primary | ICD-10-CM

## 2021-04-21 PROCEDURE — 99214 OFFICE O/P EST MOD 30 MIN: CPT | Performed by: ORTHOPAEDIC SURGERY

## 2021-04-21 ASSESSMENT — MIFFLIN-ST. JEOR: SCORE: 1863.39

## 2021-04-21 NOTE — PROGRESS NOTES
DIAGNOSIS:   1. Recurrent patellar instability left knee  2. Valgus left knee  3. Recurrent patellar instability right knee  4. Valgus right knee    PROCEDURES:  1. Distal femoral osteotomy and medial retinacular imbrication, left knee 2/28/2020  2. Open removal of hardware left knee date of surgery, 12/15/2020  3. Distal femoral osteotomy and medial retinacular imbrication right knee, 10/2/2020    HISTORY:    Chirag returns to my clinic today to discuss the MRI that we recently obtained of his left knee.  He denies any intercurrent trauma or change in his functional status since I saw him approximately 1 to 2 weeks ago.  I am cutting and pasting him my note from approximately 2 weeks ago as I think it nicely highlights the SANE score about his bilateral knees.  This is listed in italics below:    Patient returns to my clinic today to discuss both of his knees.  He reports that overall he is doing well.  On the right knee which had the surgery completed in October 2020 he describes a SANE score of approximately 85.  This is improved from a 50 in the preoperative state.  He is pain is well controlled he feels like his strength is improving.  He denies any further or recurrent instability of his patella.  He is really quite happy with how the site is doing.    On the patient's left side which was completed first in approximately February 2020 the patient reports continued pain.  He describes a SANE score of approximately 50 preoperatively this was a 10 or 20.  He is admits that he is better than before surgeries especially in regards to patellar stability as he is not having any further instability events.  His pain and inability to strengthen has been a problem on the side.  His incisions are well-healed.  He is using a cane today.  He is not taking any narcotics.      EXAM:     General: Awake, Alert, and oriented. Articulates and communicates with a normal affect       Examination of bilateral lower extremities show  no change.  Incisions are healed well.    IMAGING:  MRI was reviewed today which does show good evidence of healing across the osteotomy site.  There is evidence of high-grade cartilage loss of the patellofemoral compartment as well as the lateral femoral condyle with joint space narrowing cartilage wear and osteophyte formation present.    ASSESSMENT:  1. Status post bilateral distal femoral osteotomies as described above    PLAN:   I did very long discussion with the patient regarding his knee MRI I reviewed with him his diagnosis and showed him the images.  We went through the MRI findings point point point.    At this time I believe that this MRI confirms that there is no surgery that needs to be performed on his knee.  I see no evidence of meniscus tears, cruciate injuries or collateral ligament injuries.  His medial compartment appears healthy.  He has high-grade cartilage loss of his patellofemoral compartment and his lateral compartment.  This is consistent with the arthritis pattern that we know that he has as documented at the time of our arthroscopy.  There is no evidence of significant edema throughout his bone suggesting continued overload.  I do believe this MRI confirms definitive union of his osteotomy.    I do recognize from his prior standing alignment films that he is in a little bit of residual valgus on the side.  Clearly it is improved from the contralateral side but there is some residual valgus present.    I did discuss with the patient that there could be a role for revision distal femoral osteotomy to further alter his alignment.  However I am concerned about how much better he will ultimately get from this procedure.  Certainly there is some ceiling on his SANE score and I am worried that though a revision osteotomy may improve his symptoms I am worried that it may not ultimately make him to a point that he is satisfied.    The patient fully agrees with this and is currently not interested  in proceeding with more surgical intervention.    I did offer the patient an opportunity to proceed with a second opinion about his knee to see if there are any other options available that I may not be thinking of.  At this time the patient is not interested in this as he is not currently considering surgery    I think our goal should be to continue to make his knee as good as he can for as long as he can maximize nonsurgical management with a home physical therapy program, anti-inflammatories, intermittent use of injections.    I am going to place a referral for pain management to see what types of nonsurgical modalities the patient would be a candidate for as I think he could be a good candidate for some type of interventional injection type management.    The patient can follow-up with me on an as-needed basis.

## 2021-04-23 ENCOUNTER — TRANSFERRED RECORDS (OUTPATIENT)
Dept: HEALTH INFORMATION MANAGEMENT | Facility: CLINIC | Age: 36
End: 2021-04-23

## 2021-04-30 ENCOUNTER — TRANSFERRED RECORDS (OUTPATIENT)
Dept: HEALTH INFORMATION MANAGEMENT | Facility: CLINIC | Age: 36
End: 2021-04-30

## 2021-05-14 NOTE — PLAN OF CARE
Problem: Patient Care Overview  Goal: Plan of Care/Patient Progress Review  A&Ox4. VSS on RA. 6 lap sites with steri-strips, bandaids, some small amount of bloody drainage on L sites. Abdominal binder in place. BS hypoactive, -flatus.  Pain managed with PCA dilaudid 0.2. Starks patent. Ambulated in hallway x1. Blood sugars monitored q4h, insulin coverage per order.        Quality 402: Tobacco Use And Help With Quitting Among Adolescents: Patient screened for tobacco and never smoked Quality 431: Preventive Care And Screening: Unhealthy Alcohol Use - Screening: Patient screened for unhealthy alcohol use using a single question and scores less than 2 times per year Detail Level: Detailed Quality 130: Documentation Of Current Medications In The Medical Record: Current Medications Documented

## 2021-06-24 ENCOUNTER — TELEPHONE (OUTPATIENT)
Dept: FAMILY MEDICINE | Facility: CLINIC | Age: 36
End: 2021-06-24

## 2021-06-24 NOTE — TELEPHONE ENCOUNTER
Summary:    Patient is due/failing the follo wing:   Eye exam    Reviewed:  [x] CARE EVERYWHERE  [x] LAST OV NOTE INCLUDING ENDO  [x] FYI TAB  [x] MYCHART ACTIVE?  [x] LAST PANEL ENCOUNTER  [x] FUTURE APPTS  [x] IMMUNIZATIONS          Action needed:   Patient needs office visit for eye exam.    Type of outreach:    Phone, spoke to patient.  pt states has been more than two years and will schedule soon                                                                               Ladonna Kirkpatrick/GREGG  [place]---[facility]

## 2021-06-29 PROBLEM — Z47.89 AFTERCARE FOLLOWING SURGERY OF THE MUSCULOSKELETAL SYSTEM: Status: RESOLVED | Noted: 2020-03-10 | Resolved: 2021-04-07

## 2021-06-29 PROBLEM — M25.561 RIGHT KNEE PAIN: Status: RESOLVED | Noted: 2020-08-31 | Resolved: 2021-04-07

## 2021-09-21 ENCOUNTER — DOCUMENTATION ONLY (OUTPATIENT)
Dept: SURGERY | Facility: CLINIC | Age: 36
End: 2021-09-21

## 2021-09-26 ENCOUNTER — HEALTH MAINTENANCE LETTER (OUTPATIENT)
Age: 36
End: 2021-09-26

## 2021-11-21 ENCOUNTER — HEALTH MAINTENANCE LETTER (OUTPATIENT)
Age: 36
End: 2021-11-21

## 2021-12-02 DIAGNOSIS — E11.29 TYPE 2 DIABETES MELLITUS WITH MICROALBUMINURIA, WITHOUT LONG-TERM CURRENT USE OF INSULIN (H): Primary | ICD-10-CM

## 2021-12-02 DIAGNOSIS — E78.2 MIXED HYPERLIPIDEMIA: ICD-10-CM

## 2021-12-02 DIAGNOSIS — R80.9 TYPE 2 DIABETES MELLITUS WITH MICROALBUMINURIA, WITHOUT LONG-TERM CURRENT USE OF INSULIN (H): Primary | ICD-10-CM

## 2021-12-02 RX ORDER — ATORVASTATIN CALCIUM 40 MG/1
TABLET, FILM COATED ORAL
Qty: 90 TABLET | Refills: 0 | Status: SHIPPED | OUTPATIENT
Start: 2021-12-02 | End: 2022-04-06

## 2021-12-02 NOTE — TELEPHONE ENCOUNTER
Routing refill request to provider for review/approval because:  Labs not current:  A1C and Creatinine  Patient needs to be seen because it has been more than 1 year since last office visit.  Needs new endo.  No upcoming appt    Nazanin Rahman RN

## 2021-12-02 NOTE — TELEPHONE ENCOUNTER
Medication is being filled for 1 time refill only due to:  Patient needs to be seen because it has been more than one year since last visit.    Please call patient and assist with scheduling prior to additional refills.    Hannah HANCOCK - Registered Nurse  [facility]  Acute and Diagnostic Services

## 2022-01-10 DIAGNOSIS — R80.9 TYPE 2 DIABETES MELLITUS WITH MICROALBUMINURIA, WITHOUT LONG-TERM CURRENT USE OF INSULIN (H): Primary | ICD-10-CM

## 2022-01-10 DIAGNOSIS — E11.29 TYPE 2 DIABETES MELLITUS WITH MICROALBUMINURIA, WITHOUT LONG-TERM CURRENT USE OF INSULIN (H): Primary | ICD-10-CM

## 2022-01-12 NOTE — TELEPHONE ENCOUNTER
Routing refill request to provider for review/approval because:  Labs not current:  all labs  Patient needs to be seen because it has been more than 1 year since last office visit.    Carlos GIL RN, BSN

## 2022-01-13 RX ORDER — SEMAGLUTIDE 1.34 MG/ML
INJECTION, SOLUTION SUBCUTANEOUS
Qty: 3 ML | Refills: 0 | Status: SHIPPED | OUTPATIENT
Start: 2022-01-13 | End: 2022-04-06

## 2022-01-13 NOTE — TELEPHONE ENCOUNTER
Routing refill request to provider for review/approval because:  Patient needs to be seen because:  DM visit due  Routed to endo per PCP  Enedelia Goetz RN, BSN  [facility]

## 2022-02-26 DIAGNOSIS — E11.29 TYPE 2 DIABETES MELLITUS WITH MICROALBUMINURIA, WITHOUT LONG-TERM CURRENT USE OF INSULIN (H): ICD-10-CM

## 2022-02-26 DIAGNOSIS — R80.9 TYPE 2 DIABETES MELLITUS WITH MICROALBUMINURIA, WITHOUT LONG-TERM CURRENT USE OF INSULIN (H): ICD-10-CM

## 2022-03-16 ENCOUNTER — TRANSFERRED RECORDS (OUTPATIENT)
Dept: HEALTH INFORMATION MANAGEMENT | Facility: CLINIC | Age: 37
End: 2022-03-16
Payer: COMMERCIAL

## 2022-03-19 ENCOUNTER — APPOINTMENT (OUTPATIENT)
Dept: CT IMAGING | Facility: CLINIC | Age: 37
End: 2022-03-19
Attending: INTERNAL MEDICINE
Payer: COMMERCIAL

## 2022-03-19 ENCOUNTER — OFFICE VISIT (OUTPATIENT)
Dept: URGENT CARE | Facility: URGENT CARE | Age: 37
End: 2022-03-19
Payer: COMMERCIAL

## 2022-03-19 ENCOUNTER — HOSPITAL ENCOUNTER (EMERGENCY)
Facility: CLINIC | Age: 37
Discharge: HOME | End: 2022-03-19
Attending: INTERNAL MEDICINE | Admitting: INTERNAL MEDICINE
Payer: COMMERCIAL

## 2022-03-19 VITALS
SYSTOLIC BLOOD PRESSURE: 116 MMHG | TEMPERATURE: 98 F | DIASTOLIC BLOOD PRESSURE: 78 MMHG | OXYGEN SATURATION: 98 % | HEART RATE: 69 BPM | RESPIRATION RATE: 20 BRPM

## 2022-03-19 VITALS
HEART RATE: 79 BPM | RESPIRATION RATE: 18 BRPM | DIASTOLIC BLOOD PRESSURE: 81 MMHG | WEIGHT: 200 LBS | OXYGEN SATURATION: 98 % | BODY MASS INDEX: 26.51 KG/M2 | SYSTOLIC BLOOD PRESSURE: 122 MMHG | HEIGHT: 73 IN | TEMPERATURE: 97.9 F

## 2022-03-19 DIAGNOSIS — L03.811 CELLULITIS OF HEAD EXCEPT FACE: ICD-10-CM

## 2022-03-19 DIAGNOSIS — H92.02 MASTOID PAIN, LEFT: Primary | ICD-10-CM

## 2022-03-19 DIAGNOSIS — E11.9 TYPE 2 DIABETES MELLITUS WITHOUT COMPLICATION, WITHOUT LONG-TERM CURRENT USE OF INSULIN (H): ICD-10-CM

## 2022-03-19 LAB
ANION GAP SERPL CALCULATED.3IONS-SCNC: 5 MMOL/L (ref 3–14)
BASOPHILS # BLD AUTO: 0 10E3/UL (ref 0–0.2)
BASOPHILS NFR BLD AUTO: 1 %
BUN SERPL-MCNC: 11 MG/DL (ref 7–30)
CALCIUM SERPL-MCNC: 8.5 MG/DL (ref 8.5–10.1)
CHLORIDE BLD-SCNC: 104 MMOL/L (ref 94–109)
CO2 SERPL-SCNC: 30 MMOL/L (ref 20–32)
CREAT SERPL-MCNC: 0.63 MG/DL (ref 0.66–1.25)
EOSINOPHIL # BLD AUTO: 0.1 10E3/UL (ref 0–0.7)
EOSINOPHIL NFR BLD AUTO: 1 %
ERYTHROCYTE [DISTWIDTH] IN BLOOD BY AUTOMATED COUNT: 13.1 % (ref 10–15)
GFR SERPL CREATININE-BSD FRML MDRD: >90 ML/MIN/1.73M2
GLUCOSE BLD-MCNC: 246 MG/DL (ref 70–99)
HCT VFR BLD AUTO: 42.8 % (ref 40–53)
HGB BLD-MCNC: 14.1 G/DL (ref 13.3–17.7)
HOLD SPECIMEN: NORMAL
IMM GRANULOCYTES # BLD: 0 10E3/UL
IMM GRANULOCYTES NFR BLD: 0 %
LACTATE SERPL-SCNC: 0.7 MMOL/L (ref 0.7–2)
LYMPHOCYTES # BLD AUTO: 2.3 10E3/UL (ref 0.8–5.3)
LYMPHOCYTES NFR BLD AUTO: 42 %
MCH RBC QN AUTO: 28.5 PG (ref 26.5–33)
MCHC RBC AUTO-ENTMCNC: 32.9 G/DL (ref 31.5–36.5)
MCV RBC AUTO: 87 FL (ref 78–100)
MONOCYTES # BLD AUTO: 0.4 10E3/UL (ref 0–1.3)
MONOCYTES NFR BLD AUTO: 8 %
NEUTROPHILS # BLD AUTO: 2.7 10E3/UL (ref 1.6–8.3)
NEUTROPHILS NFR BLD AUTO: 48 %
NRBC # BLD AUTO: 0 10E3/UL
NRBC BLD AUTO-RTO: 0 /100
PLATELET # BLD AUTO: 195 10E3/UL (ref 150–450)
POTASSIUM BLD-SCNC: 4.1 MMOL/L (ref 3.4–5.3)
RBC # BLD AUTO: 4.95 10E6/UL (ref 4.4–5.9)
SODIUM SERPL-SCNC: 139 MMOL/L (ref 133–144)
WBC # BLD AUTO: 5.5 10E3/UL (ref 4–11)

## 2022-03-19 PROCEDURE — 99284 EMERGENCY DEPT VISIT MOD MDM: CPT | Mod: 25

## 2022-03-19 PROCEDURE — 36415 COLL VENOUS BLD VENIPUNCTURE: CPT | Performed by: INTERNAL MEDICINE

## 2022-03-19 PROCEDURE — 70450 CT HEAD/BRAIN W/O DYE: CPT

## 2022-03-19 PROCEDURE — 80048 BASIC METABOLIC PNL TOTAL CA: CPT | Performed by: INTERNAL MEDICINE

## 2022-03-19 PROCEDURE — 83605 ASSAY OF LACTIC ACID: CPT | Performed by: INTERNAL MEDICINE

## 2022-03-19 PROCEDURE — 99214 OFFICE O/P EST MOD 30 MIN: CPT | Performed by: FAMILY MEDICINE

## 2022-03-19 PROCEDURE — 85025 COMPLETE CBC W/AUTO DIFF WBC: CPT | Performed by: INTERNAL MEDICINE

## 2022-03-19 RX ORDER — DESVENLAFAXINE 25 MG/1
50 TABLET, EXTENDED RELEASE ORAL DAILY
COMMUNITY
Start: 2022-02-23 | End: 2023-02-10

## 2022-03-19 RX ORDER — CEPHALEXIN 500 MG/1
500 CAPSULE ORAL 4 TIMES DAILY
Qty: 40 CAPSULE | Refills: 0 | Status: SHIPPED | OUTPATIENT
Start: 2022-03-19 | End: 2022-03-29

## 2022-03-19 ASSESSMENT — ENCOUNTER SYMPTOMS
HEADACHES: 1
FEVER: 0

## 2022-03-19 NOTE — ED PROVIDER NOTES
History   Chief Complaint:  Mastoiditis    The history is provided by the patient.      Naman Jones is a 36 year old male with history of type 2 diabetes mellitus, hypertension, and hyperlipidemia, who presents with pain just posterior to his left ear, worsening since onset 1 week ago. Approximately 10 days ago, the patient developed cold-like symptoms. A few days later, he noted new pain and swelling to his left mastoid. Over the past 3-4 days, the patient reports improvement of his cold-like symptoms but considerable worsening pain and swelling behind his left ear. He also endorses headache, and worsening symptoms prompted his concern and presentation to urgent care today. There, he was advised to be seen in the ED for concern over possible mastoiditis. The patient has history of type 2 diabetes mellitus, for which he manages well with metformin. He does not regularly check his blood glucose. The patient denies any fever or other.    Review of Systems   Constitutional: Negative for fever.   HENT: Positive for ear pain (and swelling posterior to left ear).    Neurological: Positive for headaches.   All other systems reviewed and are negative.      Allergies:  Sulfa Drugs  Oxycodone  Lisinopril  Onion    Medications:  Atorvastatin  Buspirone  Calcium carb-cholecalciferol  Cyanocobalamin  Pristiq  Eszopiclone  Dilaudid  Hydroxyzine  Lorazepam  Metformin  Naloxone  Ondansetron  Ozempic  Polyethylene glycol  Semaglutide  Senna docusate  Tramadol  Exenatide microspheres  Humalog Kwikpen  Trazodone    Past Medical History:     Type 2 diabetes mellitus  Hypertension  Hyperlipidemia  Plantar fasciitis  JOEL  Insomnia  Blepharitis of upper and lower eyelids of both eyes  Patellar instability of left knee  Acquired genu valgum of left knee  Gait abnormality  Chronic pain of left knee  Osteoarthritis  Morbid obesity  Patellar instability of right  Anxiety    Past Surgical History:    Arthroscopy knee with debridement  "joint, combined, left  Arthroscopy knee with patellar realignment, left  Arthroscopy knee with retinacular release, left  Arthroscopy knee with retinacular release, right  Laparoscopic bypass gastric  Orthopedic surgery, right  Osteotomy femur distal, bilateral  Remove hardware knee, left  [place]en teeth extraction    Family History:    Glaucoma  Diabetes mellitus  Arthritis  Heart disease  Hyperlipidemia  Allergies    Social History:  The patient presented alone.  The patient arrived in a private vehicle.    Physical Exam     Patient Vitals for the past 24 hrs:   BP Temp Temp src Pulse Resp SpO2 Height Weight   03/19/22 1423 122/81 97.9  F (36.6  C) Temporal 79 18 98 % 1.854 m (6' 1\") 90.7 kg (200 lb)     Physical Exam  Constitutional:       Comments: Pleasant and cooperative   HENT:      Right Ear: Tympanic membrane normal.      Left Ear: Tympanic membrane normal. There is mastoid tenderness.      Mouth/Throat:      Pharynx: No posterior oropharyngeal erythema.   Eyes:      Conjunctiva/sclera: Conjunctivae normal.   Cardiovascular:      Rate and Rhythm: Normal rate and regular rhythm.      Heart sounds: Normal heart sounds.   Pulmonary:      Effort: Pulmonary effort is normal.      Breath sounds: Normal breath sounds.   Abdominal:      General: Bowel sounds are normal. There is no distension.      Palpations: Abdomen is soft.      Tenderness: There is no abdominal tenderness. There is no guarding or rebound.   Musculoskeletal:         General: Normal range of motion.      Cervical back: Neck supple.   Skin:     General: Skin is warm and dry.   Neurological:      Mental Status: He is alert.         Emergency Department Course     Imaging:  Head CT w/o contrast   Final Result   IMPRESSION:   1.  Normal head CT.   2.  No middle ear or mastoid inflammation.        Report per radiology    Laboratory:  Labs Ordered and Resulted from Time of ED Arrival to Time of ED Departure   BASIC METABOLIC PANEL - Abnormal       Result " Value    Sodium 139      Potassium 4.1      Chloride 104      Carbon Dioxide (CO2) 30      Anion Gap 5      Urea Nitrogen 11      Creatinine 0.63 (*)     Calcium 8.5      Glucose 246 (*)     GFR Estimate >90     LACTIC ACID WHOLE BLOOD - Normal    Lactic Acid 0.7     CBC WITH PLATELETS AND DIFFERENTIAL    WBC Count 5.5      RBC Count 4.95      Hemoglobin 14.1      Hematocrit 42.8      MCV 87      MCH 28.5      MCHC 32.9      RDW 13.1      Platelet Count 195      % Neutrophils 48      % Lymphocytes 42      % Monocytes 8      % Eosinophils 1      % Basophils 1      % Immature Granulocytes 0      NRBCs per 100 WBC 0      Absolute Neutrophils 2.7      Absolute Lymphocytes 2.3      Absolute Monocytes 0.4      Absolute Eosinophils 0.1      Absolute Basophils 0.0      Absolute Immature Granulocytes 0.0      Absolute NRBCs 0.0       Emergency Department Course:     Reviewed:  I reviewed nursing notes, vitals, past medical history and Care Everywhere.    Assessments:  1441 I obtained history and examined the patient as noted above.   1551 I rechecked the patient and explained findings. I believe that they are safe for discharge at this time.    Disposition:  The patient was discharged to home.     Impression & Plan     Medical Decision Making:    Naman Jones is a 36 year old male referred from clinic for further evaluation erythema and swelling over the left mastoid area.  I do appreciate this on exam.  Patient is afebrile with reassuring laboratory tests.  CT does not show any evidence of mastoid fluid.  Radiology did not comment on any swelling in this area though I believe I can see a little asymmetry on the CT.  I suspect cellulitis since there is no clear evidence of lymphadenopathy.  I will start the patient on Keflex.  I discussed with him his elevated glucose and he will follow-up in clinic.      Diagnosis:    ICD-10-CM    1. Cellulitis of head except face  L03.811      Discharge Medications:  New Prescriptions     CEPHALEXIN (KEFLEX) 500 MG CAPSULE    Take 1 capsule (500 mg) by mouth 4 times daily for 10 days     Scribe Disclosure:  I, Charis Garcia, am serving as a scribe at 2:28 PM on 3/19/2022 to document services personally performed by Ilana Pop MD based on my observations and the provider's statements to me.     Ilana Pop MD  03/19/22 2174

## 2022-03-19 NOTE — PROGRESS NOTES
ASSESSMENT/ PLAN  Mastoid pain, left  Due to severe pain and swelling over the left mastoid, there is concern that he has mastoiditis.  We reviewed evaluation / treatment recommendations ( CT scan and possible MRI for diagnosis and if positive for mastoiditis,  hospital admission is recommended)    Patient will go to [facility] ER for evaluation-  Called to the ED-  Patient was accepted.  He will provide his own transportation    Type 2 diabetes mellitus without complication, without long-term current use of insulin (H)   patient would have decreased ability to fight infection due to his diabetes  --------------------------------------------------------------------------------------------------------------------------------------      SUBJECTIVE:  Chief Complaint   Patient presents with     Urgent Care     Ear Problem     Pain behind left [id number]     Naman Jones is a 36 year old male who presents with left ear pain over the mastoid area  for 1 week(s).   Severity: severe   Timing:gradual onset, still present, worsening and constant  Additional symptoms include none.   He has not had recent ear infection,  Though he did have frequent infections in childhood.  No pain extending onto the scalp,  No blisters, no fever.  Does have some pain extending to the left neck     Hx type 2 diabetes,  Last Hgb a1c 7.0 from 2 years ago-  Takes metformin/  semaglutide injection    He was referred by [facility]    Past Medical History:   Diagnosis Date     Diabetes (H)      Hypertension      Patient Active Problem List   Diagnosis     Essential hypertension     Mixed hyperlipidemia     Plantar fasciitis     JOEL (generalized anxiety disorder)     Type 2 diabetes mellitus with microalbuminuria, without long-term current use of insulin (H)     Insomnia, unspecified type     Blepharitis of upper and lower eyelids of both eyes, unspecified type     Left knee pain     Patellar instability of left knee     Acquired  genu valgum of left knee     Patellar instability of right knee     Gait abnormality     Status post knee surgery     S/P right knee surgery     Chronic pain of left knee       ALLERGIES:  Sulfa drugs, Oxycodone, Lisinopril, and Onion    MEDs  acetaminophen (TYLENOL) 325 MG tablet, Take 2 tablets (650 mg) by mouth every 4 hours as needed for mild pain  acetaminophen (TYLENOL) 325 MG tablet, Take 2 tablets (650 mg) by mouth every 4 hours  atorvastatin (LIPITOR) 40 MG tablet, TAKE 1 TABLET BY MOUTH EVERY DAY  blood glucose (ONETOUCH ULTRA) test strip, Use to test blood sugar 3 times daily or as directed.  busPIRone (BUSPAR) 15 MG tablet, Take 15 mg by mouth 2 times daily  Calcium Carb-Cholecalciferol (CALCIUM 600 + D PO), Take 1 tablet by mouth 2 times daily   Continuous Blood Gluc  (FREESTYLE KEILA READER) SUGAR, 1 each continuous  Continuous Blood Gluc Sensor (FREESTYLE KEILA 14 DAY SENSOR) XX MISC, Inject 1 each Subcutaneous every 14 days  Cyanocobalamin (B-12) 500 MCG SUBL, Place 1 tablet under the tongue daily  desvenlafaxine succinate (PRISTIQ) 25 MG 24 hr tablet, Take 25 mg by mouth daily  eszopiclone (LUNESTA) 2 MG tablet, Take 2 mg by mouth At Bedtime  LORazepam (ATIVAN) 1 MG tablet, Take 1 mg by mouth every 6 hours as needed for anxiety  metFORMIN (GLUCOPHAGE) 500 MG tablet, TAKE 1 TABLET BY MOUTH 2 TIMES DAILY WITH MEALS  multivitamin  peds with iron (FLINTSTONES COMPLETE) 60 MG chewable tablet, Take 2 chew tab by mouth every morning   OneTouch Delica Lancets 33G MISC, 1 each 3 times daily  OZEMPIC, 1 MG/DOSE, 4 MG/3ML SOPN, INJECT 1MG UNDER THE SKIN ONCE WEEKLY  semaglutide (OZEMPIC, 1 MG/DOSE,) 2 MG/1.5ML pen, Inject 1 mg subcutaneous weekly  HYDROmorphone (DILAUDID) 2 MG tablet, Take 0.5-1 tablets (1-2 mg) by mouth every 6 hours as needed for moderate to severe pain (Patient not taking: Reported on 3/19/2022)  hydrOXYzine (ATARAX) 50 MG tablet, Take 1 tablet (50 mg) by mouth 2 times daily May take  up to 3 times daily if needed (Patient not taking: Reported on 3/19/2022)  hydrOXYzine (VISTARIL) 25 MG capsule, Take 1-2 capsules (25-50 mg) by mouth 4 times daily as needed for itching (pain) (Patient not taking: Reported on 3/19/2022)  naloxone (NARCAN) 4 MG/0.1ML nasal spray, Spray 1 spray (4 mg) into one nostril alternating nostrils once as needed for opioid reversal every 2-3 minutes until assistance arrives (Patient not taking: Reported on 3/19/2022)  ondansetron (ZOFRAN-ODT) 4 MG ODT tab, Take 1-2 tablets (4-8 mg) by mouth every 8 hours as needed for nausea (Patient not taking: Reported on 3/19/2022)  polyethylene glycol (MIRALAX) 17 g packet, Take 17 g by mouth daily (Patient not taking: Reported on 3/19/2022)  senna-docusate (SENOKOT-S/PERICOLACE) 8.6-50 MG tablet, Take 1-2 tablets by mouth 2 times daily (Patient not taking: Reported on 3/19/2022)  traMADol (ULTRAM) 50 MG tablet, Take 1 tablet (50 mg) by mouth every 6 hours as needed for severe pain (Patient not taking: Reported on 3/19/2022)    lidocaine (PF) 0.5 % injection SOLN 8 mL  triamcinolone (KENALOG-40) injection 40 mg        Social History     Tobacco Use     Smoking status: Never Smoker     Smokeless tobacco: Never Used   Substance Use Topics     Alcohol use: Not Currently     Comment: socially       Family History   Problem Relation Age of Onset     Glaucoma Father      Diabetes Father      Prostate Cancer Paternal Grandfather      Macular Degeneration No family hx of          ROS:   CONSTITUTIONAL:NEGATIVE for  change in weight  EYES: NEGATIVE for vision changes or irritation  RESP:NEGATIVE for significant cough or SOB  GI: NEGATIVE for nausea, abdominal pain,  or change in bowel habits    OBJECTIVE:  /78   Pulse 69   Temp 98  F (36.7  C) (Tympanic)   Resp 20   SpO2 98%    EXAM:  The right TM is normal: no effusions, no erythema, and normal landmarks     The right auditory canal is normal and without drainage, edema or erythema  The  left TM is normal: no effusions, no erythema, and normal landmarks  The left auditory canal is normal and without drainage, edema or erythema  Left mastoid is mildly swollen but exquisitely tender,  With milder tenderness to the left neck  No skin changes over the mastoid,  No blisters    Oropharynx exam is normal: no lesions, erythema, adenopathy or exudate.  GENERAL: no acute distress  EYES: EOMI,  PERRL, conjunctiva clear  NECK: supple, non-tender to palpation, no adenopathy noted  RESP: lungs clear to auscultation - no rales, rhonchi or wheezes  CV: regular rates and rhythm, normal S1 S2, no murmur noted  SKIN: no suspicious lesions or rashes

## 2022-04-06 ENCOUNTER — OFFICE VISIT (OUTPATIENT)
Dept: FAMILY MEDICINE | Facility: CLINIC | Age: 37
End: 2022-04-06
Payer: COMMERCIAL

## 2022-04-06 VITALS
HEART RATE: 67 BPM | OXYGEN SATURATION: 97 % | RESPIRATION RATE: 16 BRPM | BODY MASS INDEX: 27.62 KG/M2 | DIASTOLIC BLOOD PRESSURE: 60 MMHG | WEIGHT: 208.4 LBS | HEIGHT: 73 IN | TEMPERATURE: 98.6 F | SYSTOLIC BLOOD PRESSURE: 98 MMHG

## 2022-04-06 DIAGNOSIS — E78.2 MIXED HYPERLIPIDEMIA: ICD-10-CM

## 2022-04-06 DIAGNOSIS — Z00.00 ROUTINE GENERAL MEDICAL EXAMINATION AT A HEALTH CARE FACILITY: Primary | ICD-10-CM

## 2022-04-06 DIAGNOSIS — E11.29 TYPE 2 DIABETES MELLITUS WITH MICROALBUMINURIA, WITHOUT LONG-TERM CURRENT USE OF INSULIN (H): ICD-10-CM

## 2022-04-06 DIAGNOSIS — F41.1 GAD (GENERALIZED ANXIETY DISORDER): ICD-10-CM

## 2022-04-06 DIAGNOSIS — Z11.59 NEED FOR HEPATITIS C SCREENING TEST: ICD-10-CM

## 2022-04-06 DIAGNOSIS — R80.9 TYPE 2 DIABETES MELLITUS WITH MICROALBUMINURIA, WITHOUT LONG-TERM CURRENT USE OF INSULIN (H): ICD-10-CM

## 2022-04-06 LAB — HBA1C MFR BLD: 8.5 % (ref 0–5.6)

## 2022-04-06 PROCEDURE — 90732 PPSV23 VACC 2 YRS+ SUBQ/IM: CPT | Performed by: STUDENT IN AN ORGANIZED HEALTH CARE EDUCATION/TRAINING PROGRAM

## 2022-04-06 PROCEDURE — 0064A COVID-19,PF,MODERNA (18+ YRS BOOSTER .25ML): CPT | Performed by: STUDENT IN AN ORGANIZED HEALTH CARE EDUCATION/TRAINING PROGRAM

## 2022-04-06 PROCEDURE — 83036 HEMOGLOBIN GLYCOSYLATED A1C: CPT | Performed by: STUDENT IN AN ORGANIZED HEALTH CARE EDUCATION/TRAINING PROGRAM

## 2022-04-06 PROCEDURE — 91306 COVID-19,PF,MODERNA (18+ YRS BOOSTER .25ML): CPT | Performed by: STUDENT IN AN ORGANIZED HEALTH CARE EDUCATION/TRAINING PROGRAM

## 2022-04-06 PROCEDURE — 90471 IMMUNIZATION ADMIN: CPT | Performed by: STUDENT IN AN ORGANIZED HEALTH CARE EDUCATION/TRAINING PROGRAM

## 2022-04-06 PROCEDURE — 36415 COLL VENOUS BLD VENIPUNCTURE: CPT | Performed by: STUDENT IN AN ORGANIZED HEALTH CARE EDUCATION/TRAINING PROGRAM

## 2022-04-06 PROCEDURE — 99395 PREV VISIT EST AGE 18-39: CPT | Mod: 25 | Performed by: STUDENT IN AN ORGANIZED HEALTH CARE EDUCATION/TRAINING PROGRAM

## 2022-04-06 PROCEDURE — 99214 OFFICE O/P EST MOD 30 MIN: CPT | Mod: 25 | Performed by: STUDENT IN AN ORGANIZED HEALTH CARE EDUCATION/TRAINING PROGRAM

## 2022-04-06 PROCEDURE — 99207 PR FOOT EXAM NO CHARGE: CPT | Mod: 25 | Performed by: STUDENT IN AN ORGANIZED HEALTH CARE EDUCATION/TRAINING PROGRAM

## 2022-04-06 RX ORDER — SEMAGLUTIDE 1.34 MG/ML
1 INJECTION, SOLUTION SUBCUTANEOUS
Qty: 3 ML | Refills: 3 | Status: SHIPPED | OUTPATIENT
Start: 2022-04-06 | End: 2022-12-20

## 2022-04-06 RX ORDER — ATORVASTATIN CALCIUM 40 MG/1
40 TABLET, FILM COATED ORAL DAILY
Qty: 90 TABLET | Refills: 1 | Status: SHIPPED | OUTPATIENT
Start: 2022-04-06 | End: 2022-09-22

## 2022-04-06 ASSESSMENT — ENCOUNTER SYMPTOMS
WEAKNESS: 0
HEADACHES: 0
HEMATURIA: 0
CONSTIPATION: 0
PALPITATIONS: 0
ABDOMINAL PAIN: 0
CHILLS: 0
FEVER: 0
NAUSEA: 0
DIZZINESS: 0
EYE PAIN: 0
PARESTHESIAS: 0
SORE THROAT: 0
MYALGIAS: 0
HEMATOCHEZIA: 0
DYSURIA: 0
FREQUENCY: 0
ARTHRALGIAS: 1
HEARTBURN: 0
NERVOUS/ANXIOUS: 1
JOINT SWELLING: 1
DIARRHEA: 0
COUGH: 0
SHORTNESS OF BREATH: 0

## 2022-04-06 NOTE — PROGRESS NOTES
SUBJECTIVE:   CC: Naman Jones is an 36 year old male who presents for preventative health visit.     Patient has been advised of split billing requirements and indicates understanding: Yes  Healthy Habits:     Getting at least 3 servings of Calcium per day:  Yes    Bi-annual eye exam:  Yes    Dental care twice a year:  Yes    Sleep apnea or symptoms of sleep apnea:  None    Diet:  Regular (no restrictions)    Frequency of exercise:  2-3 days/week    Duration of exercise:  15-30 minutes    Taking medications regularly:  Yes    Medication side effects:  None    PHQ-2 Total Score: 2    Additional concerns today:  No    Diabetes Follow-up      How often are you checking your blood sugar? Not at all    What concerns do you have today about your diabetes? None     Do you have any of these symptoms? (Select all that apply)  No numbness or tingling in feet.  No redness, sores or blisters on feet.  No complaints of excessive thirst.  No reports of blurry vision.  No significant changes to weight.    Have you had a diabetic eye exam in the last 12 months? No, will make follow up appointment    BP Readings from Last 2 Encounters:   04/06/22 98/60   03/19/22 122/81     Hemoglobin A1C POCT (%)   Date Value   09/29/2020 7.3 (H)   02/28/2020 6.8 (H)     Hemoglobin A1C (%)   Date Value   04/06/2022 8.5 (H)     LDL Cholesterol Calculated (mg/dL)   Date Value   09/29/2020 64   02/14/2020 88     Hyperlipidemia Follow-Up      Are you regularly taking any medication or supplement to lower your cholesterol?   Yes- atorvastatin    Are you having muscle aches or other side effects that you think could be caused by your cholesterol lowering medication?  No    Today's PHQ-2 Score:   PHQ-2 ( 1999 [employer]) 4/6/2022   Q1: Little interest or pleasure in doing things 1   Q2: Feeling down, depressed or hopeless 1   PHQ-2 Score 2   PHQ-2 Total Score (12-17 Years)- Positive if 3 or more points; Administer PHQ-A if positive -   Q1: Little  interest or pleasure in doing things Several days   Q2: Feeling down, depressed or hopeless Several days   PHQ-2 Score 2     JOEL  Seeing psychiatry and psychology outside this system. Completed PHQ9 a few weeks ago, no changes since then. On desvenlafaxine a few months, just increased the dose a few weeks ago.    Abuse: Current or Past(Physical, Sexual or Emotional)- No  Do you feel safe in your environment? Yes    Social History     Tobacco Use     Smoking status: Never Smoker     Smokeless tobacco: Never Used   Substance Use Topics     Alcohol use: Not Currently     Comment: socially     Alcohol Use 4/6/2022   Prescreen: >3 drinks/day or >7 drinks/week? No   Prescreen: >3 drinks/day or >7 drinks/week? -       Last PSA: None    Reviewed orders with patient. Reviewed health maintenance and updated orders accordingly - Yes    Reviewed and updated as needed this visit by clinical staff   Tobacco  Allergies  Meds  Problems  Med Hx  Surg Hx  Fam Hx  Soc   Hx      Reviewed and updated as needed this visit by Provider   Tobacco  Allergies   Problems  Med Hx  Surg Hx  Fam Hx           Review of Systems   Constitutional: Negative for chills and fever.   HENT: Negative for congestion, ear pain, hearing loss and sore throat.    Eyes: Negative for pain and visual disturbance.   Respiratory: Negative for cough and shortness of breath.    Cardiovascular: Negative for chest pain, palpitations and peripheral edema.   Gastrointestinal: Negative for abdominal pain, constipation, diarrhea, heartburn, hematochezia and nausea.   Genitourinary: Negative for dysuria, frequency, genital sores, hematuria and urgency.   Musculoskeletal: Positive for arthralgias and joint swelling. Negative for myalgias.   Skin: Negative for rash.   Neurological: Negative for dizziness, weakness, headaches and paresthesias.   Psychiatric/Behavioral: Negative for mood changes. The patient is nervous/anxious.      OBJECTIVE:   BP 98/60   Pulse 67  "  Temp 98.6  F (37  C) (Oral)   Resp 16   Ht 1.854 m (6' 1\")   Wt 94.5 kg (208 lb 6.4 oz)   SpO2 97%   BMI 27.50 kg/m      Physical Exam  GENERAL: healthy, alert and no distress  EYES: Eyes grossly normal to inspection, PERRL and conjunctivae and sclerae normal  HENT: ear canals and TM's normal, nose and mouth without ulcers or lesions  NECK: no adenopathy, no asymmetry, masses, or scars and thyroid normal to palpation  RESP: lungs clear to auscultation - no rales, rhonchi or wheezes  CV: regular rate and rhythm, normal S1 S2, no murmur, click or rub, no peripheral edema and peripheral pulses strong  ABDOMEN: soft, nontender, no hepatosplenomegaly, no masses and bowel sounds normal  MS: no gross musculoskeletal defects noted, no edema  SKIN: no suspicious lesions or rashes  NEURO: Normal strength and tone, mentation intact and speech normal  PSYCH: mentation appears normal, affect normal/bright  Diabetic foot exam: normal DP and PT pulses, no trophic changes or ulcerative lesions, normal sensory exam and normal monofilament exam, except for sensation to monofilament on left big toe.    ASSESSMENT/PLAN:   (E11.29,  R80.9) Type 2 diabetes mellitus with microalbuminuria, without long-term current use of insulin (H)  A1c of 8.5 today. Foot exam stable. Due for ophthalmology visit - pt to schedule. Taking metformin and semaglutide weekly. Discussed adding empagliflozin, 10 mg and referring for diabetic education. Follow up in 3 months.   Plan: HEMOGLOBIN A1C, Lipid panel reflex to direct         LDL Fasting, Albumin Random Urine Quantitative         with Creat Ratio, COVID-19,PF,MODERNA (18+ YRS         BOOSTER .25ML), metFORMIN (GLUCOPHAGE) 500 MG         tablet, FOOT EXAM, Semaglutide, 1 MG/DOSE,         (OZEMPIC, 1 MG/DOSE,) 4 MG/3ML SOPN,         empagliflozin (JARDIANCE) 10 MG TABS tablet    (E78.2) Mixed hyperlipidemia  - repeat lipid panel today  Plan: atorvastatin (LIPITOR) 40 MG tablet    (F41.1) JOEL " "(generalized anxiety disorder)  Following with psychiatry. Recently increased desvenlafaxine few weeks ago.      (Z00.00) Routine general medical examination at a health care facility  (primary encounter diagnosis)  (Z11.59) Need for hepatitis C screening test  Lab closed by end of visit, patient to return for lab only visit.    Patient has been advised of split billing requirements and indicates understanding: Yes    COUNSELING:   Reviewed preventive health counseling, as reflected in patient instructions    Estimated body mass index is 27.5 kg/m  as calculated from the following:    Height as of this encounter: 1.854 m (6' 1\").    Weight as of this encounter: 94.5 kg (208 lb 6.4 oz).     Weight management plan: Discussed healthy diet and exercise guidelines     He reports that he has never smoked. He has never used smokeless tobacco.    F/u in 3 months for diabetes recheck    Rajinder Cortez MD  [facility]  4/6/2022  "

## 2022-04-22 ENCOUNTER — LAB (OUTPATIENT)
Dept: LAB | Facility: CLINIC | Age: 37
End: 2022-04-22
Payer: COMMERCIAL

## 2022-04-22 DIAGNOSIS — Z11.59 NEED FOR HEPATITIS C SCREENING TEST: ICD-10-CM

## 2022-04-22 DIAGNOSIS — E78.2 MIXED HYPERLIPIDEMIA: ICD-10-CM

## 2022-04-22 DIAGNOSIS — E11.29 TYPE 2 DIABETES MELLITUS WITH MICROALBUMINURIA, WITHOUT LONG-TERM CURRENT USE OF INSULIN (H): ICD-10-CM

## 2022-04-22 DIAGNOSIS — R80.9 TYPE 2 DIABETES MELLITUS WITH MICROALBUMINURIA, WITHOUT LONG-TERM CURRENT USE OF INSULIN (H): ICD-10-CM

## 2022-04-22 PROCEDURE — 80061 LIPID PANEL: CPT

## 2022-04-22 PROCEDURE — 36415 COLL VENOUS BLD VENIPUNCTURE: CPT

## 2022-04-22 PROCEDURE — 86803 HEPATITIS C AB TEST: CPT

## 2022-04-22 PROCEDURE — 82043 UR ALBUMIN QUANTITATIVE: CPT

## 2022-04-23 LAB
CHOLEST SERPL-MCNC: 118 MG/DL
CREAT UR-MCNC: 152 MG/DL
FASTING STATUS PATIENT QL REPORTED: YES
HDLC SERPL-MCNC: 49 MG/DL
LDLC SERPL CALC-MCNC: 53 MG/DL
MICROALBUMIN UR-MCNC: 10 MG/L
MICROALBUMIN/CREAT UR: 6.58 MG/G CR (ref 0–17)
NONHDLC SERPL-MCNC: 69 MG/DL
TRIGL SERPL-MCNC: 78 MG/DL

## 2022-04-25 LAB — HCV AB SERPL QL IA: NONREACTIVE

## 2022-08-03 ENCOUNTER — OFFICE VISIT (OUTPATIENT)
Dept: FAMILY MEDICINE | Facility: CLINIC | Age: 37
End: 2022-08-03
Payer: COMMERCIAL

## 2022-08-03 VITALS
SYSTOLIC BLOOD PRESSURE: 116 MMHG | OXYGEN SATURATION: 98 % | DIASTOLIC BLOOD PRESSURE: 82 MMHG | TEMPERATURE: 97.9 F | BODY MASS INDEX: 27.28 KG/M2 | WEIGHT: 206.8 LBS | HEART RATE: 69 BPM

## 2022-08-03 DIAGNOSIS — E11.29 TYPE 2 DIABETES MELLITUS WITH MICROALBUMINURIA, WITHOUT LONG-TERM CURRENT USE OF INSULIN (H): ICD-10-CM

## 2022-08-03 DIAGNOSIS — R80.9 TYPE 2 DIABETES MELLITUS WITH MICROALBUMINURIA, WITHOUT LONG-TERM CURRENT USE OF INSULIN (H): ICD-10-CM

## 2022-08-03 PROBLEM — I10 ESSENTIAL HYPERTENSION: Status: RESOLVED | Noted: 2017-06-13 | Resolved: 2022-08-03

## 2022-08-03 LAB
HBA1C MFR BLD: 6.8 % (ref 0–5.6)
HOLD SPECIMEN: NORMAL

## 2022-08-03 PROCEDURE — 99214 OFFICE O/P EST MOD 30 MIN: CPT | Performed by: STUDENT IN AN ORGANIZED HEALTH CARE EDUCATION/TRAINING PROGRAM

## 2022-08-03 PROCEDURE — 36415 COLL VENOUS BLD VENIPUNCTURE: CPT | Performed by: STUDENT IN AN ORGANIZED HEALTH CARE EDUCATION/TRAINING PROGRAM

## 2022-08-03 PROCEDURE — 83036 HEMOGLOBIN GLYCOSYLATED A1C: CPT | Performed by: STUDENT IN AN ORGANIZED HEALTH CARE EDUCATION/TRAINING PROGRAM

## 2022-08-03 RX ORDER — ESZOPICLONE 3 MG/1
3 TABLET, FILM COATED ORAL EVERY 24 HOURS
COMMUNITY

## 2022-08-03 ASSESSMENT — PAIN SCALES - GENERAL: PAINLEVEL: NO PAIN (0)

## 2022-08-03 NOTE — PROGRESS NOTES
Assessment & Plan     Type 2 diabetes mellitus with microalbuminuria, without long-term current use of insulin (H)  A1c of 6.8 today. Improved from last visit and now at goal. Not taking Jardiance due to side effects. Plan to take 1500mg metformin daily, continue 1mg Ozempic weekly and continue increased exercise following PT recommendations. Due for eye exam, placed referral. Is on atorvastatin. Follow up in 6 months.  - Hemoglobin A1c  - Adult Eye Referral  - metFORMIN (GLUCOPHAGE) 500 MG tablet  Dispense: 180 tablet; Refill: 0    Return in about 6 months (around 2/3/2023) for Follow up diabetes.    Rajinder Cortez MD  [facility]  8/3/2022    Gallito Beard is a 37 year old presenting for the following health issues:    Diabetes      History of Present Illness       Diabetes:   He presents for follow up of diabetes.  He is not checking blood glucose. He has no concerns regarding his diabetes at this time.  He is not experiencing numbness or burning in feet, excessive thirst, blurry vision, weight changes or redness, sores or blisters on feet. The patient has not had a diabetic eye exam in the last 12 months.         He eats 2-3 servings of fruits and vegetables daily.He consumes 0 sweetened beverage(s) daily.He exercises with enough effort to increase his heart rate 20 to 29 minutes per day.  He exercises with enough effort to increase his heart rate 4 days per week.      Diabetes  Took the jardiance for about 2 weeks only  Was giving problems with side effects - felt very hot/uncomfortable  Has been more consistent with the ozempic - weekly injections, every Monday.  Found a time that worked well for him.  Has been trying to be outside more and more active - a lot of gardening recently  Taking the kids out also - more walking, added in quite a bit more walking - under supervision of PT  1.5 years out from knee surgery - osteotomy for genu valgus with a couple follow ups  Blood sugars -  fastings are 130-140, as day goes on, fasting numbers will come down  Lowest numbers are 80's. No hypoglycemic episodes  Highest number has been 250's - does notice some occasional increased thirst.    No chest pain, SOB, headaches      Objective    /82 (BP Location: Left arm, Patient Position: Sitting, Cuff Size: Adult Regular)   Pulse 69   Temp 97.9  F (36.6  C) (Oral)   Wt 93.8 kg (206 lb 12.8 oz)   SpO2 98%   BMI 27.28 kg/m    Body mass index is 27.28 kg/m .     Physical Exam     GENERAL: healthy, alert and no distress  HEAD: Normocephalic, atraumatic.   EYES:  Normal conjunctivae, sclera.   RESP: lungs clear to auscultation - no rales, rhonchi or wheezes  CV: regular rate and rhythm, normal S1 S2, no murmur, click, rub or gallop. Symmetric radial pulses. No peripheral swelling noted.   MSK: no gross musculoskeletal defects noted.  SKIN: no suspicious lesions or rashes.  EXT: Warm and well perfused.  NEURO: CNII-XII grossly intact. No focal deficits.  PSYCH: Groomed, dressed appropriately for weather. Normal mood with consistent affect.

## 2022-08-03 NOTE — PATIENT INSTRUCTIONS
Increase metformin to 1500mg daily.    We will see you in 6 months for another diabetes check up.    Rajinder Cortez MD  [facility]

## 2022-09-09 ENCOUNTER — MYC MEDICAL ADVICE (OUTPATIENT)
Dept: FAMILY MEDICINE | Facility: CLINIC | Age: 37
End: 2022-09-09

## 2022-09-12 NOTE — TELEPHONE ENCOUNTER
Form printed and placed in Dr. Cortez's in basket for appt 9/15/22.    Will forward to Dr. Cortez and the [facility].

## 2022-09-15 ENCOUNTER — OFFICE VISIT (OUTPATIENT)
Dept: FAMILY MEDICINE | Facility: CLINIC | Age: 37
End: 2022-09-15
Payer: COMMERCIAL

## 2022-09-15 VITALS
OXYGEN SATURATION: 98 % | DIASTOLIC BLOOD PRESSURE: 76 MMHG | TEMPERATURE: 97.9 F | BODY MASS INDEX: 26.9 KG/M2 | SYSTOLIC BLOOD PRESSURE: 124 MMHG | HEIGHT: 73 IN | WEIGHT: 203 LBS | HEART RATE: 71 BPM | RESPIRATION RATE: 16 BRPM

## 2022-09-15 DIAGNOSIS — G89.29 CHRONIC PAIN OF LEFT KNEE: ICD-10-CM

## 2022-09-15 DIAGNOSIS — Z02.89 ENCOUNTER FOR COMPLETION OF FORM WITH PATIENT: Primary | ICD-10-CM

## 2022-09-15 DIAGNOSIS — M25.362 PATELLAR INSTABILITY OF LEFT KNEE: ICD-10-CM

## 2022-09-15 DIAGNOSIS — M21.062 ACQUIRED GENU VALGUM OF LEFT KNEE: ICD-10-CM

## 2022-09-15 DIAGNOSIS — M25.562 CHRONIC PAIN OF LEFT KNEE: ICD-10-CM

## 2022-09-15 PROCEDURE — 99214 OFFICE O/P EST MOD 30 MIN: CPT | Performed by: STUDENT IN AN ORGANIZED HEALTH CARE EDUCATION/TRAINING PROGRAM

## 2022-09-15 ASSESSMENT — PAIN SCALES - GENERAL: PAINLEVEL: MODERATE PAIN (5)

## 2022-09-15 NOTE — PROGRESS NOTES
Assessment & Plan     Encounter for completion of form with patient  Acquired genu valgum of left knee  Chronic pain of left knee  Patellar instability of left knee  Followed with Ortho who performed bilateral distal femoral osteotomy and medial retinacular imbrication in  without relief in pain symptoms. Ortho recommended against additional surgeries at follow up visits and referred him to pain management who he has seen few times since then. Last visit was on 3/2022 with no significant improvement in pain. Has done PT in the past but has taken a break over the summer due to insurance coverage issues. Will re-refer for leg strengthening exercises. Has had two disability forms completed in the past, one for 6 months and the subsequent one for 12 months which is due to  at the end of the month. Due to persistent chronic pain, no surgical options and ambulation requiring cane, will complete form for 24 months.     - Physical Therapy Referral     30 minutes spent with precharting, visit with patient, and documentation.    Return in about 4 months (around 1/15/2023) for Follow up as already scheduled for diabetes.    Rajinder Cortez MD  [facility]  9/15/2022      Gallito Beard is a 37 year old presenting for the following health issues:    Forms      History of Present Illness       Reason for visit:  Need disability certificate renewed    He eats 2-3 servings of fruits and vegetables daily.He consumes 0 sweetened beverage(s) daily.He exercises with enough effort to increase his heart rate 9 or less minutes per day.  He exercises with enough effort to increase his heart rate 3 or less days per week.   He is taking medications regularly.     Disability parking  Hx of significant genu valgus bilaterally leading to arthritis -   Saw orthopaedics and did surgery, seemed to help with valgus.   But the arthritis is progressively worsening since that time.  Hasn't seen the surgeon this  "year. Last seen was last year.   Surgeon said healing has been done, any remaining pain will likely not resolve  Was referred to PT and pain management at that time.  Was told that potentially needed total knee but that he was too young for that right now.  Last disability - was given one year - by [employer] [facility].   PCPs have retired twice so having to see alternative people to help out.    Was seeing PT every 2 weeks - helping with strength but not with the pain.   Had a gap over the summer though where he stopped doing this due to insurance changing         Objective    /76 (BP Location: Right arm)   Pulse 71   Temp 97.9  F (36.6  C) (Oral)   Resp 16   Ht 1.854 m (6' 1\")   Wt 92.1 kg (203 lb)   SpO2 98%   BMI 26.78 kg/m    Body mass index is 26.78 kg/m .     Physical Exam     GENERAL: healthy, alert and no distress. Walks with cane.  HEAD: Normocephalic, atraumatic.   EYES: Normal conjunctivae, sclera.  RESP: Normal respiratory effort.  MSK: Knees with old surgical scars. Genu valgus present bilaterally.  SKIN: no suspicious lesions or rashes.  NEURO: CNII-XII grossly intact. No focal deficits.  PSYCH: Groomed, dressed appropriately for weather. Normal mood with consistent affect.             "

## 2022-09-21 DIAGNOSIS — E78.2 MIXED HYPERLIPIDEMIA: ICD-10-CM

## 2022-09-22 RX ORDER — ATORVASTATIN CALCIUM 40 MG/1
TABLET, FILM COATED ORAL
Qty: 90 TABLET | Refills: 1 | Status: SHIPPED | OUTPATIENT
Start: 2022-09-22 | End: 2023-02-10

## 2022-09-22 NOTE — TELEPHONE ENCOUNTER
Patient was given form at appointment. Sent copy to [facility] to scan into chart as well     Abebe Lino CMA

## 2022-09-22 NOTE — TELEPHONE ENCOUNTER
Prescription approved per [facility] Refill Protocol.    Geraldine JIMENEZ RN   Patient Advocate Liaison (PAL)  [facility]

## 2022-10-19 ENCOUNTER — TELEPHONE (OUTPATIENT)
Dept: FAMILY MEDICINE | Facility: CLINIC | Age: 37
End: 2022-10-19

## 2022-10-19 NOTE — TELEPHONE ENCOUNTER
Prior Authorization Approval    Authorization Effective Date: 9/19/2022  Authorization Expiration Date: 10/19/2023  Medication: Semaglutide, 1 MG/DOSE, (OZEMPIC, 1 MG/DOSE,) 4 MG/3ML SOPN  Approved Dose/Quantity:    Reference #:     Insurance Company: [employer] - Phone [phone] Fax [phone]  Expected CoPay:       CoPay Card Available:      Foundation Assistance Needed:    Which Pharmacy is filling the prescription (Not needed for infusion/clinic administered): [facility] [address] [facility] [address] [facility]  Pharmacy Notified: Yes  Patient Notified: Yes  **Instructed pharmacy to notify patient when script is ready to /ship.**

## 2022-10-19 NOTE — TELEPHONE ENCOUNTER
Prior Authorization Specialty Medication Request    Medication/Dose: Semaglutide, 1 MG/DOSE, (OZEMPIC, 1 MG/DOSE,) 4 MG/3ML SOPN    Insurance Name: [facility]  Insurance ID: [id number]  Insurance Phone Number: [phone]

## 2022-11-26 ENCOUNTER — E-VISIT (OUTPATIENT)
Dept: URGENT CARE | Facility: CLINIC | Age: 37
End: 2022-11-26
Payer: COMMERCIAL

## 2022-11-26 DIAGNOSIS — J01.90 ACUTE SINUSITIS WITH SYMPTOMS > 10 DAYS: Primary | ICD-10-CM

## 2022-11-26 PROCEDURE — 99421 OL DIG E/M SVC 5-10 MIN: CPT | Performed by: NURSE PRACTITIONER

## 2022-11-26 NOTE — PATIENT INSTRUCTIONS
Dear Naman Jones    After reviewing your responses, I've been able to diagnose you with Acute sinusitis with symptoms > 10 days.      Based on your responses and diagnosis, I have prescribed   Orders Placed This Encounter     amoxicillin-clavulanate (AUGMENTIN) 875-125 MG tablet    to treat your symptoms. I have sent this to your pharmacy.?     It is also important to stay well hydrated, get lots of rest and take over-the-counter decongestants,?tylenol?or ibuprofen if you?are able to?take those medications per your primary care provider to help relieve discomfort.?     It is important that you take?all of?your prescribed medication even if your symptoms are improving after a few doses.? Taking?all of?your medicine helps prevent the symptoms from returning.?     If your symptoms worsen, you develop severe headache, vomiting, high fever (>102), or are not improving in 7 days, please contact your primary care provider for an appointment or visit any of our convenient Walk-in Care or Urgent Care Centers to be seen which can be found on our website?here.?     Thanks again for choosing?us?as your health care partner,?   ?  Clarisse Dillon, ARDEN CNP?

## 2022-12-02 ENCOUNTER — OFFICE VISIT (OUTPATIENT)
Dept: ORTHOPEDICS | Facility: CLINIC | Age: 37
End: 2022-12-02
Payer: COMMERCIAL

## 2022-12-02 VITALS — BODY MASS INDEX: 26.51 KG/M2 | HEIGHT: 73 IN | WEIGHT: 200 LBS

## 2022-12-02 DIAGNOSIS — M77.11 LATERAL EPICONDYLITIS OF RIGHT ELBOW: Primary | ICD-10-CM

## 2022-12-02 PROCEDURE — 99203 OFFICE O/P NEW LOW 30 MIN: CPT | Performed by: STUDENT IN AN ORGANIZED HEALTH CARE EDUCATION/TRAINING PROGRAM

## 2022-12-02 NOTE — PATIENT INSTRUCTIONS
1. Lateral epicondylitis of right elbow      Naman Jones is a 37 year old right-handed male presenting for evaluation of severe right elbow pain x 6 months. History, exam and imaging findings were reviewed today, consistent with lateral epicondylosis.  Educated on exacerbating activities. Discussed treatment plan inclusive of pain control (oral NSAIDS, topicals, elbow strap or wrist brace) and formal Hand Therapy. Reviewed the limited role for cortisone injections due to risk of tendon rupture and limited effectiveness.     Detailed plan:  - Avoid activities that irritate / worsen your pain.  - Wrist brace provided to be worn consistently at night and as needed during activities that provoke the pain (ie lifting, typing or at night).   - Referral placed for Hand Therapy to work on eccentric strengthening. Please call [phone] to schedule.  - Topical diclofenac (Voltaren) gel may be applied to the painful area 3-4 times per day for up to 2 weeks, then reduce to as-needed. This is an over-the-counter topical non-steroidal anti-inflammatory (NSAID) medication. Do not use with other NSAIDS like ibuprofen or advil.  - Heat or ice as needed for comfort.    If no improvement with Hand Therapy, we can consider nitroglycerin patches, ultrasound-guided percutaneous needle tenotomy or PRP injection.    Please follow up after 4-6 Hand Therapy sessions if not improved or sooner if needed. You may call our direct clinic number ([phone]) at any time with questions or concerns.    Stephania James MD, [facility] Sports and Orthopedic [facility]

## 2022-12-02 NOTE — PROGRESS NOTES
ASSESSMENT & PLAN  Patient Instructions     1. Lateral epicondylitis of right elbow      Naman Jones is a 37 year old right-handed male presenting for evaluation of severe right elbow pain x 6 months. History, exam and imaging findings were reviewed today, consistent with lateral epicondylosis.  Educated on exacerbating activities. Discussed treatment plan inclusive of pain control (oral NSAIDS, topicals, elbow strap or wrist brace) and formal Hand Therapy. Reviewed the limited role for cortisone injections due to risk of tendon rupture and limited effectiveness.     Detailed plan:  - Avoid activities that irritate / worsen your pain.  - Wrist brace provided to be worn consistently at night and as needed during activities that provoke the pain (ie lifting, typing or at night).   - Referral placed for Hand Therapy to work on eccentric strengthening. Please call [phone] to schedule.  - Topical diclofenac (Voltaren) gel may be applied to the painful area 3-4 times per day for up to 2 weeks, then reduce to as-needed. This is an over-the-counter topical non-steroidal anti-inflammatory (NSAID) medication. Do not use with other NSAIDS like ibuprofen or advil.  - Heat or ice as needed for comfort.    If no improvement with Hand Therapy, we can consider nitroglycerin patches, ultrasound-guided percutaneous needle tenotomy or PRP injection.    Please follow up after 4-6 Hand Therapy sessions if not improved or sooner if needed. You may call our direct clinic number ([phone]) at any time with questions or concerns.    Stephania James MD, [facility] Sports and Orthopedic Care        -----    SUBJECTIVE  Naman Jones is a/an 37 year old Right handed male who is seen as a self referral for evaluation of right elbow pain. The patient is seen by themselves.    Onset: 6 month(s) ago. Patient cannot recall a specific injury but states that he first noticed the pain while lifting a heavy item  "about 6 months ago. He never felt a pop or snap. Pain has persisted with certain movements   Location of Pain: right lateral elbow  Rating of Pain at worst: 9/10  Rating of Pain Currently: 4/10  Worsened by: activities / lifting performed with elbow extended, resisted external rotation, reaching forward with cane  Better with: rest / activity avoidance, TENS, ibuprofen  Treatments tried: rest/activity avoidance, ice and TENS unit, ibuprofen  Associated symptoms: no weakness, swelling, bruising, ecchymosis  Orthopedic history: NO  Relevant surgical history: NO  Social history: works - IT    Past Medical History:   Diagnosis Date     Diabetes (H)      Hypertension      Social History     Socioeconomic History     Marital status:      Highest education level: Bachelor's degree (e.g., BA, AB, BS)   Tobacco Use     Smoking status: Never     Smokeless tobacco: Never   Vaping Use     Vaping Use: Never used   Substance and Sexual Activity     Alcohol use: Not Currently     Comment: socially     Drug use: No     Sexual activity: Yes     Partners: Female       Patient's past medical, surgical, social, and family histories were reviewed today and no changes are noted.    REVIEW OF SYSTEMS:  10 point ROS is negative other than symptoms noted above in HPI, Past Medical History or as stated below  Constitutional: NEGATIVE for fever, chills, change in weight  Skin: NEGATIVE for worrisome rashes, moles or lesions  GI/: NEGATIVE for bowel or bladder changes  Neuro: NEGATIVE for weakness, dizziness or paresthesias    OBJECTIVE:  Ht 1.854 m (6' 1\")   Wt 90.7 kg (200 lb)   BMI 26.39 kg/m     General: healthy, alert and in no distress  HEENT: no scleral icterus or conjunctival erythema  Skin: no suspicious lesions or rash. No jaundice.  CV: regular rhythm by palpation  Resp: normal respiratory effort without conversational dyspnea   Psych: normal mood and affect  Gait: normal steady gait with appropriate coordination and " balance  Neuro: Normal sensory exam of bilateral hands.   MSK:  RIGHT ELBOW  Inspection:    No swelling, bruising, discoloration, or obvious deformity or asymmetry  Palpation:    Tender about the lateral epicondyle, common extensor tendon, extensor muscle of forearm. Remainder of bony, ligamentous and tendinous landmarks are nontender.    Crepitus is Absent  Range of Motion:     Elbow: Extension full / flexion full / pronation full / supination full    Wrist: Full (active and passive) flexion, extension,and ulnar/radial deviation.  Strength:    No deficits in elbow flexion, extension, pronation/supination (+mild pain).    No deficits in wrist flexion, extension (+pain), radial/ulnar deviation.  Special Tests:    Positive: Pain with resisted wrist extension, pain with resisted middle finger extension, pain with resisted supination, pain with resisted pronation    Negative: pain with resisted wrist flexion, cubital tunnel (ulnar Tinel's)      Stephania James MD [facility] Sports and Orthopedic Care

## 2022-12-02 NOTE — LETTER
12/2/2022         RE: Naman Jones  [address] [facility] [address] [facility] [address]        Dear Colleague,    Thank you for referring your patient, Naman Jones, to the [facility] [place]. Please see a copy of my visit note below.    ASSESSMENT & PLAN  Patient Instructions     1. Lateral epicondylitis of right elbow      Naman Jones is a 37 year old right-handed male presenting for evaluation of severe right elbow pain x 6 months. History, exam and imaging findings were reviewed today, consistent with lateral epicondylosis.  Educated on exacerbating activities. Discussed treatment plan inclusive of pain control (oral NSAIDS, topicals, elbow strap or wrist brace) and formal Hand Therapy. Reviewed the limited role for cortisone injections due to risk of tendon rupture and limited effectiveness.     Detailed plan:  - Avoid activities that irritate / worsen your pain.  - Wrist brace provided to be worn consistently at night and as needed during activities that provoke the pain (ie lifting, typing or at night).   - Referral placed for Hand Therapy to work on eccentric strengthening. Please call [phone] to schedule.  - Topical diclofenac (Voltaren) gel may be applied to the painful area 3-4 times per day for up to 2 weeks, then reduce to as-needed. This is an over-the-counter topical non-steroidal anti-inflammatory (NSAID) medication. Do not use with other NSAIDS like ibuprofen or advil.  - Heat or ice as needed for comfort.    If no improvement with Hand Therapy, we can consider nitroglycerin patches, ultrasound-guided percutaneous needle tenotomy or PRP injection.    Please follow up after 4-6 Hand Therapy sessions if not improved or sooner if needed. You may call our direct clinic number ([phone]) at any time with questions or concerns.    Stephania James MD, CAQSM  [facility] Sports and Orthopedic Care        -----    SUBJECTIVE  Naman Jones  "is a/an 37 year old Right handed male who is seen as a self referral for evaluation of right elbow pain. The patient is seen by themselves.    Onset: 6 month(s) ago. Patient cannot recall a specific injury but states that he first noticed the pain while lifting a heavy item about 6 months ago. He never felt a pop or snap. Pain has persisted with certain movements   Location of Pain: right lateral elbow  Rating of Pain at worst: 9/10  Rating of Pain Currently: 4/10  Worsened by: activities / lifting performed with elbow extended, resisted external rotation, reaching forward with cane  Better with: rest / activity avoidance, TENS, ibuprofen  Treatments tried: rest/activity avoidance, ice and TENS unit, ibuprofen  Associated symptoms: no weakness, swelling, bruising, ecchymosis  Orthopedic history: NO  Relevant surgical history: NO  Social history: works - IT    Past Medical History:   Diagnosis Date     Diabetes (H)      Hypertension      Social History     Socioeconomic History     Marital status:      Highest education level: Bachelor's degree (e.g., BA, AB, BS)   Tobacco Use     Smoking status: Never     Smokeless tobacco: Never   Vaping Use     Vaping Use: Never used   Substance and Sexual Activity     Alcohol use: Not Currently     Comment: socially     Drug use: No     Sexual activity: Yes     Partners: Female       Patient's past medical, surgical, social, and family histories were reviewed today and no changes are noted.    REVIEW OF SYSTEMS:  10 point ROS is negative other than symptoms noted above in HPI, Past Medical History or as stated below  Constitutional: NEGATIVE for fever, chills, change in weight  Skin: NEGATIVE for worrisome rashes, moles or lesions  GI/: NEGATIVE for bowel or bladder changes  Neuro: NEGATIVE for weakness, dizziness or paresthesias    OBJECTIVE:  Ht 1.854 m (6' 1\")   Wt 90.7 kg (200 lb)   BMI 26.39 kg/m     General: healthy, alert and in no distress  HEENT: no scleral " icterus or conjunctival erythema  Skin: no suspicious lesions or rash. No jaundice.  CV: regular rhythm by palpation  Resp: normal respiratory effort without conversational dyspnea   Psych: normal mood and affect  Gait: normal steady gait with appropriate coordination and balance  Neuro: Normal sensory exam of bilateral hands.   MSK:  RIGHT ELBOW  Inspection:    No swelling, bruising, discoloration, or obvious deformity or asymmetry  Palpation:    Tender about the lateral epicondyle, common extensor tendon, extensor muscle of forearm. Remainder of bony, ligamentous and tendinous landmarks are nontender.    Crepitus is Absent  Range of Motion:     Elbow: Extension full / flexion full / pronation full / supination full    Wrist: Full (active and passive) flexion, extension,and ulnar/radial deviation.  Strength:    No deficits in elbow flexion, extension, pronation/supination (+mild pain).    No deficits in wrist flexion, extension (+pain), radial/ulnar deviation.  Special Tests:    Positive: Pain with resisted wrist extension, pain with resisted middle finger extension, pain with resisted supination, pain with resisted pronation    Negative: pain with resisted wrist flexion, cubital tunnel (ulnar Tinel's)      Stephania James MD [facility] Sports and Orthopedic Care        Again, thank you for allowing me to participate in the care of your patient.        Sincerely,        Stephania James MD

## 2022-12-20 DIAGNOSIS — R80.9 TYPE 2 DIABETES MELLITUS WITH MICROALBUMINURIA, WITHOUT LONG-TERM CURRENT USE OF INSULIN (H): ICD-10-CM

## 2022-12-20 DIAGNOSIS — E11.29 TYPE 2 DIABETES MELLITUS WITH MICROALBUMINURIA, WITHOUT LONG-TERM CURRENT USE OF INSULIN (H): ICD-10-CM

## 2022-12-20 RX ORDER — SEMAGLUTIDE 1.34 MG/ML
1 INJECTION, SOLUTION SUBCUTANEOUS
Qty: 3 ML | Refills: 0 | Status: SHIPPED | OUTPATIENT
Start: 2022-12-20 | End: 2023-02-15

## 2022-12-20 NOTE — TELEPHONE ENCOUNTER
Pt has appointment scheduled  Prescription approved per [facility] Refill Protocol.  Enedelia Goetz RN, BSN  [facility]

## 2023-01-04 ENCOUNTER — OFFICE VISIT (OUTPATIENT)
Dept: OPTOMETRY | Facility: CLINIC | Age: 38
End: 2023-01-04
Attending: STUDENT IN AN ORGANIZED HEALTH CARE EDUCATION/TRAINING PROGRAM
Payer: COMMERCIAL

## 2023-01-04 DIAGNOSIS — H01.006 BLEPHARITIS OF BOTH EYES, UNSPECIFIED EYELID, UNSPECIFIED TYPE: ICD-10-CM

## 2023-01-04 DIAGNOSIS — H52.13 MYOPIA OF BOTH EYES: ICD-10-CM

## 2023-01-04 DIAGNOSIS — R80.9 TYPE 2 DIABETES MELLITUS WITH MICROALBUMINURIA, WITHOUT LONG-TERM CURRENT USE OF INSULIN (H): ICD-10-CM

## 2023-01-04 DIAGNOSIS — E11.9 DIABETES MELLITUS WITHOUT OPHTHALMIC MANIFESTATIONS (H): Primary | ICD-10-CM

## 2023-01-04 DIAGNOSIS — H01.003 BLEPHARITIS OF BOTH EYES, UNSPECIFIED EYELID, UNSPECIFIED TYPE: ICD-10-CM

## 2023-01-04 DIAGNOSIS — E11.29 TYPE 2 DIABETES MELLITUS WITH MICROALBUMINURIA, WITHOUT LONG-TERM CURRENT USE OF INSULIN (H): ICD-10-CM

## 2023-01-04 PROCEDURE — 92004 COMPRE OPH EXAM NEW PT 1/>: CPT | Performed by: OPTOMETRIST

## 2023-01-04 ASSESSMENT — VISUAL ACUITY
OS_CC: 20/25
METHOD: SNELLEN - LINEAR
OD_SC: 20/200
OS_SC: 20/300
OD_CC: 20/30
OD_CC+: -2

## 2023-01-04 ASSESSMENT — CONF VISUAL FIELD
OS_INFERIOR_TEMPORAL_RESTRICTION: 0
METHOD: COUNTING FINGERS
OD_SUPERIOR_NASAL_RESTRICTION: 0
OS_SUPERIOR_TEMPORAL_RESTRICTION: 0
OD_NORMAL: 1
OS_NORMAL: 1
OD_SUPERIOR_TEMPORAL_RESTRICTION: 0
OD_INFERIOR_TEMPORAL_RESTRICTION: 0
OS_INFERIOR_NASAL_RESTRICTION: 0
OS_SUPERIOR_NASAL_RESTRICTION: 0
OD_INFERIOR_NASAL_RESTRICTION: 0

## 2023-01-04 ASSESSMENT — REFRACTION_MANIFEST
OS_SPHERE: -4.00
OD_CYLINDER: +0.25
OS_AXIS: 137
OS_CYLINDER: +0.50
OD_AXIS: 010
OD_SPHERE: -2.75

## 2023-01-04 ASSESSMENT — CUP TO DISC RATIO
OD_RATIO: 0.4
OS_RATIO: 0.35

## 2023-01-04 ASSESSMENT — TONOMETRY
IOP_METHOD: APPLANATION
OD_IOP_MMHG: 14
OS_IOP_MMHG: 15

## 2023-01-04 ASSESSMENT — EXTERNAL EXAM - LEFT EYE: OS_EXAM: MILD ROSACEA

## 2023-01-04 ASSESSMENT — EXTERNAL EXAM - RIGHT EYE: OD_EXAM: MILD ROSACEA

## 2023-01-04 NOTE — PROGRESS NOTES
Chief Complaint   Patient presents with     Diabetic Eye Exam    and  Glasses need to be replaced    Last Eye Exam: 2019  Dilated Previously: Yes    What are you currently using to see?  glasses       Distance Vision Acuity: Satisfied with vision    Near Vision Acuity: Satisfied with vision while reading      Eye Comfort: good  Do you use eye drops? : No    Lab Results   Component Value Date    A1C 6.8 08/03/2022    A1C 8.5 04/06/2022    A1C 7.3 09/29/2020    A1C 6.8 02/28/2020    A1C 7.0 02/14/2020    A1C 6.1 09/06/2019    A1C 6.5 05/03/2019         Medical, surgical and family histories reviewed and updated 1/4/2023.     fohx glaucoma father    Blepharitis no treatment / dry eye seems better   OBJECTIVE: See Ophthalmology exam    ASSESSMENT:    ICD-10-CM    1. Diabetes mellitus without ophthalmic manifestations (H)  E11.9       2. Type 2 diabetes mellitus with microalbuminuria, without long-term current use of insulin (H)  E11.29 Adult Eye Referral    R80.9       3. Myopia of both eyes  H52.13       4. Blepharitis of both eyes, unspecified eyelid, unspecified type  H01.003     H01.006           PLAN:   Glucose control / update prescription     Goldie Georges OD

## 2023-01-04 NOTE — LETTER
1/4/2023         RE: Naman Jones  [address] [facility] [address]        Dear Colleague,    Thank you for referring your patient, Naman Jones, to the [facility]. Please see a copy of my visit note below.    Chief Complaint   Patient presents with     Diabetic Eye Exam    and  Glasses need to be replaced    Last Eye Exam: 2019  Dilated Previously: Yes    What are you currently using to see?  glasses       Distance Vision Acuity: Satisfied with vision    Near Vision Acuity: Satisfied with vision while reading      Eye Comfort: good  Do you use eye drops? : No    Lab Results   Component Value Date    A1C 6.8 08/03/2022    A1C 8.5 04/06/2022    A1C 7.3 09/29/2020    A1C 6.8 02/28/2020    A1C 7.0 02/14/2020    A1C 6.1 09/06/2019    A1C 6.5 05/03/2019         Medical, surgical and family histories reviewed and updated 1/4/2023.     fohx glaucoma father    Blepharitis no treatment / dry eye seems better   OBJECTIVE: See Ophthalmology exam    ASSESSMENT:    ICD-10-CM    1. Diabetes mellitus without ophthalmic manifestations (H)  E11.9       2. Type 2 diabetes mellitus with microalbuminuria, without long-term current use of insulin (H)  E11.29 Adult Eye Referral    R80.9       3. Myopia of both eyes  H52.13       4. Blepharitis of both eyes, unspecified eyelid, unspecified type  H01.003     H01.006           PLAN:   Glucose control / update prescription     Goldie Georges OD        Again, thank you for allowing me to participate in the care of your patient.        Sincerely,        Goldie Georges, OD

## 2023-01-04 NOTE — PATIENT INSTRUCTIONS
Patient Education   Diabetes weakens the blood vessels all over the body, including the eyes. Damage to the blood vessels in the eyes can cause swelling or bleeding into part of the eye (called the retina). This is called diabetic retinopathy (PATRICIA-tin-AH-puh-thee). If not treated, this disease can cause vision loss or blindness.   Symptoms may include blurred or distorted vision, but many people have no symptoms. It's important to see your eye doctor regularly to check for problems.   Early treatment and good control can help protect your vision. Here are the things you can do to help prevent vision loss:      1. Keep your blood sugar levels under tight control.      2. Bring high blood pressure under control.      3. No smoking.      4. Have yearly dilated eye exams.    No retinopathy , very little change  Blepharitis is a chronic or long term inflammation of the eyelids and eyelashes. It affects all ages. Causes include poor eyelid hygiene, excess oil production, staph bacteria or an allergic reaction.    Blepharitis may appear as greasy flakes on the base of the eyelashes, crusting of eyelashes and mild redness of the eyelid margins.  Sometimes it may result in an acute infection of a gland in the eyelid called a stye and sometimes painless firm nodules can form in the eyelid that do not resolve on their own and must be surgically removed.    Treatment includes warm compresses and lid hygiene with an antimicrobial lid scrub and sometimes a prescription ointment is needed.      Hot compresses/ warm soaks  Warm compresses are very beneficial to the normal functioning of the eye.   They help loosen up the eyelid debris that has collected on the eyelash follicles.  Overabundance of bacterial microorganisms along the eyelashes and lid margins induce stress on the tear film and promote inflammation.  Regular lid hygiene helps diminish the bacterial population to prevent inflammation and infection.  Cleanse lids once  daily with a lid cleansing product as directed such as Ocusoft or Sterilid which can be purchased at most pharmacies. Eyelid cleansers maintain clean and healthy eyelid margins. Ocusoft or sterilid are commercial products that are available as individual wrapped cleansing pads.  Diluted baby shampoo will work, but not as well and is a cheaper alternative.    Directions for warm soaks  There are few methods for hot compresses. Moisten a washcloth with hot water, or microwave for 10 seconds, being careful to not get the cloth too hot.   Then put the washcloth onto your eyelids for 5 minutes. It will cool quickly so a rice pack or eyemask that can be heated and laid on top of the washcloth will help retain the heat.

## 2023-02-06 ENCOUNTER — VIRTUAL VISIT (OUTPATIENT)
Dept: UROLOGY | Facility: CLINIC | Age: 38
End: 2023-02-06
Payer: COMMERCIAL

## 2023-02-06 VITALS — HEIGHT: 73 IN | BODY MASS INDEX: 26.11 KG/M2 | WEIGHT: 197 LBS

## 2023-02-06 DIAGNOSIS — Z30.2 ENCOUNTER FOR STERILIZATION: Primary | ICD-10-CM

## 2023-02-06 PROCEDURE — 99203 OFFICE O/P NEW LOW 30 MIN: CPT | Mod: 95 | Performed by: UROLOGY

## 2023-02-06 ASSESSMENT — PAIN SCALES - GENERAL: PAINLEVEL: NO PAIN (0)

## 2023-02-06 NOTE — LETTER
2/6/2023       RE: Naman Jones  [address] [facility] [id number]     Dear Colleague,    Thank you for referring your patient, Naman Jones, to the [facility] [place] at [facility]. Please see a copy of my visit note below.    ** Send link to cell phone    Chirag is a 37 year old who is being evaluated via a billable video visit.      How would you like to obtain your AVS? MyChart     If the video visit is dropped, the invitation should be resent by: Text to cell phone: [phone]    Will anyone else be joining your video visit? No      VASECTOMY CONSULTATION NOTE  [facility]  [phone]  DATE OF VISIT: 2/6/2023    PATIENT NAME: Naman Jones    YOB: 1985      REASON FOR CONSULTATION: Mr. Naman Jones is a 37 year old year old gentleman who is being seen as a virtual visit in the urology clinic today requesting a vasectomy. He has 2 children and he wishes to have a vasectomy for birth control. He has no prior urologic history and has had no prior surgery on the testicles. He has no symptoms in the testicles.    PAST MEDICAL HISTORY:   Past Medical History:   Diagnosis Date     Diabetes (H)      Hypertension        PAST SURGICAL HISTORY:   Past Surgical History:   Procedure Laterality Date     ARTHROSCOPY KNEE WITH DEBRIDEMENT JOINT, COMBINED Left 12/15/2020    Procedure: left knee examination under anesthesia, left knee arthroscopy;  Surgeon: Angel Madera MD;  Location: [facility] OR     ARTHROSCOPY KNEE WITH PATELLAR REALIGNMENT Left 02/28/2020    Procedure: Examination under anesthesia Left knee,  medial reefing/imbrication;  Surgeon: Angel Madera MD;  [facility]     ARTHROSCOPY KNEE WITH RETINACULAR RELEASE Left 02/28/2020    Procedure: Left lateral retinacular lengthening;  Surgeon: Angel Madera MD;  [facility]     ARTHROSCOPY KNEE  WITH RETINACULAR RELEASE Right 10/02/2020    Procedure: Examination under anesthesia right knee, right knee arthroscopy, chondralplasty of lateral femoral condyle and lateral trochlea, medial retinacular imbrication, open lateral retinacular lengthening;  Surgeon: Angel Madera MD;  [facility]     LAPAROSCOPIC BYPASS GASTRIC N/A 10/09/2018    Procedure: LAPAROSCOPIC BYPASS GASTRIC;  LAPAROSCOPIC WELL[place]-EN Y GASTRIC BYPASS;  Surgeon: Alden Mcwilliams MD;  Location: [facility]     ORTHOPEDIC SURGERY Right 02/2016     OSTEOTOMY FEMUR DISTAL Left 02/28/2020    Procedure: Left distal femoral osteotomy;  Surgeon: Angel Madera MD;  [facility]     OSTEOTOMY FEMUR DISTAL Right 10/02/2020    Procedure: Left distal femoral osteotomy;  Surgeon: Angel Madera MD;  [facility]     REMOVE HARDWARE KNEE Left 12/15/2020    Procedure: LEFT KNEE HARDWARE REMOVAL;  Surgeon: Angel Madera MD;  Location: [facility] OR       MEDICATIONS:   Current Outpatient Medications:      atorvastatin (LIPITOR) 40 MG tablet, TAKE 1 TABLET BY MOUTH EVERY DAY, Disp: 90 tablet, Rfl: 1     blood glucose (ONETOUCH ULTRA) test strip, Use to test blood sugar 3 times daily or as directed., Disp: 300 strip, Rfl: 3     Calcium Carb-Cholecalciferol (CALCIUM 600 + D PO), Take 1 tablet by mouth 2 times daily, Disp: , Rfl:      Cyanocobalamin (B-12) 500 MCG SUBL, Place 1 tablet under the tongue daily, Disp: , Rfl:      desvenlafaxine succinate (PRISTIQ) 25 MG 24 hr tablet, Take 50 mg by mouth daily , Disp: , Rfl:      eszopiclone (LUNESTA) 3 MG tablet, Take 3 mg by mouth every 24 hours, Disp: , Rfl:      metFORMIN (GLUCOPHAGE) 500 MG tablet, Take 2 tablets (1,000 mg) by mouth daily (with breakfast) AND 1 tablet (500 mg) daily (with dinner)., Disp: 180 tablet, Rfl: 0     multivitamin  peds with iron (FLINTSTONES COMPLETE) 60 MG chewable tablet, Take 2 chew tab by mouth every morning , Disp: , Rfl:       OneTouch Delica Lancets 33G MISC, 1 each 3 times daily, Disp: 300 each, Rfl: 3     OZEMPIC, 1 MG/DOSE, 4 MG/3ML SOPN, INJECT 1 MG INTO THE MUSCLE EVERY 7 DAYS, Disp: 3 mL, Rfl: 0    ALLERGIES:   Allergies   Allergen Reactions     Sulfa Drugs Anaphylaxis     Oxycodone Other (See Comments) and Itching     Flushed     Lisinopril      Other reaction(s): Impotence     Onion      Other reaction(s): Intolerance-Can't Take       FAMILY HISTORY:   Family History   Problem Relation Age of Onset     Glaucoma Father      Diabetes Father      Alcoholism Father      Cirrhosis Father      Prostate Cancer Paternal Grandfather      Macular Degeneration No family hx of        SOCIAL HISTORY:   Social History     Socioeconomic History     Marital status:      Spouse name: Not on file     Number of children: Not on file     Years of education: Not on file     Highest education level: Bachelor's degree (e.g., BA, AB, BS)   Occupational History     Not on file   Tobacco Use     Smoking status: Never     Smokeless tobacco: Never   Vaping Use     Vaping Use: Never used   Substance and Sexual Activity     Alcohol use: Not Currently     Comment: socially     Drug use: No     Sexual activity: Yes     Partners: Female   Other Topics Concern     Parent/sibling w/ CABG, MI or angioplasty before 65F 55M? Not Asked   Social History Narrative     Not on file     Social Determinants of Health     Financial Resource Strain: Not on file   Food Insecurity: Not on file   Transportation Needs: Not on file   Physical Activity: Not on file   Stress: Not on file   Social Connections: Not on file   Intimate Partner Violence: Not on file   Housing Stability: Not on file       REVIEW OF SYSTEMS:  Skin: No rash, pruritis, or skin pigmentation  Eyes: No changes in vision  Ears/Nose/Throat: No changes in hearing, no nosebleeds  Respiratory: No shortness of breath, dyspnea on exertion, cough, or hemoptysis  Cardiovascular: No chest pain or  "palpitations  Gastrointestinal: No diarrhea or constipation. No abdominal pain. No hematochezia  Genitourinary: see HPI  Musculoskeletal: No pain or swelling of joints, normal range of motion  Neurologic: No weakness or tremors  Psychiatric: No recent changes in memory or mood  Hematologic/Lymphatic/Immunologic: No easy bruising or enlarged lymph nodes  Endocrine: No weight gain or loss      HEIGHT: 6' 1\"     WEIGHT: 197 lbs 0 oz   BP: Data Unavailable    PULSE: Data Unavailable    EXAM:   General: Alert and oriented to time, place, and self. In NAD   HEENT: Head AT/NC, EOMI, CN Grossly intact   Lungs: no respiratory distress, or pursed lip breathing   Heart: No obvious jugular venous distension present   Musculoskeltal: Normal movements. Normal appearing musculature  Skin: no suspicious lesions or rashes   Neuro: Alert, oriented, speech and mentation normal; moving all 4 extremities equally.   Psych: affect and mood normal      DIAGNOSIS: Request for sterilization    PLAN: The risks of the procedure as well as expectations for recovery and outcomes were splint in detail to him.  He was counseled on the risks for bleeding infection and pain after the procedure.  He was instructed to continue to use contraception until he had proven azoospermia on a semen specimen.  This would normally be collected at least 3 months after the procedure.  He was instructed to hold all anticoagulants medications for one week prior to the procedure.  He was also instructed to shave the scrotum prior to procedure.  It was recommended that he have someone else drive him home after his vasectomy.  In light of these risks and expectations he would like to proceed.  We are scheduling a vasectomy in the office in the near future.  This visit today was performed via video.  He understands that because of this I was not able to examine the testicles in person today.  I will perform an examination at the beginning of the vasectomy and he " understands that there is a small chance the procedure may need to be moved to the operating room.    Oh Hill M.D.        Video-Visit Details    Type of service:  Video Visit     Originating Location (pt. Location): Home    Distant Location (provider location):  On-site  Platform used for Video Visit: [facility]

## 2023-02-06 NOTE — PROGRESS NOTES
** Send link to cell phone    Chirag is a 37 year old who is being evaluated via a billable video visit.      How would you like to obtain your AVS? Blueknowhart     If the video visit is dropped, the invitation should be resent by: Text to cell phone: [phone]    Will anyone else be joining your video visit? No      VASECTOMY CONSULTATION NOTE  [facility]  [phone]  DATE OF VISIT: 2/6/2023    PATIENT NAME: Naman Jones    YOB: 1985      REASON FOR CONSULTATION: Mr. Naman Jones is a 37 year old year old gentleman who is being seen as a virtual visit in the urology clinic today requesting a vasectomy. He has 2 children and he wishes to have a vasectomy for birth control. He has no prior urologic history and has had no prior surgery on the testicles. He has no symptoms in the testicles.    PAST MEDICAL HISTORY:   Past Medical History:   Diagnosis Date     Diabetes (H)      Hypertension        PAST SURGICAL HISTORY:   Past Surgical History:   Procedure Laterality Date     ARTHROSCOPY KNEE WITH DEBRIDEMENT JOINT, COMBINED Left 12/15/2020    Procedure: left knee examination under anesthesia, left knee arthroscopy;  Surgeon: Angel Madera MD;  Location: [facility] OR     ARTHROSCOPY KNEE WITH PATELLAR REALIGNMENT Left 02/28/2020    Procedure: Examination under anesthesia Left knee,  medial reefing/imbrication;  Surgeon: Angel Madera MD;  [facility]     ARTHROSCOPY KNEE WITH RETINACULAR RELEASE Left 02/28/2020    Procedure: Left lateral retinacular lengthening;  Surgeon: Angel Madera MD;  [facility]     ARTHROSCOPY KNEE WITH RETINACULAR RELEASE Right 10/02/2020    Procedure: Examination under anesthesia right knee, right knee arthroscopy, chondralplasty of lateral femoral condyle and lateral trochlea, medial retinacular imbrication, open lateral retinacular lengthening;  Surgeon: Angel Madera MD;  [facility]      LAPAROSCOPIC BYPASS GASTRIC N/A 10/09/2018    Procedure: LAPAROSCOPIC BYPASS GASTRIC;  LAPAROSCOPIC [place]-EN Y GASTRIC BYPASS;  Surgeon: Alden Mcwilliams MD;  [facility]     ORTHOPEDIC SURGERY Right 02/2016     OSTEOTOMY FEMUR DISTAL Left 02/28/2020    Procedure: Left distal femoral osteotomy;  Surgeon: Angel Madera MD;  [facility]     OSTEOTOMY FEMUR DISTAL Right 10/02/2020    Procedure: Left distal femoral osteotomy;  Surgeon: Angel Madera MD;  [facility]     REMOVE HARDWARE KNEE Left 12/15/2020    Procedure: LEFT KNEE HARDWARE REMOVAL;  Surgeon: Angel Madera MD;  Location: [facility] OR       MEDICATIONS:   Current Outpatient Medications:      atorvastatin (LIPITOR) 40 MG tablet, TAKE 1 TABLET BY MOUTH EVERY DAY, Disp: 90 tablet, Rfl: 1     blood glucose (ONETOUCH ULTRA) test strip, Use to test blood sugar 3 times daily or as directed., Disp: 300 strip, Rfl: 3     Calcium Carb-Cholecalciferol (CALCIUM 600 + D PO), Take 1 tablet by mouth 2 times daily, Disp: , Rfl:      Cyanocobalamin (B-12) 500 MCG SUBL, Place 1 tablet under the tongue daily, Disp: , Rfl:      desvenlafaxine succinate (PRISTIQ) 25 MG 24 hr tablet, Take 50 mg by mouth daily , Disp: , Rfl:      eszopiclone (LUNESTA) 3 MG tablet, Take 3 mg by mouth every 24 hours, Disp: , Rfl:      metFORMIN (GLUCOPHAGE) 500 MG tablet, Take 2 tablets (1,000 mg) by mouth daily (with breakfast) AND 1 tablet (500 mg) daily (with dinner)., Disp: 180 tablet, Rfl: 0     multivitamin  peds with iron (FLINTSTONES COMPLETE) 60 MG chewable tablet, Take 2 chew tab by mouth every morning , Disp: , Rfl:      OneTouch Delica Lancets 33G MISC, 1 each 3 times daily, Disp: 300 each, Rfl: 3     OZEMPIC, 1 MG/DOSE, 4 MG/3ML SOPN, INJECT 1 MG INTO THE MUSCLE EVERY 7 DAYS, Disp: 3 mL, Rfl: 0    ALLERGIES:   Allergies   Allergen Reactions     Sulfa Drugs Anaphylaxis     Oxycodone Other (See Comments) and Itching     Flushed      Lisinopril      Other reaction(s): Impotence     Onion      Other reaction(s): Intolerance-Can't Take       FAMILY HISTORY:   Family History   Problem Relation Age of Onset     Glaucoma Father      Diabetes Father      Alcoholism Father      Cirrhosis Father      Prostate Cancer Paternal Grandfather      Macular Degeneration No family hx of        SOCIAL HISTORY:   Social History     Socioeconomic History     Marital status:      Spouse name: Not on file     Number of children: Not on file     Years of education: Not on file     Highest education level: Bachelor's degree (e.g., BA, AB, BS)   Occupational History     Not on file   Tobacco Use     Smoking status: Never     Smokeless tobacco: Never   Vaping Use     Vaping Use: Never used   Substance and Sexual Activity     Alcohol use: Not Currently     Comment: socially     Drug use: No     Sexual activity: Yes     Partners: Female   Other Topics Concern     Parent/sibling w/ CABG, MI or angioplasty before 65F 55M? Not Asked   Social History Narrative     Not on file     Social Determinants of Health     Financial Resource Strain: Not on file   Food Insecurity: Not on file   Transportation Needs: Not on file   Physical Activity: Not on file   Stress: Not on file   Social Connections: Not on file   Intimate Partner Violence: Not on file   Housing Stability: Not on file       REVIEW OF SYSTEMS:  Skin: No rash, pruritis, or skin pigmentation  Eyes: No changes in vision  Ears/Nose/Throat: No changes in hearing, no nosebleeds  Respiratory: No shortness of breath, dyspnea on exertion, cough, or hemoptysis  Cardiovascular: No chest pain or palpitations  Gastrointestinal: No diarrhea or constipation. No abdominal pain. No hematochezia  Genitourinary: see HPI  Musculoskeletal: No pain or swelling of joints, normal range of motion  Neurologic: No weakness or tremors  Psychiatric: No recent changes in memory or mood  Hematologic/Lymphatic/Immunologic: No easy bruising or  "enlarged lymph nodes  Endocrine: No weight gain or loss      HEIGHT: 6' 1\"     WEIGHT: 197 lbs 0 oz   BP: Data Unavailable    PULSE: Data Unavailable    EXAM:   General: Alert and oriented to time, place, and self. In NAD   HEENT: Head AT/NC, EOMI, CN Grossly intact   Lungs: no respiratory distress, or pursed lip breathing   Heart: No obvious jugular venous distension present   Musculoskeltal: Normal movements. Normal appearing musculature  Skin: no suspicious lesions or rashes   Neuro: Alert, oriented, speech and mentation normal; moving all 4 extremities equally.   Psych: affect and mood normal      DIAGNOSIS: Request for sterilization    PLAN: The risks of the procedure as well as expectations for recovery and outcomes were splint in detail to him.  He was counseled on the risks for bleeding infection and pain after the procedure.  He was instructed to continue to use contraception until he had proven azoospermia on a semen specimen.  This would normally be collected at least 3 months after the procedure.  He was instructed to hold all anticoagulants medications for one week prior to the procedure.  He was also instructed to shave the scrotum prior to procedure.  It was recommended that he have someone else drive him home after his vasectomy.  In light of these risks and expectations he would like to proceed.  We are scheduling a vasectomy in the office in the near future.  This visit today was performed via video.  He understands that because of this I was not able to examine the testicles in person today.  I will perform an examination at the beginning of the vasectomy and he understands that there is a small chance the procedure may need to be moved to the operating room.    Oh Hill M.D.        Video-Visit Details    Type of service:  Video Visit     Originating Location (pt. Location): Home    Distant Location (provider location):  On-site  Platform used for Video Visit: [employer]  "

## 2023-02-10 ENCOUNTER — OFFICE VISIT (OUTPATIENT)
Dept: FAMILY MEDICINE | Facility: CLINIC | Age: 38
End: 2023-02-10
Payer: COMMERCIAL

## 2023-02-10 VITALS
HEIGHT: 73 IN | DIASTOLIC BLOOD PRESSURE: 68 MMHG | WEIGHT: 202.5 LBS | OXYGEN SATURATION: 98 % | RESPIRATION RATE: 19 BRPM | TEMPERATURE: 98.4 F | SYSTOLIC BLOOD PRESSURE: 114 MMHG | HEART RATE: 69 BPM | BODY MASS INDEX: 26.84 KG/M2

## 2023-02-10 DIAGNOSIS — E11.9 DIABETES MELLITUS WITHOUT COMPLICATION (H): Primary | ICD-10-CM

## 2023-02-10 DIAGNOSIS — E78.2 MIXED HYPERLIPIDEMIA: ICD-10-CM

## 2023-02-10 LAB
ANION GAP SERPL CALCULATED.3IONS-SCNC: 10 MMOL/L (ref 7–15)
BUN SERPL-MCNC: 11.7 MG/DL (ref 6–20)
CALCIUM SERPL-MCNC: 9.3 MG/DL (ref 8.6–10)
CHLORIDE SERPL-SCNC: 103 MMOL/L (ref 98–107)
CREAT SERPL-MCNC: 0.6 MG/DL (ref 0.67–1.17)
DEPRECATED HCO3 PLAS-SCNC: 28 MMOL/L (ref 22–29)
GFR SERPL CREATININE-BSD FRML MDRD: >90 ML/MIN/1.73M2
GLUCOSE SERPL-MCNC: 161 MG/DL (ref 70–99)
HBA1C MFR BLD: 6.3 % (ref 0–5.6)
HOLD SPECIMEN: NORMAL
POTASSIUM SERPL-SCNC: 4.3 MMOL/L (ref 3.4–5.3)
SODIUM SERPL-SCNC: 141 MMOL/L (ref 136–145)

## 2023-02-10 PROCEDURE — 99214 OFFICE O/P EST MOD 30 MIN: CPT | Performed by: STUDENT IN AN ORGANIZED HEALTH CARE EDUCATION/TRAINING PROGRAM

## 2023-02-10 PROCEDURE — 36415 COLL VENOUS BLD VENIPUNCTURE: CPT | Performed by: STUDENT IN AN ORGANIZED HEALTH CARE EDUCATION/TRAINING PROGRAM

## 2023-02-10 PROCEDURE — 83036 HEMOGLOBIN GLYCOSYLATED A1C: CPT | Performed by: STUDENT IN AN ORGANIZED HEALTH CARE EDUCATION/TRAINING PROGRAM

## 2023-02-10 PROCEDURE — 80048 BASIC METABOLIC PNL TOTAL CA: CPT | Performed by: STUDENT IN AN ORGANIZED HEALTH CARE EDUCATION/TRAINING PROGRAM

## 2023-02-10 RX ORDER — ATORVASTATIN CALCIUM 40 MG/1
40 TABLET, FILM COATED ORAL DAILY
Qty: 90 TABLET | Refills: 1 | Status: SHIPPED | OUTPATIENT
Start: 2023-02-10 | End: 2023-11-08

## 2023-02-10 RX ORDER — BLOOD SUGAR DIAGNOSTIC
STRIP MISCELLANEOUS
Qty: 300 STRIP | Refills: 3 | Status: SHIPPED | OUTPATIENT
Start: 2023-02-10

## 2023-02-10 RX ORDER — LANCETS 33 GAUGE
1 EACH MISCELLANEOUS 3 TIMES DAILY
Qty: 300 EACH | Refills: 3 | Status: SHIPPED | OUTPATIENT
Start: 2023-02-10

## 2023-02-10 RX ORDER — DESVENLAFAXINE 50 MG/1
1 TABLET, FILM COATED, EXTENDED RELEASE ORAL
COMMUNITY
Start: 2022-12-20

## 2023-02-10 NOTE — PROGRESS NOTES
"  Assessment & Plan     Diabetes mellitus without complication (H)  Mixed hyperlipidemia  A1c of 6.3 down from 6.8. Well controlled on 1000-1500mg of metformin and 2mg ozempic weekly. 1500mg of metformin daily leads to significant GI distress thus plan to decrease to 1000mg daily. Eye exam completed. Taking atorvastatin, lipid UTD. Follow up in 6 months for wellness and diabetes visit (will be due for microalbumin and foot exam at that time).  - Hemoglobin A1c  - Basic metabolic panel  (Ca, Cl, CO2, Creat, Gluc, K, Na, BUN)  - blood glucose (ONETOUCH ULTRA) test strip  Dispense: 300 strip; Refill: 3  - OneTouch Delica Lancets 33G MISC  Dispense: 300 each; Refill: 3  - atorvastatin (LIPITOR) 40 MG tablet  Dispense: 90 tablet; Refill: 1    Patient prefers to obtain Tdap at follow up visit    BMI:   Estimated body mass index is 26.72 kg/m  as calculated from the following:    Height as of this encounter: 1.854 m (6' 1\").    Weight as of this encounter: 91.9 kg (202 lb 8 oz).     Return in about 6 months (around 8/10/2023) for Follow up diabetes.    Rajinder Cortez MD  [facility]  2/10/2023    Gallito Beard is a 37 year old, presenting for the following health issues:    Diabetes    History of Present Illness       Diabetes:   He presents for follow up of diabetes.  He is not checking blood glucose. He has no concerns regarding his diabetes at this time.  He is having numbness in feet.         He eats 2-3 servings of fruits and vegetables daily.He consumes 0 sweetened beverage(s) daily.He exercises with enough effort to increase his heart rate 10 to 19 minutes per day.  He exercises with enough effort to increase his heart rate 3 or less days per week.   He is taking medications regularly.     DM2  Been moving between the 1000 - 1500mg metformin due to diarrhea  Is only taking the 1500mg of metformin about 1/3 of the days.  Hsa gone without the ozempic for a couple of weeks due to short supply " "(one month ago) but received again afterwards.   Numbness in feet - normal, ongoing, not new, stable right now and not progressive.   Taking the atorvastatin without issue    Mood  Sees psychiatry for desvenlafaxine and eszopiclone    Would like to tdap at next visit    Review of Systems   No chest pain, sob, palpitation      Objective    /68 (BP Location: Right arm, Patient Position: Sitting, Cuff Size: Adult Regular)   Pulse 69   Temp 98.4  F (36.9  C) (Oral)   Resp 19   Ht 1.854 m (6' 1\")   Wt 91.9 kg (202 lb 8 oz)   SpO2 98%   BMI 26.72 kg/m    Body mass index is 26.72 kg/m .     Physical Exam   GENERAL: healthy, alert and no distress  HEAD: Normocephalic, atraumatic.   EYES: PERRL. Normal conjunctivae, sclera.   NECK: Supple. No lymphadenopathy appreciated. Trachea midline. Thyroid not enlarged, not TTP.  RESP: lungs clear to auscultation - no rales, rhonchi or wheezes  CV: regular rate and rhythm, normal S1 S2, no murmur, click, rub or gallop. No peripheral swelling noted.   ABDOMEN: soft, no TTP x4 quadrants. No hepatomegaly or masses appreciated. BS normactive.  MSK: no gross musculoskeletal defects noted.  SKIN: no suspicious lesions or rashes.  EXT: Warm and well perfused.   NEURO: CNII-XII grossly intact. No focal deficits.  PSYCH: Groomed, dressed appropriately for weather. Normal mood with consistent affect.         "

## 2023-02-15 DIAGNOSIS — R80.9 TYPE 2 DIABETES MELLITUS WITH MICROALBUMINURIA, WITHOUT LONG-TERM CURRENT USE OF INSULIN (H): ICD-10-CM

## 2023-02-15 DIAGNOSIS — E11.29 TYPE 2 DIABETES MELLITUS WITH MICROALBUMINURIA, WITHOUT LONG-TERM CURRENT USE OF INSULIN (H): ICD-10-CM

## 2023-02-15 RX ORDER — SEMAGLUTIDE 1.34 MG/ML
1 INJECTION, SOLUTION SUBCUTANEOUS
Qty: 9 ML | Refills: 1 | Status: SHIPPED | OUTPATIENT
Start: 2023-02-15 | End: 2023-09-11

## 2023-02-15 NOTE — TELEPHONE ENCOUNTER
Routing refill request to provider for review/approval because:  Labs out of range:  creatinine  See recent visit, ok for ongoing refills  Creatinine   Date Value Ref Range Status   02/10/2023 0.60 (L) 0.67 - 1.17 mg/dL Final   10/03/2020 0.56 (L) 0.66 - 1.25 mg/dL Final     Enedelia Goetz, RN, BSN  [facility]

## 2023-03-10 ENCOUNTER — OFFICE VISIT (OUTPATIENT)
Dept: UROLOGY | Facility: CLINIC | Age: 38
End: 2023-03-10
Payer: COMMERCIAL

## 2023-03-10 VITALS
SYSTOLIC BLOOD PRESSURE: 102 MMHG | BODY MASS INDEX: 25.84 KG/M2 | WEIGHT: 195 LBS | DIASTOLIC BLOOD PRESSURE: 70 MMHG | HEIGHT: 73 IN

## 2023-03-10 DIAGNOSIS — Z30.2 ENCOUNTER FOR STERILIZATION: Primary | ICD-10-CM

## 2023-03-10 PROCEDURE — 88302 TISSUE EXAM BY PATHOLOGIST: CPT | Performed by: PATHOLOGY

## 2023-03-10 PROCEDURE — 99207 PR DROP WITH A PROCEDURE: CPT | Performed by: UROLOGY

## 2023-03-10 RX ORDER — LIDOCAINE HYDROCHLORIDE 20 MG/ML
20 INJECTION, SOLUTION INFILTRATION; PERINEURAL ONCE
Status: COMPLETED | OUTPATIENT
Start: 2023-03-10 | End: 2023-03-10

## 2023-03-10 RX ADMIN — LIDOCAINE HYDROCHLORIDE 20 ML: 20 INJECTION, SOLUTION INFILTRATION; PERINEURAL at 11:30

## 2023-03-10 ASSESSMENT — PAIN SCALES - GENERAL: PAINLEVEL: NO PAIN (0)

## 2023-03-10 NOTE — PROGRESS NOTES
OFFICE VASECTOMY OPERATIVE NOTE  [facility]  ([phone]    DATE: 03/10/23  PATIENT: Naman Jones    YOB: 1985    Naman Jones is a 37 year old male.  He has 2 children and he wishes a vasectomy for birth control.  He has read the brochure and he has shaved himself.  I reviewed the vasectomy procedure with him explaining that it would be done with a local anesthetic given just in the location where the vasectomy would be done.  It would be done through scalpel-less incisions with the removal of segments of the vasa, cauterization of the ends, and burying the ends separate with sutures.      Pt. Understands:  1/1000-1/3000 risk of future pregnancy even with perfectly done vasectomy  -vasectomy is a permanent procedure    -he may cryopreserve sperm if he wishes   -1-5% risk of post-vasectomy pain syndrome   -1-5% risk of complication, primarily infection or bleeding  - he needs to have a semen sample that shows no sperm before getting approval for unprotected intercourse.      Complications such as bleeding, infection, and damage to other tissues in the area were discussed.  I recommended that an ice bag be placed on the scrotum off and on tonight to help reduce pain and swelling.      He was reminded that he was not sterile immediately after the vasectomy that it would take at least 20 ejaculations to empty the vas of any remaining sperm.  He was not to provide a semen sample until after the 20th ejaculation and not before 12 weeks after the vas. He was  to fulfill both of those requirements.   He understands it is his responsibility to find out the results of the vas before proceeding with intercourse without birth control protection.  Other items discussed were activity afterwards, returning to work, voluntary physical activity,  resuming sexual activity, clothing to wear, bathing, and care of the vas site and expected changes in the site as healing progresses.  After signing  the permit, bilateral vasectomy was done as described through scalpel-less incisions.       ANESTHESIA: Local    DETAILS OF PROCEDURE: The risks of the procedure were explained in detail to the patient and informed consent was obtained. The patient was placed supine on the procedure table and the penis and scrotum were prepped and draped in the standard sterile fashion. The right vas deferens was isolated and brought up to the median raphe of the scrotum. 1% lidocaine local anesthesia was used to infiltrate the skin and the spermatic cord. A sharp hemostat was used to make a skin puncture. Adventitial tissues were swept away from the vas. A 1 cm segment of the vas was excised and sent for pathology. The proximal and distal lumina of the vas were cauterized and then each segment was tied off in a knuckling-fashion with a 3-0 chromic suture. Hemostasis was ensured and the segments were released back into the scrotum. Next the left vas was brought up to the same incision and a vasectomy was performed in the similar fashion. At the end of the procedure a single 3-0 chromic suture was placed in the skin.     COMPLICATIONS: None    DISMISSAL INSTRUCTIONS:  - Ice pack to scrotum 15 to 20 minutes each hour awake for 36 to 40 hours.  - No strenuous activity or ejaculation for 14 days.  - No unprotected sexual activity until proven azoospermia on semen samples at 3 months.  - Referred to patient handout for normal postop expectations and indications to contact nurse or physician.    M.D.: Oh Hill M.D.

## 2023-03-10 NOTE — PATIENT INSTRUCTIONS
POST VASECTOMY INSTRUCTIONS    1.) If you have any concerns or questions, please contact our office at [phone] and choose option 2.      2.) It is okay to take a shower, however, do not soak in water (bath,swimming, hot tub,etc....) until your incision is healed.    3.) You might notice some swelling, mild bruising, and discomfort for several days after your vasectomy. This is to be expected. For at least the next 24 hours, an ice pack should be applied for 20 minutes every hour that you are awake. Ice will help with discomfort and swelling. Do not place directly on the skin.    4.) No intercourse, strenuous activity or exercise for at least 7-10 days, even if you feel fine.    5.) You need to wear good scrotal support while you are healing. We strongly recommend an athletic supporter or a pair of regular briefs that are one size too small. Boxer briefs do not offer enough support.    6.) Tylenol as directed on the bottle is preferred for discomfort. Please avoid any blood thinning products such as ibuprofen and aspirin (Motrin, Advil, Excedrin, Aleve, ect..) for at least the next week.    7.) It is normal to have mild drainage from the incision area for several days. However, please contact our office if you notice: bright red blood that does not stop after three days, increased pain, heat at the incision, red streaks, foul smelling discharge, or if you start to run a fever.     8.) YOU MUST CONTINUE BIRTH CONTROL UNTIL WE CONFIRM YOUR STERILITY.  This process can take up to a year to complete (rare occurrence).     9.) You have been given a form with specimen cup and instructions for your follow up specimen. You will be cleared once we receive ONE negative specimen. If your specimen comes back positive (sperm seen) you will be asked to repeat the test. This does not mean that your vasectomy has failed.

## 2023-03-10 NOTE — NURSING NOTE
Patient has signed the consent form stating that we will be doing a bilateral vasectomy today and that this is the correct procedure.  I verbally confirmed the patient's identity using two indicators, relevant allergies, and that the correct equipment was available. Patient was cleaned and prepped according to the appropriate policy.  Equipment was prepped in a sterile fashion and MD was informed that patient was ready.    Consent read and signed: Yes  Aspirin/blood thinning products stopped 7 days prior to procedure: Yes  Allergies   Allergen Reactions     Sulfa Drugs Anaphylaxis     Oxycodone Other (See Comments) and Itching     Flushed     Lisinopril      Other reaction(s): Impotence     Onion      Other reaction(s): Intolerance-Can't Take       Physician performed procedure.  After the procedure the patient was instructed to wait approximately three months and at least 30 ejaculations prior to returning a sample to confirm sterility.  The patient was instructed to bring in sample within 3-6 hours post sample ejaculation.  Any additional medications after the procedure were sent to the patient's pharmacy and instructions were given according to company protocol and the performing physician.  The physician performing the procedure prescribed as they felt appropriate.  Patient was also instructed to avoid heavy lifting or strenuous activity for 10-14 days and to ice the scrotum.  Samples from the R and L vas deferens were sent to the lab and an order was placed for future semen analysis.      The following medication was given:     MEDICATION:  Lidocaine 2% Soln  ROUTE: infiltration  SITE: scrotum  DOSE: 11  LOT #: [id number]  : Sol[place]ad  EXPIRATION DATE: 9/24  NDC#: 37190-768-36  Was there drug waste? Yes  Amount of drug waste (mL): 9.  Reason for waste:  As per MD  Multi-dose vial: Yes      Rosalba Lema, EMT

## 2023-03-10 NOTE — LETTER
3/10/2023       RE: Naman Jones  [id number] [address] [id number]     Dear Colleague,    Thank you for referring your patient, Naman Jones, to the [facility] [place] [facility]. Please see a copy of my visit note below.    OFFICE VASECTOMY OPERATIVE NOTE  [facility]  ([phone]    DATE: 03/10/23  PATIENT: Naman Jones    YOB: 1985    Naman Jones is a 37 year old male.  He has 2 children and he wishes a vasectomy for birth control.  He has read the brochure and he has shaved himself.  I reviewed the vasectomy procedure with him explaining that it would be done with a local anesthetic given just in the location where the vasectomy would be done.  It would be done through scalpel-less incisions with the removal of segments of the vasa, cauterization of the ends, and burying the ends separate with sutures.      Pt. Understands:  1/1000-1/3000 risk of future pregnancy even with perfectly done vasectomy  -vasectomy is a permanent procedure    -he may cryopreserve sperm if he wishes   -1-5% risk of post-vasectomy pain syndrome   -1-5% risk of complication, primarily infection or bleeding  - he needs to have a semen sample that shows no sperm before getting approval for unprotected intercourse.      Complications such as bleeding, infection, and damage to other tissues in the area were discussed.  I recommended that an ice bag be placed on the scrotum off and on tonight to help reduce pain and swelling.      He was reminded that he was not sterile immediately after the vasectomy that it would take at least 20 ejaculations to empty the vas of any remaining sperm.  He was not to provide a semen sample until after the 20th ejaculation and not before 12 weeks after the vas. He was  to fulfill both of those requirements.   He understands it is his responsibility to find out the results of the vas  before proceeding with intercourse without birth control protection.  Other items discussed were activity afterwards, returning to work, voluntary physical activity,  resuming sexual activity, clothing to wear, bathing, and care of the vas site and expected changes in the site as healing progresses.  After signing the permit, bilateral vasectomy was done as described through scalpel-less incisions.       ANESTHESIA: Local    DETAILS OF PROCEDURE: The risks of the procedure were explained in detail to the patient and informed consent was obtained. The patient was placed supine on the procedure table and the penis and scrotum were prepped and draped in the standard sterile fashion. The right vas deferens was isolated and brought up to the median raphe of the scrotum. 1% lidocaine local anesthesia was used to infiltrate the skin and the spermatic cord. A sharp hemostat was used to make a skin puncture. Adventitial tissues were swept away from the vas. A 1 cm segment of the vas was excised and sent for pathology. The proximal and distal lumina of the vas were cauterized and then each segment was tied off in a knuckling-fashion with a 3-0 chromic suture. Hemostasis was ensured and the segments were released back into the scrotum. Next the left vas was brought up to the same incision and a vasectomy was performed in the similar fashion. At the end of the procedure a single 3-0 chromic suture was placed in the skin.     COMPLICATIONS: None    DISMISSAL INSTRUCTIONS:  - Ice pack to scrotum 15 to 20 minutes each hour awake for 36 to 40 hours.  - No strenuous activity or ejaculation for 14 days.  - No unprotected sexual activity until proven azoospermia on semen samples at 3 months.  - Referred to patient handout for normal postop expectations and indications to contact nurse or physician.    M.D.: Oh Hill M.D.

## 2023-03-14 LAB
PATH REPORT.COMMENTS IMP SPEC: NORMAL
PATH REPORT.COMMENTS IMP SPEC: NORMAL
PATH REPORT.FINAL DX SPEC: NORMAL
PATH REPORT.GROSS SPEC: NORMAL
PATH REPORT.MICROSCOPIC SPEC OTHER STN: NORMAL
PATH REPORT.RELEVANT HX SPEC: NORMAL
PHOTO IMAGE: NORMAL

## 2023-03-17 ENCOUNTER — TRANSFERRED RECORDS (OUTPATIENT)
Dept: HEALTH INFORMATION MANAGEMENT | Facility: CLINIC | Age: 38
End: 2023-03-17

## 2023-05-15 ENCOUNTER — MYC REFILL (OUTPATIENT)
Dept: FAMILY MEDICINE | Facility: CLINIC | Age: 38
End: 2023-05-15
Payer: COMMERCIAL

## 2023-05-15 DIAGNOSIS — E11.29 TYPE 2 DIABETES MELLITUS WITH MICROALBUMINURIA, WITHOUT LONG-TERM CURRENT USE OF INSULIN (H): ICD-10-CM

## 2023-05-15 DIAGNOSIS — R80.9 TYPE 2 DIABETES MELLITUS WITH MICROALBUMINURIA, WITHOUT LONG-TERM CURRENT USE OF INSULIN (H): ICD-10-CM

## 2023-05-17 NOTE — TELEPHONE ENCOUNTER
Routing refill request to provider for review/approval because:  A break in medication - last ordered #90 8/3/2022    Daria Obregon, Registered Nurse  [facility]

## 2023-06-02 ENCOUNTER — HEALTH MAINTENANCE LETTER (OUTPATIENT)
Age: 38
End: 2023-06-02

## 2023-07-09 DIAGNOSIS — E11.29 TYPE 2 DIABETES MELLITUS WITH MICROALBUMINURIA, WITHOUT LONG-TERM CURRENT USE OF INSULIN (H): ICD-10-CM

## 2023-07-09 DIAGNOSIS — R80.9 TYPE 2 DIABETES MELLITUS WITH MICROALBUMINURIA, WITHOUT LONG-TERM CURRENT USE OF INSULIN (H): ICD-10-CM

## 2023-07-10 NOTE — TELEPHONE ENCOUNTER
Sent [employer] message requesting a call back for an appt. Two more attempts will be made.     Elsa Vasquez

## 2023-09-06 DIAGNOSIS — R80.9 TYPE 2 DIABETES MELLITUS WITH MICROALBUMINURIA, WITHOUT LONG-TERM CURRENT USE OF INSULIN (H): ICD-10-CM

## 2023-09-06 DIAGNOSIS — E11.29 TYPE 2 DIABETES MELLITUS WITH MICROALBUMINURIA, WITHOUT LONG-TERM CURRENT USE OF INSULIN (H): ICD-10-CM

## 2023-09-08 NOTE — TELEPHONE ENCOUNTER
Routing refill request to provider for review/approval because:    GLP-1 Agonists Protocol [id number]/06/2023 09:03 AM   Protocol Details HgbA1C in past 3 or 6 months    Normal serum creatinine on file in past 12 months        Hemoglobin A1C   Date Value Ref Range Status   02/10/2023 6.3 (H) 0.0 - 5.6 % Final     Comment:     Normal <5.7%   Prediabetes 5.7-6.4%    Diabetes 6.5% or higher     Note: Adopted from ADA consensus guidelines.   09/29/2020 7.3 (H) 0 - 5.6 % Final     Comment:     Normal <5.7% Prediabetes 5.7-6.4%  Diabetes 6.5% or higher - adopted from ADA   consensus guidelines.       Creatinine   Date Value Ref Range Status   02/10/2023 0.60 (L) 0.67 - 1.17 mg/dL Final   10/03/2020 0.56 (L) 0.66 - 1.25 mg/dL Final       Tracy GARG RN

## 2023-09-11 RX ORDER — SEMAGLUTIDE 1.34 MG/ML
1 INJECTION, SOLUTION SUBCUTANEOUS
Qty: 12 ML | Refills: 1 | Status: SHIPPED | OUTPATIENT
Start: 2023-09-11 | End: 2024-09-30

## 2023-09-18 ENCOUNTER — OFFICE VISIT (OUTPATIENT)
Dept: FAMILY MEDICINE | Facility: CLINIC | Age: 38
End: 2023-09-18
Payer: COMMERCIAL

## 2023-09-18 VITALS
DIASTOLIC BLOOD PRESSURE: 72 MMHG | HEIGHT: 73 IN | BODY MASS INDEX: 26.39 KG/M2 | WEIGHT: 199.1 LBS | HEART RATE: 80 BPM | OXYGEN SATURATION: 99 % | RESPIRATION RATE: 12 BRPM | SYSTOLIC BLOOD PRESSURE: 98 MMHG | TEMPERATURE: 98.6 F

## 2023-09-18 DIAGNOSIS — E78.2 MIXED HYPERLIPIDEMIA: ICD-10-CM

## 2023-09-18 DIAGNOSIS — E11.9 DIABETES MELLITUS WITHOUT COMPLICATION (H): Primary | ICD-10-CM

## 2023-09-18 LAB
CHOLEST SERPL-MCNC: 113 MG/DL
CREAT UR-MCNC: 146 MG/DL
HBA1C MFR BLD: 6 % (ref 0–5.6)
HDLC SERPL-MCNC: 47 MG/DL
LDLC SERPL CALC-MCNC: 56 MG/DL
MICROALBUMIN UR-MCNC: <12 MG/L
MICROALBUMIN/CREAT UR: NORMAL MG/G{CREAT}
NONHDLC SERPL-MCNC: 66 MG/DL
TRIGL SERPL-MCNC: 48 MG/DL

## 2023-09-18 PROCEDURE — 99207 PR FOOT EXAM NO CHARGE: CPT | Mod: 25 | Performed by: STUDENT IN AN ORGANIZED HEALTH CARE EDUCATION/TRAINING PROGRAM

## 2023-09-18 PROCEDURE — 83036 HEMOGLOBIN GLYCOSYLATED A1C: CPT | Performed by: STUDENT IN AN ORGANIZED HEALTH CARE EDUCATION/TRAINING PROGRAM

## 2023-09-18 PROCEDURE — 36415 COLL VENOUS BLD VENIPUNCTURE: CPT | Performed by: STUDENT IN AN ORGANIZED HEALTH CARE EDUCATION/TRAINING PROGRAM

## 2023-09-18 PROCEDURE — 99214 OFFICE O/P EST MOD 30 MIN: CPT | Mod: 25 | Performed by: STUDENT IN AN ORGANIZED HEALTH CARE EDUCATION/TRAINING PROGRAM

## 2023-09-18 PROCEDURE — 90472 IMMUNIZATION ADMIN EACH ADD: CPT | Performed by: STUDENT IN AN ORGANIZED HEALTH CARE EDUCATION/TRAINING PROGRAM

## 2023-09-18 PROCEDURE — 90471 IMMUNIZATION ADMIN: CPT | Performed by: STUDENT IN AN ORGANIZED HEALTH CARE EDUCATION/TRAINING PROGRAM

## 2023-09-18 PROCEDURE — 82570 ASSAY OF URINE CREATININE: CPT | Performed by: STUDENT IN AN ORGANIZED HEALTH CARE EDUCATION/TRAINING PROGRAM

## 2023-09-18 PROCEDURE — 82043 UR ALBUMIN QUANTITATIVE: CPT | Performed by: STUDENT IN AN ORGANIZED HEALTH CARE EDUCATION/TRAINING PROGRAM

## 2023-09-18 PROCEDURE — 90715 TDAP VACCINE 7 YRS/> IM: CPT | Performed by: STUDENT IN AN ORGANIZED HEALTH CARE EDUCATION/TRAINING PROGRAM

## 2023-09-18 PROCEDURE — 80061 LIPID PANEL: CPT | Performed by: STUDENT IN AN ORGANIZED HEALTH CARE EDUCATION/TRAINING PROGRAM

## 2023-09-18 PROCEDURE — 90686 IIV4 VACC NO PRSV 0.5 ML IM: CPT | Performed by: STUDENT IN AN ORGANIZED HEALTH CARE EDUCATION/TRAINING PROGRAM

## 2023-09-18 ASSESSMENT — PAIN SCALES - GENERAL: PAINLEVEL: NO PAIN (0)

## 2023-09-18 NOTE — PROGRESS NOTES
"  Assessment & Plan     Diabetes mellitus without complication (H)  Mixed hyperlipidemia  On 1000mg metformin daily; 1mg semaglutide weekly. On statin therapy. Foot exam completed, unable to appreciate R DP pulse (otherwise WNL), asymptomatic, plan to repeat at follow up visit in 6 months (with doppler). Eye exam UTD. Plan to obtain A1c, if >7 will increase to 2mg semaglutide and follow up in 3 months. If <7, plan to continue current management and follow up in 6 months for annual physical and DM2 recheck.  - Hemoglobin A1c  - Albumin Random Urine Quantitative with Creat Ratio  - Lipid panel reflex to direct LDL Non-fasting  - FOOT EXAM    BMI:   Estimated body mass index is 26.27 kg/m  as calculated from the following:    Height as of this encounter: 1.854 m (6' 1\").    Weight as of this encounter: 90.3 kg (199 lb 1.6 oz).     Follow up in 3-6 months pending A1c    Rajinder Cortez MD  [facility]  9/18/2023    Gallito Beard is a 38 year old, presenting for the following health issues:  Follow Up, Diabetes, and Recheck Medication        9/18/2023     9:40 AM   Additional Questions   Roomed by Vero BERGERON     History of Present Illness       Diabetes:   He presents for follow up of diabetes.    He is not checking blood glucose.         He has no concerns regarding his diabetes at this time.  He is having numbness in feet.            He eats 0-1 servings of fruits and vegetables daily.He consumes 0 sweetened beverage(s) daily.He exercises with enough effort to increase his heart rate 20 to 29 minutes per day.  He exercises with enough effort to increase his heart rate 3 or less days per week.   He is taking medications regularly.     DM2  Is at 1000mg metformin daily.   Decreased from 1500mg daily at last visit 2/2 GI side effects.  Tolerating well now. Not having any GI upset. No issues.  Still using ozempic, has been at 1mg dosage for quite some time. Tolerating this well too.  Taking " "atovastatin without issue too.  Rarely takes blood sugars at home.  Seems neuropathy has been improving. Feels this prn now.   No redness/pain/warmth in LE bilaterally.        Objective    BP 98/72 (BP Location: Right arm, Patient Position: Sitting, Cuff Size: Adult Regular)   Pulse 80   Temp 98.6  F (37  C) (Oral)   Resp 12   Ht 1.854 m (6' 1\")   Wt 90.3 kg (199 lb 1.6 oz)   SpO2 99%   BMI 26.27 kg/m    Body mass index is 26.27 kg/m .    Physical Exam   GENERAL: healthy, alert and no distress  HEAD: Normocephalic, atraumatic.   EYES: Normal conjunctivae, sclera.   RESP: lungs clear to auscultation - no rales, rhonchi or wheezes  CV: regular rate and rhythm, normal S1 S2, no murmur, click, rub or gallop. No peripheral swelling noted.   ABDOMEN: BS normactive.  MSK: no gross musculoskeletal defects noted.  SKIN: no suspicious lesions or rashes.  EXT: Warm and well perfused.  Diabetic foot exam: L DP pulse 1+; unable to appreciate R DP pulse today, no trophic changes or ulcerative lesions, and normal monofilament exam bilaterally.   NEURO: CNII-XII grossly intact. No focal deficits.  PSYCH: Groomed, dressed appropriately for weather. Normal mood with consistent affect.   "

## 2023-10-07 DIAGNOSIS — R80.9 TYPE 2 DIABETES MELLITUS WITH MICROALBUMINURIA, WITHOUT LONG-TERM CURRENT USE OF INSULIN (H): ICD-10-CM

## 2023-10-07 DIAGNOSIS — E11.29 TYPE 2 DIABETES MELLITUS WITH MICROALBUMINURIA, WITHOUT LONG-TERM CURRENT USE OF INSULIN (H): ICD-10-CM

## 2023-11-08 DIAGNOSIS — E78.2 MIXED HYPERLIPIDEMIA: ICD-10-CM

## 2023-11-08 RX ORDER — ATORVASTATIN CALCIUM 40 MG/1
40 TABLET, FILM COATED ORAL DAILY
Qty: 90 TABLET | Refills: 1 | Status: SHIPPED | OUTPATIENT
Start: 2023-11-08 | End: 2024-06-26

## 2023-12-01 ENCOUNTER — LAB (OUTPATIENT)
Dept: LAB | Facility: CLINIC | Age: 38
End: 2023-12-01
Payer: COMMERCIAL

## 2023-12-01 DIAGNOSIS — Z30.2 ENCOUNTER FOR STERILIZATION: ICD-10-CM

## 2023-12-01 LAB — SEMEN ANALYSIS P VAS PNL: NORMAL

## 2023-12-01 PROCEDURE — 89321 SEMEN ANAL SPERM DETECTION: CPT

## 2024-01-04 DIAGNOSIS — E11.29 TYPE 2 DIABETES MELLITUS WITH MICROALBUMINURIA, WITHOUT LONG-TERM CURRENT USE OF INSULIN (H): ICD-10-CM

## 2024-01-04 DIAGNOSIS — R80.9 TYPE 2 DIABETES MELLITUS WITH MICROALBUMINURIA, WITHOUT LONG-TERM CURRENT USE OF INSULIN (H): ICD-10-CM

## 2024-03-15 ENCOUNTER — OFFICE VISIT (OUTPATIENT)
Dept: FAMILY MEDICINE | Facility: CLINIC | Age: 39
End: 2024-03-15
Attending: STUDENT IN AN ORGANIZED HEALTH CARE EDUCATION/TRAINING PROGRAM
Payer: COMMERCIAL

## 2024-03-15 ENCOUNTER — MYC MEDICAL ADVICE (OUTPATIENT)
Dept: FAMILY MEDICINE | Facility: CLINIC | Age: 39
End: 2024-03-15

## 2024-03-15 VITALS
DIASTOLIC BLOOD PRESSURE: 79 MMHG | SYSTOLIC BLOOD PRESSURE: 122 MMHG | OXYGEN SATURATION: 99 % | HEIGHT: 73 IN | BODY MASS INDEX: 26.24 KG/M2 | RESPIRATION RATE: 16 BRPM | WEIGHT: 198 LBS | HEART RATE: 74 BPM | TEMPERATURE: 98.6 F

## 2024-03-15 DIAGNOSIS — E11.9 DIABETES MELLITUS WITHOUT COMPLICATION (H): ICD-10-CM

## 2024-03-15 DIAGNOSIS — Z00.00 ROUTINE GENERAL MEDICAL EXAMINATION AT A HEALTH CARE FACILITY: Primary | ICD-10-CM

## 2024-03-15 DIAGNOSIS — E78.2 MIXED HYPERLIPIDEMIA: ICD-10-CM

## 2024-03-15 DIAGNOSIS — H57.02 ANISOCORIA: ICD-10-CM

## 2024-03-15 DIAGNOSIS — Z98.84 HX OF GASTRIC BYPASS: ICD-10-CM

## 2024-03-15 DIAGNOSIS — K76.0 FATTY LIVER: ICD-10-CM

## 2024-03-15 LAB
ERYTHROCYTE [DISTWIDTH] IN BLOOD BY AUTOMATED COUNT: 13.2 % (ref 10–15)
HBA1C MFR BLD: 6.1 % (ref 0–5.6)
HCT VFR BLD AUTO: 45.4 % (ref 40–53)
HGB BLD-MCNC: 15.2 G/DL (ref 13.3–17.7)
MCH RBC QN AUTO: 29.5 PG (ref 26.5–33)
MCHC RBC AUTO-ENTMCNC: 33.5 G/DL (ref 31.5–36.5)
MCV RBC AUTO: 88 FL (ref 78–100)
PLATELET # BLD AUTO: 201 10E3/UL (ref 150–450)
RBC # BLD AUTO: 5.16 10E6/UL (ref 4.4–5.9)
WBC # BLD AUTO: 5.4 10E3/UL (ref 4–11)

## 2024-03-15 PROCEDURE — 86704 HEP B CORE ANTIBODY TOTAL: CPT | Performed by: STUDENT IN AN ORGANIZED HEALTH CARE EDUCATION/TRAINING PROGRAM

## 2024-03-15 PROCEDURE — 36415 COLL VENOUS BLD VENIPUNCTURE: CPT | Performed by: STUDENT IN AN ORGANIZED HEALTH CARE EDUCATION/TRAINING PROGRAM

## 2024-03-15 PROCEDURE — 82607 VITAMIN B-12: CPT | Performed by: STUDENT IN AN ORGANIZED HEALTH CARE EDUCATION/TRAINING PROGRAM

## 2024-03-15 PROCEDURE — 87340 HEPATITIS B SURFACE AG IA: CPT | Performed by: STUDENT IN AN ORGANIZED HEALTH CARE EDUCATION/TRAINING PROGRAM

## 2024-03-15 PROCEDURE — 90480 ADMN SARSCOV2 VAC 1/ONLY CMP: CPT | Performed by: STUDENT IN AN ORGANIZED HEALTH CARE EDUCATION/TRAINING PROGRAM

## 2024-03-15 PROCEDURE — 85027 COMPLETE CBC AUTOMATED: CPT | Performed by: STUDENT IN AN ORGANIZED HEALTH CARE EDUCATION/TRAINING PROGRAM

## 2024-03-15 PROCEDURE — 99396 PREV VISIT EST AGE 40-64: CPT | Performed by: STUDENT IN AN ORGANIZED HEALTH CARE EDUCATION/TRAINING PROGRAM

## 2024-03-15 PROCEDURE — 99214 OFFICE O/P EST MOD 30 MIN: CPT | Mod: 25 | Performed by: STUDENT IN AN ORGANIZED HEALTH CARE EDUCATION/TRAINING PROGRAM

## 2024-03-15 PROCEDURE — 82728 ASSAY OF FERRITIN: CPT | Performed by: STUDENT IN AN ORGANIZED HEALTH CARE EDUCATION/TRAINING PROGRAM

## 2024-03-15 PROCEDURE — 83036 HEMOGLOBIN GLYCOSYLATED A1C: CPT | Performed by: STUDENT IN AN ORGANIZED HEALTH CARE EDUCATION/TRAINING PROGRAM

## 2024-03-15 PROCEDURE — 80053 COMPREHEN METABOLIC PANEL: CPT | Performed by: STUDENT IN AN ORGANIZED HEALTH CARE EDUCATION/TRAINING PROGRAM

## 2024-03-15 PROCEDURE — 82306 VITAMIN D 25 HYDROXY: CPT | Performed by: STUDENT IN AN ORGANIZED HEALTH CARE EDUCATION/TRAINING PROGRAM

## 2024-03-15 PROCEDURE — 91320 SARSCV2 VAC 30MCG TRS-SUC IM: CPT | Performed by: STUDENT IN AN ORGANIZED HEALTH CARE EDUCATION/TRAINING PROGRAM

## 2024-03-15 SDOH — HEALTH STABILITY: PHYSICAL HEALTH: ON AVERAGE, HOW MANY DAYS PER WEEK DO YOU ENGAGE IN MODERATE TO STRENUOUS EXERCISE (LIKE A BRISK WALK)?: 2 DAYS

## 2024-03-15 ASSESSMENT — PAIN SCALES - GENERAL: PAINLEVEL: MILD PAIN (3)

## 2024-03-15 ASSESSMENT — SOCIAL DETERMINANTS OF HEALTH (SDOH): HOW OFTEN DO YOU GET TOGETHER WITH FRIENDS OR RELATIVES?: ONCE A WEEK

## 2024-03-15 NOTE — PROGRESS NOTES
"Preventive Care Visit  [facility]  Rajidner Cortez MD, Family Practice    Mar 15, 2024    Assessment & Plan     Routine general medical examination at a health care facility  Doing well overall. Discussed vaccination recommendations.    Diabetes mellitus without complication (H)  A1c of 6.1 today on 1000mg metformin daily and 1mg semaglutide. Due for eye exam, patient aware. On atorvastatin. Follow up in 6 months for DM2 recheck.   - Hemoglobin A1c    Mixed hyperlipidemia  On 40mg atorvastatin. Last lipid panel well controlled and UTD.    Hx of gastric bypass  Snehal-en-Y in 2018. Takes multivitamin. Inconsistent B12 use.   - Ferritin  - Vitamin B12  - Vitamin D Deficiency    Fatty liver  Noted on US liver in 2018 prior to Snehal-en-Y gastric bypass. Plan to evaluate with Fib4 score today. Consider imaging pending results.   - Comprehensive metabolic panel (BMP + Alb, Alk Phos, ALT, AST, Total. Bili, TP)  - CBC with platelets  - Hepatitis B core antibody  - Hepatitis B surface antigen    Anisocoria  Incidentally noted. Most prominent in light setting. Unclear if new or has been present in past as prior photographs on phone are grainy. Ophthalmology has not mentioned in past, eye exam overdue. He will plan to see them for DM2 and further evaluation of this. RTC if indicated for further workup afterwards.    BMI  Estimated body mass index is 26.12 kg/m  as calculated from the following:    Height as of this encounter: 1.854 m (6' 1\").    Weight as of this encounter: 89.8 kg (198 lb).     Counseling  Appropriate preventive services were discussed with this patient.  Checklist reviewing preventive services available has been given to the patient.    Follow up in 6 months for DM2    Rajinder Cortez MD  [facility] [place]  3/15/2024    Subjective   Chirag is a 38 year old, presenting for the following:  Physical        3/15/2024     8:11 AM   Additional Questions   Roomed by sebastian lyons   Accompanied by " self         3/15/2024     8:11 AM   Patient Reported Additional Medications   Patient reports taking the following new medications na        Via the Health Maintenance questionnaire, the patient has reported the following services have been completed -Eye Exam, this information has been sent to the abstraction team.  Health Care Directive  Patient does not have a Health Care Directive or Living Will: Discussed advance care planning with patient; however, patient declined at this time.    HPI    DM2  No low blood sugars   Tests infrequently.  Had eye exam done last spring, not yet due      Hx of gastric bypass  Taking multivitamin daily  B12 when remembers. Not often.     Fatty liver  No hx of alcohol use.   Peak weight of 360-370lbs.    Mood  Still seeing psychiatrist, on desvenlafaxine and lunesta.   Takes lorazepam prn (only like once per month) for acute anxiety.         3/15/2024   General Health   How would you rate your overall physical health? Good   Feel stress (tense, anxious, or unable to sleep) To some extent   (!) STRESS CONCERN      3/15/2024   Nutrition   Three or more servings of calcium each day? Yes   Diet: Regular (no restrictions)   How many servings of fruit and vegetables per day? (!) 0-1   How many sweetened beverages each day? 0-1         3/15/2024   Exercise   Days per week of moderate/strenous exercise 2 days   (!) EXERCISE CONCERN      3/15/2024   Social Factors   Frequency of gathering with friends or relatives Once a week   Worry food won't last until get money to buy more No   Food not last or not have enough money for food? No   Do you have housing?  Yes   Are you worried about losing your housing? No   Lack of transportation? No   Unable to get utilities (heat,electricity)? No         3/15/2024   Dental   Dentist two times every year? Yes         3/15/2024   TB Screening   Were you born outside of [place]?  No     Today's PHQ-2 Score:       3/15/2024     8:19 AM   PHQ-2 ( 1999 [employer])  "  Q1: Little interest or pleasure in doing things 1   Q2: Feeling down, depressed or hopeless 1   PHQ-2 Score 2   Q1: Little interest or pleasure in doing things Several days   Q2: Feeling down, depressed or hopeless Several days   PHQ-2 Score 2         3/15/2024   Substance Use   Alcohol more than 3/day or more than 7/wk No   Do you use any other substances recreationally? No     Social History     Tobacco Use    Smoking status: Never    Smokeless tobacco: Never   Vaping Use    Vaping Use: Never used   Substance Use Topics    Alcohol use: Not Currently    Drug use: No         3/15/2024   STI Screening   New sexual partner(s) since last STI/HIV test? No     ASCVD Risk   The ASCVD Risk score (Onel VILLALOBOS, et al., 2019) failed to calculate for the following reasons:    The valid total cholesterol range is 130 to 320 mg/dL        3/15/2024   Contraception/Family Planning   Questions about contraception or family planning No     Reviewed and updated as needed this visit by Provider   Tobacco  Allergies  Meds   Med Hx  Surg Hx  Fam Hx            Objective    Exam  /79   Pulse 74   Temp 98.6  F (37  C) (Oral)   Resp 16   Ht 1.854 m (6' 1\")   Wt 89.8 kg (198 lb)   SpO2 99%   BMI 26.12 kg/m     Estimated body mass index is 26.12 kg/m  as calculated from the following:    Height as of this encounter: 1.854 m (6' 1\").    Weight as of this encounter: 89.8 kg (198 lb).    Physical Exam  GENERAL: healthy, alert and no distress  HEAD: Normocephalic, atraumatic.   EYES: Anisocoria, R pupil 6mm; L pupil 4mm. Most prominent in light. Normal conjunctivae, sclera.   ENT: Normal EAC and TMs bilaterally. Normal oropharynx.   NECK: Supple. No lymphadenopathy appreciated. Trachea midline. Thyroid not enlarged, not TTP.  RESP: lungs clear to auscultation - no rales, rhonchi or wheezes  CV: regular rate and rhythm, normal S1 S2, no murmur, click, rub or gallop.  No peripheral swelling noted.   ABDOMEN: soft, no TTP " x4 quadrants. No hepatomegaly or masses appreciated. BS normactive.  MSK: no gross musculoskeletal defects noted.  SKIN: no suspicious lesions or rashes.  EXT: Warm and well perfused.   NEURO: CNII-XII grossly intact. No focal deficits.  PSYCH: Groomed, dressed appropriately for weather. Normal mood with consistent affect.     Signed Electronically by: Rajinder Cortez MD

## 2024-03-15 NOTE — PATIENT INSTRUCTIONS
Preventive Care Advice   This is general advice given by our system to help you stay healthy. However, your care team may have specific advice just for you. Please talk to your care team about your preventive care needs.  Nutrition  Eat 5 or more servings of fruits and vegetables each day.  Try wheat bread, brown rice and whole grain pasta (instead of white bread, rice, and pasta).  Get enough calcium and vitamin D. Check the label on foods and aim for 100% of the RDA (recommended daily allowance).  Lifestyle  Exercise at least 150 minutes each week   (30 minutes a day, 5 days a week).  Do muscle strengthening activities 2 days a week. These help control your weight and prevent disease.  No smoking.  Wear sunscreen to prevent skin cancer.  Have a dental exam and cleaning every 6 months.  Yearly exams  See your health care team every year to talk about:  Any changes in your health.  Any medicines your care team has prescribed.  Preventive care, family planning, and ways to prevent chronic diseases.  Shots (vaccines)   HPV shots (up to age 26), if you've never had them before.  Hepatitis B shots (up to age 59), if you've never had them before.  COVID-19 shot: Get this shot when it's due.  Flu shot: Get a flu shot every year.  Tetanus shot: Get a tetanus shot every 10 years.  Pneumococcal, hepatitis A, and RSV shots: Ask your care team if you need these based on your risk.  Shingles shot (for age 50 and up).  General health tests  Diabetes screening:  Starting at age 35, Get screened for diabetes at least every 3 years.  If you are younger than age 35, ask your care team if you should be screened for diabetes.  Cholesterol test: At age 39, start having a cholesterol test every 5 years, or more often if advised.  Bone density scan (DEXA): At age 50, ask your care team if you should have this scan for osteoporosis (brittle bones).  Hepatitis C: Get tested at least once in your life.  STIs (sexually transmitted  infections)  Before age 24: Ask your care team if you should be screened for STIs.  After age 24: Get screened for STIs if you're at risk. You are at risk for STIs (including HIV) if:  You are sexually active with more than one person.  You don't use condoms every time.  You or a partner was diagnosed with a sexually transmitted infection.  If you are at risk for HIV, ask about PrEP medicine to prevent HIV.  Get tested for HIV at least once in your life, whether you are at risk for HIV or not.  Cancer screening tests  Cervical cancer screening: If you have a cervix, begin getting regular cervical cancer screening tests at age 21. Most people who have regular screenings with normal results can stop after age 65. Talk about this with your provider.  Breast cancer scan (mammogram): If you've ever had breasts, begin having regular mammograms starting at age 40. This is a scan to check for breast cancer.  Colon cancer screening: It is important to start screening for colon cancer at age 45.  Have a colonoscopy test every 10 years (or more often if you're at risk) Or, ask your provider about stool tests like a FIT test every year or Cologuard test every 3 years.  To learn more about your testing options, visit: https://www.[employer]/019072.pdf.  For help making a decision, visit: https://bit.ly/pf70300.  Prostate cancer screening test: If you have a prostate and are age 55 to 69, ask your provider if you would benefit from a yearly prostate cancer screening test.  Lung cancer screening: If you are a current or former smoker age 50 to 80, ask your care team if ongoing lung cancer screenings are right for you.  For informational purposes only. Not to replace the advice of your health care provider. Copyright   2023 [employer]. All rights reserved. Clinically reviewed by the [facility] Transitions Program. [employer] 172040 - REV 01/24.    Learning About Stress  What is stress?     Stress is your  body's response to a hard situation. Your body can have a physical, emotional, or mental response. Stress is a fact of life for most people, and it affects everyone differently. What causes stress for you may not be stressful for someone else.  A lot of things can cause stress. You may feel stress when you go on a job interview, take a test, or run a race. This kind of short-term stress is normal and even useful. It can help you if you need to work hard or react quickly. For example, stress can help you finish an important job on time.  Long-term stress is caused by ongoing stressful situations or events. Examples of long-term stress include long-term health problems, ongoing problems at work, or conflicts in your family. Long-term stress can harm your health.  How does stress affect your health?  When you are stressed, your body responds as though you are in danger. It makes hormones that speed up your heart, make you breathe faster, and give you a burst of energy. This is called the fight-or-flight stress response. If the stress is over quickly, your body goes back to normal and no harm is done.  But if stress happens too often or lasts too long, it can have bad effects. Long-term stress can make you more likely to get sick, and it can make symptoms of some diseases worse. If you tense up when you are stressed, you may develop neck, shoulder, or low back pain. Stress is linked to high blood pressure and heart disease.  Stress also harms your emotional health. It can make you mcfarland, tense, or depressed. Your relationships may suffer, and you may not do well at work or school.  What can you do to manage stress?  You can try these things to help manage stress:   Do something active. Exercise or activity can help reduce stress. Walking is a great way to get started. Even everyday activities such as housecleaning or yard work can help.  Try yoga or laura chi. These techniques combine exercise and meditation. You may need  some training at first to learn them.  Do something you enjoy. For example, listen to music or go to a movie. Practice your hobby or do volunteer work.  Meditate. This can help you relax, because you are not worrying about what happened before or what may happen in the future.  Do guided imagery. Imagine yourself in any setting that helps you feel calm. You can use online videos, books, or a teacher to guide you.  Do breathing exercises. For example:  From a standing position, bend forward from the waist with your knees slightly bent. Let your arms dangle close to the floor.  Breathe in slowly and deeply as you return to a standing position. Roll up slowly and lift your head last.  Hold your breath for just a few seconds in the standing position.  Breathe out slowly and bend forward from the waist.  Let your feelings out. Talk, laugh, cry, and express anger when you need to. Talking with supportive friends or family, a counselor, or a kae leader about your feelings is a healthy way to relieve stress. Avoid discussing your feelings with people who make you feel worse.  Write. It may help to write about things that are bothering you. This helps you find out how much stress you feel and what is causing it. When you know this, you can find better ways to cope.  What can you do to prevent stress?  You might try some of these things to help prevent stress:  Manage your time. This helps you find time to do the things you want and need to do.  Get enough sleep. Your body recovers from the stresses of the day while you are sleeping.  Get support. Your family, friends, and community can make a difference in how you experience stress.  Limit your news feed. Avoid or limit time on social media or news that may make you feel stressed.  Do something active. Exercise or activity can help reduce stress. Walking is a great way to get started.  Where can you learn more?  Go to https://www.healthwise.net/patiented  Enter N032 in the  "search box to learn more about \"Learning About Stress.\"  Current as of: October 24, 2023               Content Version: 14.0    2092-0882 [employer].   Care instructions adapted under license by your healthcare professional. If you have questions about a medical condition or this instruction, always ask your healthcare professional. [employer] disclaims any warranty or liability for your use of this information.      "

## 2024-03-16 LAB
ALBUMIN SERPL BCG-MCNC: 4.6 G/DL (ref 3.5–5.2)
ALP SERPL-CCNC: 92 U/L (ref 40–150)
ALT SERPL W P-5'-P-CCNC: 43 U/L (ref 0–70)
ANION GAP SERPL CALCULATED.3IONS-SCNC: 9 MMOL/L (ref 7–15)
AST SERPL W P-5'-P-CCNC: 28 U/L (ref 0–45)
BILIRUB SERPL-MCNC: 0.8 MG/DL
BUN SERPL-MCNC: 13.2 MG/DL (ref 6–20)
CALCIUM SERPL-MCNC: 9.2 MG/DL (ref 8.6–10)
CHLORIDE SERPL-SCNC: 102 MMOL/L (ref 98–107)
CREAT SERPL-MCNC: 0.76 MG/DL (ref 0.67–1.17)
DEPRECATED HCO3 PLAS-SCNC: 28 MMOL/L (ref 22–29)
EGFRCR SERPLBLD CKD-EPI 2021: >90 ML/MIN/1.73M2
FERRITIN SERPL-MCNC: 34 NG/ML (ref 31–409)
GLUCOSE SERPL-MCNC: 188 MG/DL (ref 70–99)
HBV CORE AB SERPL QL IA: NONREACTIVE
HBV SURFACE AG SERPL QL IA: NONREACTIVE
POTASSIUM SERPL-SCNC: 4.7 MMOL/L (ref 3.4–5.3)
PROT SERPL-MCNC: 6.9 G/DL (ref 6.4–8.3)
SODIUM SERPL-SCNC: 139 MMOL/L (ref 135–145)
VIT B12 SERPL-MCNC: 538 PG/ML (ref 232–1245)
VIT D+METAB SERPL-MCNC: 15 NG/ML (ref 20–50)

## 2024-03-23 ENCOUNTER — MYC REFILL (OUTPATIENT)
Dept: FAMILY MEDICINE | Facility: CLINIC | Age: 39
End: 2024-03-23
Payer: COMMERCIAL

## 2024-03-23 DIAGNOSIS — R80.9 TYPE 2 DIABETES MELLITUS WITH MICROALBUMINURIA, WITHOUT LONG-TERM CURRENT USE OF INSULIN (H): ICD-10-CM

## 2024-03-23 DIAGNOSIS — E11.29 TYPE 2 DIABETES MELLITUS WITH MICROALBUMINURIA, WITHOUT LONG-TERM CURRENT USE OF INSULIN (H): ICD-10-CM

## 2024-03-25 RX ORDER — SEMAGLUTIDE 1.34 MG/ML
1 INJECTION, SOLUTION SUBCUTANEOUS
Qty: 12 ML | Refills: 1 | OUTPATIENT
Start: 2024-03-25

## 2024-03-26 ENCOUNTER — TELEPHONE (OUTPATIENT)
Dept: FAMILY MEDICINE | Facility: CLINIC | Age: 39
End: 2024-03-26
Payer: COMMERCIAL

## 2024-03-26 ENCOUNTER — MYC MEDICAL ADVICE (OUTPATIENT)
Dept: FAMILY MEDICINE | Facility: CLINIC | Age: 39
End: 2024-03-26
Payer: COMMERCIAL

## 2024-03-26 NOTE — TELEPHONE ENCOUNTER
PA needed for Semaglutide, 1 MG/DOSE, (OZEMPIC, 1 MG/DOSE,) 4 MG/3ML pen     Bhakti Baca RN on 3/26/2024 at 4:12 PM

## 2024-04-08 NOTE — TELEPHONE ENCOUNTER
Central Prior Authorization Team  Phone: [phone]    Prior Authorization Approval    Medication: OZEMPIC (1 MG/DOSE) 4 MG/3ML SC SOPN  Authorization Effective Date: 3/9/2024  Authorization Expiration Date: 4/8/2025  Approved Dose/Quantity:   Reference #:     Insurance Company: [employer] Non-Specialty PA's - Phone [phone] Fax [phone]  Expected CoPay: $    CoPay Card Available:      Financial Assistance Needed:   Which Pharmacy is filling the prescription: [id number] [id number] [place] [address]  Pharmacy Notified: YES  Patient Notified: PHARMACY WILL NOTIFY PT WHEN READY

## 2024-04-09 ENCOUNTER — TRANSFERRED RECORDS (OUTPATIENT)
Dept: HEALTH INFORMATION MANAGEMENT | Facility: CLINIC | Age: 39
End: 2024-04-09
Payer: COMMERCIAL

## 2024-06-04 DIAGNOSIS — M25.562 CHRONIC PAIN OF LEFT KNEE: Primary | ICD-10-CM

## 2024-06-04 DIAGNOSIS — G89.29 CHRONIC PAIN OF LEFT KNEE: Primary | ICD-10-CM

## 2024-06-05 ENCOUNTER — ANCILLARY PROCEDURE (OUTPATIENT)
Dept: GENERAL RADIOLOGY | Facility: CLINIC | Age: 39
End: 2024-06-05
Attending: ORTHOPAEDIC SURGERY
Payer: COMMERCIAL

## 2024-06-05 ENCOUNTER — OFFICE VISIT (OUTPATIENT)
Dept: ORTHOPEDICS | Facility: CLINIC | Age: 39
End: 2024-06-05
Payer: COMMERCIAL

## 2024-06-05 VITALS — HEIGHT: 73 IN | WEIGHT: 198 LBS | BODY MASS INDEX: 26.24 KG/M2

## 2024-06-05 DIAGNOSIS — G89.29 CHRONIC PAIN OF LEFT KNEE: ICD-10-CM

## 2024-06-05 DIAGNOSIS — M25.562 CHRONIC PAIN OF LEFT KNEE: Primary | ICD-10-CM

## 2024-06-05 DIAGNOSIS — M25.562 CHRONIC PAIN OF LEFT KNEE: ICD-10-CM

## 2024-06-05 DIAGNOSIS — G89.29 CHRONIC PAIN OF LEFT KNEE: Primary | ICD-10-CM

## 2024-06-05 PROCEDURE — 99204 OFFICE O/P NEW MOD 45 MIN: CPT | Performed by: ORTHOPAEDIC SURGERY

## 2024-06-05 PROCEDURE — 73562 X-RAY EXAM OF KNEE 3: CPT | Mod: LT | Performed by: RADIOLOGY

## 2024-06-05 PROCEDURE — 77073 BONE LENGTH STUDIES: CPT | Performed by: STUDENT IN AN ORGANIZED HEALTH CARE EDUCATION/TRAINING PROGRAM

## 2024-06-05 NOTE — PROGRESS NOTES
DIAGNOSIS:   Recurrent patellar instability left knee  Valgus left knee  Recurrent patellar instability right knee  Valgus right knee    PROCEDURES:  Distal femoral osteotomy and medial retinacular imbrication, left knee 2/28/2020  Open removal of hardware left knee date of surgery, 12/15/2020  Distal femoral osteotomy and medial retinacular imbrication right knee, 10/2/2020    HISTORY:    I the opportunity assist the patient in my clinic today.  He is well-known to me I performed distal femoral osteotomies to both sides.  He now returns to my clinic today.  Our last visit together was back a few years ago.  He comes in today complaining of left greater than right knee pain as well as giving way.  He has not had any recurrent patellar instability either knee since surgery.  He is happy about this.  He gives a SANE score of 60 on the left versus 75 on the right.  Preoperative was 30 on the left versus 45 on the right.  It does bother him and limits him he can do the things that he wants to do.  It is going on for a few years.  He is interested in what other options are potentially available.      EXAM:     General: Awake, Alert, and oriented. Articulates and communicates with a normal affect     Well-healed surgical incisions on the left.  Range of motion 0 to 125 degrees.  Stable varus valgus stress testing.  Stable intra and posterior drawer testing.  No pivot shift.  No gross patellar instability or apprehension.  Right side shows well-healed surgical incisions.  Range of motion is well-preserved.  Lachman 0.  No pivot shift.  No patellar instability.    IMAGING:  Radiographs of his left knee were obtained and reviewed today which shows definitive union of his proximal tibial osteotomy on the left.  Hardware has been removed.  Patellofemoral arthritis is present.  Right knee today shows evidence of a distal femoral osteotomy with definitive union.  Plate and screws are still in place.  Both knees show overall good  preservation of his lateral tibiofemoral compartments.    Standing 6 foot alignment film today demonstrate a weightbearing axis on the right knee falling from a line from the central head to the center of the ankle joint through the lateral tibial spine.  On the left knee there is more residual valgus with the same weightbearing axis falling into the lateral compartment or more so at the junction of the lateral tibial spine and the lateral compartment    ASSESSMENT:  Status post bilateral distal femoral osteotomies as described above for patellar instability with subsequent removal of hardware on the left side    PLAN: I long discussion with the patient and his wife via telephone.  I discussed with him where he is at.  I will happy that he is seeing improvement.  His patellae are more stable.  He is not having instability.  Admittedly though he still is having some limitations on that side.  We discussed in brief for potential surgical and nonsurgical options.    Importantly he is showing good preservation of the lateral compartment of both knees.  I certainly do not think he needs total knee arthroplasty at this time.  We do know that he has areas of patellofemoral wear.    At this time we are going to get an MRI of his left knee to fully articulate what is happening within the patellofemoral compartment from a wear standpoint.  He will then return to my clinic and we will discuss the results.  I will likely offer a corticosteroid injection as I think ultimately goal will be to maximize nonsurgical intervention.  Ultimately I think we need to come to an understanding of whether there is any role for joint preservation of the patellofemoral compartment versus consideration for referral for patellofemoral arthroplasty.  Will plan to make this decision based on the results of the MRI as well as the patient's response to the corticosteroid injection.  Plan to see back at that time.    Of note I did refill his handicap  parking sticker though I did say that going forward since we are fairly far out from surgery any future refills should probably come from his primary care physician instead of from us.

## 2024-06-05 NOTE — LETTER
6/5/2024      Naman Jones  [address] [address]      Dear Colleague,    Thank you for referring your patient, Naman Jones, to the [facility] [place]. Please see a copy of my visit note below.    DIAGNOSIS:   Recurrent patellar instability left knee  Valgus left knee  Recurrent patellar instability right knee  Valgus right knee    PROCEDURES:  Distal femoral osteotomy and medial retinacular imbrication, left knee 2/28/2020  Open removal of hardware left knee date of surgery, 12/15/2020  Distal femoral osteotomy and medial retinacular imbrication right knee, 10/2/2020    HISTORY:    I the opportunity assist the patient in my clinic today.  He is well-known to me I performed distal femoral osteotomies to both sides.  He now returns to my clinic today.  Our last visit together was back a few years ago.  He comes in today complaining of left greater than right knee pain as well as giving way.  He has not had any recurrent patellar instability either knee since surgery.  He is happy about this.  He gives a SANE score of 60 on the left versus 75 on the right.  Preoperative was 30 on the left versus 45 on the right.  It does bother him and limits him he can do the things that he wants to do.  It is going on for a few years.  He is interested in what other options are potentially available.      EXAM:     General: Awake, Alert, and oriented. Articulates and communicates with a normal affect     Well-healed surgical incisions on the left.  Range of motion 0 to 125 degrees.  Stable varus valgus stress testing.  Stable intra and posterior drawer testing.  No pivot shift.  No gross patellar instability or apprehension.  Right side shows well-healed surgical incisions.  Range of motion is well-preserved.  Lachman 0.  No pivot shift.  No patellar instability.    IMAGING:  Radiographs of his left knee were obtained and reviewed today which shows definitive union of his  proximal tibial osteotomy on the left.  Hardware has been removed.  Patellofemoral arthritis is present.  Right knee today shows evidence of a distal femoral osteotomy with definitive union.  Plate and screws are still in place.  Both knees show overall good preservation of his lateral tibiofemoral compartments.    Standing 6 foot alignment film today demonstrate a weightbearing axis on the right knee falling from a line from the central head to the center of the ankle joint through the lateral tibial spine.  On the left knee there is more residual valgus with the same weightbearing axis falling into the lateral compartment or more so at the junction of the lateral tibial spine and the lateral compartment    ASSESSMENT:  Status post bilateral distal femoral osteotomies as described above for patellar instability with subsequent removal of hardware on the left side    PLAN: I long discussion with the patient and his wife via telephone.  I discussed with him where he is at.  I will happy that he is seeing improvement.  His patellae are more stable.  He is not having instability.  Admittedly though he still is having some limitations on that side.  We discussed in brief for potential surgical and nonsurgical options.    Importantly he is showing good preservation of the lateral compartment of both knees.  I certainly do not think he needs total knee arthroplasty at this time.  We do know that he has areas of patellofemoral wear.    At this time we are going to get an MRI of his left knee to fully articulate what is happening within the patellofemoral compartment from a wear standpoint.  He will then return to my clinic and we will discuss the results.  I will likely offer a corticosteroid injection as I think ultimately goal will be to maximize nonsurgical intervention.  Ultimately I think we need to come to an understanding of whether there is any role for joint preservation of the patellofemoral compartment versus  consideration for referral for patellofemoral arthroplasty.  Will plan to make this decision based on the results of the MRI as well as the patient's response to the corticosteroid injection.  Plan to see back at that time.    Of note I did refill his handicap parking sticker though I did say that going forward since we are fairly far out from surgery any future refills should probably come from his primary care physician instead of from us.    Again, thank you for allowing me to participate in the care of your patient.      Sincerely,        Angel Madera MD

## 2024-06-05 NOTE — NURSING NOTE
"Reason For Visit:   Chief Complaint   Patient presents with    RECHECK     DOS: 12/15/20 left knee arthroscopy, hardware removal        ?  No  Occupation: IT  Currently working? Yes.  Work status?  Full time.  Date of injury: NA  Type of injury: NA.  Date of surgery: 12/15/20; 2/28/2020  Type of surgery: Left knee arthroscopy, hardware removal; Left knee DFO    Overall knees have been stable, he has had occasional knee discomfort. He has been trying to be more active which has caused more pain. He still uses the cane outside of his house. Left knee has been worse he does have occasional pain in right knee. He reports quad weakness on left side as well.    SANE Score  Left Knee: 60  Right Knee: 75    Pain Assessment  Patient Currently in Pain: Yes  0-10 Pain Scale: 5  Primary Pain Location: Knee    Ht 1.854 m (6' 1\")   Wt 89.8 kg (198 lb)   BMI 26.12 kg/m           Allergies   Allergen Reactions    Sulfa Antibiotics Anaphylaxis    Oxycodone Other (See Comments) and Itching     Flushed    Lisinopril      Other reaction(s): Impotence    Onion      Other reaction(s): Intolerance-Can't Take       Current Outpatient Medications   Medication Sig Dispense Refill    atorvastatin (LIPITOR) 40 MG tablet TAKE 1 TABLET BY MOUTH EVERY DAY 90 tablet 1    blood glucose (ONETOUCH ULTRA) test strip Use to test blood sugar 3 times daily or as directed. 300 strip 3    Cyanocobalamin (B-12) 500 MCG SUBL Place 1 tablet under the tongue daily      desvenlafaxine (PRISTIQ) 50 MG 24 hr tablet Take 1 tablet by mouth daily at 2 pm      eszopiclone (LUNESTA) 3 MG tablet Take 3 mg by mouth every 24 hours      metFORMIN (GLUCOPHAGE) 500 MG tablet TAKE 2 TABLETS BY MOUTH DAILY WITH BREAKFAST AND 1 TABLET DAILY WITH DINNER. 270 tablet 0    multivitamin  peds with iron (FLINTSTONES COMPLETE) 60 MG chewable tablet Take 2 chew tab by mouth every morning       OneTouch Delica Lancets 33G MISC 1 each 3 times daily 300 each 3    " Semaglutide, 1 MG/DOSE, (OZEMPIC, 1 MG/DOSE,) 4 MG/3ML pen INJECT 1 MG INTO THE MUSCLE EVERY 7 DAYS 12 mL 1    Calcium Carb-Cholecalciferol (CALCIUM 600 + D PO) Take 1 tablet by mouth 2 times daily (Patient not taking: Reported on 9/18/2023)       No current facility-administered medications for this visit.         Lucinda Tyler, ATC

## 2024-06-26 DIAGNOSIS — E78.2 MIXED HYPERLIPIDEMIA: ICD-10-CM

## 2024-06-26 RX ORDER — ATORVASTATIN CALCIUM 40 MG/1
40 TABLET, FILM COATED ORAL DAILY
Qty: 90 TABLET | Refills: 0 | Status: SHIPPED | OUTPATIENT
Start: 2024-06-26

## 2024-06-28 ENCOUNTER — HOSPITAL ENCOUNTER (OUTPATIENT)
Dept: MRI IMAGING | Facility: CLINIC | Age: 39
Discharge: HOME | End: 2024-06-28
Attending: ORTHOPAEDIC SURGERY
Payer: COMMERCIAL

## 2024-06-28 ENCOUNTER — HOSPITAL ENCOUNTER (OUTPATIENT)
Dept: ULTRASOUND IMAGING | Facility: CLINIC | Age: 39
Discharge: HOME | End: 2024-06-28
Attending: STUDENT IN AN ORGANIZED HEALTH CARE EDUCATION/TRAINING PROGRAM
Payer: COMMERCIAL

## 2024-06-28 DIAGNOSIS — K76.0 FATTY LIVER: ICD-10-CM

## 2024-06-28 DIAGNOSIS — M25.562 CHRONIC PAIN OF LEFT KNEE: ICD-10-CM

## 2024-06-28 DIAGNOSIS — G89.29 CHRONIC PAIN OF LEFT KNEE: ICD-10-CM

## 2024-06-28 PROCEDURE — 73721 MRI JNT OF LWR EXTRE W/O DYE: CPT | Mod: 26 | Performed by: RADIOLOGY

## 2024-06-28 PROCEDURE — 73721 MRI JNT OF LWR EXTRE W/O DYE: CPT | Mod: LT

## 2024-06-28 PROCEDURE — 76705 ECHO EXAM OF ABDOMEN: CPT

## 2024-07-01 PROBLEM — Z87.19 HISTORY OF FATTY INFILTRATION OF LIVER: Status: ACTIVE | Noted: 2024-03-15

## 2024-07-15 ENCOUNTER — VIRTUAL VISIT (OUTPATIENT)
Dept: ORTHOPEDICS | Facility: CLINIC | Age: 39
End: 2024-07-15
Payer: COMMERCIAL

## 2024-07-15 DIAGNOSIS — M25.562 CHRONIC PAIN OF LEFT KNEE: Primary | ICD-10-CM

## 2024-07-15 DIAGNOSIS — G89.29 CHRONIC PAIN OF LEFT KNEE: Primary | ICD-10-CM

## 2024-07-15 PROCEDURE — 99214 OFFICE O/P EST MOD 30 MIN: CPT | Performed by: ORTHOPAEDIC SURGERY

## 2024-07-15 NOTE — LETTER
7/15/2024      Naman Jones  [address] [facility] [place] [address]      Dear Colleague,    Thank you for referring your patient, Naman Jones, to the [facility] [place]. Please see a copy of my visit note below.        Chirag is a 39 year old who is being evaluated via a billable telephone visit.        I the opportunity complete a telephone visit with the patient regarding his left knee MRI that we obtained June 28.  I had seen him back in May.  That time we elected to get an MRI of his left knee.  Very complex history with profound valgus deformity and patellar instability.  I performed a distal femoral osteotomy on the left side.  He subsequently had the hardware removed.    He is also undergone this procedure on the right side.  Overall the right side is doing well and is quite happy with it.  He had continued pain on his left side.  We elected to get new imaging studies and this was completed.  He returns for telephone visit to discuss the results.  He denies any intercurrent trauma.    No examination was completed today as this was a telephone visit.    Radiographs of his left knee from June 5, 2024 show osteotomy in place on the right side.  Definitive union is present.  Overall the weightbearing axis on the right side falls through the lateral tibial spine.  Weightbearing axis on the left side falls more through the lateral tibial plateau definitive union is present.    MRI was reviewed which shows intact medial and lateral menisci.  Cruciate and collateral ligaments are intact.  Progressive arthritis in the patellofemoral compartment is noted this has worsened since 2021.  There is also stable chondrosis of the medial lateral compartments which is grade 3.    Clinical assessment: Osteoarthritis left knee patellofemoral and lateral compartments.  Residual valgus following distal femoral osteotomy    Plan: Long discussion with the patient.  At this time I really do not  think there are great surgical options for him I think realistically the surgical treatment is knee replacement surgery the surgery that I performed before were successful in improving his instability symptoms  Disappointed he still has some pain in his knee it is bothering him I think that we should try to exhaust nonsurgical things first these look like things like oral anti-inflammatories, intermittent use of injections and physical therapy.  We can order physical therapy now open arranging for an injection to my clinic.  He would like to proceed with this way as well.  He is not particular interested in surgery at this time.  He will follow-up in clinic for an injection.      What phone number would you like to be contacted at? [phone]  How would you like to obtain your AVS? MyChart  Originating Location (pt. Location): Home    Distant Location (provider location):  On-site  Phone call duration: 15 minutes total time 20 minutes      Again, thank you for allowing me to participate in the care of your patient.        Sincerely,        Angel Madera MD

## 2024-07-15 NOTE — PROGRESS NOTES
Chirag is a 39 year old who is being evaluated via a billable telephone visit.        I the opportunity complete a telephone visit with the patient regarding his left knee MRI that we obtained June 28.  I had seen him back in May.  That time we elected to get an MRI of his left knee.  Very complex history with profound valgus deformity and patellar instability.  I performed a distal femoral osteotomy on the left side.  He subsequently had the hardware removed.    He is also undergone this procedure on the right side.  Overall the right side is doing well and is quite happy with it.  He had continued pain on his left side.  We elected to get new imaging studies and this was completed.  He returns for telephone visit to discuss the results.  He denies any intercurrent trauma.    No examination was completed today as this was a telephone visit.    Radiographs of his left knee from June 5, 2024 show osteotomy in place on the right side.  Definitive union is present.  Overall the weightbearing axis on the right side falls through the lateral tibial spine.  Weightbearing axis on the left side falls more through the lateral tibial plateau definitive union is present.    MRI was reviewed which shows intact medial and lateral menisci.  Cruciate and collateral ligaments are intact.  Progressive arthritis in the patellofemoral compartment is noted this has worsened since 2021.  There is also stable chondrosis of the medial lateral compartments which is grade 3.    Clinical assessment: Osteoarthritis left knee patellofemoral and lateral compartments.  Residual valgus following distal femoral osteotomy    Plan: Long discussion with the patient.  At this time I really do not think there are great surgical options for him I think realistically the surgical treatment is knee replacement surgery the surgery that I performed before were successful in improving his instability symptoms  Disappointed he still has some pain in his  knee it is bothering him I think that we should try to exhaust nonsurgical things first these look like things like oral anti-inflammatories, intermittent use of injections and physical therapy.  We can order physical therapy now open arranging for an injection to my clinic.  He would like to proceed with this way as well.  He is not particular interested in surgery at this time.  He will follow-up in clinic for an injection.      What phone number would you like to be contacted at? [phone]  How would you like to obtain your AVS? Rj  Originating Location (pt. Location): Home    Distant Location (provider location):  On-site  Phone call duration: 15 minutes total time 20 minutes

## 2024-07-22 ENCOUNTER — OFFICE VISIT (OUTPATIENT)
Dept: ORTHOPEDICS | Facility: CLINIC | Age: 39
End: 2024-07-22
Payer: COMMERCIAL

## 2024-07-22 VITALS — HEIGHT: 73 IN | WEIGHT: 198 LBS | BODY MASS INDEX: 26.24 KG/M2

## 2024-07-22 DIAGNOSIS — M25.562 CHRONIC PAIN OF LEFT KNEE: Primary | ICD-10-CM

## 2024-07-22 DIAGNOSIS — G89.29 CHRONIC PAIN OF LEFT KNEE: Primary | ICD-10-CM

## 2024-07-22 PROCEDURE — 20610 DRAIN/INJ JOINT/BURSA W/O US: CPT | Mod: LT | Performed by: ORTHOPAEDIC SURGERY

## 2024-07-22 RX ORDER — LIDOCAINE HYDROCHLORIDE 5 MG/ML
8 INJECTION, SOLUTION INFILTRATION; INTRAVENOUS
Status: SHIPPED | OUTPATIENT
Start: 2024-07-22

## 2024-07-22 RX ORDER — TRIAMCINOLONE ACETONIDE 40 MG/ML
40 INJECTION, SUSPENSION INTRA-ARTICULAR; INTRAMUSCULAR
Status: SHIPPED | OUTPATIENT
Start: 2024-07-22

## 2024-07-22 RX ADMIN — TRIAMCINOLONE ACETONIDE 40 MG: 40 INJECTION, SUSPENSION INTRA-ARTICULAR; INTRAMUSCULAR at 09:27

## 2024-07-22 RX ADMIN — LIDOCAINE HYDROCHLORIDE 8 ML: 5 INJECTION, SOLUTION INFILTRATION; INTRAVENOUS at 09:27

## 2024-07-22 NOTE — LETTER
7/22/2024      Naman Jones  [address] [facility] [address]      Dear Colleague,    Thank you for referring your patient, Naman Jones, to the [facility] [place]. Please see a copy of my visit note below.      Chirag returns to my clinic today for completion of the injection he is a very pleasant 39-year-old male he has known patellofemoral and lateral compartment arthritis status post distal femoral osteotomy and removal of hardware.  We get a new MRI after our last visit and at that time I felt that would be good to exhaust nonsurgical things.  The patient was in agreement with this.  So in light of this information he comes to clinic today to complete injection.    Exam today is unchanged.  Ligaments stable.  Neurovascular intact.  Range of motion profile preserved.    Clinical assessment: Patellofemoral and lateral compartment arthritis status post distal femoral osteotomy.  No further patellar instability however pain and swelling persist    Plan: Long discussion with the patient.  Reviewed the diagnosis potential treatment options.  I offered corticosteroid injection.  The patient accepted.    After written informed consent obtained and signed, after sufficient prepping and sterile technique, 40 mg of kenalog and , 8 cc of 1% lidocaine were injected without complication into the left knee. The patient tolerated the injection well and a sterile dressing was applied.       Large Joint Injection: L knee joint    Date/Time: 7/22/2024 9:27 AM    Performed by: Angel Madera MD  Authorized by: Angel Madera MD    Indications:  Pain  Needle Size:  21 G  Guidance: landmark guided    Approach:  Anterolateral  Location:  Knee      Medications:  40 mg triamcinolone 40 MG/ML; 8 mL lidocaine (PF) 0.5 %  Outcome:  Tolerated well, no immediate complications  Procedure discussed: discussed risks, benefits, and alternatives    Consent Given by:   Patient  Timeout: timeout called immediately prior to procedure    Prep: patient was prepped and draped in usual sterile fashion          Again, thank you for allowing me to participate in the care of your patient.        Sincerely,        Angel Madera MD

## 2024-07-22 NOTE — PROGRESS NOTES
Chirag returns to my clinic today for completion of the injection he is a very pleasant 39-year-old male he has known patellofemoral and lateral compartment arthritis status post distal femoral osteotomy and removal of hardware.  We get a new MRI after our last visit and at that time I felt that would be good to exhaust nonsurgical things.  The patient was in agreement with this.  So in light of this information he comes to clinic today to complete injection.    Exam today is unchanged.  Ligaments stable.  Neurovascular intact.  Range of motion profile preserved.    Clinical assessment: Patellofemoral and lateral compartment arthritis status post distal femoral osteotomy.  No further patellar instability however pain and swelling persist    Plan: Long discussion with the patient.  Reviewed the diagnosis potential treatment options.  I offered corticosteroid injection.  The patient accepted.    After written informed consent obtained and signed, after sufficient prepping and sterile technique, 40 mg of kenalog and , 8 cc of 1% lidocaine were injected without complication into the left knee. The patient tolerated the injection well and a sterile dressing was applied.       Large Joint Injection: L knee joint    Date/Time: 7/22/2024 9:27 AM    Performed by: Angel Madera MD  Authorized by: Angel Madera MD    Indications:  Pain  Needle Size:  21 G  Guidance: landmark guided    Approach:  Anterolateral  Location:  Knee      Medications:  40 mg triamcinolone 40 MG/ML; 8 mL lidocaine (PF) 0.5 %  Outcome:  Tolerated well, no immediate complications  Procedure discussed: discussed risks, benefits, and alternatives    Consent Given by:  Patient  Timeout: timeout called immediately prior to procedure    Prep: patient was prepped and draped in usual sterile fashion

## 2024-07-22 NOTE — NURSING NOTE
"Reason For Visit:   Chief Complaint   Patient presents with    RECHECK     Knee injection - Left          ?  No  Occupation: IT  Currently working? Yes.  Work status?  Full time.  Date of injury: NA  Type of injury: NA.  Date of surgery: 12/15/20; 2/28/2020  Type of surgery: Left knee arthroscopy, hardware removal; Left knee DFO    SANE Score  Left Knee: 60   Right Knee: 75    Pain Assessment  Patient Currently in Pain: Yes  Patient's Stated Pain Goal: 5  0-10 Pain Scale: 5  Primary Pain Location: Knee (Left)    Ht 1.854 m (6' 1\")   Wt 89.8 kg (198 lb)   BMI 26.12 kg/m           Allergies   Allergen Reactions    Sulfa Antibiotics Anaphylaxis    Oxycodone Other (See Comments) and Itching     Flushed    Lisinopril      Other reaction(s): Impotence    Onion      Other reaction(s): Intolerance-Can't Take       Current Outpatient Medications   Medication Sig Dispense Refill    atorvastatin (LIPITOR) 40 MG tablet TAKE 1 TABLET BY MOUTH EVERY DAY 90 tablet 0    blood glucose (ONETOUCH ULTRA) test strip Use to test blood sugar 3 times daily or as directed. 300 strip 3    Calcium Carb-Cholecalciferol (CALCIUM 600 + D PO) Take 1 tablet by mouth 2 times daily (Patient not taking: Reported on 9/18/2023)      Cyanocobalamin (B-12) 500 MCG SUBL Place 1 tablet under the tongue daily      desvenlafaxine (PRISTIQ) 50 MG 24 hr tablet Take 1 tablet by mouth daily at 2 pm      eszopiclone (LUNESTA) 3 MG tablet Take 3 mg by mouth every 24 hours      metFORMIN (GLUCOPHAGE) 500 MG tablet TAKE 2 TABLETS BY MOUTH DAILY WITH BREAKFAST AND 1 TABLET DAILY WITH DINNER. 270 tablet 0    multivitamin  peds with iron (FLINTSTONES COMPLETE) 60 MG chewable tablet Take 2 chew tab by mouth every morning       OneTouch Delica Lancets 33G MISC 1 each 3 times daily 300 each 3    Semaglutide, 1 MG/DOSE, (OZEMPIC, 1 MG/DOSE,) 4 MG/3ML pen INJECT 1 MG INTO THE MUSCLE EVERY 7 DAYS 12 mL 1     No current facility-administered medications for this " visit.         Blas Uribe, CMA

## 2024-07-22 NOTE — NURSING NOTE
[address] [address]  Dept: [phone]  ______________________________________________________________________________    Patient: Naman Jones   : 1985   MRN: [id number]   2024    INVASIVE PROCEDURE SAFETY CHECKLIST    Date: 24   Procedure: Left knee kenalog injection  Patient Name: Naman Jones  MRN: [id number]  YOB: 1985    Action: Complete sections as appropriate. Any discrepancy results in a HARD COPY until resolved.     PRE PROCEDURE:  Patient ID verified with 2 identifiers (name and  or MRN): Yes  Procedure and site verified with patient/designee (when able): Yes  Accurate consent documentation in medical record: Yes  H&P (or appropriate assessment) documented in medical record: Yes  H&P must be up to 20 days prior to procedure and updates within 24 hours of procedure as applicable: NA  Relevant diagnostic and radiology test results appropriately labeled and displayed as applicable: Yes  Procedure site(s) marked with provider initials: NA    TIMEOUT:  Time-Out performed immediately prior to starting procedure, including verbal and active participation of all team members addressing the following:Yes  * Correct patient identify  * Confirmed that the correct side and site are marked  * An accurate procedure consent form  * Agreement on the procedure to be done  * Correct patient position  * Relevant images and results are properly labeled and appropriately displayed  * The need to administer antibiotics or fluids for irrigation purposes during the procedure as applicable   * Safety precautions based on patient history or medication use    DURING PROCEDURE: Verification of correct person, site, and procedures any time the responsibility for care of the patient is transferred to another member of the care team.       Prior to injection, verified patient identity using patient's name and date of  birth.  Due to injection administration, patient instructed to remain in clinic for 15 minutes  afterwards, and to report any adverse reaction to me immediately.    Joint injection was performed.      Drug Amount Wasted:  Yes: 42 mg/ml  lidocaine  Vial/Syringe: Single dose vial  Expiration Date:  10/01/2024      Lucinda Tyler, ATC  July 22, 2024

## 2024-09-30 DIAGNOSIS — R80.9 TYPE 2 DIABETES MELLITUS WITH MICROALBUMINURIA, WITHOUT LONG-TERM CURRENT USE OF INSULIN (H): ICD-10-CM

## 2024-09-30 DIAGNOSIS — E11.29 TYPE 2 DIABETES MELLITUS WITH MICROALBUMINURIA, WITHOUT LONG-TERM CURRENT USE OF INSULIN (H): ICD-10-CM

## 2024-09-30 RX ORDER — SEMAGLUTIDE 1.34 MG/ML
1 INJECTION, SOLUTION SUBCUTANEOUS
Qty: 9 ML | Refills: 2 | Status: SHIPPED | OUTPATIENT
Start: 2024-09-30 | End: 2024-10-01

## 2024-09-30 NOTE — TELEPHONE ENCOUNTER
Brief chart review.    Due for follow up DM visit. Please help set up appt. (Of note, have 10/2/24 availability if this works for him). Will refill semaglutide in the meantime.    Rajinder Cortez MD  [facility]  9/30/2024

## 2024-09-30 NOTE — TELEPHONE ENCOUNTER
DUANEM requesting a call back for an appt. Two more attempts will be made.    Jacqui Almanzar  Lead   [facility] Johana Vasquez

## 2024-10-01 DIAGNOSIS — R80.9 TYPE 2 DIABETES MELLITUS WITH MICROALBUMINURIA, WITHOUT LONG-TERM CURRENT USE OF INSULIN (H): ICD-10-CM

## 2024-10-01 DIAGNOSIS — E11.29 TYPE 2 DIABETES MELLITUS WITH MICROALBUMINURIA, WITHOUT LONG-TERM CURRENT USE OF INSULIN (H): ICD-10-CM

## 2024-10-01 RX ORDER — SEMAGLUTIDE 1.34 MG/ML
1 INJECTION, SOLUTION SUBCUTANEOUS
Qty: 9 ML | Refills: 0 | Status: SHIPPED | OUTPATIENT
Start: 2024-10-01 | End: 2024-10-03

## 2024-10-09 ENCOUNTER — OFFICE VISIT (OUTPATIENT)
Dept: FAMILY MEDICINE | Facility: CLINIC | Age: 39
End: 2024-10-09
Payer: COMMERCIAL

## 2024-10-09 VITALS
DIASTOLIC BLOOD PRESSURE: 72 MMHG | HEART RATE: 67 BPM | OXYGEN SATURATION: 100 % | BODY MASS INDEX: 23.64 KG/M2 | RESPIRATION RATE: 20 BRPM | TEMPERATURE: 97.8 F | HEIGHT: 77 IN | WEIGHT: 200.2 LBS | SYSTOLIC BLOOD PRESSURE: 111 MMHG

## 2024-10-09 DIAGNOSIS — E55.9 VITAMIN D DEFICIENCY: ICD-10-CM

## 2024-10-09 DIAGNOSIS — E11.9 DIABETES MELLITUS WITHOUT COMPLICATION (H): Primary | ICD-10-CM

## 2024-10-09 DIAGNOSIS — Z23 NEED FOR PROPHYLACTIC VACCINATION AND INOCULATION AGAINST INFLUENZA: ICD-10-CM

## 2024-10-09 DIAGNOSIS — Z23 HIGH PRIORITY FOR 2019-NCOV VACCINE: ICD-10-CM

## 2024-10-09 LAB
EST. AVERAGE GLUCOSE BLD GHB EST-MCNC: 131 MG/DL
HBA1C MFR BLD: 6.2 % (ref 0–5.6)
HOLD SPECIMEN: NORMAL

## 2024-10-09 PROCEDURE — 90656 IIV3 VACC NO PRSV 0.5 ML IM: CPT | Performed by: STUDENT IN AN ORGANIZED HEALTH CARE EDUCATION/TRAINING PROGRAM

## 2024-10-09 PROCEDURE — 82570 ASSAY OF URINE CREATININE: CPT | Performed by: STUDENT IN AN ORGANIZED HEALTH CARE EDUCATION/TRAINING PROGRAM

## 2024-10-09 PROCEDURE — 90480 ADMN SARSCOV2 VAC 1/ONLY CMP: CPT | Performed by: STUDENT IN AN ORGANIZED HEALTH CARE EDUCATION/TRAINING PROGRAM

## 2024-10-09 PROCEDURE — 91320 SARSCV2 VAC 30MCG TRS-SUC IM: CPT | Performed by: STUDENT IN AN ORGANIZED HEALTH CARE EDUCATION/TRAINING PROGRAM

## 2024-10-09 PROCEDURE — 90471 IMMUNIZATION ADMIN: CPT | Performed by: STUDENT IN AN ORGANIZED HEALTH CARE EDUCATION/TRAINING PROGRAM

## 2024-10-09 PROCEDURE — 83036 HEMOGLOBIN GLYCOSYLATED A1C: CPT | Performed by: STUDENT IN AN ORGANIZED HEALTH CARE EDUCATION/TRAINING PROGRAM

## 2024-10-09 PROCEDURE — 36415 COLL VENOUS BLD VENIPUNCTURE: CPT | Performed by: STUDENT IN AN ORGANIZED HEALTH CARE EDUCATION/TRAINING PROGRAM

## 2024-10-09 PROCEDURE — 99214 OFFICE O/P EST MOD 30 MIN: CPT | Mod: 25 | Performed by: STUDENT IN AN ORGANIZED HEALTH CARE EDUCATION/TRAINING PROGRAM

## 2024-10-09 PROCEDURE — 99207 PR FOOT EXAM NO CHARGE: CPT | Performed by: STUDENT IN AN ORGANIZED HEALTH CARE EDUCATION/TRAINING PROGRAM

## 2024-10-09 PROCEDURE — 82043 UR ALBUMIN QUANTITATIVE: CPT | Performed by: STUDENT IN AN ORGANIZED HEALTH CARE EDUCATION/TRAINING PROGRAM

## 2024-10-09 ASSESSMENT — PAIN SCALES - GENERAL: PAINLEVEL: MILD PAIN (2)

## 2024-10-09 NOTE — PROGRESS NOTES
Assessment & Plan     Diabetes mellitus without complication (H)  A1c of 6.2 and well controlled on 1500mg metformin daily, 1mg semaglutide weekly. On statin with last lipid on 9/2023 showing good control. Is non-fasting, will just obtain at annual physical. Due for eye exam, he plans to schedule.   - Hemoglobin A1c  - Albumin Random Urine Quantitative with Creat Ratio  - FOOT EXAM    Vitamin D deficiency  Not on supplement. Discussed starting 1000U vitamin D daily. Plan to recheck vitamin D lab at annual physical in 6 months.    Follow up in 6 months as already scheduled for annual physical and DM2 recheck    Rajinder Cortez MD  [facility]  10/9/2024    Gallito Beard is a 39 year old, presenting for the following health issues:  Diabetes and Recheck Medication (atorvastatin (LIPITOR) 40 MG tablet, metFORMIN (GLUCOPHAGE) 500 MG tablet, Semaglutide, 1 MG/DOSE, (OZEMPIC) 4 MG/3ML pen)      10/9/2024     1:37 PM   Additional Questions   Roomed by ricardo rocha   Accompanied by self     History of Present Illness       Diabetes:   He presents for follow up of diabetes.    He is not checking blood glucose.         He has no concerns regarding his diabetes at this time.  He is having numbness in feet.  The patient has not had a diabetic eye exam in the last 12 months.          He eats 0-1 servings of fruits and vegetables daily.He consumes 0 sweetened beverage(s) daily.He exercises with enough effort to increase his heart rate 10 to 19 minutes per day.  He exercises with enough effort to increase his heart rate 4 days per week.   He is taking medications regularly.     Diabetes Follow-up    How often are you checking your blood sugar? Not at all  What concerns do you have today about your diabetes? None   Do you have any of these symptoms? (Select all that apply)  Numbness in feet  Have you had a diabetic eye exam in the last 12 months? No    BP Readings from Last 2 Encounters:   10/09/24 111/72   03/15/24  "122/79     Hemoglobin A1C (%)   Date Value   10/09/2024 6.2 (H)   03/15/2024 6.1 (H)   09/29/2020 7.3 (H)   02/28/2020 6.8 (H)     LDL Cholesterol Calculated (mg/dL)   Date Value   09/18/2023 56   04/22/2022 53   09/29/2020 64   02/14/2020 88       Hyperlipidemia Follow-Up    Are you regularly taking any medication or supplement to lower your cholesterol?   Yes- atorvastatin (LIPITOR) 40 MG tablet  Are you having muscle aches or other side effects that you think could be caused by your cholesterol lowering medication?  No  How many servings of fruits and vegetables do you eat daily?  0-1  On average, how many sweetened beverages do you drink each day (Examples: soda, juice, sweet tea, etc.  Do NOT count diet or artificially sweetened beverages)?   0  How many days per week do you exercise enough to make your heart beat faster? 7  How many minutes a day do you exercise enough to make your heart beat faster? 10 - 19  How many days per week do you miss taking your medication? 0    DM2  No low blood sugars (not checking) but no symptoms at all.   Still needs to get eye exam this year.   Doing well on metformin and semaglutide. No issues at all.    Low vitamin D  Has not started a supplement for vitamin D yet.         Objective    /72 (BP Location: Right arm, Patient Position: Chair, Cuff Size: Adult Large)   Pulse 67   Temp 97.8  F (36.6  C) (Oral)   Resp 20   Ht 1.956 m (6' 5\")   Wt 90.8 kg (200 lb 3.2 oz)   SpO2 100%   BMI 23.74 kg/m    Body mass index is 23.74 kg/m .    Physical Exam   GENERAL: healthy, alert and no distress  HEAD: Normocephalic, atraumatic.   EYES: Normal conjunctivae, sclera.   RESP: lungs clear to auscultation - no rales, rhonchi or wheezes  CV: regular rate and rhythm, normal S1 S2, no murmur, click, rub or gallop.    MSK: no gross musculoskeletal defects noted.  SKIN: no suspicious lesions or rashes.  EXT: Warm and well perfused.   Diabetic foot exam: normal DP pulses, callusing " over plantar aspect of lateral 5th MTPs bilaterally, no ulcerative lesions, and normal sensation to monofilament in all 9 regions bilaterally.   NEURO: CNII-XII grossly intact. No focal deficits.  PSYCH: Groomed, dressed appropriately for weather. Normal mood with consistent affect.     Signed Electronically by: Rajinder Cortez MD

## 2024-10-10 LAB
CREAT UR-MCNC: 65 MG/DL
MICROALBUMIN UR-MCNC: <12 MG/L
MICROALBUMIN/CREAT UR: NORMAL MG/G{CREAT}

## 2024-10-22 DIAGNOSIS — E78.2 MIXED HYPERLIPIDEMIA: ICD-10-CM

## 2024-10-22 RX ORDER — ATORVASTATIN CALCIUM 40 MG/1
40 TABLET, FILM COATED ORAL DAILY
Qty: 90 TABLET | Refills: 1 | Status: SHIPPED | OUTPATIENT
Start: 2024-10-22

## 2024-10-26 ENCOUNTER — E-VISIT (OUTPATIENT)
Dept: URGENT CARE | Facility: CLINIC | Age: 39
End: 2024-10-26
Payer: COMMERCIAL

## 2024-10-26 ENCOUNTER — OFFICE VISIT (OUTPATIENT)
Dept: URGENT CARE | Facility: URGENT CARE | Age: 39
End: 2024-10-26
Payer: COMMERCIAL

## 2024-10-26 VITALS
DIASTOLIC BLOOD PRESSURE: 75 MMHG | OXYGEN SATURATION: 97 % | BODY MASS INDEX: 23.43 KG/M2 | WEIGHT: 197.6 LBS | SYSTOLIC BLOOD PRESSURE: 107 MMHG | TEMPERATURE: 98 F | HEART RATE: 75 BPM

## 2024-10-26 DIAGNOSIS — H00.014 HORDEOLUM EXTERNUM OF LEFT UPPER EYELID: Primary | ICD-10-CM

## 2024-10-26 DIAGNOSIS — H57.89 EYE SWELLING, LEFT: Primary | ICD-10-CM

## 2024-10-26 PROCEDURE — 99213 OFFICE O/P EST LOW 20 MIN: CPT | Performed by: NURSE PRACTITIONER

## 2024-10-26 PROCEDURE — 99207 PR NON-BILLABLE SERV PER CHARTING: CPT | Performed by: FAMILY MEDICINE

## 2024-10-26 RX ORDER — ERYTHROMYCIN 5 MG/G
0.5 OINTMENT OPHTHALMIC 3 TIMES DAILY
Qty: 3.5 G | Refills: 0 | Status: SHIPPED | OUTPATIENT
Start: 2024-10-26 | End: 2024-11-02

## 2024-10-26 ASSESSMENT — ENCOUNTER SYMPTOMS
CHILLS: 0
EYE PAIN: 0
SORE THROAT: 0
FATIGUE: 0
RHINORRHEA: 0
FEVER: 0

## 2024-10-26 NOTE — PATIENT INSTRUCTIONS
Erythromycin eye ointment as directed    Use warm compress with a clean wet washcloth 4 times a day. Use eye drops AVOID redness reducing drops. Practice good hand hygiene. Ibuprofen as needed. Follow up if symptoms worsen or if not getting better within 4 days

## 2024-10-26 NOTE — PROGRESS NOTES
Assessment & Plan       ICD-10-CM    1. Hordeolum externum of left upper eyelid  H00.014 erythromycin (ROMYCIN) 5 MG/GM ophthalmic ointment           Patient Instructions   Erythromycin eye ointment as directed    Use warm compress with a clean wet washcloth 4 times a day. Use eye drops AVOID redness reducing drops. Practice good hand hygiene. Ibuprofen as needed. Follow up if symptoms worsen or if not getting better within 4 days       Follow up with any problems, questions or concerns or if symptoms worsen or fail to improve. Patient agreed to plan and verbalized understanding.     Subjective     Chirag is a 39 year old male who presents to clinic today for the following health issues:  Chief Complaint   Patient presents with    Eye Problem     Start 3-5 days sx left eye, pain, sore, weeping, redness, swelling, not opening real well, crusting, sx progressively getting worse tx 5-6 warm compresses daily      HPI  Chirag states approximately 4 days ago he started having left upper eyelid swelling and tenderness to palpation.  He states he has been trying warm compresses up to 5-6 times a day with minimal relief of his symptoms.  He denies any trauma to the site. Denies any fevers. Only wears glasses, no contacts. No eye pain. No changes in vision except that left upper eyelid is swollen and red.       Review of Systems   Constitutional:  Negative for chills, fatigue and fever.   HENT:  Negative for congestion, ear pain, rhinorrhea and sore throat.    Eyes:  Negative for pain.        Left upper eyelid swollen and red.        Problem List:  2024-03: History of fatty infiltration of liver  2024-03: Hx of gastric bypass  2020-11: Chronic pain of left knee  2020-10: S/P right knee surgery  2020-08: Right knee pain  2020-04: Gait abnormality  2020-03: Aftercare following surgery of the musculoskeletal system  2020-02: Status post surgery  2019-12: Acquired genu valgum of left knee  2019-12: Patellar instability of right  knee  2019-12: Patellar instability of left knee  2019-05: Obesity (BMI 35.0-39.9) with comorbidity (H)  2019-02: Class 2 drug-induced obesity with serious comorbidity and   body mass index (BMI) of 38.0 to 38.9 in adult  2018-07: Insomnia, unspecified type  2017-06: Morbid obesity with BMI of 45.0-49.9, adult (H)  2017-06: Essential hypertension  2017-06: Mixed hyperlipidemia  2017-06: JOEL (generalized anxiety disorder)  2016-08: Tear of lateral meniscus of right knee  2016-03: Diabetes mellitus without complication (H)  2016-02: Anxiety      Past Medical History:   Diagnosis Date    Diabetes (H)     Hypertension          Social History     Tobacco Use    Smoking status: Never    Smokeless tobacco: Never   Substance Use Topics    Alcohol use: Not Currently           Objective    /75   Pulse 75   Temp 98  F (36.7  C)   Wt 89.6 kg (197 lb 9.6 oz)   SpO2 97%   BMI 23.43 kg/m    Physical Exam  Constitutional:       General: He is not in acute distress.     Appearance: Normal appearance. He is not ill-appearing.   HENT:      Head: Normocephalic and atraumatic.      Right Ear: Tympanic membrane and ear canal normal.      Left Ear: Tympanic membrane and ear canal normal.      Nose: Nose normal.      Mouth/Throat:      Mouth: Mucous membranes are moist.      Pharynx: Oropharynx is clear. No oropharyngeal exudate or posterior oropharyngeal erythema.   Eyes:      General:         Right eye: No discharge.         Left eye: No discharge.      Extraocular Movements: Extraocular movements intact.      Conjunctiva/sclera: Conjunctivae normal.      Pupils: Pupils are equal, round, and reactive to light.      Comments: Left upper eyelid mildly edematous and erythematous.   Cardiovascular:      Rate and Rhythm: Normal rate and regular rhythm.      Heart sounds: Normal heart sounds.   Pulmonary:      Effort: Pulmonary effort is normal.      Breath sounds: Normal breath sounds.   Musculoskeletal:      Cervical back: Normal  range of motion and neck supple.   Lymphadenopathy:      Cervical: No cervical adenopathy.   Neurological:      Mental Status: He is alert.              Kassandra Lisa NP

## 2024-11-05 NOTE — PROGRESS NOTES
Assessment/Plan:    ASSESSMENT/PLAN  Updated problem list and treatment plan: Diagnosis 1:  S/P Right Knee Arthroscopy with Chondroplasty and Distal Femoral Osteotomy  Diagnosis 2:  S/p Left knee EUA and hardware removal   Pain -  hot/cold therapy, manual therapy, splint/taping/bracing/orthotics, self management, education and home program  Decreased ROM/flexibility - manual therapy, therapeutic exercise, therapeutic activity and home program  Decreased strength - therapeutic exercise, therapeutic activities and home program  Impaired balance - neuro re-education, gait training, therapeutic activities, adaptive equipment/assistive device and home program  Impaired gait - gait training, assistive devices and home program  Impaired muscle performance - electric stimulation, neuro re-education and home program  Decreased function - therapeutic activities and home program  Impaired posture - neuro re-education, therapeutic activities and home program      STG/LTGs have been met:  Yes, Patient is using a cane, rather than crutches due to pain in the left knee  Progress toward STG/LTGs have been made:  Yes, some improvements with gait, strength  Assessment of Progress: The patient's condition is improving.  The patient's condition has potential to improve.  Self Management Plans:  Patient has been instructed in a home treatment program.  I have re-evaluated this patient and find that the nature, scope, duration and intensity of the therapy is appropriate for the medical condition of the patient.  Patient continues to require the following intervention to meet STG and LT's:  PT    Recommendations:  This patient would benefit from continued therapy.     Frequency:  2 X week, once daily  Duration:  for 4 weeks tapering to 2 X a month over 14 weeks    Patient continues to experience pain in the left limit that severely limits his activity and the number of treatment options within PT to allow strengthening.     Please refer  Goal Outcome Evaluation:                                             to the daily flowsheet for treatment today, total treatment time and time spent performing 1:1 timed codes.

## 2024-11-13 NOTE — TELEPHONE ENCOUNTER
DIAGNOSIS: left knee pain (to discuss knee replacement) ok to schedule per Lucinda Tyler, ATC see TE)    APPOINTMENT DATE: 11/15/2024   NOTES STATUS DETAILS   OFFICE NOTE from referring provider Internal    OFFICE NOTE from other specialist Internal    OPERATIVE REPORT Internal Large Joint Injection- Knee 7/22/2024   MEDICATION LIST Internal    LABS     CBC/DIFF Internal    MRI Internal MRI Knee Left 6/28/2024, 4/12/2021   XRAYS (IMAGES & REPORTS) Internal Xray Knee left 6/5/2024, 4/7/2021

## 2024-11-15 ENCOUNTER — OFFICE VISIT (OUTPATIENT)
Dept: ORTHOPEDICS | Facility: CLINIC | Age: 39
End: 2024-11-15
Payer: COMMERCIAL

## 2024-11-15 ENCOUNTER — PRE VISIT (OUTPATIENT)
Dept: ORTHOPEDICS | Facility: CLINIC | Age: 39
End: 2024-11-15

## 2024-11-15 VITALS — HEIGHT: 73 IN | BODY MASS INDEX: 26.11 KG/M2 | WEIGHT: 197 LBS

## 2024-11-15 DIAGNOSIS — M17.12 TRICOMPARTMENT OSTEOARTHRITIS OF LEFT KNEE: Primary | ICD-10-CM

## 2024-11-15 PROCEDURE — 99213 OFFICE O/P EST LOW 20 MIN: CPT | Performed by: ORTHOPAEDIC SURGERY

## 2024-11-15 NOTE — PROGRESS NOTES
Orth[facility] Surgery Clinic - New Patient    Chief Complaint:  Left knee pain.    History of Present Illness:  I had the opportunity meet this very pleasant 39-year-old male patient today, accompanied by his spouse Brit for the entire encounter, for the above chief complaint of chronic duration.  The patient has a relevant history of having bilateral knee patellofemoral instability with multiple dislocation events dating back to a 1998 tennis injury. He has undergone bilateral knee open lateral retinacular lengthening, medial retinacular imbrication, and varus distal femoral osteotomies. Although he reports significant improvement after the right side was performed on 10/02/2020, he reports he did not appear to have as much relief of his symptoms on the left side which was performed on 02/28/2020.  He does endorse that it seemed to help out with the buckling episodes he had had preoperatively, but he reports still having ongoing unchanged left knee pain afterwards.  He subsequently underwent hardware removal from the left side on 12/15/2020 which he reports did not seem to help his left knee pain.  He endorses previous but not current buckling episodes, and he denies locking episodes.  He does endorse left knee pain that ranges from 5-9 out of 10 in severity.  He notes particular difficulty with stair descent.  He reports he can walk various distances from anywhere from 1/2 mile on a good day to only about 5 minutes on a bad day.  He reports having tried braces, physical therapy, medications, CBD, and corticosteroid injections which did not seem to help significantly.  He reports the pain is mostly localized around the anterior left knee.  He expresses that if his left knee can be made to be like his right, he would be happy with that.  He does have a history of diabetes mellitus with a relatively well-controlled appearing hemoglobin A1c and denies any history of known neuropathy or foot ulceration. History is  obtained from interview with the patient and his spouse as well as review of the chart.    Past Medical History:  Past Medical History:   Diagnosis Date    Diabetes (H)     Hypertension      Past Surgical History:  Past Surgical History:   Procedure Laterality Date    ARTHROSCOPY KNEE WITH DEBRIDEMENT JOINT, COMBINED Left 12/15/2020    Procedure: left knee examination under anesthesia, left knee arthroscopy;  Surgeon: Angel Madera MD;  Location: [facility] OR    ARTHROSCOPY KNEE WITH PATELLAR REALIGNMENT Left 02/28/2020    Procedure: Examination under anesthesia Left knee,  medial reefing/imbrication;  Surgeon: Angel Madera MD;  [facility]    ARTHROSCOPY KNEE WITH RETINACULAR RELEASE Left 02/28/2020    Procedure: Left lateral retinacular lengthening;  Surgeon: Angel Madera MD;  [facility]    ARTHROSCOPY KNEE WITH RETINACULAR RELEASE Right 10/02/2020    Procedure: Examination under anesthesia right knee, right knee arthroscopy, chondralplasty of lateral femoral condyle and lateral trochlea, medial retinacular imbrication, open lateral retinacular lengthening;  Surgeon: Angel Madera MD;  [facility]    LAPAROSCOPIC BYPASS GASTRIC N/A 10/09/2018    Procedure: LAPAROSCOPIC BYPASS GASTRIC;  LAPAROSCOPIC WELLINGTON-EN Y GASTRIC BYPASS;  Surgeon: Alden Mcwilliams MD;  Location:  OR    ORTHOPEDIC SURGERY Right 02/2016    OSTEOTOMY FEMUR DISTAL Left 02/28/2020    Procedure: Left distal femoral osteotomy;  Surgeon: Angel Madera MD;  [facility]    OSTEOTOMY FEMUR DISTAL Right 10/02/2020    Procedure: Left distal femoral osteotomy;  Surgeon: Angel Madera MD;  [facility]    REMOVE HARDWARE KNEE Left 12/15/2020    Procedure: LEFT KNEE HARDWARE REMOVAL;  Surgeon: Angel Madera MD;  [facility] [facility] [place]    VASECTOMY  04/2023     Social History:  Social History     Occupational History    Not on file   Tobacco Use  "   Smoking status: Never    Smokeless tobacco: Never   Vaping Use    Vaping status: Never Used   Substance and Sexual Activity    Alcohol use: Not Currently    Drug use: No    Sexual activity: Yes     Partners: Female   He endorses that he lives at home with his wife, has friends/family nearby, has reliable transportation to and from appointments, and that his hobbies/recreational activities include music (he likes heavy metal and various other genres), spending time with his two young children, guitar, attending concerts, videogames, outdoor work, and visiting museums.  He denies tobacco/nicotine, alcohol, or illicit drug use.  He works full-time in IT.    Exam:  Ht 1.85 m (6' 0.84\")   Wt 89.4 kg (197 lb)   BMI 26.11 kg/m    Examination this time shows the patient to be a pleasant, cooperative, awake, and alert adult sitting upright in regular chair in no acute distress.  He is accompanied by his spouse.  He has a single prong cane with him.  Breathing pattern is regular and nonlabored on room air.  The left knee has intact skin with well-healed surgical scars over the anteromedial left patella and lateral left distal femur.  No evidence for infection or drainage.  Gentle internal and external rotation of the left hip is endorsed as nonpainful at the hip, with approximate hip ROM 0-90 of flexion, 15 of internal rotation, and 60 of external rotation.  Negative left straight leg raise for radicular symptoms.  He demonstrates the ability to perform a left lower extremity straight leg raise with 5/5 quad strength and 0 lag.  Passive left knee range of motion is approximately 5 degrees to 120 degrees of flexion.  0 varus and valgus laxity at maximal extension as well as at 30 of flexion.  0 Lachman.  0 anterior and posterior drawer at 90.  1 quadrant medial and 1 quadrant lateral patellar mobility.  Positive patellofemoral grind test.  There is crepitance and pain endorsed with range of motion but otherwise normal " patellar tracking.  The left knee is tender to palpation in the PFJ and mildly so in the lateral joint line.  Nontender medial joint line and posterior capsule.  No identifiable masses.  Distally, 3/4 easily palpable left DP and PT pulses, light touch sensation is endorsed as intact in all dermatomes, 5/5 left tib ant, EHL, gastrocsoleus, EDL, FHL, FDL, PTT, and peroneal strength, and the patient does endorse ability to feel a [place] 5.07 monofilament in all the tested areas in the plantar left forefoot.    Imaging:  Independent review of imaging was performed including the following:  L knee MRI scan dated 06/28/2024. Arthritic changes worst at the PF joint where there are grade IV changes. These appear progressed compared with the prior MRI scan from 04/12/2021. Full thickness defects at the median ridge and medial facet and at the central portion of the lateral facet of the patella, as well as a full thickness fissure at the lateral trochlear facet. Intact medial and lateral menisci, collateral ligaments, and cruciate ligaments. Evidence for prior lateral retinacular surgery. Grade III fissuring without full thickness defects in the medial and lateral femoral condyles, and lateral tibial plateau, similar to prior.  Weightbearing AP radiograph of bilateral knees, a weightbearing lateral L knee radiograph, and a sunrise left knee radiograph dated 06/05/2024. Ostensibly bone-on-bone arthritic changes at the PF joint. Arthritic changes at the medial and lateral compartments of the femorotibial joints with osteophyte formation, though joint space is remaining and the changes are not as severe as in the PFJ. Evidence for prior VDFO and subsequent HWR. No acute findings.    Impression:  Pleasant 39-year-old gentleman with tricompartmental arthritis of the left knee, worst in the patellofemoral compartment.    Plan:  The findings, diagnoses, and treatment options, both nonsurgical and surgical, were discussed  with Mr. Jones and his spouse in layman's terms.  Full opportunity was given to the patient to participate in the shared decision-making process.  W discussed the options of rest, ice, compression, elevation, corticosteroid injections, viscosupplementation injections, physical therapy, bracing, oral and topical medications, and surgery.  It appears that he has tried and failed appropriate conservative options as well as attempts at joint preservation surgeries. I would concur that there is likely no surgical option likely provide long-lasting benefit of his left knee pain other than an arthroplasty, should he wish to move on past conservative care and proceed with a surgical procedure of some sort. I told him that I would be happy to help him out with either conservative or surgical care, and that in my hands the surgery would be a total knee arthroplasty. The nature of a total knee arthroplasty, the risks, benefits, and alternatives, the postoperative plan, and realistic expectations for outcome were all discussed with the patient in layman's terms. I discussed with him however that it is possible though I am uncertain, that he might be a candidate for a patellofemoral arthroplasty, and I offered to provide a referral to Sundar Meng or Samuel to discuss this which he expressed interest in discussing.  I do note that he does have radiographic signs for medial and lateral compartment arthritis, though there did not appear to be full-thickness articular defects on the June MRI scan (although there was fissuring in the weightbearing surfaces of the MFC and LFC). The rationale for considering a PFA was discussed, as given his young age he would be extremely likely to need at least one if not multiple revision procedures in his lifetime, and that revision procedures have a higher risk for multiple complications including infection, neurovascular injury, medical complications and other problems; as such, it may be  beneficial to make his first revision relatively less morbid and invasive by being a revision of a PFA to a TKA, as opposed a revision of a TKA to a revision TKA. I would be very happy to see Mr. Jones back at any point in time to discuss the treatment options together again, or for TKA surgery should he be found not to be a candidate for a PFA. I also discussed with him that although I would be very happy to do his TKA, if Sundar Meng or Samuel were to recommend a TKA and he would like to have it performed by them that would of course be fine. All questions this very pleasant patient had this time were answered.    Disclaimer:  This note consists of symbols derived from keyboarding, dictation, and/or voice recognition software. As a result, although I do diligently check for accuracy, there may be errors in the script that have gone undetected. Please consider this when interpreting information found in this chart.

## 2024-11-15 NOTE — NURSING NOTE
Reason for visit:   Chief Complaint   Patient presents with    Consult     New patient consult for left knee.  Referred to us by Dr. Madera to discuss possible TKA.  Past surgical Hx on left knee with Dr. Madera including an osteotomy and arthroscopic surgery.         Patient pre-surgery profile    Non-operative treatments:  Injections Yes  Bracing Yes  Medications   Physical Therapy Yes    Social considerations:  Living situation Lives with wife.  Friends/Family nearby Yes  Reliable transportation to and from appointments Yes  Hobbies/recreational activities Music, spending time with children, guitar, attending concerts, video games, outdoor work, visiting museums.  Tobacco/Nicotine use No  Alcohol/illicit drug use No alcohol or illicit drugs.  Works full time in IT    BMI   Body mass index is 26.11 kg/m .                HOOS Hip Dysfunction & Osteoarthritis Outcome Questionnaire         No data to display                 KOOS Knee Survey Assessment        4/6/2021     4:00 PM   Knee Outcome Survey ADL Scale (BERNABE [place]; MASSIMO Javier; Ric, RS; Sha, FH; Syeda, ADITI; 1998)   Pain (ADLS1) 2   Stiffness (ADLS2) 2   Swelling (ADLS3) 2   Giving Way, Buckling or Shifting of Knee (ADLS4) 4   Weakness (ADLS5) 3   Limping (ADLS6) 2   Walk? (ADLS7) 3   Go up stairs? (ADLS8) 3   Go down stairs? (ADLS9) 2   Stand? (ADLS10) 4   Kneel on the front of your knee? (ADLS11) 1   Squat? (ADLS12) 2   Sit with your knee bent? (ADLS13) 5   Rise from a chair? (ADLS14) 2   How would you rate the current function of your knee during your usual daily activities on a scale from 0 to 100 with 100 being your level of knee function prior to your injury and 0 being the inability to perform any of your usual daily activities? 66   How would you rate the overall function of your knee during your usual daily activities?  (please check the one response that best describes you) 2   As a result of your knee injury, how would you rate your  current level of daily activity? (please check the one response that best describes you) 2   Sum 37   Count 14   Raw Score 37   Knee Activity of Daily Living Score 52.86        Past Medical History  Past Medical History:   Diagnosis Date    Diabetes (H)     Hypertension      Past Surgical History:   Procedure Laterality Date    ARTHROSCOPY KNEE WITH DEBRIDEMENT JOINT, COMBINED Left 12/15/2020    Procedure: left knee examination under anesthesia, left knee arthroscopy;  Surgeon: Angel Madera MD;  Location: [facility] OR    ARTHROSCOPY KNEE WITH PATELLAR REALIGNMENT Left 02/28/2020    Procedure: Examination under anesthesia Left knee,  medial reefing/imbrication;  Surgeon: Angel Madera MD;  [facility]    ARTHROSCOPY KNEE WITH RETINACULAR RELEASE Left 02/28/2020    Procedure: Left lateral retinacular lengthening;  Surgeon: Angel Madera MD;  [facility]    ARTHROSCOPY KNEE WITH RETINACULAR RELEASE Right 10/02/2020    Procedure: Examination under anesthesia right knee, right knee arthroscopy, chondralplasty of lateral femoral condyle and lateral trochlea, medial retinacular imbrication, open lateral retinacular lengthening;  Surgeon: Angel Madera MD;  [facility]    LAPAROSCOPIC BYPASS GASTRIC N/A 10/09/2018    Procedure: LAPAROSCOPIC BYPASS GASTRIC;  LAPAROSCOPIC WELLINGTON-EN Y GASTRIC BYPASS;  Surgeon: Alden Mcwilliams MD;  Location:  OR    ORTHOPEDIC SURGERY Right 02/2016    OSTEOTOMY FEMUR DISTAL Left 02/28/2020    Procedure: Left distal femoral osteotomy;  Surgeon: Angel Madera MD;  [facility]    OSTEOTOMY FEMUR DISTAL Right 10/02/2020    Procedure: Left distal femoral osteotomy;  Surgeon: Angel Madera MD;  [facility]    REMOVE HARDWARE KNEE Left 12/15/2020    Procedure: LEFT KNEE HARDWARE REMOVAL;  Surgeon: Angel Madera MD;  Location: [facility] OR    VASECTOMY  04/2023     atorvastatin (LIPITOR)  40 MG tablet  blood glucose (ONETOUCH ULTRA) test strip  Calcium Carb-Cholecalciferol (CALCIUM 600 + D PO)  Cyanocobalamin (B-12) 500 MCG SUBL  desvenlafaxine (PRISTIQ) 50 MG 24 hr tablet  eszopiclone (LUNESTA) 3 MG tablet  metFORMIN (GLUCOPHAGE) 500 MG tablet  multivitamin  peds with iron (FLINTSTONES COMPLETE) 60 MG chewable tablet  OneTouch Delica Lancets 33G MISC  Semaglutide, 1 MG/DOSE, (OZEMPIC) 4 MG/3ML pen      Allergies   Allergen Reactions    Sulfa Antibiotics Anaphylaxis    Oxycodone Other (See Comments) and Itching     Flushed    Lisinopril      Other reaction(s): Impotence    Onion      Other reaction(s): Intolerance-Can't Take     Family History  Family History   Problem Relation Age of Onset    Glaucoma Father     Diabetes Father     Alcoholism Father     Cirrhosis Father          at 67    Hypertension Father     Myocardial Infarction Father         s/p stent placement    Diabetes Type 2  Paternal Grandmother     Prostate Cancer Paternal Grandfather     Macular Degeneration No family hx of     Colon Cancer No family hx of      Social History   Social History     Tobacco Use    Smoking status: Never    Smokeless tobacco: Never   Vaping Use    Vaping status: Never Used   Substance Use Topics    Alcohol use: Not Currently    Drug use: No

## 2025-01-21 ENCOUNTER — VIRTUAL VISIT (OUTPATIENT)
Dept: ORTHOPEDICS | Facility: CLINIC | Age: 40
End: 2025-01-21
Payer: COMMERCIAL

## 2025-01-21 ENCOUNTER — TELEPHONE (OUTPATIENT)
Dept: FAMILY MEDICINE | Facility: CLINIC | Age: 40
End: 2025-01-21

## 2025-01-21 DIAGNOSIS — M17.12 TRICOMPARTMENT OSTEOARTHRITIS OF LEFT KNEE: Primary | ICD-10-CM

## 2025-01-21 NOTE — CONFIDENTIAL NOTE
Patient Quality Outreach    Patient is due for the following:   Diabetes -  Eye Exam    Action(s) Taken:   No follow up needed at this time.    Type of outreach:    Sent [place] message.    Questions for provider review:    None           Abebe Lino MA

## 2025-01-21 NOTE — PROGRESS NOTES
This is a non-billable nursing telephone visit    Teaching Flowsheet   Relevant Diagnosis: patellofemoral arthritis of left knee  Teaching Topic: left PFA     Patient's  is his wife, Brit.  Patient elected to proceed with fast-track PFA.   Patient understands they will need a preop exam within 30 days of date of surgery. Patient understands the expected length of stay and the expectation of outpatient physical therapy. Patient understands the need for an at home  after surgery and need for transportation home.  Discussed dental/non-sterile procedure prophylaxis. Discussed the [facility] Companion service.        Person(s) involved in teaching:   Patient     Motivation Level:  Asks Questions: Yes  Eager to Learn: Yes  Cooperative: Yes  Receptive (willing/able to accept information): Yes  Any cultural factors/Alevism beliefs that may influence understanding or compliance? No  Comments: none     Patient demonstrates understanding of the following:  Reason for the appointment, diagnosis and treatment plan: Yes  Knowledge of proper use of medications and conditions for which they are ordered (with special attention to potential side effects or drug interactions): Yes  Which situations necessitate calling provider and whom to contact: Yes        Teaching Concerns Addressed:   Comments: none     Proper use and care of (medical equip, care aids, etc.): Yes  Nutritional needs and diet plan: Yes  Pain management techniques: Yes  Wound Care: Yes  How and/when to access community resources: Yes     Instructional Materials Used/Given: Preoperative teaching packet, surgical soap x2, dental card, total joint booklet, & welcome letter          Time spent with patient: 30 minutes

## 2025-01-28 NOTE — PROVIDER NOTIFICATION
01/28/25 1126   Discharge Planning   Patient/Family Anticipates Transition to home with family   Concerns to be Addressed denies needs/concerns at this time   Living Arrangements   People in Home child(willian), dependent;spouse   Type of Residence Private Residence   Is your private residence a single family home or apartment? Single family home   Number of Stairs, Within Home, Primary greater than 10 stairs   Stair Railings, Within Home, Primary railings safe and in good condition   Once home, are you able to live on one level? Yes   Which level? Main Level   Bathroom Shower/Tub Tub/Shower unit   Equipment Currently Used at Home cane, straight   Support System   Support Systems Spouse/Significant Other   Do you have someone available to stay with you one or two nights once you are home? Yes   Medical Clearance   Date of Physical 02/05/25   Clinic Name [facility]   It is recommended that you call and check with any specialty providers before surgery to see if you need surgical clearance.  Do you see any specialty providers outside of your primary care provider? No   Blood   Known Bleeding Disorder or Coagulopathy No   Does the patient have any Scientology/cultural preferences related to blood products? No   Relationship/Environment   Name(s) of People in Home Brit/spouse and children

## 2025-02-03 NOTE — PROGRESS NOTES
Ortho Navigator Note      Pre-op Date 2/5/25     Medical Clearance  Cleared     Labs Stable     COVID Test Date No longer indicated      Skin  Intact      Activity: Ambulates independently with straight cane     Equipment Need Patient will likely need a walker for discharge. Defer to PT/OT for recs.       Meds to Hold Held all supplements 14 days prior to surgery  Semaglutide-Hold for minimum of 7 days prior to surgery   Metformin-Hold morning of surgery     * Medication recommendations are not intended to be exhaustive; they are limited to common medications that are potentially dangerous if incorrectly managed   NPO Instructions  Instructed to stop solids 8 hr prior to arrival and clears 2 hr prior to arrival.       Pre-op Joint Education Complete? Complete    Discharge Plan Patient has plan to discharge home on DOS as Fast Track.   Spouse Brit will remain at hospital on DOS  to participate in therapy and discharge education. They will then transport patient home    /Transportation Brit will be  and transportation.  is physically able to care for patient.      Barriers to Discharge No barriers to discharge.      Additional Info/   Special Needs : Patient had no unanswered questions or concerns.           01/28/25 1126   Discharge Planning   Patient/Family Anticipates Transition to home with family   Concerns to be Addressed denies needs/concerns at this time   Living Arrangements   People in Home child(willian), dependent;spouse   Type of Residence Private Residence   Is your private residence a single family home or apartment? Single family home   Number of Stairs, Within Home, Primary greater than 10 stairs   Stair Railings, Within Home, Primary railings safe and in good condition   Once home, are you able to live on one level? Yes   Which level? Main Level   Bathroom Shower/Tub Tub/Shower unit   Equipment Currently Used at Home cane, straight   Support System   Support Systems Spouse/Significant  Other   Do you have someone available to stay with you one or two nights once you are home? Yes   Medical Clearance   Date of Physical 02/05/25   [facility] Name [facility]   It is recommended that you call and check with any specialty providers before surgery to see if you need surgical clearance.  Do you see any specialty providers outside of your primary care provider? No   Blood   Known Bleeding Disorder or Coagulopathy No   Does the patient have any Roman Catholic/cultural preferences related to blood products? No   Education   Has the patient scheduled or completed pre-op total joint education, either in class or online, in the last 12 months? Yes   Patient attended total joint pre-op class/received pre-op teaching  online   Relationship/Environment   Name(s) of People in Home Brit/spouse and children

## 2025-02-04 NOTE — CONFIDENTIAL NOTE
Patient Quality Outreach    Patient is due for the following:   Diabetes -  Eye Exam    Action(s) Taken:   No follow up needed at this time.    Type of outreach:    Sent letter.    Questions for provider review:    None           Abebe Lino MA

## 2025-02-05 ENCOUNTER — OFFICE VISIT (OUTPATIENT)
Dept: FAMILY MEDICINE | Facility: CLINIC | Age: 40
End: 2025-02-05
Payer: COMMERCIAL

## 2025-02-05 VITALS
DIASTOLIC BLOOD PRESSURE: 75 MMHG | OXYGEN SATURATION: 97 % | BODY MASS INDEX: 25.59 KG/M2 | TEMPERATURE: 98.6 F | HEIGHT: 73 IN | HEART RATE: 77 BPM | WEIGHT: 193.1 LBS | SYSTOLIC BLOOD PRESSURE: 110 MMHG | RESPIRATION RATE: 18 BRPM

## 2025-02-05 DIAGNOSIS — M17.12 TRICOMPARTMENT OSTEOARTHRITIS OF LEFT KNEE: ICD-10-CM

## 2025-02-05 DIAGNOSIS — E11.9 DIABETES MELLITUS WITHOUT COMPLICATION (H): ICD-10-CM

## 2025-02-05 DIAGNOSIS — Z01.818 PREOP GENERAL PHYSICAL EXAM: Primary | ICD-10-CM

## 2025-02-05 LAB
ANION GAP SERPL CALCULATED.3IONS-SCNC: 12 MMOL/L (ref 7–15)
BUN SERPL-MCNC: 10.1 MG/DL (ref 6–20)
CALCIUM SERPL-MCNC: 8.8 MG/DL (ref 8.8–10.4)
CHLORIDE SERPL-SCNC: 104 MMOL/L (ref 98–107)
CHOLEST SERPL-MCNC: 90 MG/DL
CREAT SERPL-MCNC: 0.66 MG/DL (ref 0.67–1.17)
EGFRCR SERPLBLD CKD-EPI 2021: >90 ML/MIN/1.73M2
EST. AVERAGE GLUCOSE BLD GHB EST-MCNC: 131 MG/DL
FASTING STATUS PATIENT QL REPORTED: YES
FASTING STATUS PATIENT QL REPORTED: YES
GLUCOSE SERPL-MCNC: 123 MG/DL (ref 70–99)
HBA1C MFR BLD: 6.2 % (ref 0–5.6)
HCO3 SERPL-SCNC: 26 MMOL/L (ref 22–29)
HDLC SERPL-MCNC: 44 MG/DL
HGB BLD-MCNC: 14.6 G/DL (ref 13.3–17.7)
LDLC SERPL CALC-MCNC: 35 MG/DL
NONHDLC SERPL-MCNC: 46 MG/DL
POTASSIUM SERPL-SCNC: 3.7 MMOL/L (ref 3.4–5.3)
SODIUM SERPL-SCNC: 142 MMOL/L (ref 135–145)
TRIGL SERPL-MCNC: 54 MG/DL

## 2025-02-05 PROCEDURE — 99214 OFFICE O/P EST MOD 30 MIN: CPT | Performed by: STUDENT IN AN ORGANIZED HEALTH CARE EDUCATION/TRAINING PROGRAM

## 2025-02-05 PROCEDURE — 85018 HEMOGLOBIN: CPT | Performed by: STUDENT IN AN ORGANIZED HEALTH CARE EDUCATION/TRAINING PROGRAM

## 2025-02-05 PROCEDURE — 83036 HEMOGLOBIN GLYCOSYLATED A1C: CPT | Performed by: STUDENT IN AN ORGANIZED HEALTH CARE EDUCATION/TRAINING PROGRAM

## 2025-02-05 PROCEDURE — 36415 COLL VENOUS BLD VENIPUNCTURE: CPT | Performed by: STUDENT IN AN ORGANIZED HEALTH CARE EDUCATION/TRAINING PROGRAM

## 2025-02-05 PROCEDURE — 80048 BASIC METABOLIC PNL TOTAL CA: CPT | Performed by: STUDENT IN AN ORGANIZED HEALTH CARE EDUCATION/TRAINING PROGRAM

## 2025-02-05 PROCEDURE — 80061 LIPID PANEL: CPT | Performed by: STUDENT IN AN ORGANIZED HEALTH CARE EDUCATION/TRAINING PROGRAM

## 2025-02-05 NOTE — PATIENT INSTRUCTIONS
Meds:  7 days before surgery: hold semaglutide   On day of surgery: hold metformin  Ok to continue atorvastatin, desvenlafaxine

## 2025-02-05 NOTE — PROGRESS NOTES
Preoperative Evaluation  [facility]  [address] [facility] [address]  Phone: [phone]  Primary Provider: Rajinder Cortez MD  Pre-op Performing Provider: Rajinder Cortez MD  Feb 5, 2025 2/4/2025   Surgical Information   What procedure is being done? Left knee partial replacement   Facility or Hospital where procedure/surgery will be performed: [facility]   Who is doing the procedure / surgery? Dr Baker   Date of surgery / procedure: 2/18/2025   Time of surgery / procedure: 7:30   Where do you plan to recover after surgery? at home with family     Fax number for surgical facility: Note does not need to be faxed, will be available electronically in Epic.    Assessment & Plan     The proposed surgical procedure is considered INTERMEDIATE risk.    Preop general physical exam  Tricompartment osteoarthritis of left knee  - Hemoglobin  - Hemoglobin A1c    Diabetes mellitus without complication (H)  Will be due for BMP in one month thus will obtain now with pre-op labs above.  - Hemoglobin A1c  - Basic metabolic panel  (Ca, Cl, CO2, Creat, Gluc, K, Na, BUN)     - No identified additional risk factors other than previously addressed    Antiplatelet or Anticoagulation Medication Instructions   - Patient is on no antiplatelet or anticoagulation medications.    Additional Medication Instructions  Take all scheduled medications on the day of surgery EXCEPT for modifications listed below:    Meds:  7 days before surgery: hold semaglutide   On day of surgery: hold metformin  Ok to continue atorvastatin, desvenlafaxine    Recommendation  Approval given to proceed with proposed procedure, without further diagnostic evaluation.    Rajinder Cortez MD  [facility]  2/5/2025    Gallito Beard is a 39 year old, presenting for the following:  Pre-Op Exam        2/5/2025     9:07 AM   Additional Questions   Roomed by Marilyn STAUFFER   Accompanied by self         2/5/2025      9:07 AM   Patient Reported Additional Medications   Patient reports taking the following new medications n/a     HPI related to upcoming procedure: Chronic knee pain, plans to have L partial knee replacement        2/4/2025   Pre-Op Questionnaire   Have you ever had a heart attack or stroke? No   Have you ever had surgery on your heart or blood vessels, such as a stent placement, a coronary artery bypass, or surgery on an artery in your head, neck, heart, or legs? No   Do you have chest pain with activity? No   Do you have a history of heart failure? No   Do you currently have a cold, bronchitis or symptoms of other infection? No   Do you have a cough, shortness of breath, or wheezing? No   Do you or anyone in your family have previous history of blood clots? No   Do you or does anyone in your family have a serious bleeding problem such as prolonged bleeding following surgeries or cuts? No   Have you ever had problems with anemia or been told to take iron pills? No   Have you had any abnormal blood loss such as black, tarry or bloody stools? No   Have you ever had a blood transfusion? No   Are you willing to have a blood transfusion if it is medically needed before, during, or after your surgery? Yes   Have you or any of your relatives ever had problems with anesthesia? No   Do you have sleep apnea, excessive snoring or daytime drowsiness? No   Do you have any artifical heart valves or other implanted medical devices like a pacemaker, defibrillator, or continuous glucose monitor? No   Do you have artificial joints? No   Are you allergic to latex? No     Health Care Directive  Patient does not have a Health Care Directive: Discussed advance care planning with patient; however, patient declined at this time.    Preoperative Review of    reviewed - controlled substances prescribed by other outside provider(s).    Status of Chronic Conditions:  See problem list for active medical problems.  Problems all  longstanding and stable, except as noted/documented.  See ROS for pertinent symptoms related to these conditions.    Patient Active Problem List    Diagnosis Date Noted    History of fatty infiltration of liver 03/15/2024     Priority: Medium     Noted on US abdomen in 2017  S/p gastric bypass in 2018  Repeat US abdomen in 2024 with normal liver appearance.      Hx of gastric bypass 03/15/2024     Priority: Medium     Snehal-en-Y in 2018  Needs annual labs - ferritin, vitamin D, B12      Chronic pain of left knee 11/17/2020     Priority: Medium     Added automatically from request for surgery [id number]      S/P right knee surgery 10/02/2020     Priority: Medium    Gait abnormality 04/17/2020     Priority: Medium    Acquired genu valgum of left knee 12/11/2019     Priority: Medium     Added automatically from request for surgery [id number]      Patellar instability of right knee 12/11/2019     Priority: Medium     Added automatically from request for surgery [id number]      Patellar instability of left knee 12/10/2019     Priority: Medium    Insomnia, unspecified type 07/20/2018     Priority: Medium    Mixed hyperlipidemia 06/13/2017     Priority: Medium     On 40mg atorvastatin      JOEL (generalized anxiety disorder) 06/13/2017     Priority: Medium    Tear of lateral meniscus of right knee 08/08/2016     Priority: Medium     Formatting of this note might be different from the original.  Seen by Orthopedics 8/2016- planning for viscosupplementation injection      Diabetes mellitus without complication (H) 03/01/2016     Priority: Medium     On metformin, 1000mg and semaglutide 1mg  (Higher doses of metformin not tolerated)  Atorvastatin, 40mg daily          Past Medical History:   Diagnosis Date    Diabetes (H)     type 2    Hypertension     has resolved since kaushal loss     Past Surgical History:   Procedure Laterality Date    ARTHROSCOPY KNEE WITH DEBRIDEMENT JOINT, COMBINED Left 12/15/2020    Procedure: left knee  examination under anesthesia, left knee arthroscopy;  Surgeon: Angel Madera MD;  Location: [facility] OR    ARTHROSCOPY KNEE WITH PATELLAR REALIGNMENT Left 02/28/2020    Procedure: Examination under anesthesia Left knee,  medial reefing/imbrication;  Surgeon: Angel Madera MD;  Location: [facility] OR    ARTHROSCOPY KNEE WITH RETINACULAR RELEASE Left 02/28/2020    Procedure: Left lateral retinacular lengthening;  Surgeon: Angel Madera MD;  [facility]: [facility]    ARTHROSCOPY KNEE WITH RETINACULAR RELEASE Right 10/02/2020    Procedure: Examination under anesthesia right knee, right knee arthroscopy, chondralplasty of lateral femoral condyle and lateral trochlea, medial retinacular imbrication, open lateral retinacular lengthening;  Surgeon: Angel Madera MD;  Location: [facility] OR    LAPAROSCOPIC BYPASS GASTRIC N/A 10/09/2018    Procedure: LAPAROSCOPIC BYPASS GASTRIC;  LAPAROSCOPIC WELLINGTON-EN Y GASTRIC BYPASS;  Surgeon: Alden Mcwilliams MD;  Location:  OR    ORTHOPEDIC SURGERY Right 02/2016    OSTEOTOMY FEMUR DISTAL Left 02/28/2020    Procedure: Left distal femoral osteotomy;  Surgeon: Angel Madera MD;  [facility]: [facility]    OSTEOTOMY FEMUR DISTAL Right 10/02/2020    Procedure: Left distal femoral osteotomy;  Surgeon: Angel Madera MD;  [facility]: [facility]    REMOVE HARDWARE KNEE Left 12/15/2020    Procedure: LEFT KNEE HARDWARE REMOVAL;  Surgeon: Angel Madera MD;  Location: [facility] OR    VASECTOMY  04/2023     Current Outpatient Medications   Medication Sig Dispense Refill    atorvastatin (LIPITOR) 40 MG tablet TAKE 1 TABLET BY MOUTH EVERY DAY 90 tablet 1    blood glucose (ONETOUCH ULTRA) test strip Use to test blood sugar 3 times daily or as directed. 300 strip 3    Calcium Carb-Cholecalciferol (CALCIUM 600 + D PO) Take 1 tablet by mouth 2 times daily.      cyanocobalamin 2500 MCG SUBL sublingual tablet Place 1 tablet under the tongue daily.    "   desvenlafaxine (PRISTIQ) 50 MG 24 hr tablet Take 1 tablet by mouth daily at 2 pm      eszopiclone (LUNESTA) 3 MG tablet Take 3 mg by mouth every 24 hours      metFORMIN (GLUCOPHAGE) 500 MG tablet TAKE 2 TABLETS BY MOUTH DAILY WITH BREAKFAST AND 1 TABLET DAILY WITH DINNER. (Patient taking differently: Take 1,000 mg by mouth daily (with dinner).) 270 tablet 0    multivitamin  peds with iron (FLINTSTONES COMPLETE) 60 MG chewable tablet Take 2 chew tab by mouth every morning       OneTouch Delica Lancets 33G MISC 1 each 3 times daily 300 each 3    Semaglutide, 1 MG/DOSE, (OZEMPIC) 4 MG/3ML pen Inject 1 mg subcutaneously every 7 days. 9 mL 1     Allergies   Allergen Reactions    Sulfa Antibiotics Anaphylaxis    Oxycodone Other (See Comments) and Itching     Flushed    Lisinopril      Other reaction(s): Impotence    Onion      Other reaction(s): Intolerance-Can't Take    Venlafaxine Hcl Unknown      Social History     Tobacco Use    Smoking status: Never     Passive exposure: Never    Smokeless tobacco: Never   Substance Use Topics    Alcohol use: Not Currently     History   Drug Use No     Review of Systems  Constitutional, neuro, ENT, endocrine, pulmonary, cardiac, gastrointestinal, genitourinary, musculoskeletal, integument and psychiatric systems are negative, except as otherwise noted.    Objective    /75 (BP Location: Right arm, Patient Position: Sitting, Cuff Size: Adult Regular)   Pulse 77   Temp 98.6  F (37  C) (Oral)   Resp 18   Ht 1.842 m (6' 0.5\")   Wt 87.6 kg (193 lb 1.6 oz)   SpO2 97%   BMI 25.83 kg/m     Estimated body mass index is 25.83 kg/m  as calculated from the following:    Height as of this encounter: 1.842 m (6' 0.5\").    Weight as of this encounter: 87.6 kg (193 lb 1.6 oz).    Physical Exam  GENERAL: healthy, alert and no distress  HEAD: Normocephalic, atraumatic.   EYES: PERRL. Normal conjunctivae, sclera.   ENT: Normal EAC and TMs bilaterally. Normal oropharynx.   NECK: Supple. " No lymphadenopathy appreciated. Trachea midline. Thyroid not enlarged, not TTP.  RESP: lungs clear to auscultation - no rales, rhonchi or wheezes  CV: regular rate and rhythm, normal S1 S2, no murmur, click, rub or gallop.  No peripheral swelling noted.   ABDOMEN: soft, no TTP x4 quadrants. No hepatomegaly or masses appreciated. BS normactive.  MSK: no gross musculoskeletal defects noted.  SKIN: no suspicious lesions or rashes.  EXT: Warm and well perfused. DP pulses 2+ bilaterally.  NEURO: CNII-XII grossly intact. No focal deficits.  PSYCH: Groomed, dressed appropriately for weather.  Logical, linear thought process. Normal mood with consistent affect.     Recent Labs   Lab Test 10/09/24  1341 03/15/24  0814   HGB  --  15.2   PLT  --  201   NA  --  139   POTASSIUM  --  4.7   CR  --  0.76   A1C 6.2* 6.1*      Diagnostics  No labs were ordered during this visit.   No EKG required, no history of coronary heart disease, significant arrhythmia, peripheral arterial disease or other structural heart disease.    Revised Cardiac Risk Index (RCRI)  The patient has the following serious cardiovascular risks for perioperative complications:   - No serious cardiac risks = 0 points     RCRI Interpretation: 0 points: Class I (very low risk - 0.4% complication rate)    Signed Electronically by: Rajinder Cortez MD  A copy of this evaluation report is provided to the requesting physician.

## 2025-02-08 DIAGNOSIS — E11.29 TYPE 2 DIABETES MELLITUS WITH MICROALBUMINURIA, WITHOUT LONG-TERM CURRENT USE OF INSULIN (H): ICD-10-CM

## 2025-02-08 DIAGNOSIS — R80.9 TYPE 2 DIABETES MELLITUS WITH MICROALBUMINURIA, WITHOUT LONG-TERM CURRENT USE OF INSULIN (H): ICD-10-CM

## 2025-02-17 ENCOUNTER — TELEPHONE (OUTPATIENT)
Dept: ORTHOPEDICS | Facility: CLINIC | Age: 40
End: 2025-02-17
Payer: COMMERCIAL

## 2025-02-17 NOTE — PROGRESS NOTES
HISTORY OF PRESENT ILLNESS:    Naman Jones is a 39 year old male who is seen in follow up now 16-days S/P Left patellofemoral arthroplasty (DOS: 2/18/2025) performed by Dr. Baker  Present symptoms: Pt reports doing well.  Pain is fairly well-controlled, with occasional increased pain at night.  For pain management, he uses tylenol & ibuprofen during the day, and using dilaudid at night. He is hoping to get off dilaudid soon, but notes he only has a couple tablets left.. Treatments include icing, elevation, and physical therapy (2x/week). He states physical therapy is going really well, and he can tell he is getting stronger. Using walker for ambulation. Sleep has been a challenge due to pain, but dilaudid has been helpful for pain (1 before bed, 1 middle of night). Patient using ASA for DVT prophylaxis.  Denies Chest pain, Calf pain, Fever, Chills.    PHYSICAL EXAM:  There were no vitals taken for this visit.  There is no height or weight on file to calculate BMI.   GENERAL APPEARANCE: healthy, alert, and no distress   PSYCH:  mentation appears normal and affect normal/bright    MSK:  Left knee.  ROM: 0-95 degrees  Ambulates: Antalgic, using a walker.  Incision clean and dry, suture tails present, healing.  Appropriate incisional erythema.   Ecchymosis: None.  No calf pain on palpation.  Edema: Trace at knee joint.  CMS: nestor incisional numbness, otherwise grossly intact.      Radiology:   XR Left knee patellofemoral arthroplasty on 2/18/2025:  My interpretation: Status post Left knee patellofemoral arthroplasty.  Adequate sizing, fixation and orientation of components.  No signs of immediate postoperative complications.      ASSESSMENT:  Naman Jones is a 39 year old male who is seen in follow up now 16-days S/P Left patellofemoral arthroplasty (DOS: 2/18/2025) performed by Dr. Baker, improving appropriately    PLAN:  - Surgery discussed, images reviewed if applicable, and all questions were  answered at this time.  - Discussed that his pain at night is likely due to his activity during the day.  Given that his progression of range of motion and strength is more than adequate for his timeline, discussed slowing his progression of physical therapy and using pain as his guide.  Reviewed finding the balance between progressing forward with range of motion and strength while reducing pain due to exercises.  -Suture tails removed with sterile technique, care instructions given and verbally acknowledged.  - Medications: ASA, Tylenol, ibuprofen, Dilaudid.  short-term prescription of 8 Dilaudid tablets was sent to pharmacy today.  Discussed importance of beginning to wean off opioids at night.  Reviewed risks of dependency and respiratory depression.  He agrees to the plan and all questions concerns were answered.  - Physical Therapy: Continue both at home and formal PT.  Discussed slowing down progression and strain of physical therapy given his night pain.        Return to clinic in 4 weeks for xrays and recheck     Bayron Kay PA-C  Physician Assistant   Oncology and Adult Reconstructive Surgery  Dept Orthopaedic Surgery, [facility]    3/6/2025

## 2025-02-17 NOTE — TELEPHONE ENCOUNTER
Other: Chirag called he is scheduledmfor surgery with Dr. Baker tomorrow 02/18/25 but wants to confirm his arrival time. Please call patient to discuss     [facility] or would you prefer to receive a phone call?:   Patient would prefer a phone call   Okay to leave a detailed message?: Yes at Cell number on file:    Telephone Information:   Mobile [phone]

## 2025-02-17 NOTE — PHARMACY-ADMISSION MEDICATION HISTORY
Medication reconciliation interview completed by pre-admitting nurse, reviewed by pharmacy.     No further clarifications needed.     Prior to Admission medications    Medication Sig Last Dose Taking? Auth Provider Long Term End Date   atorvastatin (LIPITOR) 40 MG tablet TAKE 1 TABLET BY MOUTH EVERY DAY  Yes Rajinder Cortez MD Yes    Calcium Carb-Cholecalciferol (CALCIUM 600 + D PO) Take 1 tablet by mouth 2 times daily.  Yes Reported, Patient     cyanocobalamin 2500 MCG SUBL sublingual tablet Place 1 tablet under the tongue daily.  Yes Reported, Patient     desvenlafaxine (PRISTIQ) 50 MG 24 hr tablet Take 1 tablet by mouth daily at 2 pm  Yes Reported, Patient Yes    eszopiclone (LUNESTA) 3 MG tablet Take 3 mg by mouth every 24 hours  Yes Reported, Patient     multivitamin  peds with iron (FLINTSTONES COMPLETE) 60 MG chewable tablet Take 2 chew tab by mouth every morning   Yes Reported, Patient     Semaglutide, 1 MG/DOSE, (OZEMPIC) 4 MG/3ML pen Inject 1 mg subcutaneously every 7 days.  Yes Rajinder Cortez MD     blood glucose (ONETOUCH ULTRA) test strip Use to test blood sugar 3 times daily or as directed.   Rajinder Cortez MD     metFORMIN (GLUCOPHAGE) 500 MG tablet TAKE 2 TABLETS BY MOUTH DAILY WITH BREAKFAST AND 1 TABLET DAILY WITH DINNER.   Rajinder Cortez MD Yes    OneTouch Delica Lancets 33G MISC 1 each 3 times daily   Rajinder Cortez MD

## 2025-02-17 NOTE — TELEPHONE ENCOUNTER
Informed patient to be there 2 hours prior to surgery, Surgery time and location were confirmed with patient, no further questions at this time.      Josiah Diana ATC

## 2025-02-18 ENCOUNTER — ANESTHESIA (OUTPATIENT)
Dept: SURGERY | Facility: CLINIC | Age: 40
End: 2025-02-18
Payer: COMMERCIAL

## 2025-02-18 ENCOUNTER — APPOINTMENT (OUTPATIENT)
Dept: GENERAL RADIOLOGY | Facility: CLINIC | Age: 40
End: 2025-02-18
Attending: STUDENT IN AN ORGANIZED HEALTH CARE EDUCATION/TRAINING PROGRAM
Payer: COMMERCIAL

## 2025-02-18 ENCOUNTER — HOSPITAL ENCOUNTER (OUTPATIENT)
Facility: CLINIC | Age: 40
Discharge: HOME | End: 2025-02-18
Attending: STUDENT IN AN ORGANIZED HEALTH CARE EDUCATION/TRAINING PROGRAM | Admitting: STUDENT IN AN ORGANIZED HEALTH CARE EDUCATION/TRAINING PROGRAM
Payer: COMMERCIAL

## 2025-02-18 ENCOUNTER — ANESTHESIA EVENT (OUTPATIENT)
Dept: SURGERY | Facility: CLINIC | Age: 40
End: 2025-02-18
Payer: COMMERCIAL

## 2025-02-18 VITALS
OXYGEN SATURATION: 98 % | HEART RATE: 71 BPM | TEMPERATURE: 97.5 F | DIASTOLIC BLOOD PRESSURE: 71 MMHG | RESPIRATION RATE: 16 BRPM | WEIGHT: 193.3 LBS | SYSTOLIC BLOOD PRESSURE: 112 MMHG | BODY MASS INDEX: 25.86 KG/M2

## 2025-02-18 DIAGNOSIS — M17.12 PATELLOFEMORAL ARTHRITIS OF LEFT KNEE: Primary | ICD-10-CM

## 2025-02-18 LAB
GLUCOSE BLDC GLUCOMTR-MCNC: 124 MG/DL (ref 70–99)
GLUCOSE BLDC GLUCOMTR-MCNC: 156 MG/DL (ref 70–99)

## 2025-02-18 PROCEDURE — 250N000009 HC RX 250: Performed by: NURSE ANESTHETIST, CERTIFIED REGISTERED

## 2025-02-18 PROCEDURE — 250N000013 HC RX MED GY IP 250 OP 250 PS 637: Performed by: STUDENT IN AN ORGANIZED HEALTH CARE EDUCATION/TRAINING PROGRAM

## 2025-02-18 PROCEDURE — 250N000011 HC RX IP 250 OP 636: Performed by: STUDENT IN AN ORGANIZED HEALTH CARE EDUCATION/TRAINING PROGRAM

## 2025-02-18 PROCEDURE — 999N000141 HC STATISTIC PRE-PROCEDURE NURSING ASSESSMENT: Performed by: STUDENT IN AN ORGANIZED HEALTH CARE EDUCATION/TRAINING PROGRAM

## 2025-02-18 PROCEDURE — 250N000013 HC RX MED GY IP 250 OP 250 PS 637: Performed by: ANESTHESIOLOGY

## 2025-02-18 PROCEDURE — 710N000012 HC RECOVERY PHASE 2, PER MINUTE: Performed by: STUDENT IN AN ORGANIZED HEALTH CARE EDUCATION/TRAINING PROGRAM

## 2025-02-18 PROCEDURE — 250N000009 HC RX 250: Performed by: STUDENT IN AN ORGANIZED HEALTH CARE EDUCATION/TRAINING PROGRAM

## 2025-02-18 PROCEDURE — 258N000003 HC RX IP 258 OP 636: Performed by: ANESTHESIOLOGY

## 2025-02-18 PROCEDURE — 258N000003 HC RX IP 258 OP 636: Performed by: STUDENT IN AN ORGANIZED HEALTH CARE EDUCATION/TRAINING PROGRAM

## 2025-02-18 PROCEDURE — 82962 GLUCOSE BLOOD TEST: CPT

## 2025-02-18 PROCEDURE — C1776 JOINT DEVICE (IMPLANTABLE): HCPCS | Performed by: STUDENT IN AN ORGANIZED HEALTH CARE EDUCATION/TRAINING PROGRAM

## 2025-02-18 PROCEDURE — 360N000077 HC SURGERY LEVEL 4, PER MIN: Performed by: STUDENT IN AN ORGANIZED HEALTH CARE EDUCATION/TRAINING PROGRAM

## 2025-02-18 PROCEDURE — 250N000025 HC SEVOFLURANE, PER MIN: Performed by: STUDENT IN AN ORGANIZED HEALTH CARE EDUCATION/TRAINING PROGRAM

## 2025-02-18 PROCEDURE — 97110 THERAPEUTIC EXERCISES: CPT | Mod: GP

## 2025-02-18 PROCEDURE — 258N000001 HC RX 258: Performed by: STUDENT IN AN ORGANIZED HEALTH CARE EDUCATION/TRAINING PROGRAM

## 2025-02-18 PROCEDURE — 27599 UNLISTED PX FEMUR/KNEE: CPT | Mod: LT | Performed by: STUDENT IN AN ORGANIZED HEALTH CARE EDUCATION/TRAINING PROGRAM

## 2025-02-18 PROCEDURE — 250N000011 HC RX IP 250 OP 636: Performed by: NURSE ANESTHETIST, CERTIFIED REGISTERED

## 2025-02-18 PROCEDURE — C1713 ANCHOR/SCREW BN/BN,TIS/BN: HCPCS | Performed by: STUDENT IN AN ORGANIZED HEALTH CARE EDUCATION/TRAINING PROGRAM

## 2025-02-18 PROCEDURE — 272N000001 HC OR GENERAL SUPPLY STERILE: Performed by: STUDENT IN AN ORGANIZED HEALTH CARE EDUCATION/TRAINING PROGRAM

## 2025-02-18 PROCEDURE — 370N000017 HC ANESTHESIA TECHNICAL FEE, PER MIN: Performed by: STUDENT IN AN ORGANIZED HEALTH CARE EDUCATION/TRAINING PROGRAM

## 2025-02-18 PROCEDURE — 97116 GAIT TRAINING THERAPY: CPT | Mod: GP

## 2025-02-18 PROCEDURE — 710N000009 HC RECOVERY PHASE 1, LEVEL 1, PER MIN: Performed by: STUDENT IN AN ORGANIZED HEALTH CARE EDUCATION/TRAINING PROGRAM

## 2025-02-18 PROCEDURE — 999N000065 XR KNEE PORT LEFT 1/2 VIEWS: Mod: LT

## 2025-02-18 PROCEDURE — 250N000011 HC RX IP 250 OP 636: Performed by: ANESTHESIOLOGY

## 2025-02-18 PROCEDURE — 97161 PT EVAL LOW COMPLEX 20 MIN: CPT | Mod: GP

## 2025-02-18 DEVICE — BONE CEMENT SIMPLEX FULL DOSE 6191-1-001: Type: IMPLANTABLE DEVICE | Site: KNEE | Status: FUNCTIONAL

## 2025-02-18 DEVICE — IMPLANTABLE DEVICE: Type: IMPLANTABLE DEVICE | Site: KNEE | Status: FUNCTIONAL

## 2025-02-18 DEVICE — BONE CEMENT MIXING SYSTEM W/FEM PRESSURIZER 06065730000: Type: IMPLANTABLE DEVICE | Site: KNEE | Status: FUNCTIONAL

## 2025-02-18 RX ORDER — CEFAZOLIN SODIUM/WATER 2 G/20 ML
2 SYRINGE (ML) INTRAVENOUS
Status: COMPLETED | OUTPATIENT
Start: 2025-02-18 | End: 2025-02-18

## 2025-02-18 RX ORDER — PROPOFOL 10 MG/ML
INJECTION, EMULSION INTRAVENOUS CONTINUOUS PRN
Status: DISCONTINUED | OUTPATIENT
Start: 2025-02-18 | End: 2025-02-18

## 2025-02-18 RX ORDER — HYDROMORPHONE HYDROCHLORIDE 2 MG/1
2-4 TABLET ORAL EVERY 4 HOURS PRN
Qty: 25 TABLET | Refills: 0 | Status: SHIPPED | OUTPATIENT
Start: 2025-02-18

## 2025-02-18 RX ORDER — CEFAZOLIN SODIUM/WATER 2 G/20 ML
2 SYRINGE (ML) INTRAVENOUS EVERY 6 HOURS
Status: DISCONTINUED | OUTPATIENT
Start: 2025-02-18 | End: 2025-02-18

## 2025-02-18 RX ORDER — DEXAMETHASONE SODIUM PHOSPHATE 4 MG/ML
INJECTION, SOLUTION INTRA-ARTICULAR; INTRALESIONAL; INTRAMUSCULAR; INTRAVENOUS; SOFT TISSUE PRN
Status: DISCONTINUED | OUTPATIENT
Start: 2025-02-18 | End: 2025-02-18

## 2025-02-18 RX ORDER — PROCHLORPERAZINE MALEATE 10 MG
10 TABLET ORAL EVERY 6 HOURS PRN
Status: DISCONTINUED | OUTPATIENT
Start: 2025-02-18 | End: 2025-02-18 | Stop reason: HOSPADM

## 2025-02-18 RX ORDER — PROPOFOL 10 MG/ML
INJECTION, EMULSION INTRAVENOUS PRN
Status: DISCONTINUED | OUTPATIENT
Start: 2025-02-18 | End: 2025-02-18

## 2025-02-18 RX ORDER — ONDANSETRON 4 MG/1
4 TABLET, ORALLY DISINTEGRATING ORAL EVERY 30 MIN PRN
Status: DISCONTINUED | OUTPATIENT
Start: 2025-02-18 | End: 2025-02-18 | Stop reason: HOSPADM

## 2025-02-18 RX ORDER — FENTANYL CITRATE 50 UG/ML
25 INJECTION, SOLUTION INTRAMUSCULAR; INTRAVENOUS
Status: DISCONTINUED | OUTPATIENT
Start: 2025-02-18 | End: 2025-02-18 | Stop reason: HOSPADM

## 2025-02-18 RX ORDER — ASPIRIN 81 MG/1
81 TABLET ORAL 2 TIMES DAILY
Status: DISCONTINUED | OUTPATIENT
Start: 2025-02-18 | End: 2025-02-18 | Stop reason: HOSPADM

## 2025-02-18 RX ORDER — ACETAMINOPHEN 325 MG/1
650 TABLET ORAL EVERY 4 HOURS PRN
Qty: 100 TABLET | Refills: 0 | Status: SHIPPED | OUTPATIENT
Start: 2025-02-18

## 2025-02-18 RX ORDER — LABETALOL HYDROCHLORIDE 5 MG/ML
10 INJECTION, SOLUTION INTRAVENOUS EVERY 10 MIN PRN
Status: DISCONTINUED | OUTPATIENT
Start: 2025-02-18 | End: 2025-02-18 | Stop reason: HOSPADM

## 2025-02-18 RX ORDER — HYDROMORPHONE HYDROCHLORIDE 2 MG/1
4 TABLET ORAL EVERY 4 HOURS PRN
Status: DISCONTINUED | OUTPATIENT
Start: 2025-02-18 | End: 2025-02-18 | Stop reason: HOSPADM

## 2025-02-18 RX ORDER — KETOROLAC TROMETHAMINE 15 MG/ML
15 INJECTION, SOLUTION INTRAMUSCULAR; INTRAVENOUS EVERY 6 HOURS
Status: DISCONTINUED | OUTPATIENT
Start: 2025-02-18 | End: 2025-02-18 | Stop reason: HOSPADM

## 2025-02-18 RX ORDER — IBUPROFEN 600 MG/1
600 TABLET, FILM COATED ORAL EVERY 6 HOURS PRN
Qty: 30 TABLET | Refills: 0 | Status: SHIPPED | OUTPATIENT
Start: 2025-02-18

## 2025-02-18 RX ORDER — TRANEXAMIC ACID 650 MG/1
1950 TABLET ORAL ONCE
Status: COMPLETED | OUTPATIENT
Start: 2025-02-18 | End: 2025-02-18

## 2025-02-18 RX ORDER — ONDANSETRON 2 MG/ML
INJECTION INTRAMUSCULAR; INTRAVENOUS PRN
Status: DISCONTINUED | OUTPATIENT
Start: 2025-02-18 | End: 2025-02-18

## 2025-02-18 RX ORDER — LIDOCAINE 40 MG/G
CREAM TOPICAL
Status: DISCONTINUED | OUTPATIENT
Start: 2025-02-18 | End: 2025-02-18 | Stop reason: HOSPADM

## 2025-02-18 RX ORDER — SODIUM CHLORIDE, SODIUM LACTATE, POTASSIUM CHLORIDE, CALCIUM CHLORIDE 600; 310; 30; 20 MG/100ML; MG/100ML; MG/100ML; MG/100ML
INJECTION, SOLUTION INTRAVENOUS CONTINUOUS
Status: DISCONTINUED | OUTPATIENT
Start: 2025-02-18 | End: 2025-02-18 | Stop reason: HOSPADM

## 2025-02-18 RX ORDER — CEFAZOLIN SODIUM/WATER 2 G/20 ML
2 SYRINGE (ML) INTRAVENOUS SEE ADMIN INSTRUCTIONS
Status: DISCONTINUED | OUTPATIENT
Start: 2025-02-18 | End: 2025-02-18 | Stop reason: HOSPADM

## 2025-02-18 RX ORDER — FENTANYL CITRATE 50 UG/ML
INJECTION, SOLUTION INTRAMUSCULAR; INTRAVENOUS PRN
Status: DISCONTINUED | OUTPATIENT
Start: 2025-02-18 | End: 2025-02-18

## 2025-02-18 RX ORDER — HYDROMORPHONE HCL IN WATER/PF 6 MG/30 ML
0.4 PATIENT CONTROLLED ANALGESIA SYRINGE INTRAVENOUS
Status: DISCONTINUED | OUTPATIENT
Start: 2025-02-18 | End: 2025-02-18 | Stop reason: HOSPADM

## 2025-02-18 RX ORDER — ALBUTEROL SULFATE 0.83 MG/ML
2.5 SOLUTION RESPIRATORY (INHALATION) EVERY 4 HOURS PRN
Status: DISCONTINUED | OUTPATIENT
Start: 2025-02-18 | End: 2025-02-18 | Stop reason: HOSPADM

## 2025-02-18 RX ORDER — CEFAZOLIN SODIUM/WATER 2 G/20 ML
2 SYRINGE (ML) INTRAVENOUS EVERY 8 HOURS
Status: DISCONTINUED | OUTPATIENT
Start: 2025-02-18 | End: 2025-02-18

## 2025-02-18 RX ORDER — ACETAMINOPHEN 325 MG/1
975 TABLET ORAL ONCE
Status: COMPLETED | OUTPATIENT
Start: 2025-02-18 | End: 2025-02-18

## 2025-02-18 RX ORDER — MEPERIDINE HYDROCHLORIDE 25 MG/ML
12.5 INJECTION INTRAMUSCULAR; INTRAVENOUS; SUBCUTANEOUS EVERY 5 MIN PRN
Status: DISCONTINUED | OUTPATIENT
Start: 2025-02-18 | End: 2025-02-18 | Stop reason: HOSPADM

## 2025-02-18 RX ORDER — CEFAZOLIN SODIUM/WATER 2 G/20 ML
2 SYRINGE (ML) INTRAVENOUS ONCE
Status: COMPLETED | OUTPATIENT
Start: 2025-02-18 | End: 2025-02-18

## 2025-02-18 RX ORDER — AMOXICILLIN 250 MG
1 CAPSULE ORAL 2 TIMES DAILY
Status: DISCONTINUED | OUTPATIENT
Start: 2025-02-18 | End: 2025-02-18 | Stop reason: HOSPADM

## 2025-02-18 RX ORDER — POLYETHYLENE GLYCOL 3350 17 G/17G
1 POWDER, FOR SOLUTION ORAL DAILY
Qty: 7 PACKET | Refills: 0 | Status: SHIPPED | OUTPATIENT
Start: 2025-02-18

## 2025-02-18 RX ORDER — ONDANSETRON 2 MG/ML
4 INJECTION INTRAMUSCULAR; INTRAVENOUS EVERY 30 MIN PRN
Status: DISCONTINUED | OUTPATIENT
Start: 2025-02-18 | End: 2025-02-18 | Stop reason: HOSPADM

## 2025-02-18 RX ORDER — DEXAMETHASONE SODIUM PHOSPHATE 4 MG/ML
4 INJECTION, SOLUTION INTRA-ARTICULAR; INTRALESIONAL; INTRAMUSCULAR; INTRAVENOUS; SOFT TISSUE
Status: DISCONTINUED | OUTPATIENT
Start: 2025-02-18 | End: 2025-02-18 | Stop reason: HOSPADM

## 2025-02-18 RX ORDER — AMOXICILLIN 250 MG
1-2 CAPSULE ORAL 2 TIMES DAILY
Qty: 30 TABLET | Refills: 0 | Status: SHIPPED | OUTPATIENT
Start: 2025-02-18

## 2025-02-18 RX ORDER — ONDANSETRON 4 MG/1
4 TABLET, ORALLY DISINTEGRATING ORAL EVERY 6 HOURS PRN
Status: DISCONTINUED | OUTPATIENT
Start: 2025-02-18 | End: 2025-02-18 | Stop reason: HOSPADM

## 2025-02-18 RX ORDER — ONDANSETRON 2 MG/ML
4 INJECTION INTRAMUSCULAR; INTRAVENOUS EVERY 6 HOURS PRN
Status: DISCONTINUED | OUTPATIENT
Start: 2025-02-18 | End: 2025-02-18 | Stop reason: HOSPADM

## 2025-02-18 RX ORDER — HYDROMORPHONE HCL IN WATER/PF 6 MG/30 ML
0.4 PATIENT CONTROLLED ANALGESIA SYRINGE INTRAVENOUS EVERY 5 MIN PRN
Status: DISCONTINUED | OUTPATIENT
Start: 2025-02-18 | End: 2025-02-18 | Stop reason: HOSPADM

## 2025-02-18 RX ORDER — FENTANYL CITRATE 50 UG/ML
50 INJECTION, SOLUTION INTRAMUSCULAR; INTRAVENOUS EVERY 5 MIN PRN
Status: DISCONTINUED | OUTPATIENT
Start: 2025-02-18 | End: 2025-02-18 | Stop reason: HOSPADM

## 2025-02-18 RX ORDER — FENTANYL CITRATE 50 UG/ML
25 INJECTION, SOLUTION INTRAMUSCULAR; INTRAVENOUS EVERY 5 MIN PRN
Status: DISCONTINUED | OUTPATIENT
Start: 2025-02-18 | End: 2025-02-18 | Stop reason: HOSPADM

## 2025-02-18 RX ORDER — HYDROMORPHONE HYDROCHLORIDE 2 MG/1
2 TABLET ORAL EVERY 4 HOURS PRN
Status: DISCONTINUED | OUTPATIENT
Start: 2025-02-18 | End: 2025-02-18 | Stop reason: HOSPADM

## 2025-02-18 RX ORDER — ACETAMINOPHEN 325 MG/1
975 TABLET ORAL EVERY 8 HOURS
Status: DISCONTINUED | OUTPATIENT
Start: 2025-02-18 | End: 2025-02-18 | Stop reason: HOSPADM

## 2025-02-18 RX ORDER — LIDOCAINE HYDROCHLORIDE 20 MG/ML
INJECTION, SOLUTION INFILTRATION; PERINEURAL PRN
Status: DISCONTINUED | OUTPATIENT
Start: 2025-02-18 | End: 2025-02-18

## 2025-02-18 RX ORDER — HYDROMORPHONE HCL IN WATER/PF 6 MG/30 ML
0.2 PATIENT CONTROLLED ANALGESIA SYRINGE INTRAVENOUS
Status: DISCONTINUED | OUTPATIENT
Start: 2025-02-18 | End: 2025-02-18 | Stop reason: HOSPADM

## 2025-02-18 RX ORDER — ASPIRIN 81 MG/1
81 TABLET ORAL 2 TIMES DAILY
Qty: 60 TABLET | Refills: 0 | Status: SHIPPED | OUTPATIENT
Start: 2025-02-18

## 2025-02-18 RX ORDER — POLYETHYLENE GLYCOL 3350 17 G/17G
17 POWDER, FOR SOLUTION ORAL DAILY
Status: DISCONTINUED | OUTPATIENT
Start: 2025-02-19 | End: 2025-02-18 | Stop reason: HOSPADM

## 2025-02-18 RX ORDER — BISACODYL 10 MG
10 SUPPOSITORY, RECTAL RECTAL DAILY PRN
Status: DISCONTINUED | OUTPATIENT
Start: 2025-02-18 | End: 2025-02-18 | Stop reason: HOSPADM

## 2025-02-18 RX ADMIN — SODIUM CHLORIDE, POTASSIUM CHLORIDE, SODIUM LACTATE AND CALCIUM CHLORIDE: 600; 310; 30; 20 INJECTION, SOLUTION INTRAVENOUS at 08:09

## 2025-02-18 RX ADMIN — TRANEXAMIC ACID 1950 MG: 650 TABLET ORAL at 07:55

## 2025-02-18 RX ADMIN — FENTANYL CITRATE 25 MCG: 50 INJECTION, SOLUTION INTRAMUSCULAR; INTRAVENOUS at 12:15

## 2025-02-18 RX ADMIN — FENTANYL CITRATE 50 MCG: 50 INJECTION INTRAMUSCULAR; INTRAVENOUS at 09:22

## 2025-02-18 RX ADMIN — MIDAZOLAM 2 MG: 1 INJECTION INTRAMUSCULAR; INTRAVENOUS at 09:18

## 2025-02-18 RX ADMIN — DEXAMETHASONE SODIUM PHOSPHATE 8 MG: 4 INJECTION, SOLUTION INTRA-ARTICULAR; INTRALESIONAL; INTRAMUSCULAR; INTRAVENOUS; SOFT TISSUE at 09:23

## 2025-02-18 RX ADMIN — HYDROMORPHONE HYDROCHLORIDE 1 MG: 1 INJECTION, SOLUTION INTRAMUSCULAR; INTRAVENOUS; SUBCUTANEOUS at 09:44

## 2025-02-18 RX ADMIN — FENTANYL CITRATE 25 MCG: 50 INJECTION, SOLUTION INTRAMUSCULAR; INTRAVENOUS at 11:53

## 2025-02-18 RX ADMIN — HYDROMORPHONE HYDROCHLORIDE 2 MG: 2 TABLET ORAL at 12:10

## 2025-02-18 RX ADMIN — ACETAMINOPHEN 975 MG: 325 TABLET, FILM COATED ORAL at 14:47

## 2025-02-18 RX ADMIN — FENTANYL CITRATE 50 MCG: 50 INJECTION INTRAMUSCULAR; INTRAVENOUS at 09:27

## 2025-02-18 RX ADMIN — FENTANYL CITRATE 50 MCG: 50 INJECTION, SOLUTION INTRAMUSCULAR; INTRAVENOUS at 11:14

## 2025-02-18 RX ADMIN — KETOROLAC TROMETHAMINE 15 MG: 15 INJECTION, SOLUTION INTRAMUSCULAR; INTRAVENOUS at 14:48

## 2025-02-18 RX ADMIN — Medication 2 G: at 09:16

## 2025-02-18 RX ADMIN — ONDANSETRON 4 MG: 2 INJECTION INTRAMUSCULAR; INTRAVENOUS at 10:48

## 2025-02-18 RX ADMIN — ACETAMINOPHEN 975 MG: 325 TABLET, FILM COATED ORAL at 07:55

## 2025-02-18 RX ADMIN — PROPOFOL 200 MG: 10 INJECTION, EMULSION INTRAVENOUS at 09:22

## 2025-02-18 RX ADMIN — LIDOCAINE HYDROCHLORIDE 50 MG: 20 INJECTION, SOLUTION INFILTRATION; PERINEURAL at 09:22

## 2025-02-18 RX ADMIN — FENTANYL CITRATE 25 MCG: 50 INJECTION, SOLUTION INTRAMUSCULAR; INTRAVENOUS at 11:38

## 2025-02-18 RX ADMIN — SODIUM CHLORIDE, POTASSIUM CHLORIDE, SODIUM LACTATE AND CALCIUM CHLORIDE: 600; 310; 30; 20 INJECTION, SOLUTION INTRAVENOUS at 12:12

## 2025-02-18 RX ADMIN — WATER 2 G: 100 INJECTION, SOLUTION INTRAVENOUS at 16:53

## 2025-02-18 RX ADMIN — HYDROMORPHONE HYDROCHLORIDE 2 MG: 2 TABLET ORAL at 16:00

## 2025-02-18 RX ADMIN — PROPOFOL 50 MCG/KG/MIN: 10 INJECTION, EMULSION INTRAVENOUS at 09:26

## 2025-02-18 RX ADMIN — FENTANYL CITRATE 25 MCG: 50 INJECTION, SOLUTION INTRAMUSCULAR; INTRAVENOUS at 11:26

## 2025-02-18 ASSESSMENT — ACTIVITIES OF DAILY LIVING (ADL)
ADLS_ACUITY_SCORE: 32
ADLS_ACUITY_SCORE: 32
ADLS_ACUITY_SCORE: 34
ADLS_ACUITY_SCORE: 34
ADLS_ACUITY_SCORE: 32
ADLS_ACUITY_SCORE: 32
ADLS_ACUITY_SCORE: 34
ADLS_ACUITY_SCORE: 34
ADLS_ACUITY_SCORE: 32
ADLS_ACUITY_SCORE: 34

## 2025-02-18 NOTE — OP NOTE
[facility]    Operative Note    Pre-operative diagnosis: 39-year-old male with chronic left knee pain secondary to end-stage patellofemoral arthritis status post distant distal femoral osteotomy for patellofemoral instability.  Insufficiently responding to nonsurgical treatment options. ]  Post-operative diagnosis Same as pre-operative diagnosis    Procedure: Left patellofemoral arthroplasty, Left - Knee    Surgeon: Surgeons and Role:     * Des Baker MD - Primary     * Maximus Hunt PA-BERNY - Assisting     * Jacek To MD - Assisting  Anesthesia: General   Estimated Blood Loss: Less than 100 ml    Drains: None  Specimens: * No specimens in log *    Complications: None.  Implants:   Implant Name Type Inv. Item Serial No.  Lot No. LRB No. Used Action   BONE CEMENT SIMPLEX FULL DOSE 6191-1-001 - [id number] Cement, Bone BONE CEMENT SIMPLEX FULL DOSE 6191-1-001  [employer] [id number] Left 1 Implanted   BONE CEMENT MIXING SYSTEM W/FEM PRESSURIZER [id number] - [id number] Cement, Bone BONE CEMENT MIXING SYSTEM W/FEM PRESSURIZER [id number]  [employer]  Left 1 Used as a Supply   PATELLA GII RESURF 7.5X32MM - [id number] Total Joint Component/Insert PATELLA GII RESURF 7.5X32MM  [employer] [id number] Left 1 Implanted   COMP FEMORAL SM OXINIUM LT PATELLOFEMORAL UNICOMP [id number] - [id number] Total Joint Component/Insert COMP FEMORAL SM OXINIUM LT PATELLOFEMORAL UNICOMP [id number]  [employer] [id number] Left 1 Implanted       The presence of JONAH Taylor was essential throughout this case for assistance with patient transfer to and from the operative bed, draping, irrigation, cautery usage, retraction, implant placement, cement removal, arthrotomy closure,  incisional closure, dressing placement.    COMPONENTS USED:  [employer] PFA Size S Femoral Component  [employer] size 32 x 7.5 Patellar Button     ANESTHESIA: General      FINDINGS:  Cartilage loss and clear osteoarthritic changes of the patella and trochlear groove with well preserved cartilage of the medial and lateral joint spaces.   Femoral and patellar components cemented without complication.  Excess cement removed.  Following placement of the components the patella tracked smoothly throughout ROM.  Adequate entry, no signs of patellar hop.  The patella engaged the trochlea at approximately 100 degrees of flexion.  The knee had full extension and flexion to 120 after capsular closure.The incision was closed without tension.          DESCRIPTION OF PROCEDURE: We met the patient in the preoperative area.  There we again reviewed the risks, benefits, and alternatives to patellofemoral arthroplasty.  Informed written consent was obtained.  The operative knee was marked with an indelible marker after patient confirmation of the operative site.  All the patient's questions were answered.  The patient was taken to the operating room and underwent induction of  general anesthesia.  The patient was placed on the operating table in the supine position with a bump under the ipsilateral buttock.  Bony prominences were well padded and he was secured to the table in the standard fashion.  A tourniquet was placed on the operative thigh and the patient was prepped and draped in the normal sterile fashion.        A timeout was performed in which the patient's name, MRN, imaging, preoperative plan and laterality was reviewed.  We also confirmed that the patient received the appropriate preoperative  antibiotics and TXA.          A standard midline incision was made. Dissection was carried down to the knee retinaculum and the quadriceps tendon.  A standard medial parapatellar arthrotomy was performed.  Care was taken to preserve the tibiofemoral cartilage during the approach.  The tissue along the anterior aspect of the distal femur was also removed.  The patella was subluxed and the knee was flexed.  Now we  turned our attention to the distal femur where the anterior alignment guide was used to decide upon the appropriate entry point.  A drill was advanced down the femoral canal in a retrograde direction.  The alignment and anterior cut guide was positioned perpendicular to Erasmo's line, which had been marked as well as parallel to the TEA.  Furthermore, the appropriate alignment was verified by means of the tibial alignment clamp and visually with a drop leon overlaying the femur. The position of the the cutting guide was pinned into place.  The anterior femoral cut was made and noted to be well positioned.  The block was removed along with the leon.     The milling cut guide was positioned with the feet on the femoral trochlea and the anterior flange flat on the anterior cut surface.  The size S fit most appropriately and was pinned into position.  The position was double checked.  The green milling phill was used and the surface was milled as this allowed for the best seating of the femoral component.  The milling guide was removed and the lug holes were then drilled.  The S trial femoral component was placed and fit well.       Next, the patella was inspected.Following this the soft tissues were removed around the patella for further visualization the patella thickness was measured to be 21 and a measured resection of 6 mm was made.  A caliper was then used to measure the residual thickness of the patella which was found to be 15 mm.  A size 32x7.5 mm patellofemoral component fit best and was placed proximal and lateral on the patella.  The drill guide was placed and 4 lug holes were drilled.       Now the the patella trial was  placed and the knee was ranged. The patella was found to track well.  The patella engaged the trochlea at approximately 100 degrees of flexion.  No signs of issues with the patella component entering at the groove.  No patellar hop.     Pulsitile lavage was used to clean the bone in  preparation for cementing. The bone was dried. Cement was mixed, and then the components were cemented into place. While the cement was hardening, the  anesthetic injection was spread around the deep retinacular layer and the subcutaneous layer with a spinal needle.   The knee was irrigated with betadine lavage for approximately 3 minutes.  Once the cement had hardened, the excess cement was removed.  The tourniquet was released.        The knee was again copiously irrigated.    Closure was performed with a #1 interrupted figure of 8 Vicryl sutures in the retinacular layer, 0 Vicryl sutures and 2-0 Vicryl sutures were used in the subcutaneous tissues a 3-0 PDS was used to close the skin.   Steri-Strips and a sterile dressing was applied.  The patient was then awakened, extubated, transferred to the [facility] and brought to the recovery room in stable condition. There were no complications.     POSTOPERATIVE PLAN:  Patient is outpatient in the bed if all goes well he can discharge today otherwise tomorrow.    Rehab:WBAT with no restrictions or precautions.  Anticoagulation: SCDs while in the hospital along with aspirin 162 mg daily for a total of 4 weeks.  Pain control: multimodal pain control and minimize narcotics.  Dispo: Home pending PT/pain control  PT/OT  X-rays in PACU        Des Baker MD, PhD      Adult Reconstruction  [facility] Department of Orthopaedic Surgery  Pager [phone]

## 2025-02-18 NOTE — ANESTHESIA PROCEDURE NOTES
Airway       Patient location during procedure: OR  Staff -        CRNA: Lianne Tejeda APRN CRNA       Performed By: CRNA  Consent for Airway        Urgency: elective  Indications and Patient Condition       Indications for airway management: nestor-procedural       Induction type:intravenous       Mask difficulty assessment: 1 - vent by mask    Final Airway Details       Final airway type: supraglottic airway    Supraglottic Airway Details        Type: LMA       Brand: I-Gel       LMA size: 5    Post intubation assessment        Placement verified by: capnometry, equal breath sounds and chest rise        Number of attempts at approach: 1       Number of other approaches attempted: 0       Secured with: commercial tube weeks       Ease of procedure: easy       Dentition: Intact and Unchanged

## 2025-02-18 NOTE — ANESTHESIA POSTPROCEDURE EVALUATION
Patient: Naman Jones    Procedure: Procedure(s):  Left patellofemoral arthroplasty       Anesthesia Type:  General    Note:     Postop Pain Control: Uneventful            Sign Out: Well controlled pain   PONV: No   Neuro/Psych: Uneventful            Sign Out: Acceptable/Baseline neuro status   Airway/Respiratory: Uneventful            Sign Out: Acceptable/Baseline resp. status   CV/Hemodynamics: Uneventful            Sign Out: Acceptable CV status   Other NRE: NONE   DID A NON-ROUTINE EVENT OCCUR? No           Last vitals:  Vitals Value Taken Time   /64 02/18/25 1125   Temp 97.52  F (36.4  C) 02/18/25 1126   Pulse 64 02/18/25 1126   Resp 0 02/18/25 1126   SpO2 99 % 02/18/25 1126   Vitals shown include unfiled device data.    Electronically Signed By: Dylon Kraft MD  February 18, 2025  11:27 AM

## 2025-02-18 NOTE — ANESTHESIA PREPROCEDURE EVALUATION
Anesthesia Pre-Procedure Evaluation    Patient: Naman Jones   MRN: [id number] : 1985        Procedure : Procedure(s):  Left patellofemoral arthroplasty          Past Medical History:   Diagnosis Date    Diabetes (H)     type 2    Hypertension     has resolved since kaushal loss      Past Surgical History:   Procedure Laterality Date    ARTHROSCOPY KNEE WITH DEBRIDEMENT JOINT, COMBINED Left 12/15/2020    Procedure: left knee examination under anesthesia, left knee arthroscopy;  Surgeon: Angel Madera MD;  Location: [facility] OR    ARTHROSCOPY KNEE WITH PATELLAR REALIGNMENT Left 2020    Procedure: Examination under anesthesia Left knee,  medial reefing/imbrication;  Surgeon: Angel Madera MD;  Location: [facility] OR    ARTHROSCOPY KNEE WITH RETINACULAR RELEASE Left 2020    Procedure: Left lateral retinacular lengthening;  Surgeon: Angel Madera MD;  Location: [facility] OR    ARTHROSCOPY KNEE WITH RETINACULAR RELEASE Right 10/02/2020    Procedure: Examination under anesthesia right knee, right knee arthroscopy, chondralplasty of lateral femoral condyle and lateral trochlea, medial retinacular imbrication, open lateral retinacular lengthening;  Surgeon: Angel Madera MD;  Location: [facility] OR    LAPAROSCOPIC BYPASS GASTRIC N/A 10/09/2018    Procedure: LAPAROSCOPIC BYPASS GASTRIC;  LAPAROSCOPIC WELLINGTON-EN Y GASTRIC BYPASS;  Surgeon: Alden Mcwilliams MD;  Location:  OR    ORTHOPEDIC SURGERY Right 2016    OSTEOTOMY FEMUR DISTAL Left 2020    Procedure: Left distal femoral osteotomy;  Surgeon: Angel Madera MD;  [facility]: [facility]    OSTEOTOMY FEMUR DISTAL Right 10/02/2020    Procedure: Left distal femoral osteotomy;  Surgeon: Angel Madera MD;  [facility]: [facility]    REMOVE HARDWARE KNEE Left 12/15/2020    Procedure: LEFT KNEE HARDWARE REMOVAL;  Surgeon: Angel Madera MD;  Location: [facility] OR    VASECTOMY  2023       Allergies   Allergen Reactions    Sulfa Antibiotics Anaphylaxis    Oxycodone Other (See Comments) and Itching     Flushed    Lisinopril      Other reaction(s): Impotence    Onion      Other reaction(s): Intolerance-Can't Take    Venlafaxine Hcl Unknown      Social History     Tobacco Use    Smoking status: Never     Passive exposure: Never    Smokeless tobacco: Never   Substance Use Topics    Alcohol use: Not Currently      Wt Readings from Last 1 Encounters:   02/18/25 87.7 kg (193 lb 4.8 oz)        Anesthesia Evaluation   Pt has had prior anesthetic. Type: General.    No history of anesthetic complications       ROS/MED HX  ENT/Pulmonary:  - neg pulmonary ROS     Neurologic:  - neg neurologic ROS     Cardiovascular: Comment: Hypertension resolved after weight loss surgery - neg cardiovascular ROS     METS/Exercise Tolerance:     Hematologic:  - neg hematologic  ROS     Musculoskeletal:  - neg musculoskeletal ROS     GI/Hepatic: Comment: Post gastric bypass      Renal/Genitourinary:  - neg Renal ROS     Endo:     (+)  type II DM,   Not using insulin,                 Psychiatric/Substance Use:  - neg psychiatric ROS     Infectious Disease:  - neg infectious disease ROS     Malignancy:  - neg malignancy ROS     Other:  - neg other ROS          Physical Exam    Airway        Mallampati: I   TM distance: > 3 FB   Neck ROM: full   Mouth opening: > 3 cm    Respiratory Devices and Support         Dental  no notable dental history         Cardiovascular   cardiovascular exam normal          Pulmonary   pulmonary exam normal                OUTSIDE LABS:  CBC:   Lab Results   Component Value Date    WBC 5.4 03/15/2024    WBC 5.5 03/19/2022    HGB 14.6 02/05/2025    HGB 15.2 03/15/2024    HCT 45.4 03/15/2024    HCT 42.8 03/19/2022     03/15/2024     03/19/2022     BMP:   Lab Results   Component Value Date     02/05/2025     03/15/2024    POTASSIUM 3.7 02/05/2025    POTASSIUM 4.7 03/15/2024     "CHLORIDE 104 02/05/2025    CHLORIDE 102 03/15/2024    CO2 26 02/05/2025    CO2 28 03/15/2024    BUN 10.1 02/05/2025    BUN 13.2 03/15/2024    CR 0.66 (L) 02/05/2025    CR 0.76 03/15/2024     (H) 02/18/2025     (H) 02/05/2025     COAGS: No results found for: \"PTT\", \"INR\", \"FIBR\"  POC:   Lab Results   Component Value Date     (H) 12/15/2020     HEPATIC:   Lab Results   Component Value Date    ALBUMIN 4.6 03/15/2024    PROTTOTAL 6.9 03/15/2024    ALT 43 03/15/2024    AST 28 03/15/2024    ALKPHOS 92 03/15/2024    BILITOTAL 0.8 03/15/2024     OTHER:   Lab Results   Component Value Date    LACT 0.7 03/19/2022    A1C 6.2 (H) 02/05/2025    KALINA 8.8 02/05/2025    TSH 1.50 07/26/2018       Anesthesia Plan    ASA Status:  2    NPO Status:  NPO Appropriate    Anesthesia Type: General.     - Airway: LMA   Induction: Intravenous, Propofol.   Maintenance: Balanced.        Consents    Anesthesia Plan(s) and associated risks, benefits, and realistic alternatives discussed. Questions answered and patient/representative(s) expressed understanding.     - Discussed:     - Discussed with:  Patient            Postoperative Care    Pain management: IV analgesics, Oral pain medications, Multi-modal analgesia.   PONV prophylaxis: Ondansetron (or other 5HT-3), Dexamethasone or Solumedrol     Comments:               Dylon Kraft MD    I have reviewed the pertinent notes and labs in the chart from the past 30 days and (re)examined the patient.  Any updates or changes from those notes are reflected in this note.    Clinically Significant Risk Factors Present on Admission                             # Overweight: Estimated body mass index is 25.86 kg/m  as calculated from the following:    Height as of 2/5/25: 1.842 m (6' 0.5\").    Weight as of this encounter: 87.7 kg (193 lb 4.8 oz).                "

## 2025-02-18 NOTE — PROGRESS NOTES
02/18/25 9787   Appointment Info   Signing Clinician's Name / Credentials (PT) Luisa Ferrer DPT   Living Environment   People in Home spouse;child(willian), dependent   Current Living Arrangements house   Home Accessibility stairs to enter home   Number of Stairs, Main Entrance 2   Stair Railings, Main Entrance railing on left side (ascending)   Transportation Anticipated family or friend will provide   Living Environment Comments All needs met on main level of rambler home   Self-Care   Usual Activity Tolerance good   Current Activity Tolerance moderate   Equipment Currently Used at Home cane, straight;raised toilet seat;shower chair   Fall history within last six months no   General Information   Onset of Illness/Injury or Date of Surgery 02/18/25   Referring Physician Des Baker MD   Patient/Family Therapy Goals Statement (PT) return to home   Pertinent History of Current Problem (include personal factors and/or comorbidities that impact the POC) POD#0 s/p Left patellofemoral arthroplasty   Existing Precautions/Restrictions fall   Cognition   Orientation Status (Cognition) oriented x 4   Pain Assessment   Patient Currently in Pain Yes, see Vital Sign flowsheet   Integumentary/Edema   Integumentary/Edema Comments Patient with ace wrap from bace of toes to mid thigh   Posture    Posture Not impaired   Range of Motion (ROM)   Range of Motion ROM deficits secondary to surgical procedure;ROM deficits secondary to pain;ROM deficits secondary to swelling   Strength (Manual Muscle Testing)   Strength (Manual Muscle Testing) Deficits observed during functional mobility   Bed Mobility   Comment, (Bed Mobility) Independent   Transfers   Transfers sit-stand transfer   Sit-Stand Transfer   Sit-Stand [place] (Transfers) contact guard;verbal cues   Assistive Device (Sit-Stand Transfers) walker, front-wheeled   Gait/Stairs (Locomotion)   [place] Level (Gait) contact guard   Assistive Device (Gait) walker,  front-wheeled   Distance in Feet (Gait) 10 (eval)+175 (treatment)   Balance   Balance Comments Patient at increased risk of falling secondary to pain, sleepiness, decreased weight shift over left LE   Sensory Examination   Sensory Perception patient reports no sensory changes   Clinical Impression   Criteria for Skilled Therapeutic Intervention Yes, treatment indicated   PT Diagnosis (PT) Impaired functional mobility   Influenced by the following impairments pain, decreased strength, activity tolerance, balance, cognition   Functional limitations due to impairments difficulties with transfers and ambulation   Clinical Presentation (PT Evaluation Complexity) stable   Clinical Presentation Rationale clinical judgement   Clinical Decision Making (Complexity) low complexity   Planned Therapy Interventions (PT) balance training;bed mobility training;gait training;patient/family education;strengthening;transfer training;progressive activity/exercise;ROM (range of motion);stair training;home program guidelines   Risk & Benefits of therapy have been explained evaluation/treatment results reviewed;care plan/treatment goals reviewed;risks/benefits reviewed;current/potential barriers reviewed;participants included;patient;participants voiced agreement with care plan   PT Total Evaluation Time   PT Eval, Low Complexity Minutes (83844) 10   Physical Therapy Goals   PT Frequency One time eval and treatment only   PT Predicted Duration/Target Date for Goal Attainment 02/18/25   PT Goals Transfers;Gait;Aerobic Activity;Stairs;Bed Mobility   PT: Bed Mobility Supervision/stand-by assist;Supine to/from sit;Within precautions;Goal Met   PT: Transfers Sit to/from stand;Bed to/from chair;Assistive device;Supervision/stand-by assist;Within precautions;Goal Met   PT: Gait Assistive device;Supervision/stand-by assist;150 feet;Within precautions;Goal Met   PT: Stairs Supervision/stand-by assist;Assistive device;Rail on left;2 stairs;Goal Met    Interventions   Interventions Quick Adds Gait Training;Therapeutic Activity;Therapeutic Procedure   Therapeutic Procedure/Exercise   Ther. Procedure: strength, endurance, ROM, flexibillity Minutes (48570) 15   Symptoms Noted During/After Treatment fatigue   Treatment Detail/Skilled Intervention Facilitated therapeutic exercise to increase independence with transfers and ambulation.  Patient performed the following exercises x 10 reps with verbal and tactile cueing for correct technique: ankle DF/PF, quad/ham/glut sets, heel slides, SAQ, SLR, supine knee extension stretch, seated knee flexion stretch (able to achieve approx 90 degrees knee flexion).  Handout provided.   Therapeutic Activity   Therapeutic Activities: dynamic activities to improve functional performance Minutes (31195) 5   Symptoms Noted During/After Treatment Fatigue   Treatment Detail/Skilled Intervention Patient performed bed mobility independently. Able to demonstrate repeated SLR, no KI required. Patient transferred between sitting and standing with 2WW and graded assist from CGA to SBA.  Patient and spouse educated in car transfers.   Gait Training   Gait Training Minutes (09820) 15   Symptoms Noted During/After Treatment (Gait Training) fatigue;increased pain   Treatment Detail/Skilled Intervention Patient amb 175 feet with 2WW and graded assist from CGA to SBA.  Patient with decreased gait speed, increased trunk flexion, increased reliance on UE support at walker, fair foot clearance.  Able to demonstrate step through gait pattern with verbal cueing.  Patient instructed in stair negotiation.  Facilitated negotiation of 4 steps with single rail and cane with SBA and intermittent cueing.   Distance in Feet 175   PT Discharge Planning   PT Plan dc   PT Discharge Recommendation (DC Rec)   (per ortho md)   PT Rationale for DC Rec Patient has met all PT goals; has demonstrated mobility with supervision and walker.   PT Brief overview of current  status SBA with 2WW   PT Equipment Needed at Discharge walker, rolling  (walker issued prior to discharge)   Physical Therapy Time and Intention   Timed Code Treatment Minutes 35   Total Session Time (sum of timed and untimed services) 45

## 2025-02-18 NOTE — ANESTHESIA CARE TRANSFER NOTE
Patient: Naman Jones    Procedure: Procedure(s):  Left patellofemoral arthroplasty       Diagnosis: Patellofemoral arthritis of left knee [M17.12]  Diagnosis Additional Information: No value filed.    Anesthesia Type:   General     Note:    Oropharynx: oropharynx clear of all foreign objects and spontaneously breathing  Level of Consciousness: awake  Oxygen Supplementation: face mask  Level of Supplemental Oxygen (L/min / FiO2): 6  Independent Airway: airway patency satisfactory and stable  Dentition: dentition unchanged  Vital Signs Stable: post-procedure vital signs reviewed and stable  Report to RN Given: handoff report given  Patient transferred to: PACU    Handoff Report: Identifed the Patient, Identified the Reponsible Provider, Reviewed the pertinent medical history, Discussed the surgical course, Reviewed Intra-OP anesthesia mangement and issues during anesthesia, Set expectations for post-procedure period and Allowed opportunity for questions and acknowledgement of understanding      Vitals:  Vitals Value Taken Time   BP     Temp 98.06  F (36.7  C) 02/18/25 1106   Pulse 70 02/18/25 1106   Resp 14 02/18/25 1106   SpO2 99 % 02/18/25 1106   Vitals shown include unfiled device data.    Electronically Signed By: ARDEN Reagan CRNA  February 18, 2025  11:07 AM

## 2025-02-18 NOTE — DISCHARGE INSTRUCTIONS
ELVA CARUSO M.D.  CLINIC PHONE NUMBER:  [phone]   [facility]      You received Tylenol 975mg at 8am. Next dose of Tylenol after 2pm if needed.  Maximum acetaminophen (Tylenol) dose from all sources should not exceed 4 grams (4000 mg) per day.

## 2025-02-18 NOTE — PLAN OF CARE
Physical Therapy Discharge Summary     Reason for therapy discharge:    All goals and outcomes met, no further needs identified.     Progress towards therapy goal(s). See goals on Care Plan in [facility] electronic health record for goal details.  Goals met     Therapy recommendation(s):    Continue home exercise program.  Patient reports he will start OP PT within 1 week  Recommend use of walker with all mobility, supervision from spouse with stair negotiation.

## 2025-02-19 ENCOUNTER — THERAPY VISIT (OUTPATIENT)
Dept: PHYSICAL THERAPY | Facility: CLINIC | Age: 40
End: 2025-02-19
Attending: STUDENT IN AN ORGANIZED HEALTH CARE EDUCATION/TRAINING PROGRAM
Payer: COMMERCIAL

## 2025-02-19 DIAGNOSIS — M17.12 TRICOMPARTMENT OSTEOARTHRITIS OF LEFT KNEE: ICD-10-CM

## 2025-02-19 PROCEDURE — 97161 PT EVAL LOW COMPLEX 20 MIN: CPT | Mod: GP | Performed by: PHYSICAL THERAPIST

## 2025-02-19 PROCEDURE — 97530 THERAPEUTIC ACTIVITIES: CPT | Mod: GP | Performed by: PHYSICAL THERAPIST

## 2025-02-19 PROCEDURE — 97110 THERAPEUTIC EXERCISES: CPT | Mod: GP | Performed by: PHYSICAL THERAPIST

## 2025-02-19 ASSESSMENT — ACTIVITIES OF DAILY LIVING (ADL)
HOW_WOULD_YOU_RATE_THE_OVERALL_FUNCTION_OF_YOUR_KNEE_DURING_YOUR_USUAL_DAILY_ACTIVITIES?: SEVERELY ABNORMAL
WALK: I AM UNABLE TO DO THE ACTIVITY
PAIN: THE SYMPTOM AFFECTS MY ACTIVITY MODERATELY
AS_A_RESULT_OF_YOUR_KNEE_INJURY,_HOW_WOULD_YOU_RATE_YOUR_CURRENT_LEVEL_OF_DAILY_ACTIVITY?: SEVERELY ABNORMAL
RISE FROM A CHAIR: ACTIVITY IS SOMEWHAT DIFFICULT
HOW_WOULD_YOU_RATE_THE_CURRENT_FUNCTION_OF_YOUR_KNEE_DURING_YOUR_USUAL_DAILY_ACTIVITIES_ON_A_SCALE_FROM_0_TO_100_WITH_100_BEING_YOUR_LEVEL_OF_KNEE_FUNCTION_PRIOR_TO_YOUR_INJURY_AND_0_BEING_THE_INABILITY_TO_PERFORM_ANY_OF_YOUR_USUAL_DAILY_ACTIVITIES?: 30
AS_A_RESULT_OF_YOUR_KNEE_INJURY,_HOW_WOULD_YOU_RATE_YOUR_CURRENT_LEVEL_OF_DAILY_ACTIVITY?: SEVERELY ABNORMAL
STAND: I AM UNABLE TO DO THE ACTIVITY
HOW_WOULD_YOU_RATE_THE_CURRENT_FUNCTION_OF_YOUR_KNEE_DURING_YOUR_USUAL_DAILY_ACTIVITIES_ON_A_SCALE_FROM_0_TO_100_WITH_100_BEING_YOUR_LEVEL_OF_KNEE_FUNCTION_PRIOR_TO_YOUR_INJURY_AND_0_BEING_THE_INABILITY_TO_PERFORM_ANY_OF_YOUR_USUAL_DAILY_ACTIVITIES?: 30
RAW_SCORE: 18
GO UP STAIRS: I AM UNABLE TO DO THE ACTIVITY
GIVING WAY, BUCKLING OR SHIFTING OF KNEE: THE SYMPTOM AFFECTS MY ACTIVITY SLIGHTLY
LIMPING: THE SYMPTOM AFFECTS MY ACTIVITY SEVERELY
KNEE_ACTIVITY_OF_DAILY_LIVING_SCORE: 25.71
SIT WITH YOUR KNEE BENT: ACTIVITY IS MINIMALLY DIFFICULT
RISE FROM A CHAIR: ACTIVITY IS SOMEWHAT DIFFICULT
SWELLING: THE SYMPTOM AFFECTS MY ACTIVITY MODERATELY
GIVING WAY, BUCKLING OR SHIFTING OF KNEE: THE SYMPTOM AFFECTS MY ACTIVITY SLIGHTLY
GO UP STAIRS: I AM UNABLE TO DO THE ACTIVITY
KNEEL ON THE FRONT OF YOUR KNEE: I AM UNABLE TO DO THE ACTIVITY
HOW_WOULD_YOU_RATE_THE_OVERALL_FUNCTION_OF_YOUR_KNEE_DURING_YOUR_USUAL_DAILY_ACTIVITIES?: SEVERELY ABNORMAL
WEAKNESS: THE SYMPTOM AFFECTS MY ACTIVITY SEVERELY
PAIN: THE SYMPTOM AFFECTS MY ACTIVITY MODERATELY
WEAKNESS: THE SYMPTOM AFFECTS MY ACTIVITY SEVERELY
STIFFNESS: THE SYMPTOM AFFECTS MY ACTIVITY MODERATELY
KNEE_ACTIVITY_OF_DAILY_LIVING_SUM: 18
SQUAT: I AM UNABLE TO DO THE ACTIVITY
SWELLING: THE SYMPTOM AFFECTS MY ACTIVITY MODERATELY
STIFFNESS: THE SYMPTOM AFFECTS MY ACTIVITY MODERATELY
WALK: I AM UNABLE TO DO THE ACTIVITY
SIT WITH YOUR KNEE BENT: ACTIVITY IS MINIMALLY DIFFICULT
STAND: I AM UNABLE TO DO THE ACTIVITY
SQUAT: I AM UNABLE TO DO THE ACTIVITY
GO DOWN STAIRS: I AM UNABLE TO DO THE ACTIVITY
LIMPING: THE SYMPTOM AFFECTS MY ACTIVITY SEVERELY
KNEEL ON THE FRONT OF YOUR KNEE: I AM UNABLE TO DO THE ACTIVITY
PLEASE_INDICATE_YOR_PRIMARY_REASON_FOR_REFERRAL_TO_THERAPY:: KNEE
GO DOWN STAIRS: I AM UNABLE TO DO THE ACTIVITY

## 2025-02-19 NOTE — PROGRESS NOTES
PHYSICAL THERAPY EVALUATION  Type of Visit: Evaluation       Fall Risk Screen:  Fall screen completed by: PT  Have you fallen 2 or more times in the past year?: No  Have you fallen and had an injury in the past year?: No  Is patient a fall risk?: No    Subjective     Pt c/o L knee pain S/P L TKA (femoral patellar components) 2/18/2025. Pt had previous retinaculum lengthening and osteotomy B knee 2020 due to multiple dislocations with moderate improvement until past few months.        Presenting condition or subjective complaint: surgery recovery  Date of onset: 02/18/25    Relevant medical history: Arthritis; Depression; Diabetes   Dates & types of surgery: patellofemoral angioplasty l knee yesterday    Prior diagnostic imaging/testing results: MRI; CT scan; X-ray     Prior therapy history for the same diagnosis, illness or injury: Yes post osteotomy rehab    Prior Level of Function  Transfers: Independent  Ambulation: Independent  ADL: Independent  IADL: Driving, Finances, Housekeeping, Laundry, Meal preparation, Medication management, Work    Living Environment  Social support: With a significant other or spouse   Type of home: 1 level   Stairs to enter the home: Yes 3 Is there a railing: Yes     Ramp: No   Stairs inside the home: No       Help at home: Home management tasks (cooking, cleaning)  Equipment owned: Straight Cane; Walker with wheels; Grab bars; Raised toilet seat     Employment: Yes it security  Hobbies/Interests: music video games playing with kids hiking    Patient goals for therapy: walk    Pain assessment: Pain present     Objective   KNEE EVALUATION  PAIN: Pain Level at Rest: 2/10  Pain Level with Use: 6/10  Pain Location: knee  Pain Quality: Aching, Sharp, Shooting, and Stabbing  Pain Frequency: constant  Pain is Worst: daytime or nighttime  Pain is Exacerbated By: standing, walking, bend/squat, stairs, transfers  Pain is Relieved By: cold, otc medications, rest, and use  Pain Progression:  Unchanged  INTEGUMENTARY (edema, incisions):  moderate swelling L knee  POSTURE:   GAIT:  Weightbearing Status: WBAT  Assistive Device(s): Walker (front wheeled)  Gait Deviations: Antalgic  Push off decreased L  Knee extension decreased L  BALANCE/PROPRIOCEPTION:   WEIGHTBEARING ALIGNMENT:   NON-WEIGHTBEARING ALIGNMENT:   ROM:   (Degrees) Left AROM Left PROM  Right AROM Right PROM   Knee Flexion 2 0     Knee Extension 90 95     Pain:   End feel:     STRENGTH:  Fair/poor QS with delay L.  SLR flex with no assist with ext lag  FLEXIBILITY:  decreased gastroc  SPECIAL TESTS:   FUNCTIONAL TESTS:   PALPATION:  incisional and MJL  JOINT MOBILITY:  decreased pat mob inf/sup    Assessment & Plan   CLINICAL IMPRESSIONS  Medical Diagnosis: Tricompartment osteoarthritis of left knee    Treatment Diagnosis: S/P L TKA with decreased ROM, strength and impaired gair and balance   Impression/Assessment: Patient is a 39 year old male with S/P L TKA  complaints.  The following significant findings have been identified: Pain, Decreased ROM/flexibility, Decreased strength, Decreased proprioception, Edema, Impaired gait, Impaired muscle performance, Decreased activity tolerance, and Impaired posture. These impairments interfere with their ability to perform self care tasks, work tasks, recreational activities, household chores, driving , household mobility, and community mobility as compared to previous level of function.     Clinical Decision Making (Complexity):  Clinical Presentation: Stable/Uncomplicated  Clinical Presentation Rationale: based on medical and personal factors listed in PT evaluation  Clinical Decision Making (Complexity): Low complexity    PLAN OF CARE  Treatment Interventions:  Modalities: Cryotherapy  Interventions: Gait Training, Manual Therapy, Neuromuscular Re-education, Therapeutic Activity, Therapeutic Exercise    Long Term Goals     PT Goal 1  Goal Identifier: Ambulation  Goal Description: Be able to ambulate  45+ minutes without deviation or AD  Rationale:  (for safe household ambulation;for safe community ambulation;to maintain proper body mechanics/posture while ambulating to avoid additional compensatory injury due to improper gait mechanics;to promote a healthy and active lifestyle)  Target Date: 05/14/25  PT Goal 2  Goal Identifier: Squat  Goal Description: Be able to perform full squat/kneel pain free  Rationale:  (for proper body mechanics while performing housework;for proper body mechanics while performing yardwork and home maintenance;for proper body mechanics when lifting, personal hygiene, dressing)  Target Date: 05/14/25      Frequency of Treatment: 2x/week decreasing to 1x/week to 2x/month  Duration of Treatment: 12 weeks    Recommended Referrals to Other Professionals:   Education Assessment:   Learner/Method: Patient;Demonstration;Pictures/Video    Risks and benefits of evaluation/treatment have been explained.   Patient/Family/caregiver agrees with Plan of Care.     Evaluation Time:     PT Eval, Low Complexity Minutes (97146): 15       Signing Clinician: Jim Guajardo PT

## 2025-02-19 NOTE — PROGRESS NOTES
Post op discharge phone call for Dr. PATRICIA discharge patients:  [facility]    Type of surgery:Left patellofemoral arthroplasty  Date of surgery:2/18/25    HI,  I am a registered nurse calling from the Orthopedic unit at [facility], checking in to see how you are doing following your Knee surgery.     Is your pain level manageable? Yes   Are you having any new or increased numbness or tingling in your surgical leg? No  Are you having increased swelling  or drainage around your surgical site or surgical dressing?No  Have you been able to walk short distances around your house?Yes  Have you been able to urinate since you have been home?Yes  Do you have your first physical therapy session set up?First one tomorrow, 2/19  Do you have any questions or concerns at this time?No    Please call Dr. Aguilar's office ([phone]) of any concerns as you daily review your stoplight tool for symptom management.  Thank you, have a great recovery!

## 2025-02-19 NOTE — OR NURSING
Post op discharge phone call for Dr. PATRICIA discharge patients:  [facility]    Type of surgery:Left patellofemoral arthroplasty  Date of surgery:02/18/2025    HI,  I am a registered nurse calling from the Orthopedic unit at [facility], checking in to see how you are doing following your Knee surgery.     Is your pain level manageable? yes   Are you having any new or increased numbness or tingling in your surgical leg? no  Are you having increased swelling  or drainage around your surgical site or surgical dressing?no  Have you been able to walk short distances around your house?yes  Have you been able to urinate since you have been home?yes  Do you have your first physical therapy session set up?yes  Do you have any questions or concerns at this time?no    Please call Dr. Baker's office ([phone]) of any concerns as you daily review your stoplight tool for symptom management.  Thank you, have a great recovery!

## 2025-02-24 ENCOUNTER — MYC MEDICAL ADVICE (OUTPATIENT)
Dept: ORTHOPEDICS | Facility: CLINIC | Age: 40
End: 2025-02-24
Payer: COMMERCIAL

## 2025-02-24 NOTE — TELEPHONE ENCOUNTER
Called patient per his MyChart request.     Patient is s/p left PFA on 02/18/25 with Dr. Baker.    Patient wanted to clarify post op bandage care and ACE wrap care post surgery.  Writer advised he may remove his ACE wrap multiple times a day to allow for skin checks.  Advised re-applying to assist with post op swelling.  Reviewed per discharge instructions that he may remove his post op dressing on POD#7-10.  Reviewed showering instructions.  Good understanding expressed.    Elba Albarado RN on 2/24/2025 at 10:57 AM

## 2025-02-25 ENCOUNTER — THERAPY VISIT (OUTPATIENT)
Dept: PHYSICAL THERAPY | Facility: CLINIC | Age: 40
End: 2025-02-25
Attending: STUDENT IN AN ORGANIZED HEALTH CARE EDUCATION/TRAINING PROGRAM
Payer: COMMERCIAL

## 2025-02-25 DIAGNOSIS — M17.12 TRICOMPARTMENT OSTEOARTHRITIS OF LEFT KNEE: Primary | ICD-10-CM

## 2025-02-25 PROCEDURE — 97110 THERAPEUTIC EXERCISES: CPT | Mod: GP | Performed by: PHYSICAL THERAPIST

## 2025-02-25 PROCEDURE — 97530 THERAPEUTIC ACTIVITIES: CPT | Mod: GP | Performed by: PHYSICAL THERAPIST

## 2025-02-27 ENCOUNTER — MYC REFILL (OUTPATIENT)
Dept: FAMILY MEDICINE | Facility: CLINIC | Age: 40
End: 2025-02-27
Payer: COMMERCIAL

## 2025-02-27 DIAGNOSIS — M17.12 PATELLOFEMORAL ARTHRITIS OF LEFT KNEE: ICD-10-CM

## 2025-02-27 RX ORDER — HYDROMORPHONE HYDROCHLORIDE 2 MG/1
2-4 TABLET ORAL EVERY 4 HOURS PRN
Qty: 25 TABLET | Refills: 0 | Status: SHIPPED | OUTPATIENT
Start: 2025-02-27

## 2025-02-28 NOTE — LETTER
[facility]  [address] [address] [id number] [phone]       February 4, 2025    Naman Jones  [address] [address] [address]    Dear Chirag,    We care about your health and have reviewed your health plan and are making recommendations based on this review, to optimize your health.    You are in particular need of attention regarding:  -Diabetes    We are recommending that you:  -Diabetic Eye Exam is due.  If this was done within the last 12 months then please contact the clinic with the date and location so we can update your records.    In addition, here is a list of due or overdue Health Maintenance reminders.    Health Maintenance Due   Topic Date Due    URINE DRUG SCREEN  Never done    ANNUAL REVIEW OF HM ORDERS  04/06/2023    Eye Exam  01/04/2024    Cholesterol Lab  09/18/2024    PHQ-2 (once per calendar year)  01/01/2025       To address the above recommendations, we encourage you to contact us at [phone], via [employer] or by contacting Central Scheduling toll free at [phone] 24 hours a day. They will assist you with finding the most convenient time and location.    Thank you for trusting [facility] and we appreciate the opportunity to serve you.  We look forward to supporting your healthcare needs in the future.    Healthy Regards,    Your [facility] Team   Addended by: ANTON WILKINSON on: 2/28/2025 11:32 AM     Modules accepted: Orders

## 2025-03-06 ENCOUNTER — OFFICE VISIT (OUTPATIENT)
Dept: ORTHOPEDICS | Facility: CLINIC | Age: 40
End: 2025-03-06
Payer: COMMERCIAL

## 2025-03-06 DIAGNOSIS — Z48.89 ENCOUNTER FOR POSTOPERATIVE CARE: Primary | ICD-10-CM

## 2025-03-06 DIAGNOSIS — M17.12 PATELLOFEMORAL ARTHRITIS OF LEFT KNEE: ICD-10-CM

## 2025-03-06 RX ORDER — HYDROMORPHONE HYDROCHLORIDE 2 MG/1
2 TABLET ORAL EVERY 6 HOURS PRN
Qty: 8 TABLET | Refills: 0 | Status: SHIPPED | OUTPATIENT
Start: 2025-03-06

## 2025-03-06 NOTE — LETTER
3/6/2025      Naman Jones  [address] [address] [address]      Dear Colleague,    Thank you for referring your patient, Naman Jones, to the [facility] [place]. Please see a copy of my visit note below.    HISTORY OF PRESENT ILLNESS:    Naman Jones is a 39 year old male who is seen in follow up now 16-days S/P Left patellofemoral arthroplasty (DOS: 2/18/2025) performed by Dr. Baker  Present symptoms: Pt reports doing well.  Pain is fairly well-controlled, with occasional increased pain at night.  For pain management, he uses tylenol & ibuprofen during the day, and using dilaudid at night. He is hoping to get off dilaudid soon, but notes he only has a couple tablets left.. Treatments include icing, elevation, and physical therapy (2x/week). He states physical therapy is going really well, and he can tell he is getting stronger. Using walker for ambulation. Sleep has been a challenge due to pain, but dilaudid has been helpful for pain (1 before bed, 1 middle of night). Patient using ASA for DVT prophylaxis.  Denies Chest pain, Calf pain, Fever, Chills.    PHYSICAL EXAM:  There were no vitals taken for this visit.  There is no height or weight on file to calculate BMI.   GENERAL APPEARANCE: healthy, alert, and no distress   PSYCH:  mentation appears normal and affect normal/bright    MSK:  Left knee.  ROM: 0-95 degrees  Ambulates: Antalgic, using a walker.  Incision clean and dry, suture tails present, healing.  Appropriate incisional erythema.   Ecchymosis: None.  No calf pain on palpation.  Edema: Trace at knee joint.  CMS: nestor incisional numbness, otherwise grossly intact.      Radiology:   XR Left knee patellofemoral arthroplasty on 2/18/2025:  My interpretation: Status post Left knee patellofemoral arthroplasty.  Adequate sizing, fixation and orientation of components.  No signs of immediate postoperative complications.      ASSESSMENT:  Naman GRANT  Robert is a 39 year old male who is seen in follow up now 16-days S/P Left patellofemoral arthroplasty (DOS: 2/18/2025) performed by Dr. Baker, improving appropriately    PLAN:  - Surgery discussed, images reviewed if applicable, and all questions were answered at this time.  - Discussed that his pain at night is likely due to his activity during the day.  Given that his progression of range of motion and strength is more than adequate for his timeline, discussed slowing his progression of physical therapy and using pain as his guide.  Reviewed finding the balance between progressing forward with range of motion and strength while reducing pain due to exercises.  -Suture tails removed with sterile technique, care instructions given and verbally acknowledged.  - Medications: ASA, Tylenol, ibuprofen, Dilaudid.  short-term prescription of 8 Dilaudid tablets was sent to pharmacy today.  Discussed importance of beginning to wean off opioids at night.  Reviewed risks of dependency and respiratory depression.  He agrees to the plan and all questions concerns were answered.  - Physical Therapy: Continue both at home and formal PT.  Discussed slowing down progression and strain of physical therapy given his night pain.        Return to clinic in 4 weeks for xrays and recheck     Bayron Kay PA-C  Physician Assistant   Oncology and Adult Reconstructive Surgery  Dept Orthopaedic Surgery, [facility]    3/6/2025        Again, thank you for allowing me to participate in the care of your patient.        Sincerely,        Bayron Kay PA-C    Electronically signed

## 2025-03-11 ENCOUNTER — THERAPY VISIT (OUTPATIENT)
Dept: PHYSICAL THERAPY | Facility: CLINIC | Age: 40
End: 2025-03-11
Payer: COMMERCIAL

## 2025-03-11 DIAGNOSIS — M17.12 TRICOMPARTMENT OSTEOARTHRITIS OF LEFT KNEE: Primary | ICD-10-CM

## 2025-03-11 PROCEDURE — 97110 THERAPEUTIC EXERCISES: CPT | Mod: GP | Performed by: PHYSICAL THERAPIST

## 2025-03-18 ENCOUNTER — THERAPY VISIT (OUTPATIENT)
Dept: PHYSICAL THERAPY | Facility: CLINIC | Age: 40
End: 2025-03-18
Payer: COMMERCIAL

## 2025-03-18 DIAGNOSIS — M17.12 TRICOMPARTMENT OSTEOARTHRITIS OF LEFT KNEE: Primary | ICD-10-CM

## 2025-03-18 PROCEDURE — 97110 THERAPEUTIC EXERCISES: CPT | Mod: GP | Performed by: PHYSICAL THERAPIST

## 2025-03-25 ENCOUNTER — THERAPY VISIT (OUTPATIENT)
Dept: PHYSICAL THERAPY | Facility: CLINIC | Age: 40
End: 2025-03-25
Payer: COMMERCIAL

## 2025-03-25 DIAGNOSIS — M17.12 TRICOMPARTMENT OSTEOARTHRITIS OF LEFT KNEE: Primary | ICD-10-CM

## 2025-03-25 PROCEDURE — 97110 THERAPEUTIC EXERCISES: CPT | Mod: GP | Performed by: PHYSICAL THERAPIST

## 2025-03-25 PROCEDURE — 97112 NEUROMUSCULAR REEDUCATION: CPT | Mod: GP | Performed by: PHYSICAL THERAPIST

## 2025-03-30 DIAGNOSIS — E78.2 MIXED HYPERLIPIDEMIA: ICD-10-CM

## 2025-03-31 RX ORDER — ATORVASTATIN CALCIUM 40 MG/1
40 TABLET, FILM COATED ORAL DAILY
Qty: 90 TABLET | Refills: 1 | Status: SHIPPED | OUTPATIENT
Start: 2025-03-31

## 2025-04-06 DIAGNOSIS — R80.9 TYPE 2 DIABETES MELLITUS WITH MICROALBUMINURIA, WITHOUT LONG-TERM CURRENT USE OF INSULIN (H): ICD-10-CM

## 2025-04-06 DIAGNOSIS — E11.29 TYPE 2 DIABETES MELLITUS WITH MICROALBUMINURIA, WITHOUT LONG-TERM CURRENT USE OF INSULIN (H): ICD-10-CM

## 2025-04-07 RX ORDER — SEMAGLUTIDE 1.34 MG/ML
INJECTION, SOLUTION SUBCUTANEOUS
Qty: 9 ML | Refills: 0 | Status: SHIPPED | OUTPATIENT
Start: 2025-04-07

## 2025-04-08 ENCOUNTER — THERAPY VISIT (OUTPATIENT)
Dept: PHYSICAL THERAPY | Facility: CLINIC | Age: 40
End: 2025-04-08
Payer: COMMERCIAL

## 2025-04-08 DIAGNOSIS — M17.12 TRICOMPARTMENT OSTEOARTHRITIS OF LEFT KNEE: Primary | ICD-10-CM

## 2025-04-08 PROCEDURE — 97110 THERAPEUTIC EXERCISES: CPT | Mod: GP | Performed by: PHYSICAL THERAPIST

## 2025-04-08 PROCEDURE — 97112 NEUROMUSCULAR REEDUCATION: CPT | Mod: GP | Performed by: PHYSICAL THERAPIST

## 2025-04-08 PROCEDURE — 97530 THERAPEUTIC ACTIVITIES: CPT | Mod: GP | Performed by: PHYSICAL THERAPIST

## 2025-04-10 ENCOUNTER — DOCUMENTATION ONLY (OUTPATIENT)
Dept: ORTHOPEDICS | Facility: CLINIC | Age: 40
End: 2025-04-10

## 2025-04-10 ENCOUNTER — OFFICE VISIT (OUTPATIENT)
Dept: OPTOMETRY | Facility: CLINIC | Age: 40
End: 2025-04-10
Payer: COMMERCIAL

## 2025-04-10 DIAGNOSIS — E11.9 DIABETES MELLITUS WITHOUT OPHTHALMIC MANIFESTATIONS (H): Primary | ICD-10-CM

## 2025-04-10 DIAGNOSIS — M17.12 PRIMARY OSTEOARTHRITIS OF LEFT KNEE: Primary | ICD-10-CM

## 2025-04-10 DIAGNOSIS — H52.13 MYOPIA OF BOTH EYES: ICD-10-CM

## 2025-04-10 ASSESSMENT — REFRACTION_MANIFEST
OD_AXIS: 180
OS_SPHERE: -5.00
OS_AXIS: 175
OS_SPHERE: -4.50
OD_SPHERE: -3.00
METHOD_AUTOREFRACTION: 1
OS_CYLINDER: +0.25
OD_CYLINDER: SPHERE
OD_SPHERE: -3.75
OS_AXIS: 152
OD_CYLINDER: +0.25
OS_CYLINDER: +0.25

## 2025-04-10 ASSESSMENT — CONF VISUAL FIELD
OS_INFERIOR_NASAL_RESTRICTION: 0
OD_INFERIOR_NASAL_RESTRICTION: 0
OD_SUPERIOR_TEMPORAL_RESTRICTION: 0
OS_SUPERIOR_NASAL_RESTRICTION: 0
OD_NORMAL: 1
OD_INFERIOR_TEMPORAL_RESTRICTION: 0
METHOD: COUNTING FINGERS
OD_SUPERIOR_NASAL_RESTRICTION: 0
OS_NORMAL: 1
OS_SUPERIOR_TEMPORAL_RESTRICTION: 0
OS_INFERIOR_TEMPORAL_RESTRICTION: 0

## 2025-04-10 ASSESSMENT — KERATOMETRY
OS_AXISANGLE2_DEGREES: 169
OS_K1POWER_DIOPTERS: 43
OD_AXISANGLE2_DEGREES: 148
OD_K1POWER_DIOPTERS: 42.37
OD_AXISANGLE_DEGREES: 58
OS_AXISANGLE_DEGREES: 79
OD_K2POWER_DIOPTERS: 43
OS_K2POWER_DIOPTERS: 44

## 2025-04-10 ASSESSMENT — REFRACTION_WEARINGRX
OD_SPHERE: -2.75
OD_CYLINDER: +0.25
OS_CYLINDER: +0.50
SPECS_TYPE: SVL
OS_SPHERE: -4.00
OD_AXIS: 010
OS_AXIS: 137

## 2025-04-10 ASSESSMENT — VISUAL ACUITY
OD_CC: 20/20
CORRECTION_TYPE: GLASSES
OD_CC: 20/30
OS_CC: 20/30
OS_CC: 20/20
METHOD: SNELLEN - LINEAR

## 2025-04-10 ASSESSMENT — EXTERNAL EXAM - LEFT EYE: OS_EXAM: MILD ROSACEA

## 2025-04-10 ASSESSMENT — TONOMETRY
IOP_METHOD: APPLANATION
OD_IOP_MMHG: 13
OS_IOP_MMHG: 13

## 2025-04-10 ASSESSMENT — CUP TO DISC RATIO
OS_RATIO: 0.35
OD_RATIO: 0.45

## 2025-04-10 ASSESSMENT — EXTERNAL EXAM - RIGHT EYE: OD_EXAM: MILD ROSACEA

## 2025-04-10 NOTE — PROGRESS NOTES
Chief Complaint   Patient presents with    Diabetic Eye Exam       Lab Results   Component Value Date    A1C 6.2 02/05/2025    A1C 6.2 10/09/2024    A1C 6.1 03/15/2024    A1C 6.0 09/18/2023    A1C 6.3 02/10/2023    A1C 7.3 09/29/2020    A1C 6.8 02/28/2020    A1C 7.0 02/14/2020    A1C 6.1 09/06/2019    A1C 6.5 05/03/2019            Last Eye Exam: 01/2023  Dilated Previously: Yes, side effects of dilation explained today    What are you currently using to see?  glasses    Distance Vision Acuity: Satisfied with vision    Near Vision Acuity: Satisfied with vision while reading and using computer with glasses    Eye Comfort: good  Do you use eye drops? : No      Kaylan Villaseñor - Optometric Assistant      Medical, surgical and family histories reviewed and updated 4/10/2025.     Fohx glaucoma   OBJECTIVE: See Ophthalmology exam    ASSESSMENT:    ICD-10-CM    1. Diabetes mellitus without ophthalmic manifestations (H)  E11.9 EYE EXAM (SIMPLE-NONBILLABLE)      2. Myopia of both eyes  H52.13 REFRACTION          PLAN:  Optional update of prescription   Ongoing glucose control  Naman Jones aware  eye exam results will be sent to Rajinder Cortez OD

## 2025-04-10 NOTE — PATIENT INSTRUCTIONS
Patient Education   Diabetes weakens the blood vessels all over the body, including the eyes. Damage to the blood vessels in the eyes can cause swelling or bleeding into part of the eye (called the retina). This is called diabetic retinopathy (PATRICIA-tin--pu-thee). If not treated, this disease can cause vision loss or blindness.   Symptoms may include blurred or distorted vision, but many people have no symptoms. It's important to see your eye doctor regularly to check for problems.   Early treatment and good control can help protect your vision. Here are the things you can do to help prevent vision loss:      1. Keep your blood sugar levels under tight control.      2. Bring high blood pressure under control.      3. No smoking.      4. Have yearly dilated eye exams.

## 2025-04-10 NOTE — LETTER
4/10/2025      Naman Jones  [address] [address] [address]      Dear Colleague,    [facility]aman [facility]. Please see a copy of my visit note below.    Chief Complaint   Patient presents with     Diabetic Eye Exam       Lab Results   Component Value Date    A1C 6.2 02/05/2025    A1C 6.2 10/09/2024    A1C 6.1 03/15/2024    A1C 6.0 09/18/2023    A1C 6.3 02/10/2023    A1C 7.3 09/29/2020    A1C 6.8 02/28/2020    A1C 7.0 02/14/2020    A1C 6.1 09/06/2019    A1C 6.5 05/03/2019            Last Eye Exam: 01/2023  Dilated Previously: Yes, side effects of dilation explained today    What are you currently using to see?  glasses    Distance Vision Acuity: Satisfied with vision    Near Vision Acuity: Satisfied with vision while reading and using computer with glasses    Eye Comfort: good  Do you use eye drops? : No      Kaylan Villaseñor - Optometric Assistant      Medical, surgical and family histories reviewed and updated 4/10/2025.     Fohx glaucoma   OBJECTIVE: See Ophthalmology exam    ASSESSMENT:    ICD-10-CM    1. Diabetes mellitus without ophthalmic manifestations (H)  E11.9 EYE EXAM (SIMPLE-NONBILLABLE)      2. Myopia of both eyes  H52.13 REFRACTION          PLAN:  Optional update of prescription   Ongoing glucose control  Naman Jones aware  eye exam results will be sent to Rajinder Cortez OD          Again, thank you for allowing me to participate in the care of your patient.        Sincerely,        Goldie Georges, OD    Electronically signed

## 2025-04-11 ENCOUNTER — ANCILLARY PROCEDURE (OUTPATIENT)
Dept: GENERAL RADIOLOGY | Facility: CLINIC | Age: 40
End: 2025-04-11
Attending: PHYSICIAN ASSISTANT
Payer: COMMERCIAL

## 2025-04-11 DIAGNOSIS — Z47.89 ORTHOPEDIC AFTERCARE: ICD-10-CM

## 2025-04-11 PROCEDURE — 73562 X-RAY EXAM OF KNEE 3: CPT | Mod: TC | Performed by: RADIOLOGY

## 2025-04-24 ENCOUNTER — THERAPY VISIT (OUTPATIENT)
Dept: PHYSICAL THERAPY | Facility: CLINIC | Age: 40
End: 2025-04-24
Payer: COMMERCIAL

## 2025-04-24 DIAGNOSIS — M17.12 TRICOMPARTMENT OSTEOARTHRITIS OF LEFT KNEE: Primary | ICD-10-CM

## 2025-05-01 ENCOUNTER — THERAPY VISIT (OUTPATIENT)
Dept: PHYSICAL THERAPY | Facility: CLINIC | Age: 40
End: 2025-05-01
Payer: COMMERCIAL

## 2025-05-01 ENCOUNTER — PATIENT OUTREACH (OUTPATIENT)
Dept: CARE COORDINATION | Facility: CLINIC | Age: 40
End: 2025-05-01

## 2025-05-01 DIAGNOSIS — M17.12 TRICOMPARTMENT OSTEOARTHRITIS OF LEFT KNEE: Primary | ICD-10-CM

## 2025-05-05 NOTE — LETTER
11/15/2024      Naman Jones  [address] [address] [address]      Dear Colleague,    Thank you for referring your patient, Naman Jones, to the [facility] [place]. Please see a copy of my visit note below.    Orthopaedic Surgery Clinic - New Patient    Chief Complaint:  Left knee pain.    History of Present Illness:  I had the opportunity meet this very pleasant 39-year-old male patient today, accompanied by his spouse Brit for the entire encounter, for the above chief complaint of chronic duration.  The patient has a relevant history of having bilateral knee patellofemoral instability with multiple dislocation events dating back to a 1998 tennis injury. He has undergone bilateral knee open lateral retinacular lengthening, medial retinacular imbrication, and varus distal femoral osteotomies. Although he reports significant improvement after the right side was performed on 10/02/2020, he reports he did not appear to have as much relief of his symptoms on the left side which was performed on 02/28/2020.  He does endorse that it seemed to help out with the buckling episodes he had had preoperatively, but he reports still having ongoing unchanged left knee pain afterwards.  He subsequently underwent hardware removal from the left side on 12/15/2020 which he reports did not seem to help his left knee pain.  He endorses previous but not current buckling episodes, and he denies locking episodes.  He does endorse left knee pain that ranges from 5-9 out of 10 in severity.  He notes particular difficulty with stair descent.  He reports he can walk various distances from anywhere from 1/2 mile on a good day to only about 5 minutes on a bad day.  He reports having tried braces, physical therapy, medications, CBD, and corticosteroid injections which did not seem to help significantly.  He reports the pain is mostly localized around the anterior left knee.  He expresses that if  his left knee can be made to be like his right, he would be happy with that.  He does have a history of diabetes mellitus with a relatively well-controlled appearing hemoglobin A1c and denies any history of known neuropathy or foot ulceration. History is obtained from interview with the patient and his spouse as well as review of the chart.    Past Medical History:  Past Medical History:   Diagnosis Date     Diabetes (H)      Hypertension      Past Surgical History:  Past Surgical History:   Procedure Laterality Date     ARTHROSCOPY KNEE WITH DEBRIDEMENT JOINT, COMBINED Left 12/15/2020    Procedure: left knee examination under anesthesia, left knee arthroscopy;  Surgeon: Angel Madera MD;  Location: [facility] OR     ARTHROSCOPY KNEE WITH PATELLAR REALIGNMENT Left 02/28/2020    Procedure: Examination under anesthesia Left knee,  medial reefing/imbrication;  Surgeon: Angel Madera MD;  Location: [facility] OR     ARTHROSCOPY KNEE WITH RETINACULAR RELEASE Left 02/28/2020    Procedure: Left lateral retinacular lengthening;  Surgeon: Angel Madera MD;  Location: [facility]     ARTHROSCOPY KNEE WITH RETINACULAR RELEASE Right 10/02/2020    Procedure: Examination under anesthesia right knee, right knee arthroscopy, chondralplasty of lateral femoral condyle and lateral trochlea, medial retinacular imbrication, open lateral retinacular lengthening;  Surgeon: Angel Madera MD;  Location: [facility] OR     LAPAROSCOPIC BYPASS GASTRIC N/A 10/09/2018    Procedure: LAPAROSCOPIC BYPASS GASTRIC;  LAPAROSCOPIC WELLINGTON-EN Y GASTRIC BYPASS;  Surgeon: Alden Mcwilliams MD;  Location: [facility] OR     ORTHOPEDIC SURGERY Right 02/2016     OSTEOTOMY FEMUR DISTAL Left 02/28/2020    Procedure: Left distal femoral osteotomy;  Surgeon: Angel Madera MD;  Location: [facility]     OSTEOTOMY FEMUR DISTAL Right 10/02/2020    Procedure: Left distal femoral osteotomy;  Surgeon: Angel Madera MD;    used "Location: [facility] OR     REMOVE HARDWARE KNEE Left 12/15/2020    Procedure: LEFT KNEE HARDWARE REMOVAL;  Surgeon: Angel Madera MD;  Location: [facility] OR     VASECTOMY  04/2023     Social History:  Social History     Occupational History     Not on file   Tobacco Use     Smoking status: Never     Smokeless tobacco: Never   Vaping Use     Vaping status: Never Used   Substance and Sexual Activity     Alcohol use: Not Currently     Drug use: No     Sexual activity: Yes     Partners: Female   He endorses that he lives at home with his wife, has friends/family nearby, has reliable transportation to and from appointments, and that his hobbies/recreational activities include music (he likes heavy metal and various other genres), spending time with his two young children, guitar, attending concerts, videogames, outdoor work, and visiting museums.  He denies tobacco/nicotine, alcohol, or illicit drug use.  He works full-time in IT.    Exam:  Ht 1.85 m (6' 0.84\")   Wt 89.4 kg (197 lb)   BMI 26.11 kg/m    Examination this time shows the patient to be a pleasant, cooperative, awake, and alert adult sitting upright in regular chair in no acute distress.  He is accompanied by his spouse.  He has a single prong cane with him.  Breathing pattern is regular and nonlabored on room air.  The left knee has intact skin with well-healed surgical scars over the anteromedial left patella and lateral left distal femur.  No evidence for infection or drainage.  Gentle internal and external rotation of the left hip is endorsed as nonpainful at the hip, with approximate hip ROM 0-90 of flexion, 15 of internal rotation, and 60 of external rotation.  Negative left straight leg raise for radicular symptoms.  He demonstrates the ability to perform a left lower extremity straight leg raise with 5/5 quad strength and 0 lag.  Passive left knee range of motion is approximately 5 degrees to 120 degrees of flexion.  0 varus and valgus laxity at " maximal extension as well as at 30 of flexion.  0 Lachman.  0 anterior and posterior drawer at 90.  1 quadrant medial and 1 quadrant lateral patellar mobility.  Positive patellofemoral grind test.  There is crepitance and pain endorsed with range of motion but otherwise normal patellar tracking.  The left knee is tender to palpation in the PFJ and mildly so in the lateral joint line.  Nontender medial joint line and posterior capsule.  No identifiable masses.  Distally, 3/4 easily palpable left DP and PT pulses, light touch sensation is endorsed as intact in all dermatomes, 5/5 left tib ant, EHL, gastrocsoleus, EDL, FHL, FDL, PTT, and peroneal strength, and the patient does endorse ability to feel a [place] 5.07 monofilament in all the tested areas in the plantar left forefoot.    Imaging:  Independent review of imaging was performed including the following:  L knee MRI scan dated 06/28/2024. Arthritic changes worst at the PF joint where there are grade IV changes. These appear progressed compared with the prior MRI scan from 04/12/2021. Full thickness defects at the median ridge and medial facet and at the central portion of the lateral facet of the patella, as well as a full thickness fissure at the lateral trochlear facet. Intact medial and lateral menisci, collateral ligaments, and cruciate ligaments. Evidence for prior lateral retinacular surgery. Grade III fissuring without full thickness defects in the medial and lateral femoral condyles, and lateral tibial plateau, similar to prior.  Weightbearing AP radiograph of bilateral knees, a weightbearing lateral L knee radiograph, and a sunrise left knee radiograph dated 06/05/2024. Ostensibly bone-on-bone arthritic changes at the PF joint. Arthritic changes at the medial and lateral compartments of the femorotibial joints with osteophyte formation, though joint space is remaining and the changes are not as severe as in the PFJ. Evidence for prior VDFO and  subsequent HWR. No acute findings.    Impression:  Pleasant 39-year-old gentleman with tricompartmental arthritis of the left knee, worst in the patellofemoral compartment.    Plan:  The findings, diagnoses, and treatment options, both nonsurgical and surgical, were discussed with Mr. Jones and his spouse in layman's terms.  Full opportunity was given to the patient to participate in the shared decision-making process.  W discussed the options of rest, ice, compression, elevation, corticosteroid injections, viscosupplementation injections, physical therapy, bracing, oral and topical medications, and surgery.  It appears that he has tried and failed appropriate conservative options as well as attempts at joint preservation surgeries. I would concur that there is likely no surgical option likely provide long-lasting benefit of his left knee pain other than an arthroplasty, should he wish to move on past conservative care and proceed with a surgical procedure of some sort. I told him that I would be happy to help him out with either conservative or surgical care, and that in my hands the surgery would be a total knee arthroplasty. The nature of a total knee arthroplasty, the risks, benefits, and alternatives, the postoperative plan, and realistic expectations for outcome were all discussed with the patient in layman's terms. I discussed with him however that it is possible though I am uncertain, that he might be a candidate for a patellofemoral arthroplasty, and I offered to provide a referral to Sundar Meng or Samuel to discuss this which he expressed interest in discussing.  I do note that he does have radiographic signs for medial and lateral compartment arthritis, though there did not appear to be full-thickness articular defects on the June MRI scan (although there was fissuring in the weightbearing surfaces of the MFC and LFC). The rationale for considering a PFA was discussed, as given his young age he  would be extremely likely to need at least one if not multiple revision procedures in his lifetime, and that revision procedures have a higher risk for multiple complications including infection, neurovascular injury, medical complications and other problems; as such, it may be beneficial to make his first revision relatively less morbid and invasive by being a revision of a PFA to a TKA, as opposed a revision of a TKA to a revision TKA. I would be very happy to see Mr. Jones back at any point in time to discuss the treatment options together again, or for TKA surgery should he be found not to be a candidate for a PFA. I also discussed with him that although I would be very happy to do his TKA, if Sundar Meng or Samuel were to recommend a TKA and he would like to have it performed by them that would of course be fine. All questions this very pleasant patient had this time were answered.    Disclaimer:  This note consists of symbols derived from keyboarding, dictation, and/or voice recognition software. As a result, although I do diligently check for accuracy, there may be errors in the script that have gone undetected. Please consider this when interpreting information found in this chart.      Again, thank you for allowing me to participate in the care of your patient.        Sincerely,        Jim Barreto MD

## 2025-05-05 NOTE — PROGRESS NOTES
ASSESSMENT & PLAN    1. Chronic pain of right knee    2. Primary osteoarthritis of both knees      Seen in consultation for bilateral knee pain, left greater than right.  Subacute in nature with a remote history of a fall with a left knee.  History of patellar subluxation/dislocations and the distant past.  Discussed xray and MRI - osteoarthritis  Knee is otherwise stable  To help Chirag continue to be successful with weight loss recommend cortisone injection  Cortisone will increase your blood sugar for ~ 2 weeks    Follow-up for an ultrasound guided cortisone injection    -----    SUBJECTIVE  Naman Jones is a/an 34 year old male who is seen in consultation at the request of  Galileo Vides M.D. for evaluation of bilateral knee pain - left knee worse than right knee. The patient is seen by themselves.    Onset: Left knee - 5 month(s) ago. Patient describes injury as he was carrying laundry down the stairs when he missed a step and his left knee buckled and twisted underneath him. Right knee - patient reports chronic pain of right knee. No recent injury or trauma. Patient notes its been about 2 years since he dislocated his right patella.   Location of Pain: left anterior knee just inferior to patella, right medial knee  Rating of Pain at worst: 10/10  Rating of Pain Currently: 4/10  Worsened by: prolonged walking (>15-20 minutes), going up and down stairs is painful on left side  Better with: ice  Treatments tried: rest/activity avoidance, ice and previous imaging (MRI 10/23/19 of left knee)  Associated symptoms: swelling, locking or catching and feeling of instability (left knee worse than right knee)  Orthopedic history: YES - patient reports h/o chronic bilateral patellar dislocations and chronic bilateral knee pain since childhood  Relevant surgical history: YES - patient reports h/o right knee arthroscopic surgery Date: 2-3 years ago @ [facility]  Patient Social History: works in IT    Patient's past medical,  "surgical, social, and family histories were reviewed today and no pertinent history related to patient's presenting problem.    REVIEW OF SYSTEMS:  10 point ROS is negative other than symptoms noted above in HPI, Past Medical History or as stated below  Constitutional: NEGATIVE for fever, chills, change in weight  Skin: NEGATIVE for worrisome rashes, moles or lesions  GI/: NEGATIVE for bowel or bladder changes  Neuro: NEGATIVE for weakness, dizziness or paresthesias    OBJECTIVE:  BP (!) 150/102   Ht 1.854 m (6' 1\")   Wt 108.9 kg (240 lb)   BMI 31.66 kg/m     General: healthy, alert and in no distress  HEENT: no scleral icterus or conjunctival erythema  Skin: no suspicious lesions or rash. No jaundice.  CV: no pedal edema  Resp: normal respiratory effort without conversational dyspnea   Psych: normal mood and affect  Gait: normal steady gait with appropriate coordination and balance  Neuro: Normal light sensory exam of lower extremity  MSK:  BILATERAL KNEE  Inspection:    normal alignment  Palpation:    Tender about the lateral patellar facet, medial patellar facet and medial joint line. Remainder of bony and ligamentous landmarks are nontender.    Mild effusion is present    Patellofemoral crepitus is Present  Range of Motion:     00 extension to 1350 flexion  Strength:    Extensor mechanism intact  Special Tests:    Positive: Patellar grind    Negative: MCL/valgus stress (0 & 30 deg), LCL/varus stress (0 & 30 deg), Lachman's, posterior drawer, Conor's    Independent visualization of the below image:  Recent Results (from the past 24 hour(s))   XR Knee Standing AP Bilat [place] Bilat Lat Right    Narrative    XR KNEE STANDING AP BILAT [place] BILAT LAT RT 10/29/2019 6:30 PM     HISTORY: Chronic pain of right knee; Chronic pain of right knee    COMPARISON: None.      Impression    IMPRESSION: Mild tricompartmental hypertrophic change. No joint space  narrowing. Trace knee joint effusion. Normal patellar " alignment.    DAMIAN PADILLA MD     Results for orders placed or performed during the hospital encounter of 10/23/19   MR Knee Left w/o Contrast    Narrative    MR KNEE LEFT WITHOUT CONTRAST October 23, 2019 7:11 PM    HISTORY: Knee pain, persistent greater than six weeks of conservative  therapy. Left knee pain, unspecified chronicity.    TECHNIQUE: Sagittal proton density and T2, coronal T1, and coronal and  transverse fat suppressed T2 weighted images.    FINDINGS:   Medial Meniscus: No tear, displaced fragment, or extrusion.       Lateral Meniscus: No tear, displaced fragment, or extrusion.       Anterior Cruciate Ligament: Intact. No thickening or intrasubstance  signal abnormality.     Posterior Cruciate Ligament: Intact. No thickening or intrasubstance  signal abnormality.     Medial Collateral Ligament: No sprain or tear identified.    Lateral Collateral Ligament Complex, Popliteus Tendon: The fibular  collateral ligament, biceps femoris tendon, popliteal tendon, and  iliotibial band are intact.    Osseous Structures and Cartilaginous Surfaces: Full-thickness  articular cartilage loss is noted at the patellar apex and medial  facet with subchondral marrow edema noted at the apex. Full-thickness  articular cartilage loss is also noted along the inferolateral margin  of the femoral trochlea. Chondral fissuring and articular cartilage  thinning is also noted in the inferior aspect of the weightbearing  surface of the lateral femoral condyle. Chondral surfaces in the  medial compartment are more grossly normal.    Extensor Mechanism: The patella is slightly laterally subluxed  relative to the femoral trochlea. However, with the knee fully  extended for the exam, this is of uncertain significance. The  quadriceps and infrapatellar tendons are intact. The medial and  lateral patellar retinacula appear unremarkable.    Joint Space: There is a small joint effusion with a small to moderate  popliteal  cyst.    Additional Findings: No semimembranosus-tibial collateral ligament or  pes anserine bursitis.      Impression    IMPRESSION:    1. Patellar apical/medial facet grade 4 chondromalacia and mild  subchondral marrow edema. Grade 3-4 chondromalacia is also noted in  the inferolateral margin of the femoral trochlea and weightbearing  aspect of the lateral femoral condyle.  2. Joint effusion with popliteal cyst.  3. No meniscal, cruciate ligament, or collateral ligament tear.  4. The patella may be slightly laterally subluxed relative to the  femoral trochlea. The significance of this with the knee fully  extended for this scan is uncertain.    MD Mary Jane WALKER, DO [facility]     no

## 2025-05-19 ENCOUNTER — MYC MEDICAL ADVICE (OUTPATIENT)
Dept: ORTHOPEDICS | Facility: CLINIC | Age: 40
End: 2025-05-19
Payer: COMMERCIAL

## 2025-05-19 DIAGNOSIS — M17.12 PATELLOFEMORAL ARTHRITIS OF LEFT KNEE: Primary | ICD-10-CM

## 2025-05-19 RX ORDER — AMOXICILLIN 500 MG/1
TABLET, FILM COATED ORAL
Qty: 4 TABLET | Refills: 1 | Status: SHIPPED | OUTPATIENT
Start: 2025-05-19

## 2025-05-19 NOTE — TELEPHONE ENCOUNTER
Patient s/p left patellofemoral arthroplasty DOS 02/18/25 with Dr. Baker.    Called patient back, reviewed allergies.  Denies allergic reaction to PCN or Amoxicillin.  Sent abx to patient's preferred pharmacy per protocol for dental emergency likely requiring dental work.  Reviewed administration instructions, no further questions.    Elba Albarado RN on 5/19/2025 at 10:34 AM

## 2025-05-28 ENCOUNTER — OFFICE VISIT (OUTPATIENT)
Dept: FAMILY MEDICINE | Facility: CLINIC | Age: 40
End: 2025-05-28
Payer: COMMERCIAL

## 2025-05-28 VITALS
DIASTOLIC BLOOD PRESSURE: 73 MMHG | HEART RATE: 76 BPM | OXYGEN SATURATION: 100 % | WEIGHT: 196.2 LBS | BODY MASS INDEX: 26 KG/M2 | RESPIRATION RATE: 16 BRPM | HEIGHT: 73 IN | SYSTOLIC BLOOD PRESSURE: 113 MMHG

## 2025-05-28 DIAGNOSIS — E11.9 DIABETES MELLITUS WITHOUT COMPLICATION (H): ICD-10-CM

## 2025-05-28 DIAGNOSIS — G47.00 INSOMNIA, UNSPECIFIED TYPE: ICD-10-CM

## 2025-05-28 DIAGNOSIS — E78.2 MIXED HYPERLIPIDEMIA: ICD-10-CM

## 2025-05-28 DIAGNOSIS — Z98.84 HX OF GASTRIC BYPASS: ICD-10-CM

## 2025-05-28 DIAGNOSIS — Z00.00 ROUTINE GENERAL MEDICAL EXAMINATION AT A HEALTH CARE FACILITY: Primary | ICD-10-CM

## 2025-05-28 DIAGNOSIS — F41.1 GAD (GENERALIZED ANXIETY DISORDER): ICD-10-CM

## 2025-05-28 PROCEDURE — 3078F DIAST BP <80 MM HG: CPT | Performed by: STUDENT IN AN ORGANIZED HEALTH CARE EDUCATION/TRAINING PROGRAM

## 2025-05-28 PROCEDURE — 3074F SYST BP LT 130 MM HG: CPT | Performed by: STUDENT IN AN ORGANIZED HEALTH CARE EDUCATION/TRAINING PROGRAM

## 2025-05-28 PROCEDURE — 1126F AMNT PAIN NOTED NONE PRSNT: CPT | Performed by: STUDENT IN AN ORGANIZED HEALTH CARE EDUCATION/TRAINING PROGRAM

## 2025-05-28 PROCEDURE — 99396 PREV VISIT EST AGE 40-64: CPT | Performed by: STUDENT IN AN ORGANIZED HEALTH CARE EDUCATION/TRAINING PROGRAM

## 2025-05-28 PROCEDURE — 99214 OFFICE O/P EST MOD 30 MIN: CPT | Mod: 25 | Performed by: STUDENT IN AN ORGANIZED HEALTH CARE EDUCATION/TRAINING PROGRAM

## 2025-05-28 RX ORDER — SEMAGLUTIDE 1.34 MG/ML
1 INJECTION, SOLUTION SUBCUTANEOUS
Qty: 9 ML | Refills: 3 | Status: SHIPPED | OUTPATIENT
Start: 2025-05-28

## 2025-05-28 RX ORDER — ATORVASTATIN CALCIUM 40 MG/1
40 TABLET, FILM COATED ORAL DAILY
Qty: 90 TABLET | Refills: 3 | Status: SHIPPED | OUTPATIENT
Start: 2025-05-28

## 2025-05-28 SDOH — HEALTH STABILITY: PHYSICAL HEALTH: ON AVERAGE, HOW MANY DAYS PER WEEK DO YOU ENGAGE IN MODERATE TO STRENUOUS EXERCISE (LIKE A BRISK WALK)?: 3 DAYS

## 2025-05-28 ASSESSMENT — PAIN SCALES - GENERAL: PAINLEVEL_OUTOF10: NO PAIN (0)

## 2025-05-28 ASSESSMENT — SOCIAL DETERMINANTS OF HEALTH (SDOH): HOW OFTEN DO YOU GET TOGETHER WITH FRIENDS OR RELATIVES?: ONCE A WEEK

## 2025-05-28 NOTE — PROGRESS NOTES
"Preventive Care Visit  [facility]  Rajinder Cortez MD, Family Medicine  May 28, 2025    Assessment & Plan     Routine general medical examination at a health care facility  Reviewed recent A1c and lipid. UTD on screenings, vaccinations.    Diabetes mellitus without complication (H)  Last A1c of 6.2 and well controlled on 1000mg metformin and 1mg semaglutide. UTD on foot and eye exam. On statin therapy. Refilled medications. Follow up in 6 months to reassess.    Mixed hyperlipidemia  Reviewed recent lipid. Continue 40mg atorvastatin. Provided refill x1 yr.    JOEL (generalized anxiety disorder)  Insomnia, unspecified type  Following with outside psychiatry team who is managing with desvenlafaxine and eszopiclone.     Hx of gastric bypass  Due for annual labs. Ordered for patient to obtain with future labs as he is under time constraints today.  - Vitamin B12  - Ferritin  - Vitamin D Deficiency    BMI  Estimated body mass index is 25.89 kg/m  as calculated from the following:    Height as of this encounter: 1.854 m (6' 1\").    Weight as of this encounter: 89 kg (196 lb 3.2 oz).     Counseling  Appropriate preventive services were addressed with this patient via screening, questionnaire, or discussion as appropriate for fall prevention, nutrition, physical activity, Tobacco-use cessation, social engagement, weight loss and cognition.  Checklist reviewing preventive services available has been given to the patient.  Reviewed patient's diet, addressing concerns and/or questions.   He is at risk for lack of exercise and has been provided with information to increase physical activity for the benefit of his well-being.   He is at risk for psychosocial distress and has been provided with information to reduce risk.     Follow up in 6 months for DM2 recheck    Rajinder Cortez MD  [facility]  5/28/2025      Gallito Beard is a 40 year old, presenting for the following:  Physical        " 5/28/2025     7:14 AM   Additional Questions   Roomed by MACHO          HPI    DM2  Currently on 1mg semagluitide, 1000mg metformin (doesn't do well on the increased doses at all).  Weight stable. No changes.     HLD  On atorvastatin, no issues.    Mood/insomnia  Follows with outside provider who manages this with desvenlafaxine and eszopiclone.    Advance Care Planning  Discussed advance care planning with patient; however, patient declined at this time.        5/28/2025   General Health   How would you rate your overall physical health? Good   Feel stress (tense, anxious, or unable to sleep) To some extent   (!) STRESS CONCERN        5/28/2025   Nutrition   Three or more servings of calcium each day? Yes   Diet: Regular (no restrictions)   How many servings of fruit and vegetables per day? (!) 2-3   How many sweetened beverages each day? 0-1         5/28/2025   Exercise   Days per week of moderate/strenous exercise 3 days         5/28/2025   Social Factors   Frequency of gathering with friends or relatives Once a week   Worry food won't last until get money to buy more No   Food not last or not have enough money for food? No   Do you have housing? (Housing is defined as stable permanent housing and does not include staying outside in a car, in a tent, in an abandoned building, in an overnight shelter, or couch-surfing.) Yes   Are you worried about losing your housing? No   Lack of transportation? No   Unable to get utilities (heat,electricity)? No         5/28/2025   Dental   Dentist two times every year? Yes     Today's PHQ-2 Score:       2/4/2025    10:25 AM   PHQ-2 ( 1999 [employer])   Q1: Little interest or pleasure in doing things 1   Q2: Feeling down, depressed or hopeless 1   PHQ-2 Score 2    Q1: Little interest or pleasure in doing things Several days   Q2: Feeling down, depressed or hopeless Several days   PHQ-2 Score 2       Patient-reported         5/28/2025   Substance Use   Alcohol more than 3/day or more  "than 7/wk Not Applicable   Do you use any other substances recreationally? (!) DECLINE     Social History     Tobacco Use    Smoking status: Never     Passive exposure: Never    Smokeless tobacco: Never   Vaping Use    Vaping status: Never Used   Substance Use Topics    Alcohol use: Not Currently    Drug use: No         5/28/2025   STI Screening   New sexual partner(s) since last STI/HIV test? No     ASCVD Risk   The ASCVD Risk score (Onel VILLALOBOS, et al., 2019) failed to calculate for the following reasons:    The valid total cholesterol range is 130 to 320 mg/dL    Reviewed and updated as needed this visit by Provider   Tobacco   Meds   Med Hx  Surg Hx                Objective    Exam  /73 (BP Location: Right arm, Patient Position: Sitting, Cuff Size: Adult Large)   Pulse 76   Resp 16   Ht 1.854 m (6' 1\")   Wt 89 kg (196 lb 3.2 oz)   SpO2 100%   BMI 25.89 kg/m     Estimated body mass index is 25.89 kg/m  as calculated from the following:    Height as of this encounter: 1.854 m (6' 1\").    Weight as of this encounter: 89 kg (196 lb 3.2 oz).    Physical Exam  GENERAL: healthy, alert and no distress  HEAD: Normocephalic, atraumatic.   EYES: Anisocoria. Normal conjunctivae, sclera.   ENT: Normal EAC and TMs bilaterally. Normal oropharynx.   NECK: Supple. No lymphadenopathy appreciated. Trachea midline. Thyroid not enlarged, not TTP.  RESP: lungs clear to auscultation - no rales, rhonchi or wheezes  CV: regular rate and rhythm, normal S1 S2, no murmur, click, rub or gallop.  No peripheral swelling noted.   ABDOMEN: soft, no TTP x4 quadrants. No hepatomegaly or masses appreciated. BS normactive.  MSK: no gross musculoskeletal defects noted.  SKIN: no suspicious lesions or rashes.  EXT: Warm and well perfused.  NEURO: CNII-XII grossly intact. No focal deficits.  PSYCH: Groomed, dressed appropriately for weather.  Logical, linear thought process. Normal mood with consistent affect.     Signed " Electronically by: Rajinder Cortez MD

## 2025-05-28 NOTE — PATIENT INSTRUCTIONS
Don't forget to set up appointment for lab only visit in one week.     It was good to see you!    Dr. Calvillo    Patient Education   Preventive Care Advice   This is general advice given by our system to help you stay healthy. However, your care team may have specific advice just for you. Please talk to your care team about your preventive care needs.  Nutrition  Eat 5 or more servings of fruits and vegetables each day.  Try wheat bread, brown rice and whole grain pasta (instead of white bread, rice, and pasta).  Get enough calcium and vitamin D. Check the label on foods and aim for 100% of the RDA (recommended daily allowance).  Lifestyle  Exercise at least 150 minutes each week  (30 minutes a day, 5 days a week).  Do muscle strengthening activities 2 days a week. These help control your weight and prevent disease.  No smoking.  Wear sunscreen to prevent skin cancer.  Have a dental exam and cleaning every 6 months.  Yearly exams  See your health care team every year to talk about:  Any changes in your health.  Any medicines your care team has prescribed.  Preventive care, family planning, and ways to prevent chronic diseases.  Shots (vaccines)   HPV shots (up to age 26), if you've never had them before.  Hepatitis B shots (up to age 59), if you've never had them before.  COVID-19 shot: Get this shot when it's due.  Flu shot: Get a flu shot every year.  Tetanus shot: Get a tetanus shot every 10 years.  Pneumococcal, hepatitis A, and RSV shots: Ask your care team if you need these based on your risk.  Shingles shot (for age 50 and up)  General health tests  Diabetes screening:  Starting at age 35, Get screened for diabetes at least every 3 years.  If you are younger than age 35, ask your care team if you should be screened for diabetes.  Cholesterol test: At age 39, start having a cholesterol test every 5 years, or more often if advised.  Bone density scan (DEXA): At age 50, ask your care team if you should have this  scan for osteoporosis (brittle bones).  Hepatitis C: Get tested at least once in your life.  STIs (sexually transmitted infections)  Before age 24: Ask your care team if you should be screened for STIs.  After age 24: Get screened for STIs if you're at risk. You are at risk for STIs (including HIV) if:  You are sexually active with more than one person.  You don't use condoms every time.  You or a partner was diagnosed with a sexually transmitted infection.  If you are at risk for HIV, ask about PrEP medicine to prevent HIV.  Get tested for HIV at least once in your life, whether you are at risk for HIV or not.  Cancer screening tests  Cervical cancer screening: If you have a cervix, begin getting regular cervical cancer screening tests starting at age 21.  Breast cancer scan (mammogram): If you've ever had breasts, begin having regular mammograms starting at age 40. This is a scan to check for breast cancer.  Colon cancer screening: It is important to start screening for colon cancer at age 45.  Have a colonoscopy test every 10 years (or more often if you're at risk) Or, ask your provider about stool tests like a FIT test every year or Cologuard test every 3 years.  To learn more about your testing options, visit:   .  For help making a decision, visit:   https://bit.ly/tp92471.  Prostate cancer screening test: If you have a prostate, ask your care team if a prostate cancer screening test (PSA) at age 55 is right for you.  Lung cancer screening: If you are a current or former smoker ages 50 to 80, ask your care team if ongoing lung cancer screenings are right for you.  For informational purposes only. Not to replace the advice of your health care provider. Copyright   2023 [employer]. All rights reserved. Clinically reviewed by the [facility]. [employer] [id number] - REV 01/24.  Learning About Stress  What is stress?     Stress is your body's response to a hard situation. Your  body can have a physical, emotional, or mental response. Stress is a fact of life for most people, and it affects everyone differently. What causes stress for you may not be stressful for someone else.  A lot of things can cause stress. You may feel stress when you go on a job interview, take a test, or run a race. This kind of short-term stress is normal and even useful. It can help you if you need to work hard or react quickly. For example, stress can help you finish an important job on time.  Long-term stress is caused by ongoing stressful situations or events. Examples of long-term stress include long-term health problems, ongoing problems at work, or conflicts in your family. Long-term stress can harm your health.  How does stress affect your health?  When you are stressed, your body responds as though you are in danger. It makes hormones that speed up your heart, make you breathe faster, and give you a burst of energy. This is called the fight-or-flight stress response. If the stress is over quickly, your body goes back to normal and no harm is done.  But if stress happens too often or lasts too long, it can have bad effects. Long-term stress can make you more likely to get sick, and it can make symptoms of some diseases worse. If you tense up when you are stressed, you may develop neck, shoulder, or low back pain. Stress is linked to high blood pressure and heart disease.  Stress also harms your emotional health. It can make you mcfarland, tense, or depressed. Your relationships may suffer, and you may not do well at work or school.  What can you do to manage stress?  You can try these things to help manage stress:   Do something active. Exercise or activity can help reduce stress. Walking is a great way to get started. Even everyday activities such as housecleaning or yard work can help.  Try yoga or laura chi. These techniques combine exercise and meditation. You may need some training at first to learn them.  Do  "something you enjoy. For example, listen to music or go to a movie. Practice your hobby or do volunteer work.  Meditate. This can help you relax, because you are not worrying about what happened before or what may happen in the future.  Do guided imagery. Imagine yourself in any setting that helps you feel calm. You can use online videos, books, or a teacher to guide you.  Do breathing exercises. For example:  From a standing position, bend forward from the waist with your knees slightly bent. Let your arms dangle close to the floor.  Breathe in slowly and deeply as you return to a standing position. Roll up slowly and lift your head last.  Hold your breath for just a few seconds in the standing position.  Breathe out slowly and bend forward from the waist.  Let your feelings out. Talk, laugh, cry, and express anger when you need to. Talking with supportive friends or family, a counselor, or a kae leader about your feelings is a healthy way to relieve stress. Avoid discussing your feelings with people who make you feel worse.  Write. It may help to write about things that are bothering you. This helps you find out how much stress you feel and what is causing it. When you know this, you can find better ways to cope.  What can you do to prevent stress?  You might try some of these things to help prevent stress:  Manage your time. This helps you find time to do the things you want and need to do.  Get enough sleep. Your body recovers from the stresses of the day while you are sleeping.  Get support. Your family, friends, and community can make a difference in how you experience stress.  Limit your news feed. Avoid or limit time on social media or news that may make you feel stressed.  Do something active. Exercise or activity can help reduce stress. Walking is a great way to get started.  Where can you learn more?  Go to https://www.healthwise.net/patiented  Enter N032 in the search box to learn more about \"Learning " "About Stress.\"  Current as of: October 24, 2024  Content Version: 14.4    2542-1754 [employer].   Care instructions adapted under license by your healthcare professional. If you have questions about a medical condition or this instruction, always ask your healthcare professional. [employer] disclaims any warranty or liability for your use of this information.       "

## 2025-06-06 ENCOUNTER — TRANSFERRED RECORDS (OUTPATIENT)
Dept: HEALTH INFORMATION MANAGEMENT | Facility: CLINIC | Age: 40
End: 2025-06-06

## 2025-06-17 DIAGNOSIS — M25.562 PATELLOFEMORAL INSTABILITY OF LEFT KNEE WITH PAIN: Primary | ICD-10-CM

## 2025-06-17 DIAGNOSIS — M25.362 PATELLOFEMORAL INSTABILITY OF LEFT KNEE WITH PAIN: Primary | ICD-10-CM

## 2025-06-18 ENCOUNTER — TELEPHONE (OUTPATIENT)
Dept: ORTHOPEDICS | Facility: CLINIC | Age: 40
End: 2025-06-18
Payer: COMMERCIAL

## 2025-06-18 NOTE — TELEPHONE ENCOUNTER
Called patient to schedule surgery with Dr. Meng and Dr. Baker, no answer. Callback number [phone] left on vm.     Kajal Torres on 6/18/2025 at 12:04 PM

## 2025-07-02 NOTE — TELEPHONE ENCOUNTER
DIAGNOSIS: Discuss surgical plan    APPOINTMENT DATE: 7/8/25    NOTES STATUS DETAILS   OFFICE NOTE from referring provider Internal 5/23/25, 11/22/24 [facility]     OFFICE NOTE from other specialist Internal 6/6/25, 5/30/25, 5/16/25 Jim Guajardo, KIERRA @[facility]    4/11/25 Maximus Hunt PA-C @[facility]    3/6/25 Bayron Kay PA-C @[facility]    2/5/25 [facility]    11/15/24 Jim Barreto MD @[facility]    See Additional Encounters   DISCHARGE SUMMARY from hospital Internal 2/18/25 Des Baker MD @[facility]      OPERATIVE REPORT Internal 2/18/25 Des Baker MD @[facility] Left patellofemoral arthroplasty     12/15/20 Angel Madera MD @[facility] left knee examination under anesthesia, left knee arthroscopy, LEFT KNEE HARDWARE REMOVAL     10/2/20 Angel Madera MD @[facility] Examination under anesthesia right knee, right knee arthroscopy, chondralplasty of lateral femoral condyle and lateral trochlea, medial retinacular imbrication, open lateral retinacular lengthening , Left distal femoral osteotomy     2/28/20 Angel Madera MD @[facility] Examination under anesthesia Left knee,  medial reefing/imbrication, Left lateral retinacular lengthening, Left distal femoral osteotomy      MRI Internal [facility]  6/28/24 MR Knee Left  4/12/21 MR Knee Left     XRAYS (IMAGES & REPORTS) Internal [facility]  4/11/25 XR Knee Left  2/18/25 XR Knee Left  4/7/21 XR Knee Left  2/3/21 XR Knee Bilat  11/16/20 XR Knee Bilat

## 2025-07-03 ENCOUNTER — TELEPHONE (OUTPATIENT)
Dept: FAMILY MEDICINE | Facility: CLINIC | Age: 40
End: 2025-07-03
Payer: COMMERCIAL

## 2025-07-03 NOTE — TELEPHONE ENCOUNTER
Prior Authorization Specialty Medication Request    Medication/Dose:   Semaglutide, 1 MG/DOSE, (OZEMPIC, 1 MG/DOSE,) 4 MG/3ML pen 9 mL 3 5/28/2025 -- No   Sig - Route: Inject 1 mg subcutaneously every 7 days. - Subcutaneous   Sent to pharmacy as: Ozempic (1 MG/DOSE) 4 MG/3ML     Diagnosis and ICD code (if different than what is on RX):    Diabetes mellitus without complication (H) [E11.9]        Insurance   Primary: [facility]  Insurance ID:  [id number]    Pharmacy Information (if different than what is on RX)  Name:  [facility]  Phone:  [phone]  Fax: [phone]    Clinic Information  Preferred routing pool for dept communication: [facility] Clinic Pool        Vero BERTRAND, - [place] [id number]  [facility] [place]  [facility]

## 2025-07-07 NOTE — TELEPHONE ENCOUNTER
Prior Authorization Approval    Authorization Effective Date: 6/7/2025  Authorization Expiration Date: 7/7/2026  Medication: Semaglutide, 1 MG/DOSE, (OZEMPIC, 1 MG/DOSE,) 4 MG/3ML pen-APPROVED  Reference #:     Insurance Company: [employer] ([employer]) - Phone [phone] Fax [phone]  Which Pharmacy is filling the prescription (Not needed for infusion/clinic administered): [facility] [id number] [place] [facility] [address] [facility]  Pharmacy Notified: Yes  Patient Notified: Instructed pharmacy to notify patient when script is ready to /ship.

## 2025-07-07 NOTE — TELEPHONE ENCOUNTER
Retail Pharmacy Prior Authorization Team   Phone: [phone]    PA Initiation    Medication: OZEMPIC (1 MG/DOSE) 4 MG/3ML SC SOPN  Insurance Company: [employer] ([employer]) - Phone [phone] Fax [phone]  Pharmacy Filling the Rx: [facility] [id number] [place] [facility] [place], [place] - [address]  Filling Pharmacy Phone: [phone]  Filling Pharmacy Fax:    Start Date: 7/7/2025

## 2025-07-08 ENCOUNTER — PRE VISIT (OUTPATIENT)
Dept: ORTHOPEDICS | Facility: CLINIC | Age: 40
End: 2025-07-08

## 2025-07-08 ENCOUNTER — OFFICE VISIT (OUTPATIENT)
Dept: ORTHOPEDICS | Facility: CLINIC | Age: 40
End: 2025-07-08
Payer: COMMERCIAL

## 2025-07-08 ENCOUNTER — ANCILLARY PROCEDURE (OUTPATIENT)
Dept: GENERAL RADIOLOGY | Facility: CLINIC | Age: 40
End: 2025-07-08
Attending: ORTHOPAEDIC SURGERY
Payer: COMMERCIAL

## 2025-07-08 DIAGNOSIS — Z98.890 H/O KNEE SURGERY: ICD-10-CM

## 2025-07-08 DIAGNOSIS — M25.562 PATELLOFEMORAL INSTABILITY OF LEFT KNEE WITH PAIN: ICD-10-CM

## 2025-07-08 DIAGNOSIS — M25.362 PATELLOFEMORAL INSTABILITY OF LEFT KNEE WITH PAIN: Primary | ICD-10-CM

## 2025-07-08 DIAGNOSIS — M25.562 PATELLOFEMORAL INSTABILITY OF LEFT KNEE WITH PAIN: Primary | ICD-10-CM

## 2025-07-08 DIAGNOSIS — M25.362 PATELLOFEMORAL INSTABILITY OF LEFT KNEE WITH PAIN: ICD-10-CM

## 2025-07-08 DIAGNOSIS — Z98.890 S/P KNEE SURGERY: ICD-10-CM

## 2025-07-08 PROCEDURE — 73560 X-RAY EXAM OF KNEE 1 OR 2: CPT | Mod: LT | Performed by: RADIOLOGY

## 2025-07-08 PROCEDURE — 99214 OFFICE O/P EST MOD 30 MIN: CPT | Performed by: ORTHOPAEDIC SURGERY

## 2025-07-08 NOTE — LETTER
7/8/2025      Naman Jones  [id number] [address] [place] [address]      Dear Colleague,    Thank you for referring your patient, Naman Jones, to the [facility] [place]. Please see a copy of my visit note below.    Patient is a 40-year-old male who is here with his wife.  He is here at the request of Dr. 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    She has had several other surgeries preceding this secondary to a valgus knee.  Most specifically he underwent a left distal femoral osteotomy and medial reefing 2/2020 by Dr. Marsh.  He went on to metal removal later that year.    Due to continued patellofemoral arthritis he underwent a patellofemoral arthroplasty 2/18/2025.  Preoperative x-rays showed medial patellofemoral arthritis.    Postoperatively he did well for the first few weeks and then approximately week 6 he noticed that his kneecap was hopping.  This was described by some as a dislocation in flexion.  This was tolerable but annoying more than anything.    He had a second opinion by Dr. Darrell Cunningham of [facility] who recommended a total knee replacement.    He is here for further consideration.    Important to note his wife broke a bone in her foot and was placed on crutches around the 6-week viviana.  He continues to care for her at this moment in time.    He works as an  and his work can be done remotely.    He has no complaints with his right knee.    Physical exam of patient's left knee reveals well-healed surgical incision.  No knee swelling.  Excellent motion 0-120.  When the patient's kneecap is in deep flexion the patella is lateralized and as he extends his knee it medialize his into the groove with a clunk occurring approximately 90 degrees of flexion.  This happens with every knee cycle.    X-ray: Axial views were taken at 20 degrees of flexion and shows a well located kneecap  Previous views  of the coronal alignment show satisfactory alignment of the femoral component in the coronal plane    Assessment: This clunk is typically present because there is a slight prominence of the lateral distal edge of the nose of the component of the PFA.  It likely was not recognized until he gained enough flexion to have the patella excursion beyond the limits of the metal groove.  In flexion it tracks well as it still maintained in the groove for the kneecap but in full flexion it settles slightly lateral and then when extended it stays lateral until it hops back into the groove with medial translation.    This can be solved by a revision of the femoral component.  It cannot be solved in any other way such as exercises etc.  It also can be tolerated but in time it might create polyethylene wear.    I do not recommend a revision to a total knee secondary to his young age of 40 as well as the fact that he still has a satisfactory tibiofemoral joint.    This was discussed in detail with Chirag and his wife, including showing him models of the PFA and also models of exactly what is happening to his knee and why he is experiencing the clunk.    Both he and his wife agree that they would prefer a revision of the femoral component rather than a total knee replacement.  Postoperative course was discussed, it will be similar to the first surgery where getting back motion and quad strength will be the primary postoperative goals.  It will be done by Dr. Baker and myself and he will be followed by Dr. Mattson.    All questions were answered    Jennifer Meng MD  Professor Orthopedic Surgery  [facility]     Again, thank you for allowing me to participate in the care of your patient.        Sincerely,        Jennifer Meng MD    Electronically signed

## 2025-07-08 NOTE — PROGRESS NOTES
Patient is a 40-year-old male who is here with his wife.  He is here at the request of Dr. 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    She has had several other surgeries preceding this secondary to a valgus knee.  Most specifically he underwent a left distal femoral osteotomy and medial reefing 2/2020 by Dr. Marsh.  He went on to metal removal later that year.    Due to continued patellofemoral arthritis he underwent a patellofemoral arthroplasty 2/18/2025.  Preoperative x-rays showed medial patellofemoral arthritis.    Postoperatively he did well for the first few weeks and then approximately week 6 he noticed that his kneecap was hopping.  This was described by some as a dislocation in flexion.  This was tolerable but annoying more than anything.    He had a second opinion by Dr. Darrell Cunningham of [facility] who recommended a total knee replacement.    He is here for further consideration.    Important to note his wife broke a bone in her foot and was placed on crutches around the 6-week viviana.  He continues to care for her at this moment in time.    He works as an  and his work can be done remotely.    He has no complaints with his right knee.    Physical exam of patient's left knee reveals well-healed surgical incision.  No knee swelling.  Excellent motion 0-120.  When the patient's kneecap is in deep flexion the patella is lateralized and as he extends his knee it medialize his into the groove with a clunk occurring approximately 90 degrees of flexion.  This happens with every knee cycle.    X-ray: Axial views were taken at 20 degrees of flexion and shows a well located kneecap  Previous views of the coronal alignment show satisfactory alignment of the femoral component in the coronal plane    Assessment: This clunk is typically present because there is a slight prominence of the lateral distal edge of the nose of the component of the PFA.  It  likely was not recognized until he gained enough flexion to have the patella excursion beyond the limits of the metal groove.  In flexion it tracks well as it still maintained in the groove for the kneecap but in full flexion it settles slightly lateral and then when extended it stays lateral until it hops back into the groove with medial translation.    This can be solved by a revision of the femoral component.  It cannot be solved in any other way such as exercises etc.  It also can be tolerated but in time it might create polyethylene wear.    I do not recommend a revision to a total knee secondary to his young age of 40 as well as the fact that he still has a satisfactory tibiofemoral joint.    This was discussed in detail with Chirag and his wife, including showing him models of the PFA and also models of exactly what is happening to his knee and why he is experiencing the clunk.    Both he and his wife agree that they would prefer a revision of the femoral component rather than a total knee replacement.  Postoperative course was discussed, it will be similar to the first surgery where getting back motion and quad strength will be the primary postoperative goals.  It will be done by Dr. Baker and myself and he will be followed by Dr. Mattson.    All questions were answered    Jennifer Meng MD  Professor Orthopedic Surgery  [facility]

## 2025-07-08 NOTE — NURSING NOTE
Teaching Flowsheet     Visit Type: In Clinic    Time Start: 1115  Time End: 1130  Total Time Spent: 15 minutes    Surgeon: Dr. Baker and Dr. Meng  Location of Surgery (known or anticipated): [facility]  Type of Anesthesia: General    Pertinent Medical History: reviewed on the up to date problem list in the chart  Were medical conditions reviewed and appropriate for location? YES  BMI: Overweight BMI 25-29.9    Relevant Diagnosis: Patellofemoral instability of left knee with pain  Teaching Topic: Revision left PFA    Person(s) involved in teaching:   Patient and Wife  : No.     Caregiver//  Name: Siri Jones  Phone Number: [phone]   Relationship: Wife  Consent to communicate on file Yes       Motivation Level:  Asks Questions: Yes  Eager to Learn: Yes  Cooperative: Yes  Receptive (willing/able to accept information): Yes  Any cultural factors/Voodoo beliefs that may influence understanding or compliance? No     Patient demonstrates understanding of the following:  Reason for the appointment, diagnosis and treatment plan: Yes  Knowledge of proper use of medications and conditions for which they are ordered (with special attention to potential side effects or drug interactions): Yes  Which situations necessitate calling provider and whom to contact: Yes     Teaching Concerns Addressed: all concerns addressed     Proper use and care of medical equip, care aids, etc.: Yes  Nutritional needs and diet plan: Yes  Pain management techniques: Yes  Wound Care: Yes  How and/when to access community resources: Yes  Need for pre-op with in 30 days: YES, will be done with PCP. I asked them to ensure they go over their daily medications during this visit and discuss what medications need to be stopped before surgery and when. If you are doing a pre-op with your PCP and they are not within the [employer], I ask them to fax it to our pre-op office. Patient verbalized  understanding.       Does patient have difficulty getting a ride to appointments (post-ops, PT/OT): No     Instructional Materials Used/Given:  two bottles of chlorhexidine soap, a surgery packet, and a joint booklet given to patient in clinic.     - Important contact info/ phone numbers: emphasizing clinic number and after hours number  - Map/ location of surgery  - Showering instructions  - Stop light tool  - Your Guide to Joint Replacement Booklet   - Guidelines for post op antibiotics before dentist appointments or procedure.  -  Instructions to sign up for online joint replacement class and Care Companion through [employer].     Additionally the following was discussed with patient:  - Caregiver// listed above, will be driving the patient to surgery and able to pick the patient up after discharge and staying with them for 4-5 days after surgery.  - Need to schedule a dentist appointment and get done any recommended dental work prior to surgery.   - Online joint replacement class and the use of [employer] to send educational messages. Asked patient to sign up for join class during visit and patient declined to sign up at this time and stated will sign up later. Sheet with sign up instruction given to patient.   - Told patient to check in with insurance to check about coverage.      Plan for PT:  at [facility] and [employer] sent to patient.    Discussed with Patient that they needs to have first PT appointment scheduled for 3-5 days after surgery. They should call and ensure this appointment is set for after surgery as soon as possible.     CPM:  Discussed with Patient that a CPM will be needed after surgery and a rep will be reaching out to them about /delivery before surgery: n/a CPM not needed    Post-Ops:  Post-op will be done at with Dr. Baker at [place].      Patient is on ozempic and discussed this must be stopped 1 week before surgery.     -Next step:  surgery scheduled for 8/14 and schedule a pre-op with PCP.

## 2025-07-08 NOTE — NURSING NOTE
Reason For Visit:   Chief Complaint   Patient presents with    Consult     Discuss surgical plan Left Knee, coordinated with Dr. Baker DOS tbd       Primary MD: Rajinder Cortez  Ref. MD: Seen Dr. Baker    ?  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  Work status?  Full time.    Date of injury: No  Type of injury: No.    Date of surgery & Type:  2/28/2020 Examination under anesthesia Left knee,  medial reefing/imbrication,Left lateral retinacular lengthening,   Left distal femoral osteotomy      10/02/2020  right knee arthroscopy, chondralplasty of lateral femoral condyle and lateral trochlea, medial retinacular imbrication, open lateral retinacular lengthening,  Left distal femoral osteotomy     12/15/2020 left knee examination under anesthesia, left knee arthroscopy, Left knee hardware removal     2/18/2025 Left patellofemoral arthroplasty     Smoker: No  Request smoking cessation information: No    There were no vitals taken for this visit.    Pain Assessment  Patient Currently in Pain: Yes (4/10)  Primary Pain Location: Knee                                                   Lilibeth Mabelo, ADRIANON

## 2025-07-30 ENCOUNTER — OFFICE VISIT (OUTPATIENT)
Dept: FAMILY MEDICINE | Facility: CLINIC | Age: 40
End: 2025-07-30
Payer: COMMERCIAL

## 2025-07-30 VITALS
OXYGEN SATURATION: 99 % | HEART RATE: 73 BPM | DIASTOLIC BLOOD PRESSURE: 76 MMHG | BODY MASS INDEX: 25.31 KG/M2 | HEIGHT: 73 IN | SYSTOLIC BLOOD PRESSURE: 116 MMHG | WEIGHT: 191 LBS | RESPIRATION RATE: 16 BRPM | TEMPERATURE: 98.3 F

## 2025-07-30 DIAGNOSIS — M17.12 PATELLOFEMORAL ARTHRITIS OF LEFT KNEE: ICD-10-CM

## 2025-07-30 DIAGNOSIS — Z01.818 PREOP GENERAL PHYSICAL EXAM: Primary | ICD-10-CM

## 2025-07-30 DIAGNOSIS — M25.362 PATELLAR INSTABILITY OF LEFT KNEE: ICD-10-CM

## 2025-07-30 DIAGNOSIS — E11.9 DIABETES MELLITUS WITHOUT COMPLICATION (H): ICD-10-CM

## 2025-07-30 LAB
EST. AVERAGE GLUCOSE BLD GHB EST-MCNC: 128 MG/DL
HBA1C MFR BLD: 6.1 % (ref 0–5.6)
HGB BLD-MCNC: 14.4 G/DL (ref 13.3–17.7)
MCV RBC AUTO: 86 FL (ref 78–100)

## 2025-07-30 PROCEDURE — 36415 COLL VENOUS BLD VENIPUNCTURE: CPT | Performed by: NURSE PRACTITIONER

## 2025-07-30 PROCEDURE — 85018 HEMOGLOBIN: CPT | Performed by: NURSE PRACTITIONER

## 2025-07-30 PROCEDURE — 83036 HEMOGLOBIN GLYCOSYLATED A1C: CPT | Performed by: NURSE PRACTITIONER

## 2025-07-30 ASSESSMENT — PAIN SCALES - GENERAL: PAINLEVEL_OUTOF10: NO PAIN (0)

## 2025-07-30 NOTE — PROGRESS NOTES
Preoperative Evaluation  [facility]  [address] [address]  Phone: [phone]  Primary Provider: Rajinder Cortez MD  Pre-op Performing Provider: ARDEN York CNP  Jul 30, 2025   {Provider  Link to PREOP SmartSet  REQUIRED to apply standard patient instructions and medication directions to the AVS :003544}  {ROOMER review and update patient entered surgical information if needed :300036}        7/29/2025   Surgical Information   What procedure is being done? Revision femoral component of PATELLOFEMORAL arthroplasty, no scope    Facility or Hospital where procedure/surgery will be performed: [facility]   Who is doing the procedure / surgery? Jennifer Meng   Date of surgery / procedure: 8/14/2025   Time of surgery / procedure: Tbd   Where do you plan to recover after surgery? at home with family     Fax number for surgical facility: Note does not need to be faxed, will be available electronically in Epic.    Assessment & Plan     The proposed surgical procedure is considered INTERMEDIATE risk.      ICD-10-CM    1. Preop general physical exam  Z01.818 Hemoglobin      2. Diabetes mellitus without complication (H)  E11.9 HEMOGLOBIN A1C      3. Patellofemoral arthritis of left knee  M17.12       4. Patellar instability of left knee  M25.362                     - No identified additional risk factors other than previously addressed    Preoperative Medication Instructions  Antiplatelet or Anticoagulation Medication Instructions   - We reviewed the medication list and the patient is not on an antiplatelet or anticoagulation medications.    Additional Medication Instructions   - Herbal medications and vitamins: DO NOT TAKE 14 days prior to surgery.   - metformin: DO NOT TAKE day of surgery.   - GLP-1 Injectable (semaglutide, ): DO NOT TAKE 7 days before surgery    - Pristiq: Continue without modification.        Recommendation  Approval given to proceed with  proposed procedure, without further diagnostic evaluation.    {Follow-up (Optional):144257}    Gallito Beard is a 40 year old, presenting for the following:  Pre-Op Exam          7/30/2025     8:18 AM   Additional Questions   Roomed by Dalila HUITRON CMA   Accompanied by ABRAM         7/30/2025     8:18 AM   Patient Reported Additional Medications   Patient reports taking the following new medications None     HPI: Due to continued patellofemoral arthritis he underwent a patellofemoral arthroplasty 2/18/2025.  Preoperative x-rays showed medial patellofemoral arthritis.  Postoperatively he did well for the first few weeks and then approximately week 6 he noticed that his kneecap was hopping.  This was described by some as a dislocation in flexion has had several other surgeries preceding this secondary to a valgus knee.  Most specifically he underwent a left distal femoral osteotomy and medial reefing 2/2020 by Dr. Marsh.  He went on to metal removal later that year.                  7/29/2025   Pre-Op Questionnaire   Have you ever had a heart attack or stroke? No   Have you ever had surgery on your heart or blood vessels, such as a stent placement, a coronary artery bypass, or surgery on an artery in your head, neck, heart, or legs? No   Do you have chest pain with activity? No   Do you have a history of heart failure? No   Do you currently have a cold, bronchitis or symptoms of other infection? No   Do you have a cough, shortness of breath, or wheezing? No   Do you or anyone in your family have previous history of blood clots? No   Do you or does anyone in your family have a serious bleeding problem such as prolonged bleeding following surgeries or cuts? No   Have you ever had problems with anemia or been told to take iron pills? No   Have you had any abnormal blood loss such as black, tarry or bloody stools? No   Have you ever had a blood transfusion? No   Are you willing to have a blood transfusion if it is  medically needed before, during, or after your surgery? Yes   Have you or any of your relatives ever had problems with anesthesia? No   Do you have sleep apnea, excessive snoring or daytime drowsiness? No   Do you have any artifical heart valves or other implanted medical devices like a pacemaker, defibrillator, or continuous glucose monitor? No   Do you have artificial joints? (!) YES--left knee   Are you allergic to latex? No     Advance Care Planning  {The storyboard will display whether the patient has ACP docs on file. Hover over the Code section in the storyboard to access the ACP documents. :726009}  Discussed advance care planning with patient; informed AVS has link to [facility] [employer].    Preoperative Review of    reviewed - as expected related to recent procedures  {Review MNPMP for all patients per [facility] MNPMP Profile:789892}    Status of Chronic Conditions:  See problem list for active medical problems.  Problems all longstanding and stable, except as noted/documented.  See ROS for pertinent symptoms related to these conditions.    Patient Active Problem List    Diagnosis Date Noted    Tricompartment osteoarthritis of left knee 02/19/2025     Priority: Medium    Patellofemoral arthritis of left knee 02/18/2025     Priority: Medium    History of fatty infiltration of liver 03/15/2024     Priority: Medium     Noted on US abdomen in 2017  S/p gastric bypass in 2018  Repeat US abdomen in 2024 with normal liver appearance.      Hx of gastric bypass 03/15/2024     Priority: Medium     Snehal-en-Y in 2018  Needs annual labs - ferritin, vitamin D, B12      Chronic pain of left knee 11/17/2020     Priority: Medium     Added automatically from request for surgery [id number]      S/P right knee surgery 10/02/2020     Priority: Medium    Gait abnormality 04/17/2020     Priority: Medium    Acquired genu valgum of left knee 12/11/2019     Priority: Medium     Added automatically from request for surgery [id number]       Patellar instability of right knee 12/11/2019     Priority: Medium     Added automatically from request for surgery [id number]      Patellar instability of left knee 12/10/2019     Priority: Medium    Insomnia, unspecified type 07/20/2018     Priority: Medium    Mixed hyperlipidemia 06/13/2017     Priority: Medium     On 40mg atorvastatin      JOEL (generalized anxiety disorder) 06/13/2017     Priority: Medium    Tear of lateral meniscus of right knee 08/08/2016     Priority: Medium     Formatting of this note might be different from the original.  Seen by Orthopedics 8/2016- planning for viscosupplementation injection      Diabetes mellitus without complication (H) 03/01/2016     Priority: Medium     On metformin, 1000mg and semaglutide 1mg  (Higher doses of metformin not tolerated)  Atorvastatin, 40mg daily    No complications; no albuminuria          Past Medical History:   Diagnosis Date    Arthritis 03/2016    Right knee    Depressive disorder 01/2016    Diabetes (H)     type 2    Hypertension     has resolved since kaushal loss     Past Surgical History:   Procedure Laterality Date    ARTHROPLASTY PATELLO-FEMORAL (KNEE) Left 02/18/2025    Procedure: Left patellofemoral arthroplasty;  Surgeon: Des Baker MD;  Location: [facility] OR    ARTHROSCOPY KNEE WITH DEBRIDEMENT JOINT, COMBINED Left 12/15/2020    Procedure: left knee examination under anesthesia, left knee arthroscopy;  Surgeon: Angel Madera MD;  Location: [facility] OR    ARTHROSCOPY KNEE WITH PATELLAR REALIGNMENT Left 02/28/2020    Procedure: Examination under anesthesia Left knee,  medial reefing/imbrication;  Surgeon: Angel Madera MD;  Location: [facility]    ARTHROSCOPY KNEE WITH RETINACULAR RELEASE Left 02/28/2020    Procedure: Left lateral retinacular lengthening;  Surgeon: Angel Madera MD;  Location: [facility]    ARTHROSCOPY KNEE WITH RETINACULAR RELEASE Right 10/02/2020    Procedure: Examination under anesthesia right  knee, right knee arthroscopy, chondralplasty of lateral femoral condyle and lateral trochlea, medial retinacular imbrication, open lateral retinacular lengthening;  Surgeon: Angel Madera MD;  Location: [facility] OR    KNEE SURGERY  03/2020    Osteotomy    LAPAROSCOPIC BYPASS GASTRIC N/A 10/09/2018    Procedure: LAPAROSCOPIC BYPASS GASTRIC;  LAPAROSCOPIC WELLINGTON-EN Y GASTRIC BYPASS;  Surgeon: Alden Mcwilliams MD;  Location: [facility] OR    ORTHOPEDIC SURGERY Right 02/2016    OSTEOTOMY FEMUR DISTAL Left 02/28/2020    Procedure: Left distal femoral osteotomy;  Surgeon: Angel Madera MD;  Location: [facility]    OSTEOTOMY FEMUR DISTAL Right 10/02/2020    Procedure: Left distal femoral osteotomy;  Surgeon: Angel Madera MD;  Location: [facility]    REMOVE HARDWARE KNEE Left 12/15/2020    Procedure: LEFT KNEE HARDWARE REMOVAL;  Surgeon: Angel Madera MD;  Location: [facility] OR    VASECTOMY  04/2023     Current Outpatient Medications   Medication Sig Dispense Refill    acetaminophen (TYLENOL) 325 MG tablet Take 2 tablets (650 mg) by mouth every 4 hours as needed for other (mild pain). 100 tablet 0    atorvastatin (LIPITOR) 40 MG tablet Take 1 tablet (40 mg) by mouth daily. 90 tablet 3    blood glucose (ONETOUCH ULTRA) test strip Use to test blood sugar 3 times daily or as directed. 300 strip 3    cyanocobalamin 2500 MCG SUBL sublingual tablet Place 1 tablet under the tongue daily.      desvenlafaxine (PRISTIQ) 50 MG 24 hr tablet Take 1 tablet by mouth daily at 2 pm      eszopiclone (LUNESTA) 3 MG tablet Take 3 mg by mouth every 24 hours      metFORMIN (GLUCOPHAGE) 500 MG tablet Take 2 tablets (1,000 mg) by mouth daily (with dinner). 180 tablet 3    multivitamin  peds with iron (FLINTSTONES COMPLETE) 60 MG chewable tablet Take 2 chew tab by mouth every morning       OneTouch Delica Lancets 33G MISC 1 each 3 times daily 300 each 3    Semaglutide, 1 MG/DOSE, (OZEMPIC, 1 MG/DOSE,) 4 MG/3ML pen  "Inject 1 mg subcutaneously every 7 days. 9 mL 3    amoxicillin (AMOXIL) 500 MG tablet Administer 4 tabs (2,000 mg), 1 hour prior to dental visit and/or non-sterile procedure (Patient not taking: Reported on 7/30/2025) 4 tablet 1    Calcium Carb-Cholecalciferol (CALCIUM 600 + D PO) Take 1 tablet by mouth 2 times daily. (Patient not taking: Reported on 7/30/2025)         Allergies   Allergen Reactions    Sulfa Antibiotics Anaphylaxis     UNKNOWN, As a child.Was told it was a \"good thing\" he was in the hospital.    Lisinopril      Other reaction(s): Impotence    Oxycodone Other (See Comments) and Itching     Flushed    Onion      Other reaction(s): Intolerance-Can't Take    Venlafaxine Hcl Unknown        Social History     Tobacco Use    Smoking status: Never     Passive exposure: Never    Smokeless tobacco: Never   Substance Use Topics    Alcohol use: Not Currently     {FAMILY HISTORY (Optional):422386768}  History   Drug Use No             Review of Systems  Constitutional, HEENT, cardiovascular, pulmonary, GI, , musculoskeletal, neuro, skin, endocrine and psych systems are negative, except as otherwise noted.    Objective    /76 (BP Location: Right arm, Patient Position: Sitting, Cuff Size: Adult Regular)   Pulse 73   Temp 98.3  F (36.8  C) (Oral)   Resp 16   Ht 1.854 m (6' 1\")   Wt 86.6 kg (191 lb)   SpO2 99%   BMI 25.20 kg/m     Estimated body mass index is 25.2 kg/m  as calculated from the following:    Height as of this encounter: 1.854 m (6' 1\").    Weight as of this encounter: 86.6 kg (191 lb).  Physical Exam  GENERAL: alert and no distress  EYES: Eyes grossly normal to inspection, PERRL and conjunctivae and sclerae normal  HENT: ear canals and TM's normal, nose and mouth without ulcers or lesions  NECK: no adenopathy, no asymmetry, masses, or scars  RESP: lungs clear to auscultation - no rales, rhonchi or wheezes  CV: regular rate and rhythm, normal S1 S2, no S3 or S4, no murmur, click or rub, " no peripheral edema  ABDOMEN: soft, nontender, no hepatosplenomegaly, no masses and bowel sounds normal  MS: no gross musculoskeletal defects noted, no edema  SKIN: no suspicious lesions or rashes  NEURO: Normal strength and tone, mentation intact and speech normal  PSYCH: mentation appears normal, affect normal/bright    Recent Labs   Lab Test 02/05/25  0951 10/09/24  1341   HGB 14.6  --      --    POTASSIUM 3.7  --    CR 0.66*  --    A1C 6.2* 6.2*        Diagnostics  No labs were ordered during this visit.   No EKG required, no history of coronary heart disease, significant arrhythmia, peripheral arterial disease or other structural heart disease.    Revised Cardiac Risk Index (RCRI)  The patient has the following serious cardiovascular risks for perioperative complications:   - No serious cardiac risks = 0 points     RCRI Interpretation: 0 points: Class I (very low risk - 0.4% complication rate)         Signed Electronically by: ARDEN York CNP  A copy of this evaluation report is provided to the requesting physician.    {Provider Resources  Preop [facility] Guidelines  Revised Cardiac Risk Index :674531}   {Email feedback regarding this note to [email or web address]   :807076}

## 2025-07-30 NOTE — PATIENT INSTRUCTIONS
How to Take Your Medication Before Surgery  Preoperative Medication Instructions   Antiplatelet or Anticoagulation Medication Instructions   - We reviewed the medication list and the patient is not on an antiplatelet or anticoagulation medications.    Additional Medication Instructions   - Herbal medications and vitamins: DO NOT TAKE 14 days prior to surgery.   - metformin: DO NOT TAKE day of surgery.   - GLP-1 Injectable (semaglutide, ): DO NOT TAKE 7 days before surgery    - Pristiq: Continue without modification.           Patient Education   Preparing for Your Surgery  For Adults  Getting started  In most cases, a nurse will call to review your health history and instructions. They will give you an arrival time based on your scheduled surgery time. Please be ready to share:  Your doctor's clinic name and phone number  Your medical, surgical, and anesthesia history  A list of allergies and sensitivities  A list of medicines, including herbal treatments and over-the-counter drugs  Whether the patient has a legal guardian (ask how to send us the papers in advance)  Note: You may not receive a call if you were seen at our PAC (Preoperative Assessment Center).  Please tell us if you're pregnant--or if there's any chance you might be pregnant. Some surgeries may injure a fetus (unborn baby), so they require a pregnancy test. Surgeries that are safe for a fetus don't always need a test, and you can choose whether to have one.   Preparing for surgery  Within 10 to 30 days of surgery: Have a pre-op exam (sometimes called an H&P, or History and Physical). This can be done at a clinic or pre-operative center.  If you're having a , you may not need this exam. Talk to your care team.  At your pre-op exam, talk to your care team about all medicines you take. (This includes CBD oil and any drugs, such as THC, marijuana, and other forms of cannabis.) If you need to stop any medicine before surgery, ask when to start  taking it again.  This is for your safety. Many medicines and drugs can make you bleed too much during surgery. Some change how well surgery (anesthesia) drugs work.  Call your insurance company to let them know you're having surgery. (If you don't have insurance, call [phone].)  Call your clinic if there's any change in your health. This includes a scrape or scratch near the surgery site, or any signs of a cold (sore throat, runny nose, cough, rash, fever).  Eating and drinking guidelines  For your safety: Unless your surgeon tells you otherwise, follow the guidelines below.  Eat and drink as normal until 8 hours before you arrive for surgery. After that, no food or milk. You can spit out gum when you arrive.  Drink clear liquids until 2 hours before you arrive. These are liquids you can see through, like water, Gatorade, and Propel Water. They also include plain black coffee and tea (no cream or milk).  No alcohol for 24 hours before you arrive. The night before surgery, stop any drinks that contain THC.  If your care team tells you to take medicine on the morning of surgery, it's okay to take it with a sip of water. No other medicines or drugs are allowed (including CBD oil)--follow your care team's instructions.  If you have questions the day of surgery, call your hospital or surgery center.   Preventing infection  Shower or bathe the night before and the morning of surgery. Follow the instructions your clinic gave you. (If no instructions, use regular soap.)  Don't shave or clip hair near your surgery site. We'll remove the hair if needed.  Don't smoke or vape the morning of surgery. No chewing tobacco for 6 hours before you arrive. A nicotine patch is okay. You may spit out nicotine gum when you arrive.  For some surgeries, the surgeon will tell you to fully quit smoking and nicotine.  We will make every effort to keep you safe from infection. We will:  Clean our hands often with soap and water (or an  alcohol-based hand rub).  Clean the skin at your surgery site with a special soap that kills germs.  Give you a special gown to keep you warm. (Cold raises the risk of infection.)  Wear hair covers, masks, gowns, and gloves during surgery.  Give antibiotic medicine, if prescribed. Not all surgeries need this medicine.  What to bring on the day of surgery  Photo ID and insurance card  Copy of your health care directive, if you have one  Glasses and hearing aids (bring cases)  You can't wear contacts during surgery  Inhaler and eye drops, if you use them (tell us about these when you arrive)  CPAP machine or breathing device, if you use them  A few personal items, if spending the night  If you have . . .  A pacemaker, ICD (cardiac defibrillator), or other implant: Bring the ID card.  An implanted stimulator: Bring the remote control.  A legal guardian: Bring a copy of the certified (court-stamped) guardianship papers.  Please remove any jewelry, including body piercings. Leave jewelry and other valuables at home.  If you're going home the day of surgery  You must have a support person drive you home. They should stay with you overnight, and they may need to help with your self-care.  If you don't have a support person, please tells us as soon as possible. We can help.  After surgery  If it's hard to control your pain or you need more pain medicine, please call your surgeon's office.  Questions?   If you have any questions for your care team, list them here:   ____________________________________________________________________________________________________________________________________________________________________________________________________________________________________________________________  For informational purposes only. Not to replace the advice of your health care provider. Copyright   2003, 2019 [facility]. All rights reserved. Clinically reviewed by Devonte Hemphill MD.  [employer] [id number] - REV 02/25.

## 2025-08-13 ENCOUNTER — DOCUMENTATION ONLY (OUTPATIENT)
Dept: ORTHOPEDICS | Facility: CLINIC | Age: 40
End: 2025-08-13
Payer: COMMERCIAL

## 2025-08-13 ENCOUNTER — ANESTHESIA EVENT (OUTPATIENT)
Dept: SURGERY | Facility: CLINIC | Age: 40
End: 2025-08-13
Payer: COMMERCIAL

## 2025-08-14 ENCOUNTER — HOSPITAL ENCOUNTER (OUTPATIENT)
Facility: CLINIC | Age: 40
Discharge: HOME | End: 2025-08-14
Attending: ORTHOPAEDIC SURGERY | Admitting: ORTHOPAEDIC SURGERY
Payer: COMMERCIAL

## 2025-08-14 ENCOUNTER — ANESTHESIA (OUTPATIENT)
Dept: SURGERY | Facility: CLINIC | Age: 40
End: 2025-08-14
Payer: COMMERCIAL

## 2025-08-14 VITALS
HEIGHT: 73 IN | TEMPERATURE: 97.3 F | OXYGEN SATURATION: 98 % | SYSTOLIC BLOOD PRESSURE: 109 MMHG | BODY MASS INDEX: 25.22 KG/M2 | WEIGHT: 190.26 LBS | RESPIRATION RATE: 13 BRPM | DIASTOLIC BLOOD PRESSURE: 84 MMHG | HEART RATE: 92 BPM

## 2025-08-14 DIAGNOSIS — Z98.890 S/P KNEE SURGERY: Primary | ICD-10-CM

## 2025-08-14 LAB — GLUCOSE BLDC GLUCOMTR-MCNC: 150 MG/DL (ref 70–99)

## 2025-08-14 PROCEDURE — 250N000025 HC SEVOFLURANE, PER MIN: Performed by: ORTHOPAEDIC SURGERY

## 2025-08-14 PROCEDURE — 258N000003 HC RX IP 258 OP 636: Performed by: NURSE ANESTHETIST, CERTIFIED REGISTERED

## 2025-08-14 PROCEDURE — 250N000011 HC RX IP 250 OP 636: Performed by: ANESTHESIOLOGY

## 2025-08-14 PROCEDURE — 250N000011 HC RX IP 250 OP 636: Performed by: PHYSICIAN ASSISTANT

## 2025-08-14 PROCEDURE — 258N000003 HC RX IP 258 OP 636: Performed by: PHYSICIAN ASSISTANT

## 2025-08-14 PROCEDURE — C1776 JOINT DEVICE (IMPLANTABLE): HCPCS | Performed by: ORTHOPAEDIC SURGERY

## 2025-08-14 PROCEDURE — 250N000009 HC RX 250: Performed by: NURSE ANESTHETIST, CERTIFIED REGISTERED

## 2025-08-14 PROCEDURE — 250N000013 HC RX MED GY IP 250 OP 250 PS 637: Performed by: PHYSICIAN ASSISTANT

## 2025-08-14 PROCEDURE — C1713 ANCHOR/SCREW BN/BN,TIS/BN: HCPCS | Performed by: ORTHOPAEDIC SURGERY

## 2025-08-14 PROCEDURE — 370N000017 HC ANESTHESIA TECHNICAL FEE, PER MIN: Performed by: ORTHOPAEDIC SURGERY

## 2025-08-14 PROCEDURE — 710N000012 HC RECOVERY PHASE 2, PER MINUTE: Performed by: ORTHOPAEDIC SURGERY

## 2025-08-14 PROCEDURE — 27486 REVISE/REPLACE KNEE JOINT: CPT | Mod: LT | Performed by: STUDENT IN AN ORGANIZED HEALTH CARE EDUCATION/TRAINING PROGRAM

## 2025-08-14 PROCEDURE — 999N000141 HC STATISTIC PRE-PROCEDURE NURSING ASSESSMENT: Performed by: ORTHOPAEDIC SURGERY

## 2025-08-14 PROCEDURE — 272N000001 HC OR GENERAL SUPPLY STERILE: Performed by: ORTHOPAEDIC SURGERY

## 2025-08-14 PROCEDURE — 360N000077 HC SURGERY LEVEL 4, PER MIN: Performed by: ORTHOPAEDIC SURGERY

## 2025-08-14 PROCEDURE — 710N000010 HC RECOVERY PHASE 1, LEVEL 2, PER MIN: Performed by: ORTHOPAEDIC SURGERY

## 2025-08-14 PROCEDURE — 250N000013 HC RX MED GY IP 250 OP 250 PS 637: Performed by: ANESTHESIOLOGY

## 2025-08-14 PROCEDURE — 82962 GLUCOSE BLOOD TEST: CPT

## 2025-08-14 PROCEDURE — 250N000011 HC RX IP 250 OP 636: Performed by: NURSE ANESTHETIST, CERTIFIED REGISTERED

## 2025-08-14 DEVICE — IMP BONE CEMENT STRK SIMPLEX SPEEDSET 6192-1-001: Type: IMPLANTABLE DEVICE | Site: KNEE | Status: FUNCTIONAL

## 2025-08-14 DEVICE — IMPLANTABLE DEVICE: Type: IMPLANTABLE DEVICE | Site: KNEE | Status: FUNCTIONAL

## 2025-08-14 RX ORDER — ONDANSETRON 4 MG/1
4 TABLET, ORALLY DISINTEGRATING ORAL EVERY 30 MIN PRN
Status: DISCONTINUED | OUTPATIENT
Start: 2025-08-14 | End: 2025-08-14 | Stop reason: HOSPADM

## 2025-08-14 RX ORDER — ONDANSETRON 2 MG/ML
4 INJECTION INTRAMUSCULAR; INTRAVENOUS EVERY 6 HOURS PRN
Status: DISCONTINUED | OUTPATIENT
Start: 2025-08-14 | End: 2025-08-14 | Stop reason: HOSPADM

## 2025-08-14 RX ORDER — ACETAMINOPHEN 325 MG/1
975 TABLET ORAL ONCE
Status: COMPLETED | OUTPATIENT
Start: 2025-08-14 | End: 2025-08-14

## 2025-08-14 RX ORDER — FENTANYL CITRATE 50 UG/ML
50 INJECTION, SOLUTION INTRAMUSCULAR; INTRAVENOUS EVERY 5 MIN PRN
Status: DISCONTINUED | OUTPATIENT
Start: 2025-08-14 | End: 2025-08-14 | Stop reason: HOSPADM

## 2025-08-14 RX ORDER — LABETALOL HYDROCHLORIDE 5 MG/ML
10 INJECTION, SOLUTION INTRAVENOUS
Status: DISCONTINUED | OUTPATIENT
Start: 2025-08-14 | End: 2025-08-14 | Stop reason: HOSPADM

## 2025-08-14 RX ORDER — HYDROMORPHONE HYDROCHLORIDE 1 MG/ML
0.2 INJECTION, SOLUTION INTRAMUSCULAR; INTRAVENOUS; SUBCUTANEOUS EVERY 5 MIN PRN
Status: DISCONTINUED | OUTPATIENT
Start: 2025-08-14 | End: 2025-08-14 | Stop reason: HOSPADM

## 2025-08-14 RX ORDER — HYDROMORPHONE HYDROCHLORIDE 2 MG/1
2-4 TABLET ORAL EVERY 4 HOURS PRN
Qty: 28 TABLET | Refills: 0 | Status: SHIPPED | OUTPATIENT
Start: 2025-08-14 | End: 2025-08-18

## 2025-08-14 RX ORDER — ONDANSETRON 4 MG/1
4 TABLET, ORALLY DISINTEGRATING ORAL EVERY 8 HOURS PRN
Qty: 4 TABLET | Refills: 0 | Status: SHIPPED | OUTPATIENT
Start: 2025-08-14

## 2025-08-14 RX ORDER — ASPIRIN 81 MG/1
81 TABLET ORAL 2 TIMES DAILY
Qty: 60 TABLET | Refills: 0 | Status: SHIPPED | OUTPATIENT
Start: 2025-08-14

## 2025-08-14 RX ORDER — TRANEXAMIC ACID 650 MG/1
1950 TABLET ORAL ONCE
Status: COMPLETED | OUTPATIENT
Start: 2025-08-14 | End: 2025-08-14

## 2025-08-14 RX ORDER — FENTANYL CITRATE 50 UG/ML
25 INJECTION, SOLUTION INTRAMUSCULAR; INTRAVENOUS EVERY 5 MIN PRN
Status: DISCONTINUED | OUTPATIENT
Start: 2025-08-14 | End: 2025-08-14 | Stop reason: HOSPADM

## 2025-08-14 RX ORDER — CEFAZOLIN SODIUM/WATER 2 G/20 ML
2 SYRINGE (ML) INTRAVENOUS SEE ADMIN INSTRUCTIONS
Status: DISCONTINUED | OUTPATIENT
Start: 2025-08-14 | End: 2025-08-14 | Stop reason: HOSPADM

## 2025-08-14 RX ORDER — SODIUM CHLORIDE, SODIUM LACTATE, POTASSIUM CHLORIDE, CALCIUM CHLORIDE 600; 310; 30; 20 MG/100ML; MG/100ML; MG/100ML; MG/100ML
INJECTION, SOLUTION INTRAVENOUS CONTINUOUS PRN
Status: DISCONTINUED | OUTPATIENT
Start: 2025-08-14 | End: 2025-08-14

## 2025-08-14 RX ORDER — PROPOFOL 10 MG/ML
INJECTION, EMULSION INTRAVENOUS CONTINUOUS PRN
Status: DISCONTINUED | OUTPATIENT
Start: 2025-08-14 | End: 2025-08-14

## 2025-08-14 RX ORDER — CEFAZOLIN SODIUM/WATER 2 G/20 ML
2 SYRINGE (ML) INTRAVENOUS EVERY 6 HOURS
Status: DISCONTINUED | OUTPATIENT
Start: 2025-08-14 | End: 2025-08-14 | Stop reason: HOSPADM

## 2025-08-14 RX ORDER — LIDOCAINE HYDROCHLORIDE 20 MG/ML
INJECTION, SOLUTION INFILTRATION; PERINEURAL PRN
Status: DISCONTINUED | OUTPATIENT
Start: 2025-08-14 | End: 2025-08-14

## 2025-08-14 RX ORDER — ACETAMINOPHEN 325 MG/1
650 TABLET ORAL EVERY 4 HOURS PRN
Qty: 100 TABLET | Refills: 0 | Status: SHIPPED | OUTPATIENT
Start: 2025-08-14

## 2025-08-14 RX ORDER — ONDANSETRON 2 MG/ML
4 INJECTION INTRAMUSCULAR; INTRAVENOUS EVERY 30 MIN PRN
Status: DISCONTINUED | OUTPATIENT
Start: 2025-08-14 | End: 2025-08-14 | Stop reason: HOSPADM

## 2025-08-14 RX ORDER — ONDANSETRON 4 MG/1
4 TABLET, ORALLY DISINTEGRATING ORAL EVERY 6 HOURS PRN
Status: DISCONTINUED | OUTPATIENT
Start: 2025-08-14 | End: 2025-08-14 | Stop reason: HOSPADM

## 2025-08-14 RX ORDER — AMOXICILLIN 250 MG
1-2 CAPSULE ORAL 2 TIMES DAILY
Qty: 30 TABLET | Refills: 0 | Status: SHIPPED | OUTPATIENT
Start: 2025-08-14

## 2025-08-14 RX ORDER — HYDROMORPHONE HYDROCHLORIDE 2 MG/1
2 TABLET ORAL EVERY 4 HOURS PRN
Refills: 0 | Status: DISCONTINUED | OUTPATIENT
Start: 2025-08-14 | End: 2025-08-14 | Stop reason: HOSPADM

## 2025-08-14 RX ORDER — ONDANSETRON 2 MG/ML
INJECTION INTRAMUSCULAR; INTRAVENOUS PRN
Status: DISCONTINUED | OUTPATIENT
Start: 2025-08-14 | End: 2025-08-14

## 2025-08-14 RX ORDER — CEFAZOLIN SODIUM/WATER 2 G/20 ML
2 SYRINGE (ML) INTRAVENOUS
Status: COMPLETED | OUTPATIENT
Start: 2025-08-14 | End: 2025-08-14

## 2025-08-14 RX ORDER — PROPOFOL 10 MG/ML
INJECTION, EMULSION INTRAVENOUS PRN
Status: DISCONTINUED | OUTPATIENT
Start: 2025-08-14 | End: 2025-08-14

## 2025-08-14 RX ORDER — DEXAMETHASONE SODIUM PHOSPHATE 4 MG/ML
4 INJECTION, SOLUTION INTRA-ARTICULAR; INTRALESIONAL; INTRAMUSCULAR; INTRAVENOUS; SOFT TISSUE
Status: DISCONTINUED | OUTPATIENT
Start: 2025-08-14 | End: 2025-08-14 | Stop reason: HOSPADM

## 2025-08-14 RX ORDER — NALOXONE HYDROCHLORIDE 0.4 MG/ML
0.1 INJECTION, SOLUTION INTRAMUSCULAR; INTRAVENOUS; SUBCUTANEOUS
Status: DISCONTINUED | OUTPATIENT
Start: 2025-08-14 | End: 2025-08-14 | Stop reason: HOSPADM

## 2025-08-14 RX ORDER — DEXAMETHASONE SODIUM PHOSPHATE 4 MG/ML
INJECTION, SOLUTION INTRA-ARTICULAR; INTRALESIONAL; INTRAMUSCULAR; INTRAVENOUS; SOFT TISSUE PRN
Status: DISCONTINUED | OUTPATIENT
Start: 2025-08-14 | End: 2025-08-14

## 2025-08-14 RX ORDER — DIAZEPAM 10 MG/2ML
1.25 INJECTION, SOLUTION INTRAMUSCULAR; INTRAVENOUS
Status: DISCONTINUED | OUTPATIENT
Start: 2025-08-14 | End: 2025-08-14 | Stop reason: HOSPADM

## 2025-08-14 RX ORDER — SODIUM CHLORIDE, SODIUM LACTATE, POTASSIUM CHLORIDE, CALCIUM CHLORIDE 600; 310; 30; 20 MG/100ML; MG/100ML; MG/100ML; MG/100ML
INJECTION, SOLUTION INTRAVENOUS CONTINUOUS
Status: DISCONTINUED | OUTPATIENT
Start: 2025-08-14 | End: 2025-08-14 | Stop reason: HOSPADM

## 2025-08-14 RX ORDER — HYDROMORPHONE HYDROCHLORIDE 1 MG/ML
0.4 INJECTION, SOLUTION INTRAMUSCULAR; INTRAVENOUS; SUBCUTANEOUS EVERY 5 MIN PRN
Status: DISCONTINUED | OUTPATIENT
Start: 2025-08-14 | End: 2025-08-14 | Stop reason: HOSPADM

## 2025-08-14 RX ORDER — IBUPROFEN 600 MG/1
600 TABLET, FILM COATED ORAL EVERY 6 HOURS PRN
Qty: 30 TABLET | Refills: 0 | Status: SHIPPED | OUTPATIENT
Start: 2025-08-14

## 2025-08-14 RX ADMIN — Medication 2 G: at 16:39

## 2025-08-14 RX ADMIN — HYDROMORPHONE HYDROCHLORIDE 0.5 MG: 1 INJECTION, SOLUTION INTRAMUSCULAR; INTRAVENOUS; SUBCUTANEOUS at 11:39

## 2025-08-14 RX ADMIN — HYDROMORPHONE HYDROCHLORIDE 2 MG: 2 TABLET ORAL at 14:36

## 2025-08-14 RX ADMIN — PROPOFOL 75 MCG/KG/MIN: 10 INJECTION, EMULSION INTRAVENOUS at 11:20

## 2025-08-14 RX ADMIN — HYDROMORPHONE HYDROCHLORIDE 0.2 MG: 1 INJECTION, SOLUTION INTRAMUSCULAR; INTRAVENOUS; SUBCUTANEOUS at 14:14

## 2025-08-14 RX ADMIN — PROPOFOL 100 MG: 10 INJECTION, EMULSION INTRAVENOUS at 11:07

## 2025-08-14 RX ADMIN — ONDANSETRON 4 MG: 2 INJECTION INTRAMUSCULAR; INTRAVENOUS at 12:36

## 2025-08-14 RX ADMIN — HYDROMORPHONE HYDROCHLORIDE 0.4 MG: 1 INJECTION, SOLUTION INTRAMUSCULAR; INTRAVENOUS; SUBCUTANEOUS at 14:30

## 2025-08-14 RX ADMIN — FENTANYL CITRATE 25 MCG: 50 INJECTION, SOLUTION INTRAMUSCULAR; INTRAVENOUS at 13:59

## 2025-08-14 RX ADMIN — ACETAMINOPHEN 975 MG: 325 TABLET ORAL at 09:18

## 2025-08-14 RX ADMIN — HYDROMORPHONE HYDROCHLORIDE 0.5 MG: 1 INJECTION, SOLUTION INTRAMUSCULAR; INTRAVENOUS; SUBCUTANEOUS at 11:15

## 2025-08-14 RX ADMIN — PROPOFOL 20 MG: 10 INJECTION, EMULSION INTRAVENOUS at 13:23

## 2025-08-14 RX ADMIN — FENTANYL CITRATE 25 MCG: 50 INJECTION, SOLUTION INTRAMUSCULAR; INTRAVENOUS at 13:53

## 2025-08-14 RX ADMIN — HYDROMORPHONE HYDROCHLORIDE 2 MG: 2 TABLET ORAL at 15:42

## 2025-08-14 RX ADMIN — ACETAMINOPHEN 975 MG: 325 TABLET ORAL at 14:55

## 2025-08-14 RX ADMIN — TRANEXAMIC ACID 1950 MG: 650 TABLET ORAL at 09:18

## 2025-08-14 RX ADMIN — SODIUM CHLORIDE, SODIUM LACTATE, POTASSIUM CHLORIDE, AND CALCIUM CHLORIDE: .6; .31; .03; .02 INJECTION, SOLUTION INTRAVENOUS at 10:50

## 2025-08-14 RX ADMIN — Medication 2 G: at 11:09

## 2025-08-14 RX ADMIN — DEXAMETHASONE SODIUM PHOSPHATE 4 MG: 4 INJECTION, SOLUTION INTRAMUSCULAR; INTRAVENOUS at 11:15

## 2025-08-14 RX ADMIN — MIDAZOLAM 2 MG: 1 INJECTION INTRAMUSCULAR; INTRAVENOUS at 10:50

## 2025-08-14 RX ADMIN — PROPOFOL 200 MG: 10 INJECTION, EMULSION INTRAVENOUS at 11:03

## 2025-08-14 RX ADMIN — PROPOFOL 40 MG: 10 INJECTION, EMULSION INTRAVENOUS at 13:05

## 2025-08-14 RX ADMIN — LIDOCAINE HYDROCHLORIDE 100 MG: 20 INJECTION, SOLUTION INFILTRATION; PERINEURAL at 11:02

## 2025-08-14 ASSESSMENT — ACTIVITIES OF DAILY LIVING (ADL)
ADLS_ACUITY_SCORE: 43
ADLS_ACUITY_SCORE: 42
ADLS_ACUITY_SCORE: 43
ADLS_ACUITY_SCORE: 42
ADLS_ACUITY_SCORE: 43

## 2025-08-18 ENCOUNTER — THERAPY VISIT (OUTPATIENT)
Dept: PHYSICAL THERAPY | Facility: CLINIC | Age: 40
End: 2025-08-18
Payer: COMMERCIAL

## 2025-08-18 DIAGNOSIS — Z98.890 S/P KNEE SURGERY: ICD-10-CM

## 2025-08-18 DIAGNOSIS — Z98.890 H/O KNEE SURGERY: ICD-10-CM

## 2025-08-18 DIAGNOSIS — M25.562 LEFT KNEE PAIN: Primary | ICD-10-CM

## 2025-08-18 PROCEDURE — 97110 THERAPEUTIC EXERCISES: CPT | Mod: GP | Performed by: PHYSICAL THERAPIST

## 2025-08-18 PROCEDURE — 97530 THERAPEUTIC ACTIVITIES: CPT | Mod: GP | Performed by: PHYSICAL THERAPIST

## 2025-08-18 PROCEDURE — 97161 PT EVAL LOW COMPLEX 20 MIN: CPT | Mod: GP | Performed by: PHYSICAL THERAPIST

## 2025-08-18 RX ORDER — HYDROMORPHONE HYDROCHLORIDE 2 MG/1
2-4 TABLET ORAL EVERY 4 HOURS PRN
Qty: 28 TABLET | Refills: 0 | Status: SHIPPED | OUTPATIENT
Start: 2025-08-18

## 2025-08-18 ASSESSMENT — ACTIVITIES OF DAILY LIVING (ADL)
GIVING WAY, BUCKLING OR SHIFTING OF KNEE: THE SYMPTOM AFFECTS MY ACTIVITY MODERATELY
GO DOWN STAIRS: I AM UNABLE TO DO THE ACTIVITY
GO UP STAIRS: I AM UNABLE TO DO THE ACTIVITY
HOW_WOULD_YOU_RATE_THE_OVERALL_FUNCTION_OF_YOUR_KNEE_DURING_YOUR_USUAL_DAILY_ACTIVITIES?: SEVERELY ABNORMAL
LIMPING: THE SYMPTOM PREVENTS ME FROM ALL DAILY ACTIVITIES
AS_A_RESULT_OF_YOUR_KNEE_INJURY,_HOW_WOULD_YOU_RATE_YOUR_CURRENT_LEVEL_OF_DAILY_ACTIVITY?: SEVERELY ABNORMAL
HOW_WOULD_YOU_RATE_THE_OVERALL_FUNCTION_OF_YOUR_KNEE_DURING_YOUR_USUAL_DAILY_ACTIVITIES?: SEVERELY ABNORMAL
SWELLING: THE SYMPTOM AFFECTS MY ACTIVITY SEVERELY
WALK: I AM UNABLE TO DO THE ACTIVITY
PAIN: THE SYMPTOM PREVENTS ME FROM ALL DAILY ACTIVITIES
LIMPING: THE SYMPTOM PREVENTS ME FROM ALL DAILY ACTIVITIES
STAND: I AM UNABLE TO DO THE ACTIVITY
STIFFNESS: THE SYMPTOM PREVENTS ME FROM ALL DAILY ACTIVITIES
GIVING WAY, BUCKLING OR SHIFTING OF KNEE: THE SYMPTOM AFFECTS MY ACTIVITY MODERATELY
GO DOWN STAIRS: I AM UNABLE TO DO THE ACTIVITY
STIFFNESS: THE SYMPTOM PREVENTS ME FROM ALL DAILY ACTIVITIES
PAIN: THE SYMPTOM PREVENTS ME FROM ALL DAILY ACTIVITIES
GO UP STAIRS: I AM UNABLE TO DO THE ACTIVITY
WEAKNESS: THE SYMPTOM PREVENTS ME FROM ALL DAILY ACTIVITIES
SQUAT: I AM UNABLE TO DO THE ACTIVITY
AS_A_RESULT_OF_YOUR_KNEE_INJURY,_HOW_WOULD_YOU_RATE_YOUR_CURRENT_LEVEL_OF_DAILY_ACTIVITY?: SEVERELY ABNORMAL
PLEASE_INDICATE_YOR_PRIMARY_REASON_FOR_REFERRAL_TO_THERAPY:: KNEE
RAW_SCORE: 4
KNEEL ON THE FRONT OF YOUR KNEE: I AM UNABLE TO DO THE ACTIVITY
KNEE_ACTIVITY_OF_DAILY_LIVING_SCORE: 5.71
RISE FROM A CHAIR: ACTIVITY IS VERY DIFFICULT
HOW_WOULD_YOU_RATE_THE_CURRENT_FUNCTION_OF_YOUR_KNEE_DURING_YOUR_USUAL_DAILY_ACTIVITIES_ON_A_SCALE_FROM_0_TO_100_WITH_100_BEING_YOUR_LEVEL_OF_KNEE_FUNCTION_PRIOR_TO_YOUR_INJURY_AND_0_BEING_THE_INABILITY_TO_PERFORM_ANY_OF_YOUR_USUAL_DAILY_ACTIVITIES?: 10
WALK: I AM UNABLE TO DO THE ACTIVITY
SIT WITH YOUR KNEE BENT: I AM UNABLE TO DO THE ACTIVITY
RISE FROM A CHAIR: ACTIVITY IS VERY DIFFICULT
STAND: I AM UNABLE TO DO THE ACTIVITY
SWELLING: THE SYMPTOM AFFECTS MY ACTIVITY SEVERELY
SQUAT: I AM UNABLE TO DO THE ACTIVITY
HOW_WOULD_YOU_RATE_THE_CURRENT_FUNCTION_OF_YOUR_KNEE_DURING_YOUR_USUAL_DAILY_ACTIVITIES_ON_A_SCALE_FROM_0_TO_100_WITH_100_BEING_YOUR_LEVEL_OF_KNEE_FUNCTION_PRIOR_TO_YOUR_INJURY_AND_0_BEING_THE_INABILITY_TO_PERFORM_ANY_OF_YOUR_USUAL_DAILY_ACTIVITIES?: 10
WEAKNESS: THE SYMPTOM PREVENTS ME FROM ALL DAILY ACTIVITIES
KNEE_ACTIVITY_OF_DAILY_LIVING_SUM: 4
SIT WITH YOUR KNEE BENT: I AM UNABLE TO DO THE ACTIVITY
KNEEL ON THE FRONT OF YOUR KNEE: I AM UNABLE TO DO THE ACTIVITY

## 2025-08-21 ENCOUNTER — THERAPY VISIT (OUTPATIENT)
Dept: PHYSICAL THERAPY | Facility: CLINIC | Age: 40
End: 2025-08-21
Payer: COMMERCIAL

## 2025-08-21 DIAGNOSIS — M25.562 LEFT KNEE PAIN: Primary | ICD-10-CM

## 2025-08-25 ENCOUNTER — THERAPY VISIT (OUTPATIENT)
Dept: PHYSICAL THERAPY | Facility: CLINIC | Age: 40
End: 2025-08-25
Payer: COMMERCIAL

## 2025-08-25 DIAGNOSIS — M25.562 LEFT KNEE PAIN: Primary | ICD-10-CM

## 2025-08-25 DIAGNOSIS — Z98.890 S/P KNEE SURGERY: ICD-10-CM

## 2025-08-25 PROCEDURE — 97110 THERAPEUTIC EXERCISES: CPT | Mod: GP | Performed by: PHYSICAL THERAPIST

## 2025-08-25 RX ORDER — HYDROMORPHONE HYDROCHLORIDE 2 MG/1
2-4 TABLET ORAL EVERY 4 HOURS PRN
Qty: 26 TABLET | Refills: 0 | Status: SHIPPED | OUTPATIENT
Start: 2025-08-25

## 2025-08-28 ENCOUNTER — THERAPY VISIT (OUTPATIENT)
Dept: PHYSICAL THERAPY | Facility: CLINIC | Age: 40
End: 2025-08-28
Payer: COMMERCIAL

## 2025-08-28 ENCOUNTER — OFFICE VISIT (OUTPATIENT)
Dept: ORTHOPEDICS | Facility: CLINIC | Age: 40
End: 2025-08-28
Payer: COMMERCIAL

## 2025-08-28 DIAGNOSIS — M25.562 LEFT KNEE PAIN: Primary | ICD-10-CM

## 2025-08-28 DIAGNOSIS — M22.8X2 PATELLAR TRACKING DISORDER OF LEFT KNEE: Primary | ICD-10-CM

## 2025-08-28 DIAGNOSIS — Z47.89 ORTHOPEDIC AFTERCARE: ICD-10-CM

## 2025-09-02 ENCOUNTER — THERAPY VISIT (OUTPATIENT)
Dept: PHYSICAL THERAPY | Facility: CLINIC | Age: 40
End: 2025-09-02
Payer: COMMERCIAL

## 2025-09-02 DIAGNOSIS — M25.562 LEFT KNEE PAIN: Primary | ICD-10-CM

## 2025-09-02 PROCEDURE — 97110 THERAPEUTIC EXERCISES: CPT | Mod: GP | Performed by: PHYSICAL THERAPIST

## (undated) DEVICE — SOL NACL 0.9% IRRIG 3000ML BAG 2B7477

## (undated) DEVICE — SU SILK 0 TIE 6X30" A306H

## (undated) DEVICE — SOL WATER IRRIG 1000ML BOTTLE 2F7114

## (undated) DEVICE — SU NDL MAYO TROCAR 217005

## (undated) DEVICE — PREP CHLORAPREP 26ML TINTED HI-LITE ORANGE 930815

## (undated) DEVICE — GLOVE PROTEXIS POWDER FREE 8.0 ORTHOPEDIC 2D73ET80

## (undated) DEVICE — DRSG TEGADERM 4X10" 1627

## (undated) DEVICE — NEEDLE SPINAL DISP 18GA X 3.5" QUINCKE 333350

## (undated) DEVICE — PACK ACL SUPPLEMENT STD

## (undated) DEVICE — ESU PENCIL SMOKE EVAC W/ROCKER SWITCH 0703-047-000

## (undated) DEVICE — GLOVE PROTEXIS BLUE W/NEU-THERA 7.5  2D73EB75

## (undated) DEVICE — DEVICE RETRIEVER HEWSON 71111579

## (undated) DEVICE — SOL NACL 0.9% IRRIG 1000ML BOTTLE 2F7124

## (undated) DEVICE — DRAPE CONVERTORS U-DRAPE 60X72" 8476

## (undated) DEVICE — STPL ENDO RELOAD 60MM VASCULAR MEDIUM TAN EGIA60AVM

## (undated) DEVICE — Device

## (undated) DEVICE — BAG CLEAR TRASH 1.3M 39X33" P4040C

## (undated) DEVICE — DRAPE C-ARM W/STRAPS 42X72" 07-CA104

## (undated) DEVICE — SU VICRYL 3-0 CT-1 36" J344H

## (undated) DEVICE — SU MONOCRYL 3-0 PS-1 27" Y936H

## (undated) DEVICE — DRAPE C-ARMOR 5 SIDED 5523

## (undated) DEVICE — TUBING C02 INSUFFLATION LAP FILTER HEATER 6198

## (undated) DEVICE — SUCTION CATH 18FR ORAL TRI-FLO T62

## (undated) DEVICE — DRSG STERI STRIP 1/2X4" R1547

## (undated) DEVICE — LINEN FULL SHEET 5511

## (undated) DEVICE — SUTURE MONOCRYL+ 3-0 PS-1 27" UNDYED MCP936H

## (undated) DEVICE — GOWN IMPERVIOUS SPECIALTY XLG/XLONG 32474

## (undated) DEVICE — PACK LAP CHOLE SLC15LCFSD

## (undated) DEVICE — DRSG SILVERCEL 4.25X4.25" 900404

## (undated) DEVICE — GLOVE PROTEXIS MICRO 7.5  2D73PM75

## (undated) DEVICE — GLOVE BIOGEL PI SZ 7.5 40875

## (undated) DEVICE — SPONGE LAP 18X18" X8435

## (undated) DEVICE — COVER CAMERA IN-LIGHT DISP LT-C02

## (undated) DEVICE — DRAPE STOCKINETTE IMPERVIOUS 12" 1587

## (undated) DEVICE — SUCTION IRR STRYKERFLOW II W/TIP 250-070-520

## (undated) DEVICE — SYR 50ML LL W/O NDL 309653

## (undated) DEVICE — BNDG SPANDAGRIP SZ G LF BEIGE 4.5" SAG13145

## (undated) DEVICE — SU VICRYL 2-0 CT-1 27" UND J259H

## (undated) DEVICE — SU VICRYL 1 CT-1 36" J347H

## (undated) DEVICE — SUCTION MANIFOLD NEPTUNE 2 SYS 1 PORT 702-025-000

## (undated) DEVICE — SU VICRYL 0 CT-1 3X27" J430T

## (undated) DEVICE — PEN MARKING SKIN W/PAPER RULER 31145785

## (undated) DEVICE — SOLUTION WATER 1000ML BOTTLE R5000-01

## (undated) DEVICE — ESU DISSECTOR SONICISION CORDLESS 39CM SCD396

## (undated) DEVICE — SOL NACL 0.9% INJ 250ML BAG 2B1322Q

## (undated) DEVICE — BUR ARTHREX COOLCUT SABRE 4.0MMX13CM AR-8400SR

## (undated) DEVICE — PREP CHLORAPREP 26ML TINTED ORANGE  260815

## (undated) DEVICE — POSITIONER ARMBOARD FOAM 1PAIR LF FP-ARMB1

## (undated) DEVICE — SU NDL MCGOWAN 1/2 1859-6D

## (undated) DEVICE — CAST PADDING 6" STERILE 9046S

## (undated) DEVICE — GLOVE BIOGEL PI SZ 8.5 40885

## (undated) DEVICE — ESU GROUND PAD ADULT W/CORD E7507

## (undated) DEVICE — BLADE KNIFE SURG 10 371110

## (undated) DEVICE — NDL 19GA 1.5"

## (undated) DEVICE — STPL ENDO RELOAD 45MM VASCULAR MEDIUM TAN EGIA45AVM

## (undated) DEVICE — PACK TOTAL KNEE BOXED LATEX FREE PO15TKFCT

## (undated) DEVICE — ESU GROUND PAD UNIVERSAL W/O CORD

## (undated) DEVICE — GLOVE BIOGEL PI MICRO INDICATOR UNDERGLOVE SZ 8.0 48980

## (undated) DEVICE — LINEN DRAPE 54X72" 5467

## (undated) DEVICE — DECANTER VIAL 2006S

## (undated) DEVICE — BNDG ELASTIC 6" DBL LENGTH UNSTERILE 6611-16

## (undated) DEVICE — SUCTION MANIFOLD NEPTUNE 2 SYS 4 PORT 0702-020-000

## (undated) DEVICE — BLADE SAW SAGITTAL STRK 18X90X1.37MM HD SYS 6 6118-137-090

## (undated) DEVICE — LINEN TOWEL PACK X5 5464

## (undated) DEVICE — GLOVE BIOGEL PI MICRO INDICATOR UNDERGLOVE SZ 8.5 48985

## (undated) DEVICE — PIN GUIDE 2.4MM ARTHREX AR-13303-2.4

## (undated) DEVICE — SU FIBERWIRE 2 38" T-8 NDL  AR-7206

## (undated) DEVICE — SU ETHILON 3-0 PS-1 18" 1663H

## (undated) DEVICE — CLIP APPLIER ENDO ROTATING 10MM MED/LG ER320

## (undated) DEVICE — SU SILK 2-0 SH 30" K833H

## (undated) DEVICE — SUTURE VICRYL+ 1 CT-1 CR 8X18" VIO VCP741D

## (undated) DEVICE — LINEN ORTHO ACL PACK 5447

## (undated) DEVICE — EVAC SYSTEM CLEAR FLOW SC082500

## (undated) DEVICE — NDL SPINAL 18GA 3.5" 405184

## (undated) DEVICE — HOOD SURG T7PLUS PEEL AWAY FACE SHIELD STRL LF 0416-801-100

## (undated) DEVICE — SUTURE VICRYL+ 2-0 CT-2 27" UND VCP269H

## (undated) DEVICE — SET HANDPIECE INTERPULSE W/COAXIAL FAN SPRAY TIP 0210118000

## (undated) DEVICE — TOURNIQUET CUFF 30" REPRO BLUE 60-7070-105

## (undated) DEVICE — STRAP KNEE/BODY 31143004

## (undated) DEVICE — SOLUTION IRRIGATION 0.9% NACL 1000ML BOTTLE R5200-01

## (undated) DEVICE — SU VICRYL 2-0 CT-2 27" UND J269H

## (undated) DEVICE — SU VICRYL 0 CT-1 27" J340H

## (undated) DEVICE — SYR 10ML FINGER CONTROL W/O NDL 309695

## (undated) DEVICE — DRSG ADAPTIC 3X8" 6113

## (undated) DEVICE — INTRODUCER EEA STPL TRANS ANAL/ABD 25MM EEATAID25

## (undated) DEVICE — SU VICRYL 0 TIE 12X18" J906G

## (undated) DEVICE — SUCTION MANIFOLD DORNOCH ULTRA CART UL-CL500

## (undated) DEVICE — PREP DYNA-HEX 4% CHG SCRUB 4OZ BOTTLE MDS098710

## (undated) DEVICE — SU MONOCRYL 4-0 PS-2 18" UND Y496G

## (undated) DEVICE — DRAPE LEGGINGS 8421

## (undated) DEVICE — TUBING ARTHRO CONMED/LINVATEC PUMP BLUE INFLOW 10K100

## (undated) DEVICE — PACK LOWER EXTREMITY CUSTOM ASC

## (undated) DEVICE — GLOVE PROTEXIS BLUE W/NEU-THERA 8.0  2D73EB80

## (undated) DEVICE — TOURNIQUET SGL  BLADDER 30"X4" BLUE 5921030135

## (undated) DEVICE — TUBING ARTHROSCOPY PUMP ARTHREX AR-6410

## (undated) DEVICE — SU MONOCRYL 3-0 PS-2 18" UND Y497G

## (undated) DEVICE — SUCTION IRR SYSTEM W/O TIP INTERPULSE HANDPIECE 0210-100-000

## (undated) DEVICE — GLOVE GAMMEX NEOPRENE ULTRA SZ 6.5 LF 8513

## (undated) DEVICE — PAD ARMBOARD FOAM EGGCRATE COVIDEN 3114367

## (undated) DEVICE — BUR ARTHREX COOLCUT DISSECTOR 3.5MM  AR-8350DS

## (undated) DEVICE — ENDO POUCH UNIV RETRIEVAL SYSTEM INZII 10MM CD001

## (undated) DEVICE — SOL NACL 0.9% IRRIG 1000ML BOTTLE 07138-09

## (undated) DEVICE — ESU HOLDER LAP INST DISP PURPLE LONG 330MM H-PRO-330

## (undated) DEVICE — SYSTEM CLEARIFY VISUALIZATION 21-345

## (undated) DEVICE — ENDO TROCAR SLEEVE KII Z-THREADED 12X100MM CTS22

## (undated) DEVICE — ENDO TROCAR FIRST ENTRY KII FIOS Z-THRD 12X100MM CTF73

## (undated) DEVICE — PACK ARTHROSCOPY CUSTOM ASC

## (undated) DEVICE — DRAPE BREAST/CHEST 29420

## (undated) DEVICE — STPL CIRCULAR XL 25MM W/TILT TIP EEAXL25

## (undated) DEVICE — COVER CAMERA ENDOVIDEO

## (undated) DEVICE — LINEN ORTHO PACK 5446

## (undated) DEVICE — PEN MARKING SKIN W/LABELS 31145918

## (undated) DEVICE — GLOVE PROTEXIS POWDER FREE SMT 8.0  2D72PT80X

## (undated) DEVICE — DECANTER TRANSFER DEVICE 2008S

## (undated) DEVICE — DRAPE STERI U 1015

## (undated) DEVICE — SOL NACL 0.9% INJ 1000ML BAG 2B1324X

## (undated) DEVICE — SYR 50ML CATH TIP W/O NDL 309620

## (undated) DEVICE — ESU PENCIL W/SMOKE EVAC NEPTUNE STRYKER 0703-046-000

## (undated) DEVICE — SU VICRYL 3-0 SH 27" J316H

## (undated) DEVICE — SU VICRYL+ 1 MO-4 18" DYED VCP702D

## (undated) DEVICE — BONE CEMENT MIXEVAC III HI VAC KIT  0206-015-000

## (undated) DEVICE — SPONGE RAY-TEC 4X8" 7318

## (undated) DEVICE — TUBING SYSTEM ARTHREX PATIENT REDEUCE AR-6421

## (undated) DEVICE — STPL DELIVERY DEVICE ORAL ANVIL 25MM

## (undated) DEVICE — BLADE SAW SAGITTAL STRK 18X90X1.27MM HD SYS 6 6118-127-090

## (undated) DEVICE — STRAP POSITIONING 60X31" BODY KNEE KBS 01

## (undated) DEVICE — PREP DURAPREP 26ML APL 8630

## (undated) DEVICE — LINEN BACK PACK 5440

## (undated) DEVICE — DRSG ABDOMINAL 07 1/2X8" 7197D

## (undated) DEVICE — DRSG GAUZE 4X8" NON21842

## (undated) DEVICE — SOLUTION IV IRRIGATION 0.9% NACL 3L R8206

## (undated) DEVICE — SU DERMABOND ADVANCED .7ML DNX12

## (undated) DEVICE — SYR 03ML LL W/O NDL 309657

## (undated) DEVICE — BNDG ABDOMINAL BINDER 9X62-84" 79-89210

## (undated) DEVICE — SUCTION CANISTER MEDIVAC LINER 3000ML W/LID 65651-530

## (undated) DEVICE — BLADE SHAVER ARTHRO 3.5MM FULL RADIUS C9248

## (undated) DEVICE — STPL ENDO RELOAD 60MM MEDIUM THICK PURPLE EGIA60AMT

## (undated) DEVICE — BASIN SET MAJOR

## (undated) DEVICE — SUTURE VICRYL+ 1 CT-1 36" VCP347H

## (undated) DEVICE — NDL 18GA 1.5" 305196

## (undated) DEVICE — BONE CLEANING TIP INTERPULSE  0210-010-000

## (undated) DEVICE — CAST PADDING 4" STERILE 9044S

## (undated) DEVICE — SU VICRYL+ 0 MO-4 8X18IN VIO VCP701D

## (undated) RX ORDER — GABAPENTIN 300 MG/1
CAPSULE ORAL
Status: DISPENSED
Start: 2020-12-15

## (undated) RX ORDER — CEFAZOLIN SODIUM/WATER 2 G/20 ML
SYRINGE (ML) INTRAVENOUS
Status: DISPENSED
Start: 2025-08-14

## (undated) RX ORDER — PROPOFOL 10 MG/ML
INJECTION, EMULSION INTRAVENOUS
Status: DISPENSED
Start: 2020-12-15

## (undated) RX ORDER — LIDOCAINE HYDROCHLORIDE 5 MG/ML
INJECTION, SOLUTION INFILTRATION; INTRAVENOUS
Status: DISPENSED
Start: 2024-07-22

## (undated) RX ORDER — MEPERIDINE HYDROCHLORIDE 25 MG/ML
INJECTION INTRAMUSCULAR; INTRAVENOUS; SUBCUTANEOUS
Status: DISPENSED
Start: 2020-02-28

## (undated) RX ORDER — FENTANYL CITRATE 50 UG/ML
INJECTION, SOLUTION INTRAMUSCULAR; INTRAVENOUS
Status: DISPENSED
Start: 2025-08-14

## (undated) RX ORDER — PROPOFOL 10 MG/ML
INJECTION, EMULSION INTRAVENOUS
Status: DISPENSED
Start: 2025-02-18

## (undated) RX ORDER — LIDOCAINE HYDROCHLORIDE 10 MG/ML
INJECTION, SOLUTION EPIDURAL; INFILTRATION; INTRACAUDAL; PERINEURAL
Status: DISPENSED
Start: 2025-02-18

## (undated) RX ORDER — FENTANYL CITRATE-0.9 % NACL/PF 10 MCG/ML
PLASTIC BAG, INJECTION (ML) INTRAVENOUS
Status: DISPENSED
Start: 2025-08-14

## (undated) RX ORDER — ACETAMINOPHEN 325 MG/1
TABLET ORAL
Status: DISPENSED
Start: 2025-08-14

## (undated) RX ORDER — ONDANSETRON 2 MG/ML
INJECTION INTRAMUSCULAR; INTRAVENOUS
Status: DISPENSED
Start: 2020-10-02

## (undated) RX ORDER — CEFAZOLIN SODIUM/WATER 2 G/20 ML
SYRINGE (ML) INTRAVENOUS
Status: DISPENSED
Start: 2025-02-18

## (undated) RX ORDER — PROPOFOL 10 MG/ML
INJECTION, EMULSION INTRAVENOUS
Status: DISPENSED
Start: 2025-08-14

## (undated) RX ORDER — ONDANSETRON 2 MG/ML
INJECTION INTRAMUSCULAR; INTRAVENOUS
Status: DISPENSED
Start: 2020-02-28

## (undated) RX ORDER — OXYCODONE HYDROCHLORIDE 5 MG/1
TABLET ORAL
Status: DISPENSED
Start: 2020-02-28

## (undated) RX ORDER — HYDROMORPHONE HYDROCHLORIDE 2 MG/1
TABLET ORAL
Status: DISPENSED
Start: 2025-08-14

## (undated) RX ORDER — CEFAZOLIN SODIUM 2 G/100ML
INJECTION, SOLUTION INTRAVENOUS
Status: DISPENSED
Start: 2020-02-28

## (undated) RX ORDER — PROPOFOL 10 MG/ML
INJECTION, EMULSION INTRAVENOUS
Status: DISPENSED
Start: 2018-10-09

## (undated) RX ORDER — HYDROMORPHONE HYDROCHLORIDE 1 MG/ML
INJECTION, SOLUTION INTRAMUSCULAR; INTRAVENOUS; SUBCUTANEOUS
Status: DISPENSED
Start: 2025-08-14

## (undated) RX ORDER — HYDROMORPHONE HYDROCHLORIDE 1 MG/ML
INJECTION, SOLUTION INTRAMUSCULAR; INTRAVENOUS; SUBCUTANEOUS
Status: DISPENSED
Start: 2018-10-09

## (undated) RX ORDER — FENTANYL CITRATE 50 UG/ML
INJECTION, SOLUTION INTRAMUSCULAR; INTRAVENOUS
Status: DISPENSED
Start: 2018-10-09

## (undated) RX ORDER — ONDANSETRON 2 MG/ML
INJECTION INTRAMUSCULAR; INTRAVENOUS
Status: DISPENSED
Start: 2025-08-14

## (undated) RX ORDER — FENTANYL CITRATE 50 UG/ML
INJECTION, SOLUTION INTRAMUSCULAR; INTRAVENOUS
Status: DISPENSED
Start: 2020-12-15

## (undated) RX ORDER — GLYCOPYRROLATE 0.2 MG/ML
INJECTION INTRAMUSCULAR; INTRAVENOUS
Status: DISPENSED
Start: 2020-12-15

## (undated) RX ORDER — BUPIVACAINE HYDROCHLORIDE AND EPINEPHRINE 2.5; 5 MG/ML; UG/ML
INJECTION, SOLUTION INFILTRATION; PERINEURAL
Status: DISPENSED
Start: 2020-10-02

## (undated) RX ORDER — DEXAMETHASONE SODIUM PHOSPHATE 4 MG/ML
INJECTION, SOLUTION INTRA-ARTICULAR; INTRALESIONAL; INTRAMUSCULAR; INTRAVENOUS; SOFT TISSUE
Status: DISPENSED
Start: 2025-02-18

## (undated) RX ORDER — FENTANYL CITRATE 50 UG/ML
INJECTION, SOLUTION INTRAMUSCULAR; INTRAVENOUS
Status: DISPENSED
Start: 2020-02-28

## (undated) RX ORDER — FENTANYL CITRATE 50 UG/ML
INJECTION, SOLUTION INTRAMUSCULAR; INTRAVENOUS
Status: DISPENSED
Start: 2025-02-18

## (undated) RX ORDER — ACETAMINOPHEN 325 MG/1
TABLET ORAL
Status: DISPENSED
Start: 2025-02-18

## (undated) RX ORDER — LIDOCAINE HYDROCHLORIDE 20 MG/ML
INJECTION, SOLUTION EPIDURAL; INFILTRATION; INTRACAUDAL; PERINEURAL
Status: DISPENSED
Start: 2020-10-02

## (undated) RX ORDER — DEXAMETHASONE SODIUM PHOSPHATE 4 MG/ML
INJECTION, SOLUTION INTRA-ARTICULAR; INTRALESIONAL; INTRAMUSCULAR; INTRAVENOUS; SOFT TISSUE
Status: DISPENSED
Start: 2018-10-09

## (undated) RX ORDER — HYDROMORPHONE HYDROCHLORIDE 1 MG/ML
INJECTION, SOLUTION INTRAMUSCULAR; INTRAVENOUS; SUBCUTANEOUS
Status: DISPENSED
Start: 2020-10-02

## (undated) RX ORDER — KETOROLAC TROMETHAMINE 30 MG/ML
INJECTION, SOLUTION INTRAMUSCULAR; INTRAVENOUS
Status: DISPENSED
Start: 2020-02-28

## (undated) RX ORDER — LIDOCAINE HYDROCHLORIDE 20 MG/ML
INJECTION, SOLUTION EPIDURAL; INFILTRATION; INTRACAUDAL; PERINEURAL
Status: DISPENSED
Start: 2020-12-15

## (undated) RX ORDER — BUPIVACAINE HYDROCHLORIDE AND EPINEPHRINE 2.5; 5 MG/ML; UG/ML
INJECTION, SOLUTION EPIDURAL; INFILTRATION; INTRACAUDAL; PERINEURAL
Status: DISPENSED
Start: 2018-10-09

## (undated) RX ORDER — HYDROMORPHONE HYDROCHLORIDE 1 MG/ML
INJECTION, SOLUTION INTRAMUSCULAR; INTRAVENOUS; SUBCUTANEOUS
Status: DISPENSED
Start: 2020-02-28

## (undated) RX ORDER — CEFAZOLIN SODIUM IN 0.9 % NACL 3 G/100 ML
INTRAVENOUS SOLUTION, PIGGYBACK (ML) INTRAVENOUS
Status: DISPENSED
Start: 2018-10-09

## (undated) RX ORDER — DEXAMETHASONE SODIUM PHOSPHATE 4 MG/ML
INJECTION, SOLUTION INTRA-ARTICULAR; INTRALESIONAL; INTRAMUSCULAR; INTRAVENOUS; SOFT TISSUE
Status: DISPENSED
Start: 2020-02-28

## (undated) RX ORDER — ACETAMINOPHEN 325 MG/1
TABLET ORAL
Status: DISPENSED
Start: 2020-02-28

## (undated) RX ORDER — FENTANYL CITRATE 50 UG/ML
INJECTION, SOLUTION INTRAMUSCULAR; INTRAVENOUS
Status: DISPENSED
Start: 2020-10-02

## (undated) RX ORDER — BUPIVACAINE HYDROCHLORIDE AND EPINEPHRINE 5; 5 MG/ML; UG/ML
INJECTION, SOLUTION PERINEURAL
Status: DISPENSED
Start: 2020-02-28

## (undated) RX ORDER — CEFAZOLIN SODIUM 2 G/100ML
INJECTION, SOLUTION INTRAVENOUS
Status: DISPENSED
Start: 2020-10-02

## (undated) RX ORDER — LIDOCAINE HYDROCHLORIDE 20 MG/ML
INJECTION, SOLUTION EPIDURAL; INFILTRATION; INTRACAUDAL; PERINEURAL
Status: DISPENSED
Start: 2020-02-28

## (undated) RX ORDER — TRANEXAMIC ACID 650 MG/1
TABLET ORAL
Status: DISPENSED
Start: 2025-08-14

## (undated) RX ORDER — DEXAMETHASONE SODIUM PHOSPHATE 4 MG/ML
INJECTION, SOLUTION INTRA-ARTICULAR; INTRALESIONAL; INTRAMUSCULAR; INTRAVENOUS; SOFT TISSUE
Status: DISPENSED
Start: 2020-12-15

## (undated) RX ORDER — CEFAZOLIN SODIUM 1 G/3ML
INJECTION, POWDER, FOR SOLUTION INTRAMUSCULAR; INTRAVENOUS
Status: DISPENSED
Start: 2020-10-02

## (undated) RX ORDER — PROPOFOL 10 MG/ML
INJECTION, EMULSION INTRAVENOUS
Status: DISPENSED
Start: 2020-02-28

## (undated) RX ORDER — ONDANSETRON 2 MG/ML
INJECTION INTRAMUSCULAR; INTRAVENOUS
Status: DISPENSED
Start: 2020-12-15

## (undated) RX ORDER — FENTANYL CITRATE-0.9 % NACL/PF 10 MCG/ML
PLASTIC BAG, INJECTION (ML) INTRAVENOUS
Status: DISPENSED
Start: 2020-10-02

## (undated) RX ORDER — PHENYLEPHRINE HCL IN 0.9% NACL 1 MG/10 ML
SYRINGE (ML) INTRAVENOUS
Status: DISPENSED
Start: 2020-02-28

## (undated) RX ORDER — TRANEXAMIC ACID 650 MG/1
TABLET ORAL
Status: DISPENSED
Start: 2020-10-02

## (undated) RX ORDER — HYDROMORPHONE HYDROCHLORIDE 2 MG/1
TABLET ORAL
Status: DISPENSED
Start: 2025-02-18

## (undated) RX ORDER — TRIAMCINOLONE ACETONIDE 40 MG/ML
INJECTION, SUSPENSION INTRA-ARTICULAR; INTRAMUSCULAR
Status: DISPENSED
Start: 2021-02-03

## (undated) RX ORDER — ACETAMINOPHEN 325 MG/1
TABLET ORAL
Status: DISPENSED
Start: 2020-12-15

## (undated) RX ORDER — ONDANSETRON 2 MG/ML
INJECTION INTRAMUSCULAR; INTRAVENOUS
Status: DISPENSED
Start: 2025-02-18

## (undated) RX ORDER — HYDROMORPHONE HYDROCHLORIDE 2 MG/1
TABLET ORAL
Status: DISPENSED
Start: 2020-12-15

## (undated) RX ORDER — EPINEPHRINE 1 MG/ML
INJECTION, SOLUTION INTRAMUSCULAR; SUBCUTANEOUS
Status: DISPENSED
Start: 2018-10-09

## (undated) RX ORDER — HEPARIN SODIUM 5000 [USP'U]/.5ML
INJECTION, SOLUTION INTRAVENOUS; SUBCUTANEOUS
Status: DISPENSED
Start: 2018-10-09

## (undated) RX ORDER — ACETAMINOPHEN 325 MG/1
TABLET ORAL
Status: DISPENSED
Start: 2020-10-02

## (undated) RX ORDER — LIDOCAINE HYDROCHLORIDE 10 MG/ML
INJECTION, SOLUTION INFILTRATION; PERINEURAL
Status: DISPENSED
Start: 2018-10-09

## (undated) RX ORDER — ONDANSETRON 2 MG/ML
INJECTION INTRAMUSCULAR; INTRAVENOUS
Status: DISPENSED
Start: 2018-10-09

## (undated) RX ORDER — HYDROMORPHONE HYDROCHLORIDE 1 MG/ML
INJECTION, SOLUTION INTRAMUSCULAR; INTRAVENOUS; SUBCUTANEOUS
Status: DISPENSED
Start: 2020-12-15

## (undated) RX ORDER — LIDOCAINE HYDROCHLORIDE 20 MG/ML
INJECTION, SOLUTION EPIDURAL; INFILTRATION; INTRACAUDAL; PERINEURAL
Status: DISPENSED
Start: 2018-10-09

## (undated) RX ORDER — CEFAZOLIN SODIUM 1 G/3ML
INJECTION, POWDER, FOR SOLUTION INTRAMUSCULAR; INTRAVENOUS
Status: DISPENSED
Start: 2020-02-28

## (undated) RX ORDER — LIDOCAINE HYDROCHLORIDE 5 MG/ML
INJECTION, SOLUTION INFILTRATION; INTRAVENOUS
Status: DISPENSED
Start: 2021-02-03

## (undated) RX ORDER — KETOROLAC TROMETHAMINE 15 MG/ML
INJECTION, SOLUTION INTRAMUSCULAR; INTRAVENOUS
Status: DISPENSED
Start: 2025-02-18

## (undated) RX ORDER — PROPOFOL 10 MG/ML
INJECTION, EMULSION INTRAVENOUS
Status: DISPENSED
Start: 2020-10-02

## (undated) RX ORDER — KETOROLAC TROMETHAMINE 30 MG/ML
INJECTION, SOLUTION INTRAMUSCULAR; INTRAVENOUS
Status: DISPENSED
Start: 2020-12-15

## (undated) RX ORDER — CEFAZOLIN SODIUM 1 G/3ML
INJECTION, POWDER, FOR SOLUTION INTRAMUSCULAR; INTRAVENOUS
Status: DISPENSED
Start: 2020-12-15

## (undated) RX ORDER — TRANEXAMIC ACID 650 MG/1
TABLET ORAL
Status: DISPENSED
Start: 2025-02-18

## (undated) RX ORDER — TRIAMCINOLONE ACETONIDE 40 MG/ML
INJECTION, SUSPENSION INTRA-ARTICULAR; INTRAMUSCULAR
Status: DISPENSED
Start: 2024-07-22

## (undated) RX ORDER — KETOROLAC TROMETHAMINE 30 MG/ML
INJECTION, SOLUTION INTRAMUSCULAR; INTRAVENOUS
Status: DISPENSED
Start: 2018-10-09

## (undated) RX ORDER — SODIUM CHLORIDE, SODIUM LACTATE, POTASSIUM CHLORIDE, CALCIUM CHLORIDE 600; 310; 30; 20 MG/100ML; MG/100ML; MG/100ML; MG/100ML
INJECTION, SOLUTION INTRAVENOUS
Status: DISPENSED
Start: 2020-10-02